# Patient Record
Sex: FEMALE | Race: WHITE | NOT HISPANIC OR LATINO | Employment: FULL TIME | URBAN - METROPOLITAN AREA
[De-identification: names, ages, dates, MRNs, and addresses within clinical notes are randomized per-mention and may not be internally consistent; named-entity substitution may affect disease eponyms.]

---

## 2017-01-01 ENCOUNTER — TRANSFERRED RECORDS (OUTPATIENT)
Dept: HEALTH INFORMATION MANAGEMENT | Facility: CLINIC | Age: 42
End: 2017-01-01

## 2017-01-01 LAB — PAP SMEAR - HIM PATIENT REPORTED: NEGATIVE

## 2019-02-06 ENCOUNTER — OFFICE VISIT (OUTPATIENT)
Dept: FAMILY MEDICINE | Facility: CLINIC | Age: 44
End: 2019-02-06
Payer: MEDICAID

## 2019-02-06 VITALS
SYSTOLIC BLOOD PRESSURE: 96 MMHG | TEMPERATURE: 98.2 F | DIASTOLIC BLOOD PRESSURE: 60 MMHG | WEIGHT: 168 LBS | BODY MASS INDEX: 27.12 KG/M2 | HEART RATE: 84 BPM

## 2019-02-06 DIAGNOSIS — M79.631 PAIN OF RIGHT FOREARM: Primary | ICD-10-CM

## 2019-02-06 PROCEDURE — 99214 OFFICE O/P EST MOD 30 MIN: CPT | Performed by: FAMILY MEDICINE

## 2019-02-06 RX ORDER — DOXYCYCLINE 100 MG/1
100 CAPSULE ORAL 2 TIMES DAILY
Qty: 20 CAPSULE | Refills: 0 | Status: SHIPPED | OUTPATIENT
Start: 2019-02-06 | End: 2019-03-16

## 2019-02-06 NOTE — PROGRESS NOTES
SUBJECTIVE:   Jihan Gill is a 43 year old female who presents to clinic today for the following health issues:      Concern - redness right arm  Onset: 2-3 days     Therapies Tried and outcome: NONE  Woke up one day and noticed some redness in the right forearm area with some tenderness and some swelling.  It has been painful.    Problem list and histories reviewed & adjusted, as indicated.      Patient Active Problem List   Diagnosis     Degeneration of lumbar or lumbosacral intervertebral disc     Major depression, recurrent (H)     Opiate addiction (H)     Methadone overdose (H)     Suicide attempt (H)     Depression     Past Surgical History:   Procedure Laterality Date     C/SECTION, LOW TRANSVERSE      [id number]     ENT SURGERY       GYN SURGERY       TONSILLECTOMY         Social History     Tobacco Use     Smoking status: Never Smoker     Smokeless tobacco: Never Used   Substance Use Topics     Alcohol use: Yes     Comment: States no EtOH since 2008.     No family history on file.      Current Outpatient Medications   Medication Sig Dispense Refill     doxycycline hyclate (VIBRAMYCIN) 100 MG capsule Take 1 capsule (100 mg) by mouth 2 times daily for 10 days 20 capsule 0     FLUoxetine (PROZAC) 40 MG capsule Take 1 capsule (40 mg) by mouth daily 30 capsule 1     gabapentin (NEURONTIN) 300 MG capsule Take 1 capsule (300 mg) by mouth 3 times daily 90 capsule 0     MULTIPLE VITAMIN PO Take  by mouth.       FLUoxetine (PROZAC) 40 MG capsule Take 1 capsule (40 mg) by mouth daily 30 capsule 0     methadone (DOLPHINE-INTENSOL) 10 mg/mL CONC Take 140 mg by mouth daily         Reviewed and updated as needed this visit by clinical staff  Tobacco  Allergies  Meds  Soc Hx      Reviewed and updated as needed this visit by Provider         ROS:  CONSTITUTIONAL: NEGATIVE for fever, chills, change in weight  ENT/MOUTH: NEGATIVE for ear, mouth and throat problems  RESP: NEGATIVE for significant cough or SOB  CV:  NEGATIVE for chest pain, palpitations or peripheral edema  MUSCULOSKELETAL: POSITIVE  for arm pain.    OBJECTIVE:                                                    BP 96/60   Pulse 84   Temp 98.2  F (36.8  C) (Tympanic)   Wt 76.2 kg (168 lb)   LMP 01/23/2019 (Approximate)   BMI 27.12 kg/m    Body mass index is 27.12 kg/m .   GENERAL: healthy, alert, well nourished, well hydrated, no distress  NECK: no tenderness, no adenopathy, no asymmetry, no masses, no stiffness; thyroid- normal to palpation  RESP: lungs clear to auscultation - no rales, no rhonchi, no wheezes  CV: regular rates and rhythm, normal S1 S2, no S3 or S4 and no murmur, no click or rub -  Right forearm area has a small area with tenderness and some induration and possible some pain with swelling.     ASSESSMENT/PLAN:                                                        ICD-10-CM    1. Pain of right forearm M79.631 US Extremity Upper Venous  lt     doxycycline hyclate (VIBRAMYCIN) 100 MG capsule     Exact etiology of forearm swelling and area of redness is not clear it could be underlying small cellulitis versus some bite versus superficial clot.  I would suggest to get an ultrasound of upper extremity meanwhile I will start her on doxycycline for possible underlying cellulitis.  Once ultrasound is done if there is no clot I would suggest continue doxycycline.  Warm compresses and follow-up if is not improving.  Warning signs were discussed with the patient    Rickie Duval MD  [facility]

## 2019-02-06 NOTE — Clinical Note
Please abstract the following data from this visit with this patient into the appropriate field in Epic:Pap smear done on this date: 1/2017 (approximately), by this group: [place], results were normal.

## 2019-03-16 ENCOUNTER — APPOINTMENT (OUTPATIENT)
Dept: GENERAL RADIOLOGY | Facility: CLINIC | Age: 44
End: 2019-03-16
Attending: INTERNAL MEDICINE
Payer: MEDICAID

## 2019-03-16 ENCOUNTER — HOSPITAL ENCOUNTER (INPATIENT)
Facility: CLINIC | Age: 44
LOS: 4 days | Discharge: HOME | End: 2019-03-20
Attending: EMERGENCY MEDICINE | Admitting: INTERNAL MEDICINE
Payer: MEDICAID

## 2019-03-16 DIAGNOSIS — L29.9 ITCHING: ICD-10-CM

## 2019-03-16 DIAGNOSIS — T42.6X2A GABAPENTIN OVERDOSE, INTENTIONAL SELF-HARM, INITIAL ENCOUNTER (H): ICD-10-CM

## 2019-03-16 DIAGNOSIS — F33.9 EPISODE OF RECURRENT MAJOR DEPRESSIVE DISORDER, UNSPECIFIED DEPRESSION EPISODE SEVERITY (H): ICD-10-CM

## 2019-03-16 DIAGNOSIS — T14.91XA SUICIDE ATTEMPT (H): ICD-10-CM

## 2019-03-16 DIAGNOSIS — T56.892A: ICD-10-CM

## 2019-03-16 DIAGNOSIS — F41.9 ANXIETY: ICD-10-CM

## 2019-03-16 DIAGNOSIS — R10.31 GROIN PAIN, RIGHT: Primary | ICD-10-CM

## 2019-03-16 LAB
ALBUMIN SERPL-MCNC: 4.1 G/DL (ref 3.4–5)
ALP SERPL-CCNC: 99 U/L (ref 40–150)
ALT SERPL W P-5'-P-CCNC: 303 U/L (ref 0–50)
ANION GAP SERPL CALCULATED.3IONS-SCNC: 1 MMOL/L (ref 3–14)
APAP SERPL-MCNC: <2 MG/L (ref 10–20)
AST SERPL W P-5'-P-CCNC: 207 U/L (ref 0–45)
BASOPHILS # BLD AUTO: 0 10E9/L (ref 0–0.2)
BASOPHILS NFR BLD AUTO: 0.3 %
BILIRUB SERPL-MCNC: 0.6 MG/DL (ref 0.2–1.3)
BUN SERPL-MCNC: 6 MG/DL (ref 7–30)
CALCIUM SERPL-MCNC: 9.6 MG/DL (ref 8.5–10.1)
CHLORIDE SERPL-SCNC: 108 MMOL/L (ref 94–109)
CO2 SERPL-SCNC: 32 MMOL/L (ref 20–32)
CREAT SERPL-MCNC: 0.62 MG/DL (ref 0.52–1.04)
DIFFERENTIAL METHOD BLD: ABNORMAL
EOSINOPHIL # BLD AUTO: 0.2 10E9/L (ref 0–0.7)
EOSINOPHIL NFR BLD AUTO: 2.8 %
ERYTHROCYTE [DISTWIDTH] IN BLOOD BY AUTOMATED COUNT: 15.3 % (ref 10–15)
ETHANOL SERPL-MCNC: <0.01 G/DL
GFR SERPL CREATININE-BSD FRML MDRD: >90 ML/MIN/{1.73_M2}
GLUCOSE BLDC GLUCOMTR-MCNC: 179 MG/DL (ref 70–99)
GLUCOSE BLDC GLUCOMTR-MCNC: 41 MG/DL (ref 70–99)
GLUCOSE SERPL-MCNC: 81 MG/DL (ref 70–99)
HCT VFR BLD AUTO: 35.6 % (ref 35–47)
HGB BLD-MCNC: 12 G/DL (ref 11.7–15.7)
IMM GRANULOCYTES # BLD: 0 10E9/L (ref 0–0.4)
IMM GRANULOCYTES NFR BLD: 0.1 %
LITHIUM SERPL-SCNC: 1.95 MMOL/L (ref 0.6–1.2)
LYMPHOCYTES # BLD AUTO: 2.7 10E9/L (ref 0.8–5.3)
LYMPHOCYTES NFR BLD AUTO: 37.8 %
MCH RBC QN AUTO: 30.8 PG (ref 26.5–33)
MCHC RBC AUTO-ENTMCNC: 33.7 G/DL (ref 31.5–36.5)
MCV RBC AUTO: 91 FL (ref 78–100)
MONOCYTES # BLD AUTO: 0.4 10E9/L (ref 0–1.3)
MONOCYTES NFR BLD AUTO: 6.1 %
NEUTROPHILS # BLD AUTO: 3.8 10E9/L (ref 1.6–8.3)
NEUTROPHILS NFR BLD AUTO: 52.9 %
NRBC # BLD AUTO: 0 10*3/UL
NRBC BLD AUTO-RTO: 0 /100
PLATELET # BLD AUTO: 300 10E9/L (ref 150–450)
POTASSIUM SERPL-SCNC: 3.8 MMOL/L (ref 3.4–5.3)
PROT SERPL-MCNC: 7.9 G/DL (ref 6.8–8.8)
RBC # BLD AUTO: 3.9 10E12/L (ref 3.8–5.2)
SALICYLATES SERPL-MCNC: <2 MG/DL
SODIUM SERPL-SCNC: 141 MMOL/L (ref 133–144)
WBC # BLD AUTO: 7.2 10E9/L (ref 4–11)

## 2019-03-16 PROCEDURE — 85025 COMPLETE CBC W/AUTO DIFF WBC: CPT | Performed by: EMERGENCY MEDICINE

## 2019-03-16 PROCEDURE — 96375 TX/PRO/DX INJ NEW DRUG ADDON: CPT

## 2019-03-16 PROCEDURE — 96376 TX/PRO/DX INJ SAME DRUG ADON: CPT

## 2019-03-16 PROCEDURE — 25000128 H RX IP 250 OP 636

## 2019-03-16 PROCEDURE — 20000003 ZZH R&B ICU

## 2019-03-16 PROCEDURE — 99291 CRITICAL CARE FIRST HOUR: CPT | Performed by: INTERNAL MEDICINE

## 2019-03-16 PROCEDURE — 40000986 XR ABDOMEN PORT 1 VW

## 2019-03-16 PROCEDURE — 96374 THER/PROPH/DIAG INJ IV PUSH: CPT

## 2019-03-16 PROCEDURE — 99285 EMERGENCY DEPT VISIT HI MDM: CPT

## 2019-03-16 PROCEDURE — 93005 ELECTROCARDIOGRAM TRACING: CPT

## 2019-03-16 PROCEDURE — 80329 ANALGESICS NON-OPIOID 1 OR 2: CPT | Performed by: EMERGENCY MEDICINE

## 2019-03-16 PROCEDURE — 00000146 ZZHCL STATISTIC GLUCOSE BY METER IP

## 2019-03-16 PROCEDURE — 25800025 ZZH RX 258

## 2019-03-16 PROCEDURE — 80053 COMPREHEN METABOLIC PANEL: CPT | Performed by: EMERGENCY MEDICINE

## 2019-03-16 PROCEDURE — 80178 ASSAY OF LITHIUM: CPT | Performed by: EMERGENCY MEDICINE

## 2019-03-16 PROCEDURE — 25800030 ZZH RX IP 258 OP 636: Performed by: INTERNAL MEDICINE

## 2019-03-16 PROCEDURE — 80320 DRUG SCREEN QUANTALCOHOLS: CPT | Performed by: EMERGENCY MEDICINE

## 2019-03-16 PROCEDURE — 25000132 ZZH RX MED GY IP 250 OP 250 PS 637: Performed by: EMERGENCY MEDICINE

## 2019-03-16 PROCEDURE — 25000128 H RX IP 250 OP 636: Performed by: EMERGENCY MEDICINE

## 2019-03-16 RX ORDER — NALOXONE HYDROCHLORIDE 0.4 MG/ML
.1-.4 INJECTION, SOLUTION INTRAMUSCULAR; INTRAVENOUS; SUBCUTANEOUS
Status: DISCONTINUED | OUTPATIENT
Start: 2019-03-16 | End: 2019-03-20 | Stop reason: HOSPADM

## 2019-03-16 RX ORDER — LORAZEPAM 2 MG/ML
.5-1 INJECTION INTRAMUSCULAR EVERY 4 HOURS PRN
Status: DISCONTINUED | OUTPATIENT
Start: 2019-03-16 | End: 2019-03-17

## 2019-03-16 RX ORDER — GABAPENTIN 600 MG/1
600 TABLET ORAL 3 TIMES DAILY
Status: ON HOLD | COMMUNITY
End: 2019-03-20

## 2019-03-16 RX ORDER — ONDANSETRON 2 MG/ML
4 INJECTION INTRAMUSCULAR; INTRAVENOUS ONCE
Status: COMPLETED | OUTPATIENT
Start: 2019-03-16 | End: 2019-03-16

## 2019-03-16 RX ORDER — LORAZEPAM 2 MG/ML
INJECTION INTRAMUSCULAR
Status: COMPLETED
Start: 2019-03-16 | End: 2019-03-16

## 2019-03-16 RX ORDER — DEXTROSE MONOHYDRATE 25 G/50ML
INJECTION, SOLUTION INTRAVENOUS
Status: COMPLETED
Start: 2019-03-16 | End: 2019-03-16

## 2019-03-16 RX ORDER — ALPRAZOLAM 1 MG
1 TABLET ORAL 3 TIMES DAILY PRN
Status: ON HOLD | COMMUNITY
End: 2019-03-20

## 2019-03-16 RX ORDER — LORAZEPAM 2 MG/ML
1 INJECTION INTRAMUSCULAR ONCE
Status: COMPLETED | OUTPATIENT
Start: 2019-03-16 | End: 2019-03-16

## 2019-03-16 RX ORDER — LITHIUM CARBONATE 300 MG
300 TABLET ORAL 2 TIMES DAILY
Status: ON HOLD | COMMUNITY
End: 2019-03-20

## 2019-03-16 RX ADMIN — LORAZEPAM 1 MG: 2 INJECTION INTRAMUSCULAR at 21:22

## 2019-03-16 RX ADMIN — LORAZEPAM 1 MG: 2 INJECTION INTRAMUSCULAR; INTRAVENOUS at 20:35

## 2019-03-16 RX ADMIN — LORAZEPAM 1 MG: 2 INJECTION INTRAMUSCULAR; INTRAVENOUS at 21:22

## 2019-03-16 RX ADMIN — DEXTROSE MONOHYDRATE 50 ML: 25 INJECTION, SOLUTION INTRAVENOUS at 22:15

## 2019-03-16 RX ADMIN — POLYETHYLENE GLYCOL 3350, SODIUM SULFATE ANHYDROUS, SODIUM BICARBONATE, SODIUM CHLORIDE, POTASSIUM CHLORIDE 4000 ML: 236; 22.74; 6.74; 5.86; 2.97 POWDER, FOR SOLUTION ORAL at 21:05

## 2019-03-16 RX ADMIN — DEXTROSE AND SODIUM CHLORIDE: 5; 900 INJECTION, SOLUTION INTRAVENOUS at 23:13

## 2019-03-16 RX ADMIN — ONDANSETRON 4 MG: 2 INJECTION INTRAMUSCULAR; INTRAVENOUS at 20:35

## 2019-03-16 ASSESSMENT — ENCOUNTER SYMPTOMS
PALPITATIONS: 0
ABDOMINAL PAIN: 0
AGITATION: 0
SHORTNESS OF BREATH: 0
NAUSEA: 1
HEADACHES: 0
VOMITING: 0

## 2019-03-16 ASSESSMENT — MIFFLIN-ST. JEOR
SCORE: 1353.08
SCORE: 1385

## 2019-03-17 PROBLEM — F31.81 BIPOLAR II DISORDER (H): Status: ACTIVE | Noted: 2019-02-23

## 2019-03-17 PROBLEM — F60.3 BORDERLINE PERSONALITY DISORDER (H): Status: ACTIVE | Noted: 2019-02-24

## 2019-03-17 PROBLEM — B17.10 ACUTE HEPATITIS C VIRUS INFECTION: Status: ACTIVE | Noted: 2019-03-17

## 2019-03-17 LAB
ANION GAP SERPL CALCULATED.3IONS-SCNC: 1 MMOL/L (ref 3–14)
ANION GAP SERPL CALCULATED.3IONS-SCNC: 2 MMOL/L (ref 3–14)
ANION GAP SERPL CALCULATED.3IONS-SCNC: 3 MMOL/L (ref 3–14)
BUN SERPL-MCNC: 2 MG/DL (ref 7–30)
BUN SERPL-MCNC: 4 MG/DL (ref 7–30)
BUN SERPL-MCNC: 5 MG/DL (ref 7–30)
BUN SERPL-MCNC: 5 MG/DL (ref 7–30)
BUN SERPL-MCNC: <1 MG/DL (ref 7–30)
CALCIUM SERPL-MCNC: 8.8 MG/DL (ref 8.5–10.1)
CALCIUM SERPL-MCNC: 8.8 MG/DL (ref 8.5–10.1)
CALCIUM SERPL-MCNC: 8.9 MG/DL (ref 8.5–10.1)
CALCIUM SERPL-MCNC: 9.2 MG/DL (ref 8.5–10.1)
CALCIUM SERPL-MCNC: 9.7 MG/DL (ref 8.5–10.1)
CHLORIDE SERPL-SCNC: 105 MMOL/L (ref 94–109)
CHLORIDE SERPL-SCNC: 106 MMOL/L (ref 94–109)
CHLORIDE SERPL-SCNC: 107 MMOL/L (ref 94–109)
CHLORIDE SERPL-SCNC: 107 MMOL/L (ref 94–109)
CHLORIDE SERPL-SCNC: 108 MMOL/L (ref 94–109)
CO2 SERPL-SCNC: 29 MMOL/L (ref 20–32)
CO2 SERPL-SCNC: 30 MMOL/L (ref 20–32)
CREAT SERPL-MCNC: 0.35 MG/DL (ref 0.52–1.04)
CREAT SERPL-MCNC: 0.65 MG/DL (ref 0.52–1.04)
CREAT SERPL-MCNC: 0.66 MG/DL (ref 0.52–1.04)
CREAT SERPL-MCNC: 0.67 MG/DL (ref 0.52–1.04)
CREAT SERPL-MCNC: 0.68 MG/DL (ref 0.52–1.04)
ERYTHROCYTE [DISTWIDTH] IN BLOOD BY AUTOMATED COUNT: 15 % (ref 10–15)
GFR SERPL CREATININE-BSD FRML MDRD: >90 ML/MIN/{1.73_M2}
GLUCOSE BLDC GLUCOMTR-MCNC: 116 MG/DL (ref 70–99)
GLUCOSE BLDC GLUCOMTR-MCNC: 122 MG/DL (ref 70–99)
GLUCOSE BLDC GLUCOMTR-MCNC: 125 MG/DL (ref 70–99)
GLUCOSE BLDC GLUCOMTR-MCNC: 197 MG/DL (ref 70–99)
GLUCOSE BLDC GLUCOMTR-MCNC: 68 MG/DL (ref 70–99)
GLUCOSE BLDC GLUCOMTR-MCNC: 68 MG/DL (ref 70–99)
GLUCOSE BLDC GLUCOMTR-MCNC: 80 MG/DL (ref 70–99)
GLUCOSE BLDC GLUCOMTR-MCNC: 83 MG/DL (ref 70–99)
GLUCOSE BLDC GLUCOMTR-MCNC: 84 MG/DL (ref 70–99)
GLUCOSE SERPL-MCNC: 106 MG/DL (ref 70–99)
GLUCOSE SERPL-MCNC: 149 MG/DL (ref 70–99)
GLUCOSE SERPL-MCNC: 78 MG/DL (ref 70–99)
GLUCOSE SERPL-MCNC: 88 MG/DL (ref 70–99)
GLUCOSE SERPL-MCNC: 97 MG/DL (ref 70–99)
HCT VFR BLD AUTO: 35.2 % (ref 35–47)
HGB BLD-MCNC: 11.6 G/DL (ref 11.7–15.7)
INTERPRETATION ECG - MUSE: NORMAL
LITHIUM SERPL-SCNC: 0.61 MMOL/L (ref 0.6–1.2)
LITHIUM SERPL-SCNC: 0.67 MMOL/L (ref 0.6–1.2)
LITHIUM SERPL-SCNC: 0.87 MMOL/L (ref 0.6–1.2)
LITHIUM SERPL-SCNC: 0.99 MMOL/L (ref 0.6–1.2)
LITHIUM SERPL-SCNC: 1.01 MMOL/L (ref 0.6–1.2)
LITHIUM SERPL-SCNC: 1.17 MMOL/L (ref 0.6–1.2)
LITHIUM SERPL-SCNC: 1.21 MMOL/L (ref 0.6–1.2)
LITHIUM SERPL-SCNC: 1.28 MMOL/L (ref 0.6–1.2)
LITHIUM SERPL-SCNC: 1.71 MMOL/L (ref 0.6–1.2)
LITHIUM SERPL-SCNC: 2.74 MMOL/L (ref 0.6–1.2)
LITHIUM SERPL-SCNC: >6 MMOL/L (ref 0.6–1.2)
MAGNESIUM SERPL-MCNC: 2 MG/DL (ref 1.6–2.3)
MCH RBC QN AUTO: 30.2 PG (ref 26.5–33)
MCHC RBC AUTO-ENTMCNC: 33 G/DL (ref 31.5–36.5)
MCV RBC AUTO: 92 FL (ref 78–100)
PLATELET # BLD AUTO: 240 10E9/L (ref 150–450)
POTASSIUM SERPL-SCNC: 2.9 MMOL/L (ref 3.4–5.3)
POTASSIUM SERPL-SCNC: 3.3 MMOL/L (ref 3.4–5.3)
POTASSIUM SERPL-SCNC: 3.8 MMOL/L (ref 3.4–5.3)
POTASSIUM SERPL-SCNC: 3.9 MMOL/L (ref 3.4–5.3)
POTASSIUM SERPL-SCNC: 4 MMOL/L (ref 3.4–5.3)
RBC # BLD AUTO: 3.84 10E12/L (ref 3.8–5.2)
SODIUM SERPL-SCNC: 137 MMOL/L (ref 133–144)
SODIUM SERPL-SCNC: 138 MMOL/L (ref 133–144)
SODIUM SERPL-SCNC: 138 MMOL/L (ref 133–144)
SODIUM SERPL-SCNC: 140 MMOL/L (ref 133–144)
SODIUM SERPL-SCNC: 141 MMOL/L (ref 133–144)
WBC # BLD AUTO: 6.8 10E9/L (ref 4–11)

## 2019-03-17 PROCEDURE — 80178 ASSAY OF LITHIUM: CPT | Performed by: INTERNAL MEDICINE

## 2019-03-17 PROCEDURE — 36415 COLL VENOUS BLD VENIPUNCTURE: CPT | Performed by: INTERNAL MEDICINE

## 2019-03-17 PROCEDURE — 25000132 ZZH RX MED GY IP 250 OP 250 PS 637: Performed by: INTERNAL MEDICINE

## 2019-03-17 PROCEDURE — 87641 MR-STAPH DNA AMP PROBE: CPT | Performed by: INTERNAL MEDICINE

## 2019-03-17 PROCEDURE — 06HF33Z INSERTION OF INFUSION DEVICE INTO RIGHT EXTERNAL ILIAC VEIN, PERCUTANEOUS APPROACH: ICD-10-PCS | Performed by: INTERNAL MEDICINE

## 2019-03-17 PROCEDURE — G0499 HEPB SCREEN HIGH RISK INDIV: HCPCS | Performed by: INTERNAL MEDICINE

## 2019-03-17 PROCEDURE — 99233 SBSQ HOSP IP/OBS HIGH 50: CPT | Performed by: INTERNAL MEDICINE

## 2019-03-17 PROCEDURE — 99222 1ST HOSP IP/OBS MODERATE 55: CPT | Performed by: PSYCHIATRY & NEUROLOGY

## 2019-03-17 PROCEDURE — 90937 HEMODIALYSIS REPEATED EVAL: CPT

## 2019-03-17 PROCEDURE — 83735 ASSAY OF MAGNESIUM: CPT | Performed by: INTERNAL MEDICINE

## 2019-03-17 PROCEDURE — 25000132 ZZH RX MED GY IP 250 OP 250 PS 637: Performed by: PSYCHIATRY & NEUROLOGY

## 2019-03-17 PROCEDURE — 80048 BASIC METABOLIC PNL TOTAL CA: CPT | Performed by: INTERNAL MEDICINE

## 2019-03-17 PROCEDURE — 85027 COMPLETE CBC AUTOMATED: CPT | Performed by: INTERNAL MEDICINE

## 2019-03-17 PROCEDURE — 20000003 ZZH R&B ICU

## 2019-03-17 PROCEDURE — 25000128 H RX IP 250 OP 636: Performed by: INTERNAL MEDICINE

## 2019-03-17 PROCEDURE — 25800025 ZZH RX 258

## 2019-03-17 PROCEDURE — 00000146 ZZHCL STATISTIC GLUCOSE BY METER IP

## 2019-03-17 PROCEDURE — 5A1D70Z PERFORMANCE OF URINARY FILTRATION, INTERMITTENT, LESS THAN 6 HOURS PER DAY: ICD-10-PCS | Performed by: INTERNAL MEDICINE

## 2019-03-17 PROCEDURE — 87640 STAPH A DNA AMP PROBE: CPT | Performed by: INTERNAL MEDICINE

## 2019-03-17 PROCEDURE — 25800025 ZZH RX 258: Performed by: INTERNAL MEDICINE

## 2019-03-17 RX ORDER — POTASSIUM CHLORIDE 7.45 MG/ML
10 INJECTION INTRAVENOUS
Status: DISCONTINUED | OUTPATIENT
Start: 2019-03-17 | End: 2019-03-20 | Stop reason: HOSPADM

## 2019-03-17 RX ORDER — DEXTROSE MONOHYDRATE 100 MG/ML
INJECTION, SOLUTION INTRAVENOUS CONTINUOUS
Status: DISCONTINUED | OUTPATIENT
Start: 2019-03-17 | End: 2019-03-18

## 2019-03-17 RX ORDER — POTASSIUM CHLORIDE 29.8 MG/ML
20 INJECTION INTRAVENOUS
Status: DISCONTINUED | OUTPATIENT
Start: 2019-03-17 | End: 2019-03-20 | Stop reason: HOSPADM

## 2019-03-17 RX ORDER — DEXTROSE MONOHYDRATE 25 G/50ML
50 INJECTION, SOLUTION INTRAVENOUS ONCE
Status: COMPLETED | OUTPATIENT
Start: 2019-03-17 | End: 2019-03-17

## 2019-03-17 RX ORDER — ONDANSETRON 8 MG/1
8 TABLET, FILM COATED ORAL EVERY 6 HOURS PRN
Status: DISCONTINUED | OUTPATIENT
Start: 2019-03-17 | End: 2019-03-20 | Stop reason: HOSPADM

## 2019-03-17 RX ORDER — POTASSIUM CHLORIDE 1500 MG/1
20-40 TABLET, EXTENDED RELEASE ORAL
Status: DISCONTINUED | OUTPATIENT
Start: 2019-03-17 | End: 2019-03-20 | Stop reason: HOSPADM

## 2019-03-17 RX ORDER — POTASSIUM CL/LIDO/0.9 % NACL 10MEQ/0.1L
10 INTRAVENOUS SOLUTION, PIGGYBACK (ML) INTRAVENOUS
Status: DISCONTINUED | OUTPATIENT
Start: 2019-03-17 | End: 2019-03-20 | Stop reason: HOSPADM

## 2019-03-17 RX ORDER — POTASSIUM CHLORIDE 1.5 G/1.58G
20-40 POWDER, FOR SOLUTION ORAL
Status: DISCONTINUED | OUTPATIENT
Start: 2019-03-17 | End: 2019-03-20 | Stop reason: HOSPADM

## 2019-03-17 RX ORDER — MAGNESIUM SULFATE HEPTAHYDRATE 40 MG/ML
4 INJECTION, SOLUTION INTRAVENOUS EVERY 4 HOURS PRN
Status: DISCONTINUED | OUTPATIENT
Start: 2019-03-17 | End: 2019-03-20 | Stop reason: HOSPADM

## 2019-03-17 RX ORDER — DEXTROSE MONOHYDRATE 25 G/50ML
INJECTION, SOLUTION INTRAVENOUS
Status: COMPLETED
Start: 2019-03-17 | End: 2019-03-17

## 2019-03-17 RX ADMIN — SODIUM CHLORIDE 300 ML: 9 INJECTION, SOLUTION INTRAVENOUS at 06:45

## 2019-03-17 RX ADMIN — SODIUM CHLORIDE 250 ML: 9 INJECTION, SOLUTION INTRAVENOUS at 13:10

## 2019-03-17 RX ADMIN — DEXTROSE MONOHYDRATE 50 ML: 500 INJECTION PARENTERAL at 00:29

## 2019-03-17 RX ADMIN — DEXTROSE MONOHYDRATE 50 ML: 25 INJECTION, SOLUTION INTRAVENOUS at 00:29

## 2019-03-17 RX ADMIN — LORAZEPAM 1 MG: 2 INJECTION INTRAMUSCULAR; INTRAVENOUS at 08:41

## 2019-03-17 RX ADMIN — DEXTROSE MONOHYDRATE: 100 INJECTION, SOLUTION INTRAVENOUS at 05:42

## 2019-03-17 RX ADMIN — HEPARIN SODIUM 3000 UNITS: 1000 INJECTION INTRAVENOUS; SUBCUTANEOUS at 13:10

## 2019-03-17 RX ADMIN — LORAZEPAM 1 MG: 2 INJECTION INTRAMUSCULAR; INTRAVENOUS at 02:44

## 2019-03-17 RX ADMIN — VORTIOXETINE 5 MG: 5 TABLET, FILM COATED ORAL at 13:53

## 2019-03-17 RX ADMIN — POLYETHYLENE GLYCOL 3350, SODIUM SULFATE ANHYDROUS, SODIUM BICARBONATE, SODIUM CHLORIDE, POTASSIUM CHLORIDE 4000 ML: 236; 22.74; 6.74; 5.86; 2.97 POWDER, FOR SOLUTION ORAL at 00:41

## 2019-03-17 RX ADMIN — LORAZEPAM 1 MG: 2 INJECTION INTRAMUSCULAR; INTRAVENOUS at 13:57

## 2019-03-17 ASSESSMENT — ACTIVITIES OF DAILY LIVING (ADL)
ADLS_ACUITY_SCORE: 13
ADLS_ACUITY_SCORE: 11
ADLS_ACUITY_SCORE: 13
ADLS_ACUITY_SCORE: 11
ADLS_ACUITY_SCORE: 11
ADLS_ACUITY_SCORE: 13

## 2019-03-17 NOTE — PROVIDER NOTIFICATION
Brief update    Paged as pt w/ a shaking episode    Looked cold, denied shivering/feeling cold on nursing assessment. Alert and conversant.    Note episodes of hypoglycemia. Have increased rate of D5    Recheck blood glucose now.    Seizure precautions.     Episode resolved. Seems less likely to be seizure as brief and pt alert during, but would monitor closely.    Lithium toxicity can cause tremulousness, and gabapentin overdose can cause ZULEYKA depression w/ jerking motions.    Robi Hinson MD  1:26 AM

## 2019-03-17 NOTE — H&P
[facility]  History and Physical  Hospitalist       Date of Admission:  3/16/2019    Assessment & Plan   Jihan Gill is a 43 year old female with extensive psychiatric history (bipolar disorder, major depression, anxiety, repeated suicide attempts, PTSD, benzodiazepine and opiate dependence, alcohol use disorder), hepatitis C who was admitted on 3/16/2019 with suicide attempt by intentional overdose with lithium and gabapentin.     Suicide attempt by intentional overdose with lithium and gabapentin  Repeated suicide attempts by overdose  Depression  PTSD  Borderline Personality Disorder  Repeated suicide attempts by intentional overdose over many years with an array of medications (acetaminophen, methadone, lithium, etc). Reports taking lithium since ~ age 28 years. Most recently hospitalized at [facility] then transferred to [facility] for psychiatric care, ECT and dialysis, discharged 3/13. [place] level 1.9 in ED. Creatinine 0.62. Anion gap 1. Acetaminophen, ethanol, salicylate negative. Significant chance she will need dialysis if lithium level continues to increase. Appears she was given xanax previously but did not admit to overdosing with this med. No hallucinations. Telemetry with NSR.   -Admit to ICU  -NGT with golytely administration for whole bowel irrigation overnight (per poison control recommendations)  -recheck serum lithium levels and BMP q 4 hours overnight.   -nephrology consult for consideration of dialysis if lithium level >2.5  -telemetry to monitor for prolonged QT and symptomatic bradycardia  -avoid any QT prolonging medications  -q 2 hour neuro checks  -psych consultation tomorrow  -cautious use of prn IV ativan    Methamphetamine Abuse  Opiate Addiction  May contribute to other withdrawal symptoms or delirium above.   -utox  -supportive care    Hypoglycemia  Glucose 68 and D50 given. No h/o diabetes or any insulin given.   -additional D50 if  "needed    Hepatitis C, genotype 1a  Noted in [facility] documentation.   HCV RNA Detect/Quant, S [id number] (A)   Persistently elevated ALT and AST noted.   -Outpatient follow-up already scheduled at [facility] hepatology clinic on 3/27/2019    DVT prophylaxis: Pneumatic Compression Devices  Code Status: Prior Full    Disposition: Expected discharge in 3-5 days to inpatient psychiatry. For now needs ICU level of care and likely will need dialysis.     Daniela Gonzalez MD  Text Page    ~~~~~~~~~~~~~~~~~~~~~~~~~~~~~~~~~~~~~~~~~~~~~~~~~~~~~  Primary Care Physician   Physician No Ref-Primary    Chief Complaint   Suicide attempt by overdose    History is obtained from the patient and medical records.    History of Present Illness   Jihan Gill is a 43 year old female who presented to the ED after suicide attempt by overdose. She was recently hospitalized at [facility] for intentional overdose with lithium, acute renal failure and need for dialysis, then transferred to [facility] for further care and inpatient psychiatric hospitalization. Reports she was there about 2 weeks then thought she'd do ok at home. Discharged 3/13 and now reports she'd like to go back there and is thinking of moving to the [facility] and trying a day treatment program. She expresses remorse that she keeps waking up and \"here I am...still alive\" after taking 60 ER lithium tablets and 20 600 mg gabapentin tablets. She wonders what she will get for anxiety now since I told her she can't get gabapentin due to the ingestion. Endorses nausea. Does not know if she's making any urine since she is having profuse bowel movements due to gloytely. Denies hallucinations, confusion, headache, vision changes, palpitations, shortness of breath.     Discussed with Dr. Graham from the ED. He discussed case with nephrology for possible dialysis needed. Poison control recommended whole bowel irrigation with golytely. Had an NG placed in ED which she " dislodged in the ICU, prompting placement of a new one.     Past Medical History    I have reviewed this patient's medical history and updated it with pertinent information if needed.   Past Medical History:   Diagnosis Date     Arthritis      Depressive disorder     postpartum     Depressive disorder      Major depression, recurrent (H)      Opiate addiction (H)      Seizures (H)     narcotic withdrawal     Urinary calculus, unspecified     Renal stones       Past Surgical History   I have reviewed this patient's surgical history and updated it with pertinent information if needed.  Past Surgical History:   Procedure Laterality Date     C/SECTION, LOW TRANSVERSE      [id number]     ENT SURGERY       GYN SURGERY       TONSILLECTOMY         Prior to Admission Medications   Prior to Admission Medications   Prescriptions Last Dose Informant Patient Reported? Taking?   ALPRAZolam (XANAX) 1 MG tablet  Self Yes Yes   Sig: Take 1 mg by mouth 3 times daily as needed for anxiety   gabapentin (NEURONTIN) 600 MG tablet  Self Yes Yes   Sig: Take 600 mg by mouth 3 times daily    lithium (ESKALITH) 300 MG tablet  Self Yes Yes   Sig: Take 300 mg by mouth 2 times daily      Facility-Administered Medications: None     Allergies   Allergies   Allergen Reactions     Abilify [Aripiprazole] Other (See Comments)     Tardive dyskinesia     Compazine Other (See Comments)     Dystonia     Dramamine      Reglan Other (See Comments)     dystonia     Seroquel [Quetiapine] Other (See Comments)     Tardive dyskinesia.        Social History   I have reviewed this patient's social history and updated it with pertinent information if needed. Jihan CARRERO Jairo  reports that  has never smoked. she has never used smokeless tobacco. She reports that she drinks alcohol. She reports that she uses drugs.    Family History   I have reviewed this patient's family history and updated it with pertinent information if needed. Aunt  by suicide.     Review  of Systems   The 10 point Review of Systems is negative other than noted in the HPI or here.     Physical Exam   Temp: 98.7  F (37.1  C) Temp src: Oral BP: (!) 105/94 Pulse: 72 Heart Rate: 84 Resp: 10 SpO2: 99 % O2 Device: None (Room air)    Vital Signs with Ranges  Temp:  [98.7  F (37.1  C)] 98.7  F (37.1  C)  Pulse:  [72-87] 72  Heart Rate:  [84-87] 84  Resp:  [10-20] 10  BP: (105-119)/(55-94) 105/94  SpO2:  [99 %-100 %] 99 %  155 lbs 0 oz    Constitutional: Alert, NAD, pleasant and interactive, jittery  Eyes: PERRL, sclerae anicteric  HEENT: mmm, atraumatic  Respiratory: Lungs CTAB, no wheezes or crackles  Cardiovascular: RRR, no murmurs  no LE edema  GI: soft, non-tender, non-distended, normal bowel sounds  Skin/Integument: warm, dry, no acute rashes  Lymph/Hematologic: no supra or infraclavicular lymphadenopathy, no petechiae   Genitourinary: not examined  Musc: LITTLEJOHN, normal limb strength x 4  Neuro: AOx3, no focal deficits, no tremors or tongue fasciculations  Psych: + suicidal ideation, +anxious, fidgeting, not confused, no hallucinations    Data   Data reviewed today:  I personally reviewed the EKG tracing showing NSR with no QT prolongation.  Recent Labs   Lab 03/16/19 2020   WBC 7.2   HGB 12.0   MCV 91         POTASSIUM 3.8   CHLORIDE 108   CO2 32   BUN 6*   CR 0.62   ANIONGAP 1*   ALEXEY 9.6   GLC 81   ALBUMIN 4.1   PROTTOTAL 7.9   BILITOTAL 0.6   ALKPHOS 99   *   *       Imaging:  No results found for this or any previous visit (from the past 24 hour(s)).  Critical Care Time: 30 minutes. Patient in stabe room in ED then examined again in ICU.
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
## 2019-03-17 NOTE — PHARMACY-ADMISSION MEDICATION HISTORY
Admission medication history interview status for the 3/16/2019  admission is complete. See EPIC admission navigator for prior to admission medications     Medication history source reliability:Moderate    Actions taken by pharmacist (provider contacted, etc):None     Additional medication history information not noted on PTA med list :None    Medication reconciliation/reorder completed by provider prior to medication history? No    Time spent in this activity: 10 minutes    Prior to Admission medications    Medication Sig Last Dose Taking? Auth Provider   ALPRAZolam (XANAX) 1 MG tablet Take 1 mg by mouth 3 times daily as needed for anxiety  Yes Unknown, Entered By History   gabapentin (NEURONTIN) 600 MG tablet Take 600 mg by mouth 3 times daily   Yes Unknown, Entered By History   lithium (ESKALITH) 300 MG tablet Take 300 mg by mouth 2 times daily  Yes Unknown, Entered By History

## 2019-03-17 NOTE — PROGRESS NOTES
Lithium level now < 1 x three checks.  Latest 0.67.  Therefore done with dialysis for now.  She could rebound so I will order Q 4 H lithium levels and follow.  If she rises up > 2.5 again, she may need another run.    William Howell MD

## 2019-03-17 NOTE — PROGRESS NOTES
Procedure: Dialysis catheter placement - R femoral.      Consent: Written from pt    Anesthesia: 1 % lidocaine    Full Barrier  Precautions: Used    Narrative:  R femoral area prepped and draped in usual fashion.  1% lidocaine infiltrated locally.  Vein found with seeker needle.  Introducer needle advanced into R fem vein.  A narrow window of blood return.  Wire advanced eventually without resistance.  24 cm HD  Catheter then placed by modified Seldinger technique.  Good blood return.  Flushed.  Capped.  Sutured in place.  Dressing applied.        Initial attempt was R internal jugular.  Vein seen on US.  It was a little collapsed.  She had an internal jugular catheter for HD last month for lithium ingestion.  I was not able to cannulate the vein.      Complications:  None      William Howell MD

## 2019-03-17 NOTE — PROVIDER NOTIFICATION
Brief update    Lithium level returned >6 (last 1.9 range)    Nephrology to be contacted, anticipate dialysis. (Confirmed. Discussed with Dr Howell)    Robi Hinson MD  3:58 AM    Recurrent hypoglycemia despite increased D5 at 150/hr    Have switched to D10 at 150/hr. Note pt to initiate dialysis at this time.    Robi Hinson MD
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
## 2019-03-17 NOTE — PROGRESS NOTES
Intenisvist:    43F with pmh of depression who presented tonight after suicide attempt by ingestion apparently on lithium and gabapentin.  Hemodynamically stable, satting well on room air, sleeping but rousable.  Creat ok 0.62. Alt/ast 303/207.  Actaminophen, salicylate and etoh levels all negative.  Lithium level 1.95.  Nephrology service following.  Trending lithium level.  Undergoing whole bowel lavage.    Intensivist service is available for formal consultation or emergent intervention as needed.

## 2019-03-17 NOTE — PLAN OF CARE
VSS.  Patient had 6 hour HD run, tolerated well.  Lithium level down, recheck pending.  If lithium level > 2.5, plan for dialysis.  Attendant at the bedside.  Patient cooperative, anxious at times, received ativan 1 mg x 2.  Patient denies any suicidal ideation, but did state that she feels depressed.  Patient advanced to regular diet, tolerating well.  Voiding adequately.  Patient on bedrest as she has femoral access, but assists with turning.  Patient updated on POC, questions answered.

## 2019-03-17 NOTE — PROVIDER NOTIFICATION
Sravan Ruiz, hospitalist notfied of shivering activity, pt. Temp WNLa nd neuro cehcks WNL, will place pt. On SZ precautions and follow glucose closely (glucose also not low at time of shivering but having some low gluose requring D50).

## 2019-03-17 NOTE — PROGRESS NOTES
Current condition (current mood & behavior): agreeable, cooperative yet withdrawn  Sitter present: yes YES (RN 1:1)  Every 15 minute documentation by NA/RN completed for Shift: yes YES  Room safety Suicide Checklist completed in Epic: yes YES  Patient's color of severity (suicide scale): RED   Order for psych consult placed (if appropriate): yes YES  Suicide care plan added: no  NO    Dillon Mercer

## 2019-03-17 NOTE — CONSULTS
[facility]    Nephrology Consultation     Date of Admission:  3/16/2019    Assessment & Plan      Jihan Gill is a 43 year old female who was admitted on 3/16/2019.     1) BAD II - treated with lithium.    2) Lithium Ingestion:   Level  Is > 6.  She clearly needs dialysis.  This is [place] [facility] so it may be a prolonged run.  Need to continue HD until level is < 1.      3) Gabapentin Ingestion:  Treatment is supportive.      4) Chronic Hepatitis C    Plan:    Hemodialysis for lithium toxicity  Continue until level < 1.  Need to monitor for rebound.  She could need a second hemodialysis run.      William Howell MD  [facility] - Nephrology  [phone]    Reason for Consult      I was asked to see the patient for lithium ingestion.       Primary Care Physician      Physician No Ref-Primary    Chief Complaint      Lithium ingestion.     History is obtained from the patient and chart review.      History of Present Illness      Jihan Gill is a 43 year old female who presents with lithium ingestion.      She has been under treatment for BAD II with use of lithium.  She  Was just released from [facility] 2 days ago after receiving treatment including ECT.    She was given prescriptions including lithium which she filled last evening.    In effort to end her life she took sixty 300 mg lithium carbonate ER.    She was taken to ED by her boyfriend.     She underwent bowel irrigation with 4 liters of Golytely.   Her initial Lithium level was 1.9.  Her latest is > 6.    She is sleepy after some lorazepam but does awaken and is coherent and cooperative.      She has had some nausea and loose stool, the latter from Golytely.       She had a similar lithium ingestion last month and had dialysis for same at [facility].      Past Medical History   I have reviewed this patient's medical history and updated it with pertinent information if needed.   Past Medical History:   Diagnosis Date      Arthritis      Depressive disorder     postpartum     Depressive disorder      Major depression, recurrent (H)      Opiate addiction (H)      Seizures (H)     narcotic withdrawal     Urinary calculus, unspecified 2001    Renal stones       Past Surgical History   I have reviewed this patient's surgical history and updated it with pertinent information if needed.  Past Surgical History:   Procedure Laterality Date     C/SECTION, LOW TRANSVERSE      [id number]     ENT SURGERY       GYN SURGERY       TONSILLECTOMY         Prior to Admission Medications   Prior to Admission Medications   Prescriptions Last Dose Informant Patient Reported? Taking?   ALPRAZolam (XANAX) 1 MG tablet  Self Yes Yes   Sig: Take 1 mg by mouth 3 times daily as needed for anxiety   gabapentin (NEURONTIN) 600 MG tablet  Self Yes Yes   Sig: Take 600 mg by mouth 3 times daily    lithium (ESKALITH) 300 MG tablet  Self Yes Yes   Sig: Take 300 mg by mouth 2 times daily      Facility-Administered Medications: None     Allergies   Allergies   Allergen Reactions     Abilify [Aripiprazole] Other (See Comments)     Tardive dyskinesia     Compazine Other (See Comments)     Dystonia     Dramamine      Reglan Other (See Comments)     dystonia     Seroquel [Quetiapine] Other (See Comments)     Tardive dyskinesia.        Social History   I have reviewed this patient's social history and updated it with pertinent information if needed. Jihan Gill  reports that  has never smoked. she has never used smokeless tobacco. She reports that she drinks alcohol. She reports that she uses drugs.    Family History   I have reviewed this patient's family history and updated it with pertinent information if needed.   No family history on file.    Review of Systems   The 10 point Review of Systems is negative other than noted in the HPI.      Physical Exam   Temp: 98  F (36.7  C) Temp src: Oral BP: 99/60 Pulse: 95 Heart Rate: 96 Resp: 17 SpO2: 95 % O2 Device: None (Room  air)    Vital Signs with Ranges  Temp:  [97.8  F (36.6  C)-98.7  F (37.1  C)] 98  F (36.7  C)  Pulse:  [] 95  Heart Rate:  [] 96  Resp:  [9-54] 17  BP: ()/(55-94) 99/60  SpO2:  [92 %-100 %] 95 %  162 lbs .61 oz    GENERAL: sleepy,   HEENT:  Normocephalic. No gross abnormalities.  Pupils equal.  MMM.  Dentition is ok.  CV: RRR, no murmurs, no clicks, gallops, or rubs, no edema, no carotid bruits  RESP: Clear bilaterally with good efforts  GI: Abdomen soft/nt/nd, BS normal. No masses, organomegaly  MUSCULOSKELETAL: extremities nl - no gross deformities noted  SKIN: no suspicious lesions or rashes, dry to touch  NEURO:  Strength normal and symmetric.   PSYCH: mood good, affect appropriate  LYMPH: No palpable ant/post cervical and supraclavicular adenopathy    Data   BMP  Recent Labs   Lab 03/17/19  0220 03/16/19 2020    141   POTASSIUM 3.3* 3.8   CHLORIDE 105 108   ALEXEY 9.7 9.6   CO2 30 32   BUN 5* 6*   CR 0.66 0.62   GLC 78 81     [email or web address](phos:4)  CBC)  Recent Labs   Lab 03/16/19 2020   WBC 7.2   HGB 12.0   HCT 35.6   MCV 91        Recent Labs   Lab 03/16/19 2020   *   *   ALKPHOS 99   BILITOTAL 0.6     No lab results found in last 7 days.  No results found for: D2VIT, D3VIT, DTOT  Recent Labs   Lab 03/16/19 2020   HGB 12.0   HCT 35.6   MCV 91     No results for input(s): PTHI in the last 168 hours.

## 2019-03-17 NOTE — PLAN OF CARE
Pt. Admittied from Ed after Litthium and gabapentin ingestion with intent to harm self/end life, was in room air with one deep sleep snorind episode with sats to 8, pt. Was awoken and sats rebounded to WNL, good respiratory, and cardiac exam with the exception of cool extremities, laxatives given after replaceing NG that was dislodged, pt. With frequent watery stool that cleared over shift, no pills in stool noted (urine output hard to determine with wateriness off stool), poison control updated through shift, low glucoses treated with D50 X2, IV D5 rate increased an then change to D10 fluid, pt. Neuro exam intact with exception of one shivering episode, hospitalist MD notified and pt. Glucose checked and re-checked and was WNL or high around time of shivering episode, seizure precautions placed, hospitalist also notified of high Lithium level that took two attempts to process in LAB to ensure accuracy (delaying result), hospitalist MD notified who the paged nephrology with plan to dialyze pt, nephrology MD unable to place jugular catheter, and ultimately femoral catheter placed after pt. Did sign consent, pt. Was agreeable and cooperative yet withdrawn, she did ambulatte under own power to bedside commode, dialysis starting at change of shift.

## 2019-03-17 NOTE — ED NOTES
Bed: [facility]  Expected date: 3/16/19  Expected time: 7:40 PM  Means of arrival: Ambulance  Comments:  HEMS 423 43F Int. Lithium overdose

## 2019-03-17 NOTE — PROGRESS NOTES
[facility]    Medicine Progress Note - Hospitalist Service       Date of Admission:  3/16/2019  Assessment & Plan   Jihan Gill is a 43 year-old female with extensive psychiatric history (bipolar disorder, major depression, anxiety, repeated suicide attempts, PTSD, benzodiazepine and opiate dependence, alcohol use disorder), hepatitis C who was admitted on 3/16/2019 with suicide attempt by intentional overdose with lithium and gabapentin.      Suicide attempt by intentional overdose with lithium and gabapentin  Repeated suicide attempts by overdose  Depression  PTSD  Borderline Personality Disorder  Repeated suicide attempts by intentional overdose over many years with an array of medications (acetaminophen, methadone, lithium, etc). Reports taking lithium since ~ age 28 years. Most recently hospitalized at [facility] then transferred to [facility] for psychiatric care, ECT and dialysis, discharged 3/13. [place] level 1.9 in ED. Creatinine 0.62. Anion gap 1. Acetaminophen, ethanol, salicylate negative. NGT with golytely administration for whole bowel irrigation started on admission (per poison control recommendations).  - Lithium level peak of >6.00, initiated on HD. Dialyzing per femoral line.   - Nephrology consulted, appreciate assistance  - Continue ICU cares  - Serial lithium level per nephrology  - Psychiatry consulted  - Suicide precautions  - Telemetry   - Patient requesting transfer to [place] for psychiatric care. Can reassess when medically stable.   - Per RN, poison control recommending only 1 round of Go-Lytely, can remove NG  - Clear liquid diet, advance as tolerated once able to sit upright after HD    Hypoglycemia  Suspected due to lithium overdose  - Continue D10 infusion until diet reliably advanced and glucose stable   - Monitor BMP to ensure not precipitating hyponatremia, currently has serial BMP for lithium toxicity.    Hypokalemia  - Check magnesium  - Monitor  and replace per protocol     Methamphetamine Abuse  Opiate Addiction  She reports her last use of street drugs was 2-3 weeks ago.   - Urine toxicology pending     Hepatitis C, genotype 1a  Noted in [facility] documentation with persistently elevated ALT and AST  - Outpatient follow-up already scheduled at [facility] hepatology clinic on 3/27/2019    Diet: Advance Diet as Tolerated: Clear Liquid Diet; Regular Diet Adult    DVT Prophylaxis: Pneumatic Compression Devices  Lezama Catheter: not present  Code Status: Full Code      Disposition Plan   Expected discharge: 2+ days, likely to psychiatric hospital once medically stable. Continuing cares in ICU until stable off of HD.  Entered: Salvador Zavala MD 03/17/2019, 9:47 AM       The patient's care was discussed with the Bedside Nurse and Patient.    Salvador Zavala MD  Hospitalist Service  [facility]    ______________________________________________________________________    Interval History   Overnight was noted to be persistently hypoglycemic and was also initiated on HD for elevated lithium level. This morning she denies any complaints, specifically denies chest pain, shortness of breath, abdominal pain, n/v. Stooling adequately with Golytely. She reports she only took lithium, denies any other ingestions. Reports last street drug use was 2-3 weeks ago.     Data reviewed today: I reviewed all medications, new labs and imaging results over the last 24 hours. I personally reviewed no images or EKG's today.    Physical Exam   Vital Signs: Temp: 98.1  F (36.7  C) Temp src: Oral BP: 118/70 Pulse: 112 Heart Rate: 111 Resp: 14 SpO2: 99 % O2 Device: None (Room air)    Weight: 162 lbs .61 oz    Constitutional: NAD  Respiratory: Clear to auscultation bilaterally, good air movement bilaterally  Cardiovascular: RRR, no m/r/g. No peripheral edema.  GI: Soft, non-tender, non-distended.   Skin/Integumen: Warm, dry  Other: Alert. Oriented to person, place. Off  on month/date. Following commands.      Data   Recent Labs   Lab 03/17/19  0710 03/17/19  0220 03/16/19 2020   WBC 6.8  --  7.2   HGB 11.6*  --  12.0   MCV 92  --  91     --  300    138 141   POTASSIUM 2.9* 3.3* 3.8   CHLORIDE 107 105 108   CO2 30 30 32   BUN 4* 5* 6*   CR 0.68 0.66 0.62   ANIONGAP 3 3 1*   ALEXEY 8.8 9.7 9.6   GLC 97 78 81   ALBUMIN  --   --  4.1   PROTTOTAL  --   --  7.9   BILITOTAL  --   --  0.6   ALKPHOS  --   --  99   ALT  --   --  303*   AST  --   --  207*       Recent Results (from the past 24 hour(s))   XR Abdomen Port 1 View    Narrative    XR ABDOMEN PORTABLE 1 VIEW   3/17/2019 12:01 AM     HISTORY: New nasogastric tube placement.    COMPARISON: None.       Impression    IMPRESSION: Nasogastric tube tip in the mid stomach.    PEE PEDROZA MD     Medications     dextrose 150 mL/hr at 03/17/19 0542       sodium chloride 0.9%  250 mL Intravenous Once in dialysis     sodium chloride 0.9%  300 mL Hemodialysis Machine Once     heparin  3 mL Intracatheter During Hemodialysis (from stock)     heparin  3 mL Intracatheter During Hemodialysis (from stock)     - MEDICATION INSTRUCTIONS -   Does not apply Once     vortioxetine  5 mg Oral Daily

## 2019-03-17 NOTE — PROGRESS NOTES
Current condition (current mood & behavior): calm and cooperative  Sitter present: yes  Every 15 minute documentation by NA/RN completed for Shift: yes  Room safety Suicide Checklist completed in Epic: yes  Patient's color of severity (suicide scale): RED  Order for psych consult placed (if appropriate): yes  Suicide care plan added: yes      Johanna Betancur

## 2019-03-17 NOTE — ED NOTES
NG tube placed, pt tolerated well. Golytely administered via NG tube, pt up to BSC. Per MD/poison control, 1.5 L of medication to be given per hour.

## 2019-03-17 NOTE — PROGRESS NOTES
Lab Results   Component Value Date    POTASSIUM 2.9 03/17/2019     Lab Results   Component Value Date    HGB 11.6 03/17/2019          Hemodialysis Note:      All machine safety checks completed and passed.  Total chlorine checks less than 0.1ppm   All connections secured, saline line double clamped.  Venous and arterial parameters set  Report rec'd from Dillon Mercer RN    Dialyzed pt at bedside in ICU. Consent obtained for dialysis Rx this hospitalization, co-signed by ICU RN.  Hepatitis B labs verified on machine log from previous patient.  Time out taken.    Dialysis started from RFQ placed at bedside per Dr Howell. Pt ran 6 hours on a K 4 bath, with 0L removed. Blood flow rate of 400 ml/min was obtained.   PRE-WEIGHT:73.5kgs,    POST-WT 73.5 kgs.      Complications: None.   Education regarding need for frequent lab draws for lithium levels  Protocol explained to staff RN regarding possibility of incapacitated dialysis RN.  Vascular Access: Aseptic prep done for both on/off  Catheter Access: Aseptic prep done for both on/off       Dressing intact to IJ catheter.  Total Heparin given: 0    Meds given: Ativan.      Dr. Howell visited pt during run.   Pods checked Q 15 minutes, remain clear throughout Rx.  Machine water alarms in place and functioning.  Total blood volume processed:114.6L   Patient dialyzes at TBD    POST ASSESSMENTS: Skin warm and dry, alert, denies discomfort, Lungs clear, Resp rate regular, apical rate regular. No edema.  Dr Howell notes that this pt will have another dialysis Rx if Lithium levels > 2.5  See flowsheet in EPIC for further details.  Report given to Johanna Betancur RN.   Leslie Machado RN  [facility]

## 2019-03-18 LAB
ANION GAP SERPL CALCULATED.3IONS-SCNC: 1 MMOL/L (ref 3–14)
ANION GAP SERPL CALCULATED.3IONS-SCNC: 3 MMOL/L (ref 3–14)
BUN SERPL-MCNC: 5 MG/DL (ref 7–30)
BUN SERPL-MCNC: 5 MG/DL (ref 7–30)
CALCIUM SERPL-MCNC: 8.9 MG/DL (ref 8.5–10.1)
CALCIUM SERPL-MCNC: 9 MG/DL (ref 8.5–10.1)
CHLORIDE SERPL-SCNC: 107 MMOL/L (ref 94–109)
CHLORIDE SERPL-SCNC: 108 MMOL/L (ref 94–109)
CO2 SERPL-SCNC: 26 MMOL/L (ref 20–32)
CO2 SERPL-SCNC: 30 MMOL/L (ref 20–32)
CREAT SERPL-MCNC: 0.64 MG/DL (ref 0.52–1.04)
CREAT SERPL-MCNC: 0.68 MG/DL (ref 0.52–1.04)
GFR SERPL CREATININE-BSD FRML MDRD: >90 ML/MIN/{1.73_M2}
GFR SERPL CREATININE-BSD FRML MDRD: >90 ML/MIN/{1.73_M2}
GLUCOSE BLDC GLUCOMTR-MCNC: 108 MG/DL (ref 70–99)
GLUCOSE BLDC GLUCOMTR-MCNC: 85 MG/DL (ref 70–99)
GLUCOSE BLDC GLUCOMTR-MCNC: 97 MG/DL (ref 70–99)
GLUCOSE SERPL-MCNC: 107 MG/DL (ref 70–99)
GLUCOSE SERPL-MCNC: 89 MG/DL (ref 70–99)
HBV SURFACE AB SERPL IA-ACNC: >1000 M[IU]/ML
HBV SURFACE AG SERPL QL IA: NONREACTIVE
LITHIUM SERPL-SCNC: 0.72 MMOL/L (ref 0.6–1.2)
LITHIUM SERPL-SCNC: 1.03 MMOL/L (ref 0.6–1.2)
LITHIUM SERPL-SCNC: 1.2 MMOL/L (ref 0.6–1.2)
LITHIUM SERPL-SCNC: 1.22 MMOL/L (ref 0.6–1.2)
MRSA DNA SPEC QL NAA+PROBE: NEGATIVE
POTASSIUM SERPL-SCNC: 3.6 MMOL/L (ref 3.4–5.3)
POTASSIUM SERPL-SCNC: 3.7 MMOL/L (ref 3.4–5.3)
SODIUM SERPL-SCNC: 137 MMOL/L (ref 133–144)
SODIUM SERPL-SCNC: 138 MMOL/L (ref 133–144)
SPECIMEN SOURCE: NORMAL

## 2019-03-18 PROCEDURE — 12000000 ZZH R&B MED SURG/OB

## 2019-03-18 PROCEDURE — 80178 ASSAY OF LITHIUM: CPT | Performed by: INTERNAL MEDICINE

## 2019-03-18 PROCEDURE — 25000131 ZZH RX MED GY IP 250 OP 636 PS 637: Performed by: PSYCHIATRY & NEUROLOGY

## 2019-03-18 PROCEDURE — 36415 COLL VENOUS BLD VENIPUNCTURE: CPT | Performed by: INTERNAL MEDICINE

## 2019-03-18 PROCEDURE — 25000132 ZZH RX MED GY IP 250 OP 250 PS 637: Performed by: PSYCHIATRY & NEUROLOGY

## 2019-03-18 PROCEDURE — 40000915 ZZH STATISTIC SITTER, EVENING HOURS

## 2019-03-18 PROCEDURE — 80048 BASIC METABOLIC PNL TOTAL CA: CPT | Performed by: INTERNAL MEDICINE

## 2019-03-18 PROCEDURE — 00000146 ZZHCL STATISTIC GLUCOSE BY METER IP

## 2019-03-18 PROCEDURE — 40000141 ZZH STATISTIC PERIPHERAL IV START W/O US GUIDANCE

## 2019-03-18 PROCEDURE — 99232 SBSQ HOSP IP/OBS MODERATE 35: CPT | Performed by: PSYCHIATRY & NEUROLOGY

## 2019-03-18 PROCEDURE — 25800030 ZZH RX IP 258 OP 636: Performed by: INTERNAL MEDICINE

## 2019-03-18 PROCEDURE — 40000556 ZZH STATISTIC PERIPHERAL IV START W US GUIDANCE

## 2019-03-18 PROCEDURE — 99232 SBSQ HOSP IP/OBS MODERATE 35: CPT | Performed by: INTERNAL MEDICINE

## 2019-03-18 PROCEDURE — 40000914 ZZH STATISTIC SITTER, DAY HOURS

## 2019-03-18 PROCEDURE — 40000257 ZZH STATISTIC CONSULT NO CHARGE VASC ACCESS

## 2019-03-18 RX ORDER — GABAPENTIN 300 MG/1
300 CAPSULE ORAL 2 TIMES DAILY
Status: DISCONTINUED | OUTPATIENT
Start: 2019-03-18 | End: 2019-03-20 | Stop reason: HOSPADM

## 2019-03-18 RX ORDER — SODIUM CHLORIDE 9 MG/ML
INJECTION, SOLUTION INTRAVENOUS CONTINUOUS
Status: DISCONTINUED | OUTPATIENT
Start: 2019-03-18 | End: 2019-03-19

## 2019-03-18 RX ORDER — LORAZEPAM 0.5 MG/1
0.5 TABLET ORAL EVERY 6 HOURS PRN
Status: DISPENSED | OUTPATIENT
Start: 2019-03-18 | End: 2019-03-19

## 2019-03-18 RX ADMIN — SODIUM CHLORIDE: 9 INJECTION, SOLUTION INTRAVENOUS at 19:46

## 2019-03-18 RX ADMIN — LORAZEPAM 0.5 MG: 0.5 TABLET ORAL at 15:57

## 2019-03-18 RX ADMIN — GABAPENTIN 300 MG: 300 CAPSULE ORAL at 09:34

## 2019-03-18 RX ADMIN — VORTIOXETINE 5 MG: 5 TABLET, FILM COATED ORAL at 08:28

## 2019-03-18 RX ADMIN — ONDANSETRON HYDROCHLORIDE 8 MG: 8 TABLET, FILM COATED ORAL at 10:00

## 2019-03-18 RX ADMIN — LORAZEPAM 0.5 MG: 0.5 TABLET ORAL at 09:34

## 2019-03-18 RX ADMIN — LORAZEPAM 0.5 MG: 0.5 TABLET ORAL at 22:09

## 2019-03-18 RX ADMIN — ONDANSETRON HYDROCHLORIDE 8 MG: 8 TABLET, FILM COATED ORAL at 18:11

## 2019-03-18 RX ADMIN — GABAPENTIN 300 MG: 300 CAPSULE ORAL at 20:26

## 2019-03-18 RX ADMIN — SODIUM CHLORIDE 100 ML/HR: 9 INJECTION, SOLUTION INTRAVENOUS at 10:34

## 2019-03-18 ASSESSMENT — ACTIVITIES OF DAILY LIVING (ADL)
ADLS_ACUITY_SCORE: 11
ADLS_ACUITY_SCORE: 10
ADLS_ACUITY_SCORE: 11
ADLS_ACUITY_SCORE: 11

## 2019-03-18 ASSESSMENT — MIFFLIN-ST. JEOR: SCORE: 1390

## 2019-03-18 NOTE — PLAN OF CARE
Current condition (current mood & behavior): agreeable, cooperative yet withdrawn  Sitter present: yes YES (RN 1:1)  Every 15 minute documentation by NA/RN completed for Shift: yes YES  Room safety Suicide Checklist completed in Epic: yes YES  Patient's color of severity (suicide scale): RED   Order for psych consult placed (if appropriate): yes YES  Suicide care plan added: Y  Pt  weapy at time and trying to find a healty way to cope emotions,   Teddy cath removed per MD ordres.  Site bleed x2 femstop used  site not stable off bedrest at 345      Report called to handy 66 RN will

## 2019-03-18 NOTE — PROGRESS NOTES
Current condition (current mood & behavior): Calm, cooperative and quiet.  Sitter present: yes  Every 15 minute documentation by NA/RN completed for Shift: yes  Room safety Suicide Checklist completed in Epic: yes  Patient's color of severity (suicide scale): RED  Order for psych consult placed (if appropriate): yes  Suicide care plan added: yes      Doreen Lofton

## 2019-03-18 NOTE — PROGRESS NOTES
Current condition (current mood & behavior): calm,cooperative.  Sitter present: yes  Every 15 minute documentation by NA/RN completed for Shift: yes  Room safety Suicide Checklist completed in Epic: yes  Patient's color of severity (suicide scale): RED  Order for psych consult placed (if appropriate): yes  Suicide care plan added: yes      Dillon Mercer

## 2019-03-18 NOTE — PROGRESS NOTES
ICU Multi-Disciplinary Note  Patient condition reviewed and discussed while on multidisciplinary rounds today. Jihan Gill is a 43 year old female with extensive psychiatry history admitted with a suicide attempt by intentional overdose of lithium and gabapentin. She received two runs of HD, her line is being removed this morning. She is currently hemodynamically stable without the support of vasoactives and oxygenating adequately on room air. No interventions were implemented by the ICU team.   The Critical Care service will continue to follow peripherally while the patient is within the ICU. We are readily available should issues arise. Please feel free to contact us for critical care issues with which we may be of assistance. For all other concerns, please contact primary service first.   Marcia Hayes, BLAZE

## 2019-03-18 NOTE — PROGRESS NOTES
Assessment and Plan:   Li intoxication/overdose: HD yesterday. Li level stable at 1.2 , no longer rising. Renal fnx nl. Will cont saline diuresis, change to q 12 h Li. Repeat labs in am.             Interval History:   Jihan Gill is a 43 year old female who was admitted on 3/16/2019.      1) BAD II - treated with lithium.     2) Lithium Ingestion:   Level  Is > 6.  She clearly needs dialysis.  This is [facility] so it may be a prolonged run.  Need to continue HD until level is < 1.       3) Gabapentin Ingestion:  Treatment is supportive.       4) Chronic Hepatitis C                       Review of Systems:   Taking po well. No SOB or chest pain. No N or V. At bed rest with FDC in place.           Medications:       gabapentin  300 mg Oral BID     vortioxetine  5 mg Oral Daily         Current active medications and PTA medications reviewed, see medication list for details.            Physical Exam:   Vitals were reviewed  Patient Vitals for the past 24 hrs:   BP Temp Temp src Pulse Heart Rate Resp SpO2 Weight   03/18/19 0900 95/53 -- -- 79 82 8 100 % --   03/18/19 0800 109/67 97.6  F (36.4  C) Axillary 55 56 21 98 % --   03/18/19 0700 114/63 -- -- 59 58 15 96 % 74 kg (163 lb 2.3 oz)   03/18/19 0630 -- -- -- -- 58 8 -- --   03/18/19 0615 105/69 -- -- 66 72 19 95 % --   03/18/19 0600 -- 97.5  F (36.4  C) Oral 50 51 13 98 % --   03/18/19 0545 -- -- -- -- 66 24 98 % --   03/18/19 0530 -- -- -- -- 56 13 96 % --   03/18/19 0515 -- -- -- -- 56 11 95 % --   03/18/19 0505 91/63 -- -- 79 -- -- -- --   03/18/19 0500 -- -- -- 59 64 17 98 % --   03/18/19 0445 -- -- -- -- -- -- 97 % --   03/18/19 0430 -- -- -- -- 54 8 99 % --   03/18/19 0415 -- -- -- -- -- -- 96 % --   03/18/19 0405 90/48 -- -- 58 57 15 97 % --   03/18/19 0400 -- -- -- 56 -- -- 96 % --   03/18/19 0330 -- -- -- -- 62 8 97 % --   03/18/19 0300 92/55 -- -- 66 66 10 97 % --   03/18/19 0230 -- -- -- -- 68 18 97 % --   03/18/19 0221 90/54 -- -- 56 --  -- -- --   03/18/19 0200 -- 98.1  F (36.7  C) Oral 67 66 19 96 % --   03/18/19 0100 90/55 -- -- 72 72 9 95 % --   03/18/19 0030 -- -- -- -- 83 18 95 % --   03/18/19 0000 92/57 -- -- 84 81 9 96 % --   03/17/19 2330 -- -- -- -- 84 11 95 % --   03/17/19 2300 100/56 -- -- 83 84 (!) 6 98 % --   03/17/19 2230 -- -- -- -- 89 9 99 % --   03/17/19 2220 92/50 -- -- 90 87 12 97 % --   03/17/19 2200 -- 98.6  F (37  C) Oral 88 87 10 96 % --   03/17/19 2100 95/44 -- -- 90 78 (!) 0 97 % --   03/17/19 2030 -- -- -- -- 81 9 96 % --   03/17/19 2000 91/51 98.3  F (36.8  C) Oral 81 81 12 97 % --   03/17/19 1930 -- -- -- -- 78 (!) 6 96 % --   03/17/19 1900 90/59 -- -- 90 90 21 99 % --   03/17/19 1800 97/56 -- -- 93 90 13 95 % --   03/17/19 1700 97/61 -- -- 97 96 14 97 % --   03/17/19 1600 99/58 99.5  F (37.5  C) Oral 105 110 17 98 % --   03/17/19 1500 96/71 -- -- 105 101 10 99 % --   03/17/19 1400 97/49 -- -- 96 98 13 99 % --   03/17/19 1345 101/60 -- -- 87 86 15 97 % --   03/17/19 1330 111/65 -- -- 93 93 18 -- --   03/17/19 1320 109/66 99  F (37.2  C) Axillary 96 95 26 -- --   03/17/19 1315 103/59 -- -- 96 95 27 -- --   03/17/19 1310 111/59 -- -- 94 95 19 -- --   03/17/19 1300 110/64 -- -- 97 97 19 -- --   03/17/19 1245 101/62 -- -- 96 96 17 -- --   03/17/19 1230 114/71 -- -- 94 101 18 -- --   03/17/19 1215 109/71 -- -- 104 105 19 -- --   03/17/19 1200 107/67 -- -- 105 106 20 -- --   03/17/19 1145 107/69 -- -- 105 110 20 -- --   03/17/19 1130 117/70 -- -- 112 112 21 -- --   03/17/19 1115 112/68 -- -- 105 110 17 -- --   03/17/19 1100 100/61 -- -- 112 112 22 -- --   03/17/19 1045 107/62 -- -- 120 120 17 96 % --   03/17/19 1030 109/72 -- -- 108 109 10 97 % --   03/17/19 1015 106/67 -- -- 112 111 27 93 % --       Temp:  [97.5  F (36.4  C)-99.5  F (37.5  C)] 97.6  F (36.4  C)  Pulse:  [] 79  Heart Rate:  [] 82  Resp:  [0-27] 8  BP: ()/(44-72) 95/53  SpO2:  [93 %-100 %] 100 %    Temperatures:  Current - Temp: 97.6  F (36.4   C); Max - Temp  Av.4  F (36.9  C)  Min: 97.5  F (36.4  C)  Max: 99.5  F (37.5  C)  Respiration range: Resp  Av.9  Min: 0  Max: 27  Pulse range: Pulse  Av.1  Min: 50  Max: 120  Blood pressure range: Systolic (24hrs), Av , Min:90 , Max:117   ; Diastolic (24hrs), Av, Min:44, Max:72    Pulse oximetry range: SpO2  Av.8 %  Min: 93 %  Max: 100 %    I/O last 3 completed shifts:  In: 3274.16 [P.O.:2050; I.V.:1224.16]  Out: 600 [Urine:600]      Intake/Output Summary (Last 24 hours) at 3/18/2019 1006  Last data filed at 3/18/2019 0928  Gross per 24 hour   Intake 2524.16 ml   Output 1050 ml   Net 1474.16 ml     Alert and responsive  Lungs with clear ant BS  Cor RRR nl S1 S2 no M  LE no edema       Wt Readings from Last 4 Encounters:   19 74 kg (163 lb 2.3 oz)   19 76.2 kg (168 lb)   14 82 kg (180 lb 12.8 oz)   13 61.2 kg (135 lb)          Data:          Lab Results   Component Value Date     2019     2019     2019    Lab Results   Component Value Date    CHLORIDE 108 2019    CHLORIDE 107 2019    CHLORIDE 107 2019    Lab Results   Component Value Date    BUN 5 2019    BUN 5 2019    BUN 5 2019      Lab Results   Component Value Date    POTASSIUM 3.7 2019    POTASSIUM 3.6 2019    POTASSIUM 3.9 2019    Lab Results   Component Value Date    CO2 26 2019    CO2 30 2019    CO2 29 2019    Lab Results   Component Value Date    CR 0.68 2019    CR 0.64 2019    CR 0.67 2019        Recent Labs   Lab Test 19  0710 19  0743   WBC 6.8 7.2 6.8   HGB 11.6* 12.0 12.6   HCT 35.2 35.6 38.5   MCV 92 91 88    300 239     Recent Labs   Lab Test 19  1529 13   * 26 37   * 30 39   ALKPHOS 99 79 66   BILITOTAL 0.6 0.5 0.5       Recent Labs   Lab Test 19  0710 13  0623 13   MAG  2.0 2.2 2.2     Recent Labs   Lab Test 08/08/13  2125   PHOS 5.8*     Recent Labs   Lab Test 03/18/19  0608 03/18/19  0144 03/17/19  2148   ALEXEY 9.0 8.9 8.9       Lab Results   Component Value Date    ALEXEY 9.0 03/18/2019     Lab Results   Component Value Date    WBC 6.8 03/17/2019    HGB 11.6 (L) 03/17/2019    HCT 35.2 03/17/2019    MCV 92 03/17/2019     03/17/2019     Lab Results   Component Value Date     03/18/2019    POTASSIUM 3.7 03/18/2019    CHLORIDE 108 03/18/2019    CO2 26 03/18/2019    GLC 89 03/18/2019     Lab Results   Component Value Date    BUN 5 (L) 03/18/2019    CR 0.68 03/18/2019     Lab Results   Component Value Date    MAG 2.0 03/17/2019     Lab Results   Component Value Date    PHOS 5.8 (H) 08/08/2013       Creatinine   Date Value Ref Range Status   03/18/2019 0.68 0.52 - 1.04 mg/dL Final   03/18/2019 0.64 0.52 - 1.04 mg/dL Final   03/17/2019 0.67 0.52 - 1.04 mg/dL Final   03/17/2019 0.65 0.52 - 1.04 mg/dL Final   03/17/2019 0.35 (L) 0.52 - 1.04 mg/dL Final   03/17/2019 0.68 0.52 - 1.04 mg/dL Final       Attestation:  I have reviewed today's vital signs, notes, medications, labs and imaging.     Florentino Martinez MD

## 2019-03-18 NOTE — PLAN OF CARE
Pt. Remained in RA this 12H shift, no desats. noted, vitals stable, good perfusion with exception borderline blood pressure, HR noted in 50s at times while in a deep sleep(pt. With low baseline BP), voiding into bedpan while on bedrest, pt. Was pleasant and cooperative, alert and showing no neuro deficits,pt. Did report some anxiety and sleeplessness, PIV access was lost despite 9 attempts for new PIV, ativan prn D/C'd per hospitalist and recommendation and melatonin was prescribed for sleeplessness with pt.not wanting melatonin at this time..

## 2019-03-18 NOTE — PROGRESS NOTES
[facility]    Medicine Progress Note - Hospitalist Service       Date of Admission:  3/16/2019  Assessment & Plan   Jihan Gill is a 43 year-old female with extensive psychiatric history (bipolar disorder, major depression, anxiety, repeated suicide attempts, PTSD, benzodiazepine and opiate dependence, alcohol use disorder), hepatitis C who was admitted on 3/16/2019 with suicide attempt by intentional overdose with lithium and gabapentin.      Suicide attempt by intentional overdose with lithium and gabapentin  Repeated suicide attempts by overdose  Depression  PTSD  Borderline Personality Disorder  Repeated suicide attempts by intentional overdose over many years with an array of medications (acetaminophen, methadone, lithium, etc). Reports taking lithium since ~ age 28 years. Most recently hospitalized at [facility] then transferred to [facility] for psychiatric care, ECT and dialysis, discharged 3/13. Colon level 1.9 in ED. Creatinine 0.62. Anion gap 1. Acetaminophen, ethanol, salicylate negative. NGT with golytely administration for whole bowel irrigation started on admission (per poison control recommendations).  - Lithium level peak of >6.00, initiated on HD. Dialyzing per femoral line.   - Nephrology consulted, appreciate assistance  - Off HD, but continuing to monitor closely for rebound with serial lithium levels  - Can transfer from ICU once determined HD no longer needed and femoral line out  - Serial lithium level per nephrology  - Psychiatry re-consulted, anticipate eventual need to transfer to psychiatry once medically stable.  Patient requesting transfer to [place] for psychiatric care. Can reassess when medically stable.   - Suicide precautions  - Telemetry   - Vortioxetine started per psychiatry   - Defer additional medications for insomnia/PTSD per psychiatry. Note desire to avoid benzodiazapine therapy given her chemical dependency history.      Hypoglycemia  Suspected due to lithium overdose.  - Stable off of D10 infusion  - Regular diet as tolerated    Hypokalemia  - Magnesium normal   - Monitor and replace per protocol     Methamphetamine Abuse  Opiate Addiction  She reports her last use of street drugs was 2-3 weeks ago.   - Urine toxicology ordered on admission, has not been collected, will discontinue as unlikely to .      Hepatitis C, genotype 1a  Noted in [facility] documentation with persistently elevated ALT and AST  - Outpatient follow-up already scheduled at [facility] hepatology clinic on 3/27/2019    Diet: Combination Diet Regular Diet Adult; Safe Tray - with utensils    DVT Prophylaxis: Pneumatic Compression Devices  Lezama Catheter: not present  Code Status: Full Code      Disposition Plan   Expected discharge: 1-2 days, likely to psychiatric hospital once medically stable. Continuing cares in ICU until stable off of HD.  Entered: Salvador Zavala MD 03/18/2019, 8:05 AM       The patient's care was discussed with the Bedside Nurse and Patient.    Salvador Zaavla MD  Hospitalist Service  [facility]    ______________________________________________________________________    Interval History   Overnight had poor sleep, increase in nightmares. Otherwise denies new symptoms. Denies chest pain, shortness of breath, abdominal pain, n/v.     Data reviewed today: I reviewed all medications, new labs and imaging results over the last 24 hours. I personally reviewed no images or EKG's today.    Physical Exam   Vital Signs: Temp: 97.5  F (36.4  C) Temp src: Oral BP: 105/69 Pulse: 66 Heart Rate: 58 Resp: 8 SpO2: 95 % O2 Device: None (Room air)    Weight: 163 lbs 2.25 oz    Constitutional: NAD  Respiratory: Clear to auscultation bilaterally, good air movement bilaterally  Cardiovascular: RRR, no m/r/g. No peripheral edema.  GI: Soft, non-tender, non-distended.   Skin/Integumen: Warm, dry  Other: Alert. Cooperative.  Following commands.     Data   Recent Labs   Lab 03/18/19  0608 03/18/19  0144 03/17/19  2148  03/17/19  0710  03/16/19 2020   WBC  --   --   --   --  6.8  --  7.2   HGB  --   --   --   --  11.6*  --  12.0   MCV  --   --   --   --  92  --  91   PLT  --   --   --   --  240  --  300    138 138   < > 140   < > 141   POTASSIUM 3.7 3.6 3.9   < > 2.9*   < > 3.8   CHLORIDE 108 107 107   < > 107   < > 108   CO2 26 30 29   < > 30   < > 32   BUN 5* 5* 5*   < > 4*   < > 6*   CR 0.68 0.64 0.67   < > 0.68   < > 0.62   ANIONGAP 3 1* 2*   < > 3   < > 1*   ALEXEY 9.0 8.9 8.9   < > 8.8   < > 9.6   GLC 89 107* 88   < > 97   < > 81   ALBUMIN  --   --   --   --   --   --  4.1   PROTTOTAL  --   --   --   --   --   --  7.9   BILITOTAL  --   --   --   --   --   --  0.6   ALKPHOS  --   --   --   --   --   --  99   ALT  --   --   --   --   --   --  303*   AST  --   --   --   --   --   --  207*    < > = values in this interval not displayed.       No results found for this or any previous visit (from the past 24 hour(s)).  Medications     dextrose Stopped (03/17/19 8039)       vortioxetine  5 mg Oral Daily

## 2019-03-18 NOTE — PROVIDER NOTIFICATION
Brief update:    Paged re: loss of IV access    Discontinued IV ativan. Pt requesting benzodiazepines for anxiety and sleeping difficulty, nightmares.    Would avoid benzodiazepines for sleep and nightmares (may worsen vivid dreams/ptsd)    Melatonin for sleep.     Would avoid benzodiazepines going forward as well as psychiatry recommending chemical dependency treatment.    Robi Hinson MD  11:33 PM
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
## 2019-03-18 NOTE — PROVIDER NOTIFICATION
Dr. Hinson [facility] called with critical high Lithium level, he will page/call nephrology team MD and start to process of beginning dialysis.

## 2019-03-19 ENCOUNTER — APPOINTMENT (OUTPATIENT)
Dept: CT IMAGING | Facility: CLINIC | Age: 44
End: 2019-03-19
Attending: NURSE PRACTITIONER
Payer: MEDICAID

## 2019-03-19 LAB
ANION GAP SERPL CALCULATED.3IONS-SCNC: 7 MMOL/L (ref 3–14)
BUN SERPL-MCNC: 5 MG/DL (ref 7–30)
CALCIUM SERPL-MCNC: 8.7 MG/DL (ref 8.5–10.1)
CHLORIDE SERPL-SCNC: 114 MMOL/L (ref 94–109)
CO2 SERPL-SCNC: 22 MMOL/L (ref 20–32)
CREAT SERPL-MCNC: 0.69 MG/DL (ref 0.52–1.04)
GFR SERPL CREATININE-BSD FRML MDRD: >90 ML/MIN/{1.73_M2}
GLUCOSE SERPL-MCNC: 98 MG/DL (ref 70–99)
HGB BLD-MCNC: 9.6 G/DL (ref 11.7–15.7)
INR PPP: 1.03 (ref 0.86–1.14)
LACTATE BLD-SCNC: 0.5 MMOL/L (ref 0.7–2)
MAGNESIUM SERPL-MCNC: 1.9 MG/DL (ref 1.6–2.3)
PHOSPHATE SERPL-MCNC: 3.1 MG/DL (ref 2.5–4.5)
POTASSIUM SERPL-SCNC: 3.9 MMOL/L (ref 3.4–5.3)
SODIUM SERPL-SCNC: 143 MMOL/L (ref 133–144)

## 2019-03-19 PROCEDURE — 83605 ASSAY OF LACTIC ACID: CPT | Performed by: NURSE PRACTITIONER

## 2019-03-19 PROCEDURE — 12000000 ZZH R&B MED SURG/OB

## 2019-03-19 PROCEDURE — 25000128 H RX IP 250 OP 636: Performed by: NURSE PRACTITIONER

## 2019-03-19 PROCEDURE — 25000131 ZZH RX MED GY IP 250 OP 636 PS 637: Performed by: PSYCHIATRY & NEUROLOGY

## 2019-03-19 PROCEDURE — 25000132 ZZH RX MED GY IP 250 OP 250 PS 637: Performed by: PSYCHIATRY & NEUROLOGY

## 2019-03-19 PROCEDURE — 74177 CT ABD & PELVIS W/CONTRAST: CPT

## 2019-03-19 PROCEDURE — 25800030 ZZH RX IP 258 OP 636: Performed by: INTERNAL MEDICINE

## 2019-03-19 PROCEDURE — 36415 COLL VENOUS BLD VENIPUNCTURE: CPT | Performed by: NURSE PRACTITIONER

## 2019-03-19 PROCEDURE — 99207 ZZC APP CREDIT; MD BILLING SHARED VISIT: CPT | Performed by: NURSE PRACTITIONER

## 2019-03-19 PROCEDURE — 84100 ASSAY OF PHOSPHORUS: CPT | Performed by: INTERNAL MEDICINE

## 2019-03-19 PROCEDURE — 83735 ASSAY OF MAGNESIUM: CPT | Performed by: INTERNAL MEDICINE

## 2019-03-19 PROCEDURE — 36415 COLL VENOUS BLD VENIPUNCTURE: CPT | Performed by: INTERNAL MEDICINE

## 2019-03-19 PROCEDURE — 25000125 ZZHC RX 250: Performed by: NURSE PRACTITIONER

## 2019-03-19 PROCEDURE — 85018 HEMOGLOBIN: CPT | Performed by: NURSE PRACTITIONER

## 2019-03-19 PROCEDURE — 99232 SBSQ HOSP IP/OBS MODERATE 35: CPT | Performed by: INTERNAL MEDICINE

## 2019-03-19 PROCEDURE — 85610 PROTHROMBIN TIME: CPT | Performed by: NURSE PRACTITIONER

## 2019-03-19 PROCEDURE — 99232 SBSQ HOSP IP/OBS MODERATE 35: CPT | Performed by: PSYCHIATRY & NEUROLOGY

## 2019-03-19 PROCEDURE — 80048 BASIC METABOLIC PNL TOTAL CA: CPT | Performed by: INTERNAL MEDICINE

## 2019-03-19 RX ORDER — IOPAMIDOL 755 MG/ML
82 INJECTION, SOLUTION INTRAVASCULAR ONCE
Status: COMPLETED | OUTPATIENT
Start: 2019-03-19 | End: 2019-03-19

## 2019-03-19 RX ORDER — LORAZEPAM 0.5 MG/1
0.5 TABLET ORAL EVERY 6 HOURS PRN
Status: DISCONTINUED | OUTPATIENT
Start: 2019-03-19 | End: 2019-03-20 | Stop reason: HOSPADM

## 2019-03-19 RX ORDER — DIPHENHYDRAMINE HCL 25 MG
25 CAPSULE ORAL EVERY 6 HOURS PRN
Status: DISCONTINUED | OUTPATIENT
Start: 2019-03-19 | End: 2019-03-20 | Stop reason: HOSPADM

## 2019-03-19 RX ADMIN — LORAZEPAM 0.5 MG: 0.5 TABLET ORAL at 18:03

## 2019-03-19 RX ADMIN — IOPAMIDOL 82 ML: 755 INJECTION, SOLUTION INTRAVENOUS at 21:12

## 2019-03-19 RX ADMIN — LORAZEPAM 0.5 MG: 0.5 TABLET ORAL at 08:56

## 2019-03-19 RX ADMIN — SODIUM CHLORIDE 65 ML: 9 INJECTION, SOLUTION INTRAVENOUS at 21:12

## 2019-03-19 RX ADMIN — LORAZEPAM 0.5 MG: 0.5 TABLET ORAL at 12:04

## 2019-03-19 RX ADMIN — GABAPENTIN 300 MG: 300 CAPSULE ORAL at 20:14

## 2019-03-19 RX ADMIN — GABAPENTIN 300 MG: 300 CAPSULE ORAL at 08:56

## 2019-03-19 RX ADMIN — SODIUM CHLORIDE: 9 INJECTION, SOLUTION INTRAVENOUS at 06:06

## 2019-03-19 RX ADMIN — VORTIOXETINE 5 MG: 5 TABLET, FILM COATED ORAL at 08:56

## 2019-03-19 RX ADMIN — ONDANSETRON HYDROCHLORIDE 8 MG: 8 TABLET, FILM COATED ORAL at 09:05

## 2019-03-19 ASSESSMENT — ACTIVITIES OF DAILY LIVING (ADL)
ADLS_ACUITY_SCORE: 10

## 2019-03-19 ASSESSMENT — MIFFLIN-ST. JEOR: SCORE: 1390

## 2019-03-19 NOTE — CODE/RAPID RESPONSE
"[facility]    RRT Note  3/19/2019   Time Called: 1845    I was called to evaluate Ms. Gill for tenderness and hardening at the site of a recently removed HD line 3/18. The patient was admitted for lithium overdose with levels>6 requiring 2 runs of HD. The line was removed ~[id number] on 3/18, there are nursing notes documenting some site bleeding post removal, requiring femostop for site stabilization. After 4 hours of BR, patient was off bedrest and has been ambulating since. Transferred from ICU on 3/18 to station 66. Of note, the patient had an internal jugular dialysis line last month after a lithium overdose requiring HD.    Assessment & Plan     Line Removal Concern: Upon entering the room, the patient is in semi fowlers position, talking on the phone. She appears in no distress. She endorses severe tenderness at the line insertion site this afternoon after she showered. Nursing reports \"firmness\" of the tissue immediately below the insertion site, no firmness or tenderness in surrounding tissue.     On exam:  Right femoral site with CDI dressing noted. Dressing removed, a saturated fibrin patch dressing is in place, insertion site has dried drainage noted, site is tender to palpation but there is no firmness noted around the site. No signs of infection. DP/PT pulses are 1+ without doppler. Surrounding skin is soft, no erythema or ecchymosis noted, all of skin is warm and dry. The patient does note right low back \"aching\" since yesterday, worsening fatigue and weakness with mild nausea since yesterday. She is not dizzy or lightheaded upon standing.     INTERVENTIONS:  -STAT hgb, lactic acid --> if there are any acute changes, hypotension or tachycardia will scan abdomen/pelvis for bleeding    Immediate and life threatening concerns are for intraabdominal bleeding, or RP bleeding. Exam of the site is reassuring, her BP is slightly softer than 36 hours ago, but has been 90/50s for about 30 " hours. Since then, the patient has been up ambulating without issue, no mentation changes, making urine, overall assessment indicates adequate perfusion.     Discussed with and defer further cares to Dr. Betancur / Internal Medicine Hospitalist    Addendum 2200    2 gram drop in hgb from 2 days prior + soft BP--> CT abd/pelvis to r/o RP bleed pending    Addendum  CT abd/pelvis - probable hematoma present which would explain her tenderness/pain, but no extension into the RP or pelvis       Code Status: Full Code      BLANCHE Mojica CNP  Hospitalist - House Officer  Pager: [phone]       Allergies   Allergies   Allergen Reactions     Abilify [Aripiprazole] Other (See Comments)     Tardive dyskinesia     Compazine Other (See Comments)     Dystonia     Dramamine      Reglan Other (See Comments)     dystonia     Seroquel [Quetiapine] Other (See Comments)     Tardive dyskinesia.        Physical Exam   Vital Signs with Ranges:  Temp:  [97.8  F (36.6  C)-98.9  F (37.2  C)] 98.8  F (37.1  C)  Pulse:  [79-84] 84  Heart Rate:  [72-97] 84  Resp:  [16-18] 16  BP: (90-97)/(52-61) 90/52  SpO2:  [99 %-100 %] 100 %  I/O last 3 completed shifts:  In: 2038 [P.O.:700; I.V.:1338]  Out: 3075 [Urine:3075]        Constitutional: Awake, alert, cooperative, no apparent distress.  Eyes: Conjunctiva and pupils examined and normal.  HEENT: Moist mucous membranes, normal dentition.  Respiratory: Clear to auscultation bilaterally, no crackles or wheezing.  Cardiovascular: Regular rate and rhythm, normal S1 and S2, and no murmur noted.  GI: Soft, non-distended, non-tender, normal bowel sounds.  Skin: right femoral site has dried drainage, tenderness w palpation at the site insertion, no firmness noted, surrounding tissue is soft, non tender, no abnormal or irregular contour between groins  Neurologic: no gross neuro deficits, moves all four extremities.  Psychiatric: Alert, oriented to person, place and time    Data     IMAGING:  (X-ray/CT/MRI)   No results found for this or any previous visit (from the past 24 hour(s)).    CBC with Diff:  Recent Labs   Lab Test 03/17/19  0710   WBC 6.8   HGB 11.6*   MCV 92             Comprehensive Metabolic Panel:  Recent Labs   Lab 03/19/19  0838  03/16/19 2020      < > 141   POTASSIUM 3.9   < > 3.8   CHLORIDE 114*   < > 108   CO2 22   < > 32   ANIONGAP 7   < > 1*   GLC 98   < > 81   BUN 5*   < > 6*   CR 0.69   < > 0.62   GFRESTIMATED >90   < > >90   GFRESTBLACK >90   < > >90   ALEXEY 8.7   < > 9.6   MAG 1.9   < >  --    PHOS 3.1  --   --    PROTTOTAL  --   --  7.9   ALBUMIN  --   --  4.1   BILITOTAL  --   --  0.6   ALKPHOS  --   --  99   AST  --   --  207*   ALT  --   --  303*    < > = values in this interval not displayed.       INR:    No lab results found.      Time Spent on this Encounter   I spent 45 minutes on the unit/floor managing the care of Jihan ALISE Gill.Over 50% of my time was spent counseling the patient and/or coordinating care regarding services listed in this note.
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
## 2019-03-19 NOTE — PLAN OF CARE
Care Plan Summary Note:  ORIENTATION/BEHAVIOR: A&O X 4  ABNL VS/O2: Soft BP 92/55  MOBILITY/FALL RISK: Can be independent but due to IV Pole, patient is SBA.  PAIN MANAGMENT: Denies pain  DIET: Reg  BOWEL/BLADDER: Had 1 bowel movement this shift, voiding well.  ABNL LAB/BG:  DRAIN/DEVICES: PIV Infusing  SKIN: Rash to lower abdomen, lotion applied.  TESTS/PROCEDURES: None  AGGRESSION TOOL COLOR: Was red on admission, currently Green. Denies suicidal though and ideations.  D/C DAY/GOALS/PLACE: Psychiatry transfer when medically stable  OTHER: Tele NSR, prn ativan given per pt request, suicide precaution in place, sitter at bedside.

## 2019-03-19 NOTE — PROGRESS NOTES
[facility]    Medicine Progress Note - Hospitalist Service       Date of Admission:  3/16/2019  Assessment & Plan   Jihan Gill is a 43 year-old female with extensive psychiatric history (bipolar disorder, major depression, anxiety, repeated suicide attempts, PTSD, benzodiazepine and opiate dependence, alcohol use disorder), hepatitis C who was admitted on 3/16/2019 with suicide attempt by intentional overdose with lithium and gabapentin.      Suicide attempt by intentional overdose with lithium and gabapentin  Repeated suicide attempts by overdose  Depression  PTSD  Borderline Personality Disorder  Repeated suicide attempts by intentional overdose over many years with an array of medications (acetaminophen, methadone, lithium, etc). Reports taking lithium since ~ age 28 years. Most recently hospitalized at [facility] then transferred to [facility] for psychiatric care, ECT and dialysis, discharged 3/13. [place] level 1.9 in ED. Creatinine 0.62. Anion gap 1. Acetaminophen, ethanol, salicylate negative. NGT with golytely administration for whole bowel irrigation started on admission (per poison control recommendations).  - Nephrology consulted, appreciate assistance  - Lithium level peak of >6.00, initiated on HD per femoral line. Femoral line out 3/18/19   - Lithium levels stabilizing and now decreasing, no further HD anticipated  - Discontinue IVF  - Psychiatry re-consulted, anticipate transfer to psychiatry. Patient requesting transfer to [place] for psychiatric care, discussed with CM and also encouraged patient to speak to psychiatry to see if they support this  - Suicide precautions  - Telemetry discontinued, has been sinus rhythm   - Vortioxetine started per psychiatry. Patient c/o itching, see below.  - Defer additional medications for insomnia/PTSD per psychiatry. Note desire to avoid benzodiazapine therapy given her chemical dependency history.     Pruritis   - Patient c/o  itching, potentially due to vortioxetine as only other medication is gabapentin which she has been on previously and tolerated. Encouraged her to speak to psychiatry about potential medication change     Hypoglycemia  Suspected due to lithium overdose.  - Regular diet as tolerated    Hypokalemia  Magnesium normal. Resolved with replacement.     Methamphetamine Abuse  Opiate Addiction  She reports her last use of street drugs was 2-3 weeks ago.      Hepatitis C, genotype 1a  Noted in [facility] documentation with persistently elevated ALT and AST  - Outpatient follow-up already scheduled at [facility] hepatology clinic on 3/27/19. Will potentially need to reschedule depending on the length of her psychiatric hospitalizatoin    Diet: Combination Diet Regular Diet Adult; Safe Tray - with utensils    DVT Prophylaxis: Pneumatic Compression Devices  Lezama Catheter: not present  Code Status: Full Code      Disposition Plan   Expected discharge: IVF stopping, patient ambulating. Medically stable for transfer to psychiatry.   Entered: Salvador Zavala MD 03/19/2019, 9:35 AM       The patient's care was discussed with the Care Coordinator/ and Patient.    Salvador Zavala MD  Hospitalist Service  [facility]    ______________________________________________________________________    Interval History   No acute events overnight. Reports itching, primarily on trunk. Denies any other new symptoms. Eating/drinking well. Ambulating independently.     Data reviewed today: I reviewed all medications, new labs and imaging results over the last 24 hours. I personally reviewed no images or EKG's today.    Physical Exam   Vital Signs: Temp: 98.5  F (36.9  C) Temp src: Oral BP: 97/61 Pulse: 83 Heart Rate: 91 Resp: 16 SpO2: 99 % O2 Device: None (Room air)    Weight: 163 lbs 2.25 oz    Constitutional: NAD  Respiratory: Clear to auscultation bilaterally, good air movement bilaterally  Cardiovascular: RRR, no  m/r/g. No peripheral edema.  GI: Soft, non-tender, non-distended.   Skin/Integumen: Warm, dry. Excoriations over lower abdomen from itching  Other: Alert. Cooperative. Following commands.     Data   Recent Labs   Lab 03/19/19  0838 03/18/19  0608 03/18/19  0144  03/17/19  0710  03/16/19 2020   WBC  --   --   --   --  6.8  --  7.2   HGB  --   --   --   --  11.6*  --  12.0   MCV  --   --   --   --  92  --  91   PLT  --   --   --   --  240  --  300    137 138   < > 140   < > 141   POTASSIUM 3.9 3.7 3.6   < > 2.9*   < > 3.8   CHLORIDE 114* 108 107   < > 107   < > 108   CO2 22 26 30   < > 30   < > 32   BUN 5* 5* 5*   < > 4*   < > 6*   CR 0.69 0.68 0.64   < > 0.68   < > 0.62   ANIONGAP 7 3 1*   < > 3   < > 1*   ALEXEY 8.7 9.0 8.9   < > 8.8   < > 9.6   GLC 98 89 107*   < > 97   < > 81   ALBUMIN  --   --   --   --   --   --  4.1   PROTTOTAL  --   --   --   --   --   --  7.9   BILITOTAL  --   --   --   --   --   --  0.6   ALKPHOS  --   --   --   --   --   --  99   ALT  --   --   --   --   --   --  303*   AST  --   --   --   --   --   --  207*    < > = values in this interval not displayed.       No results found for this or any previous visit (from the past 24 hour(s)).  Medications     sodium chloride 100 mL/hr at 03/19/19 0606       gabapentin  300 mg Oral BID     vortioxetine  5 mg Oral Daily

## 2019-03-19 NOTE — PLAN OF CARE
Pt has occasional episodes of anxiety and requests ativan which alleviates her symptoms. Pt states emotionally she is dealing with PTSD symptoms still. Has been calm and cooperative with cares today. Right femoral site dressing clean and dry. VSS. Pt says she doesn't have much appetite for food, taking liquids well. Lithium level 0.72 today. Rash on abd improved today, less itchy as well as less redness.

## 2019-03-20 ENCOUNTER — HOSPITAL ENCOUNTER (INPATIENT)
Facility: CLINIC | Age: 44
LOS: 12 days | Discharge: HOME | End: 2019-04-01
Attending: PSYCHIATRY & NEUROLOGY | Admitting: PSYCHIATRY & NEUROLOGY
Payer: MEDICAID

## 2019-03-20 VITALS
RESPIRATION RATE: 16 BRPM | OXYGEN SATURATION: 100 % | HEIGHT: 64 IN | BODY MASS INDEX: 27.85 KG/M2 | HEART RATE: 102 BPM | WEIGHT: 163.14 LBS | SYSTOLIC BLOOD PRESSURE: 132 MMHG | DIASTOLIC BLOOD PRESSURE: 62 MMHG | TEMPERATURE: 98.8 F

## 2019-03-20 DIAGNOSIS — F33.2 SEVERE EPISODE OF RECURRENT MAJOR DEPRESSIVE DISORDER, WITHOUT PSYCHOTIC FEATURES (H): Primary | ICD-10-CM

## 2019-03-20 PROBLEM — F31.9 BIPOLAR DISORDER (H): Status: ACTIVE | Noted: 2019-03-20

## 2019-03-20 LAB — HCG UR QL: NEGATIVE

## 2019-03-20 PROCEDURE — 25000132 ZZH RX MED GY IP 250 OP 250 PS 637: Performed by: PSYCHIATRY & NEUROLOGY

## 2019-03-20 PROCEDURE — 81025 URINE PREGNANCY TEST: CPT | Performed by: INTERNAL MEDICINE

## 2019-03-20 PROCEDURE — 12400000 ZZH R&B MH

## 2019-03-20 PROCEDURE — 99239 HOSP IP/OBS DSCHRG MGMT >30: CPT | Performed by: INTERNAL MEDICINE

## 2019-03-20 PROCEDURE — 25000131 ZZH RX MED GY IP 250 OP 636 PS 637: Performed by: PSYCHIATRY & NEUROLOGY

## 2019-03-20 PROCEDURE — 99232 SBSQ HOSP IP/OBS MODERATE 35: CPT | Performed by: PSYCHIATRY & NEUROLOGY

## 2019-03-20 RX ORDER — HYDROXYZINE HYDROCHLORIDE 25 MG/1
25 TABLET, FILM COATED ORAL 3 TIMES DAILY PRN
Status: DISCONTINUED | OUTPATIENT
Start: 2019-03-20 | End: 2019-04-01 | Stop reason: HOSPADM

## 2019-03-20 RX ORDER — HYDROXYZINE HYDROCHLORIDE 25 MG/1
25 TABLET, FILM COATED ORAL 3 TIMES DAILY PRN
Status: DISCONTINUED | OUTPATIENT
Start: 2019-03-20 | End: 2019-03-20 | Stop reason: HOSPADM

## 2019-03-20 RX ORDER — ACETAMINOPHEN 325 MG/1
650 TABLET ORAL EVERY 4 HOURS PRN
Status: DISCONTINUED | OUTPATIENT
Start: 2019-03-20 | End: 2019-04-01 | Stop reason: HOSPADM

## 2019-03-20 RX ORDER — ACETAMINOPHEN 325 MG/1
650 TABLET ORAL EVERY 4 HOURS PRN
Status: ON HOLD | DISCHARGE
Start: 2019-03-20 | End: 2019-04-29

## 2019-03-20 RX ORDER — GABAPENTIN 300 MG/1
300 CAPSULE ORAL 2 TIMES DAILY
Status: ON HOLD | DISCHARGE
Start: 2019-03-20 | End: 2019-04-01

## 2019-03-20 RX ORDER — ACETAMINOPHEN 325 MG/1
650 TABLET ORAL EVERY 4 HOURS PRN
Status: DISCONTINUED | OUTPATIENT
Start: 2019-03-20 | End: 2019-03-20 | Stop reason: HOSPADM

## 2019-03-20 RX ORDER — HYDROXYZINE HYDROCHLORIDE 25 MG/1
25 TABLET, FILM COATED ORAL 3 TIMES DAILY PRN
Status: ON HOLD | DISCHARGE
Start: 2019-03-20 | End: 2019-04-29

## 2019-03-20 RX ORDER — DIPHENHYDRAMINE HCL 25 MG
25 CAPSULE ORAL EVERY 6 HOURS PRN
Status: ON HOLD | DISCHARGE
Start: 2019-03-20 | End: 2019-04-01

## 2019-03-20 RX ORDER — GABAPENTIN 300 MG/1
300 CAPSULE ORAL 2 TIMES DAILY
Status: DISCONTINUED | OUTPATIENT
Start: 2019-03-20 | End: 2019-03-21

## 2019-03-20 RX ADMIN — HYDROXYZINE HYDROCHLORIDE 25 MG: 25 TABLET ORAL at 14:15

## 2019-03-20 RX ADMIN — VORTIOXETINE 10 MG: 5 TABLET, FILM COATED ORAL at 08:28

## 2019-03-20 RX ADMIN — LORAZEPAM 0.5 MG: 0.5 TABLET ORAL at 00:01

## 2019-03-20 RX ADMIN — LORAZEPAM 0.5 MG: 0.5 TABLET ORAL at 19:02

## 2019-03-20 RX ADMIN — LORAZEPAM 0.5 MG: 0.5 TABLET ORAL at 07:11

## 2019-03-20 RX ADMIN — GABAPENTIN 300 MG: 300 CAPSULE ORAL at 08:29

## 2019-03-20 RX ADMIN — ONDANSETRON HYDROCHLORIDE 8 MG: 8 TABLET, FILM COATED ORAL at 08:53

## 2019-03-20 RX ADMIN — LORAZEPAM 0.5 MG: 0.5 TABLET ORAL at 13:12

## 2019-03-20 RX ADMIN — GABAPENTIN 300 MG: 300 CAPSULE ORAL at 21:32

## 2019-03-20 ASSESSMENT — ACTIVITIES OF DAILY LIVING (ADL)
ADLS_ACUITY_SCORE: 10
ADLS_ACUITY_SCORE: 10
FALL_HISTORY_WITHIN_LAST_SIX_MONTHS: NO
AMBULATION: 0-->INDEPENDENT
RETIRED_EATING: 0-->INDEPENDENT
DRESS: 0-->INDEPENDENT
ADLS_ACUITY_SCORE: 10
SWALLOWING: 0-->SWALLOWS FOODS/LIQUIDS WITHOUT DIFFICULTY
TOILETING: 0-->INDEPENDENT
ADLS_ACUITY_SCORE: 10
TRANSFERRING: 0-->INDEPENDENT
FALL_HISTORY_WITHIN_LAST_SIX_MONTHS: NO
AMBULATION: 0-->INDEPENDENT
RETIRED_COMMUNICATION: 0-->UNDERSTANDS/COMMUNICATES WITHOUT DIFFICULTY
COGNITION: 0 - NO COGNITION ISSUES REPORTED
SWALLOWING: 0-->SWALLOWS FOODS/LIQUIDS WITHOUT DIFFICULTY
TRANSFERRING: 0-->INDEPENDENT
BATHING: 0-->INDEPENDENT
COGNITION: 0 - NO COGNITION ISSUES REPORTED
ADLS_ACUITY_SCORE: 10
TOILETING: 0-->INDEPENDENT
RETIRED_COMMUNICATION: 0-->UNDERSTANDS/COMMUNICATES WITHOUT DIFFICULTY
DRESS: 0-->INDEPENDENT
RETIRED_EATING: 0-->INDEPENDENT
ADLS_ACUITY_SCORE: 10
BATHING: 0-->INDEPENDENT

## 2019-03-20 ASSESSMENT — MIFFLIN-ST. JEOR: SCORE: 1378.93

## 2019-03-20 NOTE — PLAN OF CARE
Current condition (current mood & behavior): Calm, cooperative and quiet.  Sitter present: yes  Every 15 minute documentation by NA/RN completed for Shift: yes  Room safety Suicide Checklist completed in Epic: yes  Patient's color of severity (suicide scale): Was RED on admission but now denies suicidal ideation.  Order for psych consult placed (if appropriate): yes  Suicide care plan added: yes

## 2019-03-20 NOTE — PLAN OF CARE
ORIENTATION/BEHAVIOR:, A&O x 4, anxious at times , given PRN ativan x 2 and Atarax x1. suicidal and seizure precautions,siter at bedside, VPM in place.  ABNL VS/O2:BP; 95/ 61a symptomatic, that is baseline per patient  MOBILITY/FALL RISK: SBA- Independent  PAIN MANAGMENT:on scheduled gabapentin  DIET: Regular, appetite fair  BOWEL/BLADDER:continent  ABNL LAB/BG:  DRAIN/DEVICES:Peripheral IV saline locked  SKIN:Rash to abdomen , previous HD femoral site dressing c/d/i, dried blood on dressing  TESTS/PROCEDURES:  AGGRESSION TOOL COLOR:Green  D/C DAY/GOALS/PLACE: no suicidal ideations this shift,transfer to [facility] when bed available

## 2019-03-20 NOTE — PLAN OF CARE
Current condition (current mood & behavior): Denies suicidal ideation, anxious but otherwise no behavior issues  Sitter present: Yes  Every 15 minute documentation by NA/RN completed for Shift: Yes  Room safety Suicide Checklist completed in Epic: Yes  Patient's color of severity (suicide scale): Currently green  Order for psych consult placed (if appropriate): Yes  Suicide care plan added: Yes      Adarsh Payan

## 2019-03-20 NOTE — PROVIDER NOTIFICATION
Pt took a shower and after returning from shower she C/o of right groin pain and stated it felt swollen around old right femoral line site. VS baseline, area around femoral site was tender to touch and felt firm at the border of the dressing. Dr. Betancur notified and was instructed to call an RRT to have pt evaluated.

## 2019-03-20 NOTE — PROGRESS NOTES
Current condition (current mood & behavior):calm cooperative  Sitter present: YES  Every 15 minute documentation by NA/RN completed for Shift: YES  Room safety Suicide Checklist completed in Epic: YES  Patient's color of severity (suicide scale):green   Order for psych consult placed (if appropriate): YES  Suicide care plan added: YES    Kin Allen

## 2019-03-20 NOTE — PLAN OF CARE
Care Plan Summary Note:  ORIENTATION/BEHAVIOR: No behavior issues overnight: denied suicidal ideations.She remains on 1:1 monitoring, sitter at bedside.  ABNL VS/O2: SBP in 90s, no change, pt asymptomatic  MOBILITY/FALL RISK:No  PAIN MANAGMENT: Scheduled Neurontin, declined non-pharmaceutical interventions.  DIET: Regular  BOWEL/BLADDER: Continent  ABNL LAB/BG:  DRAIN/DEVICES: PIV, SL  SKIN: Rash to abdomen/trunk, previous HD femoral line site  TESTS/PROCEDURES: NA  AGGRESSION TOOL COLOR: Currently green, was red at admission  D/C DAY/GOALS/PLACE: Pending  OTHER: She reported feeling anxious, PRN Ativan effective.

## 2019-03-20 NOTE — DISCHARGE SUMMARY
[facility]  Hospitalist Discharge Summary       Date of Admission:  3/16/2019  Date of Discharge:  3/20/2019  Discharging Provider: Salvador Zavala MD      Discharge Diagnoses   Suicide attempt by intentional overdose with lithium and gabapentin  Repeated suicide attempts by overdose  Bipolar disorder  PTSD  Anxiety  Borderline Personality Disorder  Pruritis   Groin edema secondary to femoral line   Normocytic anemia  Hypoglycemia  Hypokalemia  Methamphetamine Abuse  Opiate Addiction  Hepatitis C, genotype 1a    Follow-ups Needed After Discharge   Follow-up Appointments     Follow-up and recommended labs and tests       Follow-up with psychiatry on arrival to psychiatry unit. See primary care   provider 1 week after discharge from mental health unit for follow-up of   hospitalization and medical issues.               Hospital Course   Jihan Gill is a 43 year-old female with extensive psychiatric history (bipolar disorder, major depression, anxiety, repeated suicide attempts, PTSD, benzodiazepine and opiate dependence, alcohol use disorder), hepatitis C who was admitted on 3/16/2019 with suicide attempt by intentional overdose with lithium and gabapentin.      Suicide attempt by intentional overdose with lithium and gabapentin  Repeated suicide attempts by overdose  Bipolar disorder  PTSD  Anxiety  Borderline Personality Disorder  Repeated suicide attempts by intentional overdose over many years with an array of medications (acetaminophen, methadone, lithium, etc). Reports taking lithium since ~ age 28 years. Most recently hospitalized at [facility] then transferred to [facility] for psychiatric care, ECT and dialysis, discharged 3/13. [place] level 1.9 in ED. Creatinine 0.62. Anion gap 1. Acetaminophen, ethanol, salicylate negative. NGT placed with golytely administration for whole bowel irrigation started on admission (per poison control recommendations). Nephrology consulted,  underwent HD per femoral line for lithium level peak of >6.00. Lithium levels stabilizing and now decreasing, no further HD anticipated.   - Psychiatry re-consulted, awaiting bed availability for transfer to mental health unit.  - Vortioxetine started per psychiatry. Patient c/o itching, see below.  - Defer additional medications for insomnia/PTSD per psychiatry. Note desire to avoid benzodiazapine therapy given her chemical dependency history.   - Continue gabapentin and hydroxyzine ordered by psychiatry   - Suicide precautions    Pruritis   Patient c/o itching, potentially due to vortioxetine as only other medication is gabapentin which she has been on previously and tolerated.  - Vortioxetine increased to 10 mg 3/19 per psychiatry, does not sound as though she spoke to them about itching. Itching actually slightly better, discussed with psychiatry who will follow in mental health unit and adjust as needed  - Benadryl PRN    Groin edema secondary to femoral line   Noted to have increased firmness in right groin, hemoglobin checked and decreased so underwent CT which demonstrated edema but no evidence of large hematoma or extension into the pelvis or retroperitoneum.  - Acetaminophen PRN (will limit to maximum 2 grams per 24 hours given hepatitis C and elevated LFTs), ice for pain     Normocytic anemia  Recent Labs   Lab 03/19/19 2000 03/17/19  0710 03/16/19 2020   HGB 9.6* 11.6* 12.0      No signs of bleeding. Has received IVF since admission, likely some dilutional component. As well as iatrogenic blood loss from frequent labs (for example had 16 lithium levels checked 3/16-3/18)  - Does not need to be monitored routinely on psychiatry unless new bleeding concerns    Hypoglycemia  Suspected due to lithium overdose.  - Regular diet as tolerated    Hypokalemia  Magnesium normal. Resolved with replacement.     Methamphetamine Abuse  Opiate Addiction  She reports her last use of street drugs was 2-3 weeks ago.       Hepatitis C, genotype 1a  Noted in [facility] documentation with persistently elevated ALT and AST  - Outpatient follow-up already scheduled at [facility] hepatology clinic on 3/27/19. Will potentially need to reschedule depending on the length of her psychiatric hospitalizatoin    Diet: Combination Diet Regular Diet Adult; Safe Tray - with utensils    DVT Prophylaxis: Pneumatic Compression Devices  Lezama Catheter: not present  Code Status: Full Code        Consultations This Hospital Stay   NEPHROLOGY IP CONSULT  PSYCHIATRY IP CONSULT  PSYCHIATRY IP CONSULT  PSYCHIATRY IP CONSULT  PSYCHIATRY IP CONSULT    Code Status   Full Code    Time Spent on this Encounter   I, Salvador Zavala, personally saw the patient today and spent greater than 30 minutes discharging this patient.       Salvador Zavala MD  [facility]  ______________________________________________________________________    Physical Exam   Vital Signs: Temp: 98.8  F (37.1  C) Temp src: Oral BP: 132/62 Pulse: 102 Heart Rate: 68 Resp: 16 SpO2: 100 % O2 Device: None (Room air)    Weight: 163 lbs 2.25 oz    Constitutional: NAD  Respiratory:     Clear to auscultation bilaterally, good air movement bilaterally  Cardiovascular: RRR, no m/r/g. No peripheral edema. Right groin dressed, no significant hematoma.   GI: Soft, non-tender, non-distended.   Skin/Integumen: Warm, dry. Excoriations over lower abdomen from itching, no significant rashes  Other: Alert. Cooperative. Following commands.          Primary Care Physician   Physician No Ref-Primary    Discharge Disposition   Transferred to psychiatric hospital   Condition at discharge: Stable      Discharge Orders      Follow-up and recommended labs and tests     Follow-up with psychiatry on arrival to psychiatry unit. See primary care provider 1 week after discharge from mental health unit for follow-up of hospitalization and medical issues.     Activity    Your activity upon discharge:  activity as tolerated     Full Code    Default due to suicide attempt.     Diet    Follow this diet upon discharge:  Regular Diet Adult; Safe Tray - with utensils     Discharge Medications   Current Discharge Medication List      START taking these medications    Details   acetaminophen (TYLENOL) 325 MG tablet Take 2 tablets (650 mg) by mouth every 4 hours as needed for mild pain . Maximum of 2 grams per 24 hour period.    Associated Diagnoses: Groin pain, right      diphenhydrAMINE (BENADRYL) 25 MG capsule Take 1 capsule (25 mg) by mouth every 6 hours as needed for itching    Associated Diagnoses: Itching      gabapentin (NEURONTIN) 300 MG capsule Take 1 capsule (300 mg) by mouth 2 times daily    Associated Diagnoses: Anxiety      hydrOXYzine (ATARAX) 25 MG tablet Take 1 tablet (25 mg) by mouth 3 times daily as needed (anxiety)    Associated Diagnoses: Anxiety      vortioxetine (TRINTELLIX/BRINTELLIX) 10 MG tablet Take 1 tablet (10 mg) by mouth daily    Associated Diagnoses: Episode of recurrent major depressive disorder, unspecified depression episode severity (H)         STOP taking these medications       ALPRAZolam (XANAX) 1 MG tablet Comments:   Reason for Stopping:         gabapentin (NEURONTIN) 600 MG tablet Comments:   Reason for Stopping:         lithium (ESKALITH) 300 MG tablet Comments:   Reason for Stopping:               Significant Results and Procedures   Most Recent 3 CBC's:  Recent Labs   Lab Test 03/19/19 2000 03/17/19  0710 03/16/19 2020 02/09/14  0743   WBC  --  6.8 7.2 6.8   HGB 9.6* 11.6* 12.0 12.6   MCV  --  92 91 88   PLT  --  240 300 239     Most Recent 3 BMP's:  Recent Labs   Lab Test 03/19/19  0838 03/18/19  0608 03/18/19  0144    137 138   POTASSIUM 3.9 3.7 3.6   CHLORIDE 114* 108 107   CO2 22 26 30   BUN 5* 5* 5*   CR 0.69 0.68 0.64   ANIONGAP 7 3 1*   ALEXEY 8.7 9.0 8.9   GLC 98 89 107*     Most Recent 2 LFT's:  Recent Labs   Lab Test 03/16/19 2020 09/16/13  1529   * 26    * 30   ALKPHOS 99 79   BILITOTAL 0.6 0.5     Most Recent 3 INR's:  Recent Labs   Lab Test 03/19/19  2000   INR 1.03     Most Recent 3 Troponin's:No lab results found.  Most Recent 3 BNP's:No lab results found.  Most Recent Cholesterol Panel:No lab results found.  Most Recent 6 Bacteria Isolates From Any Culture (See EPIC Reports for Culture Details):  Recent Labs   Lab Test 08/13/13  1215   CULT No growth     Most Recent TSH and T4:  Recent Labs   Lab Test 02/09/14  0743 08/13/13  0732   TSH 0.85 0.54   T4  --  0.97     Most Recent Hemoglobin A1c:No lab results found.  Most Recent Urinalysis:  Recent Labs   Lab Test 02/08/14  1447  04/04/12  1512   COLOR Yellow   < > Yellow   APPEARANCE Slightly Cloudy   < >  --    URINEGLC Negative   < > NEGATIVE   URINEBILI Negative   < >  --    URINEKETONE Negative   < >  --    SG 1.021   < > 1.010   UBLD Moderate*   < >  --    URINEPH 6.5   < > 5.5   PROTEIN Negative   < >  --    UROBILINOGEN  --   --  0.2 E.U./dL   NITRITE Negative   < > NEGATIVE   LEUKEST Trace*   < >  --    RBCU 13*   < >  --    WBCU 4*   < > 21-50    < > = values in this interval not displayed.     Most Recent ABG:No lab results found.  Most Recent ESR & CRP:No lab results found.,   Results for orders placed or performed during the hospital encounter of 03/16/19   XR Abdomen Port 1 View    Narrative    XR ABDOMEN PORTABLE 1 VIEW   3/17/2019 12:01 AM     HISTORY: New nasogastric tube placement.    COMPARISON: None.       Impression    IMPRESSION: Nasogastric tube tip in the mid stomach.    PEE PEDROZA MD   CT Abdomen Pelvis w Contrast    Narrative    CT ABDOMEN AND PELVIS WITH CONTRAST   3/19/2019 9:16 PM     HISTORY: Right groin pain with recent HD catheter removed 3/18, rule  out retroperitoneal bleed.    TECHNIQUE: 82 mL Isovue-370. Radiation dose for this scan was reduced  using automated exposure control, adjustment of the mA and/or kV  according to patient size, or iterative reconstruction  technique.    COMPARISON: None.    FINDINGS: Included lung bases are unremarkable.    The liver is normal. Gallbladder is contracted. Spleen and pancreas  are normal.    No adrenal lesions. No kidney stones or hydronephrosis. No suspicious  renal lesions.  No retroperitoneal adenopathy or evidence of aortic  aneurysm.    Scans through the pelvis demonstrate a normal appearing bladder. No  pelvic adenopathy.    Scattered colonic diverticuli. No evidence of diverticulitis or  appendicitis. No thickened bowel wall or dilated bowel. No free air or  free fluid.    There is subcutaneous edema in the right groin. This does not extend  into the retroperitoneum or abdomen.      Impression    IMPRESSION:   1. There is mild edema in the right groin, some of which could be a  small amount of blood. No evidence of large hematoma. No extension  into the pelvis or retroperitoneum.  2. Otherwise unremarkable.    STACEY MAST MD       Allergies   Allergies   Allergen Reactions     Abilify [Aripiprazole] Other (See Comments)     Tardive dyskinesia     Compazine Other (See Comments)     Dystonia     Dramamine      Reglan Other (See Comments)     dystonia     Seroquel [Quetiapine] Other (See Comments)     Tardive dyskinesia.

## 2019-03-20 NOTE — PROGRESS NOTES
[facility]    Medicine Progress Note - Hospitalist Service       Date of Admission:  3/16/2019  Assessment & Plan   Jihan Gill is a 43 year-old female with extensive psychiatric history (bipolar disorder, major depression, anxiety, repeated suicide attempts, PTSD, benzodiazepine and opiate dependence, alcohol use disorder), hepatitis C who was admitted on 3/16/2019 with suicide attempt by intentional overdose with lithium and gabapentin.      Suicide attempt by intentional overdose with lithium and gabapentin  Repeated suicide attempts by overdose  Depression  PTSD  Borderline Personality Disorder  Repeated suicide attempts by intentional overdose over many years with an array of medications (acetaminophen, methadone, lithium, etc). Reports taking lithium since ~ age 28 years. Most recently hospitalized at [facility] then transferred to [facility] for psychiatric care, ECT and dialysis, discharged 3/13. [place] level 1.9 in ED. Creatinine 0.62. Anion gap 1. Acetaminophen, ethanol, salicylate negative. NGT placed with golytely administration for whole bowel irrigation started on admission (per poison control recommendations). Nephrology consulted, underwent HD per femoral line for lithium level peak of >6.00. Lithium levels stabilizing and now decreasing, no further HD anticipated.   - Psychiatry re-consulted, awaiting bed availability for transfer to mental health unit.  - Vortioxetine started per psychiatry. Patient c/o itching, see below.  - Defer additional medications for insomnia/PTSD per psychiatry. Note desire to avoid benzodiazapine therapy given her chemical dependency history.   - Suicide precautions    Pruritis   Patient c/o itching, potentially due to vortioxetine as only other medication is gabapentin which she has been on previously and tolerated.  - Vortioxetine increased to 10 mg 3/19 per psychiatry, does not sound as though she spoke to them about itching. Itching  actually slightly better, but will ask psychiatry to review for potential side effect      Groin edema secondary to femoral line   Noted to have increased firmness in right groin, hemoglobin checked and decreased so underwent CT which demonstrated edema but no evidence of large hematoma or extension into the pelvis or retroperitoneum.  - Acetaminophen PRN (will limit to maximum 2 grams per 24 hours given hepatitis C and elevated LFTs), ice for pain     Normocytic anemia  Recent Labs   Lab 03/19/19 2000 03/17/19  0710 03/16/19 2020   HGB 9.6* 11.6* 12.0      No signs of bleeding. Has received IVF since admission, likely some dilutional component. As well as iatrogenic blood loss from frequent labs (for example had 16 lithium levels checked 3/16-3/18)  - Monitor periodically    Hypoglycemia  Suspected due to lithium overdose.  - Regular diet as tolerated    Hypokalemia  Magnesium normal. Resolved with replacement.     Methamphetamine Abuse  Opiate Addiction  She reports her last use of street drugs was 2-3 weeks ago.      Hepatitis C, genotype 1a  Noted in [facility] documentation with persistently elevated ALT and AST  - Outpatient follow-up already scheduled at [facility] hepatology clinic on 3/27/19. Will potentially need to reschedule depending on the length of her psychiatric hospitalizatoin    Diet: Combination Diet Regular Diet Adult; Safe Tray - with utensils    DVT Prophylaxis: Pneumatic Compression Devices  Lezama Catheter: not present  Code Status: Full Code      Disposition Plan   Expected discharge: Transfer to psychiatry when bed available   Entered: Salvador Zavala MD 03/20/2019, 8:01 AM       The patient's care was discussed with the Patient and Charge Nurse.    Salvador Zavala MD  Hospitalist Service  [facility]    ______________________________________________________________________    Interval History   Overnight had RRT called to assess groin due to increased firmness.  Hemoglobin drop noted, so underwent CT which did not reveal large hematoma or retroperitoneal bleed. Continues to have some discomfort in the area. Reports itching is actually slightly improved despite increase in vortioxetine dose, though still present. Ambulating independently.     Data reviewed today: I reviewed all medications, new labs and imaging results over the last 24 hours. I personally reviewed no images or EKG's today.    Physical Exam   Vital Signs: Temp: 98.3  F (36.8  C) Temp src: Oral BP: 95/61 Pulse: 84 Heart Rate: 68 Resp: 16 SpO2: 99 % O2 Device: None (Room air)    Weight: 163 lbs 2.25 oz    Constitutional: NAD  Respiratory: Clear to auscultation bilaterally, good air movement bilaterally  Cardiovascular: RRR, no m/r/g. No peripheral edema. Right groin dressed, no significant hematoma.   GI: Soft, non-tender, non-distended.   Skin/Integumen: Warm, dry. Excoriations over lower abdomen from itching, no significant rashes  Other: Alert. Cooperative. Following commands.     Data   Recent Labs   Lab 03/19/19 2000 03/19/19  0838 03/18/19  0608 03/18/19  0144  03/17/19  0710  03/16/19 2020   WBC  --   --   --   --   --  6.8  --  7.2   HGB 9.6*  --   --   --   --  11.6*  --  12.0   MCV  --   --   --   --   --  92  --  91   PLT  --   --   --   --   --  240  --  300   INR 1.03  --   --   --   --   --   --   --    NA  --  143 137 138   < > 140   < > 141   POTASSIUM  --  3.9 3.7 3.6   < > 2.9*   < > 3.8   CHLORIDE  --  114* 108 107   < > 107   < > 108   CO2  --  22 26 30   < > 30   < > 32   BUN  --  5* 5* 5*   < > 4*   < > 6*   CR  --  0.69 0.68 0.64   < > 0.68   < > 0.62   ANIONGAP  --  7 3 1*   < > 3   < > 1*   ALEXEY  --  8.7 9.0 8.9   < > 8.8   < > 9.6   GLC  --  98 89 107*   < > 97   < > 81   ALBUMIN  --   --   --   --   --   --   --  4.1   PROTTOTAL  --   --   --   --   --   --   --  7.9   BILITOTAL  --   --   --   --   --   --   --  0.6   ALKPHOS  --   --   --   --   --   --   --  99   ALT  --   --   --    --   --   --   --  303*   AST  --   --   --   --   --   --   --  207*    < > = values in this interval not displayed.       Recent Results (from the past 24 hour(s))   CT Abdomen Pelvis w Contrast    Narrative    CT ABDOMEN AND PELVIS WITH CONTRAST   3/19/2019 9:16 PM     HISTORY: Right groin pain with recent HD catheter removed 3/18, rule  out retroperitoneal bleed.    TECHNIQUE: 82 mL Isovue-370. Radiation dose for this scan was reduced  using automated exposure control, adjustment of the mA and/or kV  according to patient size, or iterative reconstruction technique.    COMPARISON: None.    FINDINGS: Included lung bases are unremarkable.    The liver is normal. Gallbladder is contracted. Spleen and pancreas  are normal.    No adrenal lesions. No kidney stones or hydronephrosis. No suspicious  renal lesions.  No retroperitoneal adenopathy or evidence of aortic  aneurysm.    Scans through the pelvis demonstrate a normal appearing bladder. No  pelvic adenopathy.    Scattered colonic diverticuli. No evidence of diverticulitis or  appendicitis. No thickened bowel wall or dilated bowel. No free air or  free fluid.    There is subcutaneous edema in the right groin. This does not extend  into the retroperitoneum or abdomen.      Impression    IMPRESSION:   1. There is mild edema in the right groin, some of which could be a  small amount of blood. No evidence of large hematoma. No extension  into the pelvis or retroperitoneum.  2. Otherwise unremarkable.    STACEY MAST MD     Medications       gabapentin  300 mg Oral BID     vortioxetine  10 mg Oral Daily

## 2019-03-20 NOTE — CONSULTS
[facility] Psychiatric Consult Progress Note    Interval History:   Pt seen, chart reviewed, case discussed with nursing staff and treating clinicians.  I met with Jihan arzate 68.  She was pleasant and cooperative.  She denies active thoughts of self-harm, we are waiting for a bed on psychiatry.  She has had some itching in the last day or 2, less today.  She does not have a severe rash, but we will need to monitor this because she just started a new antidepressant.  She has a history of hypomania, mood instability.  We briefly talked about whether lamotrigine might be a suitable option for her long-term.  We talked about psychiatry transfer, eventual stepdown needs, she has been in DBT before.     Review of systems:   10 point Review of Systems completed by Dr. Espinoza, and is  is negative other than noted in the HPI     Medications:       gabapentin  300 mg Oral BID     vortioxetine  10 mg Oral Daily     acetaminophen, diphenhydrAMINE, LORazepam, magnesium sulfate, melatonin, naloxone, ondansetron, potassium chloride, potassium chloride with lidocaine, potassium chloride, potassium chloride, potassium chloride    Mental Status Examination:     Appearance:  awake, alert, dressed in hospital scrubs, appeared as age stated and cooperative  Eye Contact:  good  Speech:  clear, coherent  Language:Normal  Psychomotor Behavior:  no evidence of tardive dyskinesia, dystonia, or tics  Mood:  depressed  Affect:  mood congruent and intensity is flat  Thought Process:  logical, linear and goal oriented no loose associations  Thought Content:  no evidence of suicidal ideation or homicidal ideation and passive suicidal ideation present  Oriented to:  time, person, and place  Attention Span and Concentration:  intact  Recent and Remote Memory:  intact  Fund of Knowledge: appropriate  Muscle Strength and Tone: normal  Gait and Station: Normal  Insight:  limited  Judgment:  poor        Labs/Vitals:      Recent Results (from the past 24 hour(s))   Basic metabolic panel    Collection Time: 03/19/19  8:38 AM   Result Value Ref Range    Sodium 143 133 - 144 mmol/L    Potassium 3.9 3.4 - 5.3 mmol/L    Chloride 114 (H) 94 - 109 mmol/L    Carbon Dioxide 22 20 - 32 mmol/L    Anion Gap 7 3 - 14 mmol/L    Glucose 98 70 - 99 mg/dL    Urea Nitrogen 5 (L) 7 - 30 mg/dL    Creatinine 0.69 0.52 - 1.04 mg/dL    GFR Estimate >90 >60 mL/min/[1.73_m2]    GFR Estimate If Black >90 >60 mL/min/[1.73_m2]    Calcium 8.7 8.5 - 10.1 mg/dL   Phosphorus    Collection Time: 03/19/19  8:38 AM   Result Value Ref Range    Phosphorus 3.1 2.5 - 4.5 mg/dL   Magnesium    Collection Time: 03/19/19  8:38 AM   Result Value Ref Range    Magnesium 1.9 1.6 - 2.3 mg/dL   Hemoglobin    Collection Time: 03/19/19  8:00 PM   Result Value Ref Range    Hemoglobin 9.6 (L) 11.7 - 15.7 g/dL   Lactic acid whole blood    Collection Time: 03/19/19  8:00 PM   Result Value Ref Range    Lactic Acid 0.5 (L) 0.7 - 2.0 mmol/L   INR    Collection Time: 03/19/19  8:00 PM   Result Value Ref Range    INR 1.03 0.86 - 1.14     B/P: 109/67, T: 97.6, P: 55, R: 21    Impression:   Jihan is a pleasant 43-year-old  female status post very significant lithium ingestion with resolving intoxication.  She is ready for psychiatry transfer, the unit should notify intake so they can get her on the list.  We will continue her antidepressant medication, monitor itching.  She might benefit from a mood stabilizer at some point such as Lamictal, certainly needs stepdown services after discharge.  She has some interest in Suboxone given her past history of abusing opiates which is something she can discuss with the psychiatry service. vivitrol or naltrexone are also considerations.      DIagnoses:   1.  Bipolar disorder type II, most recent episode depressed  2.  PTSD  3.  Borderline personality disorder suspected  4.  Acute lithium intoxication, status post overdose  5. H/O  polysubstance abuse         Plan:   1. Written information given on medications. Side effects, risks, benefits reviewed.  2.  Okay to continue Tintellex, resume gabapentin 300 mg twice daily  3.  Psychiatry transfer when bd opens up  4. Likely needs stepdown services, DBT after discharge  5. Issue of suboxone, vivitrol can be addressed on psych    Attestation:  Patient has been seen and evaluated by me,  Seun Espinoza MD

## 2019-03-21 PROCEDURE — 90853 GROUP PSYCHOTHERAPY: CPT

## 2019-03-21 PROCEDURE — 12400000 ZZH R&B MH

## 2019-03-21 PROCEDURE — 25000131 ZZH RX MED GY IP 250 OP 636 PS 637: Performed by: PSYCHIATRY & NEUROLOGY

## 2019-03-21 PROCEDURE — 25000132 ZZH RX MED GY IP 250 OP 250 PS 637: Performed by: PSYCHIATRY & NEUROLOGY

## 2019-03-21 RX ORDER — ONDANSETRON 4 MG/1
4 TABLET, ORALLY DISINTEGRATING ORAL EVERY 6 HOURS PRN
Status: DISCONTINUED | OUTPATIENT
Start: 2019-03-21 | End: 2019-04-01 | Stop reason: HOSPADM

## 2019-03-21 RX ORDER — GABAPENTIN 300 MG/1
600 CAPSULE ORAL 2 TIMES DAILY
Status: DISCONTINUED | OUTPATIENT
Start: 2019-03-21 | End: 2019-04-01 | Stop reason: HOSPADM

## 2019-03-21 RX ORDER — BUPRENORPHINE AND NALOXONE 4; 1 MG/1; MG/1
1 FILM, SOLUBLE BUCCAL; SUBLINGUAL DAILY
Status: DISCONTINUED | OUTPATIENT
Start: 2019-03-21 | End: 2019-03-26

## 2019-03-21 RX ADMIN — ONDANSETRON 4 MG: 4 TABLET, ORALLY DISINTEGRATING ORAL at 19:08

## 2019-03-21 RX ADMIN — HYDROXYZINE HYDROCHLORIDE 25 MG: 25 TABLET ORAL at 16:20

## 2019-03-21 RX ADMIN — GABAPENTIN 600 MG: 300 CAPSULE ORAL at 20:01

## 2019-03-21 RX ADMIN — ONDANSETRON 4 MG: 4 TABLET, ORALLY DISINTEGRATING ORAL at 13:51

## 2019-03-21 RX ADMIN — VORTIOXETINE 10 MG: 10 TABLET, FILM COATED ORAL at 08:00

## 2019-03-21 RX ADMIN — BUPRENORPHINE HYDROCHLORIDE, NALOXONE HYDROCHLORIDE 1 FILM: 4; 1 FILM, SOLUBLE BUCCAL; SUBLINGUAL at 21:02

## 2019-03-21 RX ADMIN — HYDROXYZINE HYDROCHLORIDE 25 MG: 25 TABLET ORAL at 07:58

## 2019-03-21 RX ADMIN — GABAPENTIN 300 MG: 300 CAPSULE ORAL at 08:00

## 2019-03-21 ASSESSMENT — ACTIVITIES OF DAILY LIVING (ADL)
ORAL_HYGIENE: INDEPENDENT
HYGIENE/GROOMING: INDEPENDENT
DRESS: INDEPENDENT
LAUNDRY: WITH SUPERVISION

## 2019-03-21 NOTE — PROGRESS NOTES
03/20/19 2041   Patient Belongings   Did you bring any home meds/supplements to the hospital?  No   Patient Belongings locker   Patient Belongings Put in Hospital Secure Location (Security or Locker, etc.) other (see comments)   Belongings Search Yes   Clothing Search Yes   Second Staff Cassi         Boots  Zip up sweater  Lip balm  Shirt  Tank top  Leggings  Empty pill bottle        A               Admission:  I am responsible for any personal items that are not sent to the safe or pharmacy.  [facility] is not responsible for loss, theft or damage of any property in my possession.    Signature:  _________________________________ Date: _______  Time: _____                                              Staff Signature:  ____________________________ Date: ________  Time: _____      2nd Staff person, if patient is unable/unwilling to sign:    Signature: ________________________________ Date: ________  Time: _____     Discharge:  [facility] has returned all of my personal belongings:    Signature: _________________________________ Date: ________  Time: _____                                          Staff Signature:  ____________________________ Date: ________  Time: _____

## 2019-03-21 NOTE — PLAN OF CARE
Discharge    Patient discharged to [facility] via W/C with Bedside sitter and Security  Care plan note: Patient belongings sent with patient are clothes and shoes. Discharge paperwork given to pt. Report given to 77 RN staff.    Listed belongings gathered and returned to patient. Yes  Care Plan and Patient education resolved: Yes  Prescriptions if needed, hard copies sent with patient: CATHERINE  Home and hospital acquired medications returned to patient: CATHREINE  Medication Bin checked and emptied on discharge Yes  Follow up appointment made for patient: CATHERINE
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
## 2019-03-21 NOTE — PLAN OF CARE
"Flat affect, tearful at times this morning when describing her current reason for admission. Also reports \"a lot of anxiety\", prn hydroxyzine given. Also discussed with her non-pharm methods to deal with anxiety and also suggested she practice coping strategies that she learned while doing DBT as this might help her deal with her frustration/stress. Visible all shift, spending most of her time over on the SDU. Social with peers. Med compliant  "

## 2019-03-21 NOTE — PLAN OF CARE
Calm , pleasant and cooperative. Mood stable . Denies SI. Med compliant. . Social with peers and spent time on SDU.

## 2019-03-21 NOTE — PROGRESS NOTES
03/21/19 1200   General Information   Date Initially Attended OT 03/21/19   Clinical Impression   Affect Appropriate to situation   Orientation Oriented to person, place and time   Appearance and ADLs General cleanliness observed in most areas   Attention to Internal Stimuli No observed signs   Interaction Skills Interacts appropriately with staff;Interacts appropriately with peers   Ability to Communicate Needs Independent   Verbal Content Appropriate to topic   Ability to Maintain Boundaries Maintains appropriate physical boundaries;Maintains appropriate verbal boundaries   Participation Participates with minimal encouragement   Concentration Concentrates 5-10 minutes   Ability to Concentrate With structure   Follows and Comprehends Directions Independently follows multi-step directions   Memory Delayed and immediate recall intact   Organization Independently organizes medium tasks   Decision Making Needs further assessment   Planning and Problem Solving Needs further assessment   Ability to Apply and Learn Concepts Applies within group structure   Frustrations / Stress Tolerance Needs further assessment   Level of Insight Identifies needs with structure/support   Self Esteem Can identify positives   Social Supports Has knowledge of support systems   General Observation/Plan   General Observations/Plan See Comments     INITIAL O.T. ASSESSMENT:      With initial task set up and MIN encouragement, pt participated in therapeutic group activity and discussion addressing wellness. Pt ID'd positive supports in her life and hopes she has for the future (e.g. establish a better relationship with her son, maintain sobriety). Educated pt on benefits of reflecting on accomplishments, positive qualities, and things she's grateful for with pt verbalizing understanding. Additionally, pt ID'd current stressors (e.g. Needing to find a new job, recent loss of a relationship). With MIN A, problem-solved ways to address same. Pt  will continue to benefit from OT intervention to address implementation of positive functional coping skills, role performance, and community reintegration.         Pt was given and completed a written self assessment. Cited suicide attempt/overdose as the reason for current hospitalization. Goals ID'd include to manage time better, to be more independent, and to improve decision making. OT staff explained the purpose of pt being involved in current treatment plan.      Plan: Encourage ongoing attendance as able to meet self-stated goals, implement positive coping skills, engage in therapeutic activities, and provide assessment ongoing.

## 2019-03-21 NOTE — PROGRESS NOTES
Patient reports she has scheduled an intake at [facility] in [place] for [place] 3/26/19 @ 6:10 am. 3/26/19 @ 6:10 am.

## 2019-03-21 NOTE — PROGRESS NOTES
Admitted from [facility], where she was medically cleared for an overdosed of 60 pills ( 300 mg Lithium) and 20 pill ( 600 mg of Gabapentin, on a suicide attempt.   Patient reported feeling depressed, due to some legal issues that are coming, but does not want to talk about it.   She said  Two weeks ago, she got out of [facility], where she got ECT for depression but it did not seem to help.    Patient is alert, oriented, her Lithium level is normal at 1.2. Lithium will not be re started.    She denies any suicidal ideation, feels safe here,  Admission completed.
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
## 2019-03-21 NOTE — PROGRESS NOTES
Chart check:  Patient discharged hospitalist service to mental health unit on 3/20/2019.  Discussed with patient's bedside RN, no new issues from a medical standpoint at the moment.  I will sign off for now.  Please call us with any questions.    Ferdinand Cazares MD  March 21, 2019

## 2019-03-21 NOTE — PROGRESS NOTES
[facility] Psychiatric Progress Note       Interim History     The patient's care was discussed with the treatment team and chart notes were reviewed. Pt seen on 03/21/19.  The patient has a history of bipolar disorder, major depression, anxiety, repeated suicide attempts, PTSD, benzodiazepine and opiate dependence, alcohol use disorder. She was initially admitted at [facility] for a suicide attempt by intentional overdose with Lithium and Gabapentin. She was followed by Dr. Addison who started her on Trintellix. She is currently at 10mg daily. Additionally, she is on Gabapentin 300mg bid. On interview with Dr. Addison, the patient is more calm, focused, and alert. The patient exhibits as less depressed and less anxious. Her energy and motivation has increased appropriately. She is less flat, withdrawn, and agitated. She is retaining an appropriate sleeping pattern and currently denies SI or a plan of action.     Dr. Addison recommend the patient seek a chemical dependency inpatient treatment that will prescribe Suboxone as it will help manage the patient's opiate dependency. Additionally, recommend scheduling a Rule 25.      Hospital Course     This is a 43 year old female who initially presented at [facility] in the context of a suicide attempt through overdose of Gabapentin and Lithium. Psychitry was consulted and Dr. Addison started the patient on Trintellix.     Medications     Current Facility-Administered Medications Ordered in Epic   Medication Dose Route Frequency Last Rate Last Dose     acetaminophen (TYLENOL) tablet 650 mg  650 mg Oral Q4H PRN         gabapentin (NEURONTIN) capsule 300 mg  300 mg Oral BID   300 mg at 03/21/19 0800     hydrOXYzine (ATARAX) tablet 25 mg  25 mg Oral TID PRN   25 mg at 03/21/19 0758     vortioxetine (TRINTELLIX/BRINTELLIX) tablet 10 mg  10 mg Oral Daily   10 mg at 03/21/19 0800     No current [facility]-ordered outpatient medications on file.       "   Allergies      Allergies   Allergen Reactions     Abilify [Aripiprazole] Other (See Comments)     Tardive dyskinesia     Compazine Other (See Comments)     Dystonia     Dramamine      Reglan Other (See Comments)     dystonia     Seroquel [Quetiapine] Other (See Comments)     Tardive dyskinesia.         Medical Review of Systems     /58   Pulse 86   Temp 99.3  F (37.4  C) (Oral)   Resp 16   Ht 1.651 m (5' 5\")   Wt 71.3 kg (157 lb 3.2 oz)   BMI 26.16 kg/m    Body mass index is 26.16 kg/m .  A 10-point review of systems was performed by Edgardo Addison MD and is negative, no new findings.      Psychiatric Examination     Appearance Sitting in chair, dressed in scrubs. Appears stated age.   Attitude Cooperative   Orientation Oriented to person, place, time   Eye Contact Poor   Speech Regular rate, rhythm, volume and tone   Language Normal   Psychomotor Behavior Normal   Mood Less depressed, less anxious   Affect More bright    Thought Process Goal-Oriented, Intact   Associations Intact   Thought Content Patient is currently negative for suicidal ideation, negative for plan or intent, able to contract no self harm and identify barriers to suicide.  Negative for obsessions, compulsions or psychosis.     Fund of Knowledge Intact   Insight Improving   Judgement Improving   Attention Span & Concentration Intact   Recent & Remote Memory Intact   Gait Normal   Muscle Tone Intact        Labs     Labs reviewed.  Recent Results (from the past 24 hour(s))   HCG qualitative urine    Collection Time: 03/20/19 10:35 AM   Result Value Ref Range    HCG Qual Urine Negative NEG^Negative        Impression          This is a 43 year old female with a past psychiatric history significant for bipolar disorder, major depression, general anxiety, and polysubstance abuse who initially presented at [facility] in the context of a suicide attempt through overdose on Lithium and Gabapentin. The patient was started on " Trintellix, currently 10mg, and exhibits as much less depressed and anxious. Energy, motivation, concentration, and focus have improved. She is not overly agitated or irritable and retains a more normal sleeping pattern. Although previous suicidal, the patient currently denies suicidal ideation or a plan of action. Due to her increasing mental stability, will increase Gabapentin to 600mg bid. Continue all other medication as prescribed with no change. Additionally, recommend the contact a local treatment center to set up a Rule 25 and an intake appointment. Recommend the patient seek a chemical dependency treatment center with a Suboxone program to appropriately manage opiate withdrawals.         Diagnoses     1. Bipolar disorder  2. Major depression, recurrent, severe, without psychotic features.  3. General anxiety disorder.   4.         Benzodiazepine and opiate dependence  5.         Alcohol use disorder       Plan     1. Explained side effects, benefits, and complications of medications to the patient, Pt gave verbal consent.  2. Medication changes: Will increase Gabapentin to 600mg bid. Continue all other medication as prescribed with no change.   3. Discussed treatment plan with patient and team.  4. Projected length of stay: +7 days      Attestation:   Patient has been seen and evaluated by me, Edgardo Addison MD.    Patient ID:  Name: Jihan Gill    MRN: [id number]  Admission: 3/20/2019   YOB: 1975

## 2019-03-22 PROCEDURE — 25000132 ZZH RX MED GY IP 250 OP 250 PS 637: Performed by: PSYCHIATRY & NEUROLOGY

## 2019-03-22 PROCEDURE — 12400000 ZZH R&B MH

## 2019-03-22 PROCEDURE — 90853 GROUP PSYCHOTHERAPY: CPT

## 2019-03-22 RX ADMIN — GABAPENTIN 600 MG: 300 CAPSULE ORAL at 20:36

## 2019-03-22 RX ADMIN — GABAPENTIN 600 MG: 300 CAPSULE ORAL at 07:53

## 2019-03-22 RX ADMIN — VORTIOXETINE 10 MG: 10 TABLET, FILM COATED ORAL at 07:53

## 2019-03-22 RX ADMIN — BUPRENORPHINE HYDROCHLORIDE, NALOXONE HYDROCHLORIDE 1 FILM: 4; 1 FILM, SOLUBLE BUCCAL; SUBLINGUAL at 11:26

## 2019-03-22 ASSESSMENT — ACTIVITIES OF DAILY LIVING (ADL)
LAUNDRY: WITH SUPERVISION
HYGIENE/GROOMING: INDEPENDENT
DRESS: INDEPENDENT
ORAL_HYGIENE: INDEPENDENT

## 2019-03-22 NOTE — PLAN OF CARE
Adult Behavioral Health Plan of Care  Team Discussion  Description  Team Plan:  3/21/2019 2323 - No Change by Lala Blancas  Note  BEHAVIORAL TEAM DISCUSSION    Participants: Dr. Addison, , nurses, PA's  Progress: Pt's mood is improved. Less agitated, and depressed.   Continued Stay Criteria/Rationale: Needs further stabilization.   Medical/Physical: n/a  Precautions: n/a  Behavioral Orders   Procedures    Code 1 - Restrict to Unit    Routine Programming     As clinically indicated    Status 15     Every 15 minutes.     Plan: Start Trintellix. Continue hospitalization until stabilized with aftercare in place.   Rationale for change in precautions or plan: n/a

## 2019-03-22 NOTE — PLAN OF CARE
"Pt transitioned I'lly to OT group and engaged in therapeutic activity addressing functional cognition and interpersonal skills with task set up. With task set up, pt participated in therapeutic group activity and discussion addressing wellness and recovery. Pt ID'd what a \"life in recovery\" looks like (e.g. Not using, enjoying her job, living somewhere pleasant, being active and social, taking medications as prescribed, and going to appointments). Additionally, pt reports a relationship she would like to improve as that with her son and method to do so as to stay sober and be more present and responsible. Pt will continue to benefit from OT intervention to address implementation of positive functional coping skills, role performance, and community reintegration.     "

## 2019-03-22 NOTE — PROGRESS NOTES
[facility] Psychiatric Progress Note       Interim History     The patient's care was discussed with the treatment team and chart notes were reviewed. It should be noted that the patient reported feelings of paranoia last night, declaring that she no longer trusts the world. She has reported seeing things at times that are not there. Patient additionally admitted to endorsing chronic and passive SI. Pt seen on 03/22/19. On interview today, the patient declares she is doing fine this morning. Denies any problems or concerns in regards of the start of Suboxone yesterday evening. She does note that her appetite seemed increased last night, she would wake up feeling hungry. Again, Dr. Addison recommend the patient seek a chemical dependency inpatient treatment that will prescribe Suboxone as it will help manage the patient's opiate dependency. Additionally, recommend scheduling a Rule 25. According to our , the patient scheduled an intake at [facility] in [place] for Tuesday, 3/26/19.      Hospital Course     This is a 43 year old female who initially presented at [facility] in the context of a suicide attempt through overdose of Gabapentin and Lithium. Psychitry was consulted and Dr. Addison started the patient on Trintellix. Energy, motivation, concentration, and focus have improved. She is not overly agitated or irritable and retains a more normal sleeping pattern. Although previous suicidal, the patient currently denies suicidal ideation or a plan of action. Due to her increasing mental stability, will increase Gabapentin to 600mg bid. Continue all other medication as prescribed with no change. Additionally, recommend the contact a local treatment center to set up a Rule 25 and an intake appointment. Recommend the patient seek a chemical dependency treatment center with a Suboxone program to appropriately manage opiate withdrawals.      Medications  "    Current Facility-Administered Medications Ordered in Epic   Medication Dose Route Frequency Last Rate Last Dose     acetaminophen (TYLENOL) tablet 650 mg  650 mg Oral Q4H PRN         buprenorphine HCl-naloxone HCl (SUBOXONE) 4-1 MG per film 1 Film  1 Film Sublingual Daily   1 Film at 03/21/19 2102     gabapentin (NEURONTIN) capsule 600 mg  600 mg Oral BID   600 mg at 03/21/19 2001     hydrOXYzine (ATARAX) tablet 25 mg  25 mg Oral TID PRN   25 mg at 03/21/19 1620     ondansetron (ZOFRAN-ODT) ODT tab 4 mg  4 mg Oral Q6H PRN   4 mg at 03/21/19 1908     vortioxetine (TRINTELLIX/BRINTELLIX) tablet 10 mg  10 mg Oral Daily   10 mg at 03/21/19 0800     No current [facility]-ordered outpatient medications on file.         Allergies      Allergies   Allergen Reactions     Abilify [Aripiprazole] Other (See Comments)     Tardive dyskinesia     Compazine Other (See Comments)     Dystonia     Dramamine      Reglan Other (See Comments)     dystonia     Seroquel [Quetiapine] Other (See Comments)     Tardive dyskinesia.         Medical Review of Systems     /58   Pulse 86   Temp 99.3  F (37.4  C) (Oral)   Resp 16   Ht 1.651 m (5' 5\")   Wt 71.3 kg (157 lb 3.2 oz)   BMI 26.16 kg/m    Body mass index is 26.16 kg/m .  A 10-point review of systems was performed by Edgardo Addison MD and is negative, no new findings.      Psychiatric Examination     Appearance Sitting in chair, dressed in scrubs. Appears stated age.   Attitude Cooperative   Orientation Oriented to person, place, time   Eye Contact Good    Speech Regular rate, rhythm, volume and tone   Language Normal   Psychomotor Behavior Normal   Mood Less depressed, less anxious   Affect More bright    Thought Process Goal-Oriented, Intact   Associations Intact   Thought Content Patient is currently negative for suicidal ideation, negative for plan or intent, able to contract no self harm and identify barriers to suicide.  Negative for obsessions, compulsions or " psychosis.     Fund of Knowledge Intact   Insight Improving   Judgement Improving   Attention Span & Concentration Intact   Recent & Remote Memory Intact   Gait Normal   Muscle Tone Intact        Labs     Labs reviewed.  No results found for this or any previous visit (from the past 24 hour(s)).     Impression   This is a 43 year old female with a past psychiatric history significant for bipolar disorder, major depression, general anxiety, and polysubstance abuse. Today, patient denied any concerns, complications, or side effects from the start of Suboxone 4-1 mg. Dr. Addison will make no changes to this medication today. Patient proceeds to declare feeling an increase in energy and motivation and a decrease in irritability and agitation. In addition to this, the patient reported obtaining an intake appointment at [facility] in [place], [place]. This appointment is scheduled for Tuesday, 3/26/19 at 6:10am. Patient reported looking forward to this intake appointment.      Diagnoses     1. Bipolar disorder  2. Major depression, recurrent, severe, without psychotic features.  3. General anxiety disorder.   4.         Benzodiazepine and opiate dependence  5.         Alcohol use disorder       Plan     1. Explained side effects, benefits, and complications of medications to the patient, Pt gave verbal consent.  2. Medication changes: None today  3. Continue all other medication as prescribed with no change.   4. Discussed treatment plan with patient and team.  5. Projected length of stay: 7+ days  6. Patient attains an intake appointment on 3/26/19 with [facility] at 6:10am      Attestation:   Patient has been seen and evaluated by me, Edgardo Addison MD.    Patient ID:  Name: Jihan Gill    MRN: [id number]  Admission: 3/20/2019   YOB: 1975

## 2019-03-22 NOTE — PLAN OF CARE
Pleasant and cooperative.  States that she no longer trusts the world and feels paranoid at times.  Reports that she sees things at times that are not there, currently denies any hallucinations.  Endorses chronic passive SI, verbally contracts for safety.  Fears that she will be stuck like this.  Discussed the need for ongoing treatment and structure.  Blunted in affect and depressed in mood.  Hopes Suboxone helps with cravings.

## 2019-03-22 NOTE — PLAN OF CARE
Pt is med compliant, social with roommate and peers. Eating well and  attending groups.  Pt denied SI.

## 2019-03-23 PROCEDURE — 12400000 ZZH R&B MH

## 2019-03-23 PROCEDURE — 25000132 ZZH RX MED GY IP 250 OP 250 PS 637: Performed by: PSYCHIATRY & NEUROLOGY

## 2019-03-23 RX ADMIN — GABAPENTIN 600 MG: 300 CAPSULE ORAL at 20:47

## 2019-03-23 RX ADMIN — VORTIOXETINE 10 MG: 10 TABLET, FILM COATED ORAL at 07:57

## 2019-03-23 RX ADMIN — GABAPENTIN 600 MG: 300 CAPSULE ORAL at 07:57

## 2019-03-23 RX ADMIN — BUPRENORPHINE HYDROCHLORIDE, NALOXONE HYDROCHLORIDE 1 FILM: 4; 1 FILM, SOLUBLE BUCCAL; SUBLINGUAL at 07:57

## 2019-03-23 ASSESSMENT — ACTIVITIES OF DAILY LIVING (ADL)
DRESS: INDEPENDENT
DRESS: INDEPENDENT
HYGIENE/GROOMING: INDEPENDENT
LAUNDRY: WITH SUPERVISION
HYGIENE/GROOMING: INDEPENDENT
ORAL_HYGIENE: INDEPENDENT
ORAL_HYGIENE: INDEPENDENT
LAUNDRY: WITH SUPERVISION

## 2019-03-23 NOTE — PLAN OF CARE
Pt spent almost the entire shift bed resting and sleeping.  Reports feeling better after getting sleep.  Up for dinner and meds.  Attended wrap up group.  Stated her goal today was to stop ruminating and that it went well. Denied SI.

## 2019-03-23 NOTE — PLAN OF CARE
Patient denies suicidal ideation.  She states she is feeling a lot better but is still depressed. She states that at times she gets paranoid.  She thought people were talking about her this morning. She states that she has never been paranoid before. She is happy with being on suboxone and states that she has an intake on Tuesday.  She attended groups and participated appropriately.  Pleasant and cooperative. Social with her peers.

## 2019-03-24 PROCEDURE — 90853 GROUP PSYCHOTHERAPY: CPT

## 2019-03-24 PROCEDURE — 25000132 ZZH RX MED GY IP 250 OP 250 PS 637: Performed by: PSYCHIATRY & NEUROLOGY

## 2019-03-24 PROCEDURE — 12400000 ZZH R&B MH

## 2019-03-24 RX ADMIN — GABAPENTIN 600 MG: 300 CAPSULE ORAL at 08:00

## 2019-03-24 RX ADMIN — BUPRENORPHINE HYDROCHLORIDE, NALOXONE HYDROCHLORIDE 1 FILM: 4; 1 FILM, SOLUBLE BUCCAL; SUBLINGUAL at 08:00

## 2019-03-24 RX ADMIN — GABAPENTIN 600 MG: 300 CAPSULE ORAL at 20:41

## 2019-03-24 RX ADMIN — VORTIOXETINE 10 MG: 10 TABLET, FILM COATED ORAL at 08:00

## 2019-03-24 ASSESSMENT — ACTIVITIES OF DAILY LIVING (ADL)
LAUNDRY: WITH SUPERVISION
HYGIENE/GROOMING: INDEPENDENT
DRESS: INDEPENDENT
DRESS: STREET CLOTHES
ORAL_HYGIENE: INDEPENDENT
LAUNDRY: WITH SUPERVISION
HYGIENE/GROOMING: INDEPENDENT
ORAL_HYGIENE: INDEPENDENT

## 2019-03-24 NOTE — PLAN OF CARE
Pleasant and cooperative.  Pt complained of feeling more paranoid today.  Also complained of itchiness.  Reported that it started today, after she read about Trintellix side effects and was concerned.  Offered reassurance and education.  Declined PRN Atarax.  Pt was also concerned that she is late for menses, HCG negative.  Napped for 3 hours.  Attended wrap up group.  Appears blunted in affect and anxious in mood.

## 2019-03-24 NOTE — PLAN OF CARE
"A/O. Up independently. Head to toe assessment WDL ex large bruise on side groin. About a dime size hard mass under bruise in groin. States that it feels sore at times. Attends groups and participates. States that her paranoia is not well controlled. \"I feel at times that people are talking about me or that staff is conspiring against me.\" Denies hallucinations, although states that when talking with family , \"There are things that I remember that they tell me never happened.\" State she feels her depression is lifting although still has passive suicidal thoughts.   "

## 2019-03-24 NOTE — PROGRESS NOTES
Late entry:  3/23 at 1515 patient showed this writer a large bruise on her right groin area. This was from a catheter that was inserted for dialysis in ICU.This writer circled the border with a pen and will observe

## 2019-03-25 LAB — HCG UR QL: NEGATIVE

## 2019-03-25 PROCEDURE — 25000132 ZZH RX MED GY IP 250 OP 250 PS 637: Performed by: PSYCHIATRY & NEUROLOGY

## 2019-03-25 PROCEDURE — 12400000 ZZH R&B MH

## 2019-03-25 PROCEDURE — 81025 URINE PREGNANCY TEST: CPT | Performed by: PSYCHIATRY & NEUROLOGY

## 2019-03-25 PROCEDURE — 90853 GROUP PSYCHOTHERAPY: CPT

## 2019-03-25 RX ADMIN — BUPRENORPHINE HYDROCHLORIDE, NALOXONE HYDROCHLORIDE 1 FILM: 4; 1 FILM, SOLUBLE BUCCAL; SUBLINGUAL at 07:37

## 2019-03-25 RX ADMIN — GABAPENTIN 600 MG: 300 CAPSULE ORAL at 20:37

## 2019-03-25 RX ADMIN — GABAPENTIN 600 MG: 300 CAPSULE ORAL at 07:37

## 2019-03-25 RX ADMIN — VORTIOXETINE 10 MG: 10 TABLET, FILM COATED ORAL at 07:37

## 2019-03-25 ASSESSMENT — ACTIVITIES OF DAILY LIVING (ADL)
DRESS: STREET CLOTHES;INDEPENDENT
HYGIENE/GROOMING: INDEPENDENT
ORAL_HYGIENE: INDEPENDENT

## 2019-03-25 NOTE — PROGRESS NOTES
"[facility] Psychiatric Progress Note       Interim History     The patient's care was discussed with the treatment team and chart notes were reviewed. Over the weekend, the patient expressed mild improvment in regards of her mood, however reported of feeling depressed and attaining passive suicidal thoughts. She also complained of feeling somewhat paranoid throughout the weekend, as if people were talking about her or that staff members were conspiring against her. Pt seen on 3/25/19 by Dr. Addison. On interview, patient notes she is doing really well today. She notes that since the start of Suboxone and Trintellix she had noticed improvement in mood and feels \"a lot better.\" She states she is looking forward to her scheduled intake at [facility] in [place] tomorrow (3/26/19) at 6:10am.      Hospital Course     This is a 43 year old female who initially presented at [facility] in the context of a suicide attempt through overdose of Gabapentin and Lithium. Psychitry was consulted and Dr. Addison started the patient on Trintellix. Energy, motivation, concentration, and focus have improved. She is not overly agitated or irritable and retains a more normal sleeping pattern. Although previous suicidal, the patient currently denies suicidal ideation or a plan of action. Due to her increasing mental stability, will increase Gabapentin to 600mg bid. Continue all other medication as prescribed with no change. Additionally, recommend the contact a local treatment center to set up a Rule 25 and an intake appointment. Recommend the patient seek a chemical dependency treatment center with a Suboxone program to appropriately manage opiate withdrawals.      Medications     Current Facility-Administered Medications Ordered in Epic   Medication Dose Route Frequency Last Rate Last Dose     acetaminophen (TYLENOL) tablet 650 mg  650 mg Oral Q4H PRN         buprenorphine HCl-naloxone " "HCl (SUBOXONE) 4-1 MG per film 1 Film  1 Film Sublingual Daily   1 Film at 03/24/19 0800     gabapentin (NEURONTIN) capsule 600 mg  600 mg Oral BID   600 mg at 03/24/19 2041     hydrOXYzine (ATARAX) tablet 25 mg  25 mg Oral TID PRN   25 mg at 03/21/19 1620     ondansetron (ZOFRAN-ODT) ODT tab 4 mg  4 mg Oral Q6H PRN   4 mg at 03/21/19 1908     vortioxetine (TRINTELLIX/BRINTELLIX) tablet 10 mg  10 mg Oral Daily   10 mg at 03/24/19 0800     No current Epic-ordered outpatient medications on file.         Allergies      Allergies   Allergen Reactions     Abilify [Aripiprazole] Other (See Comments)     Tardive dyskinesia     Compazine Other (See Comments)     Dystonia     Dramamine      Reglan Other (See Comments)     dystonia     Seroquel [Quetiapine] Other (See Comments)     Tardive dyskinesia.         Medical Review of Systems     /66   Pulse 95   Temp 98.5  F (36.9  C) (Oral)   Resp 16   Ht 1.651 m (5' 5\")   Wt 71.3 kg (157 lb 3.2 oz)   SpO2 93%   BMI 26.16 kg/m    Body mass index is 26.16 kg/m .  A 10-point review of systems was performed by Edgardo Addison MD and is negative, no new findings.      Psychiatric Examination     Appearance Sitting in chair, dressed in scrubs. Appears stated age.   Attitude Cooperative   Orientation Oriented to person, place, time   Eye Contact Good    Speech Regular rate, rhythm, volume and tone   Language Normal   Psychomotor Behavior Normal   Mood Less depressed, less anxious   Affect More bright    Thought Process Goal-Oriented, Intact   Associations Intact   Thought Content Patient is currently negative for suicidal ideation, negative for plan or intent, able to contract no self harm and identify barriers to suicide.  Negative for obsessions, compulsions or psychosis.     Fund of Knowledge Intact   Insight Improving   Judgement Improving   Attention Span & Concentration Intact   Recent & Remote Memory Intact   Gait Normal   Muscle Tone Intact        Labs     Labs " reviewed.  No results found for this or any previous visit (from the past 24 hour(s)).     Impression   This is a 43 year old female with a past psychiatric history significant for bipolar disorder, major depression, general anxiety, and polysubstance abuse. Over the weekend (3/23-3/24), the patient expressed to staff members feelings of paranoia and continued depression. However in today's interview, the patient stressed how much better she feels after the start of Trintellix and Suboxone. She did report feelings of paranoia with Dr. Addison today, however Dr. Addison does not want to address this right away in hopes pf the medication Trintellix subsiding this claimed paranoia. Again, patient has a scheduled intake at [facility] in [place] (3/26/19) at 6:10am. Patient's mother will be driving the patient to and from this intake appointment.      Diagnoses   1. Bipolar disorder  2. Major depression, recurrent, severe, without psychotic features.  3. General anxiety disorder.   4.   Benzodiazepine and opiate dependence  5.   Alcohol use disorder     Plan     1. Explained side effects, benefits, and complications of medications to the patient, Pt gave verbal consent.  2. Medication changes: None today   3. Discussed treatment plan with patient and team.  4. Projected length of stay: 7+ days  5. Patient has scheduled intake at [facility] in [place] (3/26/19) at 6:10am.    Attestation:   Patient has been seen and evaluated by me, Edgardo Addison MD.    Patient ID:  Name: Jihan Gill    MRN: [id number]  Admission: 3/20/2019   YOB: 1975

## 2019-03-25 NOTE — PROGRESS NOTES
Pt came to the nursing station to alert staff that she is having paranoid thoughts. PRN for anxiety was offered. Pt refused.

## 2019-03-25 NOTE — PLAN OF CARE
"BEHAVIORAL TEAM DISCUSSION    Participants:  Dr. Addison, nurses, social workers, occupational therapist, psych assistants, scribe  Progress: Pt is improving. Pt stated she is feeling \"a lot better\" since taking Suboxone and Trintellix.   Continued Stay Criteria/Rationale: Pt has  meeting tomorrow at [facility] in [place] at 6:10am.  Medical/Physical: n/a  Precautions:   Behavioral Orders   Procedures     Code 1 - Restrict to Unit     Routine Programming     As clinically indicated     Status 15     Every 15 minutes.     Plan: Pt is awaiting placement; continue with current treatment plan.  Rationale for change in precautions or plan: Pt awaiting for placement, needs further observation here on the unit.      "

## 2019-03-25 NOTE — PLAN OF CARE
Pleasant and cooperative.  Social.  Napped. Attended wrap up group and participated appropriately.  Denied SI this shift.  States that she is experiencing paranoia intermittently.  Looking forward to her intake appointment on Tuesday.  Blunted in affect and anxious .at times

## 2019-03-25 NOTE — PLAN OF CARE
Pt engaged in therapeutic activity addressing functional cognition and interpersonal skills with task set up and MIN encouragement. With initial task set up, pt participated in therapeutic group activity and discussion addressing strategies to increase motivation for improved wellness. Educated pt on strategies to increase motivation and common myths regarding motivation. Pt ID'd strategy to help improve her motivation as taking things one step at a time. Pt reports that she could use this technique to help her come up with a long term treatment plan. Pt will continue to benefit from OT intervention to address implementation of positive functional coping skills, role performance, and community reintegration.

## 2019-03-26 PROCEDURE — 25000132 ZZH RX MED GY IP 250 OP 250 PS 637: Performed by: PSYCHIATRY & NEUROLOGY

## 2019-03-26 PROCEDURE — 90853 GROUP PSYCHOTHERAPY: CPT

## 2019-03-26 PROCEDURE — 12400000 ZZH R&B MH

## 2019-03-26 RX ORDER — BUPRENORPHINE AND NALOXONE 4; 1 MG/1; MG/1
1 FILM, SOLUBLE BUCCAL; SUBLINGUAL 2 TIMES DAILY
Status: DISCONTINUED | OUTPATIENT
Start: 2019-03-26 | End: 2019-04-01

## 2019-03-26 RX ADMIN — BUPRENORPHINE HYDROCHLORIDE, NALOXONE HYDROCHLORIDE 1 FILM: 4; 1 FILM, SOLUBLE BUCCAL; SUBLINGUAL at 16:34

## 2019-03-26 RX ADMIN — BUPRENORPHINE HYDROCHLORIDE, NALOXONE HYDROCHLORIDE 1 FILM: 4; 1 FILM, SOLUBLE BUCCAL; SUBLINGUAL at 04:58

## 2019-03-26 RX ADMIN — HYDROXYZINE HYDROCHLORIDE 25 MG: 25 TABLET ORAL at 17:23

## 2019-03-26 RX ADMIN — GABAPENTIN 600 MG: 300 CAPSULE ORAL at 04:57

## 2019-03-26 RX ADMIN — VORTIOXETINE 10 MG: 10 TABLET, FILM COATED ORAL at 04:58

## 2019-03-26 RX ADMIN — GABAPENTIN 600 MG: 300 CAPSULE ORAL at 19:51

## 2019-03-26 ASSESSMENT — MIFFLIN-ST. JEOR: SCORE: 1417.49

## 2019-03-26 NOTE — PLAN OF CARE
"Pt transitioned I'lly to OT group and engaged in therapeutic activity addressing functional cognition and interpersonal skills with task set up and initial instruction. With MIN encouragement, pt participated in therapeutic group activity and discussion addressing wellness. Pt ID'd things she is grateful for (e.g. That the medications are working, her boyfriend, and her career), goals she is working toward (e.g. Being less codependent, sobriety), and values that guide her life (e.g. Love, humor, curiosity). Additionally, pt ID'd what a \"life worth living\" looks like (having a good relationship with her son). Pt will continue to benefit from OT intervention to address implementation of positive functional coping skills, role performance, and community reintegration.     "

## 2019-03-26 NOTE — PLAN OF CARE
Pt presents a full range affect, pt has pressured speech throughout the shift. Pt was med compliant. Pt went to groups and participated appropriately. Pt went to go interview CD in [place], pt stated that it went well. Pt was seen spending most of the shift out of her room and doing a puzzle in the lounge.

## 2019-03-26 NOTE — PROGRESS NOTES
[facility] Psychiatric Progress Note       Interim History     The patient's care was discussed with the treatment team and chart notes were reviewed. Per attending hospital staff members, the patient has presented somewhat anxious in mood, but has been spending her time socializing with her peers in the SDU lounge. Patient seen on 3/26/19 by Dr. Addison. On interview, patient notes that her scheduled intake appointment at [facility] went well today and plans to seek treatment there. Dr. Addison in is agreement with this plan. Aside from this, Dr. Addison will increase patient's Suboxone dose to 4-1mg film to BID.      Hospital Course     This is a 43 year old female who initially presented at [facility] in the context of a suicide attempt through overdose of Gabapentin and Lithium. Psychitry was consulted and Dr. Addison started the patient on Trintellix. Energy, motivation, concentration, and focus have improved. She is not overly agitated or irritable and retains a more normal sleeping pattern. Although previous suicidal, the patient currently denies suicidal ideation or a plan of action. Due to her increasing mental stability, will increase Gabapentin to 600mg bid. Continue all other medication as prescribed with no change. Additionally, recommend the contact a local treatment center to set up a Rule 25 and an intake appointment. Recommend the patient seek a chemical dependency treatment center with a Suboxone program to appropriately manage opiate withdrawals.  Over the weekend (3/23-3/24), the patient expressed to staff members feelings of paranoia and continued depression. However in 3/26 interview, the patient stressed how much better she felt after the start of Trintellix and Suboxone. She did report feelings of paranoia with Dr. Addison, however Dr. Addison does not want to address this right away in hopes the medication Trintellix subsides this  "claimed paranoia. Patient attended an intake appointment at [facility] in [place] on 3/26/19.      Medications     Current Facility-Administered Medications Ordered in Epic   Medication Dose Route Frequency Last Rate Last Dose     acetaminophen (TYLENOL) tablet 650 mg  650 mg Oral Q4H PRN         buprenorphine HCl-naloxone HCl (SUBOXONE) 4-1 MG per film 1 Film  1 Film Sublingual Daily   1 Film at 03/26/19 0458     gabapentin (NEURONTIN) capsule 600 mg  600 mg Oral BID   600 mg at 03/26/19 0457     hydrOXYzine (ATARAX) tablet 25 mg  25 mg Oral TID PRN   25 mg at 03/21/19 1620     ondansetron (ZOFRAN-ODT) ODT tab 4 mg  4 mg Oral Q6H PRN   4 mg at 03/21/19 1908     vortioxetine (TRINTELLIX/BRINTELLIX) tablet 10 mg  10 mg Oral Daily   10 mg at 03/26/19 0458     No current [facility]-ordered outpatient medications on file.         Allergies      Allergies   Allergen Reactions     Abilify [Aripiprazole] Other (See Comments)     Tardive dyskinesia     Compazine Other (See Comments)     Dystonia     Dramamine      Reglan Other (See Comments)     dystonia     Seroquel [Quetiapine] Other (See Comments)     Tardive dyskinesia.         Medical Review of Systems     /52   Pulse 95   Temp 98.4  F (36.9  C) (Oral)   Resp 18   Ht 1.651 m (5' 5\")   Wt 75.2 kg (165 lb 11.2 oz)   SpO2 93%   BMI 27.57 kg/m    Body mass index is 27.57 kg/m .  A 10-point review of systems was performed by Edgardo Addison MD and is negative, no new findings.      Psychiatric Examination     Appearance Sitting in chair, dressed in scrubs. Appears stated age.   Attitude Cooperative   Orientation Oriented to person, place, time   Eye Contact Good    Speech Regular rate, rhythm, volume and tone   Language Normal   Psychomotor Behavior Normal   Mood Less depressed, less anxious   Affect More bright    Thought Process Goal-Oriented, Intact   Associations Intact   Thought Content Patient is currently negative for suicidal " ideation, negative for plan or intent, able to contract no self harm and identify barriers to suicide.  Negative for obsessions, compulsions or psychosis.     Fund of Knowledge Intact   Insight Improving   Judgement Improving   Attention Span & Concentration Intact   Recent & Remote Memory Intact   Gait Normal   Muscle Tone Intact        Labs     Labs reviewed.  Recent Results (from the past 24 hour(s))   HCG qualitative urine    Collection Time: 03/25/19  6:45 PM   Result Value Ref Range    HCG Qual Urine Negative NEG^Negative        Impression   This is a 43 year old female with a past psychiatric history significant for bipolar disorder, major depression, general anxiety, and polysubstance abuse. The patient proceeds to be cooperative in care, medication compliant, and has appeared somewhat anxious at times however has been social with her peers. Patient underwent her intake appointment at [facility] this morning, which went well. She would like to seek treatment at this clinic after hospitalization. Dr. Addison is in support of this plan.      Diagnoses   1. Bipolar disorder  2. Major depression, recurrent, severe, without psychotic features.  3. General anxiety disorder.   4.   Benzodiazepine and opiate dependence  5.   Alcohol use disorder     Plan     1. Explained side effects, benefits, and complications of medications to the patient, Pt gave verbal consent.  2. Medication changes: Increased Suboxone 4-1mg to BID   3. Discussed treatment plan with patient and team.  4. Projected length of stay: 7+ days    Attestation:   Patient has been seen and evaluated by me, Edgardo Addison MD.    Patient ID:  Name: Jihan Gill    MRN: [id number]  Admission: 3/20/2019   YOB: 1975

## 2019-03-26 NOTE — PLAN OF CARE
"  Depressive Symptoms  Depressive Symptoms  Description  Signs and symptoms of listed problems will be absent or manageable.  3/25/2019 2109 - No Change by Cassandra Lane     Patient presents with blunt affect, somewhat anxious mood. Spent most of shift watching TV and socializing with peers in lounge or resting in bed. Pleasant. Social with peers. \"Very tired\" today, isn't sure why. Poor boundaries with peers. Has intake interview tomorrow morning for [facility]. Patient's mom will pick her up at 0500. Attended activity and wrap-up group and participated appropriately. Med-compliant.  "

## 2019-03-27 PROCEDURE — 25000132 ZZH RX MED GY IP 250 OP 250 PS 637: Performed by: PSYCHIATRY & NEUROLOGY

## 2019-03-27 PROCEDURE — 12400000 ZZH R&B MH

## 2019-03-27 PROCEDURE — 90853 GROUP PSYCHOTHERAPY: CPT

## 2019-03-27 RX ADMIN — HYDROXYZINE HYDROCHLORIDE 25 MG: 25 TABLET ORAL at 08:30

## 2019-03-27 RX ADMIN — BUPRENORPHINE HYDROCHLORIDE, NALOXONE HYDROCHLORIDE 1 FILM: 4; 1 FILM, SOLUBLE BUCCAL; SUBLINGUAL at 16:00

## 2019-03-27 RX ADMIN — BUPRENORPHINE HYDROCHLORIDE, NALOXONE HYDROCHLORIDE 1 FILM: 4; 1 FILM, SOLUBLE BUCCAL; SUBLINGUAL at 07:43

## 2019-03-27 RX ADMIN — GABAPENTIN 600 MG: 300 CAPSULE ORAL at 07:43

## 2019-03-27 RX ADMIN — VORTIOXETINE 10 MG: 10 TABLET, FILM COATED ORAL at 07:43

## 2019-03-27 RX ADMIN — GABAPENTIN 600 MG: 300 CAPSULE ORAL at 20:34

## 2019-03-27 ASSESSMENT — ACTIVITIES OF DAILY LIVING (ADL)
DRESS: STREET CLOTHES
HYGIENE/GROOMING: INDEPENDENT
LAUNDRY: WITH SUPERVISION
DRESS: STREET CLOTHES
HYGIENE/GROOMING: INDEPENDENT
LAUNDRY: WITH SUPERVISION
ORAL_HYGIENE: INDEPENDENT
ORAL_HYGIENE: INDEPENDENT

## 2019-03-27 NOTE — PROGRESS NOTES
[facility] Psychiatric Progress Note       Interim History     The patient's care was discussed with the treatment team and chart notes were reviewed. Patient seen on 3/27/19 by Dr. Addison. On interview, patient reports she is doing good today. She notes that she slept really well last night in comparison to previous nights. The plan continues to be for the patient to seek treatment at [facility] in [place], [place]. In regards of further medication management, the patient will need to find a psychiatrist after hospitalization. Prior to admission, she was seeing a pscyhiatrist at [facility]. We will have patient sign a release of information for [facility] in order for our  to arrange appointments.      Hospital Course     This is a 43 year old female who initially presented at [facility] in the context of a suicide attempt through overdose of Gabapentin and Lithium. Psychitry was consulted and Dr. Addison started the patient on Trintellix. Energy, motivation, concentration, and focus have improved. She is not overly agitated or irritable and retains a more normal sleeping pattern. Although previous suicidal, the patient currently denies suicidal ideation or a plan of action. Due to her increasing mental stability, will increase Gabapentin to 600mg bid. Continue all other medication as prescribed with no change. Additionally, recommend the contact a local treatment center to set up a Rule 25 and an intake appointment. Recommend the patient seek a chemical dependency treatment center with a Suboxone program to appropriately manage opiate withdrawals.  Over the weekend (3/23-3/24), the patient expressed to staff members feelings of paranoia and continued depression. However in 3/26 interview, the patient stressed how much better she felt after the start of Trintellix and Suboxone. She did report feelings of paranoia with  "Dr. Addison, however Dr. Addison does not want to address this right away in hopes the medication Trintellix subsides this claimed paranoia. Patient attended an intake appointment at [facility] in [place] on 3/26/19.      Medications     Current Facility-Administered Medications Ordered in Epic   Medication Dose Route Frequency Last Rate Last Dose     acetaminophen (TYLENOL) tablet 650 mg  650 mg Oral Q4H PRN         buprenorphine HCl-naloxone HCl (SUBOXONE) 4-1 MG per film 1 Film  1 Film Sublingual BID   1 Film at 03/26/19 1634     gabapentin (NEURONTIN) capsule 600 mg  600 mg Oral BID   600 mg at 03/26/19 1951     hydrOXYzine (ATARAX) tablet 25 mg  25 mg Oral TID PRN   25 mg at 03/26/19 1723     ondansetron (ZOFRAN-ODT) ODT tab 4 mg  4 mg Oral Q6H PRN   4 mg at 03/21/19 1908     vortioxetine (TRINTELLIX/BRINTELLIX) tablet 10 mg  10 mg Oral Daily   10 mg at 03/26/19 0458     No current [facility]-ordered outpatient medications on file.         Allergies      Allergies   Allergen Reactions     Abilify [Aripiprazole] Other (See Comments)     Tardive dyskinesia     Compazine Other (See Comments)     Dystonia     Dramamine      Reglan Other (See Comments)     dystonia     Seroquel [Quetiapine] Other (See Comments)     Tardive dyskinesia.         Medical Review of Systems     /64   Pulse 90   Temp 98.2  F (36.8  C) (Oral)   Resp 16   Ht 1.651 m (5' 5\")   Wt 75.2 kg (165 lb 11.2 oz)   SpO2 93%   BMI 27.57 kg/m    Body mass index is 27.57 kg/m .  A 10-point review of systems was performed by Edgardo Addison MD and is negative, no new findings.      Psychiatric Examination     Appearance Sitting in chair, dressed in scrubs. Appears stated age.   Attitude Cooperative   Orientation Oriented to person, place, time   Eye Contact Good    Speech Regular rate, rhythm, volume and tone   Language Normal   Psychomotor Behavior Normal   Mood Less depressed, less anxious   Affect More bright  "   Thought Process Goal-Oriented, Intact   Associations Intact   Thought Content Patient is currently negative for suicidal ideation, negative for plan or intent, able to contract no self harm and identify barriers to suicide.  Negative for obsessions, compulsions or psychosis.     Fund of Knowledge Intact   Insight Improving   Judgement Improving   Attention Span & Concentration Intact   Recent & Remote Memory Intact   Gait Normal   Muscle Tone Intact        Labs     Labs reviewed.  No results found for this or any previous visit (from the past 24 hour(s)).     Impression   This is a 43 year old female with a past psychiatric history significant for bipolar disorder, major depression, general anxiety, and polysubstance abuse. Today the patient reported significant improvement in sleep last night in comparison to previous days. The plan proceeds to be for the patient to seek treatment at [facility] in [place] immediately after discharge. She has agreed to sign a release of information in order for our  to schedule an appointment with her prior to admission psychiatrist at [facility].     Diagnoses   1. Bipolar disorder  2. Major depression, recurrent, severe, without psychotic features.  3. General anxiety disorder.   4.         Benzodiazepine and opiate dependence  5.         Alcohol use disorder     Plan     1. Explained side effects, benefits, and complications of medications to the patient, Pt gave verbal consent.  2. Medication changes: None today  3. Discussed treatment plan with patient and team.  4. Projected length of stay: 7+ days  5. There could be an opening at [facility] by Monday, 4/01/19    Attestation:   Patient has been seen and evaluated by me, Edgardo Addison MD.    Patient ID:  Name: Jihan Gill    MRN: [id number]  Admission: 3/20/2019   YOB: 1975

## 2019-03-27 NOTE — PLAN OF CARE
Pt visible around the unit. Presents as paranoid and anxious during the morning; but stated she felt better as the day went on. Attended groups and participated appropriately. Bed rested after lunch. During staff check-in pt denied having any feelings of SI this shift.

## 2019-03-27 NOTE — PLAN OF CARE
Pt was napping at the start of the shift. States that she woke up early. Pt was cooperative and pleasant with peers and staff. Denies SI, attended groups and participated appropriately. Requested Atarax for itchy, states that she started itching upon admission and is unsure of the origin and will discuss her concerns with the doctor in the morning. Will continue to monitor for safety.

## 2019-03-27 NOTE — PLAN OF CARE
With initial task set up, pt participated in therapeutic group activity and discussion addressing wellness. Educated pt on choice theory of basic needs including survival, love and belonging, power, freedom, and fun. With MIN encouragement Pt identified constructive and destructive ways that each of these needs could be met and was able to ID how to meet needs in more constructive way (participate in more healthy versus unhealthy leisure activities such as spending time with positive supports, and being to herself in her romantic relationship). Pt will continue to benefit from OT intervention to address implementation of positive functional coping skills, role performance, and community reintegration.

## 2019-03-28 PROCEDURE — 25000132 ZZH RX MED GY IP 250 OP 250 PS 637: Performed by: PSYCHIATRY & NEUROLOGY

## 2019-03-28 PROCEDURE — 90853 GROUP PSYCHOTHERAPY: CPT

## 2019-03-28 PROCEDURE — 12400000 ZZH R&B MH

## 2019-03-28 RX ADMIN — VORTIOXETINE 10 MG: 10 TABLET, FILM COATED ORAL at 07:55

## 2019-03-28 RX ADMIN — CARIPRAZINE 1.5 MG: 1.5 CAPSULE, GELATIN COATED ORAL at 13:33

## 2019-03-28 RX ADMIN — BUPRENORPHINE HYDROCHLORIDE, NALOXONE HYDROCHLORIDE 1 FILM: 4; 1 FILM, SOLUBLE BUCCAL; SUBLINGUAL at 15:47

## 2019-03-28 RX ADMIN — GABAPENTIN 600 MG: 300 CAPSULE ORAL at 07:55

## 2019-03-28 RX ADMIN — BUPRENORPHINE HYDROCHLORIDE, NALOXONE HYDROCHLORIDE 1 FILM: 4; 1 FILM, SOLUBLE BUCCAL; SUBLINGUAL at 07:55

## 2019-03-28 RX ADMIN — GABAPENTIN 600 MG: 300 CAPSULE ORAL at 19:48

## 2019-03-28 ASSESSMENT — ACTIVITIES OF DAILY LIVING (ADL)
HYGIENE/GROOMING: INDEPENDENT
HYGIENE/GROOMING: INDEPENDENT
ORAL_HYGIENE: INDEPENDENT
ORAL_HYGIENE: INDEPENDENT
LAUNDRY: WITH SUPERVISION
ORAL_HYGIENE: INDEPENDENT
DRESS: INDEPENDENT
HYGIENE/GROOMING: INDEPENDENT
ORAL_HYGIENE: INDEPENDENT
DRESS: INDEPENDENT
HYGIENE/GROOMING: INDEPENDENT
LAUNDRY: WITH SUPERVISION
DRESS: INDEPENDENT
DRESS: STREET CLOTHES

## 2019-03-28 NOTE — PROGRESS NOTES
"[facility] Psychiatric Progress Note       Interim History     The patient's care was discussed with the treatment team and chart notes were reviewed. Patient seen on 3/28/19 by Dr. Addison. On interview, patient declares she is doing well today, however expresses some concern in regards of her claimed paranoia. She states, \"I have really bad paranoia, even before this [hospital stay], I wouldn't leave my house...when I was leaving the Suboxone clinic (this past Tuesday), I felt like I was having a panic attack...sometimes it [paranoia] gets so bad...\" Patient's previous antipsychotic medications are reviewed. These medications include Zyprexa, Abilify, and Seroquel. She notes that she disliked all of these medications due to them causing weight gain and making patient \"feel weird.\" From all of this, Dr. Addison informs the patient about the medication, Vraylar with the patient. After much review, Dr. Addison will order Vraylar 1.5mg daily in order to address paranoia. Patient is in agreement with this medication adjustment.      Hospital Course     This is a 43 year old female who initially presented at [facility] in the context of a suicide attempt through overdose of Gabapentin and Lithium. Psychitry was consulted and Dr. Addison started the patient on Trintellix. Energy, motivation, concentration, and focus have improved. She is not overly agitated or irritable and retains a more normal sleeping pattern. Although previous suicidal, the patient currently denies suicidal ideation or a plan of action. Due to her increasing mental stability, will increase Gabapentin to 600mg bid. Continue all other medication as prescribed with no change. Additionally, recommend the contact a local treatment center to set up a Rule 25 and an intake appointment. Recommend the patient seek a chemical dependency treatment center with a Suboxone program to appropriately manage opiate withdrawals.  Over " "the weekend (3/23-3/24), the patient expressed to staff members feelings of paranoia and continued depression. However in 3/26 interview, the patient stressed how much better she felt after the start of Trintellix and Suboxone. She did report feelings of paranoia with Dr. Addison, however Dr. Addison does not want to address this right away in hopes the medication Trintellix subsides this claimed paranoia. Patient will seek treatment at [facility] in [place] immediately after discharge. She had agreed to sign a release of information in order for our  to schedule an appointment with her prior to admission psychiatrist at [facility].     Medications     Current Facility-Administered Medications Ordered in Epic   Medication Dose Route Frequency Last Rate Last Dose     acetaminophen (TYLENOL) tablet 650 mg  650 mg Oral Q4H PRN         buprenorphine HCl-naloxone HCl (SUBOXONE) 4-1 MG per film 1 Film  1 Film Sublingual BID   1 Film at 03/28/19 0755     gabapentin (NEURONTIN) capsule 600 mg  600 mg Oral BID   600 mg at 03/28/19 0755     hydrOXYzine (ATARAX) tablet 25 mg  25 mg Oral TID PRN   25 mg at 03/27/19 0830     ondansetron (ZOFRAN-ODT) ODT tab 4 mg  4 mg Oral Q6H PRN   4 mg at 03/21/19 1908     vortioxetine (TRINTELLIX/BRINTELLIX) tablet 10 mg  10 mg Oral Daily   10 mg at 03/28/19 0755     No current [facility]-ordered outpatient medications on file.         Allergies      Allergies   Allergen Reactions     Abilify [Aripiprazole] Other (See Comments)     Tardive dyskinesia     Compazine Other (See Comments)     Dystonia     Dramamine      Reglan Other (See Comments)     dystonia     Seroquel [Quetiapine] Other (See Comments)     Tardive dyskinesia.         Medical Review of Systems     /67   Pulse 82   Temp 98.6  F (37  C) (Oral)   Resp 17   Ht 1.651 m (5' 5\")   Wt 75.2 kg (165 lb 11.2 oz)   SpO2 93%   BMI 27.57 kg/m    Body mass index is " 27.57 kg/m .  A 10-point review of systems was performed by Edgardo Addison MD and is negative, no new findings.      Psychiatric Examination     Appearance Sitting in chair, dressed in scrubs. Appears stated age.   Attitude Cooperative   Orientation Oriented to person, place, time   Eye Contact Good    Speech Regular rate, rhythm, volume and tone   Language Normal   Psychomotor Behavior Normal   Mood Less depressed, less anxious   Affect More bright    Thought Process Goal-Oriented, Intact   Associations Intact   Thought Content Patient is currently negative for suicidal ideation, negative for plan or intent, able to contract no self harm and identify barriers to suicide.  Negative for obsessions, compulsions or psychosis.     Fund of Knowledge Intact   Insight Improving   Judgement Improving   Attention Span & Concentration Intact   Recent & Remote Memory Intact   Gait Normal   Muscle Tone Intact        Labs     Labs reviewed.  No results found for this or any previous visit (from the past 24 hour(s)).     Impression   This is a 43 year old female with a past psychiatric history significant for bipolar disorder, major depression, general anxiety, and polysubstance abuse. Today, patient noted she is doing better, however expressed concern in regards of paranoia. Previous antipsychotic medications were reviewed in detail (Zyprexa, Abilify, and Seroquel) and Dr. Addison would like to start the patient on Vraylar 1.5mg daily. Patient is in agreement this.      Diagnoses   1. Bipolar disorder  2. Major depression, recurrent, severe, without psychotic features.  3. General anxiety disorder.   4.         Benzodiazepine and opiate dependence  5.         Alcohol use disorder     Plan     1. Explained side effects, benefits, and complications of medications to the patient, Pt gave verbal consent.  2. Medication changes: Started Vraylar 1.5mg daily   3. Discussed treatment plan with patient and team.  4. Projected  length of stay: 7+ days  5. There could be an opening at [facility] by Monday, 4/01/19    Attestation:   Patient has been seen and evaluated by me, Edgardo Addison MD.    Patient ID:  Name: Jihan Gill    MRN: [id number]  Admission: 3/20/2019   YOB: 1975

## 2019-03-28 NOTE — PLAN OF CARE
Reported feeling much better, depression is improving, however she expressed to the Doctor that sometimes she has bad paranoia and it is hard for her to leave her house, she started on Vryalar to address her paranoia.  Plan : Possible discharge tomorrow and will follow up with [facility].

## 2019-03-28 NOTE — PLAN OF CARE
Pt visible around the unit. Spent time socializing with another patient in the lounge. Pleasant and cooperative. Presents with a calm affect and brightens upon approach. Attended groups and participated appropriately; pt showed insight during focus group. During staff check-in pt reported no anxiety or paranoia this shift. Denies SI.

## 2019-03-28 NOTE — PLAN OF CARE
"Pt attended 2 of 2 OT groups today. With initial task set up, pt participated in therapeutic group activity and discussion addressing cognitive distortions and their impact on recovery. Educated pt on the various cognitive distortions (e.g. All or nothing thinking, filtering, labeling, jumping to conclusions, etc.) with pt ID'ing cognitive distortions she often uses as \"emotional reasoning and black and white thinking.\" Educated pt on strategies to fix cognitive distortions with pt verbalizing understanding and reporting thinking in shades of gray as helpful strategy to use in the future. Additionally, pt ID'd specific unhelpful thought she has as \"I'm anxious because I was traumatized.\" With MIN-MOD A, problem-solved individual strategy to fix specific distortion. Pt also ID'd a new hobby she would like to try (gardening), illness symptoms (e.g. Paranoia, muscle tension), and and a trigger to anger (people being in her space). Pt will continue to benefit from OT intervention to address implementation of positive functional coping skills, role performance, and community reintegration.    "

## 2019-03-28 NOTE — PLAN OF CARE
BEHAVIORAL TEAM DISCUSSION    Participants: Dr. Addison, nurses, social workers, occupational therapist, psych assistants, medical scribe.   Progress: Pt is improving. Pt reported feeling better, but still feels paranoid at times.   Continued Stay Criteria/Rationale: Until pt becomes adequately stabilized. There could be an opening at [facility] this upcoming Monday.  Medical/Physical: Start Vraylar 1.5mg.  Precautions:   Behavioral Orders   Procedures     Code 1 - Restrict to Unit     Routine Programming     As clinically indicated     Status 15     Every 15 minutes.     Plan: Continue with current treatment plan. Possible discharge on Monday if opening at [facility].   Rationale for change in precautions or plan: Pt needs further observation here on the unit.

## 2019-03-28 NOTE — PLAN OF CARE
Pt spent the first half of shift bed resting in her room; more visible once activity groups started. Presents with a calm affect. Pleasant and cooperative. Attended activity group and wrap-up group and participated appropriately. Pt stated she felt less paranoid this evening.

## 2019-03-29 LAB
PROLACTIN SERPL-MCNC: 24 UG/L (ref 3–27)
TSH SERPL DL<=0.005 MIU/L-ACNC: 0.58 MU/L (ref 0.4–4)

## 2019-03-29 PROCEDURE — 90853 GROUP PSYCHOTHERAPY: CPT

## 2019-03-29 PROCEDURE — 25000132 ZZH RX MED GY IP 250 OP 250 PS 637: Performed by: PSYCHIATRY & NEUROLOGY

## 2019-03-29 PROCEDURE — 36415 COLL VENOUS BLD VENIPUNCTURE: CPT | Performed by: PHYSICIAN ASSISTANT

## 2019-03-29 PROCEDURE — 84443 ASSAY THYROID STIM HORMONE: CPT | Performed by: PHYSICIAN ASSISTANT

## 2019-03-29 PROCEDURE — 12400000 ZZH R&B MH

## 2019-03-29 PROCEDURE — 99207 ZZC NON-BILLABLE SERV PER CHARTING: CPT | Performed by: PHYSICIAN ASSISTANT

## 2019-03-29 PROCEDURE — 84146 ASSAY OF PROLACTIN: CPT | Performed by: PHYSICIAN ASSISTANT

## 2019-03-29 RX ADMIN — BUPRENORPHINE HYDROCHLORIDE, NALOXONE HYDROCHLORIDE 1 FILM: 4; 1 FILM, SOLUBLE BUCCAL; SUBLINGUAL at 15:59

## 2019-03-29 RX ADMIN — GABAPENTIN 600 MG: 300 CAPSULE ORAL at 07:48

## 2019-03-29 RX ADMIN — GABAPENTIN 600 MG: 300 CAPSULE ORAL at 20:21

## 2019-03-29 RX ADMIN — CARIPRAZINE 1.5 MG: 1.5 CAPSULE, GELATIN COATED ORAL at 07:48

## 2019-03-29 RX ADMIN — BUPRENORPHINE HYDROCHLORIDE, NALOXONE HYDROCHLORIDE 1 FILM: 4; 1 FILM, SOLUBLE BUCCAL; SUBLINGUAL at 07:48

## 2019-03-29 RX ADMIN — VORTIOXETINE 10 MG: 10 TABLET, FILM COATED ORAL at 07:48

## 2019-03-29 NOTE — PROGRESS NOTES
Hospitalist service re-consulted for evaluation of a breast lump. Patient reports noticing a breast lump yesterday. Denies tenderness or erythema. Does not do regular breast exams and is due to see regular OBGYN. Also notes one week of small galactorrhea for one week or so, minimal per report. On exam area patient notes is just lateral to areola and without discrete borders. Non-tender. No dimpling or nipple retraction. No erythema or infectious findings. She is able to express a very small amount of milky fluid, non-bloody appearing. She reports her LMP was > 1 month ago and period is a week late, hcg negative x2. Youngest child born 7 years ago.     Medications reviewed: she is currently on serotonin specific reuptake inhibitor (Trintillix) as well as an atypical antipsychotic, Vraylar, which was just started 3/28. Recently on lithium and presented with toxicity this admission. She has not received any first generation anti-psychotics.     Plan:  --she reports she has an established OBGYN who she will see in clinic next week, will repeat breast exam at that time. Defer need for mammogram to OBGYN provider.   --will check prolactin level here, can follow this up with OBGYN in clinic next week. May need further workup pending results but this can be deferred to outpatient setting  --recommend she avoid trying to express breast further as this will stimulate further production.     Please call with questions or concerns.     Helen Rivas PA-C

## 2019-03-29 NOTE — PLAN OF CARE
With initial task set up, pt participated in therapeutic group activity and discussion addressing balanced scheduling. Educated pt on four categories of activity (self-care, leisure, productivity, and social) and the importance of balance between categories for wellness with pt verbalizing understanding. Pt created pie chart of current breakdown of daily schedule using the four categories and identified where she is out of balance. Pt reports poor balance in her daily schedule, reporting that most of her day is spend doing unhealthy leisure activities. Pt reports wanting to spend more time doing productive activities. With MIN A, problem-solved strategies to address same (e.g. Pursue cosmetology license in [place], spend time care-taking for son). Pt will continue to benefit from OT intervention to address implementation of positive functional coping skills, role performance, and community reintegration.

## 2019-03-29 NOTE — PROGRESS NOTES
[facility] Psychiatric Progress Note       Interim History     The patient's care was discussed with the treatment team and chart notes were reviewed. Patient seen on 3/29/19 by Dr. Addison. On interview, Pt started Vraylar 1.5mg no side effects. Reports a lump in her breast, hospitalist examined it. Hospitalist would like her to get a mammogram. No other concerns or complications as of right now. Still pending appointment at [facility] and [facility].     Hospital Course     This is a 43 year old female who initially presented at [facility] in the context of a suicide attempt through overdose of Gabapentin and Lithium. Psychitry was consulted and Dr. Addison started the patient on Trintellix. Energy, motivation, concentration, and focus have improved. She is not overly agitated or irritable and retains a more normal sleeping pattern. Although previous suicidal, the patient currently denies suicidal ideation or a plan of action. Due to her increasing mental stability, will increase Gabapentin to 600mg bid. Continue all other medication as prescribed with no change. Additionally, recommend the contact a local treatment center to set up a Rule 25 and an intake appointment. Recommend the patient seek a chemical dependency treatment center with a Suboxone program to appropriately manage opiate withdrawals.  Over the weekend (3/23-3/24), the patient expressed to staff members feelings of paranoia and continued depression. However in 3/26 interview, the patient stressed how much better she felt after the start of Trintellix and Suboxone. She did report feelings of paranoia with Dr. Addison, however Dr. Addison does not want to address this right away in hopes the medication Trintellix subsides this claimed paranoia. Patient will seek treatment at [facility] in [place] immediately after discharge. She had agreed to sign a release of information in order for our  to  "schedule an appointment with her prior to admission psychiatrist at [facility] and [facility]. On 3/28, patient noted she was doing much better, however expressed concern in regards of paranoia. Previous antipsychotic medications were reviewed in detail (Zyprexa, Abilify, and Seroquel) and Dr. Addison ended up starting the patient on Vraylar 1.5mg daily in hopes to subside paranoia.      Medications     Current Facility-Administered Medications Ordered in Epic   Medication Dose Route Frequency Last Rate Last Dose     acetaminophen (TYLENOL) tablet 650 mg  650 mg Oral Q4H PRN         buprenorphine HCl-naloxone HCl (SUBOXONE) 4-1 MG per film 1 Film  1 Film Sublingual BID   1 Film at 03/28/19 1547     cariprazine (VRAYLAR) capsule CAPS 1.5 mg  1.5 mg Oral Daily   1.5 mg at 03/28/19 1333     gabapentin (NEURONTIN) capsule 600 mg  600 mg Oral BID   600 mg at 03/28/19 1948     hydrOXYzine (ATARAX) tablet 25 mg  25 mg Oral TID PRN   25 mg at 03/27/19 0830     ondansetron (ZOFRAN-ODT) ODT tab 4 mg  4 mg Oral Q6H PRN   4 mg at 03/21/19 1908     vortioxetine (TRINTELLIX/BRINTELLIX) tablet 10 mg  10 mg Oral Daily   10 mg at 03/28/19 0755     No current [facility]-ordered outpatient medications on file.         Allergies      Allergies   Allergen Reactions     Abilify [Aripiprazole] Other (See Comments)     Tardive dyskinesia     Compazine Other (See Comments)     Dystonia     Dramamine      Reglan Other (See Comments)     dystonia     Seroquel [Quetiapine] Other (See Comments)     Tardive dyskinesia.         Medical Review of Systems     /68   Pulse 95   Temp 99  F (37.2  C) (Oral)   Resp 16   Ht 1.651 m (5' 5\")   Wt 75.2 kg (165 lb 11.2 oz)   SpO2 93%   BMI 27.57 kg/m    Body mass index is 27.57 kg/m .  A 10-point review of systems was performed by Edgardo Addison MD and is negative, no new findings.      Psychiatric Examination     Appearance Sitting in chair, dressed in scrubs. Appears " stated age.   Attitude Cooperative   Orientation Oriented to person, place, time   Eye Contact Good    Speech Regular rate, rhythm, volume and tone   Language Normal   Psychomotor Behavior Normal   Mood Less anxious   Affect Pleasant   Thought Process Goal-Oriented, Intact   Associations Intact   Thought Content Patient is currently negative for suicidal ideation, negative for plan or intent, able to contract no self harm and identify barriers to suicide.  Negative for obsessions, compulsions or psychosis.     Fund of Knowledge Intact   Insight Improving   Judgement Improving   Attention Span & Concentration Intact   Recent & Remote Memory Intact   Gait Normal   Muscle Tone Intact        Labs     Labs reviewed.  No results found for this or any previous visit (from the past 24 hour(s)).     Impression   This is a 43 year old female with a past psychiatric history significant for bipolar disorder, major depression, general anxiety, and polysubstance abuse. Patient proceeds to be medication compliant, cooperative in care, attending group sessions, and awaiting treatment at [facility]. Today, the patient denied any side effects from the start of Vraylar 1.5mg. She did express concerns regarding a lump on her breast. Hospital would like her to undergo a mammogram.      Diagnoses   1. Bipolar disorder  2. Major depression, recurrent, severe, without psychotic features.  3. General anxiety disorder.   4.         Benzodiazepine and opiate dependence  5.         Alcohol use disorder     Plan     1. Explained side effects, benefits, and complications of medications to the patient, Pt gave verbal consent.  2. Medication changes: none  3. Discussed treatment plan with patient and team.  4. Projected length of stay: 7+ days  5. Patient will attend treatment at [facility] on Monday, 4/01/19    Attestation:   Patient has been seen and evaluated by me, Edgardo Addison MD.    Patient ID:  Name: Jihan ALISE  Jairo    MRN: [id number]  Admission: 3/20/2019   YOB: 1975

## 2019-03-29 NOTE — PROGRESS NOTES
Pt presents calm and visible on unit spending time in lounge and socializing with peers during groups. Was seen by hospitalist for concerns of lump on breast and was advised to schedule an appointment with her ob/gyn for next week. Looking forward to discharging on Monday.

## 2019-03-29 NOTE — PLAN OF CARE
Presents with blunt affect, but brightens upon approach. Pt was visible for some of the shift; spent some time bed resting. Attended groups and participated appropriately. Denies any SI this shift.

## 2019-03-30 PROCEDURE — 25000132 ZZH RX MED GY IP 250 OP 250 PS 637: Performed by: PSYCHIATRY & NEUROLOGY

## 2019-03-30 PROCEDURE — 90853 GROUP PSYCHOTHERAPY: CPT

## 2019-03-30 PROCEDURE — 12400000 ZZH R&B MH

## 2019-03-30 RX ADMIN — GABAPENTIN 600 MG: 300 CAPSULE ORAL at 07:37

## 2019-03-30 RX ADMIN — GABAPENTIN 600 MG: 300 CAPSULE ORAL at 20:22

## 2019-03-30 RX ADMIN — VORTIOXETINE 10 MG: 10 TABLET, FILM COATED ORAL at 07:37

## 2019-03-30 RX ADMIN — BUPRENORPHINE HYDROCHLORIDE, NALOXONE HYDROCHLORIDE 1 FILM: 4; 1 FILM, SOLUBLE BUCCAL; SUBLINGUAL at 07:37

## 2019-03-30 RX ADMIN — ACETAMINOPHEN 650 MG: 325 TABLET, FILM COATED ORAL at 15:51

## 2019-03-30 RX ADMIN — CARIPRAZINE 1.5 MG: 1.5 CAPSULE, GELATIN COATED ORAL at 07:37

## 2019-03-30 RX ADMIN — BUPRENORPHINE HYDROCHLORIDE, NALOXONE HYDROCHLORIDE 1 FILM: 4; 1 FILM, SOLUBLE BUCCAL; SUBLINGUAL at 15:51

## 2019-03-30 NOTE — PROGRESS NOTES
Pt presents calm and visible on unit spending time in lounge and socializing with peers during groups. Was seen by hospitalist for concerns of lump on breast and was given a referral to schedule an appointment with her ob/gyn for next week but was not able to today since the office was closed. Looking forward to discharging on Monday.

## 2019-03-30 NOTE — PROGRESS NOTES
Hospitalist No Charge Note:    I came by today to discuss the results of her prolactin lab that was drawn on 3/29.  It was 24 and is not something we should work up anymore.  She denies any change since yesterday, understands plan should be to see her OB/Gyn as OP.  No new issues.  Will sign off today but please call if any concerns develop.    Willam Thibodeaux MD  [facility] Hospitalist  Pager [phone]

## 2019-03-30 NOTE — PLAN OF CARE
Pt presents with a full range affect, pt spent most of the shift outside of his room. Pt was med compliant. Pt told staff on a 1:1 that she is having a hard time sleeping at night. Pt will wake up in the middle of the night talking to someone in the corner of the room then realizing that there is no one there. Pt complains that she cannot stay asleep. Pt was med compliant. Pt ate all of her meals.

## 2019-03-31 PROCEDURE — 25000132 ZZH RX MED GY IP 250 OP 250 PS 637: Performed by: PSYCHIATRY & NEUROLOGY

## 2019-03-31 PROCEDURE — 12400000 ZZH R&B MH

## 2019-03-31 PROCEDURE — 90853 GROUP PSYCHOTHERAPY: CPT

## 2019-03-31 RX ADMIN — BUPRENORPHINE HYDROCHLORIDE, NALOXONE HYDROCHLORIDE 1 FILM: 4; 1 FILM, SOLUBLE BUCCAL; SUBLINGUAL at 15:49

## 2019-03-31 RX ADMIN — CARIPRAZINE 1.5 MG: 1.5 CAPSULE, GELATIN COATED ORAL at 07:43

## 2019-03-31 RX ADMIN — HYDROXYZINE HYDROCHLORIDE 25 MG: 25 TABLET ORAL at 08:56

## 2019-03-31 RX ADMIN — VORTIOXETINE 10 MG: 10 TABLET, FILM COATED ORAL at 07:43

## 2019-03-31 RX ADMIN — GABAPENTIN 600 MG: 300 CAPSULE ORAL at 19:40

## 2019-03-31 RX ADMIN — BUPRENORPHINE HYDROCHLORIDE, NALOXONE HYDROCHLORIDE 1 FILM: 4; 1 FILM, SOLUBLE BUCCAL; SUBLINGUAL at 07:43

## 2019-03-31 RX ADMIN — HYDROXYZINE HYDROCHLORIDE 25 MG: 25 TABLET ORAL at 19:40

## 2019-03-31 RX ADMIN — GABAPENTIN 600 MG: 300 CAPSULE ORAL at 07:43

## 2019-03-31 ASSESSMENT — ACTIVITIES OF DAILY LIVING (ADL)
LAUNDRY: WITH SUPERVISION
DRESS: STREET CLOTHES;INDEPENDENT
LAUNDRY: WITH SUPERVISION
DRESS: INDEPENDENT
ORAL_HYGIENE: INDEPENDENT
HYGIENE/GROOMING: INDEPENDENT
HYGIENE/GROOMING: INDEPENDENT
ORAL_HYGIENE: INDEPENDENT

## 2019-03-31 NOTE — DISCHARGE INSTRUCTIONS
Behavioral Discharge Planning and Instructions    Summary: Admitted for suicidal attempt by intentional overdose.     Main Diagnosis:  Bipolar Disorder; Major Depression, recurrent, severe, without psychotic features; General Anxiety Disorder; Benzodiazepine and Opiate Dependence; Alcohol Use Disorder    Major Treatments, Procedures and Findings:  Psychiatric assessment. Medication adjustment.     Symptoms to Report: Increased anxiety, Losing more sleep or sleeping too much, Mood getting worse or Thoughts of suicide    Lifestyle Adjustment:  Follow all treatment recommendations. Develop and follow safety plan. Your safety plan is your contract with yourself to keep you safe in a crisis. Be sure to use the resources and crisis numbers listed on your safety plan.  Abstain from the use of all mood-altering substances, including alcohol, as usage may increase symptoms of depression and interfere with the effectiveness of your psychiatric medications. Maintain sobriety by attending daily AA or NA meetings and obtaining a sponsor.     Psychiatry Follow-up:     Follow with Medication-Assisted Treatment through [facility] in [place]. Appointment on 4/2/19 @ 6 am.  [address] [address]  [phone] / Fax: [phone]    You have an appointment for medication management at [facility] in [place] on Wednesday 4/3/19 @ 9:40 am with Rajendra Daugherty and [facility] [address], [address]  [place] [address]  [phone] / [phone] fax    Per the Hospitalist Service, you should follow up with your OB/GYN doctor after discharge for evaluation of breast lump.     Resources:   Crisis Intervention: 279.935.3140 or 304-112-5876 (TTY: 376.754.9682).  Call anytime for help.  National [place] on Mental Illness (www.[place].zunilda.org): 797.901.8265 or 855-189-7367.  Alcoholics Anonymous  (www.alcoholics-anonymous.org): Check your phone book for your local chapter.  National Suicide Prevention Line (www.mentalhealthmn.org): 187-562-SIEH (4208)  COPE (Crisis Services for Adults) in [facility]: 375.877.1645 (Available 24/7)    General Medication Instructions:   See your medication sheet(s) for instructions.   Take all medicines as directed.  Make no changes unless your doctor suggests them.   Go to all your doctor visits.  Be sure to have all your required lab tests. This way, your medicines can be refilled on time.  Do not use any drugs not prescribed by your doctor.  Avoid alcohol.

## 2019-03-31 NOTE — PLAN OF CARE
Pt has been present and pleasant in the SDU throughout the day shift. Verbalized to writer that she is anxious and fearful about expressing that she wants to leave because she does not like this place, but that she also does not want to leave and not be on top of her meds. Presents a blunted affect, but brightens upon approach and communication. Respectful to peers and staff. No shower; untidy. Attended OT and participated accordingly.

## 2019-03-31 NOTE — PROGRESS NOTES
Pt got 4.75 hours of sleep this shift. Unable to stay asleep throughout the night and was seen sitting up in bed a few times during the night.

## 2019-03-31 NOTE — PROGRESS NOTES
Patient reported she is feeling paranoid and hallucinating. She is seeing people going across her room, she said last night she almost screamed because when she was sleeping people were in her room, she feels that people are talking about her

## 2019-03-31 NOTE — PLAN OF CARE
"Pt presents with calm mood and full range affect.  Pt has been visible for the majority of the shift.  During 1:1 pt expressed she is looking forward to discharge on Monday, however, is \"a little nervous\" to go. Pt has been working on figuring out a daily schedule for once she discharges.  Pt attended unit programming, participating appropriately.  Pt denies thoughts of suicide or self harm.  Pt med compliant.     "

## 2019-03-31 NOTE — PLAN OF CARE
Pt has been present and pleasant throughout the evening shift in the SDU. Presents an anxious affect but has remained calm. Respectful to peers and staff. Had two visitors; went well. Med compliant. No shower; neglected grooming, but attire is appropriate to age and situation.

## 2019-04-01 VITALS
HEIGHT: 65 IN | DIASTOLIC BLOOD PRESSURE: 83 MMHG | WEIGHT: 165.7 LBS | RESPIRATION RATE: 16 BRPM | OXYGEN SATURATION: 98 % | TEMPERATURE: 98.2 F | BODY MASS INDEX: 27.61 KG/M2 | HEART RATE: 110 BPM | SYSTOLIC BLOOD PRESSURE: 120 MMHG

## 2019-04-01 PROCEDURE — 25000132 ZZH RX MED GY IP 250 OP 250 PS 637: Performed by: PSYCHIATRY & NEUROLOGY

## 2019-04-01 PROCEDURE — 90853 GROUP PSYCHOTHERAPY: CPT

## 2019-04-01 RX ORDER — BUPRENORPHINE AND NALOXONE 8; 2 MG/1; MG/1
1 FILM, SOLUBLE BUCCAL; SUBLINGUAL DAILY
Qty: 2 FILM | Refills: 0 | Status: ON HOLD | OUTPATIENT
Start: 2019-04-01 | End: 2019-04-29

## 2019-04-01 RX ORDER — BUPRENORPHINE AND NALOXONE 8; 2 MG/1; MG/1
1 FILM, SOLUBLE BUCCAL; SUBLINGUAL DAILY
Status: DISCONTINUED | OUTPATIENT
Start: 2019-04-01 | End: 2019-04-01 | Stop reason: HOSPADM

## 2019-04-01 RX ORDER — GABAPENTIN 300 MG/1
600 CAPSULE ORAL 2 TIMES DAILY
Qty: 120 CAPSULE | Refills: 0 | Status: ON HOLD | OUTPATIENT
Start: 2019-04-01 | End: 2019-04-29

## 2019-04-01 RX ORDER — ONDANSETRON 4 MG/1
4 TABLET, ORALLY DISINTEGRATING ORAL EVERY 6 HOURS PRN
Qty: 30 TABLET | Refills: 0 | Status: ON HOLD | OUTPATIENT
Start: 2019-04-01 | End: 2019-04-29

## 2019-04-01 RX ADMIN — VORTIOXETINE 20 MG: 20 TABLET, FILM COATED ORAL at 09:15

## 2019-04-01 RX ADMIN — GABAPENTIN 600 MG: 300 CAPSULE ORAL at 09:16

## 2019-04-01 RX ADMIN — CARIPRAZINE 1.5 MG: 1.5 CAPSULE, GELATIN COATED ORAL at 09:15

## 2019-04-01 RX ADMIN — BUPRENORPHINE HYDROCHLORIDE, NALOXONE HYDROCHLORIDE 1 FILM: 8; 2 FILM, SOLUBLE BUCCAL; SUBLINGUAL at 09:15

## 2019-04-01 ASSESSMENT — ACTIVITIES OF DAILY LIVING (ADL)
ORAL_HYGIENE: INDEPENDENT
DRESS: STREET CLOTHES
LAUNDRY: WITH SUPERVISION

## 2019-04-01 NOTE — DISCHARGE SUMMARY
[facility] Psychiatric Discharge Summary      DATE OF ADMISSION: 3/20/2019     DATE OF DISCHARGE: 4/01/19    PRIMARY CARE PHYSICIAN: No Ref-Primary, Physician    IDENTIFICATION: For history, see dictation by Dr. Addison on 3/17/19. For physical summary, see dictation by Joey Graham MD on 3/16/19.     HOSPITAL COURSE:   This is a 43 year old female who initially presented at [facility] in the context of a suicide attempt through overdose of Gabapentin and Lithium. Psychitry was consulted and Dr. Addison started the patient on Trintellix. Energy, motivation, concentration, and focus have improved. She is not overly agitated or irritable and retains a more normal sleeping pattern. Although previous suicidal, the patient currently denies suicidal ideation or a plan of action. Due to her increasing mental stability, will increase Gabapentin to 600mg bid. Continue all other medication as prescribed with no change. Additionally, recommend the contact a local treatment center to set up a Rule 25 and an intake appointment. Recommend the patient seek a chemical dependency treatment center with a Suboxone program to appropriately manage opiate withdrawals.  Over the weekend (3/23-3/24), the patient expressed to staff members feelings of paranoia and continued depression. However in 3/26 interview, the patient stressed how much better she felt after the start of Trintellix and Suboxone. She did report feelings of paranoia with Dr. Addison, however Dr. Addison does not want to address this right away in hopes the medication Trintellix subsides this claimed paranoia. Patient will seek treatment at [facility] in [place] immediately after discharge. She had agreed to sign a release of information in order for our  to schedule an appointment with her prior to admission psychiatrist at [facility]. On 3/28, patient noted she was  "doing much better, however expressed concern in regards of paranoia. Previous antipsychotic medications were reviewed in detail (Zyprexa, Abilify, and Seroquel) and Dr. Addison ended up starting the patient on Vraylar 1.5mg daily in hopes to subside paranoia.       DISCHARGE MENTAL STATUS EXAMINATION:  Appearance Sitting in chair, dressed in scrubs. Appears stated age.   Attitude Cooperative   Orientation Oriented to person, place, time   Eye Contact Good    Speech Regular rate, rhythm, volume and tone   Language Normal   Psychomotor Behavior Normal   Mood Less anxious, less paranoid    Affect Pleasant   Thought Process Goal-Oriented, Intact   Associations Intact   Thought Content Patient is currently negative for suicidal ideation, negative for plan or intent, able to contract no self harm and identify barriers to suicide.  Negative for obsessions, compulsions or psychosis.     Fund of Knowledge Intact   Insight Improving   Judgement Improving   Attention Span & Concentration Intact   Recent & Remote Memory Intact   Gait Normal   Muscle Tone Intact       LABORATORY DATA:    Refer to hospitalist admission dictation.  No results found for this or any previous visit (from the past 24 hour(s)).     /84   Pulse 113   Temp 98.2  F (36.8  C) (Oral)   Resp 16   Ht 1.651 m (5' 5\")   Wt 75.2 kg (165 lb 11.2 oz)   SpO2 98%   BMI 27.57 kg/m       DISCHARGE MEDICATIONS:      Review of your medicines      UNREVIEWED medicines. Ask your doctor about these medicines      Dose / Directions   acetaminophen 325 MG tablet  Commonly known as:  TYLENOL  Used for:  Groin pain, right      Dose:  650 mg  Take 2 tablets (650 mg) by mouth every 4 hours as needed for mild pain . Maximum of 2 grams per 24 hour period.  Refills:  0     diphenhydrAMINE 25 MG capsule  Commonly known as:  BENADRYL  Used for:  Itching      Dose:  25 mg  Take 1 capsule (25 mg) by mouth every 6 hours as needed for itching  Refills:  0     gabapentin " 300 MG capsule  Commonly known as:  NEURONTIN  Used for:  Anxiety      Dose:  300 mg  Take 1 capsule (300 mg) by mouth 2 times daily  Refills:  0     hydrOXYzine 25 MG tablet  Commonly known as:  ATARAX  Used for:  Anxiety      Dose:  25 mg  Take 1 tablet (25 mg) by mouth 3 times daily as needed (anxiety)  Refills:  0     vortioxetine 10 MG tablet  Commonly known as:  TRINTELLIX/BRINTELLIX      Dose:  10 mg  Take 1 tablet (10 mg) by mouth daily  Refills:  0            DISCHARGE DIAGNOSES:  1. Bipolar disorder  2. Major depression, recurrent, severe, without psychotic features.  3. General anxiety disorder.   4.   Benzodiazepine and opiate dependence  5.    Alcohol use disorder    DISCHARGE PLAN:   1. Continue with current medication regiment  2. Discharge to [facility] in [place], [place]  3. Patient will follow-up at [facility]      DISCHARGE FOLLOW-UP:  See Reconciliation Note     Attestation:   Patient has been seen and evaluated by me, Edgardo Addison MD.    Patient ID:    Name: Jihan Gill MRN: [id number]  Admission: 3/20/2019  YOB: 1975

## 2019-04-01 NOTE — PROGRESS NOTES
Met with patient regarding discharge appontments. She reports she now has [facility] [place] and has an intake at [facility] tomorrow at 6 am. Checked with [facility] [employer] [facility] business office and able to verify [facility] insurance. Verified [facility] appointment and set up medication management appointment at [facility] for 4/3 @ 9:40 am. Patient said her OB Gyn is her primary care and she will follow up with them re': lump on breast.

## 2019-04-01 NOTE — PROGRESS NOTES
Patient denies suicidal ideation.  Reviewed discharge instructions with patient. Patient has a copy of discharge instructions and verbalizes understanding of them. Discharging to home this afternoon. Left at 1330 to take the bus home.

## 2019-04-01 NOTE — PROGRESS NOTES
[facility] Psychiatric Progress Note       Interim History     The patient's care was discussed with the treatment team and chart notes were reviewed. Per attending hospital staff members on [address], the patient has been medication compliant, cooperative in care, and pleasant with both her peers and staff members on SDU. Patient seen on 4/01/19 by Dr. Addison. On interview, patient notes she is doing well today. She reports she is looking forward to discharging to [facility] today. She plans to live with a friend and follow-up with her prior to admission psychiatrist at [facility] after hospital stay. In addition to this, patient denies experiencing any side effects or complications with her current medication regiment. Dr. Addison would like to increase Trintellix dose to 20mg in order to continue with patient's steady progress. Patient is in agreement with this medication adjustment.      Hospital Course     This is a 43 year old female who initially presented at [facility] in the context of a suicide attempt through overdose of Gabapentin and Lithium. Psychitry was consulted and Dr. Addison started the patient on Trintellix. Energy, motivation, concentration, and focus have improved. She is not overly agitated or irritable and retains a more normal sleeping pattern. Although previous suicidal, the patient currently denies suicidal ideation or a plan of action. Due to her increasing mental stability, will increase Gabapentin to 600mg bid. Continue all other medication as prescribed with no change. Additionally, recommend the contact a local treatment center to set up a Rule 25 and an intake appointment. Recommend the patient seek a chemical dependency treatment center with a Suboxone program to appropriately manage opiate withdrawals.  Over the weekend (3/23-3/24), the patient expressed to staff members feelings of paranoia and continued  depression. However in 3/26 interview, the patient stressed how much better she felt after the start of Trintellix and Suboxone. She did report feelings of paranoia with Dr. Addison, however Dr. Addison does not want to address this right away in hopes the medication Trintellix subsides this claimed paranoia. Patient will seek treatment at [facility] in [place] immediately after discharge. She had agreed to sign a release of information in order for our  to schedule an appointment with her prior to admission psychiatrist at [facility]. On 3/28, patient noted she was doing much better, however expressed concern in regards of paranoia. Previous antipsychotic medications were reviewed in detail (Zyprexa, Abilify, and Seroquel) and Dr. Addison ended up starting the patient on Vraylar 1.5mg daily in hopes to subside paranoia.      Medications     Current Facility-Administered Medications Ordered in Epic   Medication Dose Route Frequency Last Rate Last Dose     acetaminophen (TYLENOL) tablet 650 mg  650 mg Oral Q4H PRN   650 mg at 03/30/19 1551     buprenorphine HCl-naloxone HCl (SUBOXONE) 4-1 MG per film 1 Film  1 Film Sublingual BID   1 Film at 03/31/19 1549     cariprazine (VRAYLAR) capsule CAPS 1.5 mg  1.5 mg Oral Daily   1.5 mg at 03/31/19 0743     gabapentin (NEURONTIN) capsule 600 mg  600 mg Oral BID   600 mg at 03/31/19 1940     hydrOXYzine (ATARAX) tablet 25 mg  25 mg Oral TID PRN   25 mg at 03/31/19 1940     ondansetron (ZOFRAN-ODT) ODT tab 4 mg  4 mg Oral Q6H PRN   4 mg at 03/21/19 1908     vortioxetine (TRINTELLIX/BRINTELLIX) tablet 10 mg  10 mg Oral Daily   10 mg at 03/31/19 0743     No current [facility]-ordered outpatient medications on file.         Allergies      Allergies   Allergen Reactions     Abilify [Aripiprazole] Other (See Comments)     Tardive dyskinesia     Compazine Other (See Comments)     Dystonia     Dramamine      Reglan Other  "(See Comments)     dystonia     Seroquel [Quetiapine] Other (See Comments)     Tardive dyskinesia.         Medical Review of Systems     /84   Pulse 113   Temp 98.2  F (36.8  C) (Oral)   Resp 16   Ht 1.651 m (5' 5\")   Wt 75.2 kg (165 lb 11.2 oz)   SpO2 98%   BMI 27.57 kg/m    Body mass index is 27.57 kg/m .  A 10-point review of systems was performed by Edgardo Addison MD and is negative, no new findings.      Psychiatric Examination     Appearance Sitting in chair, dressed in scrubs. Appears stated age.   Attitude Cooperative   Orientation Oriented to person, place, time   Eye Contact Good    Speech Regular rate, rhythm, volume and tone   Language Normal   Psychomotor Behavior Normal   Mood Less anxious   Affect Pleasant   Thought Process Goal-Oriented, Intact   Associations Intact   Thought Content Patient is currently negative for suicidal ideation, negative for plan or intent, able to contract no self harm and identify barriers to suicide.  Negative for obsessions, compulsions or psychosis.     Fund of Knowledge Intact   Insight Improving   Judgement Improving   Attention Span & Concentration Intact   Recent & Remote Memory Intact   Gait Normal   Muscle Tone Intact        Labs     Labs reviewed.  No results found for this or any previous visit (from the past 24 hour(s)).     Impression   This is a 43 year old female with a past psychiatric history significant for bipolar disorder, major depression, general anxiety, and polysubstance abuse. Patient has been medication compliant, cooperative in care, pleasant to both her peers and staff members, and attending group sessions. She has denied any concerns or complications with her current medication regiment. Dr. Addison has increased patient's Trintellix dose form 10mg to 20mg in order to continue to aid in patient's symptoms of depression and anxiety. Aside from this, patient will be discharged today to [facility] later " this afternoon. She will also follow-up with her prior to admission psychiatrist at [facility].      Diagnoses   1. Bipolar disorder  2. Major depression, recurrent, severe, without psychotic features.  3. General anxiety disorder.   4.         Benzodiazepine and opiate dependence  5.         Alcohol use disorder     Plan     1. Explained side effects, benefits, and complications of medications to the patient, Pt gave verbal consent.  2. Medication changes: Increased Trintellix to 20mg daily   3. Discussed treatment plan with patient and team.  4. Projected length of stay: today  5. Patient will be discharged to treatment at [facility]     Attestation:   Patient has been seen and evaluated by me, Edgardo Addison MD.    Patient ID:  Name: Jihan Gill    MRN: [id number]  Admission: 3/20/2019   YOB: 1975

## 2019-04-01 NOTE — PROGRESS NOTES
With initial task set up, pt participated in therapeutic group activity and discussion addressing wellness. Pt ID'd positive supports in her life, things she is grateful for (e.g. Her son, her physical health, medication, laughter, and the outdoors), things she is proud of accomplishing in the past year (e.g. Moving twice, getting off benzodiazepines, getting out of an unhealthy relationship), and new things she would like to try (e.g. Canoeing, wear a wig, move to a new area, go to [place], and get a pet). Educated pt on benefits of reflecting on accomplishments, positive qualities, and things she's grateful for with pt verbalizing understanding. Pt will continue to benefit from OT intervention to address implementation of positive functional coping skills, role performance, and community reintegration.

## 2019-04-03 ENCOUNTER — HOSPITAL ENCOUNTER (INPATIENT)
Facility: CLINIC | Age: 44
LOS: 27 days | Discharge: PSYCHIATRIC OR SUBSTANCE USE FACILITY | End: 2019-04-30
Attending: EMERGENCY MEDICINE | Admitting: PSYCHIATRY & NEUROLOGY
Payer: COMMERCIAL

## 2019-04-03 DIAGNOSIS — F33.2 SEVERE EPISODE OF RECURRENT MAJOR DEPRESSIVE DISORDER, WITHOUT PSYCHOTIC FEATURES (H): ICD-10-CM

## 2019-04-03 DIAGNOSIS — R45.851 SUICIDAL IDEATION: ICD-10-CM

## 2019-04-03 DIAGNOSIS — F29 PSYCHOSIS, UNSPECIFIED PSYCHOSIS TYPE (H): ICD-10-CM

## 2019-04-03 LAB
ALBUMIN SERPL-MCNC: 3.9 G/DL (ref 3.4–5)
ALP SERPL-CCNC: 100 U/L (ref 40–150)
ALT SERPL W P-5'-P-CCNC: 297 U/L (ref 0–50)
AMPHETAMINES UR QL SCN: POSITIVE
ANION GAP SERPL CALCULATED.3IONS-SCNC: 7 MMOL/L (ref 3–14)
APAP SERPL-MCNC: <2 MG/L (ref 10–20)
AST SERPL W P-5'-P-CCNC: 232 U/L (ref 0–45)
BARBITURATES UR QL: NEGATIVE
BASOPHILS # BLD AUTO: 0 10E9/L (ref 0–0.2)
BASOPHILS NFR BLD AUTO: 0.5 %
BENZODIAZ UR QL: NEGATIVE
BILIRUB SERPL-MCNC: 0.9 MG/DL (ref 0.2–1.3)
BUN SERPL-MCNC: 8 MG/DL (ref 7–30)
CALCIUM SERPL-MCNC: 9.4 MG/DL (ref 8.5–10.1)
CANNABINOIDS UR QL SCN: NEGATIVE
CHLORIDE SERPL-SCNC: 108 MMOL/L (ref 94–109)
CO2 SERPL-SCNC: 26 MMOL/L (ref 20–32)
COCAINE UR QL: NEGATIVE
CREAT SERPL-MCNC: 0.66 MG/DL (ref 0.52–1.04)
DIFFERENTIAL METHOD BLD: ABNORMAL
EOSINOPHIL # BLD AUTO: 0.2 10E9/L (ref 0–0.7)
EOSINOPHIL NFR BLD AUTO: 3.6 %
ERYTHROCYTE [DISTWIDTH] IN BLOOD BY AUTOMATED COUNT: 14.1 % (ref 10–15)
ETHANOL SERPL-MCNC: <0.01 G/DL
GFR SERPL CREATININE-BSD FRML MDRD: >90 ML/MIN/{1.73_M2}
GLUCOSE SERPL-MCNC: 121 MG/DL (ref 70–99)
HCT VFR BLD AUTO: 35 % (ref 35–47)
HGB BLD-MCNC: 11.4 G/DL (ref 11.7–15.7)
IMM GRANULOCYTES # BLD: 0 10E9/L (ref 0–0.4)
IMM GRANULOCYTES NFR BLD: 0.2 %
LYMPHOCYTES # BLD AUTO: 2.1 10E9/L (ref 0.8–5.3)
LYMPHOCYTES NFR BLD AUTO: 32.4 %
MCH RBC QN AUTO: 29.6 PG (ref 26.5–33)
MCHC RBC AUTO-ENTMCNC: 32.6 G/DL (ref 31.5–36.5)
MCV RBC AUTO: 91 FL (ref 78–100)
MONOCYTES # BLD AUTO: 0.5 10E9/L (ref 0–1.3)
MONOCYTES NFR BLD AUTO: 7.4 %
NEUTROPHILS # BLD AUTO: 3.6 10E9/L (ref 1.6–8.3)
NEUTROPHILS NFR BLD AUTO: 55.9 %
NRBC # BLD AUTO: 0 10*3/UL
NRBC BLD AUTO-RTO: 0 /100
OPIATES UR QL SCN: NEGATIVE
PCP UR QL SCN: NEGATIVE
PLATELET # BLD AUTO: 293 10E9/L (ref 150–450)
POTASSIUM SERPL-SCNC: 3.2 MMOL/L (ref 3.4–5.3)
PROT SERPL-MCNC: 8 G/DL (ref 6.8–8.8)
RBC # BLD AUTO: 3.85 10E12/L (ref 3.8–5.2)
SALICYLATES SERPL-MCNC: <2 MG/DL
SODIUM SERPL-SCNC: 141 MMOL/L (ref 133–144)
WBC # BLD AUTO: 6.4 10E9/L (ref 4–11)

## 2019-04-03 PROCEDURE — 80053 COMPREHEN METABOLIC PANEL: CPT | Performed by: EMERGENCY MEDICINE

## 2019-04-03 PROCEDURE — 80329 ANALGESICS NON-OPIOID 1 OR 2: CPT | Performed by: EMERGENCY MEDICINE

## 2019-04-03 PROCEDURE — 25000132 ZZH RX MED GY IP 250 OP 250 PS 637: Performed by: EMERGENCY MEDICINE

## 2019-04-03 PROCEDURE — 25000132 ZZH RX MED GY IP 250 OP 250 PS 637: Performed by: PSYCHIATRY & NEUROLOGY

## 2019-04-03 PROCEDURE — 25000128 H RX IP 250 OP 636: Performed by: PSYCHIATRY & NEUROLOGY

## 2019-04-03 PROCEDURE — 36415 COLL VENOUS BLD VENIPUNCTURE: CPT | Performed by: EMERGENCY MEDICINE

## 2019-04-03 PROCEDURE — 12400000 ZZH R&B MH

## 2019-04-03 PROCEDURE — 80320 DRUG SCREEN QUANTALCOHOLS: CPT | Performed by: EMERGENCY MEDICINE

## 2019-04-03 PROCEDURE — 25000125 ZZHC RX 250

## 2019-04-03 PROCEDURE — 99285 EMERGENCY DEPT VISIT HI MDM: CPT

## 2019-04-03 PROCEDURE — 80307 DRUG TEST PRSMV CHEM ANLYZR: CPT | Performed by: EMERGENCY MEDICINE

## 2019-04-03 PROCEDURE — 85025 COMPLETE CBC W/AUTO DIFF WBC: CPT | Performed by: EMERGENCY MEDICINE

## 2019-04-03 RX ORDER — OLANZAPINE 10 MG/2ML
10 INJECTION, POWDER, FOR SOLUTION INTRAMUSCULAR EVERY 6 HOURS PRN
Status: DISCONTINUED | OUTPATIENT
Start: 2019-04-03 | End: 2019-04-30 | Stop reason: HOSPADM

## 2019-04-03 RX ORDER — OLANZAPINE 10 MG/2ML
10 INJECTION, POWDER, FOR SOLUTION INTRAMUSCULAR ONCE
Status: COMPLETED | OUTPATIENT
Start: 2019-04-03 | End: 2019-04-03

## 2019-04-03 RX ORDER — ACETAMINOPHEN 325 MG/1
650 TABLET ORAL EVERY 4 HOURS PRN
Status: DISCONTINUED | OUTPATIENT
Start: 2019-04-03 | End: 2019-04-30 | Stop reason: HOSPADM

## 2019-04-03 RX ORDER — HYDROXYZINE HYDROCHLORIDE 25 MG/1
25 TABLET, FILM COATED ORAL EVERY 4 HOURS PRN
Status: DISCONTINUED | OUTPATIENT
Start: 2019-04-03 | End: 2019-04-30 | Stop reason: HOSPADM

## 2019-04-03 RX ORDER — OLANZAPINE 10 MG/1
10 TABLET, ORALLY DISINTEGRATING ORAL ONCE
Status: COMPLETED | OUTPATIENT
Start: 2019-04-03 | End: 2019-04-03

## 2019-04-03 RX ORDER — HYDROXYZINE HYDROCHLORIDE 25 MG/1
25 TABLET, FILM COATED ORAL 3 TIMES DAILY PRN
Status: DISCONTINUED | OUTPATIENT
Start: 2019-04-03 | End: 2019-04-03

## 2019-04-03 RX ORDER — ONDANSETRON 4 MG/1
4 TABLET, ORALLY DISINTEGRATING ORAL EVERY 6 HOURS PRN
Status: DISCONTINUED | OUTPATIENT
Start: 2019-04-03 | End: 2019-04-26

## 2019-04-03 RX ORDER — HALOPERIDOL 5 MG/1
5 TABLET ORAL EVERY 6 HOURS PRN
Status: DISCONTINUED | OUTPATIENT
Start: 2019-04-03 | End: 2019-04-30 | Stop reason: HOSPADM

## 2019-04-03 RX ORDER — HALOPERIDOL 5 MG/ML
5 INJECTION INTRAMUSCULAR EVERY 6 HOURS PRN
Status: DISCONTINUED | OUTPATIENT
Start: 2019-04-03 | End: 2019-04-30 | Stop reason: HOSPADM

## 2019-04-03 RX ORDER — WATER 10 ML/10ML
INJECTION INTRAMUSCULAR; INTRAVENOUS; SUBCUTANEOUS
Status: COMPLETED
Start: 2019-04-03 | End: 2019-04-03

## 2019-04-03 RX ORDER — GABAPENTIN 300 MG/1
600 CAPSULE ORAL 2 TIMES DAILY
Status: DISCONTINUED | OUTPATIENT
Start: 2019-04-03 | End: 2019-04-11

## 2019-04-03 RX ORDER — LORAZEPAM 2 MG/ML
2 INJECTION INTRAMUSCULAR ONCE
Status: COMPLETED | OUTPATIENT
Start: 2019-04-03 | End: 2019-04-03

## 2019-04-03 RX ORDER — OLANZAPINE 10 MG/1
10 TABLET ORAL EVERY 6 HOURS PRN
Status: DISCONTINUED | OUTPATIENT
Start: 2019-04-03 | End: 2019-04-30 | Stop reason: HOSPADM

## 2019-04-03 RX ORDER — LORAZEPAM 2 MG/ML
INJECTION INTRAMUSCULAR
Status: DISCONTINUED
Start: 2019-04-03 | End: 2019-04-04 | Stop reason: HOSPADM

## 2019-04-03 RX ADMIN — HYDROXYZINE HYDROCHLORIDE 25 MG: 25 TABLET ORAL at 20:32

## 2019-04-03 RX ADMIN — CARIPRAZINE 1.5 MG: 1.5 CAPSULE, GELATIN COATED ORAL at 12:37

## 2019-04-03 RX ADMIN — HYDROXYZINE HYDROCHLORIDE 25 MG: 25 TABLET ORAL at 16:01

## 2019-04-03 RX ADMIN — GABAPENTIN 600 MG: 300 CAPSULE ORAL at 20:32

## 2019-04-03 RX ADMIN — OLANZAPINE 10 MG: 10 TABLET, ORALLY DISINTEGRATING ORAL at 16:01

## 2019-04-03 RX ADMIN — WATER 5 ML: 1 INJECTION INTRAMUSCULAR; INTRAVENOUS; SUBCUTANEOUS at 18:31

## 2019-04-03 RX ADMIN — VORTIOXETINE 20 MG: 20 TABLET, FILM COATED ORAL at 12:36

## 2019-04-03 RX ADMIN — OLANZAPINE 10 MG: 10 INJECTION, POWDER, FOR SOLUTION INTRAMUSCULAR at 18:31

## 2019-04-03 RX ADMIN — HALOPERIDOL 5 MG: 5 TABLET ORAL at 19:50

## 2019-04-03 RX ADMIN — OLANZAPINE 10 MG: 10 TABLET, ORALLY DISINTEGRATING ORAL at 11:56

## 2019-04-03 RX ADMIN — LORAZEPAM 2 MG: 2 INJECTION INTRAMUSCULAR; INTRAVENOUS at 18:32

## 2019-04-03 RX ADMIN — GABAPENTIN 600 MG: 300 CAPSULE ORAL at 11:53

## 2019-04-03 ASSESSMENT — MIFFLIN-ST. JEOR
SCORE: 1430.19
SCORE: 1420.66

## 2019-04-03 ASSESSMENT — ACTIVITIES OF DAILY LIVING (ADL)
SWALLOWING: 0-->SWALLOWS FOODS/LIQUIDS WITHOUT DIFFICULTY
TOILETING: 0-->INDEPENDENT
RETIRED_COMMUNICATION: 0-->UNDERSTANDS/COMMUNICATES WITHOUT DIFFICULTY
RETIRED_EATING: 0-->INDEPENDENT
BATHING: 0-->INDEPENDENT
AMBULATION: 0-->INDEPENDENT
COGNITION: 0 - NO COGNITION ISSUES REPORTED
TRANSFERRING: 0-->INDEPENDENT
FALL_HISTORY_WITHIN_LAST_SIX_MONTHS: NO
DRESS: 0-->INDEPENDENT

## 2019-04-03 ASSESSMENT — ENCOUNTER SYMPTOMS
HALLUCINATIONS: 1
WOUND: 1
FEVER: 0

## 2019-04-03 NOTE — PROGRESS NOTES
Patient brought in on a  Hold by Officer Kavin yang # [id number] file # [id number] of the [place] [employer] [phone]. If patient is placed on a 72 hold and is being discharged before it expires this department will need to be called when a discharge order is placed and before the discharge and then fully documented in Epic.

## 2019-04-03 NOTE — ED NOTES
"Late entry summary: Change of shift, 1515, pt became quite agitated by pacing around more and more and starting to shout and have a real argument with her dad who is not here physically at all.     MD has been texted to possibly come by with the hopes that his \"Doctoral presence\" would calm her down and she would take her prescribed atarax.     Gina Rn called from 77 because pt was recently there on [address] with her. Gina came down and tried to talk to patient but pt did not calm down at all.     Spoke with MD Joy and ordered zyprexa. Lissett attempting to give her it now.     1615: Pt took zyprexa and atarax from Gina . Plan is to transport to  shortly.   "

## 2019-04-03 NOTE — PLAN OF CARE
"Jihan is delusional and paranoid, she is hearing voices, she is impulsive. Reported doing methamphetamine, soon as she left from Hospital on Monday.                                                          On arrival to the unit, while I was talking to her, she ran to the hallway and saw kids and she was going to tell them \" Do not worry about a thing, I do not have Aids\"  She was redirected to her room.   She is hallucinating, she told me  \" Get my Dad Delores here, I just saw him around the corner.  Has superficial scratches on left forearm , scratched her arm with a griffin pin, over a tattoo.  Redness noted.   She was given Olanzapine in the ED.   Will continue to monitor status.  "

## 2019-04-03 NOTE — ED NOTES
"Jihan was discharged from the Mental Health Unit on April 1st.  Today she presented to the ED via police, for suicidal ideation.  After her discharge she stayed with her boyfriend in a hotel and smoked meth.  She had in an argument with her boyfriend and went for a walk, she was thinking about jumping off the bridge, however she change her mind and flagged down a . According to Jihan, she has been hearing voices and seeing people.  She has multiple scratches on her lower arm, over a tattoo that she got 3 weeks ago  \" I was trying to scratch my tat, because I do not believe in the saying anymore\"    She is anxious and tearful during the Admission Interview.  Here in the ED she was looking for something to hang her self, and that is the reason she was moved to [facility].  At this time she denies SI.  Contracts for safety.  "

## 2019-04-03 NOTE — PHARMACY-ADMISSION MEDICATION HISTORY
"Admission medication history interview status for the 4/3/2019  admission is complete. See EPIC admission navigator for prior to admission medications     Medication history source reliability:Moderate    Actions taken by pharmacist (provider contacted, etc):medication as per what she was taken when discharged this week.     Additional medication history information not noted on PTA med list :None    Medication reconciliation/reorder completed by provider prior to medication history? No    Time spent in this activity: 15\"    Prior to Admission medications    Medication Sig Last Dose Taking? Auth Provider   buprenorphine HCl-naloxone HCl (SUBOXONE) 8-2 MG per film Place 1 Film under the tongue daily for 2 days 4/3/2019 at am Yes Edgardo Addison MD   cariprazine (VRAYLAR) 1.5 MG CAPS capsule Take 1 capsule (1.5 mg) by mouth daily 4/3/2019 at 1237 Yes Edgardo Addison MD   gabapentin (NEURONTIN) 300 MG capsule Take 2 capsules (600 mg) by mouth 2 times daily 4/3/2019 at 1153 Yes Edgardo Addison MD   vortioxetine (TRINTELLIX/BRINTELLIX) 20 MG tablet Take 1 tablet (20 mg) by mouth daily 4/3/2019 at 1236 Yes Edgardo Addison MD   acetaminophen (TYLENOL) 325 MG tablet Take 2 tablets (650 mg) by mouth every 4 hours as needed for mild pain . Maximum of 2 grams per 24 hour period. prn  Salvador Zavala MD   hydrOXYzine (ATARAX) 25 MG tablet Take 1 tablet (25 mg) by mouth 3 times daily as needed (anxiety) prn  Salvador Zavala MD   ondansetron (ZOFRAN-ODT) 4 MG ODT tab Take 1 tablet (4 mg) by mouth every 6 hours as needed for nausea or vomiting prn  Edgardo Addison MD         "

## 2019-04-03 NOTE — PROGRESS NOTES
Welcome packet reviewed with patient. Information reviewed includes getting emergency help, preventing infections, understanding your care, using medication safely, reducing falls, preventing pressure ulcers, smoking cessation, powerful choices and Patients Bill of Rights. Pt. given tour of the unit and instruction on use of facility including emergency call light. Program schedule reviewed with patient. Questions regarding the unit addressed. Pt. Search completed and belongings inventoried. .Nursing assessment complete including patient and medication profiles. Risk assessments completed addressing suicide,fall,skin,nutrition and safety issues. Care plan initiated. Assessments reviewed with physician and admit orders received. Video monitoring in progress, Patient Informed.

## 2019-04-03 NOTE — ED NOTES
"Patient presents with PD after pt waving down a  because she wanted to jump in front of a car or off of a bridge. Pt recently in and out of hospital for months for suicidal actions, ideations and attempts. Pt reports she tried to overdose on lithium twice in the past few weeks requiring ICU admissions. Today, pt states she is hearing and seeing people. She states people are talking badly about her, about her recent warrant and looking through her blinds. Pt took a griffin pin to her left forearm today and cut herself with attempts of suicide. Pt states she then saw someone peering through her blinds and that caused her to run outside with hopes of jumping off a bridge or jumping in front of a car. When pt arrived to ED room she tried to stand on bed looking to harm herself so she was moved to a locked room with the garage door down. Pt states she used to feel safe in hospitals and now she does not because all she can hear voices. When RN is talking with patient she is saying, \"can't you hear that woman talking shit about me.\" RN attempting to reassure patient. Belongings secured and pt on [place] by [place] police  "

## 2019-04-04 PROCEDURE — 12400000 ZZH R&B MH

## 2019-04-04 PROCEDURE — 25000132 ZZH RX MED GY IP 250 OP 250 PS 637: Performed by: PSYCHIATRY & NEUROLOGY

## 2019-04-04 PROCEDURE — 25000132 ZZH RX MED GY IP 250 OP 250 PS 637: Performed by: EMERGENCY MEDICINE

## 2019-04-04 RX ADMIN — HYDROXYZINE HYDROCHLORIDE 25 MG: 25 TABLET ORAL at 15:56

## 2019-04-04 RX ADMIN — OLANZAPINE 10 MG: 10 TABLET, FILM COATED ORAL at 19:46

## 2019-04-04 RX ADMIN — OLANZAPINE 10 MG: 10 TABLET, FILM COATED ORAL at 12:36

## 2019-04-04 RX ADMIN — HALOPERIDOL 5 MG: 5 TABLET ORAL at 13:50

## 2019-04-04 RX ADMIN — ACETAMINOPHEN 650 MG: 325 TABLET, FILM COATED ORAL at 15:56

## 2019-04-04 RX ADMIN — VORTIOXETINE 15 MG: 10 TABLET, FILM COATED ORAL at 15:56

## 2019-04-04 RX ADMIN — GABAPENTIN 600 MG: 300 CAPSULE ORAL at 19:46

## 2019-04-04 RX ADMIN — GABAPENTIN 600 MG: 300 CAPSULE ORAL at 08:56

## 2019-04-04 RX ADMIN — CARIPRAZINE 1.5 MG: 1.5 CAPSULE, GELATIN COATED ORAL at 08:56

## 2019-04-04 ASSESSMENT — ACTIVITIES OF DAILY LIVING (ADL)
LAUNDRY: WITH SUPERVISION
ORAL_HYGIENE: INDEPENDENT
HYGIENE/GROOMING: INDEPENDENT
DRESS: SCRUBS (BEHAVIORAL HEALTH)
ORAL_HYGIENE: INDEPENDENT
LAUNDRY: WITH SUPERVISION
DRESS: SCRUBS (BEHAVIORAL HEALTH)
HYGIENE/GROOMING: INDEPENDENT
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
## 2019-04-04 NOTE — PLAN OF CARE
Confused , delusional, restless , anxious . Having auditory hallucinations, poor comprehension, making senseless remarks, having racing thoughts, loose associations, incoherent, difficult to redirect. Was given Zyprexa 10 mg IM and Ativan 2 mg IM shortly after intake and not slowing down. Gave Haldol 5 mg PO and constant attention and she eventually settled down. Accepted  meds .

## 2019-04-04 NOTE — PLAN OF CARE
"Alert. Up independently. VSS ex low grade temperature and tachycardia. Upon 1:1 discussion, patient's speech was rambling a difficult to follow. At one point, patient stated \"oh my gosh I just talked about a dream like it it reality.\" Expressed feelings that people are talking about her. Patient was reassured that this was not the case. Patient was also surprised with the information that she was at [facility] in the mental health unit. Spent the majority of her time in her room napping. Head to toe assessment WDL ex left arm is scratched and erythremic. It is open to air.   "

## 2019-04-04 NOTE — PROGRESS NOTES
04/03/19 2034   Patient Belongings   Did you bring any home meds/supplements to the hospital?  No   Patient Belongings other (see comments)   Belongings Search Yes   Clothing Search Yes   Second Staff Noé   Comment All belongings searched and placed in locker on unit     Black watch  Black boots  Pink t-shirt  Black pants with strings  Pink bra  Gray fleece jacket     Admission:  I am responsible for any personal items that are not sent to the safe or pharmacy. [facility] is not responsible for loss, theft or damage of any property in my possession.        Patient Signature: ___________________________________________      Date/Time:__________________________     Staff Signature: __________________________________      Date/Time:__________________________     2nd Staff person, if patient is unable/unwilling to sign:      __________________________________________________________      Date/Time: __________________________        Discharge:  [facility] has returned all of my personal belongings:     Patient Signature: ________________________________________     Date/Time: ____________________________________     Staff Signature: ______________________________________     Date/Time:_____________________________________

## 2019-04-04 NOTE — PLAN OF CARE
"Goal: Team Discussion  Outcome: No change   BEHAVIORAL TEAM DISCUSSION     Participants: Dr. Addison, nurses, social workers, psych asst   Progress: Pt was admitted yesterday evening, pt was given a shot during the shift. Pt displayed paranoia about \"the  coming to get her.\" Pt was delusional and hallucinating last evening. Pt had poor boundaries and attempted multiple times to go into other pts room. Pt was disorganized.   Continued Stay Criteria/Rationale: Pt will not go back on suboxone. Pt will remain on the unit to receive further treatment. Pt meds are being adjusted.   Medical/Physical: Raylar 3MG; Trintellix 15 MG  Precautions:        Behavioral Orders   Procedures    Code 1 - Restrict to Unit    Routine Programming       As clinically indicated    Seizure precautions    Status 15       Every 15 minutes.      Plan: Pt will remain in the ITC, pt is not tracking well. Pt meds are being adjusted. Pt needs further treatment.   Rationale for change in precautions or plan: Pt needs further observation and treatment.     "

## 2019-04-05 PROCEDURE — 25000132 ZZH RX MED GY IP 250 OP 250 PS 637: Performed by: PSYCHIATRY & NEUROLOGY

## 2019-04-05 PROCEDURE — 12400000 ZZH R&B MH

## 2019-04-05 PROCEDURE — 25000132 ZZH RX MED GY IP 250 OP 250 PS 637: Performed by: EMERGENCY MEDICINE

## 2019-04-05 RX ADMIN — GABAPENTIN 600 MG: 300 CAPSULE ORAL at 22:03

## 2019-04-05 RX ADMIN — GABAPENTIN 600 MG: 300 CAPSULE ORAL at 08:19

## 2019-04-05 RX ADMIN — VORTIOXETINE 15 MG: 10 TABLET, FILM COATED ORAL at 08:19

## 2019-04-05 RX ADMIN — CARIPRAZINE 3 MG: 1.5 CAPSULE, GELATIN COATED ORAL at 08:19

## 2019-04-05 ASSESSMENT — ACTIVITIES OF DAILY LIVING (ADL)
ORAL_HYGIENE: INDEPENDENT
DRESS: SCRUBS (BEHAVIORAL HEALTH)
HYGIENE/GROOMING: INDEPENDENT
DRESS: STREET CLOTHES;SCRUBS (BEHAVIORAL HEALTH)
HYGIENE/GROOMING: INDEPENDENT
LAUNDRY: WITH SUPERVISION
ORAL_HYGIENE: INDEPENDENT

## 2019-04-05 NOTE — PROGRESS NOTES
[facility] Psychiatric Progress Note       Interim History     The patient's care was discussed with the treatment team and chart notes were reviewed. According to attending staff members, since the patient has been on [address] she has been observed pacing the ITC lounge and talking to herself. She had appeared confused and knocking on the nurses station window multiple times, however has been easily redirectable. Pt seen on 4/05/19 by Dr. Addison. On interview, the patient noted she is feeling fine this morning. She denies any complications with her current medication regiment, particularly the increase in Trintellix dose made yesterday. Dr. Addison will not make any medication adjustments today.      Hospital Course   This is a 43 year old female who attains a history of depression, substance abuse, and bipolar disorder. She was discharged from [facility] on 4/01/19 by Dr. Addison with the plans of discharging to [facility] in [place], [place] and following up at [facility]. The patient however never did make it to treatment, instead she got on the bus after discharge and went to [facility] where she used meth and Klonopin with her boyfriend. While discussing with Dr. Addison today, the patient declared she believes she needs residential treatment rather than outpatient. Dr. Addison is in agreement with this and will discuss more with our  in order to arrange for aftercare plans. Aside from this, Dr. Addison has increased patient's Trintellix dose from 10mg to 15mg in order to aid in patient's consistent symptoms of depression and paranoia.      Medications     Current Facility-Administered Medications Ordered in Epic   Medication Dose Route Frequency Last Rate Last Dose     acetaminophen (TYLENOL) tablet 650 mg  650 mg Oral Q4H PRN   650 mg at 04/04/19 9916     cariprazine (VRAYLAR) capsule CAPS 3 mg  3 mg Oral Daily      "    gabapentin (NEURONTIN) capsule 600 mg  600 mg Oral BID   600 mg at 04/04/19 1946     haloperidol (HALDOL) tablet 5 mg  5 mg Oral Q6H PRN   5 mg at 04/04/19 1350     haloperidol lactate (HALDOL) injection 5 mg  5 mg Intramuscular Q6H PRN         hydrOXYzine (ATARAX) tablet 25 mg  25 mg Oral Q4H PRN   25 mg at 04/04/19 1556     OLANZapine (zyPREXA) tablet 10 mg  10 mg Oral Q6H PRN   10 mg at 04/04/19 1946    Or     OLANZapine (zyPREXA) injection 10 mg  10 mg Intramuscular Q6H PRN         ondansetron (ZOFRAN-ODT) ODT tab 4 mg  4 mg Oral Q6H PRN         vortioxetine (TRINTELLIX/BRINTELLIX) tablet 15 mg  15 mg Oral Daily   15 mg at 04/04/19 1556     No current Epic-ordered outpatient medications on file.         Allergies      Allergies   Allergen Reactions     Abilify [Aripiprazole] Other (See Comments)     Tardive dyskinesia     Compazine Other (See Comments)     Dystonia     Dramamine      Reglan Other (See Comments)     dystonia     Seroquel [Quetiapine] Other (See Comments)     Tardive dyskinesia.         Medical Review of Systems     /71   Pulse 97   Temp 98.9  F (37.2  C)   Resp 16   Ht 1.676 m (5' 6\")   Wt 73.9 kg (162 lb 14.4 oz)   SpO2 99%   BMI 26.29 kg/m    Body mass index is 26.29 kg/m .  A 10-point review of systems was performed by Edgardo Addison MD and is negative, no new findings.      Psychiatric Examination     Appearance Sitting in chair, dressed in hospital scrubs. Appears stated age.   Attitude Cooperative   Orientation Oriented to person, place, time   Eye Contact Good   Speech Regular rate, rhythm, volume and tone   Language Normal   Psychomotor Behavior Normal   Mood Anxious    Affect Pleasant    Thought Process Goal-Oriented, Intact   Associations Intact   Thought Content Patient is currently negative for suicidal ideation, negative for plan or intent, able to contract no self harm and identify barriers to suicide.  Negative for obsessions, compulsions or psychosis.   "   Fund of Knowledge Intact   Insight Poor   Judgement Poor   Attention Span & Concentration Fair    Recent & Remote Memory Intact   Gait Normal   Muscle Tone Intact      Labs     Labs reviewed.  No results found for this or any previous visit (from the past 24 hour(s)).     Impression   This is a 43 year old female who attains a history of depression, substance abuse, and bipolar disorder. Since being on Psychiatry, the patient has been found multiple times pacing in the lounge, talking to herself, and knocking on the nurses window. She has been easily redirectable. While discussing with Dr. Addison this morning, patient did not demonstrate any paranoid or unusual behavior. She had denied any side effects, concerns, or complications with her current medication regiment, particularly the increase in Trintellix made yesterday afternoon. Dr. Addison will not make any mediation adjustments today. Will continue to arrange treatment options for patient. [facility] should be considered.      Diagnoses   1. Bipolar disorder  2. Major depression, recurrent, severe, without psychotic features.  3. General anxiety disorder.   4.         Benzodiazepine and opiate dependence  5.         Alcohol use disorder     Plan     1. Explained side effects, benefits, and complications of medications to the patient, Pt gave verbal consent.  2. Medication changes: none   3. Discussed treatment plan with patient and team.  4. Projected length of stay: 7+ days  5. Arrange treatment options for patient, consider [facility]       Attestation:   Patient has been seen and evaluated by me, Edgardo Addison MD.    Patient ID:  Name: Jihan Gill    MRN: [id number]  Admission: 4/3/2019   YOB: 1975

## 2019-04-05 NOTE — PLAN OF CARE
Pt was observed pacing in the lounge and talking to self. Appeared confused; walking in and out of the bathroom, and knocking on the window making inaudible sounds. Pt was easily directable, but continued with pacing back and forth in and out of her room. Pt is internal preoccupied at times; called her mother several times while engaging in rambling tangential conversations. Pt was given Atarax and Tylenol; low grade temp noted. Pt ate dinner, napped and was withdrawn to her room.

## 2019-04-05 NOTE — PLAN OF CARE
"Pt slept through morning until 11am until woken by staff. Pt woke and immediately said, \"I'm hungry.\" Pt had told writer 2 hrs prior to get rid of her breakfast because she didn't want it. Staff brought her food from kitchen and pt ate it in the lounge. Pt has flat affect. Denies SI. Med compliant. Plan for pt to move to SDU today once room is cleaned by facilities.    Adult Inpatient Plan of Care  Absence of Hospital-Acquired Illness or Injury  4/5/2019 1108 - Improving by Mary Dunn, RN     Adult Inpatient Plan of Care  Readiness for Transition of Care  4/5/2019 1108 - Improving by Mary Dunn RN     Suicide Risk  Absence of Self-Harm  4/5/2019 1108 - Improving by Mary Dunn RN     Suicidal Behavior  Suicidal Behavior is Absent or Managed  4/5/2019 1108 - Improving by Mary Dunn, RN    "

## 2019-04-06 PROCEDURE — 25000132 ZZH RX MED GY IP 250 OP 250 PS 637: Performed by: PSYCHIATRY & NEUROLOGY

## 2019-04-06 PROCEDURE — 12400000 ZZH R&B MH

## 2019-04-06 PROCEDURE — 25000132 ZZH RX MED GY IP 250 OP 250 PS 637: Performed by: EMERGENCY MEDICINE

## 2019-04-06 RX ADMIN — CARIPRAZINE 3 MG: 1.5 CAPSULE, GELATIN COATED ORAL at 09:02

## 2019-04-06 RX ADMIN — GABAPENTIN 600 MG: 300 CAPSULE ORAL at 19:50

## 2019-04-06 RX ADMIN — GABAPENTIN 600 MG: 300 CAPSULE ORAL at 09:02

## 2019-04-06 RX ADMIN — VORTIOXETINE 15 MG: 10 TABLET, FILM COATED ORAL at 09:02

## 2019-04-06 ASSESSMENT — ACTIVITIES OF DAILY LIVING (ADL)
LAUNDRY: WITH SUPERVISION
ORAL_HYGIENE: INDEPENDENT
DRESS: STREET CLOTHES;INDEPENDENT
HYGIENE/GROOMING: INDEPENDENT

## 2019-04-06 NOTE — PLAN OF CARE
"3p-7p: after arriving to SDU, pt spent majority of time in bed resting. When asked why pt stated \"I'm just really tired.\"    Psychotic Symptoms  Social and Therapeutic (Psychotic Symptoms)  Description  Signs and symptoms of listed problems will be absent or manageable.  4/5/2019 1902 - No Change by Mary Dunn RN     Suicidal Behavior  Suicidal Behavior is Absent or Managed  4/5/2019 1902 - Improving by Mary Dunn RN     Psychotic Symptoms  Psychotic Symptoms  Description  Signs and symptoms of listed problems will be absent or manageable.  4/5/2019 1902 - Improving by Mary Dunn RN     "

## 2019-04-06 NOTE — PLAN OF CARE
Pt spent most of the shift sleeping, was only up for breakfast and lunch and attend community meeting. Presents disheveled in appearance, has a flat affect and is withdrawn.

## 2019-04-07 LAB — POTASSIUM SERPL-SCNC: 4 MMOL/L (ref 3.4–5.3)

## 2019-04-07 PROCEDURE — 25000132 ZZH RX MED GY IP 250 OP 250 PS 637: Performed by: EMERGENCY MEDICINE

## 2019-04-07 PROCEDURE — 84132 ASSAY OF SERUM POTASSIUM: CPT | Performed by: PSYCHIATRY & NEUROLOGY

## 2019-04-07 PROCEDURE — 25000132 ZZH RX MED GY IP 250 OP 250 PS 637: Performed by: PSYCHIATRY & NEUROLOGY

## 2019-04-07 PROCEDURE — 12400000 ZZH R&B MH

## 2019-04-07 PROCEDURE — 36415 COLL VENOUS BLD VENIPUNCTURE: CPT | Performed by: PSYCHIATRY & NEUROLOGY

## 2019-04-07 RX ADMIN — CARIPRAZINE 3 MG: 1.5 CAPSULE, GELATIN COATED ORAL at 07:34

## 2019-04-07 RX ADMIN — GABAPENTIN 600 MG: 300 CAPSULE ORAL at 19:52

## 2019-04-07 RX ADMIN — ACETAMINOPHEN 650 MG: 325 TABLET, FILM COATED ORAL at 07:59

## 2019-04-07 RX ADMIN — VORTIOXETINE 15 MG: 10 TABLET, FILM COATED ORAL at 07:34

## 2019-04-07 RX ADMIN — GABAPENTIN 600 MG: 300 CAPSULE ORAL at 07:34

## 2019-04-07 ASSESSMENT — ACTIVITIES OF DAILY LIVING (ADL)
ORAL_HYGIENE: INDEPENDENT
DRESS: STREET CLOTHES;INDEPENDENT
HYGIENE/GROOMING: INDEPENDENT

## 2019-04-07 NOTE — PLAN OF CARE
Pt was more active on the unit, spent time out in the lounge watching tv, was out for meals and showered this shift. Presents with a flat affect and is withdrawn. Pt hopes to discharge tomorrow.

## 2019-04-07 NOTE — PLAN OF CARE
Patient has remained isolative to bed most of the shift, dozing at intervals.  She needed to be awakened for her meal & meds.  Patient denies any suicidal thoughts.  Patient declined to attend any groups.  She is hoping to be discharged on Monday.

## 2019-04-08 PROCEDURE — 25000132 ZZH RX MED GY IP 250 OP 250 PS 637: Performed by: PSYCHIATRY & NEUROLOGY

## 2019-04-08 PROCEDURE — 25000132 ZZH RX MED GY IP 250 OP 250 PS 637: Performed by: EMERGENCY MEDICINE

## 2019-04-08 PROCEDURE — 12400000 ZZH R&B MH

## 2019-04-08 PROCEDURE — 90853 GROUP PSYCHOTHERAPY: CPT

## 2019-04-08 RX ADMIN — VORTIOXETINE 15 MG: 10 TABLET, FILM COATED ORAL at 08:12

## 2019-04-08 RX ADMIN — CARIPRAZINE 3 MG: 1.5 CAPSULE, GELATIN COATED ORAL at 08:12

## 2019-04-08 RX ADMIN — GABAPENTIN 600 MG: 300 CAPSULE ORAL at 08:13

## 2019-04-08 RX ADMIN — GABAPENTIN 600 MG: 300 CAPSULE ORAL at 19:37

## 2019-04-08 ASSESSMENT — ACTIVITIES OF DAILY LIVING (ADL)
HYGIENE/GROOMING: INDEPENDENT
DRESS: STREET CLOTHES
DRESS: STREET CLOTHES
ORAL_HYGIENE: INDEPENDENT
DRESS: STREET CLOTHES
LAUNDRY: WITH SUPERVISION
HYGIENE/GROOMING: INDEPENDENT
LAUNDRY: WITH SUPERVISION
ORAL_HYGIENE: INDEPENDENT
LAUNDRY: WITH SUPERVISION
ORAL_HYGIENE: INDEPENDENT
HYGIENE/GROOMING: INDEPENDENT

## 2019-04-08 NOTE — PLAN OF CARE
BEHAVIORAL TEAM DISCUSSION    Participants: Dr. Addison, nurses, social workers, occupational therapist, psych assistants, medical scribe.  Progress: No change. Pt stated she has auditory hallucinations and feels paranoid.   Continued Stay Criteria/Rationale: Until pt becomes adequately stabilized.  Medical/Physical: n/a  Precautions:   Behavioral Orders   Procedures     Code 1 - Restrict to Unit     Routine Programming     As clinically indicated     Status 15     Every 15 minutes.     Plan: Continue with current treatment plan. CD assessment ordered.  Rationale for change in precautions or plan: Pt needs further observation here on the unit.

## 2019-04-08 NOTE — PROGRESS NOTES
"[facility] Psychiatric Progress Note       Interim History     The patient's care was discussed with the treatment team and chart notes were reviewed. Per attending hospital staff note, the patient has been quite isolative and withdrawn to her room. Has been medication compliant, cooperative, however has declined multiple group sessions. Patient seen on 4/08/19 by Dr. Addison. When asked how the patient is doing this morning, she states, \"my life has been a shit show...I hallucinate now...I don't know what has been happening.\" She notes that since being discharged just last week from [facility], she has experienced auditory hallucinations and paranoia. Dr. Addison highly encourages the patient to utilize her previous DBT skills to aid in her current situation. After discussing the benefits of this, the patient acknowledges.      Hospital Course   This is a 43 year old female who attains a history of depression, substance abuse, and bipolar disorder. She was discharged from [facility] on 4/01/19 by Dr. Addison with the plans of discharging to [facility] in [place], [place] and following up at [facility]. The patient however never did make it to treatment, instead she got on the bus after discharge and went to [facility] where she used meth and Klonopin with her boyfriend. While discussing with Dr. Addison today, the patient declared she believes she needs residential treatment rather than outpatient. Dr. Addison is in agreement with this and will discuss more with our  in order to arrange for aftercare plans. Aside from this, Dr. Addison has increased patient's Trintellix dose from 10mg to 15mg in order to aid in patient's consistent symptoms of depression and paranoia.      Medications     Current Facility-Administered Medications Ordered in Epic   Medication Dose Route Frequency Last Rate Last Dose     acetaminophen (TYLENOL) tablet " "650 mg  650 mg Oral Q4H PRN   650 mg at 04/07/19 0759     cariprazine (VRAYLAR) capsule CAPS 3 mg  3 mg Oral Daily   3 mg at 04/07/19 0734     gabapentin (NEURONTIN) capsule 600 mg  600 mg Oral BID   600 mg at 04/07/19 1952     haloperidol (HALDOL) tablet 5 mg  5 mg Oral Q6H PRN   5 mg at 04/04/19 1350     haloperidol lactate (HALDOL) injection 5 mg  5 mg Intramuscular Q6H PRN         hydrOXYzine (ATARAX) tablet 25 mg  25 mg Oral Q4H PRN   25 mg at 04/04/19 1556     OLANZapine (zyPREXA) tablet 10 mg  10 mg Oral Q6H PRN   10 mg at 04/04/19 1946    Or     OLANZapine (zyPREXA) injection 10 mg  10 mg Intramuscular Q6H PRN         ondansetron (ZOFRAN-ODT) ODT tab 4 mg  4 mg Oral Q6H PRN         vortioxetine (TRINTELLIX/BRINTELLIX) tablet 15 mg  15 mg Oral Daily   15 mg at 04/07/19 0734     No current Epic-ordered outpatient medications on file.         Allergies      Allergies   Allergen Reactions     Abilify [Aripiprazole] Other (See Comments)     Tardive dyskinesia     Compazine Other (See Comments)     Dystonia     Dramamine      Reglan Other (See Comments)     dystonia     Seroquel [Quetiapine] Other (See Comments)     Tardive dyskinesia.         Medical Review of Systems     BP (!) 130/92   Pulse 95   Temp 98  F (36.7  C) (Oral)   Resp 16   Ht 1.676 m (5' 6\")   Wt 73.9 kg (162 lb 14.4 oz)   SpO2 97%   BMI 26.29 kg/m    Body mass index is 26.29 kg/m .  A 10-point review of systems was performed by Edgardo Addison MD and is negative, no new findings.      Psychiatric Examination     Appearance Sitting in chair, dressed in hospital scrubs. Appears stated age.   Attitude Cooperative   Orientation Oriented to person, place, time   Eye Contact Good   Speech Regular rate, rhythm, volume and tone   Language Normal   Psychomotor Behavior Normal   Mood Anxious, less paranoid    Affect Pleasant, but tearful at times     Thought Process Goal-Oriented, Intact   Associations Intact   Thought Content Patient is " currently negative for suicidal ideation, negative for plan or intent, able to contract no self harm and identify barriers to suicide.  Negative for obsessions, compulsions or psychosis.     Fund of Knowledge Intact   Insight Poor   Judgement Poor   Attention Span & Concentration Fair    Recent & Remote Memory Intact   Gait Normal   Muscle Tone Intact      Labs     Labs reviewed.  Recent Results (from the past 24 hour(s))   Potassium    Collection Time: 04/07/19  7:23 PM   Result Value Ref Range    Potassium 4.0 3.4 - 5.3 mmol/L        Impression   This is a 43 year old female who attains a history of depression, substance abuse, and bipolar disorder. According to attending hospital staff members, the patient has been somewhat isolative and withdrawn since being on Psychiatry. She has been cooperative in care, medication compliant, however has declined multiple group sessions since admission. Today, when discussing with patient, she stressed that she is quite fearful and hopeless in regards of her future. She reported experiencing increased paranoia and even auditory hallucinations since she was discharged from previous hospitalization (just last week). Dr. Addison had highly encouraged the patient to utilize her DBT skills in order to deal with her fearfulness. Will continue to be in contact with  in regards of treatment options.      Diagnoses   1. Bipolar disorder  2. Major depression, recurrent, severe, without psychotic features.  3. General anxiety disorder.   4.         Benzodiazepine and opiate dependence  5.         Alcohol use disorder     Plan     1. Explained side effects, benefits, and complications of medications to the patient, Pt gave verbal consent.  2. Medication changes: none   3. Discussed treatment plan with patient and team.  4. Projected length of stay: 7+ days  5. Arrange treatment options for patient, consider [facility]  6. Ordered CD assessment       Attestation:   Patient  has been seen and evaluated by me, Edgardo Addison MD.    Patient ID:  Name: Jihan Gill    MRN: [id number]  Admission: 4/3/2019   YOB: 1975

## 2019-04-08 NOTE — PLAN OF CARE
Problem: Depression  Goal: Improved Mood  4/7/2019 2149 by Cassandra Lane  Outcome: No Change     Patient presents with blunt affect, somewhat depressed mood. Spent majority of shift sleeping in room. Got up briefly to watch TV and eat dinner. Attended no groups. Med-compliant.

## 2019-04-08 NOTE — PROGRESS NOTES
04/08/19 1400   General Information   Date Initially Attended OT 04/08/19   Clinical Impression   Affect Appropriate to situation   Orientation Oriented to person, place and time   Appearance and ADLs General cleanliness observed in most areas   Attention to Internal Stimuli No observed signs   Interaction Skills Interacts appropriately with staff;Interacts appropriately with peers   Ability to Communicate Needs Independent   Verbal Content Appropriate to topic   Ability to Maintain Boundaries Maintains appropriate physical boundaries;Maintains appropriate verbal boundaries   Participation Participates with minimal encouragement   Concentration Concentrates 5-10 minutes   Ability to Concentrate With structure   Follows and Comprehends Directions Independently follows multi-step directions   Memory Delayed and immediate recall intact   Organization Independently organizes medium tasks   Decision Making Needs further assessment   Planning and Problem Solving Needs further assessment   Ability to Apply and Learn Concepts Applies within group structure   Frustrations / Stress Tolerance Needs further assessment   Level of Insight Identifies needs with structure/support   Self Esteem Can identify positives   Social Supports Has knowledge of support systems   General Observation/Plan   General Observations/Plan See Comments     INITIAL O.T. ASSESSMENT:      Pt attended 1 of 2 OT groups today. With initial task set up, pt participated in therapeutic group activity and discussion addressing self-concept. Educated pt on definition of self concept and benefits of positive/consequences of negative self-confidence. With MIN encouragement, pt ID'd things she is good at (e.g. Cutting hair, making others laugh), positive attributes of self (e.g. Kind, smart), achievements, and things that make her happy. With education, pt ID'd something she can do to improve self-concept as to focus on her achievements rather than her failures.  Pt will continue to benefit from OT intervention to address implementation of positive functional coping skills, role performance, and community reintegration.        Pt was given and completed a written self assessment. Cited self-injurious behavior and SI as the reason for current hospitalization. Goals ID'd include to improve self-confidence, to increase motivation, to manage time better, and to be more assertive. OT staff explained the purpose of pt being involved in current treatment plan.      Plan: Encourage ongoing attendance as able to meet self-stated goals, implement positive coping skills, engage in graded opportunities for success, and provide assessment ongoing.

## 2019-04-08 NOTE — PLAN OF CARE
Pt spent the majority of the shift bed resting. Presents with a blunt affect, but is pleasant with staff and other patients. Pt attended wrap-up group; needed prompts to participate.

## 2019-04-09 PROCEDURE — 25000132 ZZH RX MED GY IP 250 OP 250 PS 637: Performed by: EMERGENCY MEDICINE

## 2019-04-09 PROCEDURE — 90853 GROUP PSYCHOTHERAPY: CPT

## 2019-04-09 PROCEDURE — 25000132 ZZH RX MED GY IP 250 OP 250 PS 637: Performed by: PSYCHIATRY & NEUROLOGY

## 2019-04-09 PROCEDURE — 12400000 ZZH R&B MH

## 2019-04-09 RX ADMIN — GABAPENTIN 600 MG: 300 CAPSULE ORAL at 08:08

## 2019-04-09 RX ADMIN — GABAPENTIN 600 MG: 300 CAPSULE ORAL at 20:55

## 2019-04-09 RX ADMIN — HYDROXYZINE HYDROCHLORIDE 25 MG: 25 TABLET ORAL at 16:16

## 2019-04-09 RX ADMIN — VORTIOXETINE 15 MG: 10 TABLET, FILM COATED ORAL at 08:08

## 2019-04-09 RX ADMIN — CARIPRAZINE 3 MG: 1.5 CAPSULE, GELATIN COATED ORAL at 20:55

## 2019-04-09 ASSESSMENT — MIFFLIN-ST. JEOR: SCORE: 1451.05

## 2019-04-09 ASSESSMENT — ACTIVITIES OF DAILY LIVING (ADL)
LAUNDRY: WITH SUPERVISION
DRESS: INDEPENDENT
ORAL_HYGIENE: INDEPENDENT
HYGIENE/GROOMING: INDEPENDENT

## 2019-04-09 NOTE — PROGRESS NOTES
[facility] Psychiatric Progress Note       Interim History     The patient's care was discussed with the treatment team and chart notes were reviewed.  Patient seen on 4/09/19 by Dr. Addison. On interview, the patient notes that she is doing fine today. When asked about her current medication regiment, the patient denies any issues, however asks if she could have her Vraylar medication be scheduled for at bedtime due to it making her tired after taking it in the morning. Dr. Addison is ok with this change, thus will order Vraylar at bedtime. We continue to be in contact with our  in regards of treatment options after hospitalization. Patient will also be meeting with a CD  during this hospitalization as well.      Hospital Course   This is a 43 year old female who attains a history of depression, substance abuse, and bipolar disorder. She was discharged from [facility] on 4/01/19 by Dr. Addison with the plans of discharging to [facility] in [place], [place] and following up at [facility]. The patient however never did make it to treatment, instead she got on the bus after discharge and went to [facility] where she used meth and Klonopin with her boyfriend. While discussing with Dr. Addison today, the patient declared she believes she needs residential treatment rather than outpatient. Dr. Addison is in agreement with this and will discuss more with our  in order to arrange for aftercare plans. Aside from this, Dr. Addison has increased patient's Trintellix dose from 10mg to 15mg in order to aid in patient's consistent symptoms of depression and paranoia. According to attending hospital staff members, the patient has been somewhat isolative and withdrawn since being on Psychiatry. She has been cooperative in care, medication compliant, however has declined multiple group sessions since admission. Today, when  "discussing with patient, she stressed that she is quite fearful and hopeless in regards of her future. She reported experiencing increased paranoia and even auditory hallucinations since she was discharged from previous hospitalization (just last week). Dr. Addison had highly encouraged the patient to utilize her DBT skills in order to deal with her fearfulness.     Medications     Current Facility-Administered Medications Ordered in Epic   Medication Dose Route Frequency Last Rate Last Dose     acetaminophen (TYLENOL) tablet 650 mg  650 mg Oral Q4H PRN   650 mg at 04/07/19 0759     cariprazine (VRAYLAR) capsule CAPS 3 mg  3 mg Oral At Bedtime         gabapentin (NEURONTIN) capsule 600 mg  600 mg Oral BID   600 mg at 04/09/19 0808     haloperidol (HALDOL) tablet 5 mg  5 mg Oral Q6H PRN   5 mg at 04/04/19 1350     haloperidol lactate (HALDOL) injection 5 mg  5 mg Intramuscular Q6H PRN         hydrOXYzine (ATARAX) tablet 25 mg  25 mg Oral Q4H PRN   25 mg at 04/04/19 1556     OLANZapine (zyPREXA) tablet 10 mg  10 mg Oral Q6H PRN   10 mg at 04/04/19 1946    Or     OLANZapine (zyPREXA) injection 10 mg  10 mg Intramuscular Q6H PRN         ondansetron (ZOFRAN-ODT) ODT tab 4 mg  4 mg Oral Q6H PRN         vortioxetine (TRINTELLIX/BRINTELLIX) tablet 15 mg  15 mg Oral Daily   15 mg at 04/09/19 0808     No current [facility]-ordered outpatient medications on file.         Allergies      Allergies   Allergen Reactions     Abilify [Aripiprazole] Other (See Comments)     Tardive dyskinesia     Compazine Other (See Comments)     Dystonia     Dramamine      Reglan Other (See Comments)     dystonia     Seroquel [Quetiapine] Other (See Comments)     Tardive dyskinesia.         Medical Review of Systems     /61   Pulse 70   Temp 99.4  F (37.4  C) (Oral)   Resp 14   Ht 1.676 m (5' 6\")   Wt 76.9 kg (169 lb 9.6 oz)   SpO2 97%   BMI 27.37 kg/m    Body mass index is 27.37 kg/m .  A 10-point review of systems was performed by " Edgardo Addison MD and is negative, no new findings.      Psychiatric Examination     Appearance Sitting in chair, dressed in hospital scrubs. Appears stated age.   Attitude Cooperative   Orientation Oriented to person, place, time   Eye Contact Good   Speech Regular rate, rhythm, volume and tone   Language Normal   Psychomotor Behavior Normal   Mood Anxious    Affect Pleasant     Thought Process Goal-Oriented, Intact   Associations Intact   Thought Content Patient is currently negative for suicidal ideation, negative for plan or intent, able to contract no self harm and identify barriers to suicide.  Negative for obsessions, compulsions or psychosis.     Fund of Knowledge Intact   Insight Poor   Judgement Poor   Attention Span & Concentration Fair    Recent & Remote Memory Intact   Gait Normal   Muscle Tone Intact      Labs     Labs reviewed.  No results found for this or any previous visit (from the past 24 hour(s)).     Impression   This is a 43 year old female who attains a history of depression, substance abuse, and bipolar disorder. Today, the patient denied any complications or concerns with care, however reported being tired after taking her Vraylar medication in the mornings. Dr. Addison has changed this medication from in the morning to at bedtime in order to avoid patient becoming tired throughout the daytime. Will continue to be in contact with  in regards of treatment options, patient proceeds to await CD assessment.      Diagnoses   1. Bipolar disorder  2. Major depression, recurrent, severe, without psychotic features.  3. General anxiety disorder.   4.         Benzodiazepine and opiate dependence  5.         Alcohol use disorder     Plan     1. Explained side effects, benefits, and complications of medications to the patient, Pt gave verbal consent.  2. Medication changes: changed Vraylar medication to at bedtime   3. Discussed treatment plan with patient and team.  4. Projected  length of stay: 7+ days  5. Arrange treatment options for patient, consider [facility]  6. Awaiting CD assessment       Attestation:   Patient has been seen and evaluated by me, Edgardo Addison MD.    Patient ID:  Name: Jihan Gill    MRN: [id number]  Admission: 4/3/2019   YOB: 1975

## 2019-04-09 NOTE — PLAN OF CARE
Pt spent most of the shift in her room bed resting; came out to eat dinner but then returned back to her room. Presents with a blunt affect. Pt did not attend any groups this shift.

## 2019-04-09 NOTE — PLAN OF CARE
With initial task set up, pt participated in therapeutic group activity and discussion addressing role performance. Pt engaged in therapeutic activity addressing functional cognition and interpersonal skills with task set up. Pt ID'd roles she currently has and responsibilities of each role (e.g. Mother, daughter, employee). Additionally, Pt ID'd what she does well in each role and what she could improve (e.g. Be consistent with communicating with son, be more present for son). Problem-solved ways to implement changes with Pt appreciative of information given. Pt became tearful when discussing how she could improve in her role as a mother. Pt will continue to benefit from OT intervention to address implementation of positive functional coping skills, role performance, and community reintegration.

## 2019-04-10 PROCEDURE — 25000132 ZZH RX MED GY IP 250 OP 250 PS 637: Performed by: PSYCHIATRY & NEUROLOGY

## 2019-04-10 PROCEDURE — 90853 GROUP PSYCHOTHERAPY: CPT

## 2019-04-10 PROCEDURE — 12400000 ZZH R&B MH

## 2019-04-10 PROCEDURE — 25000132 ZZH RX MED GY IP 250 OP 250 PS 637: Performed by: EMERGENCY MEDICINE

## 2019-04-10 RX ADMIN — GABAPENTIN 600 MG: 300 CAPSULE ORAL at 20:06

## 2019-04-10 RX ADMIN — VORTIOXETINE 15 MG: 10 TABLET, FILM COATED ORAL at 08:42

## 2019-04-10 RX ADMIN — HYDROXYZINE HYDROCHLORIDE 25 MG: 25 TABLET ORAL at 17:29

## 2019-04-10 RX ADMIN — GABAPENTIN 600 MG: 300 CAPSULE ORAL at 08:42

## 2019-04-10 RX ADMIN — CARIPRAZINE 3 MG: 1.5 CAPSULE, GELATIN COATED ORAL at 20:06

## 2019-04-10 ASSESSMENT — ACTIVITIES OF DAILY LIVING (ADL)
HYGIENE/GROOMING: INDEPENDENT
DRESS: STREET CLOTHES
ORAL_HYGIENE: WITH SUPERVISION;INDEPENDENT
LAUNDRY: WITH SUPERVISION

## 2019-04-10 NOTE — PROGRESS NOTES
[facility] Psychiatric Progress Note       Interim History     The patient's care was discussed with the treatment team and chart notes were reviewed. Patient proceeds to be medication compliant, cooperative in care, calm in mood, and has been spending majority of her time in the SDU lounge. Patient seen on 4/10/19 by Dr. Addison. On interview, patient notes she is doing fine today, however has been experiencing horrible cravings and even becomes tearful when discussing it. She had been trying to distract herself by doing cross word puzzles, thinking of her son, or spending time in the lounge, however these have been unsuccessful in relieving her cravings. Dr. Addison will try to get the medication Vivitrol to patient while in the hospital. He also encourages the patient to make a list of things she has done right in her life.     Hospital Course   This is a 43 year old female who attains a history of depression, substance abuse, and bipolar disorder. She was discharged from [facility] on 4/01/19 by Dr. Addison with the plans of discharging to [facility] in [place], [place] and following up at [facility]. The patient however never did make it to treatment, instead she got on the bus after discharge and went to [facility] where she used meth and Klonopin with her boyfriend. While discussing with Dr. Addison today, the patient declared she believes she needs residential treatment rather than outpatient. Dr. Addison is in agreement with this and will discuss more with our  in order to arrange for aftercare plans. Aside from this, Dr. Addison has increased patient's Trintellix dose from 10mg to 15mg in order to aid in patient's consistent symptoms of depression and paranoia. According to attending hospital staff members, the patient has been somewhat isolative and withdrawn since being on Psychiatry. She has been cooperative in  "care, medication compliant, however has declined multiple group sessions since admission. Today, when discussing with patient, she stressed that she is quite fearful and hopeless in regards of her future. She reported experiencing increased paranoia and even auditory hallucinations since she was discharged from previous hospitalization (just last week). Dr. Addison had highly encouraged the patient to utilize her DBT skills in order to deal with her fearfulness.     Medications     Current Facility-Administered Medications Ordered in Epic   Medication Dose Route Frequency Last Rate Last Dose     acetaminophen (TYLENOL) tablet 650 mg  650 mg Oral Q4H PRN   650 mg at 04/07/19 0759     cariprazine (VRAYLAR) capsule CAPS 3 mg  3 mg Oral At Bedtime   3 mg at 04/09/19 2055     gabapentin (NEURONTIN) capsule 600 mg  600 mg Oral BID   600 mg at 04/09/19 2055     haloperidol (HALDOL) tablet 5 mg  5 mg Oral Q6H PRN   5 mg at 04/04/19 1350     haloperidol lactate (HALDOL) injection 5 mg  5 mg Intramuscular Q6H PRN         hydrOXYzine (ATARAX) tablet 25 mg  25 mg Oral Q4H PRN   25 mg at 04/09/19 1616     OLANZapine (zyPREXA) tablet 10 mg  10 mg Oral Q6H PRN   10 mg at 04/04/19 1946    Or     OLANZapine (zyPREXA) injection 10 mg  10 mg Intramuscular Q6H PRN         ondansetron (ZOFRAN-ODT) ODT tab 4 mg  4 mg Oral Q6H PRN         vortioxetine (TRINTELLIX/BRINTELLIX) tablet 15 mg  15 mg Oral Daily   15 mg at 04/09/19 0808     No current [facility]-ordered outpatient medications on file.         Allergies      Allergies   Allergen Reactions     Abilify [Aripiprazole] Other (See Comments)     Tardive dyskinesia     Compazine Other (See Comments)     Dystonia     Dramamine      Reglan Other (See Comments)     dystonia     Seroquel [Quetiapine] Other (See Comments)     Tardive dyskinesia.         Medical Review of Systems     /64   Pulse 69   Temp 97.8  F (36.6  C) (Oral)   Resp 16   Ht 1.676 m (5' 6\")   Wt 76.9 kg (169 lb 9.6 " oz)   SpO2 97%   BMI 27.37 kg/m    Body mass index is 27.37 kg/m .  A 10-point review of systems was performed by Edgardo Addison MD and is negative, no new findings.      Psychiatric Examination     Appearance Sitting in chair, dressed in hospital scrubs. Appears stated age.   Attitude Cooperative   Orientation Oriented to person, place, time   Eye Contact Good   Speech Regular rate, rhythm, volume and tone   Language Normal   Psychomotor Behavior Normal   Mood Anxious, experiencing cravings    Affect Pleasant     Thought Process Goal-Oriented, Intact   Associations Intact   Thought Content Patient is currently negative for suicidal ideation, negative for plan or intent, able to contract no self harm and identify barriers to suicide.  Negative for obsessions, compulsions or psychosis.     Fund of Knowledge Intact   Insight Poor   Judgement Poor   Attention Span & Concentration Fair    Recent & Remote Memory Intact   Gait Normal   Muscle Tone Intact      Labs     Labs reviewed.  No results found for this or any previous visit (from the past 24 hour(s)).     Impression   This is a 43 year old female who attains a history of depression, substance abuse, and bipolar disorder. Patient proceeds to be medication compliant, cooperative in care, pleasant with her peers, and calm in mood according to attending staff members. Today, the patient denied any mediation or care concerns, however did declare she has been having horrible cravings. She has tried to distract herself by engaging in different things/activities, however has yet to be successful in finding relief. Dr. Addison will work on getting the medication Vivitrol to patient while in the hospital.        Diagnoses   1. Bipolar disorder  2. Major depression, recurrent, severe, without psychotic features.  3. General anxiety disorder.   4.         Benzodiazepine and opiate dependence  5.         Alcohol use disorder     Plan     1. Explained side effects,  benefits, and complications of medications to the patient, Pt gave verbal consent.  2. Medication changes: None  3. Discussed treatment plan with patient and team.  4. Projected length of stay: 7+ days  5. Arrange treatment options for patient, consider [facility]  6. Awaiting CD assessment       Attestation:   Patient has been seen and evaluated by me, Edgardo Addison MD.    Patient ID:  Name: Jihan Gill    MRN: [id number]  Admission: 4/3/2019   YOB: 1975

## 2019-04-10 NOTE — PLAN OF CARE
Problem: Depression  Goal: Improved Mood  Outcome: No Change  Note:   Pt is alert and oriented within the SDU. Pt presents with a blunt affect and calm mood. Pt spent most of the shift in the lounge, with portions of resting in her room. Pt was minimally social with peers and staff but was actively apart of the milieu. Pt stated that she is just really bored of being here. Pt requested to borrow a book and spent a good portion of time reading in her room. Pt declined unit programming. Pt ate all of her food and was med compliant.

## 2019-04-10 NOTE — PLAN OF CARE
"Pt attended 2 of 2 OT groups today. Pt transitioned well to OT group  andengaged in therapeutic activity addressing functional cognition and interpersonal skills with task set up. With MIN encouragement, pt participated in therapeutic group activity and discussion addressing improved motivation and self concept by use of affirmations. With task set up, pt created visual representation of positive quote \"be gentle with yourself, you're doing the best you can\" and ID'd when it would be useful to refer to same. In PM group, pt actively participated in a mindfulness group focusing on reduction of anxiety, improvement of mood, and increase in concentration. The mindfulness practice was offered via supportive approaches including chair yoga, visualization, and journaling to decrease worry, rumination, and other negative feelings. Pt reported feeling so depressed she was nauseous prior to group but stated she felt none of that after group. Pt was able to remain focused for the full duration. Pt will continue to benefit from OT intervention to address implementation of positive functional coping skills, role performance, and community reintegration.     "

## 2019-04-10 NOTE — PLAN OF CARE
Pt has been up and visible on the unit. Attended one group and left early, withdrawn, not social with peers or staff.  Med compliant when asked to come for morning medications. Affect blunted mood calm.

## 2019-04-10 NOTE — PLAN OF CARE
"4/10 3-7p: pt shared interest in going to an all-female treatment center. Atarax given x1 for anxiety. Stated \"I have the feeling like I want to use but I know I can't. its hard to get rid of that feeling.\" Stated \"I want to take suboxone but Dr. gage won't prescribe it to me.\" up eating dinner with peers. Spoke of her son, \"I want to make sure he still wants me to be his mother once he turns 20.\"    Problem: Adult Inpatient Plan of Care  Goal: Absence of Hospital-Acquired Illness or Injury  4/10/2019 1745 by Mary Dunn RN  Outcome: Improving     Problem: Adult Inpatient Plan of Care  Goal: Optimal Comfort and Wellbeing  4/10/2019 1745 by Mary Dunn RN  Outcome: Improving     Problem: Adult Inpatient Plan of Care  Goal: Readiness for Transition of Care  4/10/2019 1745 by Mary Dunn RN  Outcome: Improving     Problem: Suicide Risk  Goal: Absence of Self-Harm  4/10/2019 1745 by Mary Dunn RN  Outcome: Improving     Problem: Suicidal Behavior  Goal: Suicidal Behavior is Absent or Managed  4/10/2019 1745 by Mary Dunn RN  Outcome: Improving     Problem: Depression  Goal: Improved Mood  4/10/2019 1745 by Mary Dunn, RN  Outcome: Improving     "

## 2019-04-11 PROCEDURE — 25000132 ZZH RX MED GY IP 250 OP 250 PS 637: Performed by: EMERGENCY MEDICINE

## 2019-04-11 PROCEDURE — 90853 GROUP PSYCHOTHERAPY: CPT

## 2019-04-11 PROCEDURE — 12400000 ZZH R&B MH

## 2019-04-11 PROCEDURE — 25000132 ZZH RX MED GY IP 250 OP 250 PS 637: Performed by: PSYCHIATRY & NEUROLOGY

## 2019-04-11 RX ORDER — GABAPENTIN 300 MG/1
600 CAPSULE ORAL 3 TIMES DAILY
Status: DISCONTINUED | OUTPATIENT
Start: 2019-04-11 | End: 2019-04-30 | Stop reason: HOSPADM

## 2019-04-11 RX ADMIN — OLANZAPINE 10 MG: 10 TABLET, FILM COATED ORAL at 14:33

## 2019-04-11 RX ADMIN — GABAPENTIN 600 MG: 300 CAPSULE ORAL at 07:55

## 2019-04-11 RX ADMIN — ACETAMINOPHEN 650 MG: 325 TABLET, FILM COATED ORAL at 07:56

## 2019-04-11 RX ADMIN — CARIPRAZINE 3 MG: 1.5 CAPSULE, GELATIN COATED ORAL at 20:52

## 2019-04-11 RX ADMIN — VORTIOXETINE 15 MG: 10 TABLET, FILM COATED ORAL at 07:56

## 2019-04-11 RX ADMIN — GABAPENTIN 600 MG: 300 CAPSULE ORAL at 20:52

## 2019-04-11 RX ADMIN — GABAPENTIN 600 MG: 300 CAPSULE ORAL at 15:32

## 2019-04-11 NOTE — PLAN OF CARE
Pt is med compliant, c/o feeling depressed, affect blunted. Attending groups and is appropriate. Ate meals in the lounge with peers.

## 2019-04-11 NOTE — PROGRESS NOTES
Patient has an order for chemical health assessment. [facility] has informed us assessments may take 3-4 days to be completed. Ordered 4/8/19.

## 2019-04-11 NOTE — PLAN OF CARE
Goal: Team Discussion  Outcome: No change   BEHAVIORAL TEAM DISCUSSION     Participants: Dr. Addison, social workers, nurses, psych asst   Progress: Pt still is wanting opioids medication, Dr. Addison has declined pts request. Pt remains preoccupied. Pt received a CD assessment this afternoon.   Continued Stay Criteria/Rationale: Pt will remain on the unit, pt needs further treatment and has no where to go as of now. Vivitrol shot being disgusted with pt.   Medical/Physical:N/A   Precautions:        Behavioral Orders   Procedures    Code 1 - Restrict to Unit    Routine Programming       As clinically indicated    Seizure precautions    Status 15       Every 15 minutes.      Plan: Pt will remain on the unit, pt remains the same.   Rationale for change in precautions or plan: Pt needs further treatment.

## 2019-04-11 NOTE — PLAN OF CARE
Pt is A&O. Pt presents with a blunt affect and calm mood. Pt spent all of the shift resting in her room spent reading. Pt was appropriate with staff.Denies signs and symptoms of psychosis.  Pt declined unit programming. Pt was med compliant. No noted behaviors this shift. Discharge plan pending pt progress. Will continue to observe.

## 2019-04-11 NOTE — PLAN OF CARE
Pt transitioned to OT group with MIN VCs and engaged in therapeutic activity addressing functional cognition and interpersonal skills with task set up. With direct VCs, pt participated in therapeutic group activity and discussion addressing wellness. Pt ID'd positive things she can do to fill her time (spend time with friends, do yoga, play tennis, go to AA/NA) and words that describe her when she's feeling well (calm, peaceful, centered). Pt will continue to benefit from OT intervention to address implementation of positive functional coping skills, role performance, and community reintegration.

## 2019-04-11 NOTE — PROGRESS NOTES
"[facility] Psychiatric Progress Note       Interim History     The patient's care was discussed with the treatment team and chart notes were reviewed. The patient remains medication compliant, cooperative in care, however has been somewhat isolative, and declining group sessions. Patient seen on 4/11/19 by Dr. Addison. On interview, the patient is wondering about the Vivitrol injection that was discussed in yesterdays interview and if this medication is available yet. Dr. Addison will be in contact with pharmacy in regards of this medication, and will get back to patient. When asking patient about her mood this afternoon, she states, \"I don't know what's going on, but I've been feeling so depressed...I even feel sick to my stomach at times.\" Dr. Addison would like to increase patient's Trintellix from 15mg to 20mg and increase Gabapentin 600mg BID to 600mg TID to subside symptoms of depression and anxiety. Patient is in agreement with this medication adjustment.      Hospital Course   This is a 43 year old female who attains a history of depression, substance abuse, and bipolar disorder. She was discharged from [facility] on 4/01/19 by Dr. Addison with the plans of discharging to [facility] in [place], [place] and following up at [facility]. The patient however never did make it to treatment, instead she got on the bus after discharge and went to hotel where she used meth and Klonopin with her boyfriend. While discussing with Dr. Addison today, the patient declared she believes she needs residential treatment rather than outpatient. Dr. Addison is in agreement with this and will discuss more with our  in order to arrange for aftercare plans. Aside from this, Dr. Addison has increased patient's Trintellix dose from 10mg to 15mg in order to aid in patient's consistent symptoms of depression and paranoia. According to " attending hospital staff members, the patient has been somewhat isolative and withdrawn since being on Psychiatry. She has been cooperative in care, medication compliant, however has declined multiple group sessions since admission. Today, when discussing with patient, she stressed that she is quite fearful and hopeless in regards of her future. She reported experiencing increased paranoia and even auditory hallucinations since she was discharged from previous hospitalization (just last week). Dr. Addison had highly encouraged the patient to utilize her DBT skills in order to deal with her fearfulness.     Medications     Current Facility-Administered Medications Ordered in Epic   Medication Dose Route Frequency Last Rate Last Dose     acetaminophen (TYLENOL) tablet 650 mg  650 mg Oral Q4H PRN   650 mg at 04/11/19 0756     cariprazine (VRAYLAR) capsule CAPS 3 mg  3 mg Oral At Bedtime   3 mg at 04/10/19 2006     gabapentin (NEURONTIN) capsule 600 mg  600 mg Oral BID   600 mg at 04/11/19 0755     haloperidol (HALDOL) tablet 5 mg  5 mg Oral Q6H PRN   5 mg at 04/04/19 1350     haloperidol lactate (HALDOL) injection 5 mg  5 mg Intramuscular Q6H PRN         hydrOXYzine (ATARAX) tablet 25 mg  25 mg Oral Q4H PRN   25 mg at 04/10/19 1729     OLANZapine (zyPREXA) tablet 10 mg  10 mg Oral Q6H PRN   10 mg at 04/04/19 1946    Or     OLANZapine (zyPREXA) injection 10 mg  10 mg Intramuscular Q6H PRN         ondansetron (ZOFRAN-ODT) ODT tab 4 mg  4 mg Oral Q6H PRN         vortioxetine (TRINTELLIX/BRINTELLIX) tablet 15 mg  15 mg Oral Daily   15 mg at 04/11/19 0756     No current [facility]-ordered outpatient medications on file.         Allergies      Allergies   Allergen Reactions     Abilify [Aripiprazole] Other (See Comments)     Tardive dyskinesia     Compazine Other (See Comments)     Dystonia     Dramamine      Reglan Other (See Comments)     dystonia     Seroquel [Quetiapine] Other (See Comments)     Tardive dyskinesia.          "Medical Review of Systems     /67   Pulse 93   Temp 98.8  F (37.1  C) (Oral)   Resp 16   Ht 1.676 m (5' 6\")   Wt 76.9 kg (169 lb 9.6 oz)   SpO2 97%   BMI 27.37 kg/m    Body mass index is 27.37 kg/m .  A 10-point review of systems was performed by Edgardo Addison MD and is negative, no new findings.      Psychiatric Examination     Appearance Sitting in chair, dressed in hospital scrubs. Appears stated age.   Attitude Cooperative   Orientation Oriented to person, place, time   Eye Contact Good   Speech Regular rate, rhythm, volume and tone   Language Normal   Psychomotor Behavior Normal   Mood Depressed    Affect Pleasant, but appears depressed    Thought Process Goal-Oriented, Intact   Associations Intact   Thought Content Patient is currently negative for suicidal ideation, negative for plan or intent, able to contract no self harm and identify barriers to suicide.  Negative for obsessions, compulsions or psychosis.     Fund of Knowledge Intact   Insight Poor   Judgement Poor   Attention Span & Concentration Fair    Recent & Remote Memory Intact   Gait Normal   Muscle Tone Intact      Labs     Labs reviewed.  No results found for this or any previous visit (from the past 24 hour(s)).     Impression   This is a 43 year old female who attains a history of depression, substance abuse, and bipolar disorder. Patient remains medication compliant, cooperative in care, however proceeds to be isolative and denying group sessions. When discussing with Dr. Addison this afternoon, the patient expressed her curiosity about the Vivitrol injection discussed in yesterdays interview. Dr. Addison will be in contact with pharmacy about this medication. In addition to this, the patient reported an increase in depression and anxiety throughout the daytime. Dr. Addison has increased Trintellix dose from 15mg to 20mg and increased Gabapentin dose from 600mg BID to 600mg TID in order to aid in patient's " acclaimed symptoms of depression.      Diagnoses   1. Bipolar disorder  2. Major depression, recurrent, severe, without psychotic features.  3. General anxiety disorder.   4.         Benzodiazepine and opiate dependence  5.         Alcohol use disorder     Plan     1. Explained side effects, benefits, and complications of medications to the patient, Pt gave verbal consent.  2. Medication changes: Increased Trintellix to 20mg daily and increased Gabapentin to 600mg TID  3. Discussed treatment plan with patient and team.  4. Projected length of stay: 7+ days  5. Arrange treatment options for patient, consider [facility]  6. Awaiting CD assessment       Attestation:   Patient has been seen and evaluated by me, Edgardo Addison MD.    Patient ID:  Name: Jihan Gill    MRN: [id number]  Admission: 4/3/2019   YOB: 1975

## 2019-04-12 PROCEDURE — 25000132 ZZH RX MED GY IP 250 OP 250 PS 637: Performed by: EMERGENCY MEDICINE

## 2019-04-12 PROCEDURE — 25000132 ZZH RX MED GY IP 250 OP 250 PS 637: Performed by: PSYCHIATRY & NEUROLOGY

## 2019-04-12 PROCEDURE — 12400000 ZZH R&B MH

## 2019-04-12 PROCEDURE — 90853 GROUP PSYCHOTHERAPY: CPT

## 2019-04-12 RX ADMIN — VORTIOXETINE 20 MG: 20 TABLET, FILM COATED ORAL at 08:11

## 2019-04-12 RX ADMIN — GABAPENTIN 600 MG: 300 CAPSULE ORAL at 08:11

## 2019-04-12 RX ADMIN — GABAPENTIN 600 MG: 300 CAPSULE ORAL at 20:19

## 2019-04-12 RX ADMIN — GABAPENTIN 600 MG: 300 CAPSULE ORAL at 15:08

## 2019-04-12 RX ADMIN — ACETAMINOPHEN 650 MG: 325 TABLET, FILM COATED ORAL at 08:11

## 2019-04-12 RX ADMIN — CARIPRAZINE 3 MG: 1.5 CAPSULE, GELATIN COATED ORAL at 20:19

## 2019-04-12 ASSESSMENT — ACTIVITIES OF DAILY LIVING (ADL)
DRESS: STREET CLOTHES
LAUNDRY: WITH SUPERVISION
ORAL_HYGIENE: INDEPENDENT
HYGIENE/GROOMING: INDEPENDENT
HYGIENE/GROOMING: INDEPENDENT
DRESS: INDEPENDENT
ORAL_HYGIENE: INDEPENDENT

## 2019-04-12 NOTE — PROGRESS NOTES
[facility] Psychiatric Progress Note       Interim History     The patient's care was discussed with the treatment team and chart notes were reviewed. Patient seen on 4/12/19 by Dr. Addison. On interview, patient notes she is doing well today. She states she received a dose of Zyprexa yesterday which allowed her to attain really good sleep last night. Patient asks more about the Vivitrol injection, and if this medication can be distributed while here in the hospital. The pharmacist has yet to return Dr. Addison's call in regards of this medication. Aside from this, CD  has yet to meet with patient.      Hospital Course   This is a 43 year old female who attains a history of depression, substance abuse, and bipolar disorder. She was discharged from [facility] on 4/01/19 by Dr. Addison with the plans of discharging to [facility] in [place], [place] and following up at [facility]. The patient however never did make it to treatment, instead she got on the bus after discharge and went to [facility] [place] where she used meth and Klonopin with her boyfriend. While discussing with Dr. Addison today, the patient declared she believes she needs residential treatment rather than outpatient. Dr. Addison is in agreement with this and will discuss more with our  in order to arrange for aftercare plans. Aside from this, Dr. Addison has increased patient's Trintellix dose from 10mg to 15mg in order to aid in patient's consistent symptoms of depression and paranoia. According to attending hospital staff members, the patient has been somewhat isolative and withdrawn since being on Psychiatry. She has been cooperative in care, medication compliant, however has declined multiple group sessions since admission. Today, when discussing with patient, she stressed that she is quite fearful and hopeless in regards of her future. She reported  "experiencing increased paranoia and even auditory hallucinations since she was discharged from previous hospitalization (just last week). Dr. Addison had highly encouraged the patient to utilize her DBT skills in order to deal with her fearfulness. Dr. Addison increased Trintellix dose from 15mg to 20mg and increased Gabapentin dose from 600mg BID to 600mg TID in order to aid in patient's acclaimed symptoms of depression.      Medications     Current Facility-Administered Medications Ordered in Epic   Medication Dose Route Frequency Last Rate Last Dose     acetaminophen (TYLENOL) tablet 650 mg  650 mg Oral Q4H PRN   650 mg at 04/11/19 0756     cariprazine (VRAYLAR) capsule CAPS 3 mg  3 mg Oral At Bedtime   3 mg at 04/11/19 2052     gabapentin (NEURONTIN) capsule 600 mg  600 mg Oral TID   600 mg at 04/11/19 2052     haloperidol (HALDOL) tablet 5 mg  5 mg Oral Q6H PRN   5 mg at 04/04/19 1350     haloperidol lactate (HALDOL) injection 5 mg  5 mg Intramuscular Q6H PRN         hydrOXYzine (ATARAX) tablet 25 mg  25 mg Oral Q4H PRN   25 mg at 04/10/19 1729     OLANZapine (zyPREXA) tablet 10 mg  10 mg Oral Q6H PRN   10 mg at 04/11/19 1433    Or     OLANZapine (zyPREXA) injection 10 mg  10 mg Intramuscular Q6H PRN         ondansetron (ZOFRAN-ODT) ODT tab 4 mg  4 mg Oral Q6H PRN         vortioxetine (TRINTELLIX/BRINTELLIX) tablet 20 mg  20 mg Oral Daily         No current [facility]-ordered outpatient medications on file.         Allergies      Allergies   Allergen Reactions     Abilify [Aripiprazole] Other (See Comments)     Tardive dyskinesia     Compazine Other (See Comments)     Dystonia     Dramamine      Reglan Other (See Comments)     dystonia     Seroquel [Quetiapine] Other (See Comments)     Tardive dyskinesia.         Medical Review of Systems     /58   Pulse 77   Temp 98.3  F (36.8  C) (Oral)   Resp 16   Ht 1.676 m (5' 6\")   Wt 76.9 kg (169 lb 9.6 oz)   SpO2 97%   BMI 27.37 kg/m    Body mass index is " 27.37 kg/m .  A 10-point review of systems was performed by Edgardo Addison MD and is negative, no new findings.      Psychiatric Examination     Appearance Sitting in chair, dressed in hospital scrubs. Appears stated age.   Attitude Cooperative   Orientation Oriented to person, place, time   Eye Contact Good   Speech Regular rate, rhythm, volume and tone   Language Normal   Psychomotor Behavior Normal   Mood Depressed    Affect Pleasant, but appears depressed    Thought Process Goal-Oriented, Intact   Associations Intact   Thought Content Patient is currently negative for suicidal ideation, negative for plan or intent, able to contract no self harm and identify barriers to suicide.  Negative for obsessions, compulsions or psychosis.     Fund of Knowledge Intact   Insight Poor   Judgement Poor   Attention Span & Concentration Fair    Recent & Remote Memory Intact   Gait Normal   Muscle Tone Intact      Labs     Labs reviewed.  No results found for this or any previous visit (from the past 24 hour(s)).     Impression   This is a 43 year old female who attains a history of depression, substance abuse, and bipolar disorder. While discussing with Dr. Addison this morning, the patient denied any complications or concerns in regards of care or medications. She did ask more about the Vivitrol injection that could be distributed while here in the hospital. The pharmacist however has yet to return Dr. Addison's call. Aside from this, CD  has yet to meet with patient, however patient understands the plan for treatment. Will have CD  refer patient to [place].      Diagnoses   1. Bipolar disorder  2. Major depression, recurrent, severe, without psychotic features.  3. General anxiety disorder.   4.         Benzodiazepine and opiate dependence  5.         Alcohol use disorder     Plan     1. Explained side effects, benefits, and complications of medications to the patient, Pt gave verbal  consent.  2. Medication changes: none   3. Discussed treatment plan with patient and team.  4. Projected length of stay: 7+ days  5. Consider treatment center such as [facility]  6. Awaiting CD assessment        Attestation:   Patient has been seen and evaluated by me, Edgardo Addison MD.    Patient ID:  Name: Jihan Gill    MRN: [id number]  Admission: 4/3/2019   YOB: 1975

## 2019-04-12 NOTE — PLAN OF CARE
Pt presents with bright affect, visible in the SDU and observed socializing with peers. Pt acknowledge having had good sound sleep from the Zyprexa given to her yesterday evening. Denies ani SI or hallucination. Pt is med compliant, calm mood and cooperative. Still waiting to hear from Pharmacy  If they carry the medication Vivitrol IM shots discuss yesterday here in the hospital.

## 2019-04-12 NOTE — PLAN OF CARE
4/11 1900-2330: med compliant, c/o feeling depressed - flat/blunt affect, withdrawn. Napped in room all evening.

## 2019-04-12 NOTE — PLAN OF CARE
Pt attended 2 of 2 OT groups today. Pt transitioned I'lly to OT group and engaged in therapeutic activity addressing functional cognition and interpersonal skills with task set up. Pt participated in therapeutic group activity and discussion addressing healthy coping. With MIN VCs pt participated in IDing if various coping strategies are either healthy, unhealthy, or neutral and why. After completing, pt ID'd new coping strategies she would like to implement in the future including investing in a new hobby (beading). With MIN VCs, problem-solved how to implement same after discharge. In PM, pt actively participated in a mindfulness group focusing on reduction of anxiety, improvement of mood, and increase in concentration. The mindfulness practice was offered via supportive approaches including yoga and body scan meditation to decrease worry, rumination, and other negative feelings. Pt was able to remain focused for the full duration. Pt will continue to benefit from OT intervention to address implementation of positive functional coping skills, role performance, and community reintegration.

## 2019-04-13 PROCEDURE — H0001 ALCOHOL AND/OR DRUG ASSESS: HCPCS

## 2019-04-13 PROCEDURE — 25000132 ZZH RX MED GY IP 250 OP 250 PS 637: Performed by: PSYCHIATRY & NEUROLOGY

## 2019-04-13 PROCEDURE — 90853 GROUP PSYCHOTHERAPY: CPT

## 2019-04-13 PROCEDURE — 12400000 ZZH R&B MH

## 2019-04-13 RX ADMIN — VORTIOXETINE 20 MG: 20 TABLET, FILM COATED ORAL at 07:38

## 2019-04-13 RX ADMIN — GABAPENTIN 600 MG: 300 CAPSULE ORAL at 13:51

## 2019-04-13 RX ADMIN — HYDROXYZINE HYDROCHLORIDE 25 MG: 25 TABLET ORAL at 15:51

## 2019-04-13 RX ADMIN — GABAPENTIN 600 MG: 300 CAPSULE ORAL at 20:09

## 2019-04-13 RX ADMIN — CARIPRAZINE 3 MG: 1.5 CAPSULE, GELATIN COATED ORAL at 20:09

## 2019-04-13 RX ADMIN — GABAPENTIN 600 MG: 300 CAPSULE ORAL at 07:38

## 2019-04-13 NOTE — PLAN OF CARE
"Pt was pleased to have CD  here today, accepts the idea of in-patient CD treatment. Pt stated \"I don't want to have happen what happened before\". Pt admits to fleeting suicidal thoughts, but also expressed the hope that she will get to see her son on his 18th birthday, and that CD treatment won't be an obstacle to visiting him. Pt appears sad, but acknowledges that CD treatment is the wiser path in her life now. Pt spending more time in lounge and with peers compared to 3 days ago. Med compliant, eating well, participating in groups.  "

## 2019-04-13 NOTE — PLAN OF CARE
Flat but reactive affect. Mood calm. Visible out in lounge watching TV, social with peers. Attended morning group and OT where she worked on art project. CD assessment done today. Med compliant

## 2019-04-13 NOTE — PLAN OF CARE
Patient more visible today, pleasant and cooperative. Patient attended both groups and participated well. During staff check-in patient said her goal was to stay out of her room today until bed time. She was glad to have accomplished that goal. Denies suicidal ideation, contracts for safety.
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
## 2019-04-13 NOTE — CONSULTS
4/13/2019    Writer met with pt. Completed CD assessment. Signed JOSE for [place]. Reporting she wants inpatient treatment and also Suboxone maintenance.    Carolyn Clark, [facility]  [phone]

## 2019-04-13 NOTE — PROGRESS NOTES
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 does the person have that decrease relapse potential?)      The patient appears to lack sober coping skills and she lacks long-term sober maintenance skills.  The patient would benefit from continuing to develop her sober coping skills and long-term sober maintenance skills.  The patient has dual issues of mental health issues and substance abuse.  The patient reported her current unemployment is a potential trigger for her to abuse mood altering chemicals.  The patient lacks awareness regarding the disease model of addiction.  The patient lacks a sober peer support network.         DIMENSION VI - Recovery Environment   1. Are you employed/attending school? Tell me about that.     Not working currently.     If not employed, last time worked: 2019, .     2A. Describe a typical day; evening for you. Work, school, social, leisure, volunteer, spiritual practices. Include time spent obtaining, using, recovering from drugs or alcohol. (DSM)     \"Get up, say to self \"not going to use over and over\" the anxiety kicks in and plans change.\"    Please describe what leisure activities have been " "associated with your substance abuse:     The patient denied having any leisure activities which had been associated with her substance abuse.    2B. How often do you spend more time than you planned using or use more than you planned? (DSM)     \"Never really planning on using, so every time\"    3. How important is using to your social connections? Do many of your family or friends use?     No friends or family use.     4A. Are you currently in a significant relationship?     No    4C. Sexual Orientation:     Heterosexual    5A. Who do you live with?      My mom    5B. Tell me about their alcohol/drug use and mental health issues.     Denies concerns.     5C. Are you concerned for your safety there? No    5D. Are you concerned about the safety of anyone else who lives with you? No    6A. Do you have children who live with you?     Yes.  (Ask follow-up questions to determine the person's relationship and responsibility, both legal and care giving; and what arrangements are made for supervision for the children when the person is not available.) Son who is almost 18.    6B. Do you have children who do not live with you?     Yes.  (Ask follow-up questions to determine the person's relationship and responsibility, both legal and care giving; and what arrangements are made for supervision for the children when the person is not available.) Had a son in 2010 and gave up for adoption.     7A. Who supports you in making changes in your alcohol or drug use? What are they willing to do to support you? Who is upset or angry about you making changes in your alcohol or drug use? How big a problem is this for you?      Mom, dad and step-mom, whole family.     7B. This table is provided to record information about the person s relationships and available support It is not necessary to ask each item; only to get a comprehensive picture of their support system.  How often can you count on the following people when you need someone? "   Partner / Spouse The patient does not have a current partner or spouse.   Parent(s)/Aunt(s)/Uncle(s)/Grandparents Always supportive   Sibling(s)/Cousin(s) Always supportive   Child(shawna) Always supportive   Other relative(s) The patient doesn't have any current contact with other relatives.   Friend(s)/neighbor(s) Always supportive   Child(shawna) s father(s)/mother(s) Usually supportive   Support group member(s) The patient denied having any current involvement with 12-step or other support group meetings.   Community of radha members The patient denied having any current involvement with community radha members.   /counselor/therapist/healer The patient denied having any current involvement with a , counselor, therapist or healer.   Other (specify) No     8A. What is your current living situation?     Lives with her mom and son.     8B. What is your long term plan for where you will be living?     Stay at mom's and she needs help.     8C. Tell me about your living environment/neighborhood? Ask enough follow up questions to determine safety, criminal activity, availability of alcohol and drugs, supportive or antagonistic to the person making changes.      Safe    9. Criminal justice history: Gather current/recent history and any significant history related to substance use--Arrests? Convictions? Circumstances? Alcohol or drug involvement? Sentences? Still on probation or parole? Expectations of the court? Current court order? Any sex offenses - lifetime? What level? (DSM)    20's got two DWIs     Commitment: No  Registered Sex Offender: No    10. What obstacles exist to participating in treatment? (Time off work, childcare, funding, transportation, pending half-way time, living situation)     The patient denied having any obstacles for participating in substance abuse treatment.    Dimension VI Ratings   Recovery environment - The placing authority must use the criteria in Dimension VI to  determine a client s recovery environment.   RISK DESCRIPTIONS - Severity rating: 3 Client is not engaged in structured, meaningful activity and the client's peers, family, significant other, and living environment are unsupportive, or there is significant criminal justice system involvement.    REASONS SEVERITY WAS ASSIGNED - (What support does the person have for making changes? What structure/stability does the person have in his or her daily life that will increase the likelihood that changes can be sustained? What problems exist in the person s environment that will jeopardize getting/staying clean and sober?)     Patient lives with her mom and her 17 year old son. Patient is not employed at this time. Patient last worked in 11/2018 as a . Patient reports she is , and currently not in a romantic relationship. Patient reports her family is supportive. Patient denies current legals. Patient reports 2 DWIs in her 20's. Patient reports she has a valid 's license.        Client Choice/Exceptions   Would you like services specific to language, age, gender, culture, Protestant preference, race, ethnicity, sexual orientation or disability?  No    What particular treatment choices and options would you like to have? None    Do you have a preference for a particular treatment program? [facility]    Criteria for Diagnosis     Criteria for Diagnosis  DSM-5 Criteria for Substance Use Disorder  Instructions: Determine whether the client currently meets the criteria for Substance Use Disorder using the diagnostic criteria in the DSM-V pp.[phone]. Current means during the most recent 12 months outside a facility that controls access to substances    Category of Substance Severity (ICD-10 Code / DSM 5 Code)     Alcohol Use Disorder Severe  (10.20) (303.90)   Cannabis Use Disorder The patient does not meet the criteria for a Cannabis use disorder.   Hallucinogen Use Disorder The patient does not meet  the criteria for a Hallucinogen use disorder.   Inhalant Use Disorder The patient does not meet the criteria for an Inhalant use disorder.   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   Other (or unknown) Substance Use Disorder The patient does not meet the criteria for a Other (or unknown) Substance use disorder.       Collateral Contact Summary   Number of contacts made: 1    Contact with referring person:  No    If court related records were reviewed, summarize here: No court records had been reviewed at the time of this documentation.    Information from collateral contacts supported/largely agreed with information from the client and associated risk ratings.      Rule 25 Assessment Summary and Plan   's Recommendation    Patient is recommended to enter and complete MI/CD treatment at [facility] or similar programs.   Patient is recommended to follow all discharge recommendations from [facility] unit.       Collateral Contacts     Name:    EMR   Relationship:    Medical records   Phone Number:    None Releases:    Yes     Patient's medial record was reviewed and discussed care with staff.   collateral Contacts      A problematic pattern of alcohol/drug use leading to clinically significant impairment or distress, as manifested by at least two of the following, occurring within a 12-month period:    1.) Alcohol/drug is often taken in larger amounts or over a longer period than was intended.  2.) There is a persistent desire or unsuccessful efforts to cut down or control alcohol/drug use  3.) A great deal of time is spent in activities necessary to obtain alcohol, use alcohol, or recover from its effects.  4.) Craving, or a strong desire or  urge to use alcohol/drug  5.) Recurrent alcohol/drug use resulting in a failure to fulfill major role obligations at work, school or home.  6.) Continued alcohol use despite having persistent or recurrent social or interpersonal problems caused or exacerbated by the effects of alcohol/drug.  7.) Important social, occupational, or recreational activities are given up or reduced because of alcohol/drug use.  8.) Recurrent alcohol/drug use in situations in which it is physically hazardous.  9.) Alcohol/drug use is continued despite knowledge of having a persistent or recurrent physical or psychological problem that is likely to have been caused or exacerbated by alcohol.  10.) Tolerance, as defined by either of the following: A need for markedly increased amounts of alcohol/drug to achieve intoxication or desired effect.  11.) Withdrawal, as manifested by either of the following: The characteristic withdrawal syndrome for alcohol/drug (refer to Criteria A and B of the criteria set for alcohol/drug withdrawal). and Alcohol/drug (or a closely related substance, such as a benzodiazepine) is taken to relieve or avoid withdrawal symptoms.      Specify if: In early remission:  After full criteria for alcohol/drug use disorder were previously met, none of the criteria for alcohol/drug use disorder have been met for at least 3 months but for less than 12 months (with the exception that Criterion A4,  Craving or a strong desire or urge to use alcohol/drug  may be met).     In sustained remission:   After full criteria for alcohol use disorder were previously met, none of the criteria for alcohol/drug use disorder have been met at any time during a period of 12 months or longer (with the exception that Criterion A4,  Craving or strong desire or urge to use alcohol/drug  may be met).   Specify if:   This additional specifier is used if the individual is in an environment where access to alcohol is restricted.    Mild: Presence of  2-3 symptoms  Moderate: Presence of 4-5 symptoms  Severe: Presence of 6 or more symptoms

## 2019-04-14 PROCEDURE — 25000132 ZZH RX MED GY IP 250 OP 250 PS 637: Performed by: PSYCHIATRY & NEUROLOGY

## 2019-04-14 PROCEDURE — 90853 GROUP PSYCHOTHERAPY: CPT

## 2019-04-14 PROCEDURE — 12400000 ZZH R&B MH

## 2019-04-14 RX ADMIN — GABAPENTIN 600 MG: 300 CAPSULE ORAL at 14:46

## 2019-04-14 RX ADMIN — GABAPENTIN 600 MG: 300 CAPSULE ORAL at 20:16

## 2019-04-14 RX ADMIN — HYDROXYZINE HYDROCHLORIDE 25 MG: 25 TABLET ORAL at 17:09

## 2019-04-14 RX ADMIN — VORTIOXETINE 20 MG: 20 TABLET, FILM COATED ORAL at 07:46

## 2019-04-14 RX ADMIN — CARIPRAZINE 3 MG: 1.5 CAPSULE, GELATIN COATED ORAL at 20:16

## 2019-04-14 RX ADMIN — HYDROXYZINE HYDROCHLORIDE 25 MG: 25 TABLET ORAL at 09:19

## 2019-04-14 RX ADMIN — GABAPENTIN 600 MG: 300 CAPSULE ORAL at 07:46

## 2019-04-14 ASSESSMENT — ACTIVITIES OF DAILY LIVING (ADL)
DRESS: STREET CLOTHES
LAUNDRY: WITH SUPERVISION
ORAL_HYGIENE: INDEPENDENT
HYGIENE/GROOMING: INDEPENDENT

## 2019-04-14 NOTE — PROGRESS NOTES
ASSESSMENT SUMMARY    PATIENT NAME: Jihan Gill  MEDICAL RECORD NUMBER: [id number]  PATIENT ADDRESS: [address] [place] [address] [address] [place] [address]  HOME TELEPHONE NUMBER: [phone] (home)   MOBILE TELEPHONE NUMBER:   No relevant phone numbers on file.     STATISTICS: YOB: 1975     Age: 43 year old     Gender: female    RELATIONSHIP STATUS:  Single, in no serious relationship    DATE OF ASSESSMENT: 04/13/2019  EVALUATION COUNSELOR: NIMISHA Ng    REFERRAL SOURCE: Dr Addison    REASON FOR EVALUATION:     Per H&P:  HISTORY OF PRESENT ILLNESS  This is a 43 year old female who attains a history of depression, substance abuse, and bipolar disorder. She was just recently discharged from [facility] on 4/01/19 by Dr. Addison with the plans of immediately attending [facility] in [place], [place] and following up at [facility]. The patient however never did make it to treatment, instead she discharged and received a bus token where she got a hotel room in [place] with her boyfriend. There, they were actively using methamphetamine. Patient came to the hospital via police after leaving the hotel to go jump off a bridge. She however changed her mind and ended up flagging down a  while she was on the bridge. She reported to ED that she had been hearing voices and seeing people that are not there. Patient had missed two days worth of medication. Patient was suggested inpatient level care due to suicidal thoughts. On interview with Dr. Addison, the patient reports that she had relapsed not on her Suboxone, but rather meth and possibly Klonopin. She confirms that she did not make it to [facility], rather she got a hotel room instead. From this, Dr. Addison believes it's time for the patient to attend residential treatment rather than any form of outpatient treatment. Patient appears to be in agreement with this  "idea. Dr. Addison will need to be in contact with our  in order to arrange possible residential treatment centers for patient. In addition to this, Dr. Addison asks more about patients current medications. She denies any side effects or complications with her medications, thus Dr. Addison would like to increase Trintellix dose to 15mg and increase Vraylar to 3mg in order to address patients symptoms of depression and paranoia. Patient is in agreement with these changes     Per patient:  \"Anxiety kicks in, think I can use something to help with it and it doesn't help. And every time I have used meth I have ended up in a psych unit.\"    HEALTH HISTORY AND MEDICATIONS:     See EMR for complete medical record and medications administered.     HISTORY OF PREVIOUS TREATMENT AND COUNSELING:     Patient reports she has attended 10-15 treatments in the past. Patient denies any previous individual counseling.     HISTORY OF ALCOHOL AND DRUG USE:                   X = Primary Drug Used    Age of First Use Most Recent Pattern of Use and Duration   Need enough information to show pattern (both frequency and amounts) and to show tolerance for each chemical that has a diagnosis    Date of last use and time, if needed    Withdrawal Potential? Requiring special care Method of use  (oral, smoked, snort, IV, etc)   x    Alcohol       17 Recently:  Drinking to point of intoxication   3 or 4 times since 02/2019     Past use:  Alcohol has been a problem throughout her life. Always drinking to black out, slowed down once started using heroin.     End of 03/2019 No Oral        Marijuana/  Hashish    17 Smoked in teenage years, no pattern of use.   May have uses sporadically through adult years, doesn't enjoy it.  02/2019 No Smoke        Cocaine/Crack       No use           x    Meth/  Amphetamines    21 Recent use:  Binge using and then hallucinates and ends up in hospitals  Unsure of amount   Binging pattern being " going on since 02/2019     Used [age] and then just quit    04/3/2019 No Smoke/IV   x    Heroin       33 About 6 months after opiates, then heroin use  1 gram per day.   Daily use with periods of sobriety throughout     04/3/2019 Yes IV   x    Other Opiates/  Synthetics    32 Pain Medications  Percocet, Morphine  Everyday use started shortly after, in about summer of 2008      Has been on methadone maintenance program in 2009/2010     Started Suboxone for about a week in 03/2019 03/2019  Used Vicodin, unsure of exact date Yes Oral         Inhalants       No use                Benzodiazepines       19 Started with prescription for Klonopin and no issue  Then starting at age [age] will use to black out.   Doesn't get them prescribed much anymore due to abuse issues.     04/02/3019 Yes Oral        Hallucinogens       No use                Barbiturates/  Sedatives/  Hypnotics No use                Over-the-Counter Drugs    No use                Other       No use                Nicotine       No use               SUMMARY OF SUBSTANCE USE DISORDER SYMPTOMS ACKNOWLEDGED BY THE PATIENT: The patient identified positively with 11 of the 11 DSM-5 criteria for a primary diagnostic impression of substance use disorder severe.     SUMMARY OF COLLATERAL DATA:    Patient's medical record was reviewed and discussed care with [facility] staff.     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    Patient reports using alcohol, meth, opiates and benzo's if she has access to them. Last date of use reported as 04/03/2019. Patient admits to abusing  multiple substances. Patient reported withdrawal symptoms in past 12 months and also in past 30 days. Patient reports lifetime detox admissions.  Patient reports she wants to be on Suboxone, as she felt she was most successful with that in the past.     DIMENSION 2: Biomedical Conditions:  The patient scored a 1.    Patient denies any ongoing biomedical concerns. Patient reports she does not have a PCP, nor does she have a primary clinic. Patient reported learned last year she has Hep C, but has not done anything to receive services for it. Patient denies being prescribed medications for biomedical health concerns.     DIMENSION 3: Emotional and Behavioral:  The patient scored a 2.    Patient has a long history of mental health hospitalizations. Patient reports they typically happen when she is using/withdrawing. Patient reports she experiences high anxiety. Patient reports she is prescribed medications for her mental health symptoms. Patient reports she is good at taking her mediations as prescribed, except when using she doesn't. Patient reports suicidal thoughts, but only acts on them when under the influence. Patient reports past suicide attempts, last one 03/2019 by overdose. Patient has past trauma, and has never received counseling for the trauma. Patient would benefit from addressing her past trauma and learning coping skills to deal with her anxiety concerns.    DIMENSION 4: Readiness to Change:  The patient scored a 2.    Patient admits to having a problem with substances. Patient reports her family has expressed concerns about abusing substances. Patient appears in the contemplation stage of change based on their verbal reports and behaviors. Patient is willing to enter inpatient for treatment of her substance abuse and mental health concerns.     DIMENSION 5: Relapse Potential:  The patient scored a 4.    The patient appears to lack sober coping skills and she lacks long-term sober maintenance skills.   The patient would benefit from continuing to develop her sober coping skills and long-term sober maintenance skills.  The patient has dual issues of mental health issues and substance abuse.  The patient reported her current unemployment is a potential trigger for her to abuse mood altering chemicals.  The patient lacks awareness regarding the disease model of addiction.  The patient lacks a sober peer support network    DIMENSION 6: Recovery Environment:  The patient scored a 3.    Patient lives with her mom and her 17 year old son. Patient is not employed at this time. Patient last worked in 11/2018 as a . Patient reports she is , and currently not in a romantic relationship. Patient reports her family is supportive. Patient denies current legals. Patient reports 2 DWIs in her 20's. Patient reports she has a valid 's license.    RECOMMENDATIONS:    Patient is recommended to enter and complete MI/CD treatment at [facility] or similar programs.   Patient is recommended to follow all discharge recommendations from [facility] unit.     INITIAL PROBLEM LIST:    The patient lacks relapse prevention skills  The patient has poor coping skills  The patient lacks a sober peer support network  The patient has dual issues of MI and CD  The patient lacks the ability to effectively manage his/her mental health issues  The patient has a significant history of trauma and/or abuse issues    This information has been disclosed to you from records protected by Federal confidentiality rules (42 CFR part 2). The Federal rules prohibit you from making any further disclosure of this information unless further disclosure is expressly permitted by the written consent of the person to whom it pertains or as otherwise permitted by 42 CFR part 2. A general authorization for the release of medical or other information is NOT sufficient for this purpose. The Federal rules restrict any use of the  information to criminally investigate or prosecute any alcohol or drug abuse patient.

## 2019-04-14 NOTE — PLAN OF CARE
"Still appears sad but is also hopeful about  having the opportunity to in patient CD treatment. Her only reservation was that she doesn't want to miss her son's birthday which is in June. During 1:1 pt was able to reflect on how her past drug use has \"screwed things up\". She said that she felt really embarrassed about how \"messed up\" she was when she first got here. \"Some of the things I remember doing, like crawling around on the floor, should make want to sober up.\". Pt continues to be visible on the unit and is participating in group activities. Med compliant  "

## 2019-04-15 PROCEDURE — 25000132 ZZH RX MED GY IP 250 OP 250 PS 637: Performed by: PSYCHIATRY & NEUROLOGY

## 2019-04-15 PROCEDURE — 12400000 ZZH R&B MH

## 2019-04-15 PROCEDURE — 90853 GROUP PSYCHOTHERAPY: CPT

## 2019-04-15 RX ORDER — BENZTROPINE MESYLATE 1 MG/1
1 TABLET ORAL 2 TIMES DAILY
Status: DISCONTINUED | OUTPATIENT
Start: 2019-04-15 | End: 2019-04-30 | Stop reason: HOSPADM

## 2019-04-15 RX ORDER — BUPRENORPHINE AND NALOXONE 4; 1 MG/1; MG/1
1 FILM, SOLUBLE BUCCAL; SUBLINGUAL DAILY
Status: DISCONTINUED | OUTPATIENT
Start: 2019-04-15 | End: 2019-04-16

## 2019-04-15 RX ADMIN — GABAPENTIN 600 MG: 300 CAPSULE ORAL at 20:12

## 2019-04-15 RX ADMIN — GABAPENTIN 600 MG: 300 CAPSULE ORAL at 07:48

## 2019-04-15 RX ADMIN — VORTIOXETINE 20 MG: 20 TABLET, FILM COATED ORAL at 07:48

## 2019-04-15 RX ADMIN — GABAPENTIN 600 MG: 300 CAPSULE ORAL at 15:23

## 2019-04-15 RX ADMIN — CARIPRAZINE 3 MG: 1.5 CAPSULE, GELATIN COATED ORAL at 20:12

## 2019-04-15 RX ADMIN — BENZTROPINE MESYLATE 1 MG: 1 TABLET ORAL at 08:23

## 2019-04-15 RX ADMIN — BUPRENORPHINE HYDROCHLORIDE, NALOXONE HYDROCHLORIDE 1 FILM: 4; 1 FILM, SOLUBLE BUCCAL; SUBLINGUAL at 11:06

## 2019-04-15 RX ADMIN — HYDROXYZINE HYDROCHLORIDE 25 MG: 25 TABLET ORAL at 07:02

## 2019-04-15 RX ADMIN — HYDROXYZINE HYDROCHLORIDE 25 MG: 25 TABLET ORAL at 16:20

## 2019-04-15 NOTE — PLAN OF CARE
Problem: General Plan of Care (Inpatient Behavioral)  Goal: Team Discussion  Description  Team Plan:  Outcome: Improving  Note:   BEHAVIORAL TEAM DISCUSSION    Participants: Dr. Addison, , Nursing staff and PA's  Progress: Pt's affect remains unchanged, but appears to be more social and willing to follow plan of care  Continued Stay Criteria/Rationale: Until stabilized on medication and aftercare in place  Medical/Physical: Restart Suboxone and start Cogentin  Precautions:   Behavioral Orders   Procedures    Code 1 - Restrict to Unit    Routine Programming     As clinically indicated    Status 15     Every 15 minutes.     Plan: Pt was given medication information and gave verbal consent. Plan of care was discussed with patient and team. Restart Subxone 4-1mg films and start Cogentin 1mg Bid. Pt's Rule 25 is completed and will be referred to [facility]. Continue hospitalization at this time.  Rationale for change in precautions or plan: Medication changes to help with opioid addiction and decrease restlessness.

## 2019-04-15 NOTE — PLAN OF CARE
Patient spent much of the shift in the lounge watching TV. Minimally social with peers.  During 1:1 patient spoke about feeling guilty about past reckless sexual behaviors. Patient able to recognize this behavior as part of her illness and was encouraged to continue talking about this issue with her care team. Patient states she is feeling hopeful about discharging to treatment facility. Denies SI and SIB.  Mood is calm. Affect is full range

## 2019-04-15 NOTE — PLAN OF CARE
"Pt attended 2 of 2 OT groups today. Pt transitioned I'lly to OT group and engaged in therapeutic activity addressing functional cognition and interpersonal skills with task set up. With task set up, pt participated in therapeutic group activity and discussion addressing self care and illness management. Educated pt various types of self care and the importance of practicing good self-care with pt verbalizing understanding. Pt reports she would like to improve on exercising more, participating in hobbies, and spending time with people who she likes. With MIN A, problem-solved strategies to work towards same (e.g. Go to spin class again, discover new healthy hobbies, and meet new sober friends). Additionally, with task set up, pt created wellness plan, ID'in what she can do daily to stay healthy (yoga, take meds as prescribed), what she needs to avoid to stay well, goals for the future (get a job, have a place to live for her and her son), positive self talk (\"you're still learning\"), and what others can do to help her. Pt will continue to benefit from OT intervention to address implementation of positive functional coping skills, role performance, and community reintegration.     "

## 2019-04-15 NOTE — PLAN OF CARE
Pt presents a full range affect this shift, pt spent most of the shift attending groups. Pt was seen socializing with other pt in the lounge playing card games. Pt mentions her kids to staff and how she wants to fix what happened in the past. Pt appears much less anxious this shift, pt has been med compliant. Pt ate all of her meals. Pt did not shower this shift.

## 2019-04-15 NOTE — PROGRESS NOTES
[facility] Psychiatric Progress Note       Interim History     The patient's care was discussed with the treatment team and chart notes were reviewed. Patient seen on 4/15/19 by Dr. Addison. Upon interview, the patient states she is doing well this morning. Patient reports she met with a CD  this past Friday where it was recommended that patient either attend [facility] or [facility]. As of right now, [facility] is a 2 week wait, whereas [facility] is a 1 week wait. Both Dr. Addison and patient believe [facility] would be a better option for patient, thus will wait for placement there. In addition to this, it was reported that [facility] takes individuals on Suboxone. Patient would like to be re-started on this medication. Dr. Addison will allow this, thus will order re-started Suboxone 4-1mg per film today.      Hospital Course   This is a 43 year old female who attains a history of depression, substance abuse, and bipolar disorder. She was discharged from [facility] on 4/01/19 by Dr. Addison with the plans of discharging to [facility] in [place], [place] and following up at [facility]. The patient however never did make it to treatment, instead she got on the bus after discharge and went to [facility] [place] where she used meth and Klonopin with her boyfriend. While discussing with Dr. Addison today, the patient declared she believes she needs residential treatment rather than outpatient. Dr. Addison is in agreement with this and will discuss more with our  in order to arrange for aftercare plans. Aside from this, Dr. Addison has increased patient's Trintellix dose from 10mg to 15mg in order to aid in patient's consistent symptoms of depression and paranoia. According to attending hospital staff members, the patient has been somewhat isolative and withdrawn since being on Psychiatry. She has been cooperative in care,  medication compliant, however has declined multiple group sessions since admission. Today, when discussing with patient, she stressed that she is quite fearful and hopeless in regards of her future. She reported experiencing increased paranoia and even auditory hallucinations since she was discharged from previous hospitalization (just last week). Dr. Addison had highly encouraged the patient to utilize her DBT skills in order to deal with her fearfulness. Dr. Addison increased Trintellix dose from 15mg to 20mg and increased Gabapentin dose from 600mg BID to 600mg TID in order to aid in patient's acclaimed symptoms of depression.      Medications     Current Facility-Administered Medications Ordered in Epic   Medication Dose Route Frequency Last Rate Last Dose     acetaminophen (TYLENOL) tablet 650 mg  650 mg Oral Q4H PRN   650 mg at 04/12/19 0811     cariprazine (VRAYLAR) capsule CAPS 3 mg  3 mg Oral At Bedtime   3 mg at 04/14/19 2016     gabapentin (NEURONTIN) capsule 600 mg  600 mg Oral TID   600 mg at 04/15/19 0748     haloperidol (HALDOL) tablet 5 mg  5 mg Oral Q6H PRN   5 mg at 04/04/19 1350     haloperidol lactate (HALDOL) injection 5 mg  5 mg Intramuscular Q6H PRN         hydrOXYzine (ATARAX) tablet 25 mg  25 mg Oral Q4H PRN   25 mg at 04/15/19 0702     OLANZapine (zyPREXA) tablet 10 mg  10 mg Oral Q6H PRN   10 mg at 04/11/19 1433    Or     OLANZapine (zyPREXA) injection 10 mg  10 mg Intramuscular Q6H PRN         ondansetron (ZOFRAN-ODT) ODT tab 4 mg  4 mg Oral Q6H PRN         vortioxetine (TRINTELLIX/BRINTELLIX) tablet 20 mg  20 mg Oral Daily   20 mg at 04/15/19 0748     No current [facility]-ordered outpatient medications on file.         Allergies      Allergies   Allergen Reactions     Abilify [Aripiprazole] Other (See Comments)     Tardive dyskinesia     Compazine Other (See Comments)     Dystonia     Dramamine      Reglan Other (See Comments)     dystonia     Seroquel [Quetiapine] Other (See Comments)  "    Tardive dyskinesia.         Medical Review of Systems     BP 99/59   Pulse 77   Temp 98.6  F (37  C) (Oral)   Resp 16   Ht 1.676 m (5' 6\")   Wt 76.9 kg (169 lb 9.6 oz)   SpO2 97%   BMI 27.37 kg/m    Body mass index is 27.37 kg/m .  A 10-point review of systems was performed by Edgardo Addison MD and is negative, no new findings.      Psychiatric Examination     Appearance Sitting in chair, dressed in hospital scrubs. Appears stated age.   Attitude Cooperative   Orientation Oriented to person, place, time   Eye Contact Good   Speech Regular rate, rhythm, volume and tone   Language Normal   Psychomotor Behavior Normal   Mood Less depressed    Affect Pleasant    Thought Process Goal-Oriented, Intact   Associations Intact   Thought Content Patient is currently negative for suicidal ideation, negative for plan or intent, able to contract no self harm and identify barriers to suicide.  Negative for obsessions, compulsions or psychosis.     Fund of Knowledge Intact   Insight Improving    Judgement Improving   Attention Span & Concentration Fair    Recent & Remote Memory Intact   Gait Normal   Muscle Tone Intact      Labs     Labs reviewed.  No results found for this or any previous visit (from the past 24 hour(s)).     Impression   This is a 43 year old female who attains a history of depression, substance abuse, and bipolar disorder. While discussing with Dr. Addison this morning, the patient denied any complications, concerns, or issues with her current medication regiment or overall care while here on [facility]. She was seen by a chemical dependency  on 4/12/19 where it was recommended that patient attend [facility] after this hospitalization. This placement could be up to 2 week wait, however both Dr. Addison and patient believe [facility] would be the patient's best option. In addition to this, it was noted that [facility] takes individuals on Suboxone. Patient expressed her desire to be " re-started on this medication while discussing with Dr. Addison today, thus Dr. Addison re-started patient on Suboxone 4-1mg per film. Patient is very much in agreement with this medication adjustment.      Diagnoses   1. Bipolar disorder  2. Major depression, recurrent, severe, without psychotic features.  3. General anxiety disorder.   4.         Benzodiazepine and opiate dependence  5.         Alcohol use disorder     Plan     1. Explained side effects, benefits, and complications of medications to the patient, Pt gave verbal consent.  2. Medication changes: Re-started Suboxone 4-1mg per film and Cogentin 1mg BID (to aid in restlessness)   3. Discussed treatment plan with patient and team.  4. Projected length of stay: 7+ days  5. Patient will await placement at [facility]      Attestation:   Patient has been seen and evaluated by me, Edgardo Addison MD.    Patient ID:  Name: Jihan Gill    MRN: [id number]  Admission: 4/3/2019   YOB: 1975

## 2019-04-16 PROCEDURE — 25000132 ZZH RX MED GY IP 250 OP 250 PS 637: Performed by: PSYCHIATRY & NEUROLOGY

## 2019-04-16 PROCEDURE — 90853 GROUP PSYCHOTHERAPY: CPT

## 2019-04-16 PROCEDURE — 12400000 ZZH R&B MH

## 2019-04-16 RX ORDER — BUPRENORPHINE AND NALOXONE 4; 1 MG/1; MG/1
1 FILM, SOLUBLE BUCCAL; SUBLINGUAL 2 TIMES DAILY
Status: DISCONTINUED | OUTPATIENT
Start: 2019-04-16 | End: 2019-04-30 | Stop reason: HOSPADM

## 2019-04-16 RX ORDER — BUPRENORPHINE AND NALOXONE 4; 1 MG/1; MG/1
1 FILM, SOLUBLE BUCCAL; SUBLINGUAL ONCE
Status: COMPLETED | OUTPATIENT
Start: 2019-04-16 | End: 2019-04-16

## 2019-04-16 RX ADMIN — BUPRENORPHINE HYDROCHLORIDE, NALOXONE HYDROCHLORIDE 1 FILM: 4; 1 FILM, SOLUBLE BUCCAL; SUBLINGUAL at 07:50

## 2019-04-16 RX ADMIN — GABAPENTIN 600 MG: 300 CAPSULE ORAL at 07:38

## 2019-04-16 RX ADMIN — GABAPENTIN 600 MG: 300 CAPSULE ORAL at 14:28

## 2019-04-16 RX ADMIN — VORTIOXETINE 20 MG: 20 TABLET, FILM COATED ORAL at 07:38

## 2019-04-16 RX ADMIN — BUPRENORPHINE HYDROCHLORIDE, NALOXONE HYDROCHLORIDE 1 FILM: 4; 1 FILM, SOLUBLE BUCCAL; SUBLINGUAL at 19:05

## 2019-04-16 RX ADMIN — GABAPENTIN 600 MG: 300 CAPSULE ORAL at 20:25

## 2019-04-16 RX ADMIN — CARIPRAZINE 3 MG: 1.5 CAPSULE, GELATIN COATED ORAL at 20:25

## 2019-04-16 RX ADMIN — BENZTROPINE MESYLATE 1 MG: 1 TABLET ORAL at 20:25

## 2019-04-16 ASSESSMENT — MIFFLIN-ST. JEOR: SCORE: 1453.77

## 2019-04-16 NOTE — PLAN OF CARE
Pt transitioned well to OT group which addressed illness management through therapeutic group activity and discussion on the stages of change. Educated pt on the 5 stages of change and various steps throughout the recovery process. Pt reports that she is currently between the determination and action stages of change. Pt reports that steps she is working on in her recovery process include learning from mistakes and hospitalization/re-hospitalization. With MIN VCs, problem-solved ways to improve these in her life. Pt reports she is looking forward to going to residential treatment and feels confident in her ability to complete the program. Pt will continue to benefit from OT intervention to address implementation of positive functional coping skills, role performance, and community reintegration.

## 2019-04-16 NOTE — PLAN OF CARE
Patient presents with a full range affect and a calm/sad mood. She has been watching TV, walking the halls, and attending groups. She is waiting for placement at [facility], and states she is bored.

## 2019-04-16 NOTE — PLAN OF CARE
Pt. presents with full range affect and calm mood. Has been in the lounge most of shift and attending groups. Suboxone started yesterday. Pt. reports feeling much better and stated the Trintellix is improving depression. No SI. Med compliant.Waiting for Rx at [facility]  aprox 2 week wait

## 2019-04-16 NOTE — PROGRESS NOTES
"[facility] Psychiatric Progress Note       Interim History     The patient's care was discussed with the treatment team and chart notes were reviewed. Patient seen on 4/16/19 by Dr. Addison. Upon interview, patient reports she is doing fine this morning. No complications or side effects from the start of Suboxone yesterday. Dr. Addison will make this medication 4-1mg per film in the mornings and 4-1mg per film in evenings. Patient also declares, \"since you raised the Trintellix (to 15mg), I've noticed a big difference.\" Dr. Addison will not make any changes to this medication today, will consider in the future. We continue to wait for placement at [facility].      Hospital Course   This is a 43 year old female who attains a history of depression, substance abuse, and bipolar disorder. She was discharged from [facility] on 4/01/19 by Dr. Addison with the plans of discharging to [facility] in [place], [place] and following up at [facility]. The patient however never did make it to treatment, instead she got on the bus after discharge and went to [facility] where she used meth and Klonopin with her boyfriend. While discussing with Dr. Addison today, the patient declared she believes she needs residential treatment rather than outpatient. Dr. Addison is in agreement with this and will discuss more with our  in order to arrange for aftercare plans. Aside from this, Dr. Addison has increased patient's Trintellix dose from 10mg to 15mg in order to aid in patient's consistent symptoms of depression and paranoia. According to attending hospital staff members, the patient has been somewhat isolative and withdrawn since being on Psychiatry. She has been cooperative in care, medication compliant, however has declined multiple group sessions since admission. Today, when discussing with patient, she stressed that she is quite fearful " and hopeless in regards of her future. She reported experiencing increased paranoia and even auditory hallucinations since she was discharged from previous hospitalization (just last week). Dr. Addison had highly encouraged the patient to utilize her DBT skills in order to deal with her fearfulness. Dr. Addison increased Trintellix dose from 15mg to 20mg and increased Gabapentin dose from 600mg BID to 600mg TID in order to aid in patient's acclaimed symptoms of depression.      Medications     Current Facility-Administered Medications Ordered in Epic   Medication Dose Route Frequency Last Rate Last Dose     acetaminophen (TYLENOL) tablet 650 mg  650 mg Oral Q4H PRN   650 mg at 04/12/19 0811     benztropine (COGENTIN) tablet 1 mg  1 mg Oral BID   1 mg at 04/15/19 0823     buprenorphine HCl-naloxone HCl (SUBOXONE) 4-1 MG per film 1 Film  1 Film Sublingual Daily   1 Film at 04/15/19 1106     cariprazine (VRAYLAR) capsule CAPS 3 mg  3 mg Oral At Bedtime   3 mg at 04/15/19 2012     gabapentin (NEURONTIN) capsule 600 mg  600 mg Oral TID   600 mg at 04/15/19 2012     haloperidol (HALDOL) tablet 5 mg  5 mg Oral Q6H PRN   5 mg at 04/04/19 1350     haloperidol lactate (HALDOL) injection 5 mg  5 mg Intramuscular Q6H PRN         hydrOXYzine (ATARAX) tablet 25 mg  25 mg Oral Q4H PRN   25 mg at 04/15/19 1620     OLANZapine (zyPREXA) tablet 10 mg  10 mg Oral Q6H PRN   10 mg at 04/11/19 1433    Or     OLANZapine (zyPREXA) injection 10 mg  10 mg Intramuscular Q6H PRN         ondansetron (ZOFRAN-ODT) ODT tab 4 mg  4 mg Oral Q6H PRN         vortioxetine (TRINTELLIX/BRINTELLIX) tablet 20 mg  20 mg Oral Daily   20 mg at 04/15/19 0748     No current [facility]-ordered outpatient medications on file.         Allergies      Allergies   Allergen Reactions     Abilify [Aripiprazole] Other (See Comments)     Tardive dyskinesia     Compazine Other (See Comments)     Dystonia     Dramamine      Reglan Other (See Comments)     dystonia      "Seroquel [Quetiapine] Other (See Comments)     Tardive dyskinesia.         Medical Review of Systems     /46   Pulse 80   Temp 97.7  F (36.5  C) (Oral)   Resp 16   Ht 1.676 m (5' 6\")   Wt 76.9 kg (169 lb 9.6 oz)   SpO2 97%   BMI 27.37 kg/m    Body mass index is 27.37 kg/m .  A 10-point review of systems was performed by Edgardo Addison MD and is negative, no new findings.      Psychiatric Examination     Appearance Sitting in chair, dressed in hospital scrubs. Appears stated age.   Attitude Cooperative   Orientation Oriented to person, place, time   Eye Contact Good   Speech Regular rate, rhythm, volume and tone   Language Normal   Psychomotor Behavior Normal   Mood Good   Affect Pleasant    Thought Process Goal-Oriented, Intact   Associations Intact   Thought Content Patient is currently negative for suicidal ideation, negative for plan or intent, able to contract no self harm and identify barriers to suicide.  Negative for obsessions, compulsions or psychosis.     Fund of Knowledge Intact   Insight Improving    Judgement Improving   Attention Span & Concentration Fair    Recent & Remote Memory Intact   Gait Normal   Muscle Tone Intact      Labs     Labs reviewed.  No results found for this or any previous visit (from the past 24 hour(s)).     Impression   This is a 43 year old female who attains a history of depression, substance abuse, and bipolar disorder. In today's interview with patient, she noted improvement in mood with the start of Suboxone 4-1mg per film and the increase in Trintellix (made approximately 1 week ago). Dr. Addison has increased Suboxone dose to 4-1mg per film every morning and 4-1mg per film every evening. Patient is in agreement with this medication adjustment. We continue to wait for placement at [facility].      Diagnoses   1. Bipolar disorder  2. Major depression, recurrent, severe, without psychotic features.  3. General anxiety disorder.   4.         " Benzodiazepine and opiate dependence  5.         Alcohol use disorder     Plan     1. Explained side effects, benefits, and complications of medications to the patient, Pt gave verbal consent.  2. Medication changes: Increased Suboxone to 4-1mg per film every morning and 4-1mg per film every evening  3. Discussed treatment plan with patient and team.  4. Projected length of stay: 7+ days  5. Patient will await placement at [facility]      Attestation:   Patient has been seen and evaluated by me, Edgardo Addison MD.    Patient ID:  Name: Jihan Gill    MRN: [id number]  Admission: 4/3/2019   YOB: 1975

## 2019-04-16 NOTE — PROGRESS NOTES
Call placed to [facility] to check on referral. Message left for admissions requesting confirmation that referral information had been received. Return call requested.

## 2019-04-17 PROCEDURE — 25000132 ZZH RX MED GY IP 250 OP 250 PS 637: Performed by: PSYCHIATRY & NEUROLOGY

## 2019-04-17 PROCEDURE — 12400000 ZZH R&B MH

## 2019-04-17 PROCEDURE — 90853 GROUP PSYCHOTHERAPY: CPT

## 2019-04-17 RX ADMIN — GABAPENTIN 600 MG: 300 CAPSULE ORAL at 14:36

## 2019-04-17 RX ADMIN — VORTIOXETINE 20 MG: 20 TABLET, FILM COATED ORAL at 07:45

## 2019-04-17 RX ADMIN — GABAPENTIN 600 MG: 300 CAPSULE ORAL at 07:45

## 2019-04-17 RX ADMIN — GABAPENTIN 600 MG: 300 CAPSULE ORAL at 19:44

## 2019-04-17 RX ADMIN — BUPRENORPHINE HYDROCHLORIDE, NALOXONE HYDROCHLORIDE 1 FILM: 4; 1 FILM, SOLUBLE BUCCAL; SUBLINGUAL at 06:41

## 2019-04-17 RX ADMIN — CARIPRAZINE 3 MG: 1.5 CAPSULE, GELATIN COATED ORAL at 19:44

## 2019-04-17 RX ADMIN — BUPRENORPHINE HYDROCHLORIDE, NALOXONE HYDROCHLORIDE 1 FILM: 4; 1 FILM, SOLUBLE BUCCAL; SUBLINGUAL at 16:14

## 2019-04-17 ASSESSMENT — ACTIVITIES OF DAILY LIVING (ADL)
ORAL_HYGIENE: INDEPENDENT
DRESS: STREET CLOTHES;INDEPENDENT
HYGIENE/GROOMING: INDEPENDENT

## 2019-04-17 NOTE — PLAN OF CARE
"Pt. Reports feeling much less anxious and \"jittery\" since re-starting Suboxone today. Reports that she feels much improved in mood and thought process since being on Trintellix. Reports being \"in a good place\" motivated and ready to transition to treatment. \" I really don't want to ever get involved with drugs again\". Active in milieu and medication compliant.   "

## 2019-04-17 NOTE — PLAN OF CARE
Problem: Suicidal Behavior  Goal: Suicidal Behavior is Absent or Managed  Outcome: Improving     Patient presents with full-range affect, calm mood. Spent most of shift in groups, chatting with roommate or resting in bed. Pleasant and cooperative. Social with peers and staff. Motivated to attend treatment. Waiting for bed opening. Called parents today to make plans, get clothes for treatment. Attended community meeting, focus group and OT and participated appropriately. Med-compliant.

## 2019-04-17 NOTE — PROGRESS NOTES
[facility] Psychiatric Progress Note       Interim History     The patient's care was discussed with the treatment team and chart notes were reviewed. Per attending hospital staff notes, the patient has reported feeling much less anxious since re-starting the Suboxone medication. Her mood has improved alnong with her thought process. Additionally, according to our , a call was placed to [facility] to check on referral. Patient seen on 4/17/19 by Dr. Addison. Upon interview, patient denies any complications or concerns with her current medications or overall care. There will no medication adjustments made today. Continue to wait for placement.      Hospital Course   This is a 43 year old female who attains a history of depression, substance abuse, and bipolar disorder. She was discharged from [facility] on 4/01/19 by Dr. Addison with the plans of discharging to [facility] in [place], [place] and following up at [facility]. The patient however never did make it to treatment, instead she got on the bus after discharge and went to [facility] where she used meth and Klonopin with her boyfriend. While discussing with Dr. Addison today, the patient declared she believes she needs residential treatment rather than outpatient. Dr. Addison is in agreement with this and will discuss more with our  in order to arrange for aftercare plans. Aside from this, Dr. Addison has increased patient's Trintellix dose from 10mg to 15mg in order to aid in patient's consistent symptoms of depression and paranoia. According to attending hospital staff members, the patient has been somewhat isolative and withdrawn since being on Psychiatry. She has been cooperative in care, medication compliant, however has declined multiple group sessions since admission. Today, when discussing with patient, she stressed that she is quite fearful and hopeless  in regards of her future. She reported experiencing increased paranoia and even auditory hallucinations since she was discharged from previous hospitalization (just last week). Dr. Addison had highly encouraged the patient to utilize her DBT skills in order to deal with her fearfulness. Dr. Addison increased Trintellix dose from 15mg to 20mg and increased Gabapentin dose from 600mg BID to 600mg TID in order to aid in patient's acclaimed symptoms of depression. Patient noted improvement in mood and thought process with the start of Suboxone 4-1mg per film and the increase in Trintellix (made approximately 1 week ago). Dr. Addison increased Suboxone dose to 4-1mg per film every morning and 4-1mg per film every evening.      Medications     Current Facility-Administered Medications Ordered in Epic   Medication Dose Route Frequency Last Rate Last Dose     acetaminophen (TYLENOL) tablet 650 mg  650 mg Oral Q4H PRN   650 mg at 04/12/19 0811     benztropine (COGENTIN) tablet 1 mg  1 mg Oral BID   1 mg at 04/16/19 2025     buprenorphine HCl-naloxone HCl (SUBOXONE) 4-1 MG per film 1 Film  1 Film Sublingual BID   1 Film at 04/17/19 0641     cariprazine (VRAYLAR) capsule CAPS 3 mg  3 mg Oral At Bedtime   3 mg at 04/16/19 2025     gabapentin (NEURONTIN) capsule 600 mg  600 mg Oral TID   600 mg at 04/16/19 2025     haloperidol (HALDOL) tablet 5 mg  5 mg Oral Q6H PRN   5 mg at 04/04/19 1350     haloperidol lactate (HALDOL) injection 5 mg  5 mg Intramuscular Q6H PRN         hydrOXYzine (ATARAX) tablet 25 mg  25 mg Oral Q4H PRN   25 mg at 04/15/19 1620     OLANZapine (zyPREXA) tablet 10 mg  10 mg Oral Q6H PRN   10 mg at 04/11/19 1433    Or     OLANZapine (zyPREXA) injection 10 mg  10 mg Intramuscular Q6H PRN         ondansetron (ZOFRAN-ODT) ODT tab 4 mg  4 mg Oral Q6H PRN         vortioxetine (TRINTELLIX/BRINTELLIX) tablet 20 mg  20 mg Oral Daily   20 mg at 04/16/19 0738     No current [facility]-ordered outpatient medications  "on file.         Allergies      Allergies   Allergen Reactions     Abilify [Aripiprazole] Other (See Comments)     Tardive dyskinesia     Compazine Other (See Comments)     Dystonia     Dramamine      Reglan Other (See Comments)     dystonia     Seroquel [Quetiapine] Other (See Comments)     Tardive dyskinesia.         Medical Review of Systems     /71   Pulse 80   Temp 98.1  F (36.7  C) (Oral)   Resp 16   Ht 1.676 m (5' 6\")   Wt 77.2 kg (170 lb 3.2 oz)   SpO2 97%   BMI 27.47 kg/m    Body mass index is 27.47 kg/m .  A 10-point review of systems was performed by Edgardo Addison MD and is negative, no new findings.      Psychiatric Examination     Appearance Sitting in chair, dressed in hospital scrubs. Appears stated age.   Attitude Cooperative   Orientation Oriented to person, place, time   Eye Contact Good   Speech Regular rate, rhythm, volume and tone   Language Normal   Psychomotor Behavior Normal   Mood Good   Affect Pleasant    Thought Process Goal-Oriented, Intact   Associations Intact   Thought Content Patient is currently negative for suicidal ideation, negative for plan or intent, able to contract no self harm and identify barriers to suicide.  Negative for obsessions, compulsions or psychosis.     Fund of Knowledge Intact   Insight Improving    Judgement Improving   Attention Span & Concentration Fair    Recent & Remote Memory Intact   Gait Normal   Muscle Tone Intact      Labs     Labs reviewed.  No results found for this or any previous visit (from the past 24 hour(s)).     Impression   This is a 43 year old female who attains a history of depression, substance abuse, and bipolar disorder. Per nursing and PA hospital staff members, the patient has expressed improvement in her mood and overall thought process. While interviewing with Dr. Addison, the patient denied any complications, concerns, or issues in regards of her current medication regiment or overall care.  Patient does " indeed appear to be making much progress. There will be no changes made today, will continue to wait for [facility] placement.      Diagnoses   1. Bipolar disorder  2. Major depression, recurrent, severe, without psychotic features.  3. General anxiety disorder.   4.         Benzodiazepine and opiate dependence  5.         Alcohol use disorder     Plan     1. Explained side effects, benefits, and complications of medications to the patient, Pt gave verbal consent.  2. Medication changes: None   3. Discussed treatment plan with patient and team.  4. Projected length of stay: 7+ days  5. Patient will await placement at [facility]      Attestation:   Patient has been seen and evaluated by me, Edgardo Addison MD.    Patient ID:  Name: Jihan Gill    MRN: [id number]  Admission: 4/3/2019   YOB: 1975

## 2019-04-18 PROCEDURE — 90853 GROUP PSYCHOTHERAPY: CPT

## 2019-04-18 PROCEDURE — 12400000 ZZH R&B MH

## 2019-04-18 PROCEDURE — 25000131 ZZH RX MED GY IP 250 OP 636 PS 637: Performed by: EMERGENCY MEDICINE

## 2019-04-18 PROCEDURE — 25000132 ZZH RX MED GY IP 250 OP 250 PS 637: Performed by: PSYCHIATRY & NEUROLOGY

## 2019-04-18 RX ORDER — AMOXICILLIN 250 MG
1 CAPSULE ORAL DAILY PRN
Status: DISCONTINUED | OUTPATIENT
Start: 2019-04-18 | End: 2019-04-30 | Stop reason: HOSPADM

## 2019-04-18 RX ADMIN — ONDANSETRON 4 MG: 4 TABLET, ORALLY DISINTEGRATING ORAL at 12:15

## 2019-04-18 RX ADMIN — GABAPENTIN 600 MG: 300 CAPSULE ORAL at 07:40

## 2019-04-18 RX ADMIN — VORTIOXETINE 20 MG: 20 TABLET, FILM COATED ORAL at 07:40

## 2019-04-18 RX ADMIN — BUPRENORPHINE HYDROCHLORIDE, NALOXONE HYDROCHLORIDE 1 FILM: 4; 1 FILM, SOLUBLE BUCCAL; SUBLINGUAL at 15:44

## 2019-04-18 RX ADMIN — GABAPENTIN 600 MG: 300 CAPSULE ORAL at 13:37

## 2019-04-18 RX ADMIN — GABAPENTIN 600 MG: 300 CAPSULE ORAL at 20:12

## 2019-04-18 RX ADMIN — SENNOSIDES AND DOCUSATE SODIUM 1 TABLET: 8.6; 5 TABLET ORAL at 15:44

## 2019-04-18 RX ADMIN — BUPRENORPHINE HYDROCHLORIDE, NALOXONE HYDROCHLORIDE 1 FILM: 4; 1 FILM, SOLUBLE BUCCAL; SUBLINGUAL at 07:40

## 2019-04-18 RX ADMIN — CARIPRAZINE 3 MG: 1.5 CAPSULE, GELATIN COATED ORAL at 20:12

## 2019-04-18 NOTE — PLAN OF CARE
Pt is med compliant. Requested a PRN Zofran as she felt nauseated after taking morning medications.  Pt believes the Trintillex is to blame. Pt is attending groups.Social with peers and staff. Pt gets along well with her room mate.

## 2019-04-18 NOTE — PROGRESS NOTES
"[facility] Psychiatric Progress Note       Interim History     The patient's care was discussed with the treatment team and chart notes were reviewed.  has been in contact with [facility]. The facility has received the patients referral and is currently reviewing. Patient seen on 4/18/19 by Dr. Addison. On interview, the patient notes she is doing fine this afternoon. She denies any complications with her current medication regiment, however asks about her Suboxone dosing. She reports she starts to feel \"icky\" around 2:00pm after the first dose of Suboxone. Aside from this, the patient states the Trintellix medication has been aiding significantly in her depression, stating, \"it's made a world of difference.\" There will be no medication adjustments made today.      Hospital Course   This is a 43 year old female who attains a history of depression, substance abuse, and bipolar disorder. She was discharged from [facility] on 4/01/19 by Dr. Addison with the plans of discharging to [facility] in [place], [place] and following up at [facility]. The patient however never did make it to treatment, instead she got on the bus after discharge and went to [facility] where she used meth and Klonopin with her boyfriend. While discussing with Dr. Addison today, the patient declared she believes she needs residential treatment rather than outpatient. Dr. Addison is in agreement with this and will discuss more with our  in order to arrange for aftercare plans. Aside from this, Dr. Addison has increased patient's Trintellix dose from 10mg to 15mg in order to aid in patient's consistent symptoms of depression and paranoia. According to attending hospital staff members, the patient has been somewhat isolative and withdrawn since being on Psychiatry. She has been cooperative in care, medication compliant, however has declined multiple " group sessions since admission. Today, when discussing with patient, she stressed that she is quite fearful and hopeless in regards of her future. She reported experiencing increased paranoia and even auditory hallucinations since she was discharged from previous hospitalization (just last week). Dr. Addison had highly encouraged the patient to utilize her DBT skills in order to deal with her fearfulness. Dr. Addison increased Trintellix dose from 15mg to 20mg and increased Gabapentin dose from 600mg BID to 600mg TID in order to aid in patient's acclaimed symptoms of depression. Patient noted improvement in mood and thought process with the start of Suboxone 4-1mg per film and the increase in Trintellix (made approximately 1 week ago). Dr. Addison increased Suboxone dose to 4-1mg per film every morning and 4-1mg per film every evening. Since the start of Suboxone, the patient reported an improvement in overall mood.       Medications     Current Facility-Administered Medications Ordered in Epic   Medication Dose Route Frequency Last Rate Last Dose     acetaminophen (TYLENOL) tablet 650 mg  650 mg Oral Q4H PRN   650 mg at 04/12/19 0811     benztropine (COGENTIN) tablet 1 mg  1 mg Oral BID   1 mg at 04/16/19 2025     buprenorphine HCl-naloxone HCl (SUBOXONE) 4-1 MG per film 1 Film  1 Film Sublingual BID   1 Film at 04/18/19 0740     cariprazine (VRAYLAR) capsule CAPS 3 mg  3 mg Oral At Bedtime   3 mg at 04/17/19 1944     gabapentin (NEURONTIN) capsule 600 mg  600 mg Oral TID   600 mg at 04/18/19 0740     haloperidol (HALDOL) tablet 5 mg  5 mg Oral Q6H PRN   5 mg at 04/04/19 1350     haloperidol lactate (HALDOL) injection 5 mg  5 mg Intramuscular Q6H PRN         hydrOXYzine (ATARAX) tablet 25 mg  25 mg Oral Q4H PRN   25 mg at 04/15/19 1620     OLANZapine (zyPREXA) tablet 10 mg  10 mg Oral Q6H PRN   10 mg at 04/11/19 1433    Or     OLANZapine (zyPREXA) injection 10 mg  10 mg Intramuscular Q6H PRN          "ondansetron (ZOFRAN-ODT) ODT tab 4 mg  4 mg Oral Q6H PRN         vortioxetine (TRINTELLIX/BRINTELLIX) tablet 20 mg  20 mg Oral Daily   20 mg at 04/18/19 0740     No current Epic-ordered outpatient medications on file.         Allergies      Allergies   Allergen Reactions     Abilify [Aripiprazole] Other (See Comments)     Tardive dyskinesia     Compazine Other (See Comments)     Dystonia     Dramamine      Reglan Other (See Comments)     dystonia     Seroquel [Quetiapine] Other (See Comments)     Tardive dyskinesia.         Medical Review of Systems     /69   Pulse 82   Temp 98.4  F (36.9  C) (Oral)   Resp 16   Ht 1.676 m (5' 6\")   Wt 77.2 kg (170 lb 3.2 oz)   SpO2 97%   BMI 27.47 kg/m    Body mass index is 27.47 kg/m .  A 10-point review of systems was performed by Edgardo Addison MD and is negative, no new findings.      Psychiatric Examination     Appearance Sitting in chair, dressed in hospital scrubs. Appears stated age.   Attitude Cooperative   Orientation Oriented to person, place, time   Eye Contact Good   Speech Regular rate, rhythm, volume and tone   Language Normal   Psychomotor Behavior Normal   Mood Good   Affect Pleasant    Thought Process Goal-Oriented, Intact   Associations Intact   Thought Content Patient is currently negative for suicidal ideation, negative for plan or intent, able to contract no self harm and identify barriers to suicide.  Negative for obsessions, compulsions or psychosis.     Fund of Knowledge Intact   Insight Improving    Judgement Improving   Attention Span & Concentration Fair    Recent & Remote Memory Intact   Gait Normal   Muscle Tone Intact      Labs     Labs reviewed.  No results found for this or any previous visit (from the past 24 hour(s)).     Impression   This is a 43 year old female who attains a history of depression, substance abuse, and bipolar disorder.  has been in contact with [facility] in regards of patient's placement. They " have received referral and are currently reviewing it in detail. When discussing with Dr. Addison this afternoon, the patient denied any complications with her current medication regiment. She was able to acknowledge her improvement in mood and appears to be much more stable.      Diagnoses   1. Bipolar disorder  2. Major depression, recurrent, severe, without psychotic features.  3. General anxiety disorder.   4.         Benzodiazepine and opiate dependence  5.         Alcohol use disorder     Plan     1. Explained side effects, benefits, and complications of medications to the patient, Pt gave verbal consent.  2. Medication changes: None   3. Discussed treatment plan with patient and team.  4. Projected length of stay: 7+ days  5. Patient will await placement at [facility]      Attestation:   Patient has been seen and evaluated by me, Edgardo Addison MD.    Patient ID:  Name: Jihan Gill    MRN: [id number]  Admission: 4/3/2019   YOB: 1975

## 2019-04-18 NOTE — PROGRESS NOTES
Received phone message from Jackie at [facility] admissions (). Referral information has been received. Review is pending.

## 2019-04-18 NOTE — PLAN OF CARE
Problem: General Plan of Care (Inpatient Behavioral)  Goal: Team Discussion  Description  Team Plan:  Outcome: Improving  Note:   BEHAVIORAL TEAM DISCUSSION    Participants: Dr. Addison, Social Workers, Nursing Staff and PA's  Progress: Improving  Continued Stay Criteria/Rationale: Until aftercare in place, waiting for bed  Medical/Physical: N/A  Precautions:   Behavioral Orders   Procedures    Code 1 - Restrict to Unit    Routine Programming     As clinically indicated    Status 15     Every 15 minutes.     Plan: Plan of care discussed with patient and treatment team. Continue hospitalization until aftercare in place. Waiting for placement at [facility]. Will discharge directly to treatment when bed available.  Rationale for change in precautions or plan: N/A

## 2019-04-18 NOTE — PLAN OF CARE
"Pt transitioned well to OT group and engaged in therapeutic activity addressing functional cognition and interpersonal skills with task set up. Pt actively participated in therapeutic group activity and discussion addressing managing mental health. With education, pt reports journaling as a new coping skill she would like to implement, stsing she believes it would help her \"get out of [her] head.\" Additionally, pt I identified strategies to build social supports after discontinue (health club, [email or web address], NA, AA, work). Pt will continue to benefit from OT intervention to address implementation of positive functional coping skills, role performance, and community reintegration.     "

## 2019-04-19 PROCEDURE — 25000132 ZZH RX MED GY IP 250 OP 250 PS 637: Performed by: PSYCHIATRY & NEUROLOGY

## 2019-04-19 PROCEDURE — 12400000 ZZH R&B MH

## 2019-04-19 PROCEDURE — 25000131 ZZH RX MED GY IP 250 OP 636 PS 637: Performed by: EMERGENCY MEDICINE

## 2019-04-19 PROCEDURE — 90853 GROUP PSYCHOTHERAPY: CPT

## 2019-04-19 RX ORDER — BISACODYL 10 MG
10 SUPPOSITORY, RECTAL RECTAL DAILY PRN
Status: DISCONTINUED | OUTPATIENT
Start: 2019-04-19 | End: 2019-04-30 | Stop reason: HOSPADM

## 2019-04-19 RX ADMIN — Medication 10 MG: at 19:14

## 2019-04-19 RX ADMIN — MAGNESIUM HYDROXIDE 30 ML: 400 SUSPENSION ORAL at 14:29

## 2019-04-19 RX ADMIN — SENNOSIDES AND DOCUSATE SODIUM 1 TABLET: 8.6; 5 TABLET ORAL at 08:05

## 2019-04-19 RX ADMIN — GABAPENTIN 600 MG: 300 CAPSULE ORAL at 13:58

## 2019-04-19 RX ADMIN — BUPRENORPHINE HYDROCHLORIDE, NALOXONE HYDROCHLORIDE 1 FILM: 4; 1 FILM, SOLUBLE BUCCAL; SUBLINGUAL at 15:50

## 2019-04-19 RX ADMIN — CARIPRAZINE 3 MG: 1.5 CAPSULE, GELATIN COATED ORAL at 21:04

## 2019-04-19 RX ADMIN — BUPRENORPHINE HYDROCHLORIDE, NALOXONE HYDROCHLORIDE 1 FILM: 4; 1 FILM, SOLUBLE BUCCAL; SUBLINGUAL at 07:31

## 2019-04-19 RX ADMIN — GABAPENTIN 600 MG: 300 CAPSULE ORAL at 21:04

## 2019-04-19 RX ADMIN — GABAPENTIN 600 MG: 300 CAPSULE ORAL at 07:31

## 2019-04-19 RX ADMIN — VORTIOXETINE 20 MG: 20 TABLET, FILM COATED ORAL at 07:31

## 2019-04-19 RX ADMIN — ONDANSETRON 4 MG: 4 TABLET, ORALLY DISINTEGRATING ORAL at 19:39

## 2019-04-19 NOTE — PROGRESS NOTES
Carolyn Clark called to let us know that Jihan has been approved for [facility]. CM called Jackie at [facility] ([phone]) and left a message asking when a bed would be available.

## 2019-04-19 NOTE — PLAN OF CARE
"Pt presents with a full-range affect and calm in mood. Pleasant and cooperative. Stated she is doing \"good\" today. Was visible on the unit and social with peers. Med compliant. Awaiting placement at [facility].   "

## 2019-04-20 PROCEDURE — 25000131 ZZH RX MED GY IP 250 OP 636 PS 637: Performed by: EMERGENCY MEDICINE

## 2019-04-20 PROCEDURE — 25000132 ZZH RX MED GY IP 250 OP 250 PS 637: Performed by: PSYCHIATRY & NEUROLOGY

## 2019-04-20 PROCEDURE — 12400000 ZZH R&B MH

## 2019-04-20 RX ORDER — MAGNESIUM CARB/ALUMINUM HYDROX 105-160MG
296 TABLET,CHEWABLE ORAL ONCE
Status: COMPLETED | OUTPATIENT
Start: 2019-04-20 | End: 2019-04-20

## 2019-04-20 RX ORDER — POLYETHYLENE GLYCOL 3350 17 G/17G
17 POWDER, FOR SOLUTION ORAL DAILY
Status: DISCONTINUED | OUTPATIENT
Start: 2019-04-21 | End: 2019-04-30 | Stop reason: HOSPADM

## 2019-04-20 RX ADMIN — SENNOSIDES AND DOCUSATE SODIUM 1 TABLET: 8.6; 5 TABLET ORAL at 07:47

## 2019-04-20 RX ADMIN — VORTIOXETINE 20 MG: 20 TABLET, FILM COATED ORAL at 07:44

## 2019-04-20 RX ADMIN — GABAPENTIN 600 MG: 300 CAPSULE ORAL at 13:50

## 2019-04-20 RX ADMIN — BENZTROPINE MESYLATE 1 MG: 1 TABLET ORAL at 07:44

## 2019-04-20 RX ADMIN — GABAPENTIN 600 MG: 300 CAPSULE ORAL at 21:07

## 2019-04-20 RX ADMIN — ONDANSETRON 4 MG: 4 TABLET, ORALLY DISINTEGRATING ORAL at 10:59

## 2019-04-20 RX ADMIN — BUPRENORPHINE HYDROCHLORIDE, NALOXONE HYDROCHLORIDE 1 FILM: 4; 1 FILM, SOLUBLE BUCCAL; SUBLINGUAL at 07:44

## 2019-04-20 RX ADMIN — GABAPENTIN 600 MG: 300 CAPSULE ORAL at 07:44

## 2019-04-20 RX ADMIN — CARIPRAZINE 3 MG: 1.5 CAPSULE, GELATIN COATED ORAL at 21:07

## 2019-04-20 RX ADMIN — BUPRENORPHINE HYDROCHLORIDE, NALOXONE HYDROCHLORIDE 1 FILM: 4; 1 FILM, SOLUBLE BUCCAL; SUBLINGUAL at 15:55

## 2019-04-20 RX ADMIN — MAGNESIUM CITRATE 296 ML: 1.75 LIQUID ORAL at 12:16

## 2019-04-20 ASSESSMENT — ACTIVITIES OF DAILY LIVING (ADL)
DRESS: STREET CLOTHES
ORAL_HYGIENE: INDEPENDENT
HYGIENE/GROOMING: INDEPENDENT
LAUNDRY: WITH SUPERVISION

## 2019-04-20 NOTE — PLAN OF CARE
Pt visible around the unit; spent time in the lounge watching tv and socializing with other patients. Presents with a calm affect; brightens upon approach. During staff check-in pt reported feeling tired and just wanted to rest during the afternoon. Denies any SI this shift.

## 2019-04-20 NOTE — PROGRESS NOTES
[facility] Psychiatric Progress Note       Interim History     The patient's care was discussed with the treatment team and chart notes were reviewed.  has been in contact with [facility]. The facility has received the patients referral and is currently reviewing. Patient seen on 4/19/19 by Dr. Addison. Patient reports she has been sleeping better, less anxious, and is happier overall. She is still having issues with constipation, even after taking Sennna twice. Dicussed with RN and will try to arrange something else to help subside patient's constipation.      Hospital Course   This is a 43 year old female who attains a history of depression, substance abuse, and bipolar disorder. She was discharged from [facility] on 4/01/19 by Dr. Addison with the plans of discharging to [facility] in [place], [place] and following up at [facility]. The patient however never did make it to treatment, instead she got on the bus after discharge and went to [facility] where she used meth and Klonopin with her boyfriend. While discussing with Dr. Addison today, the patient declared she believes she needs residential treatment rather than outpatient. Dr. Addison is in agreement with this and will discuss more with our  in order to arrange for aftercare plans. Aside from this, Dr. Addison has increased patient's Trintellix dose from 10mg to 15mg in order to aid in patient's consistent symptoms of depression and paranoia. According to attending hospital staff members, the patient has been somewhat isolative and withdrawn since being on Psychiatry. She has been cooperative in care, medication compliant, however has declined multiple group sessions since admission. Today, when discussing with patient, she stressed that she is quite fearful and hopeless in regards of her future. She reported experiencing increased paranoia and even auditory  hallucinations since she was discharged from previous hospitalization (just last week). Dr. Adidson had highly encouraged the patient to utilize her DBT skills in order to deal with her fearfulness. Dr. Addison increased Trintellix dose from 15mg to 20mg and increased Gabapentin dose from 600mg BID to 600mg TID in order to aid in patient's acclaimed symptoms of depression. Patient noted improvement in mood and thought process with the start of Suboxone 4-1mg per film and the increase in Trintellix (made approximately 1 week ago). Dr. Addison increased Suboxone dose to 4-1mg per film every morning and 4-1mg per film every evening. Since the start of Suboxone, the patient reported an improvement in overall mood.       Medications     Current Facility-Administered Medications Ordered in Epic   Medication Dose Route Frequency Last Rate Last Dose     acetaminophen (TYLENOL) tablet 650 mg  650 mg Oral Q4H PRN   650 mg at 04/12/19 0811     benztropine (COGENTIN) tablet 1 mg  1 mg Oral BID   1 mg at 04/16/19 2025     bisacodyl (DULCOLAX) Suppository 10 mg  10 mg Rectal Daily PRN   10 mg at 04/19/19 1914     buprenorphine HCl-naloxone HCl (SUBOXONE) 4-1 MG per film 1 Film  1 Film Sublingual BID   1 Film at 04/19/19 1550     cariprazine (VRAYLAR) capsule CAPS 3 mg  3 mg Oral At Bedtime   3 mg at 04/19/19 2104     gabapentin (NEURONTIN) capsule 600 mg  600 mg Oral TID   600 mg at 04/19/19 2104     haloperidol (HALDOL) tablet 5 mg  5 mg Oral Q6H PRN   5 mg at 04/04/19 1350     haloperidol lactate (HALDOL) injection 5 mg  5 mg Intramuscular Q6H PRN         hydrOXYzine (ATARAX) tablet 25 mg  25 mg Oral Q4H PRN   25 mg at 04/15/19 1620     magnesium hydroxide (MILK OF MAGNESIA) suspension 30 mL  30 mL Oral Daily PRN   30 mL at 04/19/19 1429     OLANZapine (zyPREXA) tablet 10 mg  10 mg Oral Q6H PRN   10 mg at 04/11/19 1433    Or     OLANZapine (zyPREXA) injection 10 mg  10 mg Intramuscular Q6H PRN         ondansetron  "(ZOFRAN-ODT) ODT tab 4 mg  4 mg Oral Q6H PRN   4 mg at 04/19/19 1939     senna-docusate (SENOKOT-S/PERICOLACE) 8.6-50 MG per tablet 1 tablet  1 tablet Oral Daily PRN   1 tablet at 04/19/19 0805     vortioxetine (TRINTELLIX/BRINTELLIX) tablet 20 mg  20 mg Oral Daily   20 mg at 04/19/19 0731     No current Epic-ordered outpatient medications on file.         Allergies      Allergies   Allergen Reactions     Abilify [Aripiprazole] Other (See Comments)     Tardive dyskinesia     Compazine Other (See Comments)     Dystonia     Dramamine      Reglan Other (See Comments)     dystonia     Seroquel [Quetiapine] Other (See Comments)     Tardive dyskinesia.         Medical Review of Systems     /69   Pulse 95   Temp 98.1  F (36.7  C) (Oral)   Resp 16   Ht 1.676 m (5' 6\")   Wt 77.2 kg (170 lb 3.2 oz)   SpO2 97%   BMI 27.47 kg/m    Body mass index is 27.47 kg/m .  A 10-point review of systems was performed by Edgardo Addison MD and is negative, no new findings.      Psychiatric Examination     Appearance Sitting in chair, dressed in hospital scrubs. Appears stated age.   Attitude Cooperative   Orientation Oriented to person, place, time   Eye Contact Good   Speech Regular rate, rhythm, volume and tone   Language Normal   Psychomotor Behavior Normal   Mood Good   Affect Pleasant    Thought Process Goal-Oriented, Intact   Associations Intact   Thought Content Patient is currently negative for suicidal ideation, negative for plan or intent, able to contract no self harm and identify barriers to suicide.  Negative for obsessions, compulsions or psychosis.     Fund of Knowledge Intact   Insight Improving    Judgement Improving   Attention Span & Concentration Fair    Recent & Remote Memory Intact   Gait Normal   Muscle Tone Intact      Labs     Labs reviewed.  No results found for this or any previous visit (from the past 24 hour(s)).     Impression   This is a 43 year old female who attains a history of " depression, substance abuse, and bipolar disorder. There were no changes made in patient's medication regiment due to patient denying any complications with them. Patient continues to appear improved in mood and her insight along with her judgment continue to improve as well.      Diagnoses   1. Bipolar disorder  2. Major depression, recurrent, severe, without psychotic features.  3. General anxiety disorder.   4.         Benzodiazepine and opiate dependence  5.         Alcohol use disorder     Plan     1. Explained side effects, benefits, and complications of medications to the patient, Pt gave verbal consent.  2. Medication changes: None   3. Discussed treatment plan with patient and team.  4. Projected length of stay: 7+ days  5. Patient will await placement at [facility]      Attestation:   Patient has been seen and evaluated by me, Edgardo Addison MD.    Patient ID:  Name: Jihan Gill    MRN: [id number]  Admission: 4/3/2019   YOB: 1975

## 2019-04-21 PROCEDURE — 12400000 ZZH R&B MH

## 2019-04-21 PROCEDURE — 25000132 ZZH RX MED GY IP 250 OP 250 PS 637: Performed by: PSYCHIATRY & NEUROLOGY

## 2019-04-21 RX ADMIN — GABAPENTIN 600 MG: 300 CAPSULE ORAL at 15:42

## 2019-04-21 RX ADMIN — BUPRENORPHINE HYDROCHLORIDE, NALOXONE HYDROCHLORIDE 1 FILM: 4; 1 FILM, SOLUBLE BUCCAL; SUBLINGUAL at 08:50

## 2019-04-21 RX ADMIN — BUPRENORPHINE HYDROCHLORIDE, NALOXONE HYDROCHLORIDE 1 FILM: 4; 1 FILM, SOLUBLE BUCCAL; SUBLINGUAL at 15:42

## 2019-04-21 RX ADMIN — GABAPENTIN 600 MG: 300 CAPSULE ORAL at 19:52

## 2019-04-21 RX ADMIN — GABAPENTIN 600 MG: 300 CAPSULE ORAL at 08:50

## 2019-04-21 RX ADMIN — CARIPRAZINE 3 MG: 1.5 CAPSULE, GELATIN COATED ORAL at 19:52

## 2019-04-21 RX ADMIN — VORTIOXETINE 20 MG: 20 TABLET, FILM COATED ORAL at 08:50

## 2019-04-21 ASSESSMENT — ACTIVITIES OF DAILY LIVING (ADL)
ORAL_HYGIENE: INDEPENDENT
LAUNDRY: WITH SUPERVISION
HYGIENE/GROOMING: INDEPENDENT
DRESS: STREET CLOTHES

## 2019-04-21 NOTE — PLAN OF CARE
Patient presents with a full range affect and calm mood. Showered this shift. She started planning for her son's birthday today which was her goal. Patient hopes she doesn't have to be in treatment for more than 30 days because she does not want to spend her birthday (or her son's) there.

## 2019-04-21 NOTE — PLAN OF CARE
Pt visible around the unit during the first half of shift; spent time resting in bed after lunch. Presents with a full range affect; brightens upon approach. Attended groups in the morning. During staff check-in pt reported feeling more tired than usual for the past two days. Denies any feelings of SI this shift.

## 2019-04-22 PROCEDURE — 25000132 ZZH RX MED GY IP 250 OP 250 PS 637: Performed by: PSYCHIATRY & NEUROLOGY

## 2019-04-22 PROCEDURE — 25000131 ZZH RX MED GY IP 250 OP 636 PS 637: Performed by: EMERGENCY MEDICINE

## 2019-04-22 PROCEDURE — 25000132 ZZH RX MED GY IP 250 OP 250 PS 637: Performed by: EMERGENCY MEDICINE

## 2019-04-22 PROCEDURE — 90853 GROUP PSYCHOTHERAPY: CPT

## 2019-04-22 PROCEDURE — 12400000 ZZH R&B MH

## 2019-04-22 RX ADMIN — ACETAMINOPHEN 650 MG: 325 TABLET, FILM COATED ORAL at 13:19

## 2019-04-22 RX ADMIN — POLYETHYLENE GLYCOL 3350 17 G: 17 POWDER, FOR SOLUTION ORAL at 08:17

## 2019-04-22 RX ADMIN — ONDANSETRON 4 MG: 4 TABLET, ORALLY DISINTEGRATING ORAL at 08:16

## 2019-04-22 RX ADMIN — BUPRENORPHINE HYDROCHLORIDE, NALOXONE HYDROCHLORIDE 1 FILM: 4; 1 FILM, SOLUBLE BUCCAL; SUBLINGUAL at 08:17

## 2019-04-22 RX ADMIN — GABAPENTIN 600 MG: 300 CAPSULE ORAL at 13:52

## 2019-04-22 RX ADMIN — VORTIOXETINE 20 MG: 20 TABLET, FILM COATED ORAL at 08:17

## 2019-04-22 RX ADMIN — ACETAMINOPHEN 650 MG: 325 TABLET, FILM COATED ORAL at 09:16

## 2019-04-22 RX ADMIN — GABAPENTIN 600 MG: 300 CAPSULE ORAL at 08:17

## 2019-04-22 RX ADMIN — BUPRENORPHINE HYDROCHLORIDE, NALOXONE HYDROCHLORIDE 1 FILM: 4; 1 FILM, SOLUBLE BUCCAL; SUBLINGUAL at 15:48

## 2019-04-22 RX ADMIN — GABAPENTIN 600 MG: 300 CAPSULE ORAL at 20:02

## 2019-04-22 RX ADMIN — CARIPRAZINE 3 MG: 1.5 CAPSULE, GELATIN COATED ORAL at 20:02

## 2019-04-22 ASSESSMENT — ACTIVITIES OF DAILY LIVING (ADL)
HYGIENE/GROOMING: INDEPENDENT
DRESS: SCRUBS (BEHAVIORAL HEALTH);INDEPENDENT
ORAL_HYGIENE: INDEPENDENT

## 2019-04-22 NOTE — PLAN OF CARE
Problem: General Plan of Care (Inpatient Behavioral)  Goal: Team Discussion  Description  Team Plan:  4/22/2019 1514 by Oswaldo Morgan  Outcome: No Change  Note:   BEHAVIORAL TEAM DISCUSSION    Participants: Dr. Addison, , Nursing staff and PA's  Progress: Pt is adequately stabilized  Continued Stay Criteria/Rationale: Until aftercare in place  Medical/Physical: N/A  Precautions:   Behavioral Orders   Procedures    Code 1 - Restrict to Unit    Routine Programming     As clinically indicated    Status 15     Every 15 minutes.     Plan: Pt was given medication information and gave verbal consent. Plan of care was discussed with patient and team. Continue hospitalization and current medication awhile awaiting a bed at [facility].  Rationale for change in precautions or plan: N/A

## 2019-04-22 NOTE — PROGRESS NOTES
[facility] Psychiatric Progress Note       Interim History     The patient's care was discussed with the treatment team and chart notes were reviewed. Patient seen on 4/22/19 by Dr. Addison. On interview, the patient reports she is feeling pretty good today, relieved that she no longer feels nauseous or vomiting like she was this past weekend. She has no complications or concerns in regards of her current medication regiment, thus no medications will be altered today. Patient has been accepted for treatment through [facility], thus we are currently awaiting a bed to become available.      Hospital Course   This is a 43 year old female who attains a history of depression, substance abuse, and bipolar disorder. She was discharged from [facility] on 4/01/19 by Dr. Addison with the plans of discharging to [facility] in [place], [place] and following up at [facility]. The patient however never did make it to treatment, instead she got on the bus after discharge and went to [facility] [place] where she used meth and Klonopin with her boyfriend. While discussing with Dr. Addison today, the patient declared she believes she needs residential treatment rather than outpatient. Dr. Addison is in agreement with this and will discuss more with our  in order to arrange for aftercare plans. Aside from this, Dr. Addison has increased patient's Trintellix dose from 10mg to 15mg in order to aid in patient's consistent symptoms of depression and paranoia. According to attending hospital staff members, the patient has been somewhat isolative and withdrawn since being on Psychiatry. She has been cooperative in care, medication compliant, however has declined multiple group sessions since admission. Today, when discussing with patient, she stressed that she is quite fearful and hopeless in regards of her future. She reported experiencing increased paranoia  and even auditory hallucinations since she was discharged from previous hospitalization (just last week). Dr. Addison had highly encouraged the patient to utilize her DBT skills in order to deal with her fearfulness. Dr. Addison increased Trintellix dose from 15mg to 20mg and increased Gabapentin dose from 600mg BID to 600mg TID in order to aid in patient's acclaimed symptoms of depression. Patient noted improvement in mood and thought process with the start of Suboxone 4-1mg per film and the increase in Trintellix (made approximately 1 week ago). Dr. Addison increased Suboxone dose to 4-1mg per film every morning and 4-1mg per film every evening. Since the start of Suboxone, the patient reported an improvement in overall mood.       Medications     Current Facility-Administered Medications Ordered in Epic   Medication Dose Route Frequency Last Rate Last Dose     acetaminophen (TYLENOL) tablet 650 mg  650 mg Oral Q4H PRN   650 mg at 04/22/19 0916     benztropine (COGENTIN) tablet 1 mg  1 mg Oral BID   1 mg at 04/20/19 0744     bisacodyl (DULCOLAX) Suppository 10 mg  10 mg Rectal Daily PRN   10 mg at 04/19/19 1914     buprenorphine HCl-naloxone HCl (SUBOXONE) 4-1 MG per film 1 Film  1 Film Sublingual BID   1 Film at 04/22/19 0817     cariprazine (VRAYLAR) capsule CAPS 3 mg  3 mg Oral At Bedtime   3 mg at 04/21/19 1952     gabapentin (NEURONTIN) capsule 600 mg  600 mg Oral TID   600 mg at 04/22/19 0817     haloperidol (HALDOL) tablet 5 mg  5 mg Oral Q6H PRN   5 mg at 04/04/19 1350     haloperidol lactate (HALDOL) injection 5 mg  5 mg Intramuscular Q6H PRN         hydrOXYzine (ATARAX) tablet 25 mg  25 mg Oral Q4H PRN   25 mg at 04/15/19 1620     magnesium hydroxide (MILK OF MAGNESIA) suspension 30 mL  30 mL Oral Daily PRN   30 mL at 04/19/19 1429     OLANZapine (zyPREXA) tablet 10 mg  10 mg Oral Q6H PRN   10 mg at 04/11/19 1433    Or     OLANZapine (zyPREXA) injection 10 mg  10 mg Intramuscular Q6H PRN          "ondansetron (ZOFRAN-ODT) ODT tab 4 mg  4 mg Oral Q6H PRN   4 mg at 04/22/19 0816     polyethylene glycol (MIRALAX/GLYCOLAX) Packet 17 g  17 g Oral Daily   17 g at 04/22/19 0817     senna-docusate (SENOKOT-S/PERICOLACE) 8.6-50 MG per tablet 1 tablet  1 tablet Oral Daily PRN   1 tablet at 04/20/19 0747     vortioxetine (TRINTELLIX/BRINTELLIX) tablet 20 mg  20 mg Oral Daily   20 mg at 04/22/19 0817     No current Epic-ordered outpatient medications on file.         Allergies      Allergies   Allergen Reactions     Abilify [Aripiprazole] Other (See Comments)     Tardive dyskinesia     Compazine Other (See Comments)     Dystonia     Dramamine      Reglan Other (See Comments)     dystonia     Seroquel [Quetiapine] Other (See Comments)     Tardive dyskinesia.         Medical Review of Systems     /73   Pulse 100   Temp 98.9  F (37.2  C) (Oral)   Resp 16   Ht 1.676 m (5' 6\")   Wt 77.2 kg (170 lb 3.2 oz)   SpO2 97%   BMI 27.47 kg/m    Body mass index is 27.47 kg/m .  A 10-point review of systems was performed by Edgardo Addison MD and is negative, no new findings.      Psychiatric Examination     Appearance Sitting in chair, dressed in hospital scrubs. Appears stated age.   Attitude Cooperative   Orientation Oriented to person, place, time   Eye Contact Good   Speech Regular rate, rhythm, volume and tone   Language Normal   Psychomotor Behavior Normal   Mood Good, not anxious   Affect Pleasant    Thought Process Goal-Oriented, Intact   Associations Intact   Thought Content Patient is currently negative for suicidal ideation, negative for plan or intent, able to contract no self harm and identify barriers to suicide.  Negative for obsessions, compulsions or psychosis.     Fund of Knowledge Intact   Insight Improving    Judgement Improving   Attention Span & Concentration Fair    Recent & Remote Memory Intact   Gait Normal   Muscle Tone Intact      Labs     Labs reviewed.  No results found for this or any " previous visit (from the past 24 hour(s)).     Impression   This is a 43 year old female who attains a history of depression, substance abuse, and bipolar disorder. Per attending nursing staff, patient had been pleasant, cooperative in care, medication compliant, and respectful to both staff members and her peers.    Today while meeting with patient, she appeared much less anxious and her overall mood appeared good. She noted that she did experience some pretty harsh nausea and upset stomach this weekend, however denied any physical issues today. It should be noted that patient has been approved for [facility] and we are currently waiting for bed availability.      Diagnoses   1. Bipolar disorder  2. Major depression, recurrent, severe, without psychotic features.  3. General anxiety disorder.   4.         Benzodiazepine and opiate dependence  5.         Alcohol use disorder     Plan     1. Explained side effects, benefits, and complications of medications to the patient, Pt gave verbal consent.  2. Medication changes: None   3. Discussed treatment plan with patient and team.  4. Projected length of stay: 7+ days  5. Patient has been approved for [facility], continue to wait for bed availability       Attestation:   Patient has been seen and evaluated by me, Edgardo Addison MD.    Patient ID:  Name: Jihan Gill    MRN: [id number]  Admission: 4/3/2019   YOB: 1975

## 2019-04-22 NOTE — PLAN OF CARE
Patient has been bed resting and napping for about half of the shift. She enjoyed hunting for Easter eggs.  She is relieved that she is no longer constipated. Presents with a full range affect and calm mood. Attending groups.

## 2019-04-22 NOTE — PLAN OF CARE
Pt presents with a with a calm affect. Pleasant and cooperative with staff and other patients. Pt spent the majority of the morning in her room bed resting; more visible in the lounge after lunch. Did not attend any groups this shift. Pt has been accepted to [facility] and is now awaiting for a bed to be a available.

## 2019-04-23 PROCEDURE — 25000131 ZZH RX MED GY IP 250 OP 636 PS 637: Performed by: EMERGENCY MEDICINE

## 2019-04-23 PROCEDURE — 25000132 ZZH RX MED GY IP 250 OP 250 PS 637: Performed by: EMERGENCY MEDICINE

## 2019-04-23 PROCEDURE — 25000132 ZZH RX MED GY IP 250 OP 250 PS 637: Performed by: PSYCHIATRY & NEUROLOGY

## 2019-04-23 PROCEDURE — 12400000 ZZH R&B MH

## 2019-04-23 PROCEDURE — 90853 GROUP PSYCHOTHERAPY: CPT

## 2019-04-23 RX ADMIN — BUPRENORPHINE HYDROCHLORIDE, NALOXONE HYDROCHLORIDE 1 FILM: 4; 1 FILM, SOLUBLE BUCCAL; SUBLINGUAL at 15:53

## 2019-04-23 RX ADMIN — CARIPRAZINE 3 MG: 1.5 CAPSULE, GELATIN COATED ORAL at 20:24

## 2019-04-23 RX ADMIN — VORTIOXETINE 20 MG: 20 TABLET, FILM COATED ORAL at 08:23

## 2019-04-23 RX ADMIN — GABAPENTIN 600 MG: 300 CAPSULE ORAL at 20:24

## 2019-04-23 RX ADMIN — GABAPENTIN 600 MG: 300 CAPSULE ORAL at 08:23

## 2019-04-23 RX ADMIN — ACETAMINOPHEN 650 MG: 325 TABLET, FILM COATED ORAL at 08:23

## 2019-04-23 RX ADMIN — ONDANSETRON 4 MG: 4 TABLET, ORALLY DISINTEGRATING ORAL at 21:38

## 2019-04-23 RX ADMIN — BUPRENORPHINE HYDROCHLORIDE, NALOXONE HYDROCHLORIDE 1 FILM: 4; 1 FILM, SOLUBLE BUCCAL; SUBLINGUAL at 08:23

## 2019-04-23 RX ADMIN — ONDANSETRON 4 MG: 4 TABLET, ORALLY DISINTEGRATING ORAL at 08:51

## 2019-04-23 RX ADMIN — ACETAMINOPHEN 650 MG: 325 TABLET, FILM COATED ORAL at 12:33

## 2019-04-23 ASSESSMENT — ACTIVITIES OF DAILY LIVING (ADL)
HYGIENE/GROOMING: INDEPENDENT
LAUNDRY: WITH SUPERVISION
ORAL_HYGIENE: INDEPENDENT
DRESS: SCRUBS (BEHAVIORAL HEALTH);STREET CLOTHES

## 2019-04-23 ASSESSMENT — MIFFLIN-ST. JEOR: SCORE: 1461.94

## 2019-04-23 NOTE — PLAN OF CARE
Pt transitioned well to group session, which addressed illness management through therapeutic activity.  Given visual guide, pt ID'd her main barriers to recovery (e.g. Substance abuse, perfectionism). Pt ID'd her biggest barrier to recovery as unrealistic expectations which prevent her from being content. With MIN A and feedback from group, pt ID'd potential solutions to addressing barrier (e.g.  Gratitude journal, positive self-talk). Pt will continue to benefit from OT intervention to address implementation of positive functional coping skills, role performance, and community reintegration.

## 2019-04-23 NOTE — PLAN OF CARE
Patient feels better, physically, than earlier today.  Her temp.=97.9(down from 100.) after prn tylenol was given.  Patient denies any thoughts of self harm & participated in all milieu activities.  She has been accepted at [facility] & is waiting for a bed. Patient had several visitors & was social with peers.

## 2019-04-23 NOTE — PLAN OF CARE
"Pt has spent the majority of the shift in bed.  Pt reported not feeling well and wanted to rest.  Pt reported nausea and feeling \"flu\" like.  Pt was assessed by nursing.  Pt ate some lunch and did not feel well after.  Pt attended OT. Pt utilized Zofran and tylenol to help with discomfort. Pt completed laundry this shift. Pt denied SI and was med compliant.     "

## 2019-04-23 NOTE — PROGRESS NOTES
[facility] Psychiatric Progress Note       Interim History     The patient's care was discussed with the treatment team and chart notes were reviewed. According to nursing staff on [address], the patient expressed feeling better physically in comparison to this past weekend. She denies any complications with her medications, thus no changes will be made today. We continue to wait for patient to be placed at [facility].      Hospital Course   This is a 43 year old female who attains a history of depression, substance abuse, and bipolar disorder. She was discharged from [facility] on 4/01/19 by Dr. Addison with the plans of discharging to [facility] in [place], [place] and following up at [facility]. The patient however never did make it to treatment, instead she got on the bus after discharge and went to [facility] where she used meth and Klonopin with her boyfriend. While discussing with Dr. Addison today, the patient declared she believes she needs residential treatment rather than outpatient. Dr. Addison is in agreement with this and will discuss more with our  in order to arrange for aftercare plans. Aside from this, Dr. Addison has increased patient's Trintellix dose from 10mg to 15mg in order to aid in patient's consistent symptoms of depression and paranoia. According to attending hospital staff members, the patient has been somewhat isolative and withdrawn since being on Psychiatry. She has been cooperative in care, medication compliant, however has declined multiple group sessions since admission. Today, when discussing with patient, she stressed that she is quite fearful and hopeless in regards of her future. She reported experiencing increased paranoia and even auditory hallucinations since she was discharged from previous hospitalization (just last week). Dr. Addison had highly encouraged the patient  to utilize her DBT skills in order to deal with her fearfulness. Dr. Addison increased Trintellix dose from 15mg to 20mg and increased Gabapentin dose from 600mg BID to 600mg TID in order to aid in patient's acclaimed symptoms of depression. Patient noted improvement in mood and thought process with the start of Suboxone 4-1mg per film and the increase in Trintellix (made approximately 1 week ago). Dr. Addison increased Suboxone dose to 4-1mg per film every morning and 4-1mg per film every evening. Since the start of Suboxone, the patient reported an improvement in overall mood.       Medications     Current Facility-Administered Medications Ordered in Epic   Medication Dose Route Frequency Last Rate Last Dose     acetaminophen (TYLENOL) tablet 650 mg  650 mg Oral Q4H PRN   650 mg at 04/23/19 0823     benztropine (COGENTIN) tablet 1 mg  1 mg Oral BID   1 mg at 04/20/19 0744     bisacodyl (DULCOLAX) Suppository 10 mg  10 mg Rectal Daily PRN   10 mg at 04/19/19 1914     buprenorphine HCl-naloxone HCl (SUBOXONE) 4-1 MG per film 1 Film  1 Film Sublingual BID   1 Film at 04/23/19 0823     cariprazine (VRAYLAR) capsule CAPS 3 mg  3 mg Oral At Bedtime   3 mg at 04/22/19 2002     gabapentin (NEURONTIN) capsule 600 mg  600 mg Oral TID   600 mg at 04/23/19 0823     haloperidol (HALDOL) tablet 5 mg  5 mg Oral Q6H PRN   5 mg at 04/04/19 1350     haloperidol lactate (HALDOL) injection 5 mg  5 mg Intramuscular Q6H PRN         hydrOXYzine (ATARAX) tablet 25 mg  25 mg Oral Q4H PRN   25 mg at 04/15/19 1620     magnesium hydroxide (MILK OF MAGNESIA) suspension 30 mL  30 mL Oral Daily PRN   30 mL at 04/19/19 1429     OLANZapine (zyPREXA) tablet 10 mg  10 mg Oral Q6H PRN   10 mg at 04/11/19 1433    Or     OLANZapine (zyPREXA) injection 10 mg  10 mg Intramuscular Q6H PRN         ondansetron (ZOFRAN-ODT) ODT tab 4 mg  4 mg Oral Q6H PRN   4 mg at 04/23/19 0851     polyethylene glycol (MIRALAX/GLYCOLAX) Packet 17 g  17 g Oral Daily    "17 g at 04/22/19 0817     senna-docusate (SENOKOT-S/PERICOLACE) 8.6-50 MG per tablet 1 tablet  1 tablet Oral Daily PRN   1 tablet at 04/20/19 0747     vortioxetine (TRINTELLIX/BRINTELLIX) tablet 20 mg  20 mg Oral Daily   20 mg at 04/23/19 0823     No current Epic-ordered outpatient medications on file.         Allergies      Allergies   Allergen Reactions     Abilify [Aripiprazole] Other (See Comments)     Tardive dyskinesia     Compazine Other (See Comments)     Dystonia     Dramamine      Reglan Other (See Comments)     dystonia     Seroquel [Quetiapine] Other (See Comments)     Tardive dyskinesia.         Medical Review of Systems     /62   Pulse 90   Temp 98.2  F (36.8  C) (Oral)   Resp 14   Ht 1.676 m (5' 6\")   Wt 78 kg (172 lb)   SpO2 97%   BMI 27.76 kg/m    Body mass index is 27.76 kg/m .  A 10-point review of systems was performed by Edgardo Addison MD and is negative, no new findings.      Psychiatric Examination     Appearance Sitting in chair, dressed in hospital scrubs. Appears stated age.   Attitude Cooperative   Orientation Oriented to person, place, time   Eye Contact Good   Speech Regular rate, rhythm, volume and tone   Language Normal   Psychomotor Behavior Normal   Mood Good, not anxious   Affect Pleasant    Thought Process Goal-Oriented, Intact   Associations Intact   Thought Content Patient is currently negative for suicidal ideation, negative for plan or intent, able to contract no self harm and identify barriers to suicide.  Negative for obsessions, compulsions or psychosis.     Fund of Knowledge Intact   Insight Improving    Judgement Improving   Attention Span & Concentration Fair    Recent & Remote Memory Intact   Gait Normal   Muscle Tone Intact      Labs     Labs reviewed.  No results found for this or any previous visit (from the past 24 hour(s)).     Impression   This is a 43 year old female who attains a history of depression, substance abuse, and bipolar disorder. " Patient has reported feeling much better physcially in comparison to this past weekend and yesterday. Her temperature has also decreased. Patient denies any complications, concerns, or issues with her current medication regiment or overall care today. Thus, there will be no medication adjustments made. Again, it should be noted that patient has been approved for [facility] and we are currently waiting for bed availability.      Diagnoses   1. Bipolar disorder  2. Major depression, recurrent, severe, without psychotic features.  3. General anxiety disorder.   4.         Benzodiazepine and opiate dependence  5.         Alcohol use disorder     Plan     1. Explained side effects, benefits, and complications of medications to the patient, Pt gave verbal consent.  2. Medication changes: None   3. Discussed treatment plan with patient and team.  4. Projected length of stay: 7+ days  5. Patient has been approved for [facility], continue to wait for bed availability       Attestation:   Patient has been seen and evaluated by me, Edgardo Addison MD.    Patient ID:  Name: Jihan Gill    MRN: [id number]  Admission: 4/3/2019   YOB: 1975

## 2019-04-24 LAB
ALBUMIN SERPL-MCNC: 3 G/DL (ref 3.4–5)
ALP SERPL-CCNC: 109 U/L (ref 40–150)
ALT SERPL W P-5'-P-CCNC: 228 U/L (ref 0–50)
ANION GAP SERPL CALCULATED.3IONS-SCNC: 5 MMOL/L (ref 3–14)
AST SERPL W P-5'-P-CCNC: 188 U/L (ref 0–45)
BASOPHILS # BLD AUTO: 0 10E9/L (ref 0–0.2)
BASOPHILS NFR BLD AUTO: 0.1 %
BILIRUB DIRECT SERPL-MCNC: 0.2 MG/DL (ref 0–0.2)
BILIRUB SERPL-MCNC: 0.4 MG/DL (ref 0.2–1.3)
BUN SERPL-MCNC: 4 MG/DL (ref 7–30)
CALCIUM SERPL-MCNC: 8.8 MG/DL (ref 8.5–10.1)
CHLORIDE SERPL-SCNC: 105 MMOL/L (ref 94–109)
CO2 SERPL-SCNC: 28 MMOL/L (ref 20–32)
CREAT SERPL-MCNC: 0.55 MG/DL (ref 0.52–1.04)
DIFFERENTIAL METHOD BLD: ABNORMAL
EOSINOPHIL # BLD AUTO: 0.3 10E9/L (ref 0–0.7)
EOSINOPHIL NFR BLD AUTO: 4.8 %
ERYTHROCYTE [DISTWIDTH] IN BLOOD BY AUTOMATED COUNT: 14.6 % (ref 10–15)
GFR SERPL CREATININE-BSD FRML MDRD: >90 ML/MIN/{1.73_M2}
GLUCOSE SERPL-MCNC: 107 MG/DL (ref 70–99)
HCT VFR BLD AUTO: 30.9 % (ref 35–47)
HGB BLD-MCNC: 10.1 G/DL (ref 11.7–15.7)
IMM GRANULOCYTES # BLD: 0 10E9/L (ref 0–0.4)
IMM GRANULOCYTES NFR BLD: 0.1 %
LYMPHOCYTES # BLD AUTO: 1.9 10E9/L (ref 0.8–5.3)
LYMPHOCYTES NFR BLD AUTO: 27.6 %
MCH RBC QN AUTO: 28.8 PG (ref 26.5–33)
MCHC RBC AUTO-ENTMCNC: 32.7 G/DL (ref 31.5–36.5)
MCV RBC AUTO: 88 FL (ref 78–100)
MONOCYTES # BLD AUTO: 0.6 10E9/L (ref 0–1.3)
MONOCYTES NFR BLD AUTO: 8.8 %
NEUTROPHILS # BLD AUTO: 3.9 10E9/L (ref 1.6–8.3)
NEUTROPHILS NFR BLD AUTO: 58.6 %
NRBC # BLD AUTO: 0 10*3/UL
NRBC BLD AUTO-RTO: 0 /100
PLATELET # BLD AUTO: 270 10E9/L (ref 150–450)
POTASSIUM SERPL-SCNC: 3.5 MMOL/L (ref 3.4–5.3)
PROT SERPL-MCNC: 6.5 G/DL (ref 6.8–8.8)
RBC # BLD AUTO: 3.51 10E12/L (ref 3.8–5.2)
SODIUM SERPL-SCNC: 138 MMOL/L (ref 133–144)
WBC # BLD AUTO: 6.7 10E9/L (ref 4–11)

## 2019-04-24 PROCEDURE — 36415 COLL VENOUS BLD VENIPUNCTURE: CPT | Performed by: PSYCHIATRY & NEUROLOGY

## 2019-04-24 PROCEDURE — 80076 HEPATIC FUNCTION PANEL: CPT | Performed by: PSYCHIATRY & NEUROLOGY

## 2019-04-24 PROCEDURE — 25000132 ZZH RX MED GY IP 250 OP 250 PS 637: Performed by: EMERGENCY MEDICINE

## 2019-04-24 PROCEDURE — 25000131 ZZH RX MED GY IP 250 OP 636 PS 637: Performed by: EMERGENCY MEDICINE

## 2019-04-24 PROCEDURE — 25000132 ZZH RX MED GY IP 250 OP 250 PS 637: Performed by: PSYCHIATRY & NEUROLOGY

## 2019-04-24 PROCEDURE — 80048 BASIC METABOLIC PNL TOTAL CA: CPT | Performed by: PSYCHIATRY & NEUROLOGY

## 2019-04-24 PROCEDURE — 85025 COMPLETE CBC W/AUTO DIFF WBC: CPT | Performed by: PSYCHIATRY & NEUROLOGY

## 2019-04-24 PROCEDURE — 90853 GROUP PSYCHOTHERAPY: CPT

## 2019-04-24 PROCEDURE — 12400000 ZZH R&B MH

## 2019-04-24 RX ORDER — LOPERAMIDE HCL 2 MG
2 CAPSULE ORAL 4 TIMES DAILY PRN
Status: DISCONTINUED | OUTPATIENT
Start: 2019-04-24 | End: 2019-04-30 | Stop reason: HOSPADM

## 2019-04-24 RX ADMIN — VORTIOXETINE 20 MG: 20 TABLET, FILM COATED ORAL at 07:58

## 2019-04-24 RX ADMIN — CARIPRAZINE 3 MG: 1.5 CAPSULE, GELATIN COATED ORAL at 20:04

## 2019-04-24 RX ADMIN — ACETAMINOPHEN 650 MG: 325 TABLET, FILM COATED ORAL at 07:59

## 2019-04-24 RX ADMIN — ONDANSETRON 4 MG: 4 TABLET, ORALLY DISINTEGRATING ORAL at 08:42

## 2019-04-24 RX ADMIN — LOPERAMIDE HYDROCHLORIDE 2 MG: 2 CAPSULE ORAL at 11:10

## 2019-04-24 RX ADMIN — GABAPENTIN 600 MG: 300 CAPSULE ORAL at 07:58

## 2019-04-24 RX ADMIN — LOPERAMIDE HYDROCHLORIDE 2 MG: 2 CAPSULE ORAL at 15:55

## 2019-04-24 RX ADMIN — GABAPENTIN 600 MG: 300 CAPSULE ORAL at 14:14

## 2019-04-24 RX ADMIN — GABAPENTIN 600 MG: 300 CAPSULE ORAL at 20:04

## 2019-04-24 RX ADMIN — BUPRENORPHINE HYDROCHLORIDE, NALOXONE HYDROCHLORIDE 1 FILM: 4; 1 FILM, SOLUBLE BUCCAL; SUBLINGUAL at 15:55

## 2019-04-24 RX ADMIN — BUPRENORPHINE HYDROCHLORIDE, NALOXONE HYDROCHLORIDE 1 FILM: 4; 1 FILM, SOLUBLE BUCCAL; SUBLINGUAL at 07:58

## 2019-04-24 ASSESSMENT — ACTIVITIES OF DAILY LIVING (ADL)
LAUNDRY: WITH SUPERVISION
HYGIENE/GROOMING: INDEPENDENT
ORAL_HYGIENE: INDEPENDENT
DRESS: SCRUBS (BEHAVIORAL HEALTH)

## 2019-04-24 NOTE — PLAN OF CARE
Pt presents as withdrawn and depressed. Pt continues to feel tired. Pt had obtained Hep C within the last year from her ex-boyfriend and was wondering if the symptoms she is feeling now are from Hep C. Doctor ordered a screen today. Pt attended focus group and participated appropriately. Pt is currently waiting for a bed to be available at [facility].

## 2019-04-24 NOTE — PROGRESS NOTES
[facility] Psychiatric Progress Note       Interim History     The patient's care was discussed with the treatment team and chart notes were reviewed. Per attending staff members on [address], the patient has been found bed resting majority of her time. She declared that she was not feeling well, flu like symptoms. Aside from this, the patient proceeds to be medication compliant, cooperative in care, and pleasant with both staff members and her peers. Patient seen on 4/24/19 by Dr. Addison. On interview, the reports she is feeling tired and achy this morning. Patient informs Dr. Addison that she obtained Hep C approximately 6-12 months ago from her ex-boyfriend. She is wondering if these flu-like symptoms are from Hep C. Will order a screen today. Aside from this, patient denies any complications or concerns in regards of her current medication regiment. There will be no adjustments made today.      Hospital Course   This is a 43 year old female who attains a history of depression, substance abuse, and bipolar disorder. She was discharged from [facility] on 4/01/19 by Dr. Addison with the plans of discharging to [facility] in [place], [place] and following up at [facility]. The patient however never did make it to treatment, instead she got on the bus after discharge and went to [facility] where she used meth and Klonopin with her boyfriend. While discussing with Dr. Addison today, the patient declared she believes she needs residential treatment rather than outpatient. Dr. Addison is in agreement with this and will discuss more with our  in order to arrange for aftercare plans. Aside from this, Dr. Addison has increased patient's Trintellix dose from 10mg to 15mg in order to aid in patient's consistent symptoms of depression and paranoia. According to attending hospital staff members, the patient has been somewhat isolative  and withdrawn since being on Psychiatry. She has been cooperative in care, medication compliant, however has declined multiple group sessions since admission. Today, when discussing with patient, she stressed that she is quite fearful and hopeless in regards of her future. She reported experiencing increased paranoia and even auditory hallucinations since she was discharged from previous hospitalization (just last week). Dr. Addison had highly encouraged the patient to utilize her DBT skills in order to deal with her fearfulness. Dr. Addison increased Trintellix dose from 15mg to 20mg and increased Gabapentin dose from 600mg BID to 600mg TID in order to aid in patient's acclaimed symptoms of depression. Patient noted improvement in mood and thought process with the start of Suboxone 4-1mg per film and the increase in Trintellix (made approximately 1 week ago). Dr. Addison increased Suboxone dose to 4-1mg per film every morning and 4-1mg per film every evening. Since the start of Suboxone, the patient reported an improvement in overall mood.       Medications     Current Facility-Administered Medications Ordered in Epic   Medication Dose Route Frequency Last Rate Last Dose     acetaminophen (TYLENOL) tablet 650 mg  650 mg Oral Q4H PRN   650 mg at 04/23/19 1233     benztropine (COGENTIN) tablet 1 mg  1 mg Oral BID   1 mg at 04/20/19 0744     bisacodyl (DULCOLAX) Suppository 10 mg  10 mg Rectal Daily PRN   10 mg at 04/19/19 1914     buprenorphine HCl-naloxone HCl (SUBOXONE) 4-1 MG per film 1 Film  1 Film Sublingual BID   1 Film at 04/23/19 1553     cariprazine (VRAYLAR) capsule CAPS 3 mg  3 mg Oral At Bedtime   3 mg at 04/23/19 2024     gabapentin (NEURONTIN) capsule 600 mg  600 mg Oral TID   600 mg at 04/23/19 2024     haloperidol (HALDOL) tablet 5 mg  5 mg Oral Q6H PRN   5 mg at 04/04/19 1350     haloperidol lactate (HALDOL) injection 5 mg  5 mg Intramuscular Q6H PRN         hydrOXYzine (ATARAX) tablet 25 mg  " 25 mg Oral Q4H PRN   25 mg at 04/15/19 1620     magnesium hydroxide (MILK OF MAGNESIA) suspension 30 mL  30 mL Oral Daily PRN   30 mL at 04/19/19 1429     OLANZapine (zyPREXA) tablet 10 mg  10 mg Oral Q6H PRN   10 mg at 04/11/19 1433    Or     OLANZapine (zyPREXA) injection 10 mg  10 mg Intramuscular Q6H PRN         ondansetron (ZOFRAN-ODT) ODT tab 4 mg  4 mg Oral Q6H PRN   4 mg at 04/23/19 2138     polyethylene glycol (MIRALAX/GLYCOLAX) Packet 17 g  17 g Oral Daily   17 g at 04/22/19 0817     senna-docusate (SENOKOT-S/PERICOLACE) 8.6-50 MG per tablet 1 tablet  1 tablet Oral Daily PRN   1 tablet at 04/20/19 0747     vortioxetine (TRINTELLIX/BRINTELLIX) tablet 20 mg  20 mg Oral Daily   20 mg at 04/23/19 0823     No current Epic-ordered outpatient medications on file.         Allergies      Allergies   Allergen Reactions     Abilify [Aripiprazole] Other (See Comments)     Tardive dyskinesia     Compazine Other (See Comments)     Dystonia     Dramamine      Reglan Other (See Comments)     dystonia     Seroquel [Quetiapine] Other (See Comments)     Tardive dyskinesia.         Medical Review of Systems     /64   Pulse 87   Temp 97.8  F (36.6  C) (Oral)   Resp 16   Ht 1.676 m (5' 6\")   Wt 78 kg (172 lb)   SpO2 97%   BMI 27.76 kg/m    Body mass index is 27.76 kg/m .  A 10-point review of systems was performed by Edgardo Addison MD and is negative, no new findings.      Psychiatric Examination     Appearance Sitting in chair, dressed in hospital scrubs. Appears stated age.   Attitude Cooperative   Orientation Oriented to person, place, time   Eye Contact Good   Speech Regular rate, rhythm, volume and tone   Language Normal   Psychomotor Behavior Normal   Mood Good    Affect Pleasant    Thought Process Goal-Oriented, Intact   Associations Intact   Thought Content Patient is currently negative for suicidal ideation, negative for plan or intent, able to contract no self harm and identify barriers to " suicide.  Negative for obsessions, compulsions or psychosis.     Fund of Knowledge Intact   Insight Improving    Judgement Improving   Attention Span & Concentration Fair    Recent & Remote Memory Intact   Gait Normal   Muscle Tone Intact      Labs     Labs reviewed.  No results found for this or any previous visit (from the past 24 hour(s)).     Impression   This is a 43 year old female who attains a history of depression, substance abuse, and bipolar disorder. According to nursing staff notes, the patient has reported feeling unwell with flu-like symptoms. She has been bed resting for majority of her time, however proceeds to be medication compliant, cooperative in care, respectful, and pleasant with her peers. When interviewing with Dr. Addison this morning, patient confirms her flu-like symptoms, reporting feeling achy and tired. She expressed curiosity in regards of the Hep C that she contracted approximately 6-12 months ago from her ex-boyfriend. Dr. Addison will order a screen. Aside from this, patient does not have any concerns or complications in regards of her current medication regiment. There will be no alterations today.       Diagnoses   1. Bipolar disorder  2. Major depression, recurrent, severe, without psychotic features.  3. General anxiety disorder.   4.         Benzodiazepine and opiate dependence  5.         Alcohol use disorder     Plan     1. Explained side effects, benefits, and complications of medications to the patient, Pt gave verbal consent.  2. Medication changes: None   3. Discussed treatment plan with patient and team.  4. Projected length of stay: 7+ days  5. Patient has been approved for [facility], continue to wait for bed availability   6. Ordered Hep C screen       Attestation:   Patient has been seen and evaluated by me, Edgardo Addison MD.    Patient ID:  Name: Jihan Gill    MRN: [id number]  Admission: 4/3/2019   YOB: 1975

## 2019-04-24 NOTE — PLAN OF CARE
Pt is visible in the unit with full range affect and calm mood. Pt however continue to express not feeling well and reported nausea and prn Zofran given as requested. Pt refused her cogentin that it causes dry mouth and eyes. Pt took the rest of her meds. Denies SI or hallucination. Pt continue to await open bed at [facility] for placement. Pt went to focus group this shift and participated appropriately.

## 2019-04-24 NOTE — PLAN OF CARE
Pt actively participated in a mindfulness group focusing on reduction of anxiety, improvement of mood, and increase in concentration. The mindfulness practice was offered via supportive approaches including mindful movement/stretching, journaling, and progressive muscle relaxation to decrease worry, rumination, and other negative feelings. Pt was able to remain focused for the full duration.

## 2019-04-25 LAB — HBV SURFACE AG SERPL QL IA: NONREACTIVE

## 2019-04-25 PROCEDURE — 25000132 ZZH RX MED GY IP 250 OP 250 PS 637: Performed by: PSYCHIATRY & NEUROLOGY

## 2019-04-25 PROCEDURE — 87902 NFCT AGT GNTYP ALYS HEP C: CPT | Performed by: PSYCHIATRY & NEUROLOGY

## 2019-04-25 PROCEDURE — 86803 HEPATITIS C AB TEST: CPT | Performed by: PSYCHIATRY & NEUROLOGY

## 2019-04-25 PROCEDURE — 87340 HEPATITIS B SURFACE AG IA: CPT | Performed by: PSYCHIATRY & NEUROLOGY

## 2019-04-25 PROCEDURE — 90853 GROUP PSYCHOTHERAPY: CPT

## 2019-04-25 PROCEDURE — 12400000 ZZH R&B MH

## 2019-04-25 PROCEDURE — 36415 COLL VENOUS BLD VENIPUNCTURE: CPT | Performed by: PSYCHIATRY & NEUROLOGY

## 2019-04-25 PROCEDURE — 87522 HEPATITIS C REVRS TRNSCRPJ: CPT | Performed by: PSYCHIATRY & NEUROLOGY

## 2019-04-25 RX ADMIN — VORTIOXETINE 20 MG: 20 TABLET, FILM COATED ORAL at 07:58

## 2019-04-25 RX ADMIN — BUPRENORPHINE HYDROCHLORIDE, NALOXONE HYDROCHLORIDE 1 FILM: 4; 1 FILM, SOLUBLE BUCCAL; SUBLINGUAL at 15:42

## 2019-04-25 RX ADMIN — BUPRENORPHINE HYDROCHLORIDE, NALOXONE HYDROCHLORIDE 1 FILM: 4; 1 FILM, SOLUBLE BUCCAL; SUBLINGUAL at 07:58

## 2019-04-25 RX ADMIN — GABAPENTIN 600 MG: 300 CAPSULE ORAL at 14:38

## 2019-04-25 RX ADMIN — GABAPENTIN 600 MG: 300 CAPSULE ORAL at 07:58

## 2019-04-25 RX ADMIN — CARIPRAZINE 3 MG: 1.5 CAPSULE, GELATIN COATED ORAL at 19:50

## 2019-04-25 RX ADMIN — GABAPENTIN 600 MG: 300 CAPSULE ORAL at 19:50

## 2019-04-25 RX ADMIN — BENZTROPINE MESYLATE 1 MG: 1 TABLET ORAL at 19:50

## 2019-04-25 ASSESSMENT — ACTIVITIES OF DAILY LIVING (ADL)
ORAL_HYGIENE: INDEPENDENT
HYGIENE/GROOMING: INDEPENDENT
LAUNDRY: WITH SUPERVISION
DRESS: SCRUBS (BEHAVIORAL HEALTH)
DRESS: SCRUBS (BEHAVIORAL HEALTH)
HYGIENE/GROOMING: INDEPENDENT
LAUNDRY: WITH SUPERVISION
ORAL_HYGIENE: INDEPENDENT

## 2019-04-25 NOTE — PLAN OF CARE
Pt presents as flat in affect, depressed in mood. She was present on the unit and social with peers. She attended wrap up group with proper participation. Pt reports she feels better today. Waiting for placement.

## 2019-04-25 NOTE — PROGRESS NOTES
Spoke with Jackie at [facility]. She expects bed available Monday 4/30/19. Jackie will call Monday about ride arrangements.

## 2019-04-25 NOTE — PLAN OF CARE
Problem: General Plan of Care (Inpatient Behavioral)  Goal: Team Discussion  Description  Team Plan:  4/25/2019 1752 by Oswaldo Morgan  Outcome: No Change  Note:   BEHAVIORAL TEAM DISCUSSION    Participants: Dr. Addison, , Nursing staff and PA's  Progress: Pt has returned to baseline  Continued Stay Criteria/Rationale: Until aftercare in place  Medical/Physical: N/A  Precautions:   Behavioral Orders   Procedures    Code 1 - Restrict to Unit    Routine Programming     As clinically indicated    Status 15     Every 15 minutes.     Plan: Pt was given medication and gave verbal consent. Plan of care was discussed with patient and team. Continue current medication and hospitalization awhile awaiting outpatient placement.  Rationale for change in precautions or plan: N/A

## 2019-04-25 NOTE — PLAN OF CARE
Pt transitioned well to OT group and engaged in therapeutic activity addressing functional cognition and interpersonal skills with task set up. With task set up, pt participated in therapeutic group activity and discussion addressing identification and management of feelings. Pt reports recognizing that she needs to stand up for herself more and recognizes that not doing so has had negative consequences on her. Additionally, pt reports looking forward to spending time outdoors when she discharges and reports a goal to travel by herself in the future. Pt will continue to benefit from OT intervention to address implementation of positive functional coping skills, role performance, and community reintegration.

## 2019-04-25 NOTE — PLAN OF CARE
Pt presents with a calm affect. Pt stated she felt better today than the past couple of days. More visible in the morning, but bed rested after lunch. Attended some of the morning groups. During staff check-in pt reported no feelings of SI. Pt is currently awaiting for a bed to open up at [facility].

## 2019-04-25 NOTE — PROGRESS NOTES
Hep C antibody lab result is positive reactive; Dr Addison contacted and hospitalist consult ordered.

## 2019-04-25 NOTE — PROGRESS NOTES
[facility] Psychiatric Progress Note       Interim History     The patient's care was discussed with the treatment team and chart notes were reviewed. No notable difference in patient per attending staff members. Patient seen on 4/25/19 by Dr. Addison. Upon interview, patient states she is doing well this afternoon, feeling physically better in comparison to yesterday. She reports she does not have any symptoms of depression. We continue to wait for patients Hep C screening to come back.        Hospital Course   This is a 43 year old female who attains a history of depression, substance abuse, and bipolar disorder. She was discharged from [facility] on 4/01/19 by Dr. Addison with the plans of discharging to [facility] in [place], [place] and following up at [facility]. The patient however never did make it to treatment, instead she got on the bus after discharge and went to hotel where she used meth and Klonopin with her boyfriend. While discussing with Dr. Addison today, the patient declared she believes she needs residential treatment rather than outpatient. Dr. Addison is in agreement with this and will discuss more with our  in order to arrange for aftercare plans. Aside from this, Dr. Addison has increased patient's Trintellix dose from 10mg to 15mg in order to aid in patient's consistent symptoms of depression and paranoia. According to attending hospital staff members, the patient has been somewhat isolative and withdrawn since being on Psychiatry. She has been cooperative in care, medication compliant, however has declined multiple group sessions since admission. Today, when discussing with patient, she stressed that she is quite fearful and hopeless in regards of her future. She reported experiencing increased paranoia and even auditory hallucinations since she was discharged from previous hospitalization (just last  week). Dr. Addison had highly encouraged the patient to utilize her DBT skills in order to deal with her fearfulness. Dr. Addison increased Trintellix dose from 15mg to 20mg and increased Gabapentin dose from 600mg BID to 600mg TID in order to aid in patient's acclaimed symptoms of depression. Patient noted improvement in mood and thought process with the start of Suboxone 4-1mg per film and the increase in Trintellix (made approximately 1 week ago). Dr. Addison increased Suboxone dose to 4-1mg per film every morning and 4-1mg per film every evening. Since the start of Suboxone, the patient reported an improvement in overall mood.       Medications     Current Facility-Administered Medications Ordered in Epic   Medication Dose Route Frequency Last Rate Last Dose     acetaminophen (TYLENOL) tablet 650 mg  650 mg Oral Q4H PRN   650 mg at 04/24/19 0759     benztropine (COGENTIN) tablet 1 mg  1 mg Oral BID   1 mg at 04/20/19 0744     bisacodyl (DULCOLAX) Suppository 10 mg  10 mg Rectal Daily PRN   10 mg at 04/19/19 1914     buprenorphine HCl-naloxone HCl (SUBOXONE) 4-1 MG per film 1 Film  1 Film Sublingual BID   1 Film at 04/25/19 0758     cariprazine (VRAYLAR) capsule CAPS 3 mg  3 mg Oral At Bedtime   3 mg at 04/24/19 2004     gabapentin (NEURONTIN) capsule 600 mg  600 mg Oral TID   600 mg at 04/25/19 0758     haloperidol (HALDOL) tablet 5 mg  5 mg Oral Q6H PRN   5 mg at 04/04/19 1350     haloperidol lactate (HALDOL) injection 5 mg  5 mg Intramuscular Q6H PRN         hydrOXYzine (ATARAX) tablet 25 mg  25 mg Oral Q4H PRN   25 mg at 04/15/19 1620     loperamide (IMODIUM) capsule 2 mg  2 mg Oral 4x Daily PRN   2 mg at 04/24/19 1555     magnesium hydroxide (MILK OF MAGNESIA) suspension 30 mL  30 mL Oral Daily PRN   30 mL at 04/19/19 1429     OLANZapine (zyPREXA) tablet 10 mg  10 mg Oral Q6H PRN   10 mg at 04/11/19 1433    Or     OLANZapine (zyPREXA) injection 10 mg  10 mg Intramuscular Q6H PRN         ondansetron  "(ZOFRAN-ODT) ODT tab 4 mg  4 mg Oral Q6H PRN   4 mg at 04/24/19 0842     polyethylene glycol (MIRALAX/GLYCOLAX) Packet 17 g  17 g Oral Daily   17 g at 04/22/19 0817     senna-docusate (SENOKOT-S/PERICOLACE) 8.6-50 MG per tablet 1 tablet  1 tablet Oral Daily PRN   1 tablet at 04/20/19 0747     vortioxetine (TRINTELLIX/BRINTELLIX) tablet 20 mg  20 mg Oral Daily   20 mg at 04/25/19 0758     No current Epic-ordered outpatient medications on file.         Allergies      Allergies   Allergen Reactions     Abilify [Aripiprazole] Other (See Comments)     Tardive dyskinesia     Compazine Other (See Comments)     Dystonia     Dramamine      Reglan Other (See Comments)     dystonia     Seroquel [Quetiapine] Other (See Comments)     Tardive dyskinesia.         Medical Review of Systems     /67   Pulse 81   Temp 100.8  F (38.2  C) (Oral)   Resp 16   Ht 1.676 m (5' 6\")   Wt 78 kg (172 lb)   SpO2 97%   BMI 27.76 kg/m    Body mass index is 27.76 kg/m .  A 10-point review of systems was performed by Edgardo Addison MD and is negative, no new findings.      Psychiatric Examination     Appearance Sitting in chair, dressed in hospital scrubs. Appears stated age.   Attitude Cooperative   Orientation Oriented to person, place, time   Eye Contact Good   Speech Regular rate, rhythm, volume and tone   Language Normal   Psychomotor Behavior Normal   Mood Good    Affect Pleasant    Thought Process Goal-Oriented, Intact   Associations Intact   Thought Content Patient is currently negative for suicidal ideation, negative for plan or intent, able to contract no self harm and identify barriers to suicide.  Negative for obsessions, compulsions or psychosis.     Fund of Knowledge Intact   Insight Improving    Judgement Improving   Attention Span & Concentration Fair    Recent & Remote Memory Intact   Gait Normal   Muscle Tone Intact      Labs     Labs reviewed.  No results found for this or any previous visit (from the past 24 " hour(s)).     Impression   This is a 43 year old female who attains a history of depression, substance abuse, and bipolar disorder. Today while meeting with Dr. Addison, the patient reported feeling physically better in comparison to yesterday, not feeling as tired or nauseous. She denies any concerns or complications with her current medication regiment, thus no changes will be made today. We continue to wait to hear from [facility] in regards of placement.      Diagnoses   1. Bipolar disorder  2. Major depression, recurrent, severe, without psychotic features.  3. General anxiety disorder.   4.         Benzodiazepine and opiate dependence  5.         Alcohol use disorder     Plan     1. Explained side effects, benefits, and complications of medications to the patient, Pt gave verbal consent.  2. Medication changes: None   3. Discussed treatment plan with patient and team.  4. Projected length of stay: 7+ days  5. Patient has been approved for [facility], continue to wait for bed availability   6. Awaiting Hep C screen       Attestation:   Patient has been seen and evaluated by me, Edgardo Addison MD.    Patient ID:  Name: Jihan Gill    MRN: [id number]  Admission: 4/3/2019   YOB: 1975

## 2019-04-25 NOTE — PROGRESS NOTES
ASAP consult placed regarding reactive Hep C Antibody. Hepatitis RNA Quantitative pending. Has elevated LFTs but bili and electrolytes stable. Nothing urgent to address today. Discussed with RN no contact precautions neccessary. Hospitalist will f/u tomorrow. Patient will need outpatient follow up with GI.      Leticia Kemp PA-C  Hospitalist Service  [phone]

## 2019-04-26 PROCEDURE — 99221 1ST HOSP IP/OBS SF/LOW 40: CPT | Performed by: PHYSICIAN ASSISTANT

## 2019-04-26 PROCEDURE — 99207 ZZC CONSULT E&M CHANGED TO INITIAL LEVEL: CPT | Performed by: PHYSICIAN ASSISTANT

## 2019-04-26 PROCEDURE — 25000132 ZZH RX MED GY IP 250 OP 250 PS 637: Performed by: PSYCHIATRY & NEUROLOGY

## 2019-04-26 PROCEDURE — 25000132 ZZH RX MED GY IP 250 OP 250 PS 637: Performed by: PHYSICIAN ASSISTANT

## 2019-04-26 PROCEDURE — 90853 GROUP PSYCHOTHERAPY: CPT

## 2019-04-26 PROCEDURE — 25000131 ZZH RX MED GY IP 250 OP 636 PS 637: Performed by: PHYSICIAN ASSISTANT

## 2019-04-26 PROCEDURE — 12400000 ZZH R&B MH

## 2019-04-26 PROCEDURE — 25000131 ZZH RX MED GY IP 250 OP 636 PS 637: Performed by: EMERGENCY MEDICINE

## 2019-04-26 RX ORDER — SIMETHICONE 80 MG
80 TABLET,CHEWABLE ORAL 4 TIMES DAILY
Status: DISCONTINUED | OUTPATIENT
Start: 2019-04-26 | End: 2019-04-30 | Stop reason: HOSPADM

## 2019-04-26 RX ORDER — ONDANSETRON 4 MG/1
8 TABLET, ORALLY DISINTEGRATING ORAL EVERY 6 HOURS PRN
Status: DISCONTINUED | OUTPATIENT
Start: 2019-04-26 | End: 2019-04-30 | Stop reason: HOSPADM

## 2019-04-26 RX ADMIN — CARIPRAZINE 3 MG: 1.5 CAPSULE, GELATIN COATED ORAL at 20:51

## 2019-04-26 RX ADMIN — BUPRENORPHINE HYDROCHLORIDE, NALOXONE HYDROCHLORIDE 1 FILM: 4; 1 FILM, SOLUBLE BUCCAL; SUBLINGUAL at 15:37

## 2019-04-26 RX ADMIN — SIMETHICONE CHEW TAB 80 MG 80 MG: 80 TABLET ORAL at 15:37

## 2019-04-26 RX ADMIN — GABAPENTIN 600 MG: 300 CAPSULE ORAL at 13:23

## 2019-04-26 RX ADMIN — VORTIOXETINE 20 MG: 20 TABLET, FILM COATED ORAL at 07:46

## 2019-04-26 RX ADMIN — SIMETHICONE CHEW TAB 80 MG 80 MG: 80 TABLET ORAL at 20:50

## 2019-04-26 RX ADMIN — BUPRENORPHINE HYDROCHLORIDE, NALOXONE HYDROCHLORIDE 1 FILM: 4; 1 FILM, SOLUBLE BUCCAL; SUBLINGUAL at 07:46

## 2019-04-26 RX ADMIN — ONDANSETRON 8 MG: 4 TABLET, ORALLY DISINTEGRATING ORAL at 20:50

## 2019-04-26 RX ADMIN — SIMETHICONE CHEW TAB 80 MG 80 MG: 80 TABLET ORAL at 14:02

## 2019-04-26 RX ADMIN — ONDANSETRON 4 MG: 4 TABLET, ORALLY DISINTEGRATING ORAL at 07:46

## 2019-04-26 RX ADMIN — BENZTROPINE MESYLATE 1 MG: 1 TABLET ORAL at 20:50

## 2019-04-26 RX ADMIN — GABAPENTIN 600 MG: 300 CAPSULE ORAL at 20:50

## 2019-04-26 RX ADMIN — GABAPENTIN 600 MG: 300 CAPSULE ORAL at 07:46

## 2019-04-26 RX ADMIN — POLYETHYLENE GLYCOL 3350 17 G: 17 POWDER, FOR SOLUTION ORAL at 07:46

## 2019-04-26 ASSESSMENT — ACTIVITIES OF DAILY LIVING (ADL)
ORAL_HYGIENE: INDEPENDENT
HYGIENE/GROOMING: INDEPENDENT
DRESS: STREET CLOTHES
LAUNDRY: WITH SUPERVISION

## 2019-04-26 NOTE — PLAN OF CARE
Pt is visible in SDU and presents with blunt affect, calm mood. Pt reported increase flatulence and a gas pill ordered. On 1:1 pt stated that she is feeling much better and ready to go. Pt is positive for Hep C and will be discharge on Tuesday to [facility]. Pt denies any SI , no voices and remain med compliant.

## 2019-04-26 NOTE — CONSULTS
[facility]  Consult Note - Hospitalist Service     Date of Admission:  4/3/2019  Consult Requested by: Dr. Addison  Reason for Consult: Hepatitis C antibody positive/reactive    Assessment & Plan   Jihan Gill is a 43 year old female admitted on 4/3/2019.     Past medical history significant for extensive psychiatric history (bipolar D/O, MDD, Anxiety, repeat suicide attempts, PTSD, benzodiazepine and opiate dependence, alcohol use disorder), hepatitis C who was re-admitted 4/3/2019 (had been discharged 4/1/2019) due to request for residential treatment after use of meth and Klonopin immediately after discharge.      Per EMR patient again was discharged from [facility] on 4/1/2019 with plans to proceed to [facility] in [place], [place] and follow up with [facility].  Unfortunately, the patient never made it to treatment and instead took a bus and met up with her boyfriend at a hotel where she used meth and Klonopin.       Decompensated mental health with active meth and klonopin use/abuse in the setting of known Bipolar D/O, MDD, Anxiety, PTSD, Repeat Suicide attempts, benzodiazepine and opiate dependence and alcohol use disorder:  --Psych is managing at this point.      Known Hepatitis C, genotype 1a infection:  Noted labs positive in Feb 2019 from [facility] [place] [facility] and with plans for outpatient follow up within their system.  Repeat labs performed during this admission and with reactive Hepatitis C Antibody.  Had a follow-up appointment scheduled for 3/27/19 at [facility] that I believe was missed due to prolonged hospitalization.    -LFTS persistently elevated.    -Patient stated she was diagnosed in Nov 2018 when living in [place] and has not started treatment.    -She complains of nausea, vomiting and diarrhea with bloating abdominal discomfort.   -Patient is requesting a follow up appointment with provider closer to [place].    -Patient was given printout  regarding Hep C.    --Care Coordinator consulted to assist with follow-up recommend with [place] versus [facility].      Nausea/Vomiting/Diarrhea with abdominal bloating/discomfort:  --Scheduled simethicone 80 mg QID.    --Increase Zofran to 8 mg PRN.      Normocytic Anemia:  Appears stable.      Methamphetamine Abuse and Opiate Addiction:  --Per Psych.      The patient's care was discussed with the Patient.    The patient has been discussed with Dr. Lopez, who agrees with the assessment and plan at this time. Dr. Lopez will evaluate the patient independently.     Hospitalist service will sign off.      Stewart Escobar PA-C  [facility]    ______________________________________________________________________    Chief Complaint   Consult placed regarding reactive Hepatitis C antibody result.      Patient admitted due to relapse of Meth and Klonopin abuse.      History is obtained from the patient and EMR.      History of Present Illness   Jihan Gill is a 43 year old female with a past medical history significant for extensive psychiatric history (bipolar D/O, MDD, Anxiety, repeat suicide attempts, PTSD, benzodiazepine and opiate dependence, alcohol use disorder), hepatitis C who was re-admitted 4/3/2019 (had been discharged 4/1/2019) due to request for residential treatment after use of meth and Klonopin immediately after discharge.      Patient has remained hospitalized since 4/3/2019.  Psych has been working on medication adjustment and residential treatment placement.      Hepatitis screening labs were performed and Hep C antibody was found to be reactive; which was known from Care Everywhere search ([facility] 2/28/2019 with RNA 18,000,000).      I evaluated the patient on the psych unit.  I gave her a handout regarding Hepatitis C infection.  We discussed her Hepatitis C and she stated that she was diagnosed in Nov 2018 while residing in [place].  She had repeat lab  studies done in [place] system when hospitalized for mental health reasons which came back positive.  She had informed staff on the psych unit about her positive results prior to re-testing that has occurred.  She also noted that whenever she is tested for Hep B it is positive because she has been immunized against it.      Patient was requested if she could have an outpatient follow- up here in the [facility] as she does not live close to the [facility] [place] she had previously been scheduled to follow up with.      Patient also notes she has been having nausea, vomiting and diarrhea for about one week and prior to this was constipated.  She has had increased fatigue and body aches all over.  She complained of abdominal pain and bloating.  She states her bloating/discomfort resolves with belching.      Review of Systems   The 10 point Review of Systems is negative other than noted in the HPI.    Past Medical History    I have reviewed this patient's medical history and updated it with pertinent information if needed.   Past Medical History:   Diagnosis Date     Arthritis      Depressive disorder     postpartum     Depressive disorder      Major depression, recurrent (H)      Opiate addiction (H)      Seizures (H)     narcotic withdrawal     Urinary calculus, unspecified 2001    Renal stones   Extensive psychiatric history (bipolar D/O, MDD, Anxiety, repeat suicide attempts, PTSD, benzodiazepine and opiate dependence, alcohol use disorder).  Hepatitis C.    Past Surgical History   I have reviewed this patient's surgical history and updated it with pertinent information if needed.  Past Surgical History:   Procedure Laterality Date     C/SECTION, LOW TRANSVERSE      [id number]     ENT SURGERY       GYN SURGERY       TONSILLECTOMY         Social History   I have reviewed this patient's social history and updated it with pertinent information if needed.  Social History     Tobacco Use     Smoking status: Never Smoker     Smokeless  tobacco: Never Used   Substance Use Topics     Alcohol use: Yes     Comment: States no EtOH since .     Drug use: Yes     Comment: Methadone 60mg/day street buy       Family History   I have reviewed this patient's family history and updated it with pertinent information if needed.   No family history on file.   Aunt committed suicide.      Medications   Medications Prior to Admission   Medication Sig Dispense Refill Last Dose     [] buprenorphine HCl-naloxone HCl (SUBOXONE) 8-2 MG per film Place 1 Film under the tongue daily for 2 days 2 Film 0 4/3/2019 at am     cariprazine (VRAYLAR) 1.5 MG CAPS capsule Take 1 capsule (1.5 mg) by mouth daily 30 capsule 0 4/3/2019 at 1237     gabapentin (NEURONTIN) 300 MG capsule Take 2 capsules (600 mg) by mouth 2 times daily 120 capsule 0 4/3/2019 at 1153     vortioxetine (TRINTELLIX/BRINTELLIX) 20 MG tablet Take 1 tablet (20 mg) by mouth daily 30 tablet 0 4/3/2019 at 1236     acetaminophen (TYLENOL) 325 MG tablet Take 2 tablets (650 mg) by mouth every 4 hours as needed for mild pain . Maximum of 2 grams per 24 hour period.   prn     hydrOXYzine (ATARAX) 25 MG tablet Take 1 tablet (25 mg) by mouth 3 times daily as needed (anxiety)   prn     ondansetron (ZOFRAN-ODT) 4 MG ODT tab Take 1 tablet (4 mg) by mouth every 6 hours as needed for nausea or vomiting 30 tablet 0 prn       Allergies   Allergies   Allergen Reactions     Abilify [Aripiprazole] Other (See Comments)     Tardive dyskinesia     Compazine Other (See Comments)     Dystonia     Dramamine      Reglan Other (See Comments)     dystonia     Seroquel [Quetiapine] Other (See Comments)     Tardive dyskinesia.        Physical Exam   Vital Signs: Temp: 98.1  F (36.7  C) Temp src: Oral BP: 96/55 Pulse: 83 Heart Rate: 90 Resp: 16        Weight: 172 lbs 0 oz      Constitutional: Awake, alert, cooperative, no apparent distress.    ENT: Normocephalic, without obvious abnormality, atraumatic, oral pharynx with moist mucus  membranes, tonsils without erythema or exudates.  Eyes pupils are equal, round and reactive to light; extra occular movements intact.  Normal sclera.    Neck: Supple, symmetrical, trachea midline, no adenopathy.  Pulmonary: No increased work of breathing, good air exchange, clear to auscultation bilaterally, no crackles or wheezing.  Cardiovascular: Regular rate and rhythm, normal S1 and S2, no S3 or S4, and no murmur noted.  GI: Normal bowel sounds, soft, non-distended, non-tender.    Skin/Integumen: Clear.  Neuro: CN II-XII grossly intact.  Psych:  Alert and oriented x 3. Normal affect.  Extremities: No lower extremity edema noted, and non-TTP bilaterally.       Data   No results found for this or any previous visit (from the past 24 hour(s)).

## 2019-04-26 NOTE — PLAN OF CARE
Pt visible around the unit, but tends to stay to herself. Presents with a blunt and flat affect. Attended activity group for a couple of minutes this evening. Possible bed opening on Monday at [place].

## 2019-04-26 NOTE — PROGRESS NOTES
[facility] Psychiatric Progress Note       Interim History     The patient's care was discussed with the treatment team and chart notes were reviewed. Patient Hep C antibody lab results came back positive yesterday afternoon, thus a Hospitalist consult was ordered ASA. Hospitalist noted that patient does have elevated LFTs, but bili and electrolytes are stable, thus it was not deemed urgent address and no contact precautions were necessary. Hospitalist will follow-up with patient today. Patient seen on 4/26/19 by Dr. Addison. Upon interview, patient states she had a really rough night last night, vomiting a few times. Dr. Addison reviews patients Hep C results and further informs her about precautions she is needing to take while here in the hospital. Patient acknowledges.      Hospital Course   This is a 43 year old female who attains a history of depression, substance abuse, and bipolar disorder. She was discharged from [facility] on 4/01/19 by Dr. Addison with the plans of discharging to [facility] in [place], [place] and following up at [facility]. The patient however never did make it to treatment, instead she got on the bus after discharge and went to [place] she used meth and Klonopin with her boyfriend. While discussing with Dr. Addison today, the patient declared she believes she needs residential treatment rather than outpatient. Dr. Addison is in agreement with this and will discuss more with our  in order to arrange for aftercare plans. Aside from this, Dr. Addison has increased patient's Trintellix dose from 10mg to 15mg in order to aid in patient's consistent symptoms of depression and paranoia. According to attending hospital staff members, the patient has been somewhat isolative and withdrawn since being on Psychiatry. She has been cooperative in care, medication compliant, however has declined  multiple group sessions since admission. Today, when discussing with patient, she stressed that she is quite fearful and hopeless in regards of her future. She reported experiencing increased paranoia and even auditory hallucinations since she was discharged from previous hospitalization (just last week). Dr. Addison had highly encouraged the patient to utilize her DBT skills in order to deal with her fearfulness. Dr. Addison increased Trintellix dose from 15mg to 20mg and increased Gabapentin dose from 600mg BID to 600mg TID in order to aid in patient's acclaimed symptoms of depression. Patient noted improvement in mood and thought process with the start of Suboxone 4-1mg per film and the increase in Trintellix (made approximately 1 week ago). Dr. Addison increased Suboxone dose to 4-1mg per film every morning and 4-1mg per film every evening. Since the start of Suboxone, the patient reported an improvement in overall mood.       Medications     Current Facility-Administered Medications Ordered in Epic   Medication Dose Route Frequency Last Rate Last Dose     acetaminophen (TYLENOL) tablet 650 mg  650 mg Oral Q4H PRN   650 mg at 04/24/19 0759     benztropine (COGENTIN) tablet 1 mg  1 mg Oral BID   1 mg at 04/25/19 1950     bisacodyl (DULCOLAX) Suppository 10 mg  10 mg Rectal Daily PRN   10 mg at 04/19/19 1914     buprenorphine HCl-naloxone HCl (SUBOXONE) 4-1 MG per film 1 Film  1 Film Sublingual BID   1 Film at 04/25/19 1542     cariprazine (VRAYLAR) capsule CAPS 3 mg  3 mg Oral At Bedtime   3 mg at 04/25/19 1950     gabapentin (NEURONTIN) capsule 600 mg  600 mg Oral TID   600 mg at 04/25/19 1950     haloperidol (HALDOL) tablet 5 mg  5 mg Oral Q6H PRN   5 mg at 04/04/19 1350     haloperidol lactate (HALDOL) injection 5 mg  5 mg Intramuscular Q6H PRN         hydrOXYzine (ATARAX) tablet 25 mg  25 mg Oral Q4H PRN   25 mg at 04/15/19 1620     loperamide (IMODIUM) capsule 2 mg  2 mg Oral 4x Daily PRN   2 mg at  "04/24/19 1555     magnesium hydroxide (MILK OF MAGNESIA) suspension 30 mL  30 mL Oral Daily PRN   30 mL at 04/19/19 1429     OLANZapine (zyPREXA) tablet 10 mg  10 mg Oral Q6H PRN   10 mg at 04/11/19 1433    Or     OLANZapine (zyPREXA) injection 10 mg  10 mg Intramuscular Q6H PRN         ondansetron (ZOFRAN-ODT) ODT tab 4 mg  4 mg Oral Q6H PRN   4 mg at 04/24/19 0842     polyethylene glycol (MIRALAX/GLYCOLAX) Packet 17 g  17 g Oral Daily   17 g at 04/22/19 0817     senna-docusate (SENOKOT-S/PERICOLACE) 8.6-50 MG per tablet 1 tablet  1 tablet Oral Daily PRN   1 tablet at 04/20/19 0747     vortioxetine (TRINTELLIX/BRINTELLIX) tablet 20 mg  20 mg Oral Daily   20 mg at 04/25/19 0758     No current Epic-ordered outpatient medications on file.         Allergies      Allergies   Allergen Reactions     Abilify [Aripiprazole] Other (See Comments)     Tardive dyskinesia     Compazine Other (See Comments)     Dystonia     Dramamine      Reglan Other (See Comments)     dystonia     Seroquel [Quetiapine] Other (See Comments)     Tardive dyskinesia.         Medical Review of Systems     BP 96/55   Pulse 83   Temp 98.1  F (36.7  C) (Oral)   Resp 16   Ht 1.676 m (5' 6\")   Wt 78 kg (172 lb)   SpO2 97%   BMI 27.76 kg/m    Body mass index is 27.76 kg/m .  A 10-point review of systems was performed by Edgardo Addison MD and is negative, no new findings.      Psychiatric Examination     Appearance Sitting in chair, dressed in hospital scrubs. Appears stated age.   Attitude Cooperative   Orientation Oriented to person, place, time   Eye Contact Good   Speech Regular rate, rhythm, volume and tone   Language Normal   Psychomotor Behavior Normal   Mood Good    Affect Pleasant    Thought Process Goal-Oriented, Intact   Associations Intact   Thought Content Patient is currently negative for suicidal ideation, negative for plan or intent, able to contract no self harm and identify barriers to suicide.  Negative for obsessions, " compulsions or psychosis.     Fund of Knowledge Intact   Insight Improving    Judgement Improving   Attention Span & Concentration Fair    Recent & Remote Memory Intact   Gait Normal   Muscle Tone Intact      Labs     Labs reviewed.  Recent Results (from the past 24 hour(s))   Hepatitis C Screen Reflex to HCV RNA Quant and Genotype    Collection Time: 04/25/19  7:18 AM   Result Value Ref Range    Hepatitis C Antibody Reactive (AA) NR^Nonreactive   Hepatitis B surface antigen    Collection Time: 04/25/19  7:18 AM   Result Value Ref Range    Hep B Surface Agn Nonreactive NR^Nonreactive        Impression   This is a 43 year old female who attains a history of depression, substance abuse, and bipolar disorder. Due to patients Hep C antibody lab testing positive, Hospitalist was consulted yesterday afternoon. From this consultation, the Hospital deemed patients situation non-urgent being that patients bili and electrolytes were deemed stable. Patient will however need to follow-up with outpatient GI for this issue. Other than this, patient denied any complications with her overall care and denied any concerns in regards of her current medication regiment. Dr. Addison will not make any changes to medications today. Additionally, the patient noted before she left the interview that Melania Bagley had been in contact with her and informed her that there may be a bed available by Tuesday (4/30/19).     Diagnoses   1. Bipolar disorder  2. Major depression, recurrent, severe, without psychotic features.  3. General anxiety disorder.   4.         Benzodiazepine and opiate dependence  5.         Alcohol use disorder     Plan     1. Explained side effects, benefits, and complications of medications to the patient, Pt gave verbal consent.  2. Medication changes: None   3. Discussed treatment plan with patient and team.  4. Projected length of stay: 7+ days  5. Patient has been approved for [facility], continue to wait for bed  availability. Bed could be available by Tuesday (4/30/19)  6. Patient will need outpatient follow-up with GI per Hospitalist in regards of patients Hep C      Attestation:   Patient has been seen and evaluated by me, Edgardo Addison MD.    Patient ID:  Name: Jihan Gill    MRN: [id number]  Admission: 4/3/2019   YOB: 1975

## 2019-04-27 PROCEDURE — 90853 GROUP PSYCHOTHERAPY: CPT

## 2019-04-27 PROCEDURE — 25000132 ZZH RX MED GY IP 250 OP 250 PS 637: Performed by: PSYCHIATRY & NEUROLOGY

## 2019-04-27 PROCEDURE — 25000132 ZZH RX MED GY IP 250 OP 250 PS 637: Performed by: PHYSICIAN ASSISTANT

## 2019-04-27 PROCEDURE — 12400000 ZZH R&B MH

## 2019-04-27 PROCEDURE — 25000131 ZZH RX MED GY IP 250 OP 636 PS 637: Performed by: PHYSICIAN ASSISTANT

## 2019-04-27 RX ADMIN — BUPRENORPHINE HYDROCHLORIDE, NALOXONE HYDROCHLORIDE 1 FILM: 4; 1 FILM, SOLUBLE BUCCAL; SUBLINGUAL at 16:01

## 2019-04-27 RX ADMIN — SIMETHICONE CHEW TAB 80 MG 80 MG: 80 TABLET ORAL at 20:56

## 2019-04-27 RX ADMIN — GABAPENTIN 600 MG: 300 CAPSULE ORAL at 07:35

## 2019-04-27 RX ADMIN — ONDANSETRON 8 MG: 4 TABLET, ORALLY DISINTEGRATING ORAL at 13:40

## 2019-04-27 RX ADMIN — SIMETHICONE CHEW TAB 80 MG 80 MG: 80 TABLET ORAL at 13:39

## 2019-04-27 RX ADMIN — SIMETHICONE CHEW TAB 80 MG 80 MG: 80 TABLET ORAL at 07:38

## 2019-04-27 RX ADMIN — ONDANSETRON 8 MG: 4 TABLET, ORALLY DISINTEGRATING ORAL at 04:22

## 2019-04-27 RX ADMIN — BENZTROPINE MESYLATE 1 MG: 1 TABLET ORAL at 20:56

## 2019-04-27 RX ADMIN — SIMETHICONE CHEW TAB 80 MG 80 MG: 80 TABLET ORAL at 16:01

## 2019-04-27 RX ADMIN — BENZTROPINE MESYLATE 1 MG: 1 TABLET ORAL at 07:35

## 2019-04-27 RX ADMIN — GABAPENTIN 600 MG: 300 CAPSULE ORAL at 20:56

## 2019-04-27 RX ADMIN — VORTIOXETINE 20 MG: 20 TABLET, FILM COATED ORAL at 07:35

## 2019-04-27 RX ADMIN — GABAPENTIN 600 MG: 300 CAPSULE ORAL at 13:39

## 2019-04-27 RX ADMIN — CARIPRAZINE 3 MG: 1.5 CAPSULE, GELATIN COATED ORAL at 20:56

## 2019-04-27 RX ADMIN — BUPRENORPHINE HYDROCHLORIDE, NALOXONE HYDROCHLORIDE 1 FILM: 4; 1 FILM, SOLUBLE BUCCAL; SUBLINGUAL at 07:35

## 2019-04-27 ASSESSMENT — ACTIVITIES OF DAILY LIVING (ADL)
HYGIENE/GROOMING: INDEPENDENT
DRESS: STREET CLOTHES
ORAL_HYGIENE: INDEPENDENT
LAUNDRY: WITH SUPERVISION
ORAL_HYGIENE: INDEPENDENT
DRESS: INDEPENDENT
HYGIENE/GROOMING: INDEPENDENT
LAUNDRY: WITH SUPERVISION

## 2019-04-27 NOTE — PLAN OF CARE
Pt presents with blunt affect and calm mood. Previous pt visited and brought toiletries and other clothing items. Pt plans to discharge on Tuesday to [facility]. Pt denies any SI and is positive for Hep C.

## 2019-04-27 NOTE — PLAN OF CARE
"Pt appears visible in the unit although spent more time in her room. Pt continue to report anxiety and was overheard talking to Dad and tearing upon the phone she stated \"I  feel sick with nausea and bloating\". Zofran and semiticone given. Pt denies SI and looks forward to Tuesday when she is expected to discharge. Pt is med compliant. A print of her med list printed and given to her as requested.    "

## 2019-04-28 PROCEDURE — 25000132 ZZH RX MED GY IP 250 OP 250 PS 637: Performed by: PHYSICIAN ASSISTANT

## 2019-04-28 PROCEDURE — 25000132 ZZH RX MED GY IP 250 OP 250 PS 637: Performed by: PSYCHIATRY & NEUROLOGY

## 2019-04-28 PROCEDURE — 12400000 ZZH R&B MH

## 2019-04-28 PROCEDURE — 25000132 ZZH RX MED GY IP 250 OP 250 PS 637: Performed by: EMERGENCY MEDICINE

## 2019-04-28 PROCEDURE — 90853 GROUP PSYCHOTHERAPY: CPT

## 2019-04-28 RX ADMIN — SIMETHICONE CHEW TAB 80 MG 80 MG: 80 TABLET ORAL at 15:37

## 2019-04-28 RX ADMIN — BUPRENORPHINE HYDROCHLORIDE, NALOXONE HYDROCHLORIDE 1 FILM: 4; 1 FILM, SOLUBLE BUCCAL; SUBLINGUAL at 15:37

## 2019-04-28 RX ADMIN — BUPRENORPHINE HYDROCHLORIDE, NALOXONE HYDROCHLORIDE 1 FILM: 4; 1 FILM, SOLUBLE BUCCAL; SUBLINGUAL at 07:37

## 2019-04-28 RX ADMIN — GABAPENTIN 600 MG: 300 CAPSULE ORAL at 14:03

## 2019-04-28 RX ADMIN — LOPERAMIDE HYDROCHLORIDE 2 MG: 2 CAPSULE ORAL at 07:37

## 2019-04-28 RX ADMIN — SIMETHICONE CHEW TAB 80 MG 80 MG: 80 TABLET ORAL at 20:27

## 2019-04-28 RX ADMIN — SIMETHICONE CHEW TAB 80 MG 80 MG: 80 TABLET ORAL at 07:37

## 2019-04-28 RX ADMIN — GABAPENTIN 600 MG: 300 CAPSULE ORAL at 07:37

## 2019-04-28 RX ADMIN — CARIPRAZINE 3 MG: 1.5 CAPSULE, GELATIN COATED ORAL at 20:27

## 2019-04-28 RX ADMIN — ACETAMINOPHEN 650 MG: 325 TABLET, FILM COATED ORAL at 06:37

## 2019-04-28 RX ADMIN — GABAPENTIN 600 MG: 300 CAPSULE ORAL at 20:27

## 2019-04-28 RX ADMIN — SIMETHICONE CHEW TAB 80 MG 80 MG: 80 TABLET ORAL at 12:34

## 2019-04-28 RX ADMIN — BENZTROPINE MESYLATE 1 MG: 1 TABLET ORAL at 20:27

## 2019-04-28 RX ADMIN — VORTIOXETINE 20 MG: 20 TABLET, FILM COATED ORAL at 07:37

## 2019-04-28 ASSESSMENT — ACTIVITIES OF DAILY LIVING (ADL)
DRESS: STREET CLOTHES
ORAL_HYGIENE: INDEPENDENT
LAUNDRY: WITH SUPERVISION
HYGIENE/GROOMING: INDEPENDENT
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
## 2019-04-28 NOTE — PLAN OF CARE
Pt presents with sad mood and reported feeling sick. @ mg imodium given due to diarrhea. Pt express concern that she might have IBS and would check on it when she discharges Tuesday. Med compliant and denies SI. Pt stated my mood is getting better by the day.

## 2019-04-29 LAB
HCV AB SERPL QL IA: REACTIVE
HCV RNA SERPL NAA+PROBE-ACNC: ABNORMAL [IU]/ML
HCV RNA SERPL NAA+PROBE-LOG IU: 7 LOG IU/ML

## 2019-04-29 PROCEDURE — 90853 GROUP PSYCHOTHERAPY: CPT

## 2019-04-29 PROCEDURE — 25000131 ZZH RX MED GY IP 250 OP 636 PS 637: Performed by: PHYSICIAN ASSISTANT

## 2019-04-29 PROCEDURE — 12400000 ZZH R&B MH

## 2019-04-29 PROCEDURE — 25000132 ZZH RX MED GY IP 250 OP 250 PS 637: Performed by: PHYSICIAN ASSISTANT

## 2019-04-29 PROCEDURE — 25000132 ZZH RX MED GY IP 250 OP 250 PS 637: Performed by: PSYCHIATRY & NEUROLOGY

## 2019-04-29 RX ORDER — LOPERAMIDE HCL 2 MG
2 CAPSULE ORAL 4 TIMES DAILY PRN
Qty: 10 CAPSULE | Refills: 0 | Status: ON HOLD | OUTPATIENT
Start: 2019-04-29 | End: 2019-09-27

## 2019-04-29 RX ORDER — SIMETHICONE 80 MG
80 TABLET,CHEWABLE ORAL 4 TIMES DAILY
Qty: 120 TABLET | Refills: 0 | Status: ON HOLD | OUTPATIENT
Start: 2019-04-29 | End: 2019-09-27

## 2019-04-29 RX ORDER — BUPRENORPHINE AND NALOXONE 4; 1 MG/1; MG/1
1 FILM, SOLUBLE BUCCAL; SUBLINGUAL 2 TIMES DAILY
Qty: 60 FILM | Refills: 0 | Status: ON HOLD | OUTPATIENT
Start: 2019-04-29 | End: 2019-09-27

## 2019-04-29 RX ORDER — ONDANSETRON 8 MG/1
8 TABLET, ORALLY DISINTEGRATING ORAL EVERY 6 HOURS PRN
Qty: 30 TABLET | Refills: 0 | Status: ON HOLD | OUTPATIENT
Start: 2019-04-29 | End: 2019-09-27

## 2019-04-29 RX ORDER — GABAPENTIN 300 MG/1
600 CAPSULE ORAL 3 TIMES DAILY
Qty: 180 CAPSULE | Refills: 0 | Status: ON HOLD | OUTPATIENT
Start: 2019-04-29 | End: 2019-09-27

## 2019-04-29 RX ORDER — BENZTROPINE MESYLATE 1 MG/1
1 TABLET ORAL 2 TIMES DAILY
Qty: 60 TABLET | Refills: 0 | Status: ON HOLD | OUTPATIENT
Start: 2019-04-29 | End: 2019-09-27

## 2019-04-29 RX ADMIN — SIMETHICONE CHEW TAB 80 MG 80 MG: 80 TABLET ORAL at 21:10

## 2019-04-29 RX ADMIN — CARIPRAZINE 3 MG: 1.5 CAPSULE, GELATIN COATED ORAL at 21:09

## 2019-04-29 RX ADMIN — GABAPENTIN 600 MG: 300 CAPSULE ORAL at 13:42

## 2019-04-29 RX ADMIN — SIMETHICONE CHEW TAB 80 MG 80 MG: 80 TABLET ORAL at 07:47

## 2019-04-29 RX ADMIN — SIMETHICONE CHEW TAB 80 MG 80 MG: 80 TABLET ORAL at 15:50

## 2019-04-29 RX ADMIN — BENZTROPINE MESYLATE 1 MG: 1 TABLET ORAL at 21:10

## 2019-04-29 RX ADMIN — GABAPENTIN 600 MG: 300 CAPSULE ORAL at 21:09

## 2019-04-29 RX ADMIN — VORTIOXETINE 20 MG: 20 TABLET, FILM COATED ORAL at 07:47

## 2019-04-29 RX ADMIN — BUPRENORPHINE HYDROCHLORIDE, NALOXONE HYDROCHLORIDE 1 FILM: 4; 1 FILM, SOLUBLE BUCCAL; SUBLINGUAL at 15:50

## 2019-04-29 RX ADMIN — BUPRENORPHINE HYDROCHLORIDE, NALOXONE HYDROCHLORIDE 1 FILM: 4; 1 FILM, SOLUBLE BUCCAL; SUBLINGUAL at 07:47

## 2019-04-29 RX ADMIN — SIMETHICONE CHEW TAB 80 MG 80 MG: 80 TABLET ORAL at 12:11

## 2019-04-29 RX ADMIN — ONDANSETRON 8 MG: 4 TABLET, ORALLY DISINTEGRATING ORAL at 07:12

## 2019-04-29 RX ADMIN — GABAPENTIN 600 MG: 300 CAPSULE ORAL at 07:47

## 2019-04-29 ASSESSMENT — ACTIVITIES OF DAILY LIVING (ADL)
DRESS: STREET CLOTHES;INDEPENDENT
LAUNDRY: WITH SUPERVISION
ORAL_HYGIENE: INDEPENDENT
HYGIENE/GROOMING: INDEPENDENT
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
## 2019-04-29 NOTE — DISCHARGE SUMMARY
[facility] Psychiatric Discharge Summary      DATE OF ADMISSION: 4/3/2019     DATE OF DISCHARGE: 4/30/19    PRIMARY CARE PHYSICIAN: No Ref-Primary, Physician    IDENTIFICATION: For history, see dictation by Dr. Addison on 4/04/19. For physical summary, see dictation by [place] MD on 4/03/19.     HOSPITAL COURSE:   This is a 43 year old female who attains a history of depression, substance abuse, and bipolar disorder. She was discharged from [facility] on 4/01/19 by Dr. Addison with the plans of discharging to [facility] in [place], [place] and following up at [facility]. The patient however never made it to treatment, instead she got on the bus after discharge and went to a hotel where she used both meth and Klonopin with her boyfriend. When patient first arrived, she was somewhat isolative, withdrawn, and unpleasant. She stressed that she was quite fearful and hopeless in regards of her future. She reported experiencing increased paranoia and even auditory hallucinations since she was discharged from previous hospitalization (just last week). Dr. Addison increased patient's Trintellix dose from 10mg to 15mg in order to aid in patient's consistent symptoms of depression and paranoia. He also highly encouraged patient to utilize her DBT skills in order to deal with her fearfulness. Dr. Addison again increased Trintellix dose after a few days on 15mg to 20mg and increased Gabapentin dose from 600mg BID to 600mg TID in order to aid in patient's symptoms of depression. Patient was again started on Suboxone after finding out that she may be allowed on this medication while at treatment. Patient noted improvement in mood and thought process with the start of Suboxone 4-1mg per film and the increase in Trintellix. Dr. Addison increased Suboxone dose to 4-1mg per film every morning and 4-1mg per film every evening. Since the start of  "Suboxone, the patient reported an improvement in overall mood. She appeared more calm, less anxious, more pleasant, and more hopeful overall. Patient will be discharged to [facility] in [place], [place]. Upon finishing this program, patient is recommended to follow-up with medication management through [facility].     DISCHARGE MENTAL STATUS EXAMINATION:  Appearance Sitting in chair, dressed in hospital scrubs. Appears stated age.   Attitude Cooperative   Orientation Oriented to person, place, time   Eye Contact Good   Speech Regular rate, rhythm, volume and tone   Language Normal   Psychomotor Behavior Normal   Mood Good, but somewhat anxious    Affect Pleasant   Thought Process Goal-Oriented, Intact   Associations Intact   Thought Content Patient is currently negative for suicidal ideation, negative for plan or intent, able to contract no self harm and identify barriers to suicide.  Negative for obsessions, compulsions or psychosis.     Fund of Knowledge Intact   Insight Improving    Judgement Improving   Attention Span & Concentration Fair    Recent & Remote Memory Intact   Gait Normal   Muscle Tone Intact       LABORATORY DATA:    Refer to hospitalist admission dictation.  No results found for this or any previous visit (from the past 24 hour(s)).     /51   Pulse 97   Temp 98.1  F (36.7  C) (Oral)   Resp 16   Ht 1.676 m (5' 6\")   Wt 78 kg (172 lb)   SpO2 97%   BMI 27.76 kg/m       DISCHARGE MEDICATIONS:      Review of your medicines      START taking      Dose / Directions   benztropine 1 MG tablet  Commonly known as:  COGENTIN  Used for:  Severe episode of recurrent major depressive disorder, without psychotic features (H)      Dose:  1 mg  Take 1 tablet (1 mg) by mouth 2 times daily  Quantity:  60 tablet  Refills:  0     buprenorphine HCl-naloxone HCl 4-1 MG per film  Commonly known as:  SUBOXONE  Used for:  Severe episode of recurrent major depressive disorder, " without psychotic features (H)  Replaces:  buprenorphine HCl-naloxone HCl 8-2 MG per film      Dose:  1 Film  Place 1 Film under the tongue 2 times daily  Quantity:  60 Film  Refills:  0     loperamide 2 MG capsule  Commonly known as:  IMODIUM  Used for:  Severe episode of recurrent major depressive disorder, without psychotic features (H)      Dose:  2 mg  Take 1 capsule (2 mg) by mouth 4 times daily as needed for diarrhea  Quantity:  10 capsule  Refills:  0     simethicone 80 MG chewable tablet  Commonly known as:  MYLICON  Used for:  Severe episode of recurrent major depressive disorder, without psychotic features (H)      Dose:  80 mg  Take 1 tablet (80 mg) by mouth 4 times daily  Quantity:  120 tablet  Refills:  0        CONTINUE these medicines which may have CHANGED, or have new prescriptions. If we are uncertain of the size of tablets/capsules you have at home, strength may be listed as something that might have changed.      Dose / Directions   cariprazine 3 MG Caps capsule  Commonly known as:  VRAYLAR  This may have changed:      medication strength    how much to take    when to take this  Used for:  Severe episode of recurrent major depressive disorder, without psychotic features (H)      Dose:  3 mg  Take 1 capsule (3 mg) by mouth At Bedtime  Quantity:  30 capsule  Refills:  0     gabapentin 300 MG capsule  Commonly known as:  NEURONTIN  This may have changed:  when to take this  Used for:  Severe episode of recurrent major depressive disorder, without psychotic features (H)      Dose:  600 mg  Take 2 capsules (600 mg) by mouth 3 times daily  Quantity:  180 capsule  Refills:  0     ondansetron 8 MG ODT tab  Commonly known as:  ZOFRAN-ODT  This may have changed:      medication strength    how much to take  Used for:  Severe episode of recurrent major depressive disorder, without psychotic features (H)      Dose:  8 mg  Take 1 tablet (8 mg) by mouth every 6 hours as needed for nausea or  vomiting  Quantity:  30 tablet  Refills:  0        CONTINUE these medicines which have NOT CHANGED      Dose / Directions   vortioxetine 20 MG tablet  Commonly known as:  TRINTELLIX/BRINTELLIX  Used for:  Severe episode of recurrent major depressive disorder, without psychotic features (H)      Dose:  20 mg  Take 1 tablet (20 mg) by mouth daily  Quantity:  30 tablet  Refills:  0        STOP taking    acetaminophen 325 MG tablet  Commonly known as:  TYLENOL        buprenorphine HCl-naloxone HCl 8-2 MG per film  Commonly known as:  SUBOXONE  Replaced by:  buprenorphine HCl-naloxone HCl 4-1 MG per film        hydrOXYzine 25 MG tablet  Commonly known as:  ATARAX              Where to get your medicines      These medications were sent to [facility] - [place] - [address] [address]  [address] [address] [address]    Phone:  [phone]     benztropine 1 MG tablet    cariprazine 3 MG Caps capsule    gabapentin 300 MG capsule    loperamide 2 MG capsule    ondansetron 8 MG ODT tab    simethicone 80 MG chewable tablet    vortioxetine 20 MG tablet     Some of these will need a paper prescription and others can be bought over the counter. Ask your nurse if you have questions.    Bring a paper prescription for each of these medications    buprenorphine HCl-naloxone HCl 4-1 MG per film         DISCHARGE DIAGNOSES:    1. Bipolar disorder  2. Major depression, recurrent, severe, without psychotic features.  3. General anxiety disorder.   4.         Benzodiazepine and opiate dependence  5.         Alcohol use disorder    DISCHARGE PLAN:   1. Patient will continue with current medications: Vraylar 3mg daily, Trintellix 20mg daily, Suboxone 4-1mg BID, Cogentin 1mg BID, Gabapentin 600mg TID, Mylicon 80mg 4 times daily, Zofran 8mg BID PRN   2. Patient will discharge to [facility] program in [place], [place]  3. Upon discharge from [facility], patient may want to follow-up for  medication management through [facility]     DISCHARGE FOLLOW-UP:  See Reconciliation Note     Attestation:   Patient has been seen and evaluated by me, Edgardo Addison MD.    Patient ID:    Name: Jihan Gill MRN: [id number]  Admission: 4/3/2019  YOB: 1975

## 2019-04-29 NOTE — PLAN OF CARE
Patient denies suicidal ideation.  Patient states her mood is stable today.  She is attending groups.  Plan is for her to go to [facility] tomorrow. Meds are ordered.

## 2019-04-29 NOTE — PLAN OF CARE
Pt presents with sad affect and calm mood.  Pt has been visible for the majority of the shift interacting with peers in the lounge and in groups.  Pt pleasant and cooperative.  Pt attended unit programming.  During 1:1, pt reported feeling excited and anxious to discharge on Tuesday.  Pt denied SI and was medication compliant.

## 2019-04-29 NOTE — PROGRESS NOTES
[facility] will  patient at 1300 tomorrow.  They will call when they get here and we will take the patient to door 6.
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
## 2019-04-30 VITALS
OXYGEN SATURATION: 97 % | RESPIRATION RATE: 16 BRPM | DIASTOLIC BLOOD PRESSURE: 47 MMHG | SYSTOLIC BLOOD PRESSURE: 99 MMHG | BODY MASS INDEX: 27.29 KG/M2 | HEIGHT: 66 IN | WEIGHT: 169.8 LBS | TEMPERATURE: 98.9 F | HEART RATE: 97 BPM

## 2019-04-30 PROCEDURE — 25000132 ZZH RX MED GY IP 250 OP 250 PS 637: Performed by: PHYSICIAN ASSISTANT

## 2019-04-30 PROCEDURE — 90853 GROUP PSYCHOTHERAPY: CPT

## 2019-04-30 PROCEDURE — 25000132 ZZH RX MED GY IP 250 OP 250 PS 637: Performed by: PSYCHIATRY & NEUROLOGY

## 2019-04-30 PROCEDURE — 25000131 ZZH RX MED GY IP 250 OP 636 PS 637: Performed by: PHYSICIAN ASSISTANT

## 2019-04-30 PROCEDURE — 25000132 ZZH RX MED GY IP 250 OP 250 PS 637: Performed by: EMERGENCY MEDICINE

## 2019-04-30 RX ADMIN — SIMETHICONE CHEW TAB 80 MG 80 MG: 80 TABLET ORAL at 07:57

## 2019-04-30 RX ADMIN — VORTIOXETINE 20 MG: 20 TABLET, FILM COATED ORAL at 07:57

## 2019-04-30 RX ADMIN — GABAPENTIN 600 MG: 300 CAPSULE ORAL at 13:00

## 2019-04-30 RX ADMIN — SIMETHICONE CHEW TAB 80 MG 80 MG: 80 TABLET ORAL at 11:19

## 2019-04-30 RX ADMIN — GABAPENTIN 600 MG: 300 CAPSULE ORAL at 07:57

## 2019-04-30 RX ADMIN — ACETAMINOPHEN 650 MG: 325 TABLET, FILM COATED ORAL at 07:56

## 2019-04-30 RX ADMIN — BUPRENORPHINE HYDROCHLORIDE, NALOXONE HYDROCHLORIDE 1 FILM: 4; 1 FILM, SOLUBLE BUCCAL; SUBLINGUAL at 07:56

## 2019-04-30 RX ADMIN — ONDANSETRON 8 MG: 4 TABLET, ORALLY DISINTEGRATING ORAL at 08:42

## 2019-04-30 ASSESSMENT — ACTIVITIES OF DAILY LIVING (ADL)
HYGIENE/GROOMING: INDEPENDENT
LAUNDRY: WITH SUPERVISION
ORAL_HYGIENE: INDEPENDENT
DRESS: STREET CLOTHES

## 2019-04-30 ASSESSMENT — MIFFLIN-ST. JEOR: SCORE: 1451.96

## 2019-04-30 NOTE — PROGRESS NOTES
Patient discharged denies suicidal ideation and has stable mood. Instructions gone over with patient regarding medications and follow up plan.  . Copies of discharge instructions ( After Visit Summary) given to patient. Thirty day supply of medications sent with patient and staff from [facility] here to  at 1300.

## 2019-04-30 NOTE — PLAN OF CARE
Patient is looking forward to discharge tomorrow to [facility] at 1300.  Meds will be delivered at 1100 due to having to order vraylar.  Patient will need to follow up as an out-pt. With [facility] Regarding her Hepatitis C.  She participated in all milieu activities & was social with peers.  Patient denies any suicidal ideation or thoughts of self harm.

## 2019-04-30 NOTE — DISCHARGE INSTRUCTIONS
Behavioral Discharge Planning and Instructions    Summary: Readmitted to [facility] unit due to suicidal ideation with a plan to jump off of a bridge.     Main Diagnosis:    1. Bipolar disorder  2. Major depression, recurrent, severe, without psychotic features.  3. General anxiety disorder.   4.   Benzodiazepine and opiate dependence  5.   Alcohol use disorder    Major Treatments, Procedures and Findings:Psychiatric assessment;Chemical dependency assessment     Symptoms to Report: mood getting worse or thoughts of suicide    Lifestyle Adjustment:Develop and follow safety plan. Attend and complete chemical dependency treatment and follow recommendations from your care providers. Abstain from any mood altering substances including alcohol and Methamphetamine.     Follow up for Hep C  [facility]   Address: [address], [place] [address]   Phone: [phone]   Website: [facility]      Psychiatry Follow-up:     [facility] - admission date 4/30/19 (MI/CD residential program).  [address] [place] [address]  [phone]    On discharge from [facility], you may want to follow up for medication management where last you were scheduled: Both medication management and therapy are available there.  [facility] Tiffanie Yell  [address] [place] [address] Dr. Tiffanie Valdez, [id number]  [phone] fax: [phone]      Prior referral for suboxone:  [address] [address]  [phone]     Resources:   [facility] Service- [phone]  National [place] on Mental Illness (www.[place].zunilda.org): 364.640.5091 or 450-371-7203.  Alcoholics Anonymous (www.alcoholics-anonymous.org): Check your phone book for your local chapter.  Suicide Awareness Voices of Education (SAVE) (www.save.org): 599-517-QIWB (9922)  National Suicide Prevention Line (www.mentalhealthmn.org): 820-238-UEYX (2431)  Mental Health  Consumer/Survivor Network of [place] (www.mhcsn.net): 707-166-6622 or 125-718-3721  Mental Health Association of [place] (www.mentalhealth.org): 212.239.8584 or 576-914-8617    General Medication Instructions:   See your medication sheet(s) for instructions.   Take all medicines as directed.  Make no changes unless your doctor suggests them.   Go to all your doctor visits.  Be sure to have all your required lab tests. This way, your medicines can be refilled on time.  Do not use any drugs not prescribed by your doctor.  Avoid alcohol.

## 2019-05-04 LAB
HCV GENTYP SERPL NAA+PROBE: NORMAL
HCV RNA SERPL NAA+PROBE-ACNC: ABNORMAL [IU]/ML
HCV RNA SERPL NAA+PROBE-LOG IU: 7 LOG IU/ML

## 2019-09-13 ENCOUNTER — HOSPITAL ENCOUNTER (EMERGENCY)
Facility: CLINIC | Age: 44
Discharge: PSYCHIATRIC OR SUBSTANCE USE FACILITY | End: 2019-09-15
Attending: EMERGENCY MEDICINE | Admitting: EMERGENCY MEDICINE
Payer: COMMERCIAL

## 2019-09-13 DIAGNOSIS — F29 PSYCHOSIS, UNSPECIFIED PSYCHOSIS TYPE (H): ICD-10-CM

## 2019-09-13 DIAGNOSIS — F15.10 METHAMPHETAMINE ABUSE (H): ICD-10-CM

## 2019-09-13 DIAGNOSIS — R44.1 VISUAL HALLUCINATIONS: ICD-10-CM

## 2019-09-13 PROCEDURE — 80053 COMPREHEN METABOLIC PANEL: CPT | Performed by: EMERGENCY MEDICINE

## 2019-09-13 PROCEDURE — 80329 ANALGESICS NON-OPIOID 1 OR 2: CPT | Performed by: EMERGENCY MEDICINE

## 2019-09-13 PROCEDURE — 80178 ASSAY OF LITHIUM: CPT | Performed by: EMERGENCY MEDICINE

## 2019-09-13 PROCEDURE — 99285 EMERGENCY DEPT VISIT HI MDM: CPT | Mod: 25

## 2019-09-13 PROCEDURE — 80329 ANALGESICS NON-OPIOID 1 OR 2: CPT | Mod: 59 | Performed by: EMERGENCY MEDICINE

## 2019-09-13 RX ORDER — OLANZAPINE 10 MG/2ML
10 INJECTION, POWDER, FOR SOLUTION INTRAMUSCULAR ONCE
Status: DISCONTINUED | OUTPATIENT
Start: 2019-09-13 | End: 2019-09-15 | Stop reason: HOSPADM

## 2019-09-13 ASSESSMENT — ENCOUNTER SYMPTOMS
CONFUSION: 1
HALLUCINATIONS: 1
AGITATION: 1

## 2019-09-13 NOTE — ED AVS SNAPSHOT
Emergency Department  [address] [place] [address]  Phone:  [phone]  Fax:  [phone]                                    Jihan Gill   MRN: [id number]    Department:   Emergency Department   Date of Visit:  9/13/2019           After Visit Summary Signature Page    I have received my discharge instructions, and my questions have been answered. I have discussed any challenges I see with this plan with the nurse or doctor.    ..........................................................................................................................................  Patient/Patient Representative Signature      ..........................................................................................................................................  Patient Representative Print Name and Relationship to Patient    ..................................................               ................................................  Date                                   Time    ..........................................................................................................................................  Reviewed by Signature/Title    ...................................................              ..............................................  Date                                               Time          22EPIC Rev 08/18

## 2019-09-14 LAB
ALBUMIN SERPL-MCNC: 4 G/DL (ref 3.4–5)
ALBUMIN UR-MCNC: NEGATIVE MG/DL
ALP SERPL-CCNC: 130 U/L (ref 40–150)
ALT SERPL W P-5'-P-CCNC: 126 U/L (ref 0–50)
AMPHETAMINES UR QL SCN: POSITIVE
ANION GAP SERPL CALCULATED.3IONS-SCNC: 4 MMOL/L (ref 3–14)
APAP SERPL-MCNC: <2 MG/L (ref 10–20)
APPEARANCE UR: CLEAR
AST SERPL W P-5'-P-CCNC: 75 U/L (ref 0–45)
BACTERIA #/AREA URNS HPF: ABNORMAL /HPF
BARBITURATES UR QL: NEGATIVE
BENZODIAZ UR QL: POSITIVE
BILIRUB SERPL-MCNC: 0.3 MG/DL (ref 0.2–1.3)
BILIRUB UR QL STRIP: NEGATIVE
BUN SERPL-MCNC: 12 MG/DL (ref 7–30)
CALCIUM SERPL-MCNC: 9.9 MG/DL (ref 8.5–10.1)
CANNABINOIDS UR QL SCN: NEGATIVE
CHLORIDE SERPL-SCNC: 102 MMOL/L (ref 94–109)
CO2 SERPL-SCNC: 31 MMOL/L (ref 20–32)
COCAINE UR QL: NEGATIVE
COLOR UR AUTO: YELLOW
CREAT SERPL-MCNC: 0.66 MG/DL (ref 0.52–1.04)
GFR SERPL CREATININE-BSD FRML MDRD: >90 ML/MIN/{1.73_M2}
GLUCOSE SERPL-MCNC: 86 MG/DL (ref 70–99)
GLUCOSE UR STRIP-MCNC: NEGATIVE MG/DL
HGB UR QL STRIP: NEGATIVE
KETONES UR STRIP-MCNC: NEGATIVE MG/DL
LEUKOCYTE ESTERASE UR QL STRIP: ABNORMAL
LITHIUM SERPL-SCNC: 0.4 MMOL/L (ref 0.6–1.2)
MUCOUS THREADS #/AREA URNS LPF: PRESENT /LPF
NITRATE UR QL: NEGATIVE
OPIATES UR QL SCN: NEGATIVE
PCP UR QL SCN: NEGATIVE
PH UR STRIP: 7.5 PH (ref 5–7)
POTASSIUM SERPL-SCNC: 3.7 MMOL/L (ref 3.4–5.3)
PROT SERPL-MCNC: 7.9 G/DL (ref 6.8–8.8)
RBC #/AREA URNS AUTO: 1 /HPF (ref 0–2)
SALICYLATES SERPL-MCNC: <2 MG/DL
SODIUM SERPL-SCNC: 137 MMOL/L (ref 133–144)
SOURCE: ABNORMAL
SP GR UR STRIP: 1.01 (ref 1–1.03)
SQUAMOUS #/AREA URNS AUTO: 3 /HPF (ref 0–1)
UROBILINOGEN UR STRIP-MCNC: NORMAL MG/DL (ref 0–2)
WBC #/AREA URNS AUTO: 5 /HPF (ref 0–5)

## 2019-09-14 PROCEDURE — 87086 URINE CULTURE/COLONY COUNT: CPT | Performed by: EMERGENCY MEDICINE

## 2019-09-14 PROCEDURE — 25000132 ZZH RX MED GY IP 250 OP 250 PS 637: Performed by: EMERGENCY MEDICINE

## 2019-09-14 PROCEDURE — 81001 URINALYSIS AUTO W/SCOPE: CPT | Mod: XU | Performed by: EMERGENCY MEDICINE

## 2019-09-14 PROCEDURE — 90791 PSYCH DIAGNOSTIC EVALUATION: CPT

## 2019-09-14 PROCEDURE — 80307 DRUG TEST PRSMV CHEM ANLYZR: CPT | Performed by: EMERGENCY MEDICINE

## 2019-09-14 RX ORDER — HYDROXYZINE HYDROCHLORIDE 25 MG/1
50 TABLET, FILM COATED ORAL
Status: COMPLETED | OUTPATIENT
Start: 2019-09-14 | End: 2019-09-14

## 2019-09-14 RX ORDER — BENZTROPINE MESYLATE 1 MG/1
1 TABLET ORAL ONCE
Status: COMPLETED | OUTPATIENT
Start: 2019-09-14 | End: 2019-09-14

## 2019-09-14 RX ORDER — BENZTROPINE MESYLATE 1 MG/ML
1 INJECTION, SOLUTION INTRAMUSCULAR; INTRAVENOUS ONCE
Status: DISCONTINUED | OUTPATIENT
Start: 2019-09-14 | End: 2019-09-14

## 2019-09-14 RX ORDER — GABAPENTIN 300 MG/1
300 CAPSULE ORAL ONCE
Status: COMPLETED | OUTPATIENT
Start: 2019-09-14 | End: 2019-09-14

## 2019-09-14 RX ORDER — POLYETHYLENE GLYCOL 3350 17 G
2-4 POWDER IN PACKET (EA) ORAL
Status: DISCONTINUED | OUTPATIENT
Start: 2019-09-14 | End: 2019-09-15 | Stop reason: HOSPADM

## 2019-09-14 RX ADMIN — CARIPRAZINE 3 MG: 1.5 CAPSULE, GELATIN COATED ORAL at 15:26

## 2019-09-14 RX ADMIN — VORTIOXETINE 20 MG: 20 TABLET, FILM COATED ORAL at 15:26

## 2019-09-14 RX ADMIN — GABAPENTIN 300 MG: 300 CAPSULE ORAL at 16:41

## 2019-09-14 RX ADMIN — BENZTROPINE MESYLATE 1 MG: 1 TABLET ORAL at 16:41

## 2019-09-14 RX ADMIN — HYDROXYZINE HYDROCHLORIDE 50 MG: 25 TABLET, FILM COATED ORAL at 01:53

## 2019-09-14 NOTE — ED NOTES
"When giving patient her belongings she asked about her cell phone and Ugg boots. Staff showed her the belongings list and told her she did not come with either and patient responded with \" I was wearing my Ugg boots while running through the snow in the woods after my son\". Staff assured her we didn't have her boots or cell phone or boots. Patient went back to her room. Patient kept coming out of her room with many disorganized thoughts. Patient thought there was a man yelling at her and a man watching her. Staff told her that the people she saw walking by the window were paramedics and they are just here to drop off patients at the hospital. Patient seemed ok with that and went back to her room. Patient came out again in tears and upset about not having her medication and thinking there is a man watching her and yelling at her. MD and RN notified.   "
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
## 2019-09-14 NOTE — ED NOTES
"Patient in room moving bed and \"cleaning it\". Bed removed from room. Patient now watching television on mattress on floor. Will continue to monitor   "

## 2019-09-14 NOTE — ED NOTES
Pt sleeping sitting upright on the mattress against the wall. Pt repositions to supine position on the mattress.

## 2019-09-14 NOTE — ED NOTES
"Pt up to window rambling incoherently about \"talking to her mom\" and other statements that cannot be understood.  "

## 2019-09-14 NOTE — ED PROVIDER NOTES
Jihan Gill is a 44 year old female seen by Dr. Lainez last evening and here on our shift. Patient comes in after being picked up by paramedics running around the neighborhood looking for her 18 year old child. Paramedics are familiar with her, she has been brought in before, and tends to use meth and develop psychosis. She was seen by Dr. Espinoza in April and is on several meds. It is unsure if she was taking these. She was watched during the night and received atarax. She states she had an allergy to other narcotics. She was reevaluated by LORI, our DEC  today, and it appeared she was back to her normal self. I had set her up to go home, but according to the nurses, she has become more confused, making statements that do not make sense. I will give her medication that she has not had, 1 mg cogentin, 20 mg Trintellix, and 300 mg of Neurontin. Patient will be reassessed by DEC, hopefully this will stabilize her enough to go home. If not, we may need to change our disposition plans.      Scribe Disclosure:  I, Ofelia Gonzalez, am serving as a scribe at 3:04 PM on 9/14/2019 to document services personally performed by Vitaliy Lopez MD based on my observations and the provider's statements to me.        Vitaliy Lopez MD  09/14/19 2882

## 2019-09-14 NOTE — ED NOTES
Patient continuously walking up to the nurses station. Not speaking in logical sentences. Patient is attempting to stick things under the garage door in her room and making faces on the glass of the nurses station.

## 2019-09-14 NOTE — ED NOTES
Bed: ED17  Expected date:   Expected time:   Means of arrival:   Comments:  [facility] [place] [id number] crisis eval, on hold, cooperative - ETA 11min

## 2019-09-14 NOTE — ED NOTES
Pt walked out to nurses station, stands at glass. Talked with pt and redirected her back to her room. Pt rambling and not making sense with her words. Pt reoriented to place and time. Pt given water and crackers

## 2019-09-14 NOTE — ED PROVIDER NOTES
History     Chief Complaint:  Psychiatric evaluation    HPI   Jihan Gill is a 44 year old female with a history of substance abuse,hepatitis C, and diagnosis of depression and bipolar disorder who presents to the emergency department for evaluation of altered mental status. The patient was most recently admitted on 09/01 for lithium toxicity and hallucinations. The patient was found by police running down the street. Upon talking to her they found she was very confused, thinking that it was 1999 and that her 18 year old son was in the cars around her. This is her third encounter with police.     In the emergency department, the patient states that she is not sure why she is here. She states that she has begun taking Ativan and that it made her feel non-anxious. She states that she was fine this morning and mentions feeling bad that she is not there for her son who is 18. She does admit to hallucinations and takes 170 mg of Methadone a day for opioid addiction. She denies drug use, injury, thought of self harm or harm to others.    Allergies:  Abilify  Compazine  Dramamine  Reglan  Seroquel  Aripiprazole  Buspirone  Prochlorperazine  Dimenhydrinate  Droperidol  Hydrocodone  Ketorolac  Metoclopramide  Quetiapine  Sulfa    Medications:    Neurontin  Prozac  Ativan  Xanax  Nicorette  Lithium carbonate    Past Medical History:    Arthritis  Depression  Opiate addiction  Seizures  Urinary calculus  Benzodiazepine dependence  Bipolar disorder  Lithium overdose  Hepatitis C  Suicidal ideation  PTSD    Past Surgical History:    C section  ENT surgery  GYN surgery  Tonsillectomy    Family History:    Cancer  Depression    Social History:  The patient presented alone.  Smoking Status: Never  Smokeless Tobacco: Never  Alcohol Use: Yes  Drug Use: Yes  Marital Status:        Review of Systems   Psychiatric/Behavioral: Positive for agitation, confusion and hallucinations. Negative for self-injury.   All other  systems reviewed and are negative.        Physical Exam   Vitals:  Patient Vitals for the past 24 hrs:   BP Temp Temp src Pulse Resp SpO2 Weight   09/13/19 2228 91/74 98.2  F (36.8  C) Oral 81 20 96 % 83.9 kg (185 lb)     Physical Exam   Constitutional: She is oriented to person, place, and time. She appears well-developed.   Tearful and anxious appearing   HENT:   Head: Normocephalic and atraumatic.   Eyes: Pupils are equal, round, and reactive to light.   Neck: Normal range of motion.   Cardiovascular: Normal rate and regular rhythm.   Pulmonary/Chest: Effort normal.   Neurological: She is alert and oriented to person, place, and time.   Skin: Skin is warm and dry.   Psychiatric:   Patient is tangential seems to talk about things randomly.  Patient is aware of the year and her location.  She is not delusional though she seems somewhat manic.  Patient does admit to possible drug use though is not clear about this.  Patient is on a new anti-anxiety medication but they cannot tell me what.   Nursing note and vitals reviewed.        Emergency Department Course     Laboratory:  CMP: AWNL (Creatinine normal)  Lithium: 0.4    Acetaminophen Level: pending  Salicylate Level: pending    UA with micro: pending o/w negative  Drug abuse screen 77 urine: pending    Interventions:  Emergency Department Course:  Nursing notes and vitals reviewed. 2235 I performed an exam of the patient as documented above.     Medicine administered as documented above. Blood drawn. This was sent to the lab for further testing, results above.    The patient provided a urine sample here in the emergency department. This was sent for laboratory testing, findings above.     2239 I spoke with DEC and asked them to evaluate the patient.    2249 I spoke with DEC regarding the patient.    2310 I rechecked and updated the patient.     The patient was signed out to by colleague Dr. Epstein for re-evaluation of drug use and mental health  in the  morning.    Impression & Plan      Medical Decision Making:  Patient presents with agitated behavior running in the street.  Clinical presentation can be consistent with von and psychosis.  The patient does have a history of methamphetamine abuse and I wonder about drug use.  I am unable to really clarify at this time whether this is drug related psychosis or not.  Deck consultation was made and we did have clinically decided to the.  Of observation to see if mental health clears is appropriate.  We will observe till the morning and reassess.  If patient continuous tearful and shows signs of von and or psychosis consider admission once methamphetamines if ingested clear from her system.  Patient is placed on a flight risk old until the morning.      Diagnosis:    ICD-10-CM    1. 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 Epstein at 1 am pending reassessment in am      Discharge Medications:  New Prescriptions    No medications on file     I, Jarad Mcnulty, am serving as a scribe on 9/13/2019 at 10:55 PM to personally document services performed by Jermaine Lainez MD based on my observations and the provider's statements to me.     Jarad Mcnulty  9/13/2019    EMERGENCY DEPARTMENT       Jermaine Lainez MD  09/14/19 0100

## 2019-09-14 NOTE — ED NOTES
Patient was escorted to the bathroom outside of  area due to bathroom in  being clogged. Security and Tech escorted patient out and back to her room with no issues. Patient was also given chap stick and tissues.

## 2019-09-14 NOTE — ED NOTES
Pt walking around in common area of behavioral suite, kissing the glass of fishbowl area by ERT.  Continuing to talk to herself and walk around unit aimlessly.

## 2019-09-14 NOTE — ED NOTES
"Pt trying to open the mattress \"my son is in there, he isnt breathing\" reoriented pt to time and place. Bedding and scrubs changed.  "

## 2019-09-14 NOTE — ED PROVIDER NOTES
Patient was signed out to me after she came in last night with visual hallucinations and psychosis.  It was believed to be secondary to her methamphetamine use.  She seemed to clear this morning and there were plans to get her discharged.  However she became psychotic again as the day went on and was hallucinating and paranoid.  She thought she saw a fetus near the camera in the room.  She also thought there were guns pointing down at her from the ceiling.  This was almost 24 hours after her last use of methamphetamine.  I had Logan ELIZABETH see the patient again and he agreed that she is psychotic and needs admission.  Her mom came in and she visited with her daughter and said this is not normal.  She does have bipolar disorder diagnosis but I think her drugs are certainly contributing to her mental status and psychosis as well.  She has been accepted at [facility] and will be transferred there by ambulance on a 72-hour hold.      ICD-10-CM    1. Psychosis, unspecified psychosis type (H) F29    2. Visual hallucinations R44.1    3. Methamphetamine abuse (H) F15.10           Janee Garcia MD  09/14/19 8903

## 2019-09-14 NOTE — ED NOTES
Patient is in her room pointing at the corners of the ceiling and looking into the TV screen that is off and is pointing at it.

## 2019-09-15 ENCOUNTER — HOSPITAL ENCOUNTER (INPATIENT)
Facility: CLINIC | Age: 44
LOS: 16 days | Discharge: PSYCHIATRIC OR SUBSTANCE USE FACILITY | End: 2019-10-01
Attending: PSYCHIATRY & NEUROLOGY | Admitting: PSYCHIATRY & NEUROLOGY
Payer: COMMERCIAL

## 2019-09-15 VITALS
WEIGHT: 185 LBS | DIASTOLIC BLOOD PRESSURE: 66 MMHG | HEART RATE: 108 BPM | OXYGEN SATURATION: 100 % | BODY MASS INDEX: 29.86 KG/M2 | SYSTOLIC BLOOD PRESSURE: 108 MMHG | TEMPERATURE: 99 F | RESPIRATION RATE: 20 BRPM

## 2019-09-15 DIAGNOSIS — F15.20 AMPHETAMINE USE DISORDER, SEVERE (H): Primary | ICD-10-CM

## 2019-09-15 DIAGNOSIS — F11.21 OPIOID DEPENDENCE IN REMISSION (H): ICD-10-CM

## 2019-09-15 DIAGNOSIS — F31.81 BIPOLAR II DISORDER (H): ICD-10-CM

## 2019-09-15 DIAGNOSIS — F33.2 SEVERE EPISODE OF RECURRENT MAJOR DEPRESSIVE DISORDER, WITHOUT PSYCHOTIC FEATURES (H): ICD-10-CM

## 2019-09-15 PROBLEM — F31.9 BIPOLAR 1 DISORDER (H): Status: ACTIVE | Noted: 2019-09-15

## 2019-09-15 LAB
BACTERIA SPEC CULT: NO GROWTH
Lab: NORMAL
SPECIMEN SOURCE: NORMAL

## 2019-09-15 PROCEDURE — 99207 ZZC CDG-HISTORY COMP: MEETS EXP. PROB FOCUSED- DOWN CODED LACK OF PFSH: CPT | Performed by: PSYCHIATRY & NEUROLOGY

## 2019-09-15 PROCEDURE — 25000132 ZZH RX MED GY IP 250 OP 250 PS 637: Performed by: PSYCHIATRY & NEUROLOGY

## 2019-09-15 PROCEDURE — 12400001 ZZH R&B MH UMMC

## 2019-09-15 PROCEDURE — HZ2ZZZZ DETOXIFICATION SERVICES FOR SUBSTANCE ABUSE TREATMENT: ICD-10-PCS | Performed by: PSYCHIATRY & NEUROLOGY

## 2019-09-15 PROCEDURE — 99221 1ST HOSP IP/OBS SF/LOW 40: CPT | Mod: AI | Performed by: PSYCHIATRY & NEUROLOGY

## 2019-09-15 RX ORDER — TRAZODONE HYDROCHLORIDE 50 MG/1
50 TABLET, FILM COATED ORAL
Status: DISCONTINUED | OUTPATIENT
Start: 2019-09-15 | End: 2019-10-01 | Stop reason: HOSPADM

## 2019-09-15 RX ORDER — HALOPERIDOL 5 MG/1
5-10 TABLET ORAL EVERY 4 HOURS PRN
Status: DISCONTINUED | OUTPATIENT
Start: 2019-09-15 | End: 2019-10-01 | Stop reason: HOSPADM

## 2019-09-15 RX ORDER — HYDROXYZINE HYDROCHLORIDE 25 MG/1
25-50 TABLET, FILM COATED ORAL EVERY 4 HOURS PRN
Status: DISCONTINUED | OUTPATIENT
Start: 2019-09-15 | End: 2019-10-01 | Stop reason: HOSPADM

## 2019-09-15 RX ORDER — GABAPENTIN 300 MG/1
600 CAPSULE ORAL 3 TIMES DAILY
Status: DISCONTINUED | OUTPATIENT
Start: 2019-09-15 | End: 2019-09-16

## 2019-09-15 RX ORDER — BENZTROPINE MESYLATE 0.5 MG/1
1 TABLET ORAL 2 TIMES DAILY
Status: DISCONTINUED | OUTPATIENT
Start: 2019-09-15 | End: 2019-09-16

## 2019-09-15 RX ORDER — LOPERAMIDE HCL 2 MG
2 CAPSULE ORAL 4 TIMES DAILY PRN
Status: DISCONTINUED | OUTPATIENT
Start: 2019-09-15 | End: 2019-10-01 | Stop reason: HOSPADM

## 2019-09-15 RX ORDER — POLYETHYLENE GLYCOL 3350 17 G
2-4 POWDER IN PACKET (EA) ORAL
Status: DISCONTINUED | OUTPATIENT
Start: 2019-09-15 | End: 2019-10-01 | Stop reason: HOSPADM

## 2019-09-15 RX ORDER — LORAZEPAM 1 MG/1
1-2 TABLET ORAL EVERY 4 HOURS PRN
Status: DISCONTINUED | OUTPATIENT
Start: 2019-09-15 | End: 2019-09-24

## 2019-09-15 RX ORDER — DIPHENHYDRAMINE HCL 25 MG
25-50 CAPSULE ORAL EVERY 4 HOURS PRN
Status: DISCONTINUED | OUTPATIENT
Start: 2019-09-15 | End: 2019-10-01 | Stop reason: HOSPADM

## 2019-09-15 RX ORDER — LORAZEPAM 2 MG/ML
1-2 INJECTION INTRAMUSCULAR EVERY 4 HOURS PRN
Status: DISCONTINUED | OUTPATIENT
Start: 2019-09-15 | End: 2019-09-24

## 2019-09-15 RX ORDER — HALOPERIDOL 5 MG/ML
5-10 INJECTION INTRAMUSCULAR EVERY 4 HOURS PRN
Status: DISCONTINUED | OUTPATIENT
Start: 2019-09-15 | End: 2019-10-01 | Stop reason: HOSPADM

## 2019-09-15 RX ORDER — DIPHENHYDRAMINE HYDROCHLORIDE 50 MG/ML
25-50 INJECTION INTRAMUSCULAR; INTRAVENOUS EVERY 4 HOURS PRN
Status: DISCONTINUED | OUTPATIENT
Start: 2019-09-15 | End: 2019-10-01 | Stop reason: HOSPADM

## 2019-09-15 RX ORDER — ONDANSETRON 4 MG/1
8 TABLET, ORALLY DISINTEGRATING ORAL EVERY 6 HOURS PRN
Status: DISCONTINUED | OUTPATIENT
Start: 2019-09-15 | End: 2019-09-16

## 2019-09-15 RX ORDER — SIMETHICONE 80 MG
80 TABLET,CHEWABLE ORAL 4 TIMES DAILY
Status: DISCONTINUED | OUTPATIENT
Start: 2019-09-15 | End: 2019-09-24

## 2019-09-15 RX ORDER — LORAZEPAM 1 MG/1
1-4 TABLET ORAL EVERY 30 MIN PRN
Status: DISCONTINUED | OUTPATIENT
Start: 2019-09-15 | End: 2019-09-16

## 2019-09-15 RX ADMIN — SIMETHICONE CHEW TAB 80 MG 80 MG: 80 TABLET ORAL at 08:59

## 2019-09-15 RX ADMIN — GABAPENTIN 600 MG: 300 CAPSULE ORAL at 10:49

## 2019-09-15 RX ADMIN — HALOPERIDOL 5 MG: 5 TABLET ORAL at 05:24

## 2019-09-15 RX ADMIN — DIPHENHYDRAMINE HYDROCHLORIDE 25 MG: 25 CAPSULE ORAL at 05:24

## 2019-09-15 RX ADMIN — LORAZEPAM 2 MG: 1 TABLET ORAL at 03:57

## 2019-09-15 RX ADMIN — CARIPRAZINE 3 MG: 1.5 CAPSULE, GELATIN COATED ORAL at 20:07

## 2019-09-15 RX ADMIN — BENZTROPINE MESYLATE 1 MG: 0.5 TABLET ORAL at 08:59

## 2019-09-15 RX ADMIN — LORAZEPAM 2 MG: 1 TABLET ORAL at 07:10

## 2019-09-15 RX ADMIN — GABAPENTIN 600 MG: 300 CAPSULE ORAL at 20:06

## 2019-09-15 RX ADMIN — GABAPENTIN 600 MG: 300 CAPSULE ORAL at 14:44

## 2019-09-15 RX ADMIN — BENZTROPINE MESYLATE 1 MG: 0.5 TABLET ORAL at 20:06

## 2019-09-15 RX ADMIN — LORAZEPAM 2 MG: 1 TABLET ORAL at 05:24

## 2019-09-15 RX ADMIN — SIMETHICONE CHEW TAB 80 MG 80 MG: 80 TABLET ORAL at 20:06

## 2019-09-15 RX ADMIN — VORTIOXETINE 20 MG: 10 TABLET, FILM COATED ORAL at 10:49

## 2019-09-15 ASSESSMENT — ACTIVITIES OF DAILY LIVING (ADL)
COGNITION: 2 - DIFFICULTY WITH ORGANIZING THOUGHTS
TRANSFERRING: 0-->INDEPENDENT
ORAL_HYGIENE: PROMPTS
HYGIENE/GROOMING: HANDWASHING;INDEPENDENT
BATHING: 0-->INDEPENDENT
AMBULATION: 0-->INDEPENDENT
WHICH_OF_THE_ABOVE_FUNCTIONAL_RISKS_HAD_A_RECENT_ONSET_OR_CHANGE?: COGNITION
TRANSFERRING: 0-->INDEPENDENT
BATHING: 0-->INDEPENDENT
FALL_HISTORY_WITHIN_LAST_SIX_MONTHS: NO
HYGIENE/GROOMING: PROMPTS
AMBULATION: 0-->INDEPENDENT
SWALLOWING: 0-->SWALLOWS FOODS/LIQUIDS WITHOUT DIFFICULTY
RETIRED_EATING: 0-->INDEPENDENT
DRESS: 0-->INDEPENDENT
RETIRED_COMMUNICATION: 2-->DIFFICULTY UNDERSTANDING (NOT RELATED TO LANGUAGE BARRIER)
SWALLOWING: 0-->SWALLOWS FOODS/LIQUIDS WITHOUT DIFFICULTY
TOILETING: 0-->INDEPENDENT
ORAL_HYGIENE: INDEPENDENT
FALL_HISTORY_WITHIN_LAST_SIX_MONTHS: NO
DRESS: 0-->INDEPENDENT
WHICH_OF_THE_ABOVE_FUNCTIONAL_RISKS_HAD_A_RECENT_ONSET_OR_CHANGE?: COGNITION
DRESS: SCRUBS (BEHAVIORAL HEALTH);INDEPENDENT
DRESS: SCRUBS (BEHAVIORAL HEALTH)
RETIRED_COMMUNICATION: 2-->DIFFICULTY UNDERSTANDING (NOT RELATED TO LANGUAGE BARRIER)
LAUNDRY: WITH SUPERVISION
TOILETING: 0-->INDEPENDENT
COGNITION: 2 - DIFFICULTY WITH ORGANIZING THOUGHTS

## 2019-09-15 NOTE — ED NOTES
Nurse went into patients room to get consent for her transfer (EVIE). Patient was crying on her mattress but agreed that she was willing to be transferred. On license of [facility] in route

## 2019-09-15 NOTE — PROGRESS NOTES
"Patient admitted to station [facility] via litter from Ambulance, from [facility] ER.  Patient awake, at 1st smiled happily at this RN, calling her \"Kika - when did you move over here?\".  Patient informed of place and date (she at 1st thought it was December, 2019, then September 12 th, 2019. Patient then denied \"seeing anything or hearing voices\".  Appears labile - at times almost exstatic, then becomes sad and tearful. See admission interview.  Patient cooperative with staff, search done and patient admitted to her room.  Patient did admit to taking her Methadone 170 mg po about \"3 or 4 days ago\" at the [facility]. Also stated that she takes Ativan during the day, prescribed by a physician at an  [facility], but has not taken for at least 3-4 days. Patient did admit to using Methamphetamine and exstacy on approx. 9/12/19.  Patient's MSSA = 11, and Ativan 2 mg po given.  "

## 2019-09-15 NOTE — PROGRESS NOTES
Patient's MSSA was 12 at 05:00AM, and patient was crawling on the floor, seeing people outside of her window, including her son, and vacillating between crying loudly and then huddling under her blankets.  Patient was laying on her mattress, on the floor, with her feet up against her 1:1 staff's chair.  Vital signs were stable, color pink, skin warm and dry. M.D. Contacted and patient now given Haldol 5 mg po along with 2 mg po Ativan (to cover the MSSA) and Benadryl 25 mg po.  Able to give patient reassurance, and tucked back into bed with 1:1 staff at her door.  Continue to monitor closely, will recheck MSSA at 06:30AM once the Haldol and Ativan have had time to work. Reassurance, reorientation and  Redirection as needed.

## 2019-09-15 NOTE — PLAN OF CARE
Jihan Gill is a 44-year-old female with a history of substance abuse, hepatitis C, and diagnosis of depression and bipolar disorder.  This patient was found on the night of 9/13/19 by police running down the street. Upon talking to her, they found she was very confused, thinking that it was 1999 and that her 18-year-old son was in the cars around her. This patient was recently admitted on 09/01 for lithium toxicity and hallucinations. While in the emergency room last night, she states that she was fine and mentions feeling bad that she is not there for her son, who is 18 years. She does admit to hallucinations and takes 170 mg of Methadone a day for opioid addiction. She was observed to be hallucinating as she is seeing dead fetuses in the walls of the ED and thought there were guns pointing at her. Pt admits to using methamphetamines and ecstasy two nights ago. She has hx of suicide attempts by overdose. Pt utox was positive for amphetamines and benzodiazepines.  She is admitted on 72 hrs hold. She is on MSSA for benzo withdrawal and scored 12 this shift.

## 2019-09-15 NOTE — PROGRESS NOTES
Initial Psychosocial Assessment    I have reviewed the chart, met with the patient, and developed Care Plan.  Information for assessment was obtained from:     Chart Review and Patient Interview    Presenting Problem:  Pt is admitted to [facility] Station 12 under the care of Alea Gonzalez MD.  She was brought to the ED on a PD hold because she was found wandering around in a confused disorganized state with obvious delusional thinking.  She may hve a drug induced psychosis.    Per ED Note:  Jihan Gill is a 44 year old female seen by Dr. Lainez last evening and here on our shift. Patient comes in after being picked up by paramedics running around the neighborhood looking for her 18 year old child. Paramedics are familiar with her, she has been brought in before, and tends to use meth and develop psychosis. She was seen by Dr. Espinoza in April and is on several meds. It is unsure if she was taking these. She was watched during the night and received atarax. She states she had an allergy to other narcotics. She was reevaluated by LORI, our DEC  today, and it appeared she was back to her normal self. I had set her up to go home, but according to the nurses, she has become more confused, making statements that do not make sense. I will give her medication that she has not had, 1 mg cogentin, 20 mg Trintellix, and 300 mg of Neurontin. Patient will be reassessed by DEC, hopefully this will stabilize her enough to go home. If not, we may need to change our disposition plans.    History of Mental Health and Chemical Dependency:  Pt has had numerous Psychiatric admissions.  Her most recent admission was at [facility] April 2019.  She has also had 3 ED visits within the past month.  Her most recent admission to the ED was for lithium toxicity.  Pt has a history of Bi-Polar disorder, methamphetamine use, and drug induced psychosis.    She was assessed for substance use during her most recent admission to  "[facility] and referred to [facility] for residential substance abuse treatment. It is unclear if she followed through with this recommendation.    Family Description (Constellation, Family Psychiatric History):  Pt is th 2nd of 4 siblings all girls.  Her parents were not  but she spent time with both of them.  Pt has one son 18 years old and one son who is 15 who has been adopted out in an open adoption.  She sees him occasionally.  Pt is single.    Significant Life Events (Illness, Abuse, Trauma, Death):  Raped when 19 years old.    Living Situation:  Lives with mother and a cousin Connor who is 22.    Educational Background:  Some college.    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    Legal Issues:  None reported    Ethnic/Cultural Issues:      Spiritual Orientation:  none     Service History:  No    Social Functioning (organization, interests):  Has been to AA in the far past.  Likes to go snowshoeing and boating with friends.    Current Treatment Providers are:  PCP- [facility] [place]  [facility]  [facility]  Social Service Assessment/Plan:  Pt is in need of psychiatric evaluation and stabilization.  Her medicine will be reviewed and adjusted if indicated.  Pt will participate in therapeutic milieu, attend group therapy, and join in other unit activities.  [facility] will provide case management services.  "

## 2019-09-15 NOTE — PROGRESS NOTES
09/15/19 0229   Patient Belongings   Did you bring any home meds/supplements to the hospital?  No   Belongings Search Yes   Clothing Search Yes   Second Staff Riddhi CHINCHILLA     Belongings:  Frederic colored morales bear   Paper document  Black espinosa top  Black pants     No security items     A               Admission:  I am responsible for any personal items that are not sent to the safe or pharmacy.  [facility] is not responsible for loss, theft or damage of any property in my possession.    Signature:  _________________________________ Date: _______  Time: _____                                              Staff Signature:  ____________________________ Date: ________  Time: _____      2nd Staff person, if patient is unable/unwilling to sign:    Signature: ________________________________ Date: ________  Time: _____     Discharge:  [facility] has returned all of my personal belongings:    Signature: _________________________________ Date: ________  Time: _____                                          Staff Signature:  ____________________________ Date: ________  Time: _____

## 2019-09-15 NOTE — PLAN OF CARE
"Patient was found wandering down a street. Brought in by police. Making comments that confirmed that she did not know where she is at currently.  Her response to questions is frequently unrelated to the questions.  Patient scored an 8 on the MSSA mid morning. Soon after that she got into bed and slept for much of the shift. Attempted to do her vitals this afternoon but once cuff was on her arm she said \"I need to sleep\" and took it off. She ate lunch and appeared to enjoy all of it.  Pt denied suicide ideation but has hx of attempts in the past.  "

## 2019-09-15 NOTE — ED NOTES
Pt remains on 72 hour hold. Security present. Pt laying on mattress on the floor resting with eyes closed. respirations even nonlabored. Pt repositions self for comfort

## 2019-09-15 NOTE — H&P
"[facility], [place]   Psychiatric History and Physical  Admission date: 9/15/2019        Chief Complaint:   \"I'm good\"        HPI:     The patient is a 43yo female with a history of bipolar disorder, PTSD and polysubstance abuse who was admitted for delusional thinking and erratic behavior.      Per ER: Jihan Gill is a 44 year old female with a history of substance abuse,hepatitis C, and diagnosis of depression and bipolar disorder who presents to the emergency department for evaluation of altered mental status. The patient was most recently admitted on 09/01 for lithium toxicity and hallucinations. The patient was found by police running down the street. Upon talking to her they found she was very confused, thinking that it was 1999 and that her 18 year old son was in the cars around her. This is her third encounter with police.      In the emergency department, the patient states that she is not sure why she is here. She states that she has begun taking Ativan and that it made her feel non-anxious. She states that she was fine this morning and mentions feeling bad that she is not there for her son who is 18. She does admit to hallucinations and takes 170 mg of Methadone a day for opioid addiction. She denies drug use, injury, thought of self harm or harm to others.    Today, the patient reports that she needs to leave for a job interview \"at a [email or web address] place.\" Is oriented X3. Denies any problems with her mood. Denies SI or HI. Does says that she \"can  little voices\" but doesn't understand what they saw. When asked if the Vraylar helps with this, does not realize that she is on this medication. Says that she takes \"160mg of Methadone per year.\" Was on Suboxone in the past \"but it wasn't working.\"         Past Psychiatric History:     History of multiple hospitalizations including a medical admission recently for Lithium toxicity. History of suicide attempt via " overdose in 2018.         Substance Use and History:     Polysubstance abuse - methamphetamine, ecstasy, benzodiazepines. CD treatment X2.         Past Medical History:   PAST MEDICAL HISTORY:   Past Medical History:   Diagnosis Date     Arthritis      Bipolar 1 disorder (H)      Depressive disorder     postpartum     Depressive disorder      Major depression, recurrent (H)      Opiate addiction (H)      Seizures (H)     narcotic withdrawal     Substance abuse (H)      Urinary calculus, unspecified     Renal stones       PAST SURGICAL HISTORY:   Past Surgical History:   Procedure Laterality Date     C/SECTION, LOW TRANSVERSE      [id number]     ENT SURGERY       GYN SURGERY       TONSILLECTOMY               Family History:   FAMILY HISTORY: No family history on file.        Social History:   Please see the full psychosocial profile from the clinical treatment coordinator.   SOCIAL HISTORY:   Social History     Tobacco Use     Smoking status: Never Smoker     Smokeless tobacco: Never Used   Substance Use Topics     Alcohol use: Yes     Comment: States no EtOH since .            Physical ROS:   The 10-point review of systems was negative except as noted in HPI.         PTA Medications:     Medications Prior to Admission   Medication Sig Dispense Refill Last Dose     loperamide (IMODIUM) 2 MG capsule Take 1 capsule (2 mg) by mouth 4 times daily as needed for diarrhea 10 capsule 0 9/15/2019 at Unknown time     ondansetron (ZOFRAN-ODT) 8 MG ODT tab Take 1 tablet (8 mg) by mouth every 6 hours as needed for nausea or vomiting 30 tablet 0 9/15/2019 at Unknown time     benztropine (COGENTIN) 1 MG tablet Take 1 tablet (1 mg) by mouth 2 times daily 60 tablet 0      [] buprenorphine HCl-naloxone HCl (SUBOXONE) 4-1 MG per film Place 1 Film under the tongue 2 times daily 60 Film 0      [] cariprazine (VRAYLAR) 3 MG CAPS capsule Take 1 capsule (3 mg) by mouth At Bedtime 30 capsule 0      gabapentin (NEURONTIN) 300  MG capsule Take 2 capsules (600 mg) by mouth 3 times daily 180 capsule 0      [] simethicone (MYLICON) 80 MG chewable tablet Take 1 tablet (80 mg) by mouth 4 times daily 120 tablet 0      [] vortioxetine (TRINTELLIX/BRINTELLIX) 20 MG tablet Take 1 tablet (20 mg) by mouth daily 30 tablet 0           Allergies:     Allergies   Allergen Reactions     Abilify [Aripiprazole] Other (See Comments)     Tardive dyskinesia     Allopurinol      Compazine Other (See Comments)     Dystonia     Dramamine      Reglan Other (See Comments)     dystonia     Seroquel [Quetiapine] Other (See Comments)     Tardive dyskinesia.           Labs:     Recent Results (from the past 48 hour(s))   Acetaminophen level    Collection Time: 19 11:59 PM   Result Value Ref Range    Acetaminophen Level <2 mg/L   Comprehensive metabolic panel    Collection Time: 19 11:59 PM   Result Value Ref Range    Sodium 137 133 - 144 mmol/L    Potassium 3.7 3.4 - 5.3 mmol/L    Chloride 102 94 - 109 mmol/L    Carbon Dioxide 31 20 - 32 mmol/L    Anion Gap 4 3 - 14 mmol/L    Glucose 86 70 - 99 mg/dL    Urea Nitrogen 12 7 - 30 mg/dL    Creatinine 0.66 0.52 - 1.04 mg/dL    GFR Estimate >90 >60 mL/min/[1.73_m2]    GFR Estimate If Black >90 >60 mL/min/[1.73_m2]    Calcium 9.9 8.5 - 10.1 mg/dL    Bilirubin Total 0.3 0.2 - 1.3 mg/dL    Albumin 4.0 3.4 - 5.0 g/dL    Protein Total 7.9 6.8 - 8.8 g/dL    Alkaline Phosphatase 130 40 - 150 U/L     (H) 0 - 50 U/L    AST 75 (H) 0 - 45 U/L   Lithium level    Collection Time: 19 11:59 PM   Result Value Ref Range    Lithium Level 0.40 (L) 0.60 - 1.20 mmol/L   Salicylate level    Collection Time: 19 11:59 PM   Result Value Ref Range    Salicylate Level <2 mg/dL   UA with Microscopic reflex to Culture    Collection Time: 19 12:29 AM   Result Value Ref Range    Color Urine Yellow     Appearance Urine Clear     Glucose Urine Negative NEG^Negative mg/dL    Bilirubin Urine Negative  NEG^Negative    Ketones Urine Negative NEG^Negative mg/dL    Specific Gravity Urine 1.015 1.003 - 1.035    Blood Urine Negative NEG^Negative    pH Urine 7.5 (H) 5.0 - 7.0 pH    Protein Albumin Urine Negative NEG^Negative mg/dL    Urobilinogen mg/dL Normal 0.0 - 2.0 mg/dL    Nitrite Urine Negative NEG^Negative    Leukocyte Esterase Urine Moderate (A) NEG^Negative    Source Midstream Urine     WBC Urine 5 0 - 5 /HPF    RBC Urine 1 0 - 2 /HPF    Bacteria Urine Few (A) NEG^Negative /HPF    Squamous Epithelial /HPF Urine 3 (H) 0 - 1 /HPF    Mucous Urine Present (A) NEG^Negative /LPF   Drug abuse screen urine    Collection Time: 09/14/19 12:29 AM   Result Value Ref Range    Amphetamine Qual Urine Positive (A) NEG^Negative    Barbiturates Qual Urine Negative NEG^Negative    Benzodiazepine Qual Urine Positive (A) NEG^Negative    Cannabinoids Qual Urine Negative NEG^Negative    Cocaine Qual Urine Negative NEG^Negative    Opiates Qualitative Urine Negative NEG^Negative    PCP Qual Urine Negative NEG^Negative   Urine Culture Aerobic Bacterial    Collection Time: 09/14/19 12:29 AM   Result Value Ref Range    Specimen Description Midstream Urine     Special Requests Specimen received in preservative     Culture Micro No growth           Physical and Psychiatric Examination:     /69   Pulse 99   Temp 98.7  F (37.1  C) (Oral)   Resp 16   SpO2 99%   Weight is 0 lbs 0 oz  There is no height or weight on file to calculate BMI.    Physical Exam:  I have reviewed the physical exam as documented by by the medical team and agree with findings and assessment and have no additional findings to add at this time.    Mental Status Exam:  Appearance: awake, alert and disheveled   Attitude:  somewhat cooperative  Eye Contact:  fair  Mood:  good  Affect:  intensity is blunted  Speech:  rambling  Language: fluent and intact in English  Psychomotor, Gait, Musculoskeletal:  fidgeting  Thought Process:  illogical  Associations:  loosening  of associations present  Thought Content:  no evidence of suicidal ideation or homicidal ideation, auditory hallucinations present and obsessions present  Insight:  limited  Judgement:  limited  Oriented to:  time, person, and place  Attention Span and Concentration:  fair  Recent and Remote Memory:  poor  Fund of Knowledge:  appropriate         Admission Diagnoses:   Substance-induced psychosis  Bipolar disorder type 1  PTSD  Polysubstance abuse         Assessment & Plan:     1) Continue 72-hour hold.   2) Patient reports possible allergic reaction to Zyprexa so B52 added.   3) Hold Suboxone from PTA list. Will verify patient's methadone versus Suboxone dose.  4) Continue SIO.      Disposition Plan   Reason for ongoing admission: is unable to care for self due to severe psychosis or von  Discharge location: TBD  Discharge Medications: not ordered  Follow-up Appointments: not scheduled  Legal Status: 72 hour hold  Entered by: Diego Pimentel on 9/15/2019 at 6:09 AM

## 2019-09-15 NOTE — ED NOTES
Patient came out of room and asked why she was here. Nurse told her that she was placed on a hold by police and was evaluated by our DEC  who thought that she should be admitted for 72 hours. Patient agreed to this and was given food per request. Will continue to monitor.

## 2019-09-15 NOTE — PLAN OF CARE
"Patient began c/o seeing a man outside of her window, who was calling her fat. Within a few minutes she was also seeing someone outside, \"a man fishing - see there\".  Patient tearful, stating \"I'm crazy, I must be\".  Reassurance given. Patient's MSSA was 11, and she was given Ativan 2 mg po.  Will wait for now to see if it helps with her symptoms from possible withdrawal from Ativan and/or Methadone.  "

## 2019-09-15 NOTE — CONSULTS
"Brief Medicine Note  September 15, 2019    Internal medicine consult note was placed via telephone readback by RN last night for \"hep C positive.\" Per chart review, pt was admitted to a medicine service in April of this year and referrals were made for her to see GI/Hepatology at [facility] and then again admitted to OSH last week with referrals made to GI/Hepatology at [facility]. Her LFTs are mildly elevated ALT:AST 2:1 ratio c/w known hx of hepatitis C. This is a chronic issue that is stable at this point, patient should follow up with GI/hepatology as previously referred to.     Cecy Wilks PA-C    "

## 2019-09-16 PROCEDURE — 93010 ELECTROCARDIOGRAM REPORT: CPT | Performed by: INTERNAL MEDICINE

## 2019-09-16 PROCEDURE — 25000132 ZZH RX MED GY IP 250 OP 250 PS 637: Performed by: PSYCHIATRY & NEUROLOGY

## 2019-09-16 PROCEDURE — 93005 ELECTROCARDIOGRAM TRACING: CPT

## 2019-09-16 PROCEDURE — 99232 SBSQ HOSP IP/OBS MODERATE 35: CPT | Mod: GC | Performed by: PSYCHIATRY & NEUROLOGY

## 2019-09-16 PROCEDURE — 12400001 ZZH R&B MH UMMC

## 2019-09-16 RX ORDER — DIAZEPAM 5 MG
5-20 TABLET ORAL EVERY 30 MIN PRN
Status: DISCONTINUED | OUTPATIENT
Start: 2019-09-16 | End: 2019-09-17

## 2019-09-16 RX ORDER — CLONIDINE HYDROCHLORIDE 0.1 MG/1
0.1 TABLET, EXTENDED RELEASE ORAL 3 TIMES DAILY PRN
Status: DISCONTINUED | OUTPATIENT
Start: 2019-09-16 | End: 2019-09-16

## 2019-09-16 RX ORDER — GABAPENTIN 300 MG/1
300 CAPSULE ORAL 3 TIMES DAILY PRN
Status: DISCONTINUED | OUTPATIENT
Start: 2019-09-16 | End: 2019-09-20

## 2019-09-16 RX ORDER — METHADONE HYDROCHLORIDE 40 MG/1
40 TABLET ORAL AT BEDTIME
Status: DISCONTINUED | OUTPATIENT
Start: 2019-09-16 | End: 2019-09-16

## 2019-09-16 RX ORDER — METHADONE HYDROCHLORIDE 5 MG/5ML
80 SOLUTION ORAL ONCE
Status: COMPLETED | OUTPATIENT
Start: 2019-09-16 | End: 2019-09-16

## 2019-09-16 RX ORDER — CLONIDINE HYDROCHLORIDE 0.1 MG/1
0.1 TABLET ORAL 3 TIMES DAILY PRN
Status: DISCONTINUED | OUTPATIENT
Start: 2019-09-16 | End: 2019-10-01 | Stop reason: HOSPADM

## 2019-09-16 RX ADMIN — SIMETHICONE CHEW TAB 80 MG 80 MG: 80 TABLET ORAL at 18:36

## 2019-09-16 RX ADMIN — METHADONE HYDROCHLORIDE 80 MG: 5 SOLUTION ORAL at 18:38

## 2019-09-16 RX ADMIN — FLUOXETINE 40 MG: 20 CAPSULE ORAL at 18:36

## 2019-09-16 ASSESSMENT — ACTIVITIES OF DAILY LIVING (ADL)
DRESS: SCRUBS (BEHAVIORAL HEALTH);INDEPENDENT
ORAL_HYGIENE: INDEPENDENT
HYGIENE/GROOMING: INDEPENDENT

## 2019-09-16 NOTE — PLAN OF CARE
"  Problem: Psychotic Symptoms  Goal: Psychotic Symptoms  Description  Signs and symptoms of listed problems will be absent or manageable.  Outcome: Improving  RN Assessment  Patient demonstrated improvement in psychotic symptoms including hallucinations, thought process and organization and overall alertness. She was alert and oriented x 4 on assessment and was able to verbalize reason for her admission as \" Being psychotic, running around,  after doing meth\" She was somewhat tearful and stated that she was looking for her 18 year old son. Denies suicidal thoughts, thoughts of self injury or passive thoughts of dying.  She denied and did not demonstrate evidence of response to internal stimuli, but acknowledged that she did heard AH earlier this shift.  MSSA 5 and 5 this shift. Denies withdrawal symptoms. I explained to patient that nursing staff were not able to confirm her methadone dose on days today as clinic was closed, and she was receptive to this explanation. Stated that she last dosed yesterday. Upon room search staff discovered that patient stuffed a garbage bag in her toilet, when I asked her about it she stated that she did not remembered doing that, but saw a bag earlier and thought that staff had done so. Due to marked improvement, patient's condition and symptoms were dicussed with attending provider and SIO was discontinued.    Refer to case manger notes for discharge planing and recommendations. Continue with current treatment plan and recommendations. Continue to monitor and reassess symptoms. Monitor response to medications. Monitor progress towards treatment goals. Encourage groups and participation.      "

## 2019-09-16 NOTE — PROGRESS NOTES
Pt's meds held throughout the day per MD and resident during AM team meeting. EKG ordered, abnormal finding of prolonged QTc noted. Reported to resident on call who acknowledged. MD is starting her back on methadone to manage withdrawal. Pt's VS stable at this time.

## 2019-09-16 NOTE — PROGRESS NOTES
Feli called from [facility] program.  They are scheduled to come and meet with Jihan on the unit tomorrow.  She will meet with Digna Call    9/17/2019 at 12:30PM

## 2019-09-16 NOTE — PROGRESS NOTES
Met with pt, she signed in Voluntarily.  Declined to sign JOSE for her Mother.    This writer received [facility] inquiring on if we were pursuing commitment.  Pt's Mother had called them wanting commitment on her daughter.  Since the pt would not sign a release I was only able to receive information from Mom, but was unable to provide her with any information.     Mom reported to this writer that the pt has been decompensating greatly the last 30 days.  She is doctor shopping. Using IV meth.  Mother reports that she is using excessive amount of Lorazepam, Gabapentin and Xanax. Has misused lithium in the past as well. Mom repots that last Wednesday she was sent home with 10 pills of 0.5 mgs and she took them all.  She has done things like barricading herself in the house, wandering around the house with a knife because of paranoia, ransacked her Mother's room looking for pills (that mom took from her).  She has called the police 4x recently out of paranoia.  Mom worries about her safety because she is doing private massages for money.  Mom reports that she needs to be sober to return to her home.  Mom thinks she needs residential, not outpatient treatment. Pt is on probation in [facility].     I explained that at this time the patient has signed into the hospital and is voluntarily seeking treatment.  Therefore, seeking a commitment would be difficult but I would relay all of the information above to her treating doctors.

## 2019-09-16 NOTE — PLAN OF CARE
BEHAVIORAL TEAM DISCUSSION    Participants: Dr. Alea Gonzalez, Tracy Boss MD (Resident), Celestine Islas RN, HEIDI- Chrissy Barrios LM, [facility]   Progress: continue to assess, pt just admitted yesterday  Anticipated length of stay: approximately one week  Continued Stay Criteria/Rationale: pt is stabilizing after substance induced psychosis   Medical/Physical: methadone and MSSA   Precautions:   Behavioral Orders   Procedures    Code 1 - Restrict to Unit    Elopement precautions    Routine Programming     As clinically indicated    Status 15     Every 15 minutes.     Plan: plan is to stabilize MH symptoms and she is getting a DA done tomorrow with hope to go to [facility] MI[facility]CD program   Rationale for change in precautions or plan: no change

## 2019-09-16 NOTE — PHARMACY
Pharmacy contacted by Dr. Boss regarding recommendations for restarting patient's methadone.    Patient's prior to admission methadone dose reported as 160 mg daily and patient's last dose per Dr. Boss was 9/13/19. On admission, patient's urine drug screen was positive for benzodiazepines and amphetamines. In addition, chart notes indicate that patient has reportedly been using excessive amounts of lorazepam, gabapentin, and Xanax prior to admission. Considering the patient's polysubstance use, relatively high dose of methadone, and missing doses for the last few days, would recommend consulting with outpatient methadone clinic regarding if (and at which dose) methadone should be restarted at this time. Contacting outpatient methadone clinic could also be beneficial as these clinics sometimes will not continue to provide methadone to patients who have missed clinic appointments or continue to use other substances.    Zev Gonzalez, Pharm.D.  [facility]: Ascom *[phone] or [phone]

## 2019-09-16 NOTE — PROGRESS NOTES
Thank you for referring Jihan Gill to [facility] ([facility]) .     The referral order included a request for DA completion on the inpatient unit, prior to discharge.     Upon initial screening including review of the EMR, consult with the CTC, and/or a brief meeting with the patient to discuss this referral, the patient meets criteria for the DA.     At this time, we have scheduled Jihan Gill in our next available DA appointment on: 9/17/2019 at 12:30PM with Digna Call.    We will coordinate with the CTC and/or patient should an earlier appointment become available.    Please contact me directly if you would like to discuss this referral further.    Feli Galloway, [facility]  9/16/2019  [phone]

## 2019-09-16 NOTE — PROGRESS NOTES
Continuing to hold medications until MD (resident) has been able to follow up with pharmacy. Patient currently compliant and fine with this. Waiting for information on methadone dosage as well as other PTA meds.
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
## 2019-09-17 ENCOUNTER — TELEPHONE (OUTPATIENT)
Dept: BEHAVIORAL HEALTH | Facility: CLINIC | Age: 44
End: 2019-09-17

## 2019-09-17 LAB — INTERPRETATION ECG - MUSE: NORMAL

## 2019-09-17 PROCEDURE — 99233 SBSQ HOSP IP/OBS HIGH 50: CPT | Mod: GC | Performed by: PSYCHIATRY & NEUROLOGY

## 2019-09-17 PROCEDURE — 12400001 ZZH R&B MH UMMC

## 2019-09-17 PROCEDURE — 25000132 ZZH RX MED GY IP 250 OP 250 PS 637: Performed by: PSYCHIATRY & NEUROLOGY

## 2019-09-17 RX ORDER — METHADONE HYDROCHLORIDE 5 MG/5ML
160 SOLUTION ORAL
Status: DISCONTINUED | OUTPATIENT
Start: 2019-09-17 | End: 2019-09-30

## 2019-09-17 RX ORDER — NALOXONE HYDROCHLORIDE 0.4 MG/ML
.1-.4 INJECTION, SOLUTION INTRAMUSCULAR; INTRAVENOUS; SUBCUTANEOUS
Status: DISCONTINUED | OUTPATIENT
Start: 2019-09-17 | End: 2019-10-01 | Stop reason: HOSPADM

## 2019-09-17 RX ADMIN — METHADONE HYDROCHLORIDE 160 MG: 5 SOLUTION ORAL at 16:07

## 2019-09-17 ASSESSMENT — ACTIVITIES OF DAILY LIVING (ADL)
LAUNDRY: UNABLE TO COMPLETE
DRESS: SCRUBS (BEHAVIORAL HEALTH)
DRESS: SCRUBS (BEHAVIORAL HEALTH)
HYGIENE/GROOMING: PROMPTS;INDEPENDENT
ORAL_HYGIENE: INDEPENDENT
HYGIENE/GROOMING: INDEPENDENT
LAUNDRY: UNABLE TO COMPLETE
ORAL_HYGIENE: INDEPENDENT

## 2019-09-17 NOTE — PROGRESS NOTES
This writer left vm for Fito in the OP MI/CD program.   We cancelled the DA they were going to do for the pt to enter their program.  New information came to light yesterday and with that and concern for pt's follow-through we are petitioning for MI/CD commitment and recommending Inpatient treatment.

## 2019-09-17 NOTE — TELEPHONE ENCOUNTER
Received voicemail from HEIDI Infante on station 12, to cancel Diagnostic Assessment for patient as they will pursue residential at this time.     Feli Galloway, [facility] , LADC  9/17/2019

## 2019-09-17 NOTE — PROGRESS NOTES
09/17/19 1345   Behavioral Health   Hallucinations denies / not responding to hallucinations   Thinking intact   Orientation person, disoriented;place, disoriented;date, disoriented;time, disoriented   Memory baseline memory   Insight poor   Judgement impaired   Eye Contact at floor   Affect sad   Mood depressed   Physical Appearance/Attire untidy;disheveled   Hygiene neglected grooming - unclean body, hair, teeth   1. Wish to be Dead (Past Month) No   Wish to be Dead Description (Recent) denies   2. Non-Specific Active Suicidal Thoughts (Past Month) No   Non-Specific Active Suicidal Thought Description (Recent) denies   3. Active Sucidal Ideation with any Methods (Not Plan) Without Intent to Act (Past Month) No   Active Suicidal Ideation with any Methods (Not Plan) Description (Past Month) denies   4. Active Suicidal Ideation with Some Intent to Act, Without Specific Plan (Past Month) No   Active Suicidal Ideation with Some Intent to Act, Without Specific Plan Description (Past Month) denies   5. Active Suicidal Ideation with Specific Plan and Intent (Past Month) No   Elopement   (No elopement behaviors reported or observed.)   Activity isolative;withdrawn   Speech pressured;rambling   Medication Sensitivity no stated side effects;no observed side effects   Psychomotor / Gait slow;balanced;steady   Coping/Psychosocial   Verbalized Emotional State sadness   Activities of Daily Living   Hygiene/Grooming independent   Oral Hygiene independent   Dress scrubs (behavioral health)   Laundry unable to complete   Room Organization independent     Pt slept on and off throughout the shift.  Isolative to self.

## 2019-09-17 NOTE — PROGRESS NOTES
This writer called petition for MI/CD commitment into [facility] Prepetition screening.  Also, faxed Examiner's Statement, exhibit A, Petition for commitment and 72 hr hold paperwork to them.

## 2019-09-17 NOTE — PLAN OF CARE
Patient presents as quiet and socially withdrawn with a sad, blunted affect.  She endorses depressive symptoms, but she denies any self harm ideation.  She has been isolative to her room the entire night, napping and resting in bed.  She has been pleasant and cooperative on approach.  She has not demonstrated any overt psychotic symptoms, disorganization, or agitation this shift; however, she becomes defensive and slightly guarded when more triggering topics come up, such as legal status and chemical dependency.  She remains opposed to CD treatment and would like to leave the hospital ASAP.  She minimizes the events leading up to her admission.  She has been compliant with all of her scheduled medications this evening, and no adverse side effects have been reported or observed.  VSS.  She continues on COWS and MSSA, but she has not had any elevated scores on either substance withdrawal scale this evening.  She denies any acute physical concerns. Will continue to monitor closely.
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
## 2019-09-17 NOTE — PROVIDER NOTIFICATION
09/16/19 1900   COWS (Clinical Opiate Withdrawal Scale)   Resting Pulse Rate 1-->pulse rate    Sweating 0-->no report of chills or flushing   Restlessness 0-->able to sit still   Pupil Size 0-->pupils pinned or normal size for room light   Bone or Joint Aches 0-->not present   Runny Nose or Tearing 0-->none present   GI Upset 2-->nausea or loose stool   Tremor 0-->no tremor   Yawning 0-->no yawning   Anxiety or Irritability 0-->none   Gooseflesh Skin 0-->skin is smooth   Score 3   Modified Selective Severity Assessment (MSSA)   Eating Disturbances 0   Tremor 0   Sleep Disturbance 0   Clouding of Sensorium 0   Hallucinations 0   Quality of Contact 0   Agitation 0   Paroxysmal Sweats 0   Temperature 1   Pulse 3   Total MSSA Score 4

## 2019-09-18 PROCEDURE — 25000132 ZZH RX MED GY IP 250 OP 250 PS 637: Performed by: PSYCHIATRY & NEUROLOGY

## 2019-09-18 PROCEDURE — 12400001 ZZH R&B MH UMMC

## 2019-09-18 RX ADMIN — METHADONE HYDROCHLORIDE 160 MG: 5 SOLUTION ORAL at 18:10

## 2019-09-18 ASSESSMENT — ACTIVITIES OF DAILY LIVING (ADL)
DRESS: SCRUBS (BEHAVIORAL HEALTH)
DRESS: SCRUBS (BEHAVIORAL HEALTH)
ORAL_HYGIENE: PROMPTS
HYGIENE/GROOMING: PROMPTS
HYGIENE/GROOMING: PROMPTS
ORAL_HYGIENE: PROMPTS

## 2019-09-18 NOTE — PROGRESS NOTES
This writer had a vm from prepetition screener from last evening.  She came to see the pt last evening and will screen the case with her team.  They have until tomorrow when the hold is up so we will likely hear today or tomorrow morning if they support the petition.   Phone: [phone]

## 2019-09-18 NOTE — PROGRESS NOTES
Pt has been isolative and withdrawn all evening. She was napping on and off throughout the shift, though she was responsive and engaged in conversation when approached by RN. COWS score 1 at 1600. /74 and HR 85. Received scheduled Methadone at 1600. Nursing is monitoring VS closely due to low BPs recently, and there is parameters in place for scheduled Methadone.  She was initially irritable when approached about pre-petition screener arriving to meet with pt, but she did agree to participate. She is declining to attend or participate in groups or programming. Hygiene poor, she is malodorous and disheveled. Writer encouraged her to shower and that staff would help her change her bedding, though she declined.   She became tearful when talking about her son. Reports feeling concerned because son is homeless, and she is unsure where he is. Encouraged pt to reach out to family and friends that may know where he is. She reports some anxiety about court process and the possibility of going to treatment again. Denies SI/SIB and HI.   Will continue to monitor closely.

## 2019-09-18 NOTE — PHARMACY
Methadone Clinic Information Note    Clinic Name:[facility]  Clinic Location ([facility]): [address] [place] [address]  Phone Number: [phone]    I called the methadone clinic and confirm pt's  methadone dose. Per RN Chelo : pt gets methadone 160 mg po daily, last dose was on 9/13 with no take out.      Karina Ochoa, Pharm.D.

## 2019-09-18 NOTE — PLAN OF CARE
Patient isolative to room for majority of the shift. Patient has been calm/flat this evening. Did have questions regarding her 72HH and hopes for discharge. She was calm and cooperative. Neglected grooming. No s/sx of psychosis. Scoring low on withdrawal scale. Continue with POC, notify MD of changes.

## 2019-09-18 NOTE — PROGRESS NOTES
"   09/18/19 5647   Significant Event   Significant Event Other (see comments)  (shift summary)   Pt was isolative to room for almost entire shift, sleeping. She ate meals in her room as well. In 1:1 pt stated that she was \"just super tired.\" When asked she denied any SI/SIB, and showed no overt signs of psychosis. She was calm, cooperative and pleasant upon approach. Of note, she is extremely malodorous  "

## 2019-09-19 PROCEDURE — 25000132 ZZH RX MED GY IP 250 OP 250 PS 637: Performed by: PSYCHIATRY & NEUROLOGY

## 2019-09-19 PROCEDURE — 12400001 ZZH R&B MH UMMC

## 2019-09-19 PROCEDURE — 99232 SBSQ HOSP IP/OBS MODERATE 35: CPT | Performed by: PSYCHIATRY & NEUROLOGY

## 2019-09-19 RX ADMIN — FLUOXETINE 40 MG: 20 CAPSULE ORAL at 10:22

## 2019-09-19 RX ADMIN — METHADONE HYDROCHLORIDE 160 MG: 5 SOLUTION ORAL at 16:31

## 2019-09-19 ASSESSMENT — ACTIVITIES OF DAILY LIVING (ADL)
ORAL_HYGIENE: PROMPTS
LAUNDRY: WITH SUPERVISION
HYGIENE/GROOMING: PROMPTS
DRESS: SCRUBS (BEHAVIORAL HEALTH);INDEPENDENT
DRESS: SCRUBS (BEHAVIORAL HEALTH)
ORAL_HYGIENE: INDEPENDENT
HYGIENE/GROOMING: INDEPENDENT

## 2019-09-19 NOTE — PROGRESS NOTES
"[facility], [place]   Psychiatric Progress Note  Hospital Day: 4        Interim History:   The patient's care was discussed with the treatment team during the daily team meeting and/or staff's chart notes were reviewed.  Staff report patient has been mostly isolative to her room. She is eating meals in her room. She said that she feels \"super tired.\" She consistently denies SI, HI and sx of psychosis. She has been calm and cooperative on unit. Not participating in groups. Neglecting grooming.    Upon interview, the patient states that she feels Prozac is helping her. When asked how, she said \"I don't feel as negative and I am feeling better about letting things just happen rather than feeling the need to control everything.\" Again discussed symptoms of psychosis and Bipolar Disorder. Patient again notes that she has had extended periods of sobriety during which time she does NOT experience symptoms of von or psychosis. She feels psychotic symptoms are solely related to substance use. She denies problems with concentration. She continues to express concerns about her son because he is reportedly homeless. She said that she would be willing to consider inpatient treatment, \"but I would need to go home first to make sure my son is okay. Otherwise I would just be distracted in treatment.\" Pt denies acute medical concerns, including lightheadedness/dizziness. She said that her blood pressure has always been low. The patient denies SI, SIB, and HI. Patient had no further requests or concerns.          Medications:       FLUoxetine  40 mg Oral Daily     methadone  160 mg Oral Daily at 4 pm     simethicone  80 mg Oral 4x Daily          Allergies:     Allergies   Allergen Reactions     Abilify [Aripiprazole] Other (See Comments)     Tardive dyskinesia     Allopurinol      Compazine Other (See Comments)     Dystonia     Dramamine      Olanzapine Other (See Comments)     Tardive dyskinesia from " Zyprexa - per patient     Reglan Other (See Comments)     dystonia     Seroquel [Quetiapine] Other (See Comments)     Tardive dyskinesia.           Labs:   No results found for this or any previous visit (from the past 24 hour(s)).       Psychiatric Examination:     /63   Pulse 86   Temp 97.6  F (36.4  C) (Tympanic)   Resp 16   SpO2 99%   Weight is 0 lbs 0 oz  There is no height or weight on file to calculate BMI.    Weight over time:  There were no vitals filed for this visit.    Orthostatic Vitals       Most Recent      Lying Orthostatic BP 98/64 09/18 0815    Lying Orthostatic Pulse (bpm) 64 09/18 0815    Sitting Orthostatic BP 89/55 09/19 0848    Sitting Orthostatic Pulse (bpm) 61 09/19 0848    Standing Orthostatic BP --  Comment: declined 09/19 0848            Cardiometabolic risk assessment. 09/19/19      Reviewed patient profile for cardiometabolic risk factors    Date taken /Value  REFERENCE RANGE   Abdominal Obesity  (Waist Circumference)   See nursing flowsheet Women ?35 in (88 cm)   Men ?40 in (102 cm)      Triglycerides  No results found for: TRIG    ?150 mg/dL (1.7 mmol/L) or current treatment for elevated triglycerides   HDL cholesterol  No results found for: HDL]   Women <50 mg/dL (1.3 mmol/L) in women or current treatment for low HDL cholesterol  Men <40 mg/dL (1 mmol/L) in men or current treatment for low HDL cholesterol     Fasting plasma glucose (FPG) Lab Results   Component Value Date    GLC 86 09/13/2019      FPG ?100 mg/dL (5.6 mmol/L) or treatment for elevated blood glucose   Blood pressure  BP Readings from Last 3 Encounters:   09/18/19 100/63   09/15/19 108/66   04/30/19 99/47    Blood pressure ?130/85 mmHg or treatment for elevated blood pressure   Family History  See family history     Appearance: lying in bed, initially sleeping though awakened to voice, and disheveled   Attitude:  more cooperative  Eye Contact:  good  Mood:  better  Affect:  mood congruent and constricted  mobility  Speech:  clear, coherent  Language: fluent and intact in English  Psychomotor, Gait, Musculoskeletal:  no evidence of tardive dyskinesia, dystonia, or tics  Throught Process:  linear and goal oriented  Associations:  no loose associations  Thought Content:  no evidence of suicidal ideation or homicidal ideation and no evidence of psychotic thought  Insight:  fair  Judgement:  limited  Oriented to:  time, person, and place  Attention Span and Concentration:  fair  Recent and Remote Memory:  fair  Fund of Knowledge:  appropriate    Clinical Global Impressions  First:  Considering your total clinical experience with this particular patient population, how severe are the patient's symptoms at this time?: 7 (09/15/19 0601)  Compared to the patient's condition at the START of treatment, this patient's condition is:: 4 (09/15/19 0601)  Most recent:  Considering your total clinical experience with this particular patient population, how severe are the patient's symptoms at this time?: 7 (09/15/19 0601)  Compared to the patient's condition at the START of treatment, this patient's condition is:: 4 (09/15/19 0601)           Precautions:     Behavioral Orders   Procedures     Code 1 - Restrict to Unit     Elopement precautions     Routine Programming     As clinically indicated     Seizure precautions     Status 15     Every 15 minutes.     Withdrawal precautions          Diagnoses:     MDD, recurrent, moderate  Substance induced psychosis  R/O Bipolar Disorder  Amphetamine Use Disorder, severe  Benzodiazepine Use Disorder, severe  Opiate Use Disorder, severe, on methadone maintenance         Assessment & Plan:     Assessment and hospital summary:  Jihan Gill was admitted to station 12 on 72 hold initally as she was acutely psychotic and she did not have the capacity to make her decision, on second of hospitalization as she is clinically got better and all signs of substance induced psychosis has resolved,  patient has signed in voluntary. PTA Lithium was discontinued as the patient psychiatrist was weaning her off and giving her last admission on 1/19 with lithium toxicity,  PTA Prozac was started at 40 mg po day for her depressed mood. On 9/17 and as the team has concerning collateral informations about the ongoing substance use disorder mainly the IV methamphetamine and benzodiazepine which is very concerning especially that she is on high dose of the methadone ( 160 mg /daily) so the team filed for petition for MICD on 9/17 and placed the patient on 72 hours holds as she was demanding immediate discharge and has no insight to her ongoing substance use and consequent psychosis.    Target psychiatric symptoms and interventions:  Continue current medication regimen without changes and while monitoring closely for emergence of manic symptoms    Medical Problems and Treatments:  Nursing staff instructed to HOLD methadone dose and contact provider if BPs remain soft. At baseline, it appears that patient's BP range is low 100s/60s-70s. Will continue to monitor for symptoms of hypotension and may consider reducing dose of methadone if BP remains lower than baseline.    Behavioral/Psychological/Social:  Encourage group participation    Legal: Petitioned for MI/CD. Pt currently on court hold.     Safety:  - Continue precautions as noted above  - Status 15 minute checks    Disposition: Pending clinical stabilization. Advocating for [id number] to inpatient CD programming. Pt initially quite resistant to CD treatment. Due to concerns about her safety and additional collateral information from patient's mother, petition for MI/CD commitment was pursued.     Rufina Gonzalez MD  [facility] Psychiatry

## 2019-09-19 NOTE — PROGRESS NOTES
This writer heard from Carolina from [facility].  They supported for the MI and CD portion of the petition.  They will send over court hold paperwork prior to ending of 72 hr hold along with upcoming court dates.

## 2019-09-19 NOTE — PLAN OF CARE
"Pt continues to be minimally involved in social and therapeutic milieu. Pt maintains blunted, flat affect and depressed mood. Pt reports \"feeling fine\" and denying depression or anxiety. Pt denies other mental health symptoms  Pt did not attend groups or attend to her ADLs this shift. Pt reports no physical health concerns or side effects from medications this shift. Pt was medication compliant except for Simethicone which she normally takes as needed at home. Pt's VS were WDL with slightly low BP consistent with previous readings. Opiate withdrawal scale discontinued per MD this shift.   "

## 2019-09-20 PROCEDURE — 25000132 ZZH RX MED GY IP 250 OP 250 PS 637: Performed by: PSYCHIATRY & NEUROLOGY

## 2019-09-20 PROCEDURE — 99232 SBSQ HOSP IP/OBS MODERATE 35: CPT | Performed by: PSYCHIATRY & NEUROLOGY

## 2019-09-20 PROCEDURE — 12400001 ZZH R&B MH UMMC

## 2019-09-20 RX ORDER — GABAPENTIN 300 MG/1
300 CAPSULE ORAL 3 TIMES DAILY
Status: DISCONTINUED | OUTPATIENT
Start: 2019-09-20 | End: 2019-09-24

## 2019-09-20 RX ADMIN — GABAPENTIN 300 MG: 300 CAPSULE ORAL at 20:02

## 2019-09-20 RX ADMIN — METHADONE HYDROCHLORIDE 160 MG: 5 SOLUTION ORAL at 16:37

## 2019-09-20 RX ADMIN — FLUOXETINE 40 MG: 20 CAPSULE ORAL at 08:48

## 2019-09-20 RX ADMIN — GABAPENTIN 300 MG: 300 CAPSULE ORAL at 16:40

## 2019-09-20 ASSESSMENT — ACTIVITIES OF DAILY LIVING (ADL)
HYGIENE/GROOMING: INDEPENDENT
LAUNDRY: UNABLE TO COMPLETE
ORAL_HYGIENE: INDEPENDENT
ORAL_HYGIENE: INDEPENDENT
DRESS: SCRUBS (BEHAVIORAL HEALTH);INDEPENDENT
DRESS: SCRUBS (BEHAVIORAL HEALTH)
HYGIENE/GROOMING: HANDWASHING;INDEPENDENT

## 2019-09-20 NOTE — PROGRESS NOTES
"Jihan had a good day she spent all her morning in bed, she is feels \"ok\" and  denied SI/SIB/HI.      09/20/19 1200   Behavioral Health   Hallucinations denies / not responding to hallucinations   Thinking intact   Orientation person: oriented;place: oriented;date: oriented;time: oriented   Memory baseline memory   Insight insight appropriate to situation;denial of illness   Judgement intact   Eye Contact at examiner   Affect full range affect   Mood mood is calm   Physical Appearance/Attire attire appropriate to age and situation   1. Wish to be Dead (Past Month) No   2. Non-Specific Active Suicidal Thoughts (Past Month) No   Psycho Education   Type of Intervention 1:1 intervention   Response participates, initiates socially appropriate   Hours 0.5   Activities of Daily Living   Hygiene/Grooming handwashing;independent   Oral Hygiene independent   Dress scrubs (behavioral health)   Room Organization independent     "

## 2019-09-20 NOTE — PROGRESS NOTES
"   09/19/19 2000   Behavioral [facility]   Hallucinations denies / not responding to hallucinations   Thinking intact   Orientation person: oriented;place: oriented   Memory baseline memory   Insight poor   Judgement impaired   Eye Contact at examiner   Affect blunted, flat   Mood mood is calm   Physical Appearance/Attire disheveled   Hygiene neglected grooming - unclean body, hair, teeth   Suicidality other (see comments)  (denies)   1. Wish to be Dead (Past Month) No   2. Non-Specific Active Suicidal Thoughts (Past Month) No   Self Injury other (see comment)  (denies)   Activity isolative;withdrawn   Speech clear;coherent   Activities of Daily Living   Hygiene/Grooming independent   Oral Hygiene independent   Dress scrubs (behavioral health);independent   Laundry with supervision   Room Organization independent       Pt denied SI and SIB.  Pt ate supper and spent most of the shift in bed.  Pt reported feeling \"fine just tired.\"  "

## 2019-09-20 NOTE — PROGRESS NOTES
This writer reached out to [facility] to find out if they can see this pt for a CD assessment with the type of insurance she has [employer]. Waiting to hear back.

## 2019-09-20 NOTE — PROGRESS NOTES
This writer received court documents from Matthew Anaya informing us of upcoming court dates.    Prelim Exam:   @ 2:15pm  Final Hearin/27/ @ TBD

## 2019-09-20 NOTE — PROGRESS NOTES
"[facility], [place]   Psychiatric Progress Note  Hospital Day: 5        Interim History:   The patient's care was discussed with the treatment team during the daily team meeting and/or staff's chart notes were reviewed.  Staff report patient has been mostly isolative to her room. She is eating meals in her room. She consistently denies SI, HI and sx of psychosis. She has been calm and cooperative on unit. Not participating in groups. Not attending to ADLs. Pt denies acute medical concerns.     Upon interview, the patient was quite irritable and somewhat uncooperative. She said that she is feeling depressed, and \"Right now I don't even care anymore. I just wish I was dead and want to go to sleep and not wake up.\" She expresses frustration about ongoing hospitalization and court involvement. She said \"I really have to go to inpatient treatment? I have been there so many times already. I rather be dead honestly!\" She said that she feels that previous CD treatments have only addressed CD concerns and have not helped to address mental health concerns. She appeared to be quite focused on the fact that Ativan was discontinued and feels that \"that has really been the only thing to help me!\" She said \"I am just expected to smile all the time when everything is going to shit in my life.\" She attributes decline to MH issues rather than CD issues. She said that she has struggled with \"depression, anxiety, and abandonment for many years, way before the substance use started.\" She denies any overt symptoms of psychosis or von. Discussed option of increasing Prozac further. She agrees, though understands risk of inducing von. She again states that she has never had manic episodes in the past. What she describes is more consistent with emotional dysregulation. She requests reinitiation of Ativan, though it was again explained why this is not an option. She said that she wishes she could take " "methadone in the morning \"so I can just sleep.\" She also said that she does not wish to be transferred because she likes to \"watch TV just to escape everything.\" She ultimately was able to contract for safety on the unit and in the hospital. She said that she would notify staff if feeling unsafe. She said that she has an active prescription for Gabapentin and is hoping this medication can be restarted to address acute anxiety. The patient denies SI, SIB, and HI. Patient had no further requests or concerns.  Denies lightheadedness/dizziness.          Medications:       FLUoxetine  40 mg Oral Daily     methadone  160 mg Oral Daily at 4 pm     simethicone  80 mg Oral 4x Daily          Allergies:     Allergies   Allergen Reactions     Abilify [Aripiprazole] Other (See Comments)     Tardive dyskinesia     Allopurinol      Compazine Other (See Comments)     Dystonia     Dramamine      Olanzapine Other (See Comments)     Tardive dyskinesia from Zyprexa - per patient     Reglan Other (See Comments)     dystonia     Seroquel [Quetiapine] Other (See Comments)     Tardive dyskinesia.           Labs:   No results found for this or any previous visit (from the past 24 hour(s)).       Psychiatric Examination:     /63   Pulse 76   Temp 97.6  F (36.4  C) (Oral)   Resp 16   SpO2 99%   Weight is 0 lbs 0 oz  There is no height or weight on file to calculate BMI.    Weight over time:  There were no vitals filed for this visit.    Orthostatic Vitals       Most Recent      Lying Orthostatic BP 98/64 09/18 0815    Lying Orthostatic Pulse (bpm) 64 09/18 0815    Sitting Orthostatic BP 89/55 09/19 0848    Sitting Orthostatic Pulse (bpm) 61 09/19 0848    Standing Orthostatic BP --  Comment: declined 09/19 0848            Cardiometabolic risk assessment. 09/19/19      Reviewed patient profile for cardiometabolic risk factors    Date taken /Value  REFERENCE RANGE   Abdominal Obesity  (Waist Circumference)   See nursing flowsheet Women " "?35 in (88 cm)   Men ?40 in (102 cm)      Triglycerides  No results found for: TRIG    ?150 mg/dL (1.7 mmol/L) or current treatment for elevated triglycerides   HDL cholesterol  No results found for: HDL]   Women <50 mg/dL (1.3 mmol/L) in women or current treatment for low HDL cholesterol  Men <40 mg/dL (1 mmol/L) in men or current treatment for low HDL cholesterol     Fasting plasma glucose (FPG) Lab Results   Component Value Date    GLC 86 09/13/2019      FPG ?100 mg/dL (5.6 mmol/L) or treatment for elevated blood glucose   Blood pressure  BP Readings from Last 3 Encounters:   09/19/19 102/63   09/15/19 108/66   04/30/19 99/47    Blood pressure ?130/85 mmHg or treatment for elevated blood pressure   Family History  See family history     Appearance: lying in bed, initially sleeping though awakened to voice, and disheveled   Attitude:  Somewhat cooperative, dismissive, irritable  Eye Contact:  good  Mood:  \"not good\"  Affect:  mood congruent and reactive, tearful  Speech:  clear, coherent  Language: fluent and intact in English  Psychomotor, Gait, Musculoskeletal:  no evidence of tardive dyskinesia, dystonia, or tics  Throught Process:  linear and goal oriented  Associations:  no loose associations  Thought Content:  no evidence of suicidal ideation or homicidal ideation and no evidence of psychotic thought  Insight:  fair  Judgement:  limited  Oriented to:  time, person, and place. She knew month and year but not day or date.   Attention Span and Concentration:  fair  Recent and Remote Memory:  fair  Fund of Knowledge:  appropriate    Clinical Global Impressions  First:  Considering your total clinical experience with this particular patient population, how severe are the patient's symptoms at this time?: 7 (09/15/19 0601)  Compared to the patient's condition at the START of treatment, this patient's condition is:: 4 (09/15/19 0601)  Most recent:  Considering your total clinical experience with this particular " patient population, how severe are the patient's symptoms at this time?: 7 (09/15/19 0601)  Compared to the patient's condition at the START of treatment, this patient's condition is:: 4 (09/15/19 0601)           Precautions:     Behavioral Orders   Procedures     Code 1 - Restrict to Unit     Elopement precautions     Routine Programming     As clinically indicated     Seizure precautions     Status 15     Every 15 minutes.     Withdrawal precautions          Diagnoses:     MDD, recurrent, moderate  Substance induced psychosis  Cluster B traits, R/O Borderline Personality Disorder  R/O Bipolar Disorder  Amphetamine Use Disorder, severe  Benzodiazepine Use Disorder, severe  Opiate Use Disorder, severe, on methadone maintenance         Assessment & Plan:     Assessment and hospital summary:  Jihan Gill was admitted to station 12 on 72 hold initally as she was acutely psychotic and she did not have the capacity to make her decision, on second of hospitalization as she is clinically got better and all signs of substance induced psychosis has resolved, patient has signed in voluntary. PTA Lithium was discontinued as the patient psychiatrist was weaning her off and giving her last admission on 1/19 with lithium toxicity,  PTA Prozac was started at 40 mg po day for her depressed mood. On 9/17 and as the team has concerning collateral informations about the ongoing substance use disorder mainly the IV methamphetamine and benzodiazepine which is very concerning especially that she is on high dose of the methadone ( 160 mg /daily) so the team filed for petition for MICD on 9/17 and placed the patient on 72 hours holds as she was demanding immediate discharge and has no insight to her ongoing substance use and consequent psychosis.    Target psychiatric symptoms and interventions:  Increase Prozac to 60 mg daily  Keep methadone dose scheduled in afternoon for now.  Pt does have active prescription for Gabapentin.  Will schedule 300 mg TID to target acute anxiety.      Medical Problems and Treatments:  Nursing staff instructed to HOLD methadone dose and contact provider if BPs remain soft. At baseline, it appears that patient's BP range is low 100s/60s-70s. Will continue to monitor for symptoms of hypotension and may consider reducing dose of methadone if BP remains lower than baseline. Pt encouraged to drink adequate fluids. She is asymptomatic.     Behavioral/Psychological/Social:  Encourage group participation    Legal: Petitioned for MI/CD, which were both supported. Pt currently on court hold.     Safety:  - Add suicide precautions due to patient's reported passive SI  - Continue precautions as noted above  - Status 15 minute checks    Disposition: Pending clinical stabilization. Advocating for [id number] to inpatient CD programming. Pt initially quite resistant to CD treatment. Due to concerns about her safety and additional collateral information from patient's mother, petition for MI/CD commitment was pursued.     Rufina Gonzalez MD  [facility] Psychiatry

## 2019-09-21 PROCEDURE — 25000132 ZZH RX MED GY IP 250 OP 250 PS 637: Performed by: PSYCHIATRY & NEUROLOGY

## 2019-09-21 PROCEDURE — 12400001 ZZH R&B MH UMMC

## 2019-09-21 RX ADMIN — GABAPENTIN 300 MG: 300 CAPSULE ORAL at 08:46

## 2019-09-21 RX ADMIN — GABAPENTIN 300 MG: 300 CAPSULE ORAL at 21:05

## 2019-09-21 RX ADMIN — METHADONE HYDROCHLORIDE 160 MG: 5 SOLUTION ORAL at 16:32

## 2019-09-21 RX ADMIN — FLUOXETINE 60 MG: 20 CAPSULE ORAL at 08:46

## 2019-09-21 RX ADMIN — GABAPENTIN 300 MG: 300 CAPSULE ORAL at 13:56

## 2019-09-21 ASSESSMENT — ACTIVITIES OF DAILY LIVING (ADL)
LAUNDRY: UNABLE TO COMPLETE
DRESS: INDEPENDENT
LAUNDRY: UNABLE TO COMPLETE
HYGIENE/GROOMING: INDEPENDENT
ORAL_HYGIENE: INDEPENDENT
HYGIENE/GROOMING: INDEPENDENT
DRESS: SCRUBS (BEHAVIORAL HEALTH);INDEPENDENT
ORAL_HYGIENE: INDEPENDENT

## 2019-09-21 NOTE — PROGRESS NOTES
"Pt spent the shift in her room. Pt ate breakfast and lunch in her room. Pt denied SI/SIB/HI. Pt reported that she is worried about her son being missing; \"he is out there somewhere so that's what I'm worried about.\" Pt appeared calm and cooperative.      09/21/19 1300   Behavioral Health   Hallucinations denies / not responding to hallucinations   Thinking intact   Orientation person: oriented;place: oriented;date: oriented;time: oriented   Memory baseline memory   Insight insight appropriate to situation   Judgement intact   Eye Contact at examiner   Affect full range affect   Mood mood is calm   Physical Appearance/Attire attire appropriate to age and situation   Hygiene well groomed   1. Wish to be Dead (Past Month) No   2. Non-Specific Active Suicidal Thoughts (Past Month) No   Elopement   (no observed behaviors)   Activity isolative   Speech clear;coherent   Medication Sensitivity no stated side effects;no observed side effects   Psychomotor / Gait balanced;steady   Activities of Daily Living   Hygiene/Grooming independent   Oral Hygiene independent   Dress scrubs (behavioral health);independent   Laundry unable to complete   Room Organization independent     "

## 2019-09-21 NOTE — PROGRESS NOTES
Pt was in the milieu only briefly during the shift.  She denies anx, dep, SI/SIB.  Pt talked about wanting to be d/yamilet but as of yet does not know of any tx plan.

## 2019-09-22 PROCEDURE — 25000132 ZZH RX MED GY IP 250 OP 250 PS 637: Performed by: PSYCHIATRY & NEUROLOGY

## 2019-09-22 PROCEDURE — 12400001 ZZH R&B MH UMMC

## 2019-09-22 RX ADMIN — FLUOXETINE 60 MG: 20 CAPSULE ORAL at 08:39

## 2019-09-22 RX ADMIN — METHADONE HYDROCHLORIDE 160 MG: 5 SOLUTION ORAL at 17:27

## 2019-09-22 RX ADMIN — GABAPENTIN 300 MG: 300 CAPSULE ORAL at 14:05

## 2019-09-22 RX ADMIN — GABAPENTIN 300 MG: 300 CAPSULE ORAL at 08:39

## 2019-09-22 RX ADMIN — GABAPENTIN 300 MG: 300 CAPSULE ORAL at 20:12

## 2019-09-22 ASSESSMENT — ACTIVITIES OF DAILY LIVING (ADL)
DRESS: SCRUBS (BEHAVIORAL HEALTH)
ORAL_HYGIENE: INDEPENDENT
HYGIENE/GROOMING: INDEPENDENT
LAUNDRY: UNABLE TO COMPLETE
ORAL_HYGIENE: INDEPENDENT
LAUNDRY: UNABLE TO COMPLETE
HYGIENE/GROOMING: INDEPENDENT
DRESS: SCRUBS (BEHAVIORAL HEALTH)

## 2019-09-22 NOTE — PROGRESS NOTES
Case Management Note    Writer met with patient to complete Rule 25 assessment.  Patient questioned what treatment team recommendations were and writer responded that the team is recommending completing a residential MI/CD treatment as part of the order for civil commitment.      Patient verbalized ambivalence for treatment (while she is willing to do tx, she will not be able to participate in Tx knowing that 18 year old son is homeless.      Also displayed lack of insight re: the severity of MI/CD problems (reports she is in the hospital is because her mother kicked out her 18 year old son and as he is now homeless).  Stated that she did not want to hear alternative points of view on the matter.      Patient declined to participate in the assessment at this time.

## 2019-09-22 NOTE — PROGRESS NOTES
"   09/22/19 1410   Behavioral Health   Hallucinations other (see comment)   Thinking other (see comment)   Orientation place: oriented;other (see comment)   Memory baseline memory;other (see comment)   Insight other (see comment)   Judgement impaired   Eye Contact at examiner   Affect other (see comments);blunted, flat   Mood mood is calm;other (see comments)   Hygiene other (see comment)   Activities of Daily Living   Hygiene/Grooming independent   Oral Hygiene independent   Dress scrubs (behavioral health)   Laundry unable to complete   Room Organization independent   Pt was isolative in her room for most of this shift. She did not say much to staff and most of her answers to staff are short \"yes or no\". She ate all meals in her room and did not interact with staff or peers. She did not shower during this shift. She was overall clam.  "

## 2019-09-22 NOTE — PLAN OF CARE
"Pt remained in her room most of this evening shift to separate self from unit acuity. Pt slept, rested in her bed, watched TV, and ate snacks. Pt denies all mental health symptoms besides depressive symptoms. She explains she has been sleeping more than she usually does and that her mood is \"sad.\" She explains she feels \"numb,\" but does not elaborate. Pt's blood pressure was slightly low when first checked. Pt was just waking up from a nap, so she walked around the unit from a while, drank a few cups of water, and her /69 with pulse 71. Methadone was administered as VS within parameters. Pt denies problems with pain, sleeping or eating. Pt does not appear to understand the court process fully. She was unsure if she was being committed. This writer explained the next steps to her, but it is unclear if she fully grasped this.   "

## 2019-09-23 PROCEDURE — 99232 SBSQ HOSP IP/OBS MODERATE 35: CPT | Performed by: PSYCHIATRY & NEUROLOGY

## 2019-09-23 PROCEDURE — 12400001 ZZH R&B MH UMMC

## 2019-09-23 PROCEDURE — 25000132 ZZH RX MED GY IP 250 OP 250 PS 637: Performed by: PSYCHIATRY & NEUROLOGY

## 2019-09-23 RX ADMIN — GABAPENTIN 300 MG: 300 CAPSULE ORAL at 14:21

## 2019-09-23 RX ADMIN — SIMETHICONE CHEW TAB 80 MG 80 MG: 80 TABLET ORAL at 09:39

## 2019-09-23 RX ADMIN — METHADONE HYDROCHLORIDE 160 MG: 5 SOLUTION ORAL at 16:38

## 2019-09-23 RX ADMIN — GABAPENTIN 300 MG: 300 CAPSULE ORAL at 09:39

## 2019-09-23 RX ADMIN — FLUOXETINE 60 MG: 20 CAPSULE ORAL at 09:39

## 2019-09-23 RX ADMIN — GABAPENTIN 300 MG: 300 CAPSULE ORAL at 20:08

## 2019-09-23 ASSESSMENT — ACTIVITIES OF DAILY LIVING (ADL)
HYGIENE/GROOMING: PROMPTS
DRESS: SCRUBS (BEHAVIORAL HEALTH)
ORAL_HYGIENE: PROMPTS

## 2019-09-23 NOTE — PROGRESS NOTES
"[facility], [place]   Psychiatric Progress Note  Hospital Day: 8        Interim History:   The patient's care was discussed with the treatment team during the daily team meeting and/or staff's chart notes were reviewed.  Staff report patient has been mostly isolative to her room. She is eating meals in her room. She consistently denies SI, HI and sx of psychosis. She has been calm and cooperative on unit. Not participating in groups. Pt denies acute medical concerns. Pt reported longstanding, transient and brief AH of radio noises and \"seeing movements\" our of the corner of her eyes. Pt had Rule 25 assessment scheduled, but declined to meet with them to complete it.     Upon interview, the patient was alert, sitting in her bed and watching TV. When asked how she is feeling, she said \"Better actually.\" She said that she is feeling more hopeful. However, she continues to worry about her 19 yo son who is actively using substances and is homeless. She said that she would like to find him a place to live, but akcnowledges that this will also be challenging for her if she does not get the help she needs to maintain sobriety. She said that she is now willing to engage in CD assessment and inpatient CD treatment. She said that she would like to go back to [facility], if possible. The patient denies SI, SIB, and HI. Patient had no further requests or concerns.  Denies lightheadedness/dizziness. She mentioned that if depressive symptoms persist after period of sobriety, she would like to explore option of TMS. Pt also reports history of transient psychotic symptoms since she was approximately 17 years old. She said that she will sometimes hear something that sounds like a radio and then shadows. She denies other symptoms of psychosis.          Medications:       FLUoxetine  60 mg Oral Daily     gabapentin  300 mg Oral TID     methadone  160 mg Oral Daily at 4 pm     simethicone  80 mg Oral " 4x Daily          Allergies:     Allergies   Allergen Reactions     Abilify [Aripiprazole] Other (See Comments)     Tardive dyskinesia     Allopurinol      Compazine Other (See Comments)     Dystonia     Dramamine      Olanzapine Other (See Comments)     Tardive dyskinesia from Zyprexa - per patient     Reglan Other (See Comments)     dystonia     Seroquel [Quetiapine] Other (See Comments)     Tardive dyskinesia.           Labs:   No results found for this or any previous visit (from the past 24 hour(s)).       Psychiatric Examination:     BP 95/68   Pulse 67   Temp 97.5  F (36.4  C) (Tympanic)   Resp 16   SpO2 98%   Weight is 0 lbs 0 oz  There is no height or weight on file to calculate BMI.    Weight over time:  Vitals:       Orthostatic Vitals       Most Recent      Lying Orthostatic BP 98/64 09/18 0815    Lying Orthostatic Pulse (bpm) 64 09/18 0815    Sitting Orthostatic BP 89/55 09/19 0848    Sitting Orthostatic Pulse (bpm) 61 09/19 0848    Standing Orthostatic BP --  Comment: declined 09/19 0848            Cardiometabolic risk assessment. 09/19/19      Reviewed patient profile for cardiometabolic risk factors    Date taken /Value  REFERENCE RANGE   Abdominal Obesity  (Waist Circumference)   See nursing flowsheet Women ?35 in (88 cm)   Men ?40 in (102 cm)      Triglycerides  No results found for: TRIG    ?150 mg/dL (1.7 mmol/L) or current treatment for elevated triglycerides   HDL cholesterol  No results found for: HDL]   Women <50 mg/dL (1.3 mmol/L) in women or current treatment for low HDL cholesterol  Men <40 mg/dL (1 mmol/L) in men or current treatment for low HDL cholesterol     Fasting plasma glucose (FPG) Lab Results   Component Value Date    GLC 86 09/13/2019      FPG ?100 mg/dL (5.6 mmol/L) or treatment for elevated blood glucose   Blood pressure  BP Readings from Last 3 Encounters:   09/22/19 95/68   09/15/19 108/66   04/30/19 99/47    Blood pressure ?130/85 mmHg or treatment for elevated blood  "pressure   Family History  See family history     Appearance: lying in bed, awake, alert  Attitude:  Cooperative  Eye Contact:  good  Mood:  \"better\"  Affect:  mood congruent, brighter  Speech:  clear, coherent  Language: fluent and intact in English  Psychomotor, Gait, Musculoskeletal:  no evidence of tardive dyskinesia, dystonia, or tics  Throught Process:  linear and goal oriented  Associations:  no loose associations  Thought Content:  no evidence of suicidal ideation or homicidal ideation and no evidence of psychotic thought  Insight:  fair  Judgement: fair  Oriented to:  time, person, and place.    Attention Span and Concentration:  fair  Recent and Remote Memory:  fair  Fund of Knowledge:  appropriate    Clinical Global Impressions  First:  Considering your total clinical experience with this particular patient population, how severe are the patient's symptoms at this time?: 7 (09/15/19 0601)  Compared to the patient's condition at the START of treatment, this patient's condition is:: 4 (09/15/19 0601)  Most recent:  Considering your total clinical experience with this particular patient population, how severe are the patient's symptoms at this time?: 7 (09/15/19 0601)  Compared to the patient's condition at the START of treatment, this patient's condition is:: 4 (09/15/19 0601)           Precautions:     Behavioral Orders   Procedures     Code 1 - Restrict to Unit     Elopement precautions     Routine Programming     As clinically indicated     Seizure precautions     Status 15     Every 15 minutes.     Suicide precautions     Patients on Suicide Precautions should have a Combination Diet ordered that includes a Diet selection(s) AND a Behavioral Tray selection for Safe Tray - with utensils, or Safe Tray - NO utensils       Withdrawal precautions          Diagnoses:     MDD, recurrent, moderate  Substance induced psychosis  Cluster B traits, R/O Borderline Personality Disorder  R/O Bipolar " Disorder  Amphetamine Use Disorder, severe  Benzodiazepine Use Disorder, severe  Opiate Use Disorder, severe, on methadone maintenance         Assessment & Plan:     Assessment and hospital summary:  Jihan Gill was admitted to station 12 on 72 hold initally as she was acutely psychotic and she did not have the capacity to make her decision, on second of hospitalization as she is clinically got better and all signs of substance induced psychosis has resolved, patient has signed in voluntary. PTA Lithium was discontinued as the patient psychiatrist was weaning her off and giving her last admission on 1/19 with lithium toxicity,  PTA Prozac was started at 40 mg po day for her depressed mood. On 9/17 and as the team has concerning collateral informations about the ongoing substance use disorder mainly the IV methamphetamine and benzodiazepine which is very concerning especially that she is on high dose of the methadone ( 160 mg /daily) so the team filed for petition for MICD on 9/17 and placed the patient on 72 hours holds as she was demanding immediate discharge and has no insight to her ongoing substance use and consequent psychosis.    Target psychiatric symptoms and interventions:  Resume Prozac 60 mg daily  Keep methadone dose scheduled in afternoon for now.  Resume Gabapentin 300 mg TID to target acute anxiety.      Medical Problems and Treatments:  Nursing staff instructed to HOLD methadone dose and contact provider if BPs remain soft. At baseline, it appears that patient's BP range is low 100s/60s-70s. Will continue to monitor for symptoms of hypotension and may consider reducing dose of methadone if BP remains lower than baseline. Pt encouraged to drink adequate fluids. She is asymptomatic.     Behavioral/Psychological/Social:  Encourage group participation    Legal: Petitioned for MI/CD, which were both supported. Pt currently on court hold. Preliminary hearing is tomorrow.     Safety:  - Continue  suicide precautions  - Continue precautions as noted above  - Status 15 minute checks    Disposition: Pending clinical stabilization. Advocating for [id number] to inpatient CD programming. Pt initially quite resistant to CD treatment. Due to concerns about her safety and additional collateral information from patient's mother, petition for MI/CD commitment was pursued. She is now willing to meet with CD  to complete Rule 25.    Rufina Gonzalez MD  [facility]

## 2019-09-23 NOTE — PROGRESS NOTES
"Nursing Assessment    Pt was pleasant and cooperative on approach. Pt presents with blunted, flat affect. Pt remained in room for the majority of the shift. Pt did not attend any groups. Pt denies SI/SIB/HI. Pt is experiencing AH/VH. Pt expressed the hallucinations are not specific. Pt explained the hallucinations are confusing such that she believes she is hearing \"a radio on in another room\" or \"seeing movement\" out of the corner of her eyes. Per pt, this is close to her baseline. Pt rated anxiety 0/10 and depression 8/10. Staff encouraged pt to come out of room. Pt showered this shift and spent approximately 30 minutes walking the halls. Pt expressed a lack of appetite. Staff provided snacks and encouraged fluids. Pt was medication compliant. No observed medication side effects. Pt did not complain of any physical pains. Will continue to monitor and support.     "

## 2019-09-23 NOTE — PROGRESS NOTES
This writer called Penelope Gonzalez from [facility]- she wanted update prior to pt having her upcoming exam tomorrow.  Provided update including her declining to do CD eval this weekend.

## 2019-09-23 NOTE — PROGRESS NOTES
9/23/2019    CD consult acknowledged. At this time, pt may not be seen until Wednesday or Thursday of this week due to high demand.     Carolyn Clark, [facility]  [phone]

## 2019-09-23 NOTE — PROGRESS NOTES
Provided Jihan with paperwork to complete Rule 25.  She is currently working on this. Also requested to have an  attempt to meet with her again (she declined this weekend.)     Update:  Assessors notified this writer they will likely be able to come Wed or Thurs to do assessment.    Also reached out to [facility] to see if they can see her with the insurance that she has.

## 2019-09-24 PROCEDURE — 25000132 ZZH RX MED GY IP 250 OP 250 PS 637: Performed by: PSYCHIATRY & NEUROLOGY

## 2019-09-24 PROCEDURE — 99232 SBSQ HOSP IP/OBS MODERATE 35: CPT | Performed by: PSYCHIATRY & NEUROLOGY

## 2019-09-24 PROCEDURE — 12400001 ZZH R&B MH UMMC

## 2019-09-24 RX ORDER — SIMETHICONE 80 MG
80 TABLET,CHEWABLE ORAL 4 TIMES DAILY PRN
Status: DISCONTINUED | OUTPATIENT
Start: 2019-09-24 | End: 2019-10-01 | Stop reason: HOSPADM

## 2019-09-24 RX ORDER — GABAPENTIN 300 MG/1
600 CAPSULE ORAL 2 TIMES DAILY
Status: DISCONTINUED | OUTPATIENT
Start: 2019-09-25 | End: 2019-10-01 | Stop reason: HOSPADM

## 2019-09-24 RX ORDER — GABAPENTIN 300 MG/1
900 CAPSULE ORAL AT BEDTIME
Status: DISCONTINUED | OUTPATIENT
Start: 2019-09-24 | End: 2019-10-01 | Stop reason: HOSPADM

## 2019-09-24 RX ADMIN — METHADONE HYDROCHLORIDE 160 MG: 5 SOLUTION ORAL at 16:22

## 2019-09-24 RX ADMIN — FLUOXETINE 60 MG: 20 CAPSULE ORAL at 08:55

## 2019-09-24 RX ADMIN — GABAPENTIN 900 MG: 300 CAPSULE ORAL at 19:07

## 2019-09-24 RX ADMIN — GABAPENTIN 300 MG: 300 CAPSULE ORAL at 13:06

## 2019-09-24 RX ADMIN — GABAPENTIN 300 MG: 300 CAPSULE ORAL at 08:55

## 2019-09-24 RX ADMIN — HYDROXYZINE HYDROCHLORIDE 50 MG: 25 TABLET, FILM COATED ORAL at 19:07

## 2019-09-24 ASSESSMENT — ACTIVITIES OF DAILY LIVING (ADL)
LAUNDRY: UNABLE TO COMPLETE
DRESS: SCRUBS (BEHAVIORAL HEALTH)
DRESS: SCRUBS (BEHAVIORAL HEALTH)
HYGIENE/GROOMING: PROMPTS
ORAL_HYGIENE: PROMPTS
ORAL_HYGIENE: INDEPENDENT
HYGIENE/GROOMING: INDEPENDENT

## 2019-09-24 NOTE — PLAN OF CARE
"Nursing Assessment     Pt states she is, \"doing okay,\" today. Pt was pleasant and cooperative on approach. Pt presents with blunted, flat affect. Pt remained in room for the entire shift. Pt did not attend any groups. Staff encouraged pt to attend groups and join the milieu, pt declined. Pt napped on and off throughout the shift (approximately 3 hours total). Pt denies SI/SIB/HIAH/VH. Pt was medication compliant. No observed medication side effects. Pt did not complain of any physical pains. Pt refused evening vitals. Will continue to monitor and support.      "

## 2019-09-24 NOTE — PROGRESS NOTES
"Pt presented as calm, pleasant, and cooperative.  Reported feeling tired due to not sleeping well.  Pt set goal to have a good day.  Pt is scheduled for court this afternoon.  Communicated acceptance of outcome as she stated \"I agree with what they're wanting to do.\"  Pt endorsed \"hearing things, voices\" but stated they are not command in nature, and denied they are bothersome. Denied V/H.  Pt denied SI/SIB/HI.  No behavioral concerns as of the time of this note.       09/24/19 1000   Behavioral Health   Hallucinations auditory   Thinking poor concentration   Orientation person: oriented;place: oriented;date: oriented;time: oriented   Memory baseline memory   Insight admits / accepts   Judgement impaired   Eye Contact at examiner   Affect blunted, flat   Mood mood is calm   Physical Appearance/Attire untidy;disheveled   Hygiene neglected grooming - unclean body, hair, teeth   Suicidality other (see comments)  (Denies)   1. Wish to be Dead (Past Month) No   Wish to be Dead Description (Recent) Denies   2. Non-Specific Active Suicidal Thoughts (Past Month) No   Non-Specific Active Suicidal Thought Description (Recent) Denies   3. Active Sucidal Ideation with any Methods (Not Plan) Without Intent to Act (Past Month) No   Active Suicidal Ideation with any Methods (Not Plan) Description (Past Month) Denies   4. Active Suicidal Ideation with Some Intent to Act, Without Specific Plan (Past Month) No   Active Suicidal Ideation with Some Intent to Act, Without Specific Plan Description (Past Month) Denies   5. Active Suicidal Ideation with Specific Plan and Intent (Past Month) No   Active Suicidal Ideation with Specific Plan and Intent Description (Past Month) Denies   Elopement   (No elopement behaviors reported or observed.)   Activity isolative;withdrawn   Speech clear;coherent   Medication Sensitivity no stated side effects;no observed side effects   Psychomotor / Gait balanced;steady   Coping/Psychosocial   Verbalized " Emotional State acceptance   Activities of Daily Living   Hygiene/Grooming prompts   Oral Hygiene prompts   Dress scrubs (behavioral health)   Laundry unable to complete   Room Organization independent

## 2019-09-24 NOTE — CONSULTS
9/24/2019    Writer received info from  that [facility] come see pt today. Writer will close CD consult at this time.     NIMISHA Ng

## 2019-09-24 NOTE — PROGRESS NOTES
This writer spoke with Matthew Alcala's Phone: [phone].  Explained that as long as pt is agreeable to completed Rule 25 and awaiting here for placement into MI/CD Residential treatment we are comfortable with a Stay. She explained that she may push it to final hearing so that we have a better idea of when and where she will go to treatment and to make sure that she cooperates with [facility] tomorrow when they come.

## 2019-09-24 NOTE — PROGRESS NOTES
"[facility], [place]   Psychiatric Progress Note  Hospital Day: 9        Interim History:   The patient's care was discussed with the treatment team during the daily team meeting and/or staff's chart notes were reviewed.  Staff report patient has been mostly isolative to her room, napping on and off throughout the day. She is eating meals in her room. She consistently denies SI, HI and sx of psychosis. She has been calm and cooperative on unit. Not participating in groups. Pt denies acute medical concerns. Pt continues to endorse transient AH/VH when asked, though notes that she is not hearing voices and is not bothered by these symptoms.     Upon interview, the patient was alert, sitting in her bed and watching TV. She said that she is feeling \"better.\" She notes that she continues to experience high anxiety, and questions whether Gabapentin can be raised to PTA dose of 600 mg TID. She also asked for a medication for initial insomnia. Discussed option of increasing at bedtime dose of Gabapentin to 900 mg at bedtime. She is in agreement with this plan. Again discussed reported symptoms of psychosis. She again states that she has always experienced \"seeing and hearing weird things,\" though again denies that this is bothersome for her. She said that even as a child she would see things in the corner of her eye for brief seconds, and she would believe that her house was haunted for that reason. She said \"I think I was seeing ghosts sometimes, I don't know, its weird.\" However, she denies severe episodes of psychosis unless in the context of methamphetamine use. She denies any significant concerns about cognitive functioning. She believes that her thinking is clearer. She is willing to engage in inpatient CD treatment. She said that one of the prompting factors for relapse was her fast transition back to home following inpatient treatment. Her preference is to discharge to a program that " "provides transitional outpatient programming and sober housing for these reasons. She said \"Yeah, I think that has always been the missing piece.\" The patient denies SI, SIB, and HI. Patient had no further requests or concerns.  Denies lightheadedness/dizziness.         Medications:       FLUoxetine  60 mg Oral Daily     [START ON 9/25/2019] gabapentin  600 mg Oral BID     gabapentin  900 mg Oral At Bedtime     methadone  160 mg Oral Daily at 4 pm          Allergies:     Allergies   Allergen Reactions     Abilify [Aripiprazole] Other (See Comments)     Tardive dyskinesia     Allopurinol      Compazine Other (See Comments)     Dystonia     Dramamine      Olanzapine Other (See Comments)     Tardive dyskinesia from Zyprexa - per patient     Reglan Other (See Comments)     dystonia     Seroquel [Quetiapine] Other (See Comments)     Tardive dyskinesia.           Labs:   No results found for this or any previous visit (from the past 24 hour(s)).       Psychiatric Examination:     /77   Pulse 72   Temp 99.6  F (37.6  C)   Resp 16   Wt 87.3 kg (192 lb 8 oz)   SpO2 97%   BMI 31.07 kg/m    Weight is 192 lbs 8 oz  Body mass index is 31.07 kg/m .    Weight over time:  Vitals:    09/24/19 0900   Weight: 87.3 kg (192 lb 8 oz)       Orthostatic Vitals       Most Recent      Lying Orthostatic BP 98/64 09/18 0815    Lying Orthostatic Pulse (bpm) 64 09/18 0815    Sitting Orthostatic BP 89/55 09/19 0848    Sitting Orthostatic Pulse (bpm) 61 09/19 0848    Standing Orthostatic BP --  Comment: declined 09/19 0848            Cardiometabolic risk assessment. 09/19/19      Reviewed patient profile for cardiometabolic risk factors    Date taken /Value  REFERENCE RANGE   Abdominal Obesity  (Waist Circumference)   See nursing flowsheet Women ?35 in (88 cm)   Men ?40 in (102 cm)      Triglycerides  No results found for: TRIG    ?150 mg/dL (1.7 mmol/L) or current treatment for elevated triglycerides   HDL cholesterol  No results " "found for: HDL]   Women <50 mg/dL (1.3 mmol/L) in women or current treatment for low HDL cholesterol  Men <40 mg/dL (1 mmol/L) in men or current treatment for low HDL cholesterol     Fasting plasma glucose (FPG) Lab Results   Component Value Date    GLC 86 09/13/2019      FPG ?100 mg/dL (5.6 mmol/L) or treatment for elevated blood glucose   Blood pressure  BP Readings from Last 3 Encounters:   09/24/19 110/77   09/15/19 108/66   04/30/19 99/47    Blood pressure ?130/85 mmHg or treatment for elevated blood pressure   Family History  See family history     Appearance: lying in bed, awake, alert  Attitude:  Cooperative  Eye Contact:  good  Mood:  \"better\"  Affect:  mood congruent, brighter  Speech:  clear, coherent  Language: fluent and intact in English  Psychomotor, Gait, Musculoskeletal:  no evidence of tardive dyskinesia, dystonia, or tics  Throught Process:  linear and goal oriented  Associations:  no loose associations  Thought Content:  no evidence of suicidal ideation or homicidal ideation and no evidence of psychotic thought  Insight:  fair  Judgement: fair  Oriented to:  time, person, and place.    Attention Span and Concentration:  fair  Recent and Remote Memory:  fair  Fund of Knowledge:  appropriate    Clinical Global Impressions  First:  Considering your total clinical experience with this particular patient population, how severe are the patient's symptoms at this time?: 7 (09/15/19 0601)  Compared to the patient's condition at the START of treatment, this patient's condition is:: 4 (09/15/19 0601)  Most recent:  Considering your total clinical experience with this particular patient population, how severe are the patient's symptoms at this time?: 7 (09/15/19 0601)  Compared to the patient's condition at the START of treatment, this patient's condition is:: 4 (09/15/19 0601)           Precautions:     Behavioral Orders   Procedures     Code 1 - Restrict to Unit     Elopement precautions     Routine " Programming     As clinically indicated     Seizure precautions     Status 15     Every 15 minutes.     Suicide precautions     Patients on Suicide Precautions should have a Combination Diet ordered that includes a Diet selection(s) AND a Behavioral Tray selection for Safe Tray - with utensils, or Safe Tray - NO utensils       Withdrawal precautions          Diagnoses:     MDD, recurrent, moderate  Substance induced psychosis  Cluster B traits, R/O Borderline Personality Disorder  R/O Bipolar Disorder  Amphetamine Use Disorder, severe  Benzodiazepine Use Disorder, severe  Opiate Use Disorder, severe, on methadone maintenance         Assessment & Plan:     Assessment and hospital summary:  Jihan Gill was admitted to station 12 on 72 hold initally as she was acutely psychotic and she did not have the capacity to make her decision, on second of hospitalization as she is clinically got better and all signs of substance induced psychosis has resolved, patient has signed in voluntary. PTA Lithium was discontinued as the patient psychiatrist was weaning her off and giving her last admission on 1/19 with lithium toxicity,  PTA Prozac was started at 40 mg po day for her depressed mood. On 9/17 and as the team has concerning collateral informations about the ongoing substance use disorder mainly the IV methamphetamine and benzodiazepine which is very concerning especially that she is on high dose of the methadone ( 160 mg /daily) so the team filed for petition for MICD on 9/17 and placed the patient on 72 hour hold as she was demanding immediate discharge and has no insight to her ongoing substance use and consequent psychosis. Petition for MI/CD commitment was pursued and petition was supported. Pt appears to be responding positively to current medication regimen with resolution of acute psychotic and manic symptoms. She continues to endorse moderate depressive and anxiety symptoms, though notes gradual  improvements. She appears to have improved insight with regards to importance of engaging in structured inpatient CD program. She reports transient and brief episodes of AH/VH. I suspect that these symptoms were exacerbated by substance use and secondary to depression vs Borderline Personality Disorder. I do not suspect that she has a primary psychotic illness.     Target psychiatric symptoms and interventions:  Resume Prozac 60 mg daily  Keep methadone dose scheduled in afternoon for now.  Increase Gabapentin to 600 mg daily, 600 mg at 2 pm, and 900 mg QHS to target acute anxiety and sleep.  Hydroxyzine prn for sleep.    Medical Problems and Treatments:  No acute medical concerns    Behavioral/Psychological/Social:  Encourage group participation    Legal: Petitioned for MI/CD, which were both supported. Pt currently on court hold. Preliminary hearing is today.    Safety:  - Continue suicide precautions  - Continue precautions as noted above  - Status 15 minute checks    Disposition: Pending clinical stabilization. Advocating for [id number] to inpatient CD programming. Pt initially quite resistant to CD treatment. Due to concerns about her safety and additional collateral information from patient's mother, petition for MI/CD commitment was pursued. She is now willing to meet with CD  to complete Rule 25, which is scheduled for 9/25/19.     Rufina Gonzalez MD  [facility] Psychiatry

## 2019-09-25 PROCEDURE — 12400001 ZZH R&B MH UMMC

## 2019-09-25 PROCEDURE — H2032 ACTIVITY THERAPY, PER 15 MIN: HCPCS

## 2019-09-25 PROCEDURE — 25000132 ZZH RX MED GY IP 250 OP 250 PS 637: Performed by: PSYCHIATRY & NEUROLOGY

## 2019-09-25 RX ADMIN — FLUOXETINE 60 MG: 20 CAPSULE ORAL at 07:59

## 2019-09-25 RX ADMIN — GABAPENTIN 900 MG: 300 CAPSULE ORAL at 19:52

## 2019-09-25 RX ADMIN — METHADONE HYDROCHLORIDE 160 MG: 5 SOLUTION ORAL at 15:42

## 2019-09-25 RX ADMIN — GABAPENTIN 600 MG: 300 CAPSULE ORAL at 07:59

## 2019-09-25 RX ADMIN — GABAPENTIN 600 MG: 300 CAPSULE ORAL at 15:41

## 2019-09-25 ASSESSMENT — ACTIVITIES OF DAILY LIVING (ADL)
HYGIENE/GROOMING: INDEPENDENT
LAUNDRY: UNABLE TO COMPLETE
DRESS: SCRUBS (BEHAVIORAL HEALTH)
ORAL_HYGIENE: INDEPENDENT
ORAL_HYGIENE: INDEPENDENT
HYGIENE/GROOMING: INDEPENDENT
DRESS: SCRUBS (BEHAVIORAL HEALTH)

## 2019-09-25 NOTE — PROGRESS NOTES
This writer completed [facility] Diagnostic Assessment and sent to Penelope in [facility] intake @ Fax: [phone].    She will assign a  this week for Jihan. She can be reached at [phone]

## 2019-09-25 NOTE — PLAN OF CARE
Pt continues to remain mostly isolated and withdrawn to her room.  After returning from court patient became tense and then tearful.  Pt reported that going to court was extremely difficult and triggering for her, resulting in paranoia and AH.  She was given PRN hydroxyzine for anxiety and was medication compliant.  She denies SI/SIB/HI.

## 2019-09-25 NOTE — PROGRESS NOTES
"   09/25/19 1404   Behavioral Health   Hallucinations denies / not responding to hallucinations   Thinking intact   Orientation person: oriented;place: oriented;date: oriented   Memory baseline memory   Insight admits / accepts   Judgement intact   Eye Contact cultural specific   Affect full range affect   Mood anxious   Physical Appearance/Attire attire appropriate to age and situation   Hygiene well groomed   1. Wish to be Dead (Past Month) No   2. Non-Specific Active Suicidal Thoughts (Past Month) No   Activity isolative   Speech clear;coherent   Medication Sensitivity no stated side effects   Psychomotor / Gait balanced;steady   Psycho Education   Type of Intervention 1:1 intervention   Response participates, initiates socially appropriate   Hours 0.5   Treatment Detail IMR   Activities of Daily Living   Hygiene/Grooming independent   Oral Hygiene independent   Dress scrubs (behavioral health)   Room Organization independent   Pt ate meals but was in room most of shift. Pt states that she is not feeling depressed but is having moderate anxiety. Pt states she has no thought or desire to harm self or others at this time. Pt states \"I think the medication is really helping.\"  "

## 2019-09-25 NOTE — PROGRESS NOTES
This writer spoke to [facility].  The pt waived her right to exam yesterday and agreed and signed off on a stay of commitment.  She agrees to getting her Rule 25 completed and going to an MI/CD Residential program.  IF she declines any of this moving forward we are to contact them and they can push it to a full commit.      Pt has a CD eval with [facility] today at 12:30pm.

## 2019-09-26 PROCEDURE — 25000132 ZZH RX MED GY IP 250 OP 250 PS 637: Performed by: PSYCHIATRY & NEUROLOGY

## 2019-09-26 PROCEDURE — G0177 OPPS/PHP; TRAIN & EDUC SERV: HCPCS

## 2019-09-26 PROCEDURE — 12400001 ZZH R&B MH UMMC

## 2019-09-26 PROCEDURE — 99232 SBSQ HOSP IP/OBS MODERATE 35: CPT | Performed by: PSYCHIATRY & NEUROLOGY

## 2019-09-26 RX ADMIN — METHADONE HYDROCHLORIDE 160 MG: 5 SOLUTION ORAL at 16:32

## 2019-09-26 RX ADMIN — FLUOXETINE 60 MG: 20 CAPSULE ORAL at 09:02

## 2019-09-26 RX ADMIN — GABAPENTIN 600 MG: 300 CAPSULE ORAL at 09:02

## 2019-09-26 RX ADMIN — GABAPENTIN 600 MG: 300 CAPSULE ORAL at 14:14

## 2019-09-26 RX ADMIN — HYDROXYZINE HYDROCHLORIDE 25 MG: 25 TABLET, FILM COATED ORAL at 09:02

## 2019-09-26 RX ADMIN — GABAPENTIN 900 MG: 300 CAPSULE ORAL at 19:01

## 2019-09-26 ASSESSMENT — ACTIVITIES OF DAILY LIVING (ADL)
ORAL_HYGIENE: INDEPENDENT
ORAL_HYGIENE: INDEPENDENT
HYGIENE/GROOMING: INDEPENDENT
DRESS: SCRUBS (BEHAVIORAL HEALTH)
LAUNDRY: UNABLE TO COMPLETE
HYGIENE/GROOMING: INDEPENDENT
DRESS: INDEPENDENT

## 2019-09-26 NOTE — PROGRESS NOTES
This writer spoke to Sun @ [facility] and she confirms that they will be able to work with and help figure out the pt's daily Methadone dose.  They will be able to either drive her or get her approved closer to tx.

## 2019-09-26 NOTE — PROGRESS NOTES
09/25/19 2006   Art Therapy   Type of Intervention structured groups   Response participates with encouragement    Hours .5   Treatment Detail    Collaborative pankaj/ adrienne      Problem-Substance-induced psychosis  Bipolar disorder type 1  PTSD  Polysubstance abuse      Goal- process, express, cope and regulate feelings and emotions through Art Therapy directives.     Outcome- pt was engaged for about half of the group. Affect was flat and blunted. She worked on a coloring project for a short time. She identified her strengths as beingg smart, kind and a good mother.

## 2019-09-26 NOTE — PROGRESS NOTES
Pt attended OT group for the first time since admission.  Pleasant and polite throughout the morning.  Pt identifies stressors as: son homeless, job issues, mental health, chemical dependency issues.  Pt states she is working on accepting her lack of control, and desires to be sober.  Pt was given positive feedback for finally coming to group, after being invited several times.  After discussion, pt asked to be shown how to make a hemp knotted bracelet, picked up 3 step knotting technique fairly quickly, and appeared relaxed while working.

## 2019-09-26 NOTE — PROGRESS NOTES
Pt is set up to attend [facility] and will discharge to this program on Tuesday, October 1st.  They will pick her up.  They will also assist her in setting up her Methadone appts upon her arrival.    Also set up psychiatry appointment at [facility] - appointments and additional info listed in pt's AVS.

## 2019-09-26 NOTE — PROGRESS NOTES
"[facility], [place]   Psychiatric Progress Note  Hospital Day: 11        Interim History:   The patient's care was discussed with the treatment team during the daily team meeting and/or staff's chart notes were reviewed.  Staff report patient has been mostly isolative to her room, napping on and off throughout the day. She is eating meals in her room. She consistently denies SI, HI and sx of psychosis. She has been calm and cooperative on unit. More visible in the milieu and participating in more groups. Pt denies acute medical concerns.    Upon interview, the patient was alert, sitting in her bed and watching TV. She said that she is feeling \"good.\" She said that she feels ready to engage in inpatient CD treatment. She recognizes how her use has ultimately affected her ability to be a good mother to her son. She was insightful. She denied side effects to medications with exception of \"the munchies\" from methadone. She said that she often becomes hungry in the middle of the night. She has gained 30 lbs since it was started. She was encouraged to discuss these side effects with her OP prescriber and to explore option of taper. Currently, she would like to remain at current dose. She repots ongoing improvement in her mood. She denies SI, SIB, HI. She said that while at court, she was \"paranoid\" that officers may \"just bring me to intermediate and lock me up.\" She said that she has experienced deception and trauma through the legal system in the past, and describes this as \"traumatic\" for her.  Asked appropriate questions about treatment facility and aftercare plan, which were addressed. Patient had no further requests or concerns.  Denies lightheadedness/dizziness.         Medications:       FLUoxetine  60 mg Oral Daily     gabapentin  600 mg Oral BID     gabapentin  900 mg Oral At Bedtime     methadone  160 mg Oral Daily at 4 pm          Allergies:     Allergies   Allergen Reactions     Abilify " [Aripiprazole] Other (See Comments)     Tardive dyskinesia     Allopurinol      Compazine Other (See Comments)     Dystonia     Dramamine      Olanzapine Other (See Comments)     Tardive dyskinesia from Zyprexa - per patient     Reglan Other (See Comments)     dystonia     Seroquel [Quetiapine] Other (See Comments)     Tardive dyskinesia.           Labs:   No results found for this or any previous visit (from the past 24 hour(s)).       Psychiatric Examination:     BP 98/62 (BP Location: Left arm)   Pulse 61   Temp 98.6  F (37  C) (Oral)   Resp 16   Wt 87.3 kg (192 lb 8 oz)   SpO2 97%   BMI 31.07 kg/m    Weight is 192 lbs 8 oz  Body mass index is 31.07 kg/m .    Weight over time:  Vitals:    09/24/19 0900   Weight: 87.3 kg (192 lb 8 oz)       Orthostatic Vitals       Most Recent      Lying Orthostatic BP 98/64 09/18 0815    Lying Orthostatic Pulse (bpm) 64 09/18 0815    Sitting Orthostatic BP 89/55 09/19 0848    Sitting Orthostatic Pulse (bpm) 61 09/19 0848    Standing Orthostatic BP --  Comment: declined 09/19 0848            Cardiometabolic risk assessment. 09/19/19      Reviewed patient profile for cardiometabolic risk factors    Date taken /Value  REFERENCE RANGE   Abdominal Obesity  (Waist Circumference)   See nursing flowsheet Women ?35 in (88 cm)   Men ?40 in (102 cm)      Triglycerides  No results found for: TRIG    ?150 mg/dL (1.7 mmol/L) or current treatment for elevated triglycerides   HDL cholesterol  No results found for: HDL]   Women <50 mg/dL (1.3 mmol/L) in women or current treatment for low HDL cholesterol  Men <40 mg/dL (1 mmol/L) in men or current treatment for low HDL cholesterol     Fasting plasma glucose (FPG) Lab Results   Component Value Date    GLC 86 09/13/2019      FPG ?100 mg/dL (5.6 mmol/L) or treatment for elevated blood glucose   Blood pressure  BP Readings from Last 3 Encounters:   09/26/19 98/62   09/15/19 108/66   04/30/19 99/47    Blood pressure ?130/85 mmHg or treatment for  "elevated blood pressure   Family History  See family history     Appearance: lying in bed, awake, alert  Attitude:  Cooperative  Eye Contact:  good  Mood:  \"better\"  Affect:  mood congruent, brighter  Speech:  clear, coherent  Language: fluent and intact in English  Psychomotor, Gait, Musculoskeletal:  no evidence of tardive dyskinesia, dystonia, or tics  Throught Process:  linear and goal oriented  Associations:  no loose associations  Thought Content:  no evidence of suicidal ideation or homicidal ideation and no evidence of psychotic thought  Insight:  fair  Judgement: fair  Oriented to:  time, person, and place.    Attention Span and Concentration:  fair  Recent and Remote Memory:  fair  Fund of Knowledge:  appropriate    Clinical Global Impressions  First:  Considering your total clinical experience with this particular patient population, how severe are the patient's symptoms at this time?: 7 (09/15/19 0601)  Compared to the patient's condition at the START of treatment, this patient's condition is:: 4 (09/15/19 0601)  Most recent:  Considering your total clinical experience with this particular patient population, how severe are the patient's symptoms at this time?: 7 (09/15/19 0601)  Compared to the patient's condition at the START of treatment, this patient's condition is:: 4 (09/15/19 0601)           Precautions:     Behavioral Orders   Procedures     Code 1 - Restrict to Unit     Elopement precautions     Routine Programming     As clinically indicated     Seizure precautions     Status 15     Every 15 minutes.     Suicide precautions     Patients on Suicide Precautions should have a Combination Diet ordered that includes a Diet selection(s) AND a Behavioral Tray selection for Safe Tray - with utensils, or Safe Tray - NO utensils       Withdrawal precautions          Diagnoses:     MDD, recurrent, moderate  Substance induced psychosis  Cluster B traits, R/O Borderline Personality Disorder  R/O Bipolar " Disorder  Amphetamine Use Disorder, severe  Benzodiazepine Use Disorder, severe  Opiate Use Disorder, severe, on methadone maintenance         Assessment & Plan:     Assessment and hospital summary:  Jihan Gill was admitted to station 12 on 72 hold initally as she was acutely psychotic and she did not have the capacity to make her decision, on second of hospitalization as she is clinically got better and all signs of substance induced psychosis has resolved, patient has signed in voluntary. PTA Lithium was discontinued as the patient psychiatrist was weaning her off and giving her last admission on 1/19 with lithium toxicity,  PTA Prozac was started at 40 mg po day for her depressed mood. On 9/17 and as the team has concerning collateral informations about the ongoing substance use disorder mainly the IV methamphetamine and benzodiazepine which is very concerning especially that she is on high dose of the methadone ( 160 mg /daily) so the team filed for petition for MICD on 9/17 and placed the patient on 72 hour hold as she was demanding immediate discharge and has no insight to her ongoing substance use and consequent psychosis. Petition for MI/CD commitment was pursued and petition was supported. Pt appears to be responding positively to current medication regimen with resolution of acute psychotic and manic symptoms. She continues to endorse moderate depressive and anxiety symptoms, though notes gradual improvements. She appears to have improved insight with regards to importance of engaging in structured inpatient CD program. She reports transient and brief episodes of AH/VH. I suspect that these symptoms were exacerbated by substance use and secondary to depression vs Borderline Personality Disorder. I do not suspect that she has a primary psychotic illness.     Target psychiatric symptoms and interventions:  Resume Prozac 60 mg daily  Keep methadone dose scheduled in afternoon for now.  Resume  Gabapentin 600 mg daily, 600 mg at 2 pm, and 900 mg QHS to target acute anxiety and sleep.  Hydroxyzine prn for sleep.     Medical Problems and Treatments:  No acute medical concerns    Behavioral/Psychological/Social:  Encourage group participation    Legal: Petitioned for MI/CD, which were both supported. Pt currently on court hold. Preliminary hearing is today.    Safety:  - Continue suicide precautions  - Continue precautions as noted above  - Status 15 minute checks    Disposition: Pending clinical stabilization. Advocating for [id number] to inpatient CD programming. Pt initially quite resistant to CD treatment. Due to concerns about her safety and additional collateral information from patient's mother, petition for MI/CD commitment was pursued. She is now willing to meet with CD  to complete Rule 25, which was completed on 9/25/19.     Rufina Gonzalez MD  [facility] Psychiatry

## 2019-09-26 NOTE — PLAN OF CARE
48 hour nursing assessment completed. Patient presents with a brighter affect. Calm and cooperative. Pleasant upon approach. Patient endorses anxiety and approprietly requests PRN. Patient is looking forward to upcoming discharge and verbalizes readiness to move forward in her life. Denies SI/SIB. Medication compliant. Continue to monitor and assess.

## 2019-09-27 PROCEDURE — 99231 SBSQ HOSP IP/OBS SF/LOW 25: CPT | Performed by: PSYCHIATRY & NEUROLOGY

## 2019-09-27 PROCEDURE — 25000132 ZZH RX MED GY IP 250 OP 250 PS 637: Performed by: PSYCHIATRY & NEUROLOGY

## 2019-09-27 PROCEDURE — 12400001 ZZH R&B MH UMMC

## 2019-09-27 RX ORDER — GABAPENTIN 300 MG/1
CAPSULE ORAL
Qty: 210 CAPSULE | Refills: 1 | Status: ON HOLD | OUTPATIENT
Start: 2019-09-27 | End: 2021-02-22

## 2019-09-27 RX ORDER — METHADONE HYDROCHLORIDE 5 MG/5ML
160 SOLUTION ORAL
Refills: 0 | Status: ON HOLD
Start: 2019-09-27 | End: 2021-02-22

## 2019-09-27 RX ORDER — SIMETHICONE 80 MG
80 TABLET,CHEWABLE ORAL 4 TIMES DAILY PRN
Qty: 120 TABLET | Refills: 1 | Status: ON HOLD | OUTPATIENT
Start: 2019-09-27 | End: 2021-02-22

## 2019-09-27 RX ADMIN — GABAPENTIN 600 MG: 300 CAPSULE ORAL at 08:01

## 2019-09-27 RX ADMIN — GABAPENTIN 600 MG: 300 CAPSULE ORAL at 13:09

## 2019-09-27 RX ADMIN — GABAPENTIN 900 MG: 300 CAPSULE ORAL at 20:08

## 2019-09-27 RX ADMIN — METHADONE HYDROCHLORIDE 160 MG: 5 SOLUTION ORAL at 16:07

## 2019-09-27 RX ADMIN — FLUOXETINE 60 MG: 20 CAPSULE ORAL at 08:01

## 2019-09-27 ASSESSMENT — ACTIVITIES OF DAILY LIVING (ADL)
ORAL_HYGIENE: INDEPENDENT
DRESS: SCRUBS (BEHAVIORAL HEALTH)
DRESS: SCRUBS (BEHAVIORAL HEALTH)
LAUNDRY: UNABLE TO COMPLETE
ORAL_HYGIENE: INDEPENDENT
HYGIENE/GROOMING: INDEPENDENT
HYGIENE/GROOMING: INDEPENDENT

## 2019-09-27 NOTE — PLAN OF CARE
BEHAVIORAL TEAM DISCUSSION    Participants: Dr. Alea Gonzalez, Jenni Islas RN, Julia Cisse RN, CTC- Chrissy FELDMAN, NIMISHA, Jeanie-Pharmacist  Progress: pt at baseline.  Ready for discharge 10/1 to MI/CD Treatment  Anticipated length of stay: discharge plan for 10/1  Continued Stay Criteria/Rationale: pt needs door to door for treatment   Medical/Physical: no medical concerns currently   Precautions:   Behavioral Orders   Procedures    Code 1 - Restrict to Unit    Elopement precautions    Routine Programming     As clinically indicated    Seizure precautions    Status 15     Every 15 minutes.    Suicide precautions     Patients on Suicide Precautions should have a Combination Diet ordered that includes a Diet selection(s) AND a Behavioral Tray selection for Safe Tray - with utensils, or Safe Tray - NO utensils      Withdrawal precautions     Plan: plan is for pt to attend [facility] MI/CD Residential Tx program   Rationale for change in precautions or plan: no change

## 2019-09-27 NOTE — PROGRESS NOTES
"[facility], [place]   Psychiatric Progress Note  Hospital Day: 12        Interim History:   The patient's care was discussed with the treatment team during the daily team meeting and/or staff's chart notes were reviewed.  Staff report patient has been mostly isolative to her room, napping on and off throughout the day. She is eating meals in her room. She consistently denies SI, HI and sx of psychosis. She has been calm and cooperative on unit. More visible in the milieu and participating in more groups. Pt denies acute medical concerns.    Upon interview, the patient was alert, sitting in her bed and watching TV. She said that she is feeling \"good.\" Asked appropriate questions about treatment facility and aftercare plan, which were addressed. Patient had no further requests or concerns.  Denies lightheadedness/dizziness and other side effects. She is looking forward to CD treatment. Was informed of plan to discharge on Tuesday of next week.          Medications:       FLUoxetine  60 mg Oral Daily     gabapentin  600 mg Oral BID     gabapentin  900 mg Oral At Bedtime     methadone  160 mg Oral Daily at 4 pm          Allergies:     Allergies   Allergen Reactions     Abilify [Aripiprazole] Other (See Comments)     Tardive dyskinesia     Allopurinol      Compazine Other (See Comments)     Dystonia     Dramamine      Olanzapine Other (See Comments)     Tardive dyskinesia from Zyprexa - per patient     Reglan Other (See Comments)     dystonia     Seroquel [Quetiapine] Other (See Comments)     Tardive dyskinesia.           Labs:   No results found for this or any previous visit (from the past 24 hour(s)).       Psychiatric Examination:     BP 95/61 (BP Location: Left arm)   Pulse 61   Temp 98.5  F (36.9  C) (Oral)   Resp 16   Wt 87.3 kg (192 lb 8 oz)   SpO2 98%   BMI 31.07 kg/m    Weight is 192 lbs 8 oz  Body mass index is 31.07 kg/m .    Weight over time:  Vitals:    09/24/19 0900 " "  Weight: 87.3 kg (192 lb 8 oz)       Orthostatic Vitals       Most Recent      Lying Orthostatic BP 98/64 09/18 0815    Lying Orthostatic Pulse (bpm) 64 09/18 0815    Sitting Orthostatic BP 89/55 09/19 0848    Sitting Orthostatic Pulse (bpm) 61 09/19 0848    Standing Orthostatic BP --  Comment: declined 09/19 0848            Cardiometabolic risk assessment. 09/19/19      Reviewed patient profile for cardiometabolic risk factors    Date taken /Value  REFERENCE RANGE   Abdominal Obesity  (Waist Circumference)   See nursing flowsheet Women ?35 in (88 cm)   Men ?40 in (102 cm)      Triglycerides  No results found for: TRIG    ?150 mg/dL (1.7 mmol/L) or current treatment for elevated triglycerides   HDL cholesterol  No results found for: HDL]   Women <50 mg/dL (1.3 mmol/L) in women or current treatment for low HDL cholesterol  Men <40 mg/dL (1 mmol/L) in men or current treatment for low HDL cholesterol     Fasting plasma glucose (FPG) Lab Results   Component Value Date    GLC 86 09/13/2019      FPG ?100 mg/dL (5.6 mmol/L) or treatment for elevated blood glucose   Blood pressure  BP Readings from Last 3 Encounters:   09/27/19 95/61   09/15/19 108/66   04/30/19 99/47    Blood pressure ?130/85 mmHg or treatment for elevated blood pressure   Family History  See family history     Appearance: lying in bed, awake, alert  Attitude:  Cooperative  Eye Contact:  good  Mood:  \"better\"  Affect:  mood congruent, brighter  Speech:  clear, coherent  Language: fluent and intact in English  Psychomotor, Gait, Musculoskeletal:  no evidence of tardive dyskinesia, dystonia, or tics  Throught Process:  linear and goal oriented  Associations:  no loose associations  Thought Content:  no evidence of suicidal ideation or homicidal ideation and no evidence of psychotic thought  Insight:  fair  Judgement: fair  Oriented to:  time, person, and place.    Attention Span and Concentration:  fair  Recent and Remote Memory:  fair  Fund of Knowledge:  " appropriate    Clinical Global Impressions  First:  Considering your total clinical experience with this particular patient population, how severe are the patient's symptoms at this time?: 7 (09/15/19 0601)  Compared to the patient's condition at the START of treatment, this patient's condition is:: 4 (09/15/19 0601)  Most recent:  Considering your total clinical experience with this particular patient population, how severe are the patient's symptoms at this time?: 7 (09/15/19 0601)  Compared to the patient's condition at the START of treatment, this patient's condition is:: 4 (09/15/19 0601)           Precautions:     Behavioral Orders   Procedures     Code 1 - Restrict to Unit     Elopement precautions     Routine Programming     As clinically indicated     Seizure precautions     Status 15     Every 15 minutes.     Suicide precautions     Patients on Suicide Precautions should have a Combination Diet ordered that includes a Diet selection(s) AND a Behavioral Tray selection for Safe Tray - with utensils, or Safe Tray - NO utensils       Withdrawal precautions          Diagnoses:     MDD, recurrent, moderate  Substance induced psychosis  Cluster B traits, R/O Borderline Personality Disorder  R/O Bipolar Disorder  Amphetamine Use Disorder, severe  Benzodiazepine Use Disorder, severe  Opiate Use Disorder, severe, on methadone maintenance         Assessment & Plan:     Assessment and hospital summary:  Jihan Gill was admitted to station 12 on 72 hold initally as she was acutely psychotic and she did not have the capacity to make her decision, on second of hospitalization as she is clinically got better and all signs of substance induced psychosis has resolved, patient has signed in voluntary. PTA Lithium was discontinued as the patient psychiatrist was weaning her off and giving her last admission on 1/19 with lithium toxicity,  PTA Prozac was started at 40 mg po day for her depressed mood. On 9/17 and as  the team has concerning collateral informations about the ongoing substance use disorder mainly the IV methamphetamine and benzodiazepine which is very concerning especially that she is on high dose of the methadone ( 160 mg /daily) so the team filed for petition for MICD on 9/17 and placed the patient on 72 hour hold as she was demanding immediate discharge and has no insight to her ongoing substance use and consequent psychosis. Petition for MI/CD commitment was pursued and petition was supported. Pt appears to be responding positively to current medication regimen with resolution of acute psychotic and manic symptoms. She continues to endorse moderate depressive and anxiety symptoms, though notes gradual improvements. She appears to have improved insight with regards to importance of engaging in structured inpatient CD program. She reports transient and brief episodes of AH/VH. I suspect that these symptoms were exacerbated by substance use and secondary to depression vs Borderline Personality Disorder. I do not suspect that she has a primary psychotic illness.     Target psychiatric symptoms and interventions:  No medication changes will be made today  Resume Prozac 60 mg daily  Keep methadone dose scheduled in afternoon for now.  Resume Gabapentin 600 mg daily, 600 mg at 2 pm, and 900 mg QHS to target acute anxiety and sleep.  Hydroxyzine prn for sleep.     Medical Problems and Treatments:  No acute medical concerns    Behavioral/Psychological/Social:  Encourage group participation    Legal: Currently voluntary. Petitioned for MI/CD, which were both supported. Pt on a stay of commitment.     Safety:  - Continue suicide precautions  - Continue precautions as noted above  - Status 15 minute checks    Disposition: Pending clinical stabilization. Advocating for [id number] to inpatient CD programming. Pt initially quite resistant to CD treatment. Due to concerns about her safety and additional collateral  information from patient's mother, petition for MI/CD commitment was pursued. She is now willing to meet with CD  to complete Rule 25, which was completed on 9/25/19. Pt will be discharged to [facility] on Tuesday.     Rufina Gonzalez MD  [facility]

## 2019-09-27 NOTE — PLAN OF CARE
"Pt continues to be isolative to her room, though she has attended some groups with encouragement. She reports that the milieu is too loud to spend more time there. Affect full range. Reports that her mood is \"good,\" and endorses some anxiety. She has been pleasant on approach and cooperative with unit guidelines. Reports that she is looking forward to discharge next week, and that treatment would be helpful for her. Compliant with scheduled medications and denies any side effects. Will continue to monitor and encourage participation in therapeutic programming.  "

## 2019-09-27 NOTE — PROGRESS NOTES
09/26/19 2019   Behavioral [facility]   Hallucinations denies / not responding to hallucinations   Thinking intact   Orientation person: oriented;place: oriented;date: oriented   Memory baseline memory   Insight admits / accepts   Judgement intact   Eye Contact cultural specific   Affect full range affect   Mood mood is calm   Physical Appearance/Attire attire appropriate to age and situation   Hygiene well groomed   1. Wish to be Dead (Past Month) No   2. Non-Specific Active Suicidal Thoughts (Past Month) No   Activity isolative   Speech clear;coherent   Medication Sensitivity no stated side effects   Psychomotor / Gait balanced;steady   Psycho Education   Type of Intervention 1:1 intervention   Response participates, initiates socially appropriate   Hours 0.5   Treatment Detail IMR   Activities of Daily Living   Hygiene/Grooming independent   Oral Hygiene independent   Dress scrubs (behavioral health)   Room Organization independent   Pt ate meals and was isolative to room. Pt states that she is not having depressed or anxious mood at this time. Pt states she is looking forward to discharge to start treatment. Pt state she has no thought or desire to harm self or others at this time.

## 2019-09-28 PROCEDURE — 25000132 ZZH RX MED GY IP 250 OP 250 PS 637: Performed by: PSYCHIATRY & NEUROLOGY

## 2019-09-28 PROCEDURE — 12400001 ZZH R&B MH UMMC

## 2019-09-28 RX ADMIN — GABAPENTIN 600 MG: 300 CAPSULE ORAL at 08:13

## 2019-09-28 RX ADMIN — METHADONE HYDROCHLORIDE 160 MG: 5 SOLUTION ORAL at 15:40

## 2019-09-28 RX ADMIN — GABAPENTIN 900 MG: 300 CAPSULE ORAL at 19:12

## 2019-09-28 RX ADMIN — HYDROXYZINE HYDROCHLORIDE 50 MG: 25 TABLET, FILM COATED ORAL at 19:12

## 2019-09-28 RX ADMIN — GABAPENTIN 600 MG: 300 CAPSULE ORAL at 15:39

## 2019-09-28 RX ADMIN — FLUOXETINE 60 MG: 20 CAPSULE ORAL at 08:13

## 2019-09-28 ASSESSMENT — ACTIVITIES OF DAILY LIVING (ADL)
ORAL_HYGIENE: INDEPENDENT
DRESS: INDEPENDENT
HYGIENE/GROOMING: INDEPENDENT

## 2019-09-28 NOTE — PROGRESS NOTES
Pt isolative for most of shift. Pt visible for meal times only. No behavioral concerns with this pt. No stated SI/SIB     09/28/19 1500   Behavioral Health   Hallucinations denies / not responding to hallucinations   Thinking intact   Orientation person: oriented;place: oriented   Memory baseline memory   Judgement intact   Eye Contact at examiner   Affect full range affect   Mood mood is calm   Physical Appearance/Attire attire appropriate to age and situation   Hygiene well groomed   Suicidality other (see comments)  (none stated)   Self Injury   (none)   Elopement   (none)   Activity isolative   Speech clear;coherent   Medication Sensitivity no observed side effects;no stated side effects   Psychomotor / Gait balanced;steady   Activities of Daily Living   Hygiene/Grooming independent   Oral Hygiene independent   Dress independent   Room Organization independent   Activity   Activity Assistance Provided independent

## 2019-09-28 NOTE — PLAN OF CARE
Patient has been isolative in her room. She is calm and cooperative when approached. She takes meds with no issues. Denies any mental alejo sympto  Problem: Adult Behavioral Health Plan of Care  Goal: Patient-Specific Goal (Individualization)  Outcome: No Change  Note:   Patient has been isolative in her room. She is calm and cooperative when approached. She takes meds with no issues. Denies any   Problem: Adult Behavioral Health Plan of Care  Goal: Patient-Specific Goal (Individualization)  Outcome: No Change  Note:   Patient has been isolative in her room. She is calm and cooperative when approached. She takes meds with no issues. Denies any mental alejo symptoms.      Patient rates depression at a 0 and anxiety at a 0.   Patient denies current or recent suicidal ideation or behaviors. and denies current or recent self injurious behavior or ideation. Patient also denies auditory and visual hallucinations.     Appearance/Behavior: No apparent distress  Speech: Normal  Mood/Affect: normal affect  Insight: Limited  Patient has  been attending to ADLs, eating well, sleeping well    Patient has required PRN medication this shift for:not applicable   Medication side effects seen this shift: none                   Patient rates depression at a 0 and anxiety at a 0.   Patient denies current or recent suicidal ideation or behaviors. and denies current or recent self injurious behavior or ideation. Patient also denies auditory and visual hallucinations.      Appearance/Behavior: No apparent distress  Speech: Normal  Mood/Affect: normal affect  Insight: Limited  Patient has  been attending to ADLs, eating well, sleeping well     Patient has required PRN medication this shift for:not applicable   Medication side effects seen this shift: none

## 2019-09-29 PROCEDURE — 12400001 ZZH R&B MH UMMC

## 2019-09-29 PROCEDURE — 25000132 ZZH RX MED GY IP 250 OP 250 PS 637: Performed by: PSYCHIATRY & NEUROLOGY

## 2019-09-29 RX ADMIN — GABAPENTIN 600 MG: 300 CAPSULE ORAL at 15:48

## 2019-09-29 RX ADMIN — FLUOXETINE 60 MG: 20 CAPSULE ORAL at 08:30

## 2019-09-29 RX ADMIN — GABAPENTIN 600 MG: 300 CAPSULE ORAL at 08:30

## 2019-09-29 RX ADMIN — METHADONE HYDROCHLORIDE 160 MG: 5 SOLUTION ORAL at 15:48

## 2019-09-29 RX ADMIN — GABAPENTIN 900 MG: 300 CAPSULE ORAL at 19:29

## 2019-09-29 ASSESSMENT — ACTIVITIES OF DAILY LIVING (ADL)
LAUNDRY: UNABLE TO COMPLETE
DRESS: SCRUBS (BEHAVIORAL HEALTH)
ORAL_HYGIENE: INDEPENDENT
LAUNDRY: UNABLE TO COMPLETE
DRESS: SCRUBS (BEHAVIORAL HEALTH)
HYGIENE/GROOMING: INDEPENDENT
ORAL_HYGIENE: INDEPENDENT
HYGIENE/GROOMING: INDEPENDENT

## 2019-09-29 NOTE — PLAN OF CARE
48 hour nursing assessment completed. Patient mostly sleeping in her room throughout the shift. Up for meals and am medications. Patient denies current mental health symptoms. Mostly verbalizes boredom and that she is looking forward to moving on and discharge to treatment. Denies SI/SIB. Medication compliant. Continue to monitor and assess.

## 2019-09-30 PROCEDURE — 99231 SBSQ HOSP IP/OBS SF/LOW 25: CPT | Performed by: PSYCHIATRY & NEUROLOGY

## 2019-09-30 PROCEDURE — 25000132 ZZH RX MED GY IP 250 OP 250 PS 637: Performed by: PSYCHIATRY & NEUROLOGY

## 2019-09-30 PROCEDURE — 12400001 ZZH R&B MH UMMC

## 2019-09-30 RX ORDER — METHADONE HYDROCHLORIDE 5 MG/5ML
160 SOLUTION ORAL
Status: COMPLETED | OUTPATIENT
Start: 2019-09-30 | End: 2019-09-30

## 2019-09-30 RX ORDER — METHADONE HYDROCHLORIDE 5 MG/5ML
160 SOLUTION ORAL ONCE
Status: COMPLETED | OUTPATIENT
Start: 2019-10-01 | End: 2019-10-01

## 2019-09-30 RX ADMIN — HYDROXYZINE HYDROCHLORIDE 25 MG: 25 TABLET, FILM COATED ORAL at 08:19

## 2019-09-30 RX ADMIN — METHADONE HYDROCHLORIDE 160 MG: 5 SOLUTION ORAL at 17:59

## 2019-09-30 RX ADMIN — FLUOXETINE 60 MG: 20 CAPSULE ORAL at 08:19

## 2019-09-30 RX ADMIN — HYDROXYZINE HYDROCHLORIDE 50 MG: 25 TABLET, FILM COATED ORAL at 20:07

## 2019-09-30 RX ADMIN — GABAPENTIN 900 MG: 300 CAPSULE ORAL at 20:07

## 2019-09-30 RX ADMIN — GABAPENTIN 600 MG: 300 CAPSULE ORAL at 17:58

## 2019-09-30 RX ADMIN — GABAPENTIN 600 MG: 300 CAPSULE ORAL at 08:19

## 2019-09-30 ASSESSMENT — ACTIVITIES OF DAILY LIVING (ADL)
LAUNDRY: WITH SUPERVISION
DRESS: SCRUBS (BEHAVIORAL HEALTH);INDEPENDENT
HYGIENE/GROOMING: SHOWER;INDEPENDENT
ORAL_HYGIENE: INDEPENDENT

## 2019-09-30 NOTE — PROGRESS NOTES
09/29/19 2143   Behavioral [facility]   Hallucinations denies / not responding to hallucinations   Thinking other (see comment)   Orientation place: oriented;person: oriented   Memory baseline memory   Insight insight appropriate to events;insight appropriate to situation   Judgement intact   Eye Contact at examiner   Affect other (see comments)   Mood mood is calm   Hygiene well groomed   1. Wish to be Dead (Past Month) No   2. Non-Specific Active Suicidal Thoughts (Past Month) No   Activities of Daily Living   Hygiene/Grooming independent   Oral Hygiene independent   Dress scrubs (behavioral health)   Laundry unable to complete   Room Organization independent   Pt spent most of this shift in her room resting. She told staff that she is doing fine and that she like spending time in her room watching television. She said she is not hearing voices or having thoughts of harming herself. She ate dinner without any issues, and she was overall calm. She did not take a shower during this shift.

## 2019-09-30 NOTE — PROGRESS NOTES
"[facility], [place]   Psychiatric Progress Note  Hospital Day: 15        Interim History:   The patient's care was discussed with the treatment team during the daily team meeting and/or staff's chart notes were reviewed.  Staff report patient has been mostly isolative to her room, napping on and off throughout the day. She is eating meals in her room. She consistently denies SI, HI and sx of psychosis. She has been calm and cooperative on unit. More visible in the milieu and participating in more groups. Pt denies acute medical concerns. Looking forward to CD treatment.     Upon interview, the patient was alert, sitting in her bed and watching TV. She said that she is feeling \"good.\" She said that she is \"a little anxious\" about starting CD treatment tomorrow. Feeling stressed about her son's situation, noting that \"he is going to be mad at me for being in here and not helping him find a place to stay.\" She was encouraged to set appropriate boundaries and to continue to process this with counselors at CD treatment to avoid co-dependent relationships. Asked appropriate questions about treatment facility and aftercare plan, which were addressed. Patient had no further requests or concerns.  Denies SI, HI, symptoms of psychosis or von. Notes that her mood is stable.          Medications:       FLUoxetine  60 mg Oral Daily     gabapentin  600 mg Oral BID     gabapentin  900 mg Oral At Bedtime     methadone  160 mg Oral Daily at 4 pm     [START ON 10/1/2019] methadone  160 mg Oral Once          Allergies:     Allergies   Allergen Reactions     Abilify [Aripiprazole] Other (See Comments)     Tardive dyskinesia     Allopurinol      Compazine Other (See Comments)     Dystonia     Dramamine      Olanzapine Other (See Comments)     Tardive dyskinesia from Zyprexa - per patient     Reglan Other (See Comments)     dystonia     Seroquel [Quetiapine] Other (See Comments)     Tardive dyskinesia.  " "         Labs:   No results found for this or any previous visit (from the past 24 hour(s)).       Psychiatric Examination:     /73 (BP Location: Left arm)   Pulse 72   Temp 98  F (36.7  C) (Oral)   Resp 16   Wt 87.3 kg (192 lb 8 oz)   SpO2 98%   BMI 31.07 kg/m    Weight is 192 lbs 8 oz  Body mass index is 31.07 kg/m .    Weight over time:  Vitals:    09/24/19 0900   Weight: 87.3 kg (192 lb 8 oz)       Orthostatic Vitals       Most Recent      Lying Orthostatic BP 98/64 09/18 0815    Lying Orthostatic Pulse (bpm) 64 09/18 0815    Sitting Orthostatic BP 89/55 09/19 0848    Sitting Orthostatic Pulse (bpm) 61 09/19 0848    Standing Orthostatic BP --  Comment: declined 09/19 0848            Cardiometabolic risk assessment. 09/19/19      Reviewed patient profile for cardiometabolic risk factors    Date taken /Value  REFERENCE RANGE   Abdominal Obesity  (Waist Circumference)   See nursing flowsheet Women ?35 in (88 cm)   Men ?40 in (102 cm)      Triglycerides  No results found for: TRIG    ?150 mg/dL (1.7 mmol/L) or current treatment for elevated triglycerides   HDL cholesterol  No results found for: HDL]   Women <50 mg/dL (1.3 mmol/L) in women or current treatment for low HDL cholesterol  Men <40 mg/dL (1 mmol/L) in men or current treatment for low HDL cholesterol     Fasting plasma glucose (FPG) Lab Results   Component Value Date    GLC 86 09/13/2019      FPG ?100 mg/dL (5.6 mmol/L) or treatment for elevated blood glucose   Blood pressure  BP Readings from Last 3 Encounters:   09/29/19 114/73   09/15/19 108/66   04/30/19 99/47    Blood pressure ?130/85 mmHg or treatment for elevated blood pressure   Family History  See family history     Appearance: lying in bed, awake, alert  Attitude:  Cooperative  Eye Contact:  good  Mood:  \"better\"  Affect:  mood congruent, brighter  Speech:  clear, coherent  Language: fluent and intact in English  Psychomotor, Gait, Musculoskeletal:  no evidence of tardive dyskinesia, " dystonia, or tics  Throught Process:  linear and goal oriented  Associations:  no loose associations  Thought Content:  no evidence of suicidal ideation or homicidal ideation and no evidence of psychotic thought  Insight:  fair  Judgement: fair  Oriented to:  time, person, and place.    Attention Span and Concentration:  fair  Recent and Remote Memory:  fair  Fund of Knowledge:  appropriate    Clinical Global Impressions  First:  Considering your total clinical experience with this particular patient population, how severe are the patient's symptoms at this time?: 7 (09/15/19 0601)  Compared to the patient's condition at the START of treatment, this patient's condition is:: 4 (09/15/19 0601)  Most recent:  Considering your total clinical experience with this particular patient population, how severe are the patient's symptoms at this time?: 7 (09/15/19 0601)  Compared to the patient's condition at the START of treatment, this patient's condition is:: 4 (09/15/19 0601)           Precautions:     Behavioral Orders   Procedures     Code 1 - Restrict to Unit     Elopement precautions     Routine Programming     As clinically indicated     Seizure precautions     Status 15     Every 15 minutes.     Suicide precautions     Patients on Suicide Precautions should have a Combination Diet ordered that includes a Diet selection(s) AND a Behavioral Tray selection for Safe Tray - with utensils, or Safe Tray - NO utensils       Withdrawal precautions          Diagnoses:     MDD, recurrent, moderate  Substance induced psychosis  Cluster B traits, R/O Borderline Personality Disorder  R/O Bipolar Disorder  Amphetamine Use Disorder, severe  Benzodiazepine Use Disorder, severe  Opiate Use Disorder, severe, on methadone maintenance         Assessment & Plan:     Assessment and hospital summary:  Jihan Gill was admitted to station 12 on 72 hold initally as she was acutely psychotic and she did not have the capacity to make her  decision, on second of hospitalization as she is clinically got better and all signs of substance induced psychosis has resolved, patient has signed in voluntary. PTA Lithium was discontinued as the patient psychiatrist was weaning her off and giving her last admission on 1/19 with lithium toxicity,  PTA Prozac was started at 40 mg po day for her depressed mood. On 9/17 and as the team has concerning collateral informations about the ongoing substance use disorder mainly the IV methamphetamine and benzodiazepine which is very concerning especially that she is on high dose of the methadone ( 160 mg /daily) so the team filed for petition for MICD on 9/17 and placed the patient on 72 hour hold as she was demanding immediate discharge and has no insight to her ongoing substance use and consequent psychosis. Petition for MI/CD commitment was pursued and petition was supported. Pt appears to be responding positively to current medication regimen with resolution of acute psychotic and manic symptoms. She continues to endorse moderate depressive and anxiety symptoms, though notes gradual improvements. She appears to have improved insight with regards to importance of engaging in structured inpatient CD program. She reports transient and brief episodes of AH/VH. I suspect that these symptoms were exacerbated by substance use and secondary to depression vs Borderline Personality Disorder. I do not suspect that she has a primary psychotic illness.     Target psychiatric symptoms and interventions:  No medication changes will be made today  Resume Prozac 60 mg daily  Keep methadone dose scheduled in afternoon for now.  Resume Gabapentin 600 mg daily, 600 mg at 2 pm, and 900 mg QHS to target acute anxiety and sleep.  Hydroxyzine prn for sleep.     Medical Problems and Treatments:  No acute medical concerns    Behavioral/Psychological/Social:  Encourage group participation    Legal: Currently voluntary. Petitioned for MI/CD,  which were both supported. Pt on a stay of commitment.     Safety:  - Continue suicide precautions  - Continue precautions as noted above  - Status 15 minute checks    Disposition: Pending clinical stabilization. Advocating for [id number] to inpatient CD programming. Pt initially quite resistant to CD treatment. Due to concerns about her safety and additional collateral information from patient's mother, petition for MI/CD commitment was pursued. She is now willing to meet with CD  to complete Rule 25, which was completed on 9/25/19. Pt will be discharged to [facility] on Tuesday.     Rufina Gonzalez MD  [facility]iat[facility]

## 2019-09-30 NOTE — DISCHARGE SUMMARY
"Psychiatric Discharge Summary    Jihan Gill MRN# [id number]   Age: 44 year old YOB: 1975     Date of Admission:  9/15/2019  Date of Discharge:  9/30/2019  Admitting Physician:  Alea Gonzalez MD  Discharge Physician:  Alea Gonzalez MD (Contact: [phone])         Event Leading to Hospitalization:   Per H&P:    The patient is a 45yo female with a history of bipolar disorder, PTSD and polysubstance abuse who was admitted for delusional thinking and erratic behavior.       Per ER: Jihan Gill is a 44 year old female with a history of substance abuse,hepatitis C, and diagnosis of depression and bipolar disorder who presents to the emergency department for evaluation of altered mental status. The patient was most recently admitted on 09/01 for lithium toxicity and hallucinations. The patient was found by police running down the street. Upon talking to her they found she was very confused, thinking that it was 1999 and that her 18 year old son was in the cars around her. This is her third encounter with police.      In the emergency department, the patient states that she is not sure why she is here. She states that she has begun taking Ativan and that it made her feel non-anxious. She states that she was fine this morning and mentions feeling bad that she is not there for her son who is 18. She does admit to hallucinations and takes 170 mg of Methadone a day for opioid addiction. She denies drug use, injury, thought of self harm or harm to others.     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 Denies any problems with her mood. Denies SI or HI. Does says that she \"can  little voices\" but doesn't understand what they saw. When asked if the Vraylar helps with this, does not realize that she is on this medication. Says that she takes \"160mg of Methadone per year.\" Was on Suboxone in the past \"but it " "wasn't working.\"        See Admission note by Alea Gonzalez MD on 9/15/19 for additional details.          Diagnoses:     MDD, recurrent, moderate  Substance induced psychosis, resolved  Cluster B traits, R/O Borderline Personality Disorder  R/O Bipolar Disorder  Amphetamine Use Disorder, severe  Benzodiazepine Use Disorder, severe  Opiate Use Disorder, severe, on methadone maintenance         Labs:     Recent Results (from the past 672 hour(s))   Acetaminophen level    Collection Time: 09/13/19 11:59 PM   Result Value Ref Range    Acetaminophen Level <2 mg/L   Comprehensive metabolic panel    Collection Time: 09/13/19 11:59 PM   Result Value Ref Range    Sodium 137 133 - 144 mmol/L    Potassium 3.7 3.4 - 5.3 mmol/L    Chloride 102 94 - 109 mmol/L    Carbon Dioxide 31 20 - 32 mmol/L    Anion Gap 4 3 - 14 mmol/L    Glucose 86 70 - 99 mg/dL    Urea Nitrogen 12 7 - 30 mg/dL    Creatinine 0.66 0.52 - 1.04 mg/dL    GFR Estimate >90 >60 mL/min/[1.73_m2]    GFR Estimate If Black >90 >60 mL/min/[1.73_m2]    Calcium 9.9 8.5 - 10.1 mg/dL    Bilirubin Total 0.3 0.2 - 1.3 mg/dL    Albumin 4.0 3.4 - 5.0 g/dL    Protein Total 7.9 6.8 - 8.8 g/dL    Alkaline Phosphatase 130 40 - 150 U/L     (H) 0 - 50 U/L    AST 75 (H) 0 - 45 U/L   Lithium level    Collection Time: 09/13/19 11:59 PM   Result Value Ref Range    Lithium Level 0.40 (L) 0.60 - 1.20 mmol/L   Salicylate level    Collection Time: 09/13/19 11:59 PM   Result Value Ref Range    Salicylate Level <2 mg/dL   UA with Microscopic reflex to Culture    Collection Time: 09/14/19 12:29 AM   Result Value Ref Range    Color Urine Yellow     Appearance Urine Clear     Glucose Urine Negative NEG^Negative mg/dL    Bilirubin Urine Negative NEG^Negative    Ketones Urine Negative NEG^Negative mg/dL    Specific Gravity Urine 1.015 1.003 - 1.035    Blood Urine Negative NEG^Negative    pH Urine 7.5 (H) 5.0 - 7.0 pH    Protein Albumin Urine Negative NEG^Negative mg/dL    " "Urobilinogen mg/dL Normal 0.0 - 2.0 mg/dL    Nitrite Urine Negative NEG^Negative    Leukocyte Esterase Urine Moderate (A) NEG^Negative    Source Midstream Urine     WBC Urine 5 0 - 5 /HPF    RBC Urine 1 0 - 2 /HPF    Bacteria Urine Few (A) NEG^Negative /HPF    Squamous Epithelial /HPF Urine 3 (H) 0 - 1 /HPF    Mucous Urine Present (A) NEG^Negative /LPF   Drug abuse screen urine    Collection Time: 09/14/19 12:29 AM   Result Value Ref Range    Amphetamine Qual Urine Positive (A) NEG^Negative    Barbiturates Qual Urine Negative NEG^Negative    Benzodiazepine Qual Urine Positive (A) NEG^Negative    Cannabinoids Qual Urine Negative NEG^Negative    Cocaine Qual Urine Negative NEG^Negative    Opiates Qualitative Urine Negative NEG^Negative    PCP Qual Urine Negative NEG^Negative   Urine Culture Aerobic Bacterial    Collection Time: 09/14/19 12:29 AM   Result Value Ref Range    Specimen Description Midstream Urine     Special Requests Specimen received in preservative     Culture Micro No growth    EKG 12-lead, complete    Collection Time: 09/16/19  3:36 PM   Result Value Ref Range    Interpretation ECG Click View Image link to view waveform and result           Consults:   Consultation during this admission received from internal medicine on 9/15/19:    Brief Medicine Note  September 15, 2019     Internal medicine consult note was placed via telephone readback by RN last night for \"hep C positive.\" Per chart review, pt was admitted to a medicine service in April of this year and referrals were made for her to see GI/Hepatology at [facility] and then again admitted to OSH last week with referrals made to GI/Hepatology at [facility]. Her LFTs are mildly elevated ALT:AST 2:1 ratio c/w known hx of hepatitis C. This is a chronic issue that is stable at this point, patient should follow up with GI/hepatology as previously referred to.      Cecy Wilks PA-C         Hospital Course:   Jihan Gill was " "admitted to [facility] with attending Alea Gonzalez MD on a 72 hour mental health hold. Pt then was willing to sign in voluntarily. However, she requested discharge soon after and declined referral for CD treatment. Collateral information gathered from patient's mother on 9/15/19 was quite concerning. Mother apparently reached out to prepetitioning screening to advocate for a commitment. Mother reported that pt had been decompensating greatly over the last 30 days PTA.  She reportedly was doctor shopping. Using IV meth.  Mother reported that she was using excessive amounts of Lorazepam, Gabapentin and Xanax. Has misused lithium in the past as well. Mom reported that one week prior to admission, she was given 10 pills of 0.5 mgs and she took them all at once.  She has done things like barricading herself in the house, wandering around the house with a knife because of paranoia, ransacked her Mother's room looking for pills (that mom took from her). Mother reported that patient called the police 4x recently out of paranoia.  Mom expressed concern about her safety because she was \"doing private massages for money.\"  Mom strongly advocated for residential treatment. For these reasons, MI/CD commitment was pursued. Pt and county settled on a stay of commitment as patient later agreed to CD assessment and inpatient MICD treatment.    On admission, the patient was placed under status 15 (15 minute checks) to ensure patient safety.   MSSA protocol was initiated due to the patient's history of alcohol abuse and concern for withdrawal symptoms.     Jihan Gill was admitted to station 12 as she was acutely psychotic, agitated, and she did not have the capacity to make decisions. Symptoms were most consistent with substance induced psychosis, which have resolved.    PTA Lithium was discontinued as the patient's psychiatrist was weaning her off given she had presented at her last admission on 1/19 with lithium " "toxicity, and mother reported concerns about Lithium abuse (taking more than what is prescribed for sedative effect).  PTA Prozac was started at 40 mg po day for her depressed mood, which was titrated to current dose of Prozac 60 mg daily. Methadone 160 mg daily was confirmed with methadone clinic and resumed. Gabapentin was resumed and titrated to 600 mg BID and 900 mg at bedtime to target acute anxiety. Pt appears to be responding positively to current medication regimen with resolution of acute psychotic and manic symptoms. She reported significant improvements in her mood, though experienced intermittent anxiety related to discharge planning. She consistently denied SI/HI. No significant behavioral concerns, and no need for seclusion or restraints throughout hospitalization. She remained future oriented. She was cognitively intact. She demonstrated improved insight with regards to importance of engaging in structured inpatient CD program. She reported transient and brief episodes of AH/VH, though this also resolved after approximately 2 weeks in context of sobriety. Thus I suspect that these symptoms were primarily substance induced. I do not suspect that she has a primary psychotic illness.     With regards to side effects, patient experienced persistently low normal blood pressures, though remained asymptomatic. She reported that blood pressures have historically been on low side of normal. Pt also reported intermittent sleep disturbances and \"munchies\" in the middle of the night. She also reported 30 lb wt gain since methadone was initiated. She inquired about a plan for taper in the future, and was encouraged to discuss this further with her methadone prescriber.    Jihan Gill did participate in groups and was visible in the milieu.     The patient's symptoms of depression, anxiety and psychosis improved.     Jihan Gill was released to inpatient MICD program under a stay of commitment. At " "the time of discharge Jihan Gill was determined to not be a danger to herself or others.     Of note, patient did report ongoing anxiety related to concerns about her 19 yo son's living situation. She repeatedly stated that she is \"worried\" about him as he has \"nowhere to go.\" She said that he is using substances (\"only marijuana\") and \"does get into trouble.\" She said \"he will be mad at me if I cant find him a place to go.\" She was encouraged to continue to discuss these concerns with therapist at CD program so that it will minimize risk of it negatively impacting her recovery/sobriety. She will benefit from boundary setting and education on codependency.    SUICIDE RISK ASSESSMENT:  Today Jihan Gill denies SI, SIB, and HI.  She has notable risk factors for self-harm including previous suicide attempt, recent psych inpt stay, substance use / pending treatment, financial/legal stress, relationship conflict and on opiates.  However, risk is mitigated by no plan or intent, no access to lethal means, describes a safety plan, h/o seeking help when needed, symptom improvement, future oriented, feeling hopeful, commitment to family, good social support   and participation in CD treatment and stay of commitment..  Based on all available evidence she does not appear to be at imminent risk for self-harm therefore does not meet criteria for a 72-hr hold/ involuntary hospitalization.  However, based on degree of symptoms substance use treatment, therapy and close psych FU was recommended which the pt did agree to. Patient also agreed to call 911/present to ED if any imminent safety concerns arise, including re-emergence of SI. Patient was provided with crisis resources at the time of discharge. Patient agreed to further reduce risk of self-harm by completely abstaining from illicit substances and alcohol, and agreed to remain medication adherent. Expressed understanding of the risks associated with alcohol " use, illicit drug use, and medication non-adherence.             Discharge Medications:     Current Discharge Medication List      START taking these medications    Details   FLUoxetine (PROZAC) 20 MG capsule Take 3 capsules (60 mg) by mouth daily  Qty: 90 capsule, Refills: 1    Associated Diagnoses: Severe episode of recurrent major depressive disorder, without psychotic features (H)      methadone (DOLPHINE) 5 MG/5ML solution Take 160 mLs (160 mg) by mouth daily at 4pm  Refills: 0    Associated Diagnoses: Opioid dependence in remission (H)         CONTINUE these medications which have CHANGED    Details   gabapentin (NEURONTIN) 300 MG capsule Take 2 tabs (600 mg) in the morning, 2 tabs (600 mg) in the afternoon, and 3 tabs (900mg) at bedtime  Qty: 210 capsule, Refills: 1    Associated Diagnoses: Severe episode of recurrent major depressive disorder, without psychotic features (H)      simethicone (MYLICON) 80 MG chewable tablet Take 1 tablet (80 mg) by mouth 4 times daily as needed for flatulence or cramping  Qty: 120 tablet, Refills: 1    Associated Diagnoses: Severe episode of recurrent major depressive disorder, without psychotic features (H)         STOP taking these medications       benztropine (COGENTIN) 1 MG tablet Comments:   Reason for Stopping:         buprenorphine HCl-naloxone HCl (SUBOXONE) 4-1 MG per film Comments:   Reason for Stopping:         cariprazine (VRAYLAR) 3 MG CAPS capsule Comments:   Reason for Stopping:         loperamide (IMODIUM) 2 MG capsule Comments:   Reason for Stopping:         ondansetron (ZOFRAN-ODT) 8 MG ODT tab Comments:   Reason for Stopping:         vortioxetine (TRINTELLIX/BRINTELLIX) 20 MG tablet Comments:   Reason for Stopping:                    Psychiatric Examination:   Appearance:  awake, alert and adequately groomed  Attitude:  cooperative, slightly anxious about discharge  Eye Contact:  good  Mood:  good  Affect:  appropriate and in normal range  Speech:  clear,  coherent  Psychomotor Behavior:  no evidence of tardive dyskinesia, dystonia, or tics  Thought Process:  logical, linear and goal oriented  Associations:  no loose associations  Thought Content:  no evidence of suicidal ideation or homicidal ideation and no evidence of psychotic thought  Insight:  good  Judgment:  intact  Oriented to:  time, person, and place  Attention Span and Concentration:  intact  Recent and Remote Memory:  intact  Language: Able to name objects, Able to repeat phrases and Able to read and write  Fund of Knowledge: appropriate  Muscle Strength and Tone: normal  Gait and Station: Normal         Discharge Plan:   Health Care Follow-up Appointments:   - You will be going to a Residential MI/CD Treatment Program - [facility]  [address] [place] [address]  Phone [phone]  Fax [phone]     - Psychiatry Appointment  [facility]   [address] [facility] [place] [place] [address]  Phone: [phone]     - [facility]  This is where you receive your Methadone.   The treatment center will assist you in getting your rides and appointments set up upon your arrival.   [address] [place] [address]  Phone: [phone]  Fax: [phone]     - You have been assigned a  through [facility]:  Phone: [phone]        Attend all scheduled appointments with your outpatient providers. Call at least 24 hours in advance if you need to reschedule an appointment to ensure continued access to your outpatient providers.   Major Treatments, Procedures and Findings:  You were provided with: a psychiatric assessment, assessed for medical stability, medication evaluation and/or management, group therapy, CD evaluation/assessment and milieu management     Symptoms to Report: feeling more aggressive, increased confusion, losing more sleep, mood getting worse or thoughts of suicide     Early warning signs can  "include: increased depression or anxiety sleep disturbances increased thoughts or behaviors of suicide or self-harm  increased unusual thinking, such as paranoia or hearing voices     Safety and Wellness:  Take all medicines as directed.  Make no changes unless your doctor suggests them.      Follow treatment recommendations.  Refrain from alcohol and non-prescribed drugs.  If there is a concern for safety, call 911.     Resources:   Crisis Intervention: 564.802.7799 or 855-440-1014 (TTY: 453.709.2019).  Call anytime for help.  National [place] on Mental Illness (www.[place].zunilda.org): 833.353.5415 or 053-833-5254.  [place] Association for Children's Mental Health (www.mac.org): 765.674.7551.  Alcoholics Anonymous (www.alcoholics-anonymous.org): Check your phone book for your local chapter.  Suicide Awareness Voices of Education (SAVE) (www.save.org): 545-646-IBNN (6830)  National Suicide Prevention Line (www.mentalhealthmn.org): 480-612-ESMD (6217)  Mental Health Consumer/Survivor Network of [place] (www.mhcsn.net): 982.310.3817 or 609-148-8767  Mental Health Association of [place] (www.mentalhealth.org): 505.445.4616 or 267-402-5897  Self- Management and Recovery Training., SMART-- Toll free: 457.777.4548  www.[employer].org  [facility] Crisis (COPE) Response - Adult [phone]  [facility] Crisis Response - Adult 825 970-8799  Text 4 Life: txt \"LIFE\" to 09243 for immediate support and crisis intervention  Crisis text line: Text \"[place]\" to 102536. Free, confidential, 24/7.  Crisis Intervention: 775.414.3754 or 774-759-4368. Call anytime for help.   [facility] Crisis Team - Child: [phone]  [facility] Mental Health Crisis Response Team - Child: [phone]        The treatment team has appreciated the opportunity to work with you. If you have any questions or concerns our unit number is [phone].    Attestation:  The patient has been seen and evaluated by Alea ruth " NARESH Gonzalez MD

## 2019-09-30 NOTE — PROGRESS NOTES
Pt will be d/c'ing tomorrow to [facility].  She will be picked up by treatment center between 10:30am-12pm.      - You will be going to a Residential MI/CD Treatment Program - [facility]  [address] [place] [address]  Phone [phone]  Fax [phone]     - Psychiatry Appointment  [facility]   [address] [facility] [place] [place] [address]  Phone: [phone]     - [facility]  This is where you receive your Methadone.   The treatment center will assist you in getting your rides and appointments set up upon your arrival.   [address] [place] [address]  Phone: [phone]  Fax: [phone]     - You have been assigned a  through [facility]:  Phone: [phone]

## 2019-10-01 VITALS
DIASTOLIC BLOOD PRESSURE: 68 MMHG | RESPIRATION RATE: 16 BRPM | HEART RATE: 67 BPM | WEIGHT: 200 LBS | SYSTOLIC BLOOD PRESSURE: 100 MMHG | TEMPERATURE: 98.9 F | OXYGEN SATURATION: 97 % | BODY MASS INDEX: 32.28 KG/M2

## 2019-10-01 PROCEDURE — 25000132 ZZH RX MED GY IP 250 OP 250 PS 637: Performed by: PSYCHIATRY & NEUROLOGY

## 2019-10-01 PROCEDURE — 99238 HOSP IP/OBS DSCHRG MGMT 30/<: CPT | Performed by: PSYCHIATRY & NEUROLOGY

## 2019-10-01 RX ADMIN — METHADONE HYDROCHLORIDE 160 MG: 5 SOLUTION ORAL at 09:28

## 2019-10-01 RX ADMIN — FLUOXETINE 60 MG: 20 CAPSULE ORAL at 07:56

## 2019-10-01 RX ADMIN — HYDROXYZINE HYDROCHLORIDE 50 MG: 25 TABLET, FILM COATED ORAL at 08:08

## 2019-10-01 RX ADMIN — GABAPENTIN 600 MG: 300 CAPSULE ORAL at 07:57

## 2019-10-01 ASSESSMENT — ACTIVITIES OF DAILY LIVING (ADL)
DRESS: STREET CLOTHES
ORAL_HYGIENE: INDEPENDENT
HYGIENE/GROOMING: INDEPENDENT

## 2019-10-01 NOTE — PLAN OF CARE
Patient is being discharged to [facility] for treatment. The facility is picking her up. Her discharge meds are being sent with her.  Patient belongings were returned to her.  Patient denies suicidal ideation. She reports feeling somewhat anxious about the move to [facility].  Patient states that she does nt want to be back in the hospital so is optimistic that she will do okay at treatment.

## 2019-10-01 NOTE — DISCHARGE INSTRUCTIONS
Behavioral Discharge Planning and Instructions      Summary:  You were admitted on 9/15/2019  due to Disorganized Thinking/Behaviors, Delusional Thoughts and Psychotic Symptomology.  You were treated by Dr. Alea Gonzalez MD and discharged on 10/1/2019 from [facility] to [facility]     You are on a Stay of Commitment and have agreed to attend and complete this treatment program as part of your commitment order.     Principal Diagnosis:   Substance-induced psychosis  Bipolar disorder type 1  Post-Traumatic Stress Disorder   Polysubstance abuse    Health Care Follow-up Appointments:   - You will be going to a Residential MI/CD Treatment Program - [facility]  [address] [place] [address]  Phone [phone]  Fax [phone]    - Psychiatry Appointment  [facility] - They will set up appointment only with you.  Please call to schedule.   [address] [place] [address]  Phone: [phone]    - [facility]  This is where you receive your Methadone.   The treatment center will assist you in getting your rides and appointments set up upon your arrival.   [address] [place] [address]  Phone: [phone]  Fax: [phone]    - You have been assigned a  through [facility]:  Phone: [phone]      Attend all scheduled appointments with your outpatient providers. Call at least 24 hours in advance if you need to reschedule an appointment to ensure continued access to your outpatient providers.   Major Treatments, Procedures and Findings:  You were provided with: a psychiatric assessment, assessed for medical stability, medication evaluation and/or management, group therapy, CD evaluation/assessment and milieu management    Symptoms to Report: feeling more aggressive, increased confusion, losing more sleep, mood getting worse or thoughts of suicide    Early  "warning signs can include: increased depression or anxiety sleep disturbances increased thoughts or behaviors of suicide or self-harm  increased unusual thinking, such as paranoia or hearing voices    Safety and Wellness:  Take all medicines as directed.  Make no changes unless your doctor suggests them.      Follow treatment recommendations.  Refrain from alcohol and non-prescribed drugs.  If there is a concern for safety, call 911.    Resources:   Crisis Intervention: 561.373.4987 or 652-475-9393 (TTY: 681.482.5309).  Call anytime for help.  National Union on Mental Illness (www.mn.zunilda.org): 675.382.3607 or 852-883-8365.  [place] Association for Children's Mental Health (www.mac.org): 958.837.5452.  Alcoholics Anonymous (www.alcoholics-anonymous.org): Check your phone book for your local chapter.  Suicide Awareness Voices of Education (SAVE) (www.save.org): 884-705-OYJX (7020)  National Suicide Prevention Line (www.mentalhealthmn.org): 981-944-RAXP (9466)  Mental Health Consumer/Survivor Network of [place] (www.mhcsn.net): 750.211.2476 or 229-143-9105  Mental Health Association of [place] (www.mentalhealth.org): 769.853.3681 or 825-494-7498  Self- Management and Recovery Training., SMART-- Toll free: 661.777.4835  www.[employer].org  [facility] Crisis (COPE) Response - Adult [phone]  [facility] Crisis Response - Adult [phone]  Text 4 Life: txt \"LIFE\" to 55317 for immediate support and crisis intervention  Crisis text line: Text \"[place]\" to 347921. Free, confidential, 24/7.  Crisis Intervention: 635.690.1656 or 294-307-3152. Call anytime for help.   [facility] Crisis Team - Child: [phone]  [facility] Crisis Response Team - Child: [phone]      The treatment team has appreciated the opportunity to work with you. If you have any questions or concerns our unit number is [phone].    "

## 2019-10-01 NOTE — PROGRESS NOTES
09/30/19 2011   Behavioral Health   Hallucinations denies / not responding to hallucinations   Thinking intact   Orientation person: oriented;place: oriented;date: oriented   Memory baseline memory   Insight insight appropriate to situation   Judgement intact   Eye Contact at examiner   Affect blunted, flat   Mood mood is calm   Physical Appearance/Attire attire appropriate to age and situation   Hygiene well groomed   1. Wish to be Dead (Past Month) No   2. Non-Specific Active Suicidal Thoughts (Past Month) No   Activity isolative;withdrawn   Speech clear;coherent   Medication Sensitivity no stated side effects;no observed side effects   Psychomotor / Gait balanced;steady   Activities of Daily Living   Hygiene/Grooming shower;independent   Oral Hygiene independent   Dress scrubs (behavioral health);independent   Laundry with supervision   Room Organization independent   Activity   Activity Assistance Provided independent     Pt was isolative and withdrawn to their room, did not attend group this evening. Pt denies SI/SIB, hallucinations, and medication effects. Pt states she is going to treatment tomorrow. Pt showered this evening, calm, cooperative.

## 2019-10-07 NOTE — PROVIDER NOTIFICATION
09/16/19 1600   COWS (Clinical Opiate Withdrawal Scale)   Resting Pulse Rate 0-->pulse rate 80 or below   Sweating 0-->no report of chills or flushing   Restlessness 0-->able to sit still   Pupil Size 0-->pupils pinned or normal size for room light   Bone or Joint Aches 0-->not present   Runny Nose or Tearing 0-->none present   GI Upset 2-->nausea or loose stool   Tremor 0-->no tremor   Yawning 1-->yawning once or twice during assessment   Anxiety or Irritability 0-->none   Gooseflesh Skin 0-->skin is smooth   Score 3      100

## 2020-07-29 ENCOUNTER — TRANSFERRED RECORDS (OUTPATIENT)
Dept: HEALTH INFORMATION MANAGEMENT | Facility: CLINIC | Age: 45
End: 2020-07-29

## 2020-09-01 ENCOUNTER — HOSPITAL ENCOUNTER (EMERGENCY)
Facility: CLINIC | Age: 45
Discharge: HOME | End: 2020-09-02
Payer: COMMERCIAL

## 2020-11-23 ENCOUNTER — APPOINTMENT (OUTPATIENT)
Dept: GENERAL RADIOLOGY | Facility: CLINIC | Age: 45
End: 2020-11-23
Attending: EMERGENCY MEDICINE
Payer: COMMERCIAL

## 2020-11-23 ENCOUNTER — HOSPITAL ENCOUNTER (EMERGENCY)
Facility: CLINIC | Age: 45
Discharge: HOME | End: 2020-11-23
Attending: EMERGENCY MEDICINE | Admitting: EMERGENCY MEDICINE
Payer: COMMERCIAL

## 2020-11-23 VITALS
TEMPERATURE: 98.5 F | RESPIRATION RATE: 18 BRPM | OXYGEN SATURATION: 98 % | BODY MASS INDEX: 32.28 KG/M2 | WEIGHT: 200 LBS | SYSTOLIC BLOOD PRESSURE: 127 MMHG | HEART RATE: 88 BPM | DIASTOLIC BLOOD PRESSURE: 63 MMHG

## 2020-11-23 DIAGNOSIS — J45.901 MODERATE ASTHMA WITH EXACERBATION, UNSPECIFIED WHETHER PERSISTENT: ICD-10-CM

## 2020-11-23 PROCEDURE — 71045 X-RAY EXAM CHEST 1 VIEW: CPT

## 2020-11-23 PROCEDURE — 99284 EMERGENCY DEPT VISIT MOD MDM: CPT | Performed by: EMERGENCY MEDICINE

## 2020-11-23 PROCEDURE — 99283 EMERGENCY DEPT VISIT LOW MDM: CPT | Mod: 25 | Performed by: EMERGENCY MEDICINE

## 2020-11-23 RX ORDER — PREDNISONE 20 MG/1
40 TABLET ORAL DAILY
Qty: 14 TABLET | Refills: 0 | Status: SHIPPED | OUTPATIENT
Start: 2020-11-23 | End: 2020-11-30

## 2020-11-23 NOTE — ED PROVIDER NOTES
History     Chief Complaint   Patient presents with     Shortness of Breath     Pt states that she has had soa for a week now but has asthma and thinks that it may be the asthma. pt states that the inhaler is only working for short periods.     Asthma     HPI  Jihan Gill is a 45 year old female who presents from [facility], she has been there for benzodiazepine dependency since the end of September.  Over the past week she has had shortness of air, history of asthma.  She uses albuterol inhaler typically on a as needed basis, over the past week she has been using it every 2-4 hours.  Prior to developing symptoms, she was living upstairs at the facility, she moved  down to the basement and subsequently developed shortness of air.  Denies cough, sore throat, fever, nausea or vomiting, myalgias headache or any other systemic complaint.  Is every day smoker.  Last lithium level subtherapeutic 0.5 on 9/24/2020.  No reported change in dose at that time.    Allergies:  Allergies   Allergen Reactions     Abilify [Aripiprazole] Other (See Comments)     Tardive dyskinesia     Allopurinol      Compazine Other (See Comments)     Dystonia     Dramamine      Olanzapine Other (See Comments)     Tardive dyskinesia from Zyprexa - per patient     Reglan Other (See Comments)     dystonia     Seroquel [Quetiapine] Other (See Comments)     Tardive dyskinesia.        Problem List:    Patient Active Problem List    Diagnosis Date Noted     Bipolar 1 disorder (H) 09/15/2019     Priority: Medium     Suicidal ideation 04/03/2019     Priority: Medium     Bipolar disorder (H) 03/20/2019     Priority: Medium     Acute hepatitis C virus infection 03/17/2019     Priority: Medium     Lithium overdose, intentional self-harm, initial encounter (H) 03/16/2019     Priority: Medium     Borderline personality disorder (H) 02/24/2019     Priority: Medium     Bipolar II disorder (H) 02/23/2019     Priority: Medium     Depression 02/08/2014      Priority: Medium     Suicide attempt (H) 08/11/2013     Priority: Medium     Methadone overdose (H) 08/09/2013     Priority: Medium     Major depression, recurrent (H)      Priority: Medium     Opiate addiction (H)      Priority: Medium     Benzodiazepine dependence (H) 01/15/2012     Priority: Medium     Overview:   Multiple providers, multiple prescriptions and frequent emergency department visits within different systems.       Degeneration of lumbar or lumbosacral intervertebral disc 04/25/2008     Priority: Medium        Past Medical History:    Past Medical History:   Diagnosis Date     Arthritis      Bipolar 1 disorder (H)      Depressive disorder      Depressive disorder      Major depression, recurrent (H)      Opiate addiction (H)      Seizures (H)      Substance abuse (H)      Urinary calculus, unspecified 2001       Past Surgical History:    Past Surgical History:   Procedure Laterality Date     C/SECTION, LOW TRANSVERSE      [id number]     ENT SURGERY       GYN SURGERY       TONSILLECTOMY         Family History:    History reviewed. No pertinent family history.    Social History:  Marital Status:   [4]  Social History     Tobacco Use     Smoking status: Never Smoker     Smokeless tobacco: Never Used   Substance Use Topics     Alcohol use: Yes     Comment: States no EtOH since 2008.     Drug use: Yes     Comment: Methadone 60mg/day street buy        Medications:         predniSONE (DELTASONE) 20 MG tablet       FLUoxetine (PROZAC) 20 MG capsule       gabapentin (NEURONTIN) 300 MG capsule       methadone (DOLPHINE) 5 MG/5ML solution       simethicone (MYLICON) 80 MG chewable tablet          Review of Systems  All other systems reviewed and are negative.    Physical Exam   BP: 127/63  Pulse: 90  Temp: 98.5  F (36.9  C)  Resp: 18  Weight: 90.7 kg (200 lb)  SpO2: 96 %      Physical Exam  Nontoxic appearing no respiratory distress alert and oriented ×3  Head atraumatic normocephalic   Neck supple full  active painless range of motion  Lungs diminished no rales rhonchi wheezes  Heart regular no murmur  Strength and sensation grossly intact throughout the extremities, gait and station normal  Speech is fluent, good eye contact, thought processes are rational      ED Course        Procedures               Critical Care time:  none               Results for orders placed or performed during the hospital encounter of 11/23/20 (from the past 24 hour(s))   XR Chest Port 1 View    Narrative    XR CHEST PORT 1 VW 11/23/2020 12:25 PM    HISTORY: Short of breath.    COMPARISON: 4/21/2008.      Impression    IMPRESSION: A single view the chest shows no acute cardiopulmonary  disease. Mild elevation of the right hemidiaphragm is present.     ANNIE MCKAY MD       Medications - No data to display    Assessments & Plan (with Medical Decision Making)  45-year-old female in [facility] secondary to benzodiazepine dependence presents with shortness of air, history of asthma, using inhaler with transient short-lived relief.  Diminished breath sounds, chest x-ray negative for acute finding.  Recently moved into the basement which coincides with asthma exacerbation.  Prednisone, albuterol inhaler as needed, recommend moving back upstairs at the treatment facility.  Return criteria reviewed.     I have reviewed the nursing notes.    I have reviewed the findings, diagnosis, plan and need for follow up with the patient.          Discharge Medication List as of 11/23/2020 12:45 PM      START taking these medications    Details   predniSONE (DELTASONE) 20 MG tablet Take 2 tablets (40 mg) by mouth daily for 7 days, Disp-14 tablet, R-0, Local Print             Final diagnoses:   Moderate asthma with exacerbation, unspecified whether persistent       11/23/2020   [facility] EMERGENCY DEPT     Kev Montemayor MD  11/23/20 8830

## 2020-11-23 NOTE — ED AVS SNAPSHOT
[facility] Emergency Dept  [address] [address]  Phone: [phone]  Fax: [phone]                                    Jihan Gill   MRN: [id number]    Department: [facility] Emergency Dept   Date of Visit: 11/23/2020           After Visit Summary Signature Page    I have received my discharge instructions, and my questions have been answered. I have discussed any challenges I see with this plan with the nurse or doctor.    ..........................................................................................................................................  Patient/Patient Representative Signature      ..........................................................................................................................................  Patient Representative Print Name and Relationship to Patient    ..................................................               ................................................  Date                                   Time    ..........................................................................................................................................  Reviewed by Signature/Title    ...................................................              ..............................................  Date                                               Time          22EPIC Rev 08/18

## 2020-11-23 NOTE — DISCHARGE INSTRUCTIONS
Prednisone as prescribed, continue albuterol inhaler every 3-4 hours    Return here for progressive shortness of air, passing out or any other concern

## 2020-11-23 NOTE — ED NOTES
"Pt states she has hx of asthma and says the last week she has been having trouble with \"getting all the air out.\"  Using inhaler every 2-3 hours with some relief.  No flu, cold, or covid symptoms.  No fevers.  "

## 2021-02-22 ENCOUNTER — TELEPHONE (OUTPATIENT)
Dept: BEHAVIORAL HEALTH | Facility: CLINIC | Age: 46
End: 2021-02-22

## 2021-02-22 ENCOUNTER — HOSPITAL ENCOUNTER (INPATIENT)
Facility: CLINIC | Age: 46
LOS: 8 days | Discharge: HOME | End: 2021-03-02
Attending: EMERGENCY MEDICINE | Admitting: PSYCHIATRY & NEUROLOGY
Payer: COMMERCIAL

## 2021-02-22 DIAGNOSIS — G89.4 CHRONIC PAIN SYNDROME: ICD-10-CM

## 2021-02-22 DIAGNOSIS — Z20.822 COVID-19 RULED OUT BY LABORATORY TESTING: ICD-10-CM

## 2021-02-22 DIAGNOSIS — F13.20 BENZODIAZEPINE DEPENDENCE (H): ICD-10-CM

## 2021-02-22 LAB
ALBUMIN SERPL-MCNC: 3.6 G/DL (ref 3.4–5)
ALP SERPL-CCNC: 163 U/L (ref 40–150)
ALT SERPL W P-5'-P-CCNC: 310 U/L (ref 0–50)
AMPHETAMINES UR QL SCN: NEGATIVE
ANION GAP SERPL CALCULATED.3IONS-SCNC: 8 MMOL/L (ref 3–14)
AST SERPL W P-5'-P-CCNC: 237 U/L (ref 0–45)
BARBITURATES UR QL: NEGATIVE
BASOPHILS # BLD AUTO: 0 10E9/L (ref 0–0.2)
BASOPHILS NFR BLD AUTO: 0.4 %
BENZODIAZ UR QL: POSITIVE
BILIRUB SERPL-MCNC: 0.4 MG/DL (ref 0.2–1.3)
BUN SERPL-MCNC: 14 MG/DL (ref 7–30)
CALCIUM SERPL-MCNC: 9.8 MG/DL (ref 8.5–10.1)
CANNABINOIDS UR QL SCN: NEGATIVE
CHLORIDE SERPL-SCNC: 102 MMOL/L (ref 94–109)
CO2 SERPL-SCNC: 28 MMOL/L (ref 20–32)
COCAINE UR QL: NEGATIVE
CREAT SERPL-MCNC: 0.6 MG/DL (ref 0.52–1.04)
DIFFERENTIAL METHOD BLD: NORMAL
EOSINOPHIL # BLD AUTO: 0.2 10E9/L (ref 0–0.7)
EOSINOPHIL NFR BLD AUTO: 2.4 %
ERYTHROCYTE [DISTWIDTH] IN BLOOD BY AUTOMATED COUNT: 13 % (ref 10–15)
ETHANOL UR QL SCN: NEGATIVE
GFR SERPL CREATININE-BSD FRML MDRD: >90 ML/MIN/{1.73_M2}
GLUCOSE SERPL-MCNC: 116 MG/DL (ref 70–99)
HBA1C MFR BLD: 6.4 % (ref 0–5.6)
HCG UR QL: NEGATIVE
HCT VFR BLD AUTO: 40.6 % (ref 35–47)
HGB BLD-MCNC: 13.3 G/DL (ref 11.7–15.7)
IMM GRANULOCYTES # BLD: 0 10E9/L (ref 0–0.4)
IMM GRANULOCYTES NFR BLD: 0.2 %
LABORATORY COMMENT REPORT: NORMAL
LYMPHOCYTES # BLD AUTO: 4.3 10E9/L (ref 0.8–5.3)
LYMPHOCYTES NFR BLD AUTO: 52.3 %
MCH RBC QN AUTO: 31 PG (ref 26.5–33)
MCHC RBC AUTO-ENTMCNC: 32.8 G/DL (ref 31.5–36.5)
MCV RBC AUTO: 95 FL (ref 78–100)
MONOCYTES # BLD AUTO: 0.6 10E9/L (ref 0–1.3)
MONOCYTES NFR BLD AUTO: 7.2 %
NEUTROPHILS # BLD AUTO: 3.1 10E9/L (ref 1.6–8.3)
NEUTROPHILS NFR BLD AUTO: 37.5 %
NRBC # BLD AUTO: 0 10*3/UL
NRBC BLD AUTO-RTO: 0 /100
OPIATES UR QL SCN: NEGATIVE
PLATELET # BLD AUTO: 194 10E9/L (ref 150–450)
POTASSIUM SERPL-SCNC: 4 MMOL/L (ref 3.4–5.3)
PROT SERPL-MCNC: 7.9 G/DL (ref 6.8–8.8)
RBC # BLD AUTO: 4.29 10E12/L (ref 3.8–5.2)
SARS-COV-2 RNA RESP QL NAA+PROBE: NEGATIVE
SODIUM SERPL-SCNC: 138 MMOL/L (ref 133–144)
SPECIMEN SOURCE: NORMAL
TSH SERPL DL<=0.005 MIU/L-ACNC: 2.22 MU/L (ref 0.4–4)
WBC # BLD AUTO: 8.2 10E9/L (ref 4–11)

## 2021-02-22 PROCEDURE — 85025 COMPLETE CBC W/AUTO DIFF WBC: CPT | Performed by: EMERGENCY MEDICINE

## 2021-02-22 PROCEDURE — 99232 SBSQ HOSP IP/OBS MODERATE 35: CPT | Performed by: PHYSICIAN ASSISTANT

## 2021-02-22 PROCEDURE — 84443 ASSAY THYROID STIM HORMONE: CPT | Performed by: EMERGENCY MEDICINE

## 2021-02-22 PROCEDURE — 80053 COMPREHEN METABOLIC PANEL: CPT | Performed by: EMERGENCY MEDICINE

## 2021-02-22 PROCEDURE — 36415 COLL VENOUS BLD VENIPUNCTURE: CPT | Performed by: EMERGENCY MEDICINE

## 2021-02-22 PROCEDURE — 80307 DRUG TEST PRSMV CHEM ANLYZR: CPT | Performed by: EMERGENCY MEDICINE

## 2021-02-22 PROCEDURE — 250N000013 HC RX MED GY IP 250 OP 250 PS 637: Performed by: PHYSICIAN ASSISTANT

## 2021-02-22 PROCEDURE — 87635 SARS-COV-2 COVID-19 AMP PRB: CPT | Performed by: EMERGENCY MEDICINE

## 2021-02-22 PROCEDURE — H2035 A/D TX PROGRAM, PER HOUR: HCPCS | Mod: HQ

## 2021-02-22 PROCEDURE — 87389 HIV-1 AG W/HIV-1&-2 AB AG IA: CPT | Performed by: EMERGENCY MEDICINE

## 2021-02-22 PROCEDURE — 83036 HEMOGLOBIN GLYCOSYLATED A1C: CPT | Performed by: EMERGENCY MEDICINE

## 2021-02-22 PROCEDURE — 250N000013 HC RX MED GY IP 250 OP 250 PS 637: Performed by: PSYCHIATRY & NEUROLOGY

## 2021-02-22 PROCEDURE — 99223 1ST HOSP IP/OBS HIGH 75: CPT | Mod: AI | Performed by: PSYCHIATRY & NEUROLOGY

## 2021-02-22 PROCEDURE — 99284 EMERGENCY DEPT VISIT MOD MDM: CPT | Performed by: EMERGENCY MEDICINE

## 2021-02-22 PROCEDURE — 99207 PR CONSULT E&M CHANGED TO SUBSEQUENT LEVEL: CPT | Performed by: PHYSICIAN ASSISTANT

## 2021-02-22 PROCEDURE — 80320 DRUG SCREEN QUANTALCOHOLS: CPT | Performed by: EMERGENCY MEDICINE

## 2021-02-22 PROCEDURE — 128N000004 HC R&B CD ADULT

## 2021-02-22 PROCEDURE — 99285 EMERGENCY DEPT VISIT HI MDM: CPT | Mod: 25 | Performed by: EMERGENCY MEDICINE

## 2021-02-22 PROCEDURE — C9803 HOPD COVID-19 SPEC COLLECT: HCPCS | Performed by: EMERGENCY MEDICINE

## 2021-02-22 PROCEDURE — 81025 URINE PREGNANCY TEST: CPT | Performed by: EMERGENCY MEDICINE

## 2021-02-22 RX ORDER — GABAPENTIN 400 MG/1
1600 CAPSULE ORAL AT BEDTIME
COMMUNITY
End: 2021-08-08

## 2021-02-22 RX ORDER — HYDROXYZINE HYDROCHLORIDE 25 MG/1
25 TABLET, FILM COATED ORAL EVERY 4 HOURS PRN
Status: DISCONTINUED | OUTPATIENT
Start: 2021-02-22 | End: 2021-02-22 | Stop reason: DRUGHIGH

## 2021-02-22 RX ORDER — HYDROXYZINE HYDROCHLORIDE 25 MG/1
25 TABLET, FILM COATED ORAL EVERY 6 HOURS PRN
Status: ON HOLD | COMMUNITY
End: 2021-02-26

## 2021-02-22 RX ORDER — NALOXONE HYDROCHLORIDE 0.4 MG/ML
0.2 INJECTION, SOLUTION INTRAMUSCULAR; INTRAVENOUS; SUBCUTANEOUS
Status: DISCONTINUED | OUTPATIENT
Start: 2021-02-22 | End: 2021-03-02 | Stop reason: HOSPADM

## 2021-02-22 RX ORDER — FLUCONAZOLE 150 MG/1
150 TABLET ORAL ONCE
Status: CANCELLED | OUTPATIENT
Start: 2021-02-22

## 2021-02-22 RX ORDER — PHENOBARBITAL 64.8 MG/1
64.8 TABLET ORAL 4 TIMES DAILY
Status: DISCONTINUED | OUTPATIENT
Start: 2021-02-22 | End: 2021-02-24

## 2021-02-22 RX ORDER — ALBUTEROL SULFATE 90 UG/1
1-2 AEROSOL, METERED RESPIRATORY (INHALATION) EVERY 4 HOURS PRN
Status: DISCONTINUED | OUTPATIENT
Start: 2021-02-22 | End: 2021-03-02 | Stop reason: HOSPADM

## 2021-02-22 RX ORDER — GABAPENTIN 400 MG/1
800 CAPSULE ORAL 2 TIMES DAILY
Status: DISCONTINUED | OUTPATIENT
Start: 2021-02-22 | End: 2021-03-02 | Stop reason: HOSPADM

## 2021-02-22 RX ORDER — POLYETHYLENE GLYCOL 3350 17 G/17G
1 POWDER, FOR SOLUTION ORAL DAILY PRN
COMMUNITY
End: 2021-08-08

## 2021-02-22 RX ORDER — NALOXONE HYDROCHLORIDE 0.4 MG/ML
0.4 INJECTION, SOLUTION INTRAMUSCULAR; INTRAVENOUS; SUBCUTANEOUS
Status: DISCONTINUED | OUTPATIENT
Start: 2021-02-22 | End: 2021-03-02 | Stop reason: HOSPADM

## 2021-02-22 RX ORDER — LANOLIN ALCOHOL/MO/W.PET/CERES
3 CREAM (GRAM) TOPICAL
Status: DISCONTINUED | OUTPATIENT
Start: 2021-02-22 | End: 2021-03-02 | Stop reason: HOSPADM

## 2021-02-22 RX ORDER — SACCHAROMYCES BOULARDII 250 MG
CAPSULE ORAL DAILY
Status: DISCONTINUED | OUTPATIENT
Start: 2021-02-22 | End: 2021-03-02 | Stop reason: HOSPADM

## 2021-02-22 RX ORDER — HYDROXYZINE HYDROCHLORIDE 25 MG/1
25 TABLET, FILM COATED ORAL EVERY 6 HOURS PRN
Status: DISCONTINUED | OUTPATIENT
Start: 2021-02-22 | End: 2021-02-25

## 2021-02-22 RX ORDER — POLYETHYLENE GLYCOL 3350 17 G/17G
17 POWDER, FOR SOLUTION ORAL DAILY PRN
Status: DISCONTINUED | OUTPATIENT
Start: 2021-02-22 | End: 2021-03-02 | Stop reason: HOSPADM

## 2021-02-22 RX ORDER — LITHIUM CARBONATE 300 MG/1
300 TABLET, FILM COATED, EXTENDED RELEASE ORAL 2 TIMES DAILY
Status: DISCONTINUED | OUTPATIENT
Start: 2021-02-22 | End: 2021-03-02 | Stop reason: HOSPADM

## 2021-02-22 RX ORDER — GABAPENTIN 800 MG/1
TABLET ORAL
Status: ON HOLD | COMMUNITY
End: 2022-01-12

## 2021-02-22 RX ORDER — GABAPENTIN 400 MG/1
800 CAPSULE ORAL 3 TIMES DAILY
Status: DISCONTINUED | OUTPATIENT
Start: 2021-02-22 | End: 2021-02-22

## 2021-02-22 RX ORDER — METHADONE HYDROCHLORIDE 5 MG/5ML
140 SOLUTION ORAL DAILY
Status: DISCONTINUED | OUTPATIENT
Start: 2021-02-23 | End: 2021-03-02 | Stop reason: HOSPADM

## 2021-02-22 RX ORDER — LITHIUM CARBONATE 300 MG/1
TABLET, FILM COATED, EXTENDED RELEASE ORAL
COMMUNITY
End: 2021-12-25

## 2021-02-22 RX ORDER — METHADONE HYDROCHLORIDE 5 MG/5ML
70 SOLUTION ORAL DAILY
Status: ON HOLD | COMMUNITY
End: 2021-08-12

## 2021-02-22 RX ORDER — ACETAMINOPHEN 325 MG/1
650 TABLET ORAL EVERY 4 HOURS PRN
Status: DISCONTINUED | OUTPATIENT
Start: 2021-02-22 | End: 2021-03-02 | Stop reason: HOSPADM

## 2021-02-22 RX ORDER — ALBUTEROL SULFATE 90 UG/1
1-2 AEROSOL, METERED RESPIRATORY (INHALATION) EVERY 6 HOURS PRN
Status: ON HOLD | COMMUNITY
End: 2022-01-12

## 2021-02-22 RX ORDER — TOPIRAMATE 25 MG/1
25 TABLET, FILM COATED ORAL 2 TIMES DAILY
Status: ON HOLD | COMMUNITY
End: 2021-02-22

## 2021-02-22 RX ORDER — GABAPENTIN 400 MG/1
1600 CAPSULE ORAL AT BEDTIME
Status: DISCONTINUED | OUTPATIENT
Start: 2021-02-22 | End: 2021-03-02 | Stop reason: HOSPADM

## 2021-02-22 RX ADMIN — PHENOBARBITAL 64.8 MG: 64.8 TABLET ORAL at 13:49

## 2021-02-22 RX ADMIN — OMEPRAZOLE 20 MG: 20 CAPSULE, DELAYED RELEASE ORAL at 13:50

## 2021-02-22 RX ADMIN — MICONAZOLE NITRATE 200 MG: 200 SUPPOSITORY VAGINAL at 22:37

## 2021-02-22 RX ADMIN — FLUOXETINE 40 MG: 20 CAPSULE ORAL at 13:50

## 2021-02-22 RX ADMIN — PHENOBARBITAL 64.8 MG: 64.8 TABLET ORAL at 17:58

## 2021-02-22 RX ADMIN — PHENOBARBITAL 64.8 MG: 64.8 TABLET ORAL at 22:37

## 2021-02-22 RX ADMIN — LITHIUM CARBONATE 300 MG: 300 TABLET, EXTENDED RELEASE ORAL at 22:38

## 2021-02-22 RX ADMIN — Medication 250 MG: at 16:18

## 2021-02-22 RX ADMIN — GABAPENTIN 800 MG: 400 CAPSULE ORAL at 13:50

## 2021-02-22 ASSESSMENT — ACTIVITIES OF DAILY LIVING (ADL)
HYGIENE/GROOMING: INDEPENDENT
ADL_ASSESSMENT: WDL
DOING_ERRANDS_INDEPENDENTLY_DIFFICULTY: NO
DRESS: INDEPENDENT
ORAL_HYGIENE: INDEPENDENT

## 2021-02-22 ASSESSMENT — MIFFLIN-ST. JEOR: SCORE: 1798.94

## 2021-02-22 NOTE — PROGRESS NOTES
02/22/21 1531   Patient Belongings   Patient Belongings other (see comments)   Belongings Search Yes   Storage - Jacket, scarf, boots, sweater, tank top, purse,     Med bin - cigarettes, lighter, assorted change, wallet,     Security envelope ([id number]) [place], ucare card, unlimited debit/visa, [employer] Visa/debit, $63.00 cash, Cell phone, diabetic kit,    A               Admission:  I am responsible for any personal items that are not sent to the safe or pharmacy.  [facility] is not responsible for loss, theft or damage of any property in my possession.    Signature:  _________________________________ Date: _______  Time: _____                                              Staff Signature:  ____________________________ Date: ________  Time: _____      2nd Staff person, if patient is unable/unwilling to sign:    Signature: ________________________________ Date: ________  Time: _____     Discharge:  [facility] has returned all of my personal belongings:    Signature: _________________________________ Date: ________  Time: _____                                          Staff Signature:  ____________________________ Date: ________  Time: _____

## 2021-02-22 NOTE — ED PROVIDER NOTES
History     Chief Complaint   Patient presents with     Addiction Problem     wants to get of benzo's      HPI  Jihan Gill is a 45 year old female who with a history of bipolar disorder but states that she has a problem with chronic pain for which she is on methadone 140 mg every day provided to her by the [facility] in [place].  Patient states that over the last year she has been supplementing her methadone with benzodiazepines but in the last month she has been taking as much as 15 mg every 2 days of her Klonopin.  Patient states that she would like to get off the Klonopin but every time she tries to get off of it that she starts to go into withdrawal and feels generalized malaise, etc. patient states she is here in the ER today to seek treatment for getting off the Klonopin.  Patient does not want to get off her methadone.  Patient denies any other recreational drug use.  Patient is not suicidal or homicidal.  Patient denies any fevers coughing vomiting diarrhea melena or bright blood per rectum.  Patient denies suicidal or homicidal ideation.  Patient states she took her Klonopin last, last night.    I have reviewed the Medications, Allergies, Past Medical and Surgical History, and Social History in the [employer] system.    Past Medical History:   Diagnosis Date     Arthritis      Bipolar 1 disorder (H)      Depressive disorder     postpartum     Depressive disorder      Major depression, recurrent (H)      Opiate addiction (H)      Seizures (H)     narcotic withdrawal     Substance abuse (H)      Urinary calculus, unspecified 2001    Renal stones       Past Surgical History:   Procedure Laterality Date     C/SECTION, LOW TRANSVERSE      p5015     ENT SURGERY       GYN SURGERY       TONSILLECTOMY         Dose / Directions   FLUoxetine 20 MG capsule  Commonly known as: PROzac  Used for: Severe episode of recurrent major depressive disorder, without psychotic features (H)      Dose: 60  mg  Take 3 capsules (60 mg) by mouth daily  Quantity: 90 capsule  Refills: 1     gabapentin 300 MG capsule  Commonly known as: NEURONTIN  Used for: Severe episode of recurrent major depressive disorder, without psychotic features (H)      Take 2 tabs (600 mg) in the morning, 2 tabs (600 mg) in the afternoon, and 3 tabs (900mg) at bedtime  Quantity: 210 capsule  Refills: 1     methadone 5 MG/5ML solution  Commonly known as: DOLPHINE  Used for: Opioid dependence in remission (H)      Dose: 160 mg  Take 160 mLs (160 mg) by mouth daily at 4pm  Refills: 0     simethicone 80 MG chewable tablet  Commonly known as: MYLICON  Used for: Severe episode of recurrent major depressive disorder, without psychotic features (H)      Dose: 80 mg  Take 1 tablet (80 mg) by mouth 4 times daily as needed for flatulence or cramping  Quantity: 120 tablet  Refills: 1            Past medical history, past surgical history, medications, and allergies were reviewed with the patient. Additional pertinent items: None    History reviewed. No pertinent family history.    Social History     Tobacco Use     Smoking status: Never Smoker     Smokeless tobacco: Never Used   Substance Use Topics     Alcohol use: Yes     Comment: States no EtOH since 2008.     Social history was reviewed with the patient. Additional pertinent items: None    Allergies   Allergen Reactions     Abilify [Aripiprazole] Other (See Comments)     Tardive dyskinesia     Allopurinol      Compazine Other (See Comments)     Dystonia     Dramamine      Olanzapine Other (See Comments)     Tardive dyskinesia from Zyprexa - per patient     Reglan Other (See Comments)     dystonia     Seroquel [Quetiapine] Other (See Comments)     Tardive dyskinesia.        Review of Systems  A complete review of systems was performed with pertinent positives and negatives noted in the HPI, and all other systems negative.    Physical Exam   BP: 114/67  Pulse: 80  Temp: 98  F (36.7  C)  Resp: 18  Weight:  120.4 kg (265 lb 6.4 oz)  SpO2: 98 %      Physical Exam  Vitals signs and nursing note reviewed.   Constitutional:       General: She is not in acute distress.     Appearance: She is obese. She is not diaphoretic.   HENT:      Head: Atraumatic.   Eyes:      Extraocular Movements: Extraocular movements intact.      Pupils: Pupils are equal, round, and reactive to light.   Neck:      Musculoskeletal: Neck supple.   Cardiovascular:      Rate and Rhythm: Regular rhythm.      Heart sounds: Normal heart sounds.   Pulmonary:      Breath sounds: Normal breath sounds.   Musculoskeletal:         General: No deformity.   Skin:     General: Skin is warm.   Neurological:      General: No focal deficit present.      Mental Status: She is alert and oriented to person, place, and time.      Comments: Personally intact and symmetric.  Ambulates without difficulty.   Psychiatric:         Mood and Affect: Mood normal.         ED Course        Procedures          Orders Placed This Encounter   Procedures     Drug abuse screen 6 urine (tox)     HCG qualitative urine     Asymptomatic SARS-CoV-2 COVID-19 Virus (Coronavirus) by PCR     CBC with platelets differential     Comprehensive metabolic panel         Assessments & Plan (with Medical Decision Making)     I have reviewed the nursing notes.    Case was discussed with intake and a bed was arranged for the patient in our detox unit.    I have reviewed the findings, diagnosis, and plan with the patient.      Final diagnoses:   Benzodiazepine dependence (H) - klonopin 8 mg/day   Chronic pain syndrome - on methadone     Nathan Anders MD, MD    2/22/2021   [facility] EMERGENCY DEPARTMENT     Nathan Anders MD  02/22/21 0851

## 2021-02-22 NOTE — H&P
Id:  Jihan Gill is a 45 year old female who with a history of bipolar disorder    History of present illness:    Patient is a 45-year-old  female she has history of major depressive disorder on methadone maintenance she came to the emergency room to get off of benzodiazepines.  She has been prescribed benzodiazepines for several years but has never abused them however since the pandemic started she began to take them she has been taking 5 mg until December.  January February she is been taking 5 to 15 mg.  She has tolerance withdrawal progressive use loss of control she is tried to quit unsuccessfully she has history of seizures she was in detox in October at [facility].      She reports when she tried to come off of it she became sweaty whole body pains she realized that she needs to get help and come out herself here he is still.      She reports she feels feeling down, depressed, denied hopeless, helpless, worthless, with low energy, poor concentration, poor motivation, not able to enjoy things, unhappy.       She is on methadone maintenance dose is confirmed  She is history of opiate addiction  She is sober from alcohol she does not use any other drugs  She smokes 3 cigarettes a day  She denies any gambling         Psychiatric Review of Systems:   Depression:   As above  [place]:     Denies: sleeplessness, increased goal-directed activities, abrupt increase in energy, pressured speech  Psychosis:     Denies: visual hallucinations, auditory hallucinations, paranoia  Anxiety:   Reports: excessive worries that are difficult to control for the past 6 months, panic attacks   worries that are difficult to control for the past 6 months, panic attacks  PTSD:   Reports: re-experiencing past trauma, nightmares, increased arousal, avoidance of traumatic stimuli, impaired function.    OCD:     Denies: obsessions, checking, symmetry, cleaning, skin picking.  ED:     Denies: restriction, binging,  purging.        PAST PSYCHIATRIC HISTORY:   The patient went to several chemical dependency   treatments:  [facility] in 1999, inpatient, and she stayed sober for 2   months; [facility] in 2003, inpatient, and she stayed sober for 14   months; [facility], where she stayed for 2 weeks;  and   [facility] in 2007.   she was in [facility], she was   committed to be in treatment.    New beginings  [facility]     She has several psychiatric admissions .  She has a significant history of suicide attempts.  One of them she   overdosed on pills and the other one [place] , last od was in 2017   No access to a gun     Pt has ect with benefits twice    Pt has 4 civil commitment last one 2019    She has been on Prozac, Paxil, Zoloft, Celexa, Lexapro, Effexor, Cymbalta, Wellbutrin, trazodone, Xanax, Ativan, Klonopin, Abilify, Geodon, Zyprexa, Seroquel, Risperdal.  She has been on Ambien.  She has taken Vistaril, BuSpar.  She has been on Neurontin.  Denies Lithium or Depakote.        The patient also has a history of self-injurious behavior.  She has swallowed many things including keys and coins which have had to be removed by endoscopy.         FAMILY HISTORY  Her mother's side has suffered from mental illness. States that her mother has suffered from depression and anxiety. Additionally, she reports that her members of her family have suffer with alcohol dependency.      SOCIAL HISTORY  Patient grew up in [place]. Was the second of four cihldren. Patients parents were  she was [age]. She lived with her mother. States that her mother was bipolar and that her childhood was rough and unpredictable. Graduated from high school and attended college but dropped out. 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 rate 16, height 1.651 m (5' 5\"), weight 114.3 kg (252 lb), last menstrual period 02/22/2019, SpO2 94 %, not currently breastfeeding.  10 point  ros done       A 10-point review of systems is reviewed and is negative except for psychiatric symptoms above.       Allergies reviewed  @vital    vitals  Appearance:  awake, alert, appeared as age stated, adequate groomed and slightly unkempt  Attitude:  cooperative  Eye Contact:  good  Mood:  scared  Affect:  congruent   Speech:  clear, coherent normal rate   Psychomotor Behavior:  no evidence of tardive dyskinesia, dystonia, or tics  Thought Process:  logical, linear and goal oriented  Associations:  no loose associations  Thought Content:  no evidence of psychotic thought and active suicidal ideation present  Denied any active suicidal /homicidation ideation plan intent   Insight:  fair  Judgment:  fair  Oriented to:  time, person, and place  Attention Span and Concentration:  intact  Recent and Remote Memory:  intact  Language:  english with appropriate syntax and vocabulary  Fund of Knowledge: appropriate  Muscle Strength and Tone: normal  Gait and Station: Normal  Assessment      Dx    Is a biological but disposition patient having depression and anxiety in her mother and family  She is abusing benzodiazepines while she is on methadone maintenance, she is also experiencing neurovegetative symptoms of depression    Patient has severe exacerbationof drug use patient has been unable to stop using drugs in the community due to both physical and psychological symptoms.  Continued use will put the patient at risk for medical and/or psychiatric complications.       Dx  Benzodiazepine use disorder " severe  Benzodiazepine withdrawal severe  Opiate use disorder on methadone maintenance  Major depressive disorder severe recurrent without psychosis on Prozac    Plan  Benzodiazepine use disorder severe patient will detox of benzodiazepines using phenobarbital 60 mg 4 times a day    Methadone maintenance rate continued we will monitor for sedation being on phenobarbital  For depression she will be continued on Prozac  Patient has elevated liver enzymes AST is 237 ALT is 310 alkaline phosphatase is 163  Transaminitis we will put internal medicine consult to help us find out the reason    I HAVE REVIEWED LABS WITH PT AND TALKED ABOUT RESULTS WITH PT  I HAVE REVIEWED AND SUMMARIZED OLD RECORDS including his medication reconcilation of his home medications  and PDMP   I HAVE SPOKEN WITH RN ABOUT MEDICATIONS AND withdrawl SCORES  I HAVE SPOKEN WITH CM ABOUT PTS TREATMETN OPTIONS

## 2021-02-22 NOTE — TELEPHONE ENCOUNTER
Pt presents in [facility] ed seeking detox from benzos  B: Pt has been abusing Klonopin off the street for many months. Pt is up to 15 mg daily.  States Unable to stop without having withdrawla symptoms. Pt last use yesterday.  Pt also getting Methadone 140 mg from [facility].   Denies MH hx , denies seizures.   A: Benzo detox. Calm, cooperative, vol.  R:  Veluvali/3a CD  Patient cleared and ready for behavioral bed placement: Yes

## 2021-02-22 NOTE — CONSULTS
Internal Medicine Initial Visit      Jihan Gill MRN# [id number]   YOB: 1975 Age: 45 year old   Date of Admission: 2/22/2021  PCP: Clinic, [facility]    Referring Provider: Behavioral Health - Bahman Sol MD  Reason for Visit: General Medical Evaluation          Assessment and Recommendations:   Jihan Gill is a 45 year old year old female with a history of major depressive disorder, bipolar I disorder, opiate use disorder, benzodiazapine use disorder, arthritis, and chronic hepatitis C who was admitted on 2/22/21 for benzodiazapine detoxification.    #Transaminitis  Jihan was diagnosed with hepatitis C in 04/25/2019. She admits to not following up with her diagnosis and has yet to receive treatment. She is interested in being treated with anti viral therapy, following a GI provider in outpatient, starting treatment soon.  Liver enzymes today are , , Alk Phos 163, which are consistent with previous LFTs since her 2019 diagnosis. Denies EtOH use which is supported by negative serum EtOH. Has immunity to hepatitis B and previously immunized for Hep A. Currently denies any abdominal pain. No jaundice, icterus, abdominal tenderness, hepatosplenomegaly, abnormal bruises, on exam.  - Encourage follow-up with GI and initiation of anti viral therapy and repeat LFTs in 2 weeks  - HIV antigen antibody combo pending    #Vaginal discharge  Jihan reports thick white discharge and itching after recent course of abx. + history of vaginal candidiasis.   - Miconazole 200mg vaginal suppository qhs x 3 days  - Follow-up with PCP one week after discharge if no improvement    #Amenorrhea   Reports last menstrual period was one year ago. Urine HCG negative today.  - Recommend following up with PCP    #Diabetes mellitus type 2, diet controlled   Jihan reports she was previously told she might have diabetes. Hgb A1C from 12/10/20 at [facility]  reported to be 6.5%. She has not followed up on this lab. Serum glucose 116 this morning.   - Repeat Hgb A1C pending. If still elevated, consider starting metformin here or with PCP   - POCT glucose BID  - Recommend follow-up with PCP    #Benzodiazepine use disorder, withdrawal  - Management per psychiatry     #Methadone maintenance  - Management per psychiatry    #Major depressive disorder  - Management per psychiatry    Medicine will follow up on HIV, Hemoglobin A1c, and TSH.  No further recommendations at this time. Please page the on-call ELOINA for any intercurrent medical issues which arise.     Aylin Strong PA-C  Hospitalist Service  Contact information available via [facility] Paging/Directory         Reason for Admission:   Benzodiazapine detoxification         History of Present Illness:   History is obtained from the patient and medical record.     Jihan Gill is a 45 year old year female admitted to the hospital for benzodiazepine detoxification. She reports a multi year history of benzodiazepine use that began with a prescription and eventually excalated to her buying them on the street. The pandemic has worsened the issue and has found herself taking more and more over the past two months where she has been taking doses of Clonazepam up to 20mg and/or Alprazolam up to 5mg. She reports a history of withdrawal associated seizures with her last one being in October 2020. She denies a history of DTs. Denies any recent IVDU, though she did inject lithium earlier this month as a suicide attempt. Denies any recent IV needle sharing. No medical concerns. In process of getting HCV treated. Hx of DM2, but not on any medications.           Review of Systems:   Constitutional: negative for fever/chills. Endorses 120lb weight gain.   Ears/Nose/Throat: negative for cough.  Cardiovascular: negative for chest pain   Respiratory: negative for cough or shortness of breath   Gastrointestinal: negative for abdominal  pain, N/V, diarrhea or constipation   Genitourinary: negative for dysuria, urinary urgency or frequency. Positive for white vaginal discharge.   Musculoskeletal: negative for joint pain   Neurologic: negative for headaches   Skin: negative for rashes or wounds             Past Medical History:   Reviewed and updated in Epic.  Past Medical History:   Diagnosis Date     Arthritis      Bipolar 1 disorder (H)      Chronic hepatitis C without hepatic coma (H)      Depressive disorder     postpartum     Depressive disorder      Major depression, recurrent (H)      Opiate addiction (H)      Seizures (H)     narcotic withdrawal     Substance abuse (H)      Urinary calculus, unspecified 2001    Renal stones             Past Surgical History:   Reviewed and updated in Epic.  Past Surgical History:   Procedure Laterality Date     C/SECTION, LOW TRANSVERSE      [id number]     ENT SURGERY       GYN SURGERY       TONSILLECTOMY               Social History:     Social History     Tobacco Use     Smoking status: Never Smoker     Smokeless tobacco: Never Used   Substance Use Topics     Alcohol use: Yes     Comment: States no EtOH since 2008.     Drug use: Yes     Comment: Methadone 60mg/day street buy             Family History:   Reviewed and updated in Epic.  History reviewed. No pertinent family history.          Allergies:     Allergies   Allergen Reactions     Abilify [Aripiprazole] Other (See Comments)     Tardive dyskinesia     Allopurinol      Compazine Other (See Comments)     Dystonia     Dramamine      Jaw lock.       Olanzapine Other (See Comments)     Tardive dyskinesia from Zyprexa - per patient     Reglan Other (See Comments)     dystonia     Seroquel [Quetiapine] Other (See Comments)     Tardive dyskinesia.              Medications:     Medications Prior to Admission   Medication Sig Dispense Refill Last Dose     albuterol (PROAIR HFA/PROVENTIL HFA/VENTOLIN HFA) 108 (90 Base) MCG/ACT inhaler Inhale 1-2 puffs into the  lungs every 4 hours as needed for shortness of breath / dyspnea or wheezing   Past Week     FLUoxetine (PROZAC) 40 MG capsule Take 40 mg by mouth daily   2/21/2021     gabapentin (NEURONTIN) 400 MG capsule Take 800 mg by mouth 2 times daily morning and afternoon   2/22/2021 at x1     gabapentin (NEURONTIN) 400 MG capsule Take 1,600 mg by mouth At Bedtime   2/21/2021     hydrOXYzine (ATARAX) 25 MG tablet Take 25 mg by mouth every 6 hours as needed for anxiety   Past Week     lithium ER (LITHOBID) 300 MG CR tablet Take 300 mg by mouth 2 times daily   2/21/2021     methadone (DOLPHINE) 5 MG/5ML solution Take 140 mg by mouth daily   2/22/2021     omeprazole (PRILOSEC) 20 MG DR capsule Take 20 mg by mouth daily   Past Month     polyethylene glycol (MIRALAX) 17 g packet Take 1 packet by mouth daily as needed for constipation    Past Week     Probiotic Product (PROBIOTIC PO) Take 1 capsule by mouth daily   Past Week        Current Facility-Administered Medications   Medication     acetaminophen (TYLENOL) tablet 650 mg     albuterol (PROAIR HFA/PROVENTIL HFA/VENTOLIN HFA) 108 (90 Base) MCG/ACT inhaler 1-2 puff     FLUoxetine (PROzac) capsule 40 mg     gabapentin (NEURONTIN) capsule 1,600 mg     gabapentin (NEURONTIN) capsule 800 mg     hydrOXYzine (ATARAX) tablet 25 mg     lithium ER (LITHOBID) CR tablet 300 mg     melatonin tablet 3 mg     [START ON 2/23/2021] methadone (DOLPHINE) solution 140 mg     miconazole (MICATIN) vaginal suppository 200 mg     naloxone (NARCAN) injection 0.2 mg    Or     naloxone (NARCAN) injection 0.4 mg    Or     naloxone (NARCAN) injection 0.2 mg    Or     naloxone (NARCAN) injection 0.4 mg     nicotine polacrilex (NICORETTE) gum 4 mg     omeprazole (priLOSEC) CR capsule 20 mg     PHENobarbital (LUMINAL) tablet 64.8 mg     polyethylene glycol (MIRALAX) Packet 17 g     saccharomyces boulardii (FLORASTOR) capsule            Physical Exam:   /72 (BP Location: Left arm)   Pulse 77   Temp  "97.6  F (36.4  C) (Temporal)   Resp 16   Ht 1.651 m (5' 5\")   Wt 114.3 kg (252 lb)   LMP 02/22/2019   SpO2 94%   Breastfeeding No   BMI 41.93 kg/m    Body mass index is 41.93 kg/m .  GENERAL: Not in acute distress. Alert and oriented x 3. Appears comfortable. Answering questions appropriately. Obese. Sitting up in bed.   HEENT: Atraumatic, normocephalic. Anicteric sclera. Mucous membranes moist.   CV: RRR. S1, S2. No murmurs, rubs, or gallops appreciated.   RESPIRATORY: Effort normal on room air. Lungs CTAB with no wheezing, rales, rhonchi.   GI: Abdomen soft and non distended, bowel sounds present. No tenderness, rebound, guarding.   MUSCULOSKELETAL: No joint swelling or tenderness. No CVA tenderness.   NEUROLOGICAL: No focal deficits. Moves all extremities symmetrically. CN II-XII grossly intact.   EXTREMITIES: No peripheral edema. No calf asymmetry, erythema, or tenderness.   SKIN: Warm, dry. No jaundice. No rashes on exposed skin.           Data:   CBC:  Recent Labs   Lab Test 02/22/21  0926   WBC 8.2   RBC 4.29   HGB 13.3   HCT 40.6   MCV 95   MCH 31.0   MCHC 32.8   RDW 13.0          CMP:  Recent Labs   Lab Test 02/22/21  0926      POTASSIUM 4.0   CHLORIDE 102   ALEXEY 9.8   CO2 28   BUN 14   CR 0.60   *   *   *   BILITOTAL 0.4   ALBUMIN 3.6   PROTTOTAL 7.9   ALKPHOS 163*       TSH:  TSH   Date Value Ref Range Status   02/22/2021 2.22 0.40 - 4.00 mU/L Final     Tox screen: Positive for benzodiazepines.     Unresulted Labs Ordered in the Past 30 Days of this Admission     Date and Time Order Name Status Description    2/22/2021 0926 HIV Antigen Antibody Combo In process            "

## 2021-02-22 NOTE — PHARMACY-ADMISSION MEDICATION HISTORY
Admission Medication History Completed by Pharmacy    See Epic Admission Navigator for allergy information, preferred outpatient pharmacy and prior to admission medications.     Medication History Sources:     Prescription fill history ([facility] in [place]) via Epic Surescripts data    Patient (via phone)     [facility] for methadone dosing (see other pharmacy progress note for details)     Additional Information:    All medications verified with [facility]. Patient states she thought her gabapentin dose was 1600mg AM and HS with 800mg in the afternoon, but writer called [facility] and verified dosing, as listed below.       Methadone: [facility] in [place] -- see other pharmacy progress note for details.    Prior to Admission medications    Medication Sig Last Dose     albuterol (PROAIR HFA/PROVENTIL HFA/VENTOLIN HFA) 108 (90 Base) MCG/ACT inhaler Inhale 1-2 puffs into the lungs every 4 hours as needed for shortness of breath / dyspnea or wheezing     Past Week     FLUoxetine (PROZAC) 40 MG capsule Take 40 mg by mouth daily     2/21/2021     gabapentin (NEURONTIN) 400 MG capsule Take 800 mg by mouth 2 times daily morning and afternoon     2/22/2021 at x1     gabapentin (NEURONTIN) 400 MG capsule Take 1,600 mg by mouth At Bedtime     2/21/2021     hydrOXYzine (ATARAX) 25 MG tablet Take 25 mg by mouth every 6 hours as needed for anxiety     Past Week     lithium ER (LITHOBID) 300 MG CR tablet Take 300 mg by mouth 2 times daily     2/21/2021     methadone (DOLPHINE) 5 MG/5ML solution Take 140 mg by mouth daily     2/22/2021     omeprazole (PRILOSEC) 20 MG DR capsule Take 20 mg by mouth daily     Past Month     polyethylene glycol (MIRALAX) 17 g packet Take 1 packet by mouth daily as needed for constipation      Past Week     Probiotic Product (PROBIOTIC PO) Take 1 capsule by mouth daily     Past Week       Date completed: 02/22/21    Medication history completed by:   Jeanie Mansfield, Pharm.D.,  BCPP  Behavioral Health Inpatient Pharmacist  [facility] ([facility]) Emergency Department  Phone: *[phone] (Ascom) or [phone]

## 2021-02-22 NOTE — PHARMACY
Outpatient Methadone Dosing Information    Clinic: [facility]  Location: [facility]  Phone: [phone], spoke with Jie    Dose: Methadone 140mg oral solution by mouth daily   Last dose: 2/22 at clinic    Does patient receive take-home doses?: No, just Sundays     Jeanie Mansfield, Pharm.D., [facility]  Behavioral Health Inpatient Pharmacist  [facility] ([facility]) Emergency Department  Phone: *[phone] (Ascom) or [phone]

## 2021-02-22 NOTE — PLAN OF CARE
"Admission note    S-(situation):   45 year old  female admitted for 2nd inpatient detox from benzodiazepines (BZs). Pt reported she must stop using benzodiazepines or else she will lose her methadone. Pt's goal for hospitalization: Detox from BZs    B-(background):   2nd inpatient detox from BZs  History of seizures from severe BZ withdrawal with warning signs of \"my eye twittering.\"  History of Tardive Dyskinesia -see Allergies list  Pt stated she has Hepatitis C    A-(assessment):   A&O x 4  Calm, pleasant, sleepy mood, pt wishes to rest.  No SI/SIB/HI/Hallucinations.  Endorses anxiety and depressive symptoms.  Does not identify other stressors/factors leading to hospitalization except that she is using BZs and that jeopardizes her methadone.  Lives alone in a house. Pt has a 19 year old child.  RN verified with Gunnar Benjamin RN at 11am that pt took 140mg methadone today.  U tox + for BZs  Pt reported last use of BZs was klonopin 4mg last evening.    MSSA = 5    VSS    R-(recommendations):   MSSA protocol with phenobarbital.  Continue with plan of care.    Precautions: Seizures, Withdrawal  "

## 2021-02-23 LAB
CHOLEST SERPL-MCNC: 159 MG/DL
GLUCOSE BLDC GLUCOMTR-MCNC: 129 MG/DL (ref 70–99)
GLUCOSE BLDC GLUCOMTR-MCNC: 132 MG/DL (ref 70–99)
HDLC SERPL-MCNC: 38 MG/DL
HIV 1+2 AB+HIV1 P24 AG SERPL QL IA: NONREACTIVE
LDLC SERPL CALC-MCNC: 94 MG/DL
NONHDLC SERPL-MCNC: 121 MG/DL
TRIGL SERPL-MCNC: 137 MG/DL

## 2021-02-23 PROCEDURE — 99232 SBSQ HOSP IP/OBS MODERATE 35: CPT | Performed by: PSYCHIATRY & NEUROLOGY

## 2021-02-23 PROCEDURE — 36416 COLLJ CAPILLARY BLOOD SPEC: CPT | Performed by: PSYCHIATRY & NEUROLOGY

## 2021-02-23 PROCEDURE — 80061 LIPID PANEL: CPT | Performed by: PSYCHIATRY & NEUROLOGY

## 2021-02-23 PROCEDURE — 128N000004 HC R&B CD ADULT

## 2021-02-23 PROCEDURE — H2032 ACTIVITY THERAPY, PER 15 MIN: HCPCS

## 2021-02-23 PROCEDURE — 250N000013 HC RX MED GY IP 250 OP 250 PS 637: Performed by: PSYCHIATRY & NEUROLOGY

## 2021-02-23 PROCEDURE — 999N001017 HC STATISTIC GLUCOSE BY METER IP

## 2021-02-23 RX ADMIN — LITHIUM CARBONATE 300 MG: 300 TABLET, EXTENDED RELEASE ORAL at 20:17

## 2021-02-23 RX ADMIN — GABAPENTIN 1600 MG: 400 CAPSULE ORAL at 22:00

## 2021-02-23 RX ADMIN — PHENOBARBITAL 64.8 MG: 64.8 TABLET ORAL at 20:17

## 2021-02-23 RX ADMIN — PHENOBARBITAL 64.8 MG: 64.8 TABLET ORAL at 12:53

## 2021-02-23 RX ADMIN — PHENOBARBITAL 64.8 MG: 64.8 TABLET ORAL at 08:03

## 2021-02-23 RX ADMIN — PHENOBARBITAL 64.8 MG: 64.8 TABLET ORAL at 16:01

## 2021-02-23 RX ADMIN — OMEPRAZOLE 20 MG: 20 CAPSULE, DELAYED RELEASE ORAL at 08:03

## 2021-02-23 RX ADMIN — NICOTINE POLACRILEX 4 MG: 4 GUM, CHEWING BUCCAL at 18:37

## 2021-02-23 RX ADMIN — FLUOXETINE 40 MG: 20 CAPSULE ORAL at 08:03

## 2021-02-23 RX ADMIN — Medication 250 MG: at 08:02

## 2021-02-23 RX ADMIN — LITHIUM CARBONATE 300 MG: 300 TABLET, EXTENDED RELEASE ORAL at 08:03

## 2021-02-23 RX ADMIN — GABAPENTIN 800 MG: 400 CAPSULE ORAL at 08:45

## 2021-02-23 RX ADMIN — GABAPENTIN 800 MG: 400 CAPSULE ORAL at 13:16

## 2021-02-23 RX ADMIN — METHADONE HYDROCHLORIDE 140 MG: 5 SOLUTION ORAL at 08:00

## 2021-02-23 RX ADMIN — ALBUTEROL SULFATE 2 PUFF: 90 AEROSOL, METERED RESPIRATORY (INHALATION) at 18:14

## 2021-02-23 ASSESSMENT — ACTIVITIES OF DAILY LIVING (ADL)
DRESS: INDEPENDENT
HYGIENE/GROOMING: INDEPENDENT
ORAL_HYGIENE: INDEPENDENT

## 2021-02-23 NOTE — PLAN OF CARE
Behavioral Team Discussion: (2/23/2021)    Continued Stay Criteria/Rationale: Patient admitted for Chemical Use Issues.  Plan: The following services will be provided to the patient; psychiatric assessment, medication management, therapeutic milieu, individual and group support, and skills groups.   Participants: 3A Provider: Dr. Bahman Sol MD; 3A RN: Claudy Arthur RN; 3A CM's: Shayy Soni  and Klaudia Holley.  Summary/Recommendation: Providers will assess today for treatment recommendations, discharge planning, and aftercare plans. CM will meet with pt for discharge planning.   Medical/Physical: Per ED note:    Arthritis       Bipolar 1 disorder (H)       Depressive disorder       postpartum     Depressive disorder       Major depression, recurrent (H)       Opiate addiction (H)       Seizures (H)       narcotic withdrawal     Substance abuse (H)       Urinary calculus, unspecified 2001     Renal stones     Precautions:   Behavioral Orders   Procedures     Code 1 - Restrict to Unit     Elopement precautions     Fall precautions     Routine Programming     As clinically indicated     Seizure precautions     Status 15     Every 15 minutes.     Suicide precautions     Patients on Suicide Precautions should have a Combination Diet ordered that includes a Diet selection(s) AND a Behavioral Tray selection for Safe Tray - with utensils, or Safe Tray - NO utensils       Withdrawal precautions     Rationale for change in precautions or plan: N/A  Progress: Initial.

## 2021-02-23 NOTE — PROGRESS NOTES
Jihan slept throughout the night. She appeared calm and comfortable. Breathing was unlabored. Will continue to monitor.

## 2021-02-23 NOTE — PROGRESS NOTES
[facility], [place]   Psychiatric Progress Note        Interim history   This is a 45 year old female with Benzodiazepine use disorder severe  Benzodiazepine withdrawal severe  Opiate use disorder on  maintenance  Major depressive disorder severe recurrent without psychosis on Prozac.Pt seen in rounds.   The patient's care was discussed with the treatment team during the daily team meeting and/or staff's chart notes were reviewed.  Staff report patient has been visible in the milieu,  no acute eventsovernight.     Patient's mood is better  Energy Level:MODERATE  Sleep:No concerns, sleeps well through night  Appetite:fair motivation interest improving  Denied suicidal/homicidal ideation/plan intent.  Denied psychosis  No prior suicde attempts  No access to gun  Pt is in alcohol withdrawal still being monitered every 4 hrs for it,   Pt mssa score are monitered  Tolerating meds and has no side effects.              Medications:     Current Facility-Administered Medications   Medication     acetaminophen (TYLENOL) tablet 650 mg     albuterol (PROAIR HFA/PROVENTIL HFA/VENTOLIN HFA) 108 (90 Base) MCG/ACT inhaler 1-2 puff     FLUoxetine (PROzac) capsule 40 mg     gabapentin (NEURONTIN) capsule 1,600 mg     gabapentin (NEURONTIN) capsule 800 mg     hydrOXYzine (ATARAX) tablet 25 mg     lithium ER (LITHOBID) CR tablet 300 mg     melatonin tablet 3 mg     methadone (DOLPHINE) solution 140 mg     miconazole (MICATIN) vaginal suppository 200 mg     naloxone (NARCAN) injection 0.2 mg    Or     naloxone (NARCAN) injection 0.4 mg    Or     naloxone (NARCAN) injection 0.2 mg    Or     naloxone (NARCAN) injection 0.4 mg     nicotine polacrilex (NICORETTE) gum 4 mg     omeprazole (priLOSEC) CR capsule 20 mg     PHENobarbital (LUMINAL) tablet 64.8 mg     polyethylene glycol (MIRALAX) Packet 17 g     saccharomyces boulardii (FLORASTOR) capsule             Allergies:     Allergies   Allergen Reactions      "Abilify [Aripiprazole] Other (See Comments)     Tardive dyskinesia     Allopurinol      Compazine Other (See Comments)     Dystonia     Dramamine      Jaw lock.       Olanzapine Other (See Comments)     Tardive dyskinesia from Zyprexa - per patient     Reglan Other (See Comments)     dystonia     Seroquel [Quetiapine] Other (See Comments)     Tardive dyskinesia.             Psychiatric Examination:   Blood pressure 97/62, pulse 78, temperature 97.5  F (36.4  C), temperature source Temporal, resp. rate 16, height 1.651 m (5' 5\"), weight 114.3 kg (252 lb), last menstrual period 02/22/2019, SpO2 95 %, not currently breastfeeding.  Weight is 252 lbs 0 oz  Body mass index is 41.93 kg/m .    Appearance:  awake, alert and adequately groomed  Attitude:  cooperative  Eye Contact:  good  Mood:  better  Affect:  appropriate and in normal range and mood congruent  Speech:  clear, coherent rate /rhythm are normal in rate rhythm volume  Psychomotor Behavior:  no evidence of tardive dyskinesia, dystonia, or tics and intact station, gait and muscle tone  Throught Process:  logical  Associations:  no loose associations  Thought Content:  no evidence of suicidal ideation or homicidal ideation, no evidence of psychotic thought, no auditory hallucinations present and no visual hallucinations present  Insight:  fair  Judgement:  intact  Oriented to:  time, person, and place  Attention Span and Concentration:  intact  Recent and Remote Memory:  intact  Language fund of knowledge are adequate         Labs:     Recent Results (from the past 24 hour(s))   Lipid panel    Collection Time: 02/23/21  6:55 AM   Result Value Ref Range    Cholesterol 159 <200 mg/dL    Triglycerides 137 <150 mg/dL    HDL Cholesterol 38 (L) >49 mg/dL    LDL Cholesterol Calculated 94 <100 mg/dL    Non HDL Cholesterol 121 <130 mg/dL   Glucose by meter    Collection Time: 02/23/21 11:44 AM   Result Value Ref Range    Glucose 129 (H) 70 - 99 mg/dL         DX " Benzodiazepine use disorder severe  Benzodiazepine withdrawal severe  Opiate use disorder on methadone maintenance  Major depressive disorder severe recurrent without psychosis on Prozac     PLAN  Patient will be detox of benzos using phenobarbital 64 times a day 1 dose was held yesterday because patient was very sedated  Today she does not seem to have nystagmus or ataxia  We will continue to monitor and hold if patient is feeling sedated hold parameters placed      MSSA    Eating Disturbances: ate and enjoyed all of it or not applicable  Tremor: 1 - not visibly apparent but can be felt by the examiner placing his fingertip slightly against the patient's fingertips  Sleep Disturbance: slept through the night or not applicable  Clouding of Sensorium: no evidence  Hallucinations: 0 - none  Quality of Contact: 0 - awareness of examiner and people around him/her  Agitation: 1 - somewhat more than normal activity  Paroxysmal Sweats: 1 - barely perceptible sweating  Temperature: 99.5 or below  Pulse: 1 - 70 to 79  Total MSSA Score: 5      Methadone maintenance rate continued we will monitor for sedation being on phenobarbital  For depression she will be continued on Prozac    Patient has night terrors but would not want [employer] for it  Laboratory/Imaging: reviewed with patient   Consults: internal medicine consult reviewed  Patient will be treated in therapeutic milieu with appropriate individual and group therapies as described.  PDMP CHECKED     Supportive psychotheraoy provided, nicko talked about recovery enviroment, relapse prevention, triggers to use.  Discussed with patient many issues of addiction,triggers, relapse, and establishing a solid recovery program.  Asked pt to be med complinat   Medical diagnoses to be addressed this admission:    Plan:       Assessment and Recommendations:   Jihan Gill is a 45 year old year old female with a history of major depressive disorder, bipolar I disorder, opiate use  disorder, benzodiazapine use disorder, arthritis, and chronic hepatitis C who was admitted on 2/22/21 for benzodiazapine detoxification.     #Transaminitis  Jihan was diagnosed with hepatitis C in 04/25/2019. She admits to not following up with her diagnosis and has yet to receive treatment. She is interested in being treated with anti viral therapy, following a GI provider in outpatient, starting treatment soon.  Liver enzymes today are , , Alk Phos 163, which are consistent with previous LFTs since her 2019 diagnosis. Denies EtOH use which is supported by negative serum EtOH. Has immunity to hepatitis B and previously immunized for Hep A. Currently denies any abdominal pain. No jaundice, icterus, abdominal tenderness, hepatosplenomegaly, abnormal bruises, on exam.  - Encourage follow-up with GI and initiation of anti viral therapy and repeat LFTs in 2 weeks  - HIV antigen antibody combo pending     #Vaginal discharge  Jihan reports thick white discharge and itching after recent course of abx. + history of vaginal candidiasis.   - Miconazole 200mg vaginal suppository qhs x 3 days  - Follow-up with PCP one week after discharge if no improvement     #Amenorrhea   Reports last menstrual period was one year ago. Urine HCG negative today.  - Recommend following up with PCP     #Diabetes mellitus type 2, diet controlled   Jihan reports she was previously told she might have diabetes. Hgb A1C from 12/10/20 at [facility] reported to be 6.5%. She has not followed up on this lab. Serum glucose 116 this morning.   - Repeat Hgb A1C pending. If still elevated, consider starting metformin here or with PCP   - POCT glucose BID  - Recommend follow-up with PCP     #Benzodiazepine use disorder, withdrawal  - Management per psychiatry      #Methadone maintenance  - Management per psychiatry     #Major depressive disorder  - Management per psychiatry     Medicine will follow up on HIV, Hemoglobin  A1c, and TSH.  No further recommendations at this time. Please page the on-call ELOINA for any intercurrent medical issues which arise.          Legal Status: voluntary    Safety Assessment:   Checks:  15 min  Precautions: withdrawal precautions  Pt has not required locked seclusion or restraints in the past 24 hours to maintain safety, please refer to RN documentation for further details.  Discussed with patient many issues of addiction,triggers, relapse, and establishing a solid recovery program.  Able to give informed consent:  YES   Discussed Risks/Benefits/Side Effects/Alternatives: YES    After discussion of the indications, risks, benefits, alternatives and consequences of no treatment, the patient elects to do treatment

## 2021-02-23 NOTE — PROGRESS NOTES
[place] Prescription Drug Control Monitoring Note:      Narcotic  060  Sedative  150  Stimulant  000  Explanation and GuidanceOVERDOSE RISK SCORE 170  (Range 000-999)  Explanation and Guidance  ADDITIONAL RISK INDICATORS ( 1 )  >= 5 opioid or sedative providers in any year in the last 2 years  Explanation and GuidanceThis [facility] report is based on search criteria supplied and the data entered by the dispensing pharmacy. For more information about any prescription, please contact the dispensing pharmacy or the prescriber. [facility] scores and reports are intended to aid, not replace, medical decision making. None of the information presented should be used as sole justification for providing or refusing to provide medications. The information on this report is not warranted as accurate or complete.  Graphs  RX GRAPH   Narcotic  Buprenorphine  Sedative  Stimulant  Other  All Prescribers  Prescribers  12 - Cathy Carpio  11 - Colleen Glass F  10 - IZZY Lopez  9 - Regi Richard  8 - Valentine Colbert  7 - Joey Lindsey  6 - Aubrey Montemayor  5 - Kika Eagle MD  4 - Eleuterio Roman  3 - Ciara Brandon  2 - Lori William  1 - Devon Mccann,  Timeline  02/23  2m  6m  1y  2y  Buprenorphine mg  16  4  0  28  Timeline  02/23  2m  6m  1y  2y  Morphine MgEq (MME)  200  80  0  320  Timeline  02/23  2m  6m  1y  2y  Lorazepam MgEq (LME)  10  2  0  18  Timeline  02/23  2m  6m  1y  2y  *Per CDC guidance, the MME conversion factors prescribed or provided as part of the medication-assisted treatment for opioid use disorder should not be used to benchmark against dosage thresholds meant for opioids prescribed for pain. Buprenorphine products have no agreed upon morphine equivalency, and as partial opioid agonists, are not expected to be associated with overdose risk in the same dose-dependent manner as doses for full agonist opioids. MME = morphine milligram equivalents. LME = Lorazepam milligram equivalents. mg  = dose in milligrams.  Summary  Summary  Total Prescriptions:  27  Total Prescribers:  12  Total Pharmacies:  4  Narcotics* (excluding Buprenorphine)  Current Qty:  0  Current MME/day:  0.00  30 Day Avg MME/day:  0.00  Sedatives*  Current Qty:  0  Current LME/day:  0.00  30 Day Avg LME/day:  0.00  Buprenorphine*  Current Qty:  0  Current mg/day:  0.00  30 Day Avg mg/day:  0.00  Rx Data  PRESCRIPTIONS  Total Prescriptions: 27  Total Private Pay: 0  Fill Date ID Written Sold Drug Qty Days Prescriber Rx # Pharmacy Refill Daily Dose * Pymt Type   01/28/2021 1 01/28/2021 01/28/2021 Gabapentin 400 Mg Capsule  300.00 37 Ki Fee [id number] [facility] 0/[place]  01/13/2021 1 11/19/2020 01/13/2021 Gabapentin 400 Mg Capsule  150.00 15 An Sto [id number] [facility] ([facility] 0/2  [place]  12/15/2020 4 12/14/2020 12/15/2020 Gabapentin 400 Mg Capsule  150.00 17 An Sto [id number] Set (9815) 0/1  [place]  11/25/2020 3 11/19/2020 11/25/2020 Gabapentin 400 Mg Capsule  150.00 17 An Sto [id number] Set (9815[place]  11/16/2020 3 10/29/2020 11/16/2020 Gabapentin 400 Mg Capsule  120.00 15 An Sto [id number] Set (9815[place]  10/29/2020 3 10/29/2020 10/29/2020 Gabapentin 400 Mg Capsule  120.00 15 An Sto [id number] Set (9815[place]  09/18/2020 1 09/17/2020 09/21/2020 Gabapentin 400 Mg Capsule  240.00 30 Ma Ge [id number] [facility]  09/09/2020 1 09/08/2020 09/10/2020 Gabapentin 800 Mg Tablet  90.00 30 Ju Hilary [id number] [facility]  08/19/2020 1 05/19/2020 08/19/2020 Gabapentin 400 Mg Capsule  240.00 30 Ma Ge [id number] [facility] 2[place]  08/14/2020 1 08/14/2020 08/14/2020 Clonazepam 0.5 Mg Tablet  60.00 30 Ra Agn [id number] [facility] (530) 0/0 2.[place]  07/27/2020 1 07/27/2020 07/27/2020 Lorazepam 1 Mg Tablet  30.00 30 Ja Chr [id number] [facility] (5308[place] 0/0 1.00 [place]  07/16/2020 1 05/19/2020 07/16/2020 Gabapentin 400 Mg Capsule  240.00 30 Ma Ge [id number] [facility] (5301[place] 1/3  [place]  07/07/2020 1 07/06/2020 07/07/2020 Lorazepam 1 Mg Tablet  30.00 15 Pa Fredy [id number] [facility] ([facility] 0/[place] 2.[place]  06/22/2020 1 05/19/2020 06/22/2020 Gabapentin 400 Mg Capsule  240.00 30 Ma Ge [id number] [facility]  06/17/2020 1 06/16/2020 06/17/2020 Lorazepam 1 Mg Tablet  15.00 15 Ma Fie [id number] [facility] ([facility]305[facility] 0/[place] 1.[place]  06/16/2020 1 04/28/2020 06/16/2020 Lorazepam 1 Mg Tablet  2.00 2 Ma Ge [id number] [facility] (5305[place] 1/0 1.[place]  05/27/2020 1 04/28/2020 05/27/2020 Gabapentin 400 Mg Capsule  240.00 30 Ma Ge [id number] [facility]  05/19/2020 1 05/19/2020 05/19/2020 Lorazepam 1 Mg Tablet  15.00 15 Ma Ge [id number] [facility] ([facility]305[place] 0/[place] 1.[place]  04/30/2020 1 04/28/2020 04/30/2020 Gabapentin 400 Mg Capsule  240.00 30 Ma Ge [id number] [facility] [place]305[place]  04/28/2020 1 04/28/2020 04/28/2020 Lorazepam 1 Mg Tablet  8.00 28 Ma Ge [id number] [facility] ([facility]305[place] 0/0 0.29 [place]  04/19/2020 1 04/16/2020 04/20/2020 Gabapentin 400 Mg Capsule  120.00 15 Ra Agn [id number] [facility] [place]  04/08/2020 1 04/06/2020 04/08/2020 Gabapentin 400 Mg Capsule  120.00 15 Da Sul [id number] [facility] [place]  04/07/2020 1 04/07/2020 04/07/2020 Gabapentin 400 Mg Capsule  8.00 1 Ma Nima [id number] [facility] (2041) 0/[place]  [place]  03/19/2020 2 06/25/2019  Gabapentin 600 Mg Tablet  90.00 30 Br Marco A [id number] [facility] (8975) 2/[place]  [place]  03/18/2020 1 01/21/2020 03/18/2020 Gabapentin 100 Mg Capsule  60.00 30 Br Marco A [id number] [facility] (5305[place] 2/2  [place]  03/09/2020 1 12/17/2019 03/10/2020 Gabapentin 300 Mg Capsule  270.00 30 Gr Leh [id number] [facility] (5305[place] 2/2  [place]  02/26/2020 2 06/25/2019  Gabapentin 600 Mg Tablet  90.00 30 Br Marco A [id number] [facility] (4545) 1/0  [place]  *Per CDC guidance, the MME conversion factors prescribed or provided as part of the medication-assisted treatment for opioid use disorder should not be used to benchmark against dosage thresholds meant for opioids  prescribed for pain. Buprenorphine products have no agreed upon morphine equivalency, and as partial opioid agonists, are not expected to be associated with overdose risk in the same dose-dependent manner as doses for full agonist opioids. MME = morphine milligram equivalents. LME = Lorazepam milligram equivalents. mg = dose in milligrams.  Providers  Total Providers: 12  Name Address City State Zipcode Phone  Lori William [address] [facility] [address] [phone]  Devon Mccann MD [address] [place] [address] [phone]  Eleuterio Hewitt MD [address] [facility] [address] [phone]  Kika Eagle MD [address] [facility] [address] [phone]  Aubrey Montemayor MD [address] [place] [address] [phone]  Joey Mendez MD [address] [place] [address] [phone]  Valentine Spaulding D.O. [address] [place] [address] [phone]  Regi Richard [address] [facility] [address] [phone]  Kika Briscoe Do [address] [place] [address] [phone]  Cathy Carpio [address] [place] [address] [phone]  Ciara Brandon [address] [facility] [id number] [phone]  Colleen SUÁREZ Msn [facility] [place], Msn [address] [place] [address] [phone]  Pharmacies  Total Pharmacies: 4  Name Address [facility] Zipcode Phone  [facility] (2041) [address] [facility] [address] [phone]  [facility] (5063) [address] [address] [phone]  [facility] (2249) [address] [facility] [address] [phone]  [facility] ([phone]) [address] [facility] [phone]  The report provided is based upon the search criteria entered and the corresponding data as it has been reported by dispenser(s). If erroneous information is  identified or additional information is needed, please contact the dispenser or the prescriber provided on the report. Date Sold signifies the date the prescription was sold (left the pharmacy). The absence of Date Sold does not necessarily indicate the prescription was not dispensed. Fill Date represents the date the medication was filled or prepared by the pharmacy. Note, federal regulation (CFR Title 42: Part 2) requires patient consent prior to releasing certain patient data from federally funded opioid treatment programs (OTPs). As such, controlled substances dispensed from OTPs for medication-assisted treatment may not appear in the [place]  report. Morphine milligram equivalent (MME) conversion factors published by the CDC are used in the MME calculation. Per the CDC, the MME conversion factor is intended only for analytic purposes where prescription data are used to retrospectively calculate daily MME to inform analyses of risks associated with opioid prescribing. This value does not constitute clinical guidance or recommendations for converting patients from one form of opioid analgesic to another. Per the CDC, the conversion factors for drugs prescribed or provided, as part of medication-assisted treatment for opioid use disorder should not be used to benchmark against MME dosage thresholds meant for opioids prescribed for pain. Buprenorphine products listed in the CDC s MME file do not have an associated conversion factor. Lastly, the CDC notes, in clinical practice, calculating MME for methadone often involves a sliding-scale approach, whereby the conversion factor increases with increasing dose. The conversion factor of 3 for methadone presented in this file could underestimate MME for a given patient. This report contains confidential information, including patient identifiers, and is not a public record. The information on this report must be treated as protected health information and is only to be  disclosed to others as authorized by applicable state and Federal regulations.

## 2021-02-23 NOTE — PROGRESS NOTES
Pt is on the restricted recipient program:    2.22.21 per mnits pt eff feb with [facility] restricted ma/pmap pmi [id number]  [facility]  Restricted Recipient Program Please call the [facility] unit for additional information. The telephone number is [phone] or [phone].   Provider number [id number], GAYE ROSSI DO is a provider for a Restricted Recipient for: Physician Services , Nurse Practitioner Services   Provider number [id number], PARK NICOLLET [facility] is a provider for a Restricted Recipient for: Physician Services , Diagnostic Lab , Nurse Practitioner Services   Provider number [id number], [facility] NIC[facility] is a provider for a Restricted Recipient for: Inpatient Hospital , Outpatient Hospital , Physician Services , Diagnostic Lab   Provider number [id number], [id number] is a provider for a Restricted Recipient for: Pharmacy   If you are providing services other than the restricted services listed above, you may bill DHS. If you have questions, please call [phone] or [phone].

## 2021-02-23 NOTE — PLAN OF CARE
Patient experienced a largely positive day on Hinson 3A.   She has been markedly less sedated than yesterday. Relatedly, she reports having experienced a very restful night's sleep, and feeling noticeably refreshed as a result.  She reports continuing absence of suicidal ideation since admission, and generally being in good spirits.  Treatment team updated accordingly as to above.  Will continue to monitor as prudent.

## 2021-02-23 NOTE — PROGRESS NOTES
02/22/21 2200   Groups   Details    (Psychotherapy)   Number of patients attending the group:  7  Group Length:  1 Hours     Group Therapy Type: Psychotherapy     Summary of Group / Topics Discussed:      The  Psychotherapy group goal is to promote insight to positive choice and change. Group processing is within a supportive and safe environment. Patients will process emotions using verbal group and expressive psychotherapy interventions including visual art/writing interventions.     Group interventions support patients by: identifying strengths and identifying how they can help reach our recovery goals     Modalities to reach these goals include: -Positive Psychology- reaching goals with our strengths      Subjective -mood -scary the progression of more addictive drugs from drinking     Objective/ Intervention- Goal of group and Therapeutic modality utilized-Positive Psychology/ Strengths Based       Group Response- very engaged     Patient Response- Pt was pleasant, cooperative and passively engaged. Pt was able to identify a few character strengths that will help her reach her  mental health and addiction recovery goals.  She talked about how important her son is to her. She was very groggy and her eyes were closing throughout group and she spilled a full cup of pop, didn't appear to be clear headed yet.      Jeremy Quevedo, YOANNAFT, ATR-BC

## 2021-02-23 NOTE — PLAN OF CARE
Pt was visible in the milieu at the beginning of shift. She attended group. During group she fell asleep and spilled coffee. Pt again fell asleep in the lounge two more times. When told by staff to go in her room, she became a little agitated but was understanding that it was for her safety. Pt stayed in her room asleep for the rest of the evening. Pt MSSA score of 5 and 6 given scheduled phenobarbital. Pt denies SI/HI/SIB. Will continue to monitor.

## 2021-02-23 NOTE — PROGRESS NOTES
Brief Medicine Note    Following up on lab results. Please see consult note on 2/22/2021 for details. Hemoglobin A1c 6.4. TSH 2.22 which is normal.  HIV non-reactive. Recommend outpatient follow up with PCP in 1 week after discharged and continued follow up with GI for tx of chronic HCV.     Medicine will sign off. No further recommendations at this time. Please page the on-call ELOINA for any intercurrent medical issues which arise.     Aylin Strong PA-C  Hospitalist Service  Contact information available via [facility] Paging/Directory

## 2021-02-24 LAB
GLUCOSE BLDC GLUCOMTR-MCNC: 66 MG/DL (ref 70–99)
GLUCOSE BLDC GLUCOMTR-MCNC: 70 MG/DL (ref 70–99)
GLUCOSE BLDC GLUCOMTR-MCNC: 98 MG/DL (ref 70–99)

## 2021-02-24 PROCEDURE — 99232 SBSQ HOSP IP/OBS MODERATE 35: CPT | Performed by: PSYCHIATRY & NEUROLOGY

## 2021-02-24 PROCEDURE — 250N000013 HC RX MED GY IP 250 OP 250 PS 637: Performed by: PSYCHIATRY & NEUROLOGY

## 2021-02-24 PROCEDURE — 999N001017 HC STATISTIC GLUCOSE BY METER IP

## 2021-02-24 PROCEDURE — 99207 PR CDG-MDM COMPONENT: MEETS MODERATE - UP CODED: CPT | Performed by: PSYCHIATRY & NEUROLOGY

## 2021-02-24 PROCEDURE — 128N000004 HC R&B CD ADULT

## 2021-02-24 RX ORDER — GABAPENTIN 400 MG/1
800 CAPSULE ORAL ONCE
Status: COMPLETED | OUTPATIENT
Start: 2021-02-24 | End: 2021-02-24

## 2021-02-24 RX ORDER — PHENOBARBITAL 64.8 MG/1
64.8 TABLET ORAL 3 TIMES DAILY
Status: COMPLETED | OUTPATIENT
Start: 2021-02-24 | End: 2021-02-27

## 2021-02-24 RX ADMIN — MICONAZOLE NITRATE 200 MG: 200 SUPPOSITORY VAGINAL at 21:10

## 2021-02-24 RX ADMIN — PHENOBARBITAL 64.8 MG: 64.8 TABLET ORAL at 08:11

## 2021-02-24 RX ADMIN — GABAPENTIN 800 MG: 400 CAPSULE ORAL at 21:05

## 2021-02-24 RX ADMIN — PHENOBARBITAL 64.8 MG: 64.8 TABLET ORAL at 13:07

## 2021-02-24 RX ADMIN — LITHIUM CARBONATE 300 MG: 300 TABLET, EXTENDED RELEASE ORAL at 21:05

## 2021-02-24 RX ADMIN — OMEPRAZOLE 20 MG: 20 CAPSULE, DELAYED RELEASE ORAL at 08:11

## 2021-02-24 RX ADMIN — FLUOXETINE 40 MG: 20 CAPSULE ORAL at 08:11

## 2021-02-24 RX ADMIN — Medication 250 MG: at 08:11

## 2021-02-24 RX ADMIN — LITHIUM CARBONATE 300 MG: 300 TABLET, EXTENDED RELEASE ORAL at 08:11

## 2021-02-24 RX ADMIN — METHADONE HYDROCHLORIDE 140 MG: 5 SOLUTION ORAL at 08:11

## 2021-02-24 RX ADMIN — GABAPENTIN 800 MG: 400 CAPSULE ORAL at 08:11

## 2021-02-24 ASSESSMENT — ACTIVITIES OF DAILY LIVING (ADL)
HYGIENE/GROOMING: INDEPENDENT
ORAL_HYGIENE: INDEPENDENT
ORAL_HYGIENE: INDEPENDENT
DRESS: STREET CLOTHES;INDEPENDENT
DRESS: INDEPENDENT
HYGIENE/GROOMING: HANDWASHING;INDEPENDENT

## 2021-02-24 NOTE — PROVIDER NOTIFICATION
Pt sitting in the lounge, eyes closed sitting up.  She is now acknowledging sedation.  Informed Dr. Sol, held gabapentin and changed phenobarbital to TID dosing with hold parameters.

## 2021-02-24 NOTE — PROGRESS NOTES
[facility], [place]   Psychiatric Progress Note        Interim history   This is a 45 year old female with Benzodiazepine use disorder severe  Benzodiazepine withdrawal severe  Opiate use disorder on  maintenance  Major depressive disorder severe recurrent without psychosis on Prozac.Pt seen in rounds.   The patient's care was discussed with the treatment team during the daily team meeting and/or staff's chart notes were reviewed.  Staff report patient has been visible in the milieu,  no acute eventsovernight.     Patient's mood is good  Energy Level:MODERATE  Sleep:No concerns, sleeps well through night  Appetite:fair motivation interest improving  Denied suicidal/homicidal ideation/plan intent.  Denied psychosis  No prior suicde attempts  No access to gun  Pt is in alcohol withdrawal still being monitered every 4 hrs for it,   Pt mssa score are monitered  Tolerating meds and has no side effects.              Medications:     Current Facility-Administered Medications   Medication     acetaminophen (TYLENOL) tablet 650 mg     albuterol (PROAIR HFA/PROVENTIL HFA/VENTOLIN HFA) 108 (90 Base) MCG/ACT inhaler 1-2 puff     FLUoxetine (PROzac) capsule 40 mg     gabapentin (NEURONTIN) capsule 1,600 mg     gabapentin (NEURONTIN) capsule 800 mg     hydrOXYzine (ATARAX) tablet 25 mg     lithium ER (LITHOBID) CR tablet 300 mg     melatonin tablet 3 mg     methadone (DOLPHINE) solution 140 mg     miconazole (MICATIN) vaginal suppository 200 mg     naloxone (NARCAN) injection 0.2 mg    Or     naloxone (NARCAN) injection 0.4 mg    Or     naloxone (NARCAN) injection 0.2 mg    Or     naloxone (NARCAN) injection 0.4 mg     nicotine polacrilex (NICORETTE) gum 4 mg     omeprazole (priLOSEC) CR capsule 20 mg     PHENobarbital (LUMINAL) tablet 64.8 mg     polyethylene glycol (MIRALAX) Packet 17 g     saccharomyces boulardii (FLORASTOR) capsule             Allergies:     Allergies   Allergen Reactions      "Abilify [Aripiprazole] Other (See Comments)     Tardive dyskinesia     Allopurinol      Compazine Other (See Comments)     Dystonia     Dramamine      Jaw lock.       Olanzapine Other (See Comments)     Tardive dyskinesia from Zyprexa - per patient     Reglan Other (See Comments)     dystonia     Seroquel [Quetiapine] Other (See Comments)     Tardive dyskinesia.             Psychiatric Examination:   Blood pressure 97/61, pulse 78, temperature 98  F (36.7  C), temperature source Temporal, resp. rate 16, height 1.651 m (5' 5\"), weight 114.3 kg (252 lb), last menstrual period 02/22/2019, SpO2 98 %, not currently breastfeeding.  Weight is 252 lbs 0 oz  Body mass index is 41.93 kg/m .    Appearance:  awake, alert and adequately groomed  Attitude:  cooperative  Eye Contact:  good  Mood:  Hopeful   Affect:  appropriate and in normal range and mood congruent  Speech:  clear, coherent rate /rhythm are normal in rate rhythm volume  Psychomotor Behavior:  no evidence of tardive dyskinesia, dystonia, or tics and intact station, gait and muscle tone  Throught Process:  logical  Associations:  no loose associations  Thought Content:  no evidence of suicidal ideation or homicidal ideation, no evidence of psychotic thought, no auditory hallucinations present and no visual hallucinations present  Insight:  fair  Judgement:  intact  Oriented to:  time, person, and place  Attention Span and Concentration:  intact  Recent and Remote Memory:  intact  Language fund of knowledge are adequate         Labs:     Recent Results (from the past 24 hour(s))   Glucose by meter    Collection Time: 02/23/21 11:44 AM   Result Value Ref Range    Glucose 129 (H) 70 - 99 mg/dL   Glucose by meter    Collection Time: 02/23/21  5:39 PM   Result Value Ref Range    Glucose 132 (H) 70 - 99 mg/dL   Glucose by meter    Collection Time: 02/24/21  6:20 AM   Result Value Ref Range    Glucose 66 (L) 70 - 99 mg/dL   Glucose by meter    Collection Time: 02/24/21  " 6:44 AM   Result Value Ref Range    Glucose 70 70 - 99 mg/dL         DX Benzodiazepine use disorder severe  Benzodiazepine withdrawal severe  Opiate use disorder on methadone maintenance  Major depressive disorder severe recurrent without psychosis on Prozac     PLAN  Patient will be detox of benzos using phenobarbital 64 times a day   Today she does not seem to have nystagmus or ataxia  We will continue to monitor and hold if patient is feeling sedated hold parameters placed      MSSA    Eating Disturbances: ate and enjoyed all of it or not applicable  Tremor: 0 - no tremor  Sleep Disturbance: slept through the night or not applicable  Clouding of Sensorium: no evidence  Hallucinations: 0 - none  Quality of Contact: 0 - awareness of examiner and people around him/her  Agitation: 0 - normal activity  Paroxysmal Sweats: 1 - barely perceptible sweating  Temperature: 99.5 or below  Pulse: 1 - 70 to 79  Total MSSA Score: 3      Methadone maintenance rate continued we will monitor for sedation being on phenobarbital  For depression she will be continued on Prozac    Patient has night terrors but would not want Minipress for it  Laboratory/Imaging: reviewed with patient   Consults: internal medicine consult reviewed  Patient will be treated in therapeutic milieu with appropriate individual and group therapies as described.  PDMP CHECKED     Supportive psychotheraoy provided, nicko talked about recovery enviroment, relapse prevention, triggers to use.  Discussed with patient many issues of addiction,triggers, relapse, and establishing a solid recovery program.  Asked pt to be med complinat   Medical diagnoses to be addressed this admission:    Plan:       Assessment and Recommendations:   Jihan Gill is a 45 year old year old female with a history of major depressive disorder, bipolar I disorder, opiate use disorder, benzodiazapine use disorder, arthritis, and chronic hepatitis C who was admitted on 2/22/21 for  benzodiazapine detoxification.     #Transaminitis  Jihan was diagnosed with hepatitis C in 04/25/2019. She admits to not following up with her diagnosis and has yet to receive treatment. She is interested in being treated with anti viral therapy, following a GI provider in outpatient, starting treatment soon.  Liver enzymes today are , , Alk Phos 163, which are consistent with previous LFTs since her 2019 diagnosis. Denies EtOH use which is supported by negative serum EtOH. Has immunity to hepatitis B and previously immunized for Hep A. Currently denies any abdominal pain. No jaundice, icterus, abdominal tenderness, hepatosplenomegaly, abnormal bruises, on exam.  - Encourage follow-up with GI and initiation of anti viral therapy and repeat LFTs in 2 weeks  - HIV antigen antibody combo pending     #Vaginal discharge  Jihan reports thick white discharge and itching after recent course of abx. + history of vaginal candidiasis.   - Miconazole 200mg vaginal suppository qhs x 3 days  - Follow-up with PCP one week after discharge if no improvement     #Amenorrhea   Reports last menstrual period was one year ago. Urine HCG negative today.  - Recommend following up with PCP     #Diabetes mellitus type 2, diet controlled   Jihan reports she was previously told she might have diabetes. [facility] to [facility] 6.5%. She has not followed up on this lab. Serum glucose 116 this morning.   - Repeat Hgb A1C pending. If still elevated, consider starting metformin here or with PCP   - POCT glucose BID  - Recommend follow-up with PCP     #Benzodiazepine use disorder, withdrawal  - Management per psychiatry      #Methadone maintenance  - Management per psychiatry     #Major depressive disorder  - Management per psychiatry     Medicine will follow up on HIV, Hemoglobin A1c, and TSH.  No further recommendations at this time. Please page the on-call ELOINA for any intercurrent  medical issues which arise.          Legal Status: voluntary    Safety Assessment:   Checks:  15 min  Precautions: withdrawal precautions  Pt has not required locked seclusion or restraints in the past 24 hours to maintain safety, please refer to RN documentation for further details.  Discussed with patient many issues of addiction,triggers, relapse, and establishing a solid recovery program.  Able to give informed consent:  YES   Discussed Risks/Benefits/Side Effects/Alternatives: YES    After discussion of the indications, risks, benefits, alternatives and consequences of no treatment, the patient elects to do treatment

## 2021-02-24 NOTE — PROGRESS NOTES
Pt more awake and alert early evening.  She reports that she can get up and do anything, but when she sits for a while she gets sleepy.  Pt noted during recreational therapy to be somnolent and also noted while watching TV to be dozing.  During earlier conversations with patient, the patient was alert, present and talkative.  More somnolent as evening has progressed.  Pt denies SI, SIB.  She is interactive with peers and staff.  Will continue to assess

## 2021-02-24 NOTE — PROGRESS NOTES
Pt.appeared to have a good night sleep as she slept through the night. No safety or behavioral concerns. Will continue to monitor.

## 2021-02-24 NOTE — PLAN OF CARE
Problem: Substance Withdrawal  Goal: Substance Withdrawal  Description: Signs and symptoms of listed problems will be absent or manageable.  Outcome: Improving    Problem: Suicidal Behavior  Goal: Suicidal Behavior is Absent or Managed  Outcome: Completed    S: Pt admitted for benzodiazepines withdrawal. MSSA scores 3 & 2   A: Pt reports sleep and appetite are adequate, denies depression or anxiety,  denies current thoughts plan or intent for self harm or harm towards others.  She is social with peers and denies any specific needs or concerns. She appears slightly sedated after am medications but denies this.  She is using a yoga mat to stretch on the unit.   R. She was encouraged to take a shower and work on discharge worksheets.  She was educated on social distancing and handwashing.  Will continue to monitor and assist with discharge planning.

## 2021-02-24 NOTE — PLAN OF CARE
Pt participated in Therapeutic Recreation group utilizing a group discussion with focus on leisure education and healthy coping strategies. Pt was cooperative in the group discussion and added to the benefits of healthy daily recreation. Pt participated in the discussion and the written portion of groups, but occasionally seemed to doze off at times. While talking, she would gradually slow down and almost fall asleep mid-sentence, then perked back up. Pt discussed many healthy interests enjoyed during free time during group discussion.  Pt shared with the group a positive coping strategy that was helpful with sobriety.

## 2021-02-25 LAB — GLUCOSE BLDC GLUCOMTR-MCNC: 154 MG/DL (ref 70–99)

## 2021-02-25 PROCEDURE — 999N001017 HC STATISTIC GLUCOSE BY METER IP

## 2021-02-25 PROCEDURE — H2032 ACTIVITY THERAPY, PER 15 MIN: HCPCS

## 2021-02-25 PROCEDURE — H2035 A/D TX PROGRAM, PER HOUR: HCPCS

## 2021-02-25 PROCEDURE — 99232 SBSQ HOSP IP/OBS MODERATE 35: CPT | Performed by: PSYCHIATRY & NEUROLOGY

## 2021-02-25 PROCEDURE — 128N000004 HC R&B CD ADULT

## 2021-02-25 PROCEDURE — 250N000013 HC RX MED GY IP 250 OP 250 PS 637: Performed by: PSYCHIATRY & NEUROLOGY

## 2021-02-25 RX ADMIN — METHADONE HYDROCHLORIDE 140 MG: 5 SOLUTION ORAL at 08:20

## 2021-02-25 RX ADMIN — PHENOBARBITAL 64.8 MG: 64.8 TABLET ORAL at 16:20

## 2021-02-25 RX ADMIN — GABAPENTIN 800 MG: 400 CAPSULE ORAL at 16:25

## 2021-02-25 RX ADMIN — LITHIUM CARBONATE 300 MG: 300 TABLET, EXTENDED RELEASE ORAL at 20:31

## 2021-02-25 RX ADMIN — PHENOBARBITAL 64.8 MG: 64.8 TABLET ORAL at 00:08

## 2021-02-25 RX ADMIN — LITHIUM CARBONATE 300 MG: 300 TABLET, EXTENDED RELEASE ORAL at 08:20

## 2021-02-25 RX ADMIN — PHENOBARBITAL 64.8 MG: 64.8 TABLET ORAL at 08:20

## 2021-02-25 RX ADMIN — OMEPRAZOLE 20 MG: 20 CAPSULE, DELAYED RELEASE ORAL at 08:20

## 2021-02-25 RX ADMIN — PHENOBARBITAL 64.8 MG: 64.8 TABLET ORAL at 20:31

## 2021-02-25 RX ADMIN — FLUOXETINE 40 MG: 20 CAPSULE ORAL at 08:20

## 2021-02-25 RX ADMIN — GABAPENTIN 800 MG: 400 CAPSULE ORAL at 08:20

## 2021-02-25 RX ADMIN — Medication 250 MG: at 08:20

## 2021-02-25 ASSESSMENT — ACTIVITIES OF DAILY LIVING (ADL)
DRESS: STREET CLOTHES;INDEPENDENT
HYGIENE/GROOMING: HANDWASHING;INDEPENDENT
DRESS: INDEPENDENT
ORAL_HYGIENE: INDEPENDENT
HYGIENE/GROOMING: INDEPENDENT
ORAL_HYGIENE: INDEPENDENT

## 2021-02-25 NOTE — PROGRESS NOTES
[facility], [place]   Psychiatric Progress Note        Interim history   This is a 45 year old female with Benzodiazepine use disorder severe  Benzodiazepine withdrawal severe  Opiate use disorder on  maintenance  Major depressive disorder severe recurrent without psychosis on Prozac.Pt seen in rounds.   The patient's care was discussed with the treatment team during the daily team meeting and/or staff's chart notes were reviewed.  Staff report patient has been visible in the milieu,  no acute eventsovernight.     Patient's mood is good  phenobarb held for pt was feeling sleeping  And low vitals   Energy Level:MODERATE  Sleep:No concerns, sleeps well through night  Appetite:fair motivation interest improving  Denied suicidal/homicidal ideation/plan intent.  Denied psychosis  No prior suicde attempts  No access to gun    Tolerating meds and has no side effects.              Medications:     Current Facility-Administered Medications   Medication     acetaminophen (TYLENOL) tablet 650 mg     albuterol (PROAIR HFA/PROVENTIL HFA/VENTOLIN HFA) 108 (90 Base) MCG/ACT inhaler 1-2 puff     FLUoxetine (PROzac) capsule 40 mg     gabapentin (NEURONTIN) capsule 1,600 mg     gabapentin (NEURONTIN) capsule 800 mg     lithium ER (LITHOBID) CR tablet 300 mg     melatonin tablet 3 mg     methadone (DOLPHINE) solution 140 mg     miconazole (MICATIN) vaginal suppository 200 mg     naloxone (NARCAN) injection 0.2 mg    Or     naloxone (NARCAN) injection 0.4 mg    Or     naloxone (NARCAN) injection 0.2 mg    Or     naloxone (NARCAN) injection 0.4 mg     nicotine polacrilex (NICORETTE) gum 4 mg     omeprazole (priLOSEC) CR capsule 20 mg     PHENobarbital (LUMINAL) tablet 64.8 mg     polyethylene glycol (MIRALAX) Packet 17 g     saccharomyces boulardii (FLORASTOR) capsule             Allergies:     Allergies   Allergen Reactions     Abilify [Aripiprazole] Other (See Comments)     Tardive dyskinesia      "Allopurinol      Compazine Other (See Comments)     Dystonia     Dramamine      Jaw lock.       Olanzapine Other (See Comments)     Tardive dyskinesia from Zyprexa - per patient     Reglan Other (See Comments)     dystonia     Seroquel [Quetiapine] Other (See Comments)     Tardive dyskinesia.             Psychiatric Examination:   Blood pressure 100/70, pulse 74, temperature 98  F (36.7  C), temperature source Temporal, resp. rate 16, height 1.651 m (5' 5\"), weight 114.3 kg (252 lb), last menstrual period 02/22/2019, SpO2 96 %, not currently breastfeeding.  Weight is 252 lbs 0 oz  Body mass index is 41.93 kg/m .    Appearance:  awake, alert and adequately groomed  Attitude:  cooperative  Eye Contact:  good  Mood:  positive  Affect:  appropriate and in normal range and mood congruent  Speech:  clear, coherent rate /rhythm are normal in rate rhythm volume  Psychomotor Behavior:  no evidence of tardive dyskinesia, dystonia, or tics and intact station, gait and muscle tone  Throught Process:  logical  Associations:  no loose associations  Thought Content:  no evidence of suicidal ideation or homicidal ideation, no evidence of psychotic thought, no auditory hallucinations present and no visual hallucinations present  Insight:  fair  Judgement:  intact  Oriented to:  time, person, and place  Attention Span and Concentration:  intact  Recent and Remote Memory:  intact  Language fund of knowledge are adequate         Labs:     Recent Results (from the past 24 hour(s))   Glucose by meter    Collection Time: 02/24/21 10:21 PM   Result Value Ref Range    Glucose 98 70 - 99 mg/dL         DX Benzodiazepine use disorder severe  Benzodiazepine withdrawal severe  Opiate use disorder on methadonemaintenance  Major depressive disorder severe recurrent without psychosis on Prozac     PLAN  Patient will be detox of benzos using phenobarbital 60 mg , will reduce it three times a day   Today she does not seem to have nystagmus or " ataxia  We will continue to monitor and hold if patient is feeling sedated hold parameters placed      MSSA    Eating Disturbances: ate and enjoyed all of it or not applicable  Tremor: 0 - no tremor  Sleep Disturbance: slept through the night or not applicable  Clouding of Sensorium: no evidence  Hallucinations: 0 - none  Quality of Contact: 0 - awareness of examiner and people around him/her  Agitation: 0 - normal activity  Paroxysmal Sweats: 0 - no observed sweating  Temperature: 99.5 or below  Pulse: 1 - 70 to 79  Total MSSA Score: 2      Methadone maintenance rate continued we will monitor for sedation being on phenobarbital  For depression she will be continued on Prozac    Patient has night terrors but would not want Minipress for it  Laboratory/Imaging: reviewed with patient   Consults: internal medicine consult reviewed  Patient will be treated in therapeutic milieu with appropriate individual and group therapies as described.  PDMP CHECKED     Supportive psychotheraoy provided, nicko talked about recovery enviroment, relapse prevention, triggers to use.  Discussed with patient many issues of addiction,triggers, relapse, and establishing a solid recovery program.  Asked pt to be med complinat   Medical diagnoses to be addressed this admission:    Plan:       Assessment and Recommendations:   Jihan Gill is a 45 year old year old female with a history of major depressive disorder, bipolar I disorder, opiate use disorder, benzodiazapine use disorder, arthritis, and chronic hepatitis C who was admitted on 2/22/21 for benzodiazapine detoxification.     #Transaminitis  Jihan was diagnosed with hepatitis C in 04/25/2019. She admits to not following up with her diagnosis and has yet to receive treatment. She is interested in being treated with anti viral therapy, following a GI provider in outpatient, starting treatment soon.  Liver enzymes today are , , Alk Phos 163, which are consistent  with previous LFTs since her 2019 diagnosis. Denies EtOH use which is supported by negative serum EtOH. Has immunity to hepatitis B and previously immunized for Hep A. Currently denies any abdominal pain. No jaundice, icterus, abdominal tenderness, hepatosplenomegaly, abnormal bruises, on exam.  - Encourage follow-up with GI and initiation of anti viral therapy and repeat LFTs in 2 weeks  - HIV antigen antibody combo pending     #Vaginal discharge  Jihan reports thick white discharge and itching after recent course of abx. + history of vaginal candidiasis.   - Miconazole 200mg vaginal suppository qhs x 3 days  - Follow-up with PCP one week after discharge if no improvement     #Amenorrhea   Reports last menstrual period was one year ago. Urine HCG negative today.  - Recommend following up with PCP     #Diabetes mellitus type 2, diet controlled   Jihan reports she was previously told she might have diabetes. Hgb A1C from 12/10/20 at [facility] reported to be 6.5%. She has not followed up on this lab. Serum glucose 116 this morning.   - Repeat Hgb A1C pending. If still elevated, consider starting metformin here or with PCP   - POCT glucose BID  - Recommend follow-up with PCP     #Benzodiazepine use disorder, withdrawal  - Management per psychiatry      #Methadone maintenance  - Management per psychiatry     #Major depressive disorder  - Management per psychiatry     Medicine will follow up on HIV, Hemoglobin A1c, and TSH.  No further recommendations at this time. Please page the on-call ELOINA for any intercurrent medical issues which arise.          Legal Status: voluntary    Safety Assessment:   Checks:  15 min  Precautions: withdrawal precautions  Pt has not required locked seclusion or restraints in the past 24 hours to maintain safety, please refer to RN documentation for further details.  Discussed with patient many issues of addiction,triggers, relapse, and establishing a solid recovery  program.  Able to give informed consent:  YES   Discussed Risks/Benefits/Side Effects/Alternatives: YES    After discussion of the indications, risks, benefits, alternatives and consequences of no treatment, the patient elects to do treatment

## 2021-02-25 NOTE — PLAN OF CARE
"Pt was visible and active in the milieu throughout the evening. Pt attended AA group this evening. Affect is full range. Pt denies SI/SIB     MSSA scores this evening: 3 & 1.     BP was 88/62 this evening. Phenobarbital was held per parameters. On-call provider was paged re: pts other scheduled medication. Provider stated ok to give scheduled lithium and ordered onetime dose of gabapentin 800mg in place of scheduled gabapentin 1,600mg.      Pt states that her BP typically runs low. Pt stated that a systolic reading in the 80's is \"normal\" for her. Pt denies symptoms of hypotension.     Continue to monitor MSSA per protocol.   Continue to monitor signs of sedation.           "

## 2021-02-25 NOTE — PROGRESS NOTES
Pt up reporting that she was unable to sleep, requesting held HS medications. Gave HS Phenobarbital as BP WNL

## 2021-02-26 LAB — GLUCOSE BLDC GLUCOMTR-MCNC: 72 MG/DL (ref 70–99)

## 2021-02-26 PROCEDURE — 99231 SBSQ HOSP IP/OBS SF/LOW 25: CPT | Performed by: PSYCHIATRY & NEUROLOGY

## 2021-02-26 PROCEDURE — 999N001017 HC STATISTIC GLUCOSE BY METER IP

## 2021-02-26 PROCEDURE — 250N000013 HC RX MED GY IP 250 OP 250 PS 637: Performed by: PSYCHIATRY & NEUROLOGY

## 2021-02-26 PROCEDURE — 128N000004 HC R&B CD ADULT

## 2021-02-26 RX ORDER — PHENOBARBITAL 32.4 MG/1
32.4 TABLET ORAL ONCE
Status: COMPLETED | OUTPATIENT
Start: 2021-03-01 | End: 2021-03-01

## 2021-02-26 RX ORDER — PHENOBARBITAL 64.8 MG/1
64.8 TABLET ORAL EVERY MORNING
Status: DISCONTINUED | OUTPATIENT
Start: 2021-02-28 | End: 2021-02-27

## 2021-02-26 RX ORDER — PHENOBARBITAL 32.4 MG/1
32.4 TABLET ORAL AT BEDTIME
Qty: 9 TABLET | Refills: 0 | Status: SHIPPED | OUTPATIENT
Start: 2021-02-26 | End: 2021-03-01

## 2021-02-26 RX ADMIN — Medication 250 MG: at 08:17

## 2021-02-26 RX ADMIN — LITHIUM CARBONATE 300 MG: 300 TABLET, EXTENDED RELEASE ORAL at 21:09

## 2021-02-26 RX ADMIN — METHADONE HYDROCHLORIDE 140 MG: 5 SOLUTION ORAL at 08:17

## 2021-02-26 RX ADMIN — GABAPENTIN 1600 MG: 400 CAPSULE ORAL at 21:09

## 2021-02-26 RX ADMIN — OMEPRAZOLE 20 MG: 20 CAPSULE, DELAYED RELEASE ORAL at 08:17

## 2021-02-26 RX ADMIN — PHENOBARBITAL 64.8 MG: 64.8 TABLET ORAL at 16:11

## 2021-02-26 RX ADMIN — PHENOBARBITAL 64.8 MG: 64.8 TABLET ORAL at 21:09

## 2021-02-26 RX ADMIN — PHENOBARBITAL 64.8 MG: 64.8 TABLET ORAL at 08:17

## 2021-02-26 RX ADMIN — LITHIUM CARBONATE 300 MG: 300 TABLET, EXTENDED RELEASE ORAL at 08:17

## 2021-02-26 RX ADMIN — GABAPENTIN 800 MG: 400 CAPSULE ORAL at 08:16

## 2021-02-26 RX ADMIN — GABAPENTIN 800 MG: 400 CAPSULE ORAL at 16:11

## 2021-02-26 RX ADMIN — FLUOXETINE 40 MG: 20 CAPSULE ORAL at 08:17

## 2021-02-26 ASSESSMENT — ACTIVITIES OF DAILY LIVING (ADL)
HYGIENE/GROOMING: HANDWASHING;INDEPENDENT
DRESS: INDEPENDENT
DRESS: INDEPENDENT
HYGIENE/GROOMING: INDEPENDENT
ORAL_HYGIENE: INDEPENDENT
ORAL_HYGIENE: INDEPENDENT

## 2021-02-26 NOTE — PROGRESS NOTES
02/26/21 0611   Sleep/Rest/Relaxation   Sleep/Rest/Relaxation appears asleep   Night Time # Hours 6.5 hours

## 2021-02-26 NOTE — PROGRESS NOTES
"Call placed to [facility] restricted recipient line #[phone], \"Whitney,\") She is not in until 3/1/2021.  I left a message on the \"immediate assistance line\" at #[phone] and have not received a call back.  Discharge pharmacy attempted to run RX  for discharge X 2 and patient is still restricted at this time.   "

## 2021-02-26 NOTE — PLAN OF CARE
Pt was visible and active in the milieu throughout the evening, spent down time reading in her room. Pt attended groups this evening. Pt denies SI/SIB.    MSSA scores this evenin & 3.    Pt states she feels more awake then the previous day. Writer did not observe any signs of sedation this evening.     Continue to monitor MSSA per protocol.
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
## 2021-02-26 NOTE — PLAN OF CARE
Problem: General Rehab Plan of Care  Goal: Therapeutic Recreation/Music Therapy Goal  Description: The patient and/or their representative will achieve their patient-specific goals related to the plan of care.  The patient-specific goals include: emotional expression  Outcome: No Change   Art Therapy directive is to create an image of calm mind vs anxious mind using a head profile template/handout and using lines, shapes and colors. Goals of directive: emotional expression, emotional regulation, mindfulness, trauma containment, media exploration. Pt was observed at times closing her eyes while drawing, appeared tired. Pts speech also was slurred at times and pt was distractible. Pt finished one drawing and briefly shared with group. Pt did not seem to grasp the directive of group and rather then expressing calm mind vs anxious mind pt chose to create a character from a children's book series. While sharing pt made some odd comments, saying that the head profile templates reminded her of Jessica Dunaway.

## 2021-02-26 NOTE — PROGRESS NOTES
[facility], [place]   Psychiatric Progress Note        Interim history   This is a 45 year old female with Benzodiazepine use disorder severe  Benzodiazepine withdrawal severe  Opiate use disorder on methadone maintenance  Major depressive disorder severe recurrent without psychosis on Prozac.Pt seen in rounds.   The patient's care was discussed with the treatment team during the daily team meeting and/or staff's chart notes were reviewed.  Staff report patient has been visible in the milieu,  no acute eventsovernight.     Patient's mood is good    Energy Level:MODERATE  Sleep:No concerns, sleeps well through night  Appetite:fair motivation interest improving  Denied suicidal/homicidal ideation/plan intent.  Denied psychosis  No prior suicde attempts  No access to gun    Tolerating meds and has no side effects.              Medications:     Current Facility-Administered Medications   Medication     acetaminophen (TYLENOL) tablet 650 mg     albuterol (PROAIR HFA/PROVENTIL HFA/VENTOLIN HFA) 108 (90 Base) MCG/ACT inhaler 1-2 puff     FLUoxetine (PROzac) capsule 40 mg     gabapentin (NEURONTIN) capsule 1,600 mg     gabapentin (NEURONTIN) capsule 800 mg     lithium ER (LITHOBID) CR tablet 300 mg     melatonin tablet 3 mg     methadone (DOLPHINE) solution 140 mg     naloxone (NARCAN) injection 0.2 mg    Or     naloxone (NARCAN) injection 0.4 mg    Or     naloxone (NARCAN) injection 0.2 mg    Or     naloxone (NARCAN) injection 0.4 mg     nicotine polacrilex (NICORETTE) gum 4 mg     omeprazole (priLOSEC) CR capsule 20 mg     PHENobarbital (LUMINAL) tablet 64.8 mg     polyethylene glycol (MIRALAX) Packet 17 g     saccharomyces boulardii (FLORASTOR) capsule             Allergies:     Allergies   Allergen Reactions     Abilify [Aripiprazole] Other (See Comments)     Tardive dyskinesia     Allopurinol      Compazine Other (See Comments)     Dystonia     Dramamine      Jaw lock.       Olanzapine  "Other (See Comments)     Tardive dyskinesia from Zyprexa - per patient     Reglan Other (See Comments)     dystonia     Seroquel [Quetiapine] Other (See Comments)     Tardive dyskinesia.             Psychiatric Examination:   Blood pressure 106/74, pulse 65, temperature 97.7  F (36.5  C), temperature source Temporal, resp. rate 16, height 1.651 m (5' 5\"), weight 114.3 kg (252 lb), last menstrual period 02/22/2019, SpO2 100 %, not currently breastfeeding.  Weight is 252 lbs 0 oz  Body mass index is 41.93 kg/m .    Appearance:  awake, alert and adequately groomed  Attitude:  cooperative  Eye Contact:  good  Mood:  positive  Affect:  appropriate and in normal range and mood congruent  Speech:  clear, coherent rate /rhythm are normal in rate rhythm volume  Psychomotor Behavior:  no evidence of tardive dyskinesia, dystonia, or tics and intact station, gait and muscle tone  Throught Process:  logical  Associations:  no loose associations  Thought Content:  no evidence of suicidal ideation or homicidal ideation, no evidence of psychotic thought, no auditory hallucinations present and no visual hallucinations present  Insight:  fair  Judgement:  intact  Oriented to:  time, person, and place  Attention Span and Concentration:  intact  Recent and Remote Memory:  intact  Language fund of knowledge are adequate         Labs:     Recent Results (from the past 24 hour(s))   Glucose by meter    Collection Time: 02/25/21 10:26 PM   Result Value Ref Range    Glucose 154 (H) 70 - 99 mg/dL         DX Benzodiazepine use disorder severe  Benzodiazepine withdrawal severe  Opiate use disorder on methadone maintenance  Major depressive disorder severe recurrent without psychosis on Prozac     PLAN  Patient will be detox of benzos using phenobarbital 60 mg , will reduce it three times a day   Today she does not seem to have nystagmus or ataxia  We will continue to monitor and hold if patient is feeling sedated hold parameters placed    Will " start taper on Sunday   MSSA    Eating Disturbances: ate and enjoyed all of it or not applicable  Tremor: 0 - no tremor  Sleep Disturbance: slept through the night or not applicable  Clouding of Sensorium: no evidence  Hallucinations: 0 - none  Quality of Contact: 0 - awareness of examiner and people around him/her  Agitation: 0 - normal activity  Paroxysmal Sweats: 0 - no observed sweating  Temperature: 99.5 or below  Pulse: 0 - 69 or below  Total MSSA Score: 1      Methadone maintenance rate continued we will monitor for sedation being on phenobarbital  For depression she will be continued on Prozac    Patient has night terrors but would not want Minipress for it  Laboratory/Imaging: reviewed with patient   Consults: internal medicine consult reviewed  Patient will be treated in therapeutic milieu with appropriate individual and group therapies as described.  PDMP CHECKED     Supportive psychotheraoy provided, nicko talked about recovery enviroment, relapse prevention, triggers to use.  Discussed with patient many issues of addiction,triggers, relapse, and establishing a solid recovery program.  Asked pt to be med complinat   Medical diagnoses to be addressed this admission:    Plan:       Assessment and Recommendations:   Jihan Gill is a 45 year old year old female with a history of major depressive disorder, bipolar I disorder, opiate use disorder, benzodiazapine use disorder, arthritis, and chronic hepatitis C who was admitted on 2/22/21 for benzodiazapine detoxification.     #Transaminitis  Jihan was diagnosed with hepatitis C in 04/25/2019. She admits to not following up with her diagnosis and has yet to receive treatment. She is interested in being treated with anti viral therapy, following a GI provider in outpatient, starting treatment soon.  Liver enzymes today are , , Alk Phos 163, which are consistent with previous LFTs since her 2019 diagnosis. Denies EtOH use which is supported  by negative serum EtOH. Has immunity to hepatitis B and previously immunized for Hep A. Currently denies any abdominal pain. No jaundice, icterus, abdominal tenderness, hepatosplenomegaly, abnormal bruises, on exam.  - Encourage follow-up with GI and initiation of anti viral therapy and repeat LFTs in 2 weeks  - HIV antigen antibody combo pending     #Vaginal discharge  Jihan reports thick white discharge and itching after recent course of abx. + history of vaginal candidiasis.   - Miconazole 200mg vaginal suppository qhs x 3 days  - Follow-up with PCP one week after discharge if no improvement     #Amenorrhea   Reports last menstrual period was one year ago. Urine HCG negative today.  - Recommend following up with PCP     #Diabetes mellitus type 2, diet controlled   Jihan reports she was previously told she might have diabetes. Hgb A1C from 12/10/20 at [facility] reported to be 6.5%. She has not followed up on this lab. Serum glucose 116 this morning.   - Repeat Hgb A1C pending. If still elevated, consider starting metformin here or with PCP   - POCT glucose BID  - Recommend follow-up with PCP     #Benzodiazepine use disorder, withdrawal  - Management per psychiatry      #Methadone maintenance  - Management per psychiatry     #Major depressive disorder  - Management per psychiatry     Medicine will follow up on HIV, Hemoglobin A1c, and TSH.  No further recommendations at this time. Please page the on-call ELOINA for any intercurrent medical issues which arise.          Legal Status: voluntary    Safety Assessment:   Checks:  15 min  Precautions: withdrawal precautions  Pt has not required locked seclusion or restraints in the past 24 hours to maintain safety, please refer to RN documentation for further details.  Discussed with patient many issues of addiction,triggers, relapse, and establishing a solid recovery program.  Able to give informed consent:  YES   Discussed Risks/Benefits/Side  Effects/Alternatives: YES    After discussion of the indications, risks, benefits, alternatives and consequences of no treatment, the patient elects to do treatment

## 2021-02-27 LAB
GLUCOSE BLDC GLUCOMTR-MCNC: 150 MG/DL (ref 70–99)
GLUCOSE BLDC GLUCOMTR-MCNC: 85 MG/DL (ref 70–99)

## 2021-02-27 PROCEDURE — 250N000013 HC RX MED GY IP 250 OP 250 PS 637: Performed by: PSYCHIATRY & NEUROLOGY

## 2021-02-27 PROCEDURE — 128N000004 HC R&B CD ADULT

## 2021-02-27 PROCEDURE — 999N001017 HC STATISTIC GLUCOSE BY METER IP

## 2021-02-27 RX ORDER — PHENOBARBITAL 64.8 MG/1
64.8 TABLET ORAL EVERY MORNING
Status: COMPLETED | OUTPATIENT
Start: 2021-02-28 | End: 2021-02-28

## 2021-02-27 RX ADMIN — FLUOXETINE 40 MG: 20 CAPSULE ORAL at 07:54

## 2021-02-27 RX ADMIN — Medication 250 MG: at 07:54

## 2021-02-27 RX ADMIN — PHENOBARBITAL 64.8 MG: 64.8 TABLET ORAL at 14:15

## 2021-02-27 RX ADMIN — PHENOBARBITAL 64.8 MG: 64.8 TABLET ORAL at 07:55

## 2021-02-27 RX ADMIN — GABAPENTIN 1600 MG: 400 CAPSULE ORAL at 19:06

## 2021-02-27 RX ADMIN — METHADONE HYDROCHLORIDE 140 MG: 5 SOLUTION ORAL at 07:55

## 2021-02-27 RX ADMIN — PHENOBARBITAL 64.8 MG: 64.8 TABLET ORAL at 19:06

## 2021-02-27 RX ADMIN — LITHIUM CARBONATE 300 MG: 300 TABLET, EXTENDED RELEASE ORAL at 07:54

## 2021-02-27 RX ADMIN — ALBUTEROL SULFATE 2 PUFF: 90 AEROSOL, METERED RESPIRATORY (INHALATION) at 06:36

## 2021-02-27 RX ADMIN — OMEPRAZOLE 20 MG: 20 CAPSULE, DELAYED RELEASE ORAL at 07:54

## 2021-02-27 RX ADMIN — GABAPENTIN 800 MG: 400 CAPSULE ORAL at 14:15

## 2021-02-27 RX ADMIN — LITHIUM CARBONATE 300 MG: 300 TABLET, EXTENDED RELEASE ORAL at 19:06

## 2021-02-27 RX ADMIN — GABAPENTIN 800 MG: 400 CAPSULE ORAL at 07:54

## 2021-02-27 ASSESSMENT — ACTIVITIES OF DAILY LIVING (ADL)
HYGIENE/GROOMING: HANDWASHING;INDEPENDENT
ORAL_HYGIENE: INDEPENDENT
ORAL_HYGIENE: INDEPENDENT
HYGIENE/GROOMING: INDEPENDENT
DRESS: STREET CLOTHES;INDEPENDENT
DRESS: INDEPENDENT

## 2021-02-27 NOTE — PLAN OF CARE
Problem: Substance Withdrawal  Goal: Substance Withdrawal  Description: Signs and symptoms of listed problems will be absent or manageable.  Outcome: Improving     Problem: Substance Withdrawal  Goal: Social and Therapeutic (Substance Withdrawal)  Description: Signs and symptoms of listed problems will be absent or manageable.  Outcome: Completed     S: Pt admitted for Benzodiazepine withdrawal, on phenobarbital and will start taper tomorrow.    A: She reports she slept well, appetite is good, denies depression or anxiety. She is social with peers and denies any specific needs or concerns. I left a second message for [facility] regarding need to left restriction for medications as they have no called back.  R.  She was encouraged to work on discharge worksheets. Will continue to monitor and assist with discharge planning.

## 2021-02-27 NOTE — PROGRESS NOTES
Pt reported feeling weird and requested BG check it.  It was 150 mg/dl, post lunch. She appears lethargic, VSS and was she was encouraged to lay down.

## 2021-02-27 NOTE — PROVIDER NOTIFICATION
02/27/21 0612   Sleep/Rest/Relaxation   Sleep/Rest/Relaxation appears asleep   Night Time # Hours 6.5 hours     No concerns voiced.    BG was 85.

## 2021-02-27 NOTE — PROGRESS NOTES
Pt participating in station activities, medication compliant and cooperative.  She is alert and oriented, no SI, no SIB.  She is eating well and drinking fluids.  She offers no complaints.  Will continue to monitor

## 2021-02-28 PROCEDURE — 128N000004 HC R&B CD ADULT

## 2021-02-28 PROCEDURE — H2032 ACTIVITY THERAPY, PER 15 MIN: HCPCS

## 2021-02-28 PROCEDURE — 250N000013 HC RX MED GY IP 250 OP 250 PS 637: Performed by: PSYCHIATRY & NEUROLOGY

## 2021-02-28 RX ADMIN — FLUOXETINE 40 MG: 20 CAPSULE ORAL at 07:53

## 2021-02-28 RX ADMIN — GABAPENTIN 800 MG: 400 CAPSULE ORAL at 14:05

## 2021-02-28 RX ADMIN — PHENOBARBITAL 97.2 MG: 64.8 TABLET ORAL at 20:23

## 2021-02-28 RX ADMIN — GABAPENTIN 1600 MG: 400 CAPSULE ORAL at 20:24

## 2021-02-28 RX ADMIN — OMEPRAZOLE 20 MG: 20 CAPSULE, DELAYED RELEASE ORAL at 07:53

## 2021-02-28 RX ADMIN — PHENOBARBITAL 64.8 MG: 64.8 TABLET ORAL at 07:53

## 2021-02-28 RX ADMIN — Medication 250 MG: at 07:53

## 2021-02-28 RX ADMIN — LITHIUM CARBONATE 300 MG: 300 TABLET, EXTENDED RELEASE ORAL at 07:53

## 2021-02-28 RX ADMIN — GABAPENTIN 800 MG: 400 CAPSULE ORAL at 07:53

## 2021-02-28 RX ADMIN — LITHIUM CARBONATE 300 MG: 300 TABLET, EXTENDED RELEASE ORAL at 20:25

## 2021-02-28 RX ADMIN — METHADONE HYDROCHLORIDE 140 MG: 5 SOLUTION ORAL at 07:53

## 2021-02-28 NOTE — PROGRESS NOTES
Pt awake and alert for most of evening.  Choosing to spend more time in her room reading.  She is eating well, drinking fluids, has no complaints.  Continues on phenobarbital for withdrawal.  Pt intends to return home upon discharge and attend DBT therapy as an outpatient.

## 2021-02-28 NOTE — PROGRESS NOTES
Pt started on phenobarbital taper today, she continue to appear groggy but denies this.  Vital signs have been stable, appetite is good, report she slept well and has no needs or concerns.

## 2021-03-01 LAB — LITHIUM SERPL-SCNC: 0.43 MMOL/L (ref 0.6–1.2)

## 2021-03-01 PROCEDURE — 99232 SBSQ HOSP IP/OBS MODERATE 35: CPT | Performed by: PSYCHIATRY & NEUROLOGY

## 2021-03-01 PROCEDURE — 80178 ASSAY OF LITHIUM: CPT | Performed by: PSYCHIATRY & NEUROLOGY

## 2021-03-01 PROCEDURE — 128N000004 HC R&B CD ADULT

## 2021-03-01 PROCEDURE — 250N000013 HC RX MED GY IP 250 OP 250 PS 637: Performed by: PSYCHIATRY & NEUROLOGY

## 2021-03-01 PROCEDURE — 90837 PSYTX W PT 60 MINUTES: CPT

## 2021-03-01 PROCEDURE — 36415 COLL VENOUS BLD VENIPUNCTURE: CPT | Performed by: PSYCHIATRY & NEUROLOGY

## 2021-03-01 RX ORDER — PHENOBARBITAL 32.4 MG/1
32.4 TABLET ORAL AT BEDTIME
Qty: 9 TABLET | Refills: 0 | Status: SHIPPED | OUTPATIENT
Start: 2021-03-01 | End: 2021-03-02

## 2021-03-01 RX ORDER — PHENOBARBITAL 32.4 MG/1
32.4 TABLET ORAL ONCE
Status: COMPLETED | OUTPATIENT
Start: 2021-03-02 | End: 2021-03-02

## 2021-03-01 RX ORDER — PHENOBARBITAL 32.4 MG/1
32.4 TABLET ORAL AT BEDTIME
Qty: 9 TABLET | Refills: 0 | Status: SHIPPED | OUTPATIENT
Start: 2021-03-01 | End: 2021-03-01

## 2021-03-01 RX ADMIN — LITHIUM CARBONATE 300 MG: 300 TABLET, EXTENDED RELEASE ORAL at 07:57

## 2021-03-01 RX ADMIN — Medication 250 MG: at 07:57

## 2021-03-01 RX ADMIN — OMEPRAZOLE 20 MG: 20 CAPSULE, DELAYED RELEASE ORAL at 07:57

## 2021-03-01 RX ADMIN — GABAPENTIN 800 MG: 400 CAPSULE ORAL at 07:57

## 2021-03-01 RX ADMIN — FLUOXETINE 40 MG: 20 CAPSULE ORAL at 07:57

## 2021-03-01 RX ADMIN — LITHIUM CARBONATE 300 MG: 300 TABLET, EXTENDED RELEASE ORAL at 19:52

## 2021-03-01 RX ADMIN — NICOTINE POLACRILEX 4 MG: 4 GUM, CHEWING BUCCAL at 19:57

## 2021-03-01 RX ADMIN — PHENOBARBITAL 32.4 MG: 32.4 TABLET ORAL at 07:57

## 2021-03-01 RX ADMIN — GABAPENTIN 800 MG: 400 CAPSULE ORAL at 14:53

## 2021-03-01 RX ADMIN — METHADONE HYDROCHLORIDE 140 MG: 5 SOLUTION ORAL at 07:57

## 2021-03-01 RX ADMIN — PHENOBARBITAL 97.2 MG: 64.8 TABLET ORAL at 19:51

## 2021-03-01 RX ADMIN — GABAPENTIN 1600 MG: 400 CAPSULE ORAL at 19:52

## 2021-03-01 ASSESSMENT — ACTIVITIES OF DAILY LIVING (ADL)
DRESS: STREET CLOTHES;INDEPENDENT
ORAL_HYGIENE: INDEPENDENT
HYGIENE/GROOMING: HANDWASHING;INDEPENDENT

## 2021-03-01 NOTE — PROVIDER NOTIFICATION
Informed Dr. Sol regarding phenobarbital refill issues and call from [facility].  Discharge is pending on obtaining discharge medications.

## 2021-03-01 NOTE — PROVIDER NOTIFICATION
"Received a call from [facility] \"Riddhi\" restricted recipient line.  She indicated that she can not lift the restriction to fill medications \"We never do that it is illegal.\" She stated that we needed to call another  person that can \"do a referral and ask the patient primary care doctor to order the medication.\" I explained this will delay her discharge as patient needs this script for discharge. She indicated that this is the process.  I called #[phone] and they will work on getting a referral from \"[facility] provider,\" to fill the script but sounded like it is unlikely it will happen today. Will inform Dr. Sol.   "

## 2021-03-01 NOTE — PROGRESS NOTES
03/01/21 0615   Sleep/Rest/Relaxation   Sleep/Rest/Relaxation appears asleep   Night Time # Hours 5.75 hours     Woke up early; witnessed reading in her room. No concerns.

## 2021-03-01 NOTE — PROGRESS NOTES
"Case Management Note  3/1/2021    Spoke with pt's counselor Baislio ([phone]) at [employer]. They are very concerned about patient. Discussed why commitment is not being considered at this time. Basilio explained she and pt had agreed pt would enter detox then go to residential. Explained pt had reported her plan was to go home on the first day of her admission 7 days ago. Basilio and Basilio' supervisor reported pt is going to be placed on a medically supervised withdrawal and discharged from their program due to repeated noncompliance and concern about pt's continued benzodiazepine abuse with methadone becoming a safety risk.     Writer informed pt of this. Pt initially reported she would just find another methadone clinic. Pt then later requested to speak with writer. Pt was tearful. Pt reported she was not clear-headed when she first came in here, and pt \"knows\" that going home is a bad idea. Pt wants to do residential. Explained to pt given how far she is into her admission there is no guarantee of finding a bed. Pt shown paperwork to complete for comp assessment.     Klaudia Holley, LPCC, LADC          "

## 2021-03-01 NOTE — PLAN OF CARE
Problem: Adult Inpatient Plan of Care  Goal: Plan of Care Review  Outcome: Adequate for Discharge     Behavioral  Pt oriented x 4; Pt social with staff and peers; pt compliant with medications and cares; Pt eating and drinking well; Pt pleasant and cooperative upon approach;  Pt denied SI, SIB, HI, and hallucinations; Pt became upset about not being able to discharge today; pt endorsed frustration with the delay.     Medical  Vital signs stable;  Pt on phenabarbitol taper; phenobarbital 97.2 mg given; will receive 32.4 tomorrow;  MSSA 1 and 1    Plan  Possible discharge Wednesday -earlier if script can be filled.

## 2021-03-01 NOTE — PLAN OF CARE
Pt more awake and alert this evening.  She is eating socially, watching TV and talking with peers and staff.  She is eating well and drinking fluids without difficulty.  Pt offers few complaints.  She is hoping to return home tomorrow and to follow up with DBT therapy as an outpatient with [facility] and continue methadone maintenance through [facility].  Will continue to gather information and establish firm plans for pt to follow at discharge.

## 2021-03-01 NOTE — DISCHARGE SUMMARY
Jihan Gill MRN# [id number]   Age: 45 year old YOB: 1975     Date of Admission:  2/22/2021  Date of Discharge:  3/1/2021  Admitting Physician:  Bahman Sol MD  Discharge Physician:  Bahman Sol MD      DISCHARGE  DX    Benzodiazepine use disorder severe    Opiate use disorder on methadone maintence  Major depressive disorder severe recurrent without psychosis          Event Leading to Hospitalization:     See Admission note by admitting provider for patient encounter. for additional details.          Hospital Course:   PATIENT was admitted to Station 3Awith attending  under DR sol, please review the detailed admit note on 2/22/21   The patient was placed under status 15 (15 minute checks) to ensure patient safety.   Patient was detoxed off benzodiazepines using phenobarbital.  She started taper she was tolerating the taper  She is continued on methadone maintenance    All outpatient medications were continued    PATIENTdid participate in groups and was visible in the milieu.     The patient's symptoms of  improved.     Patients energy motivation , sleep appetite improved.  Pt completed detox . It was un eventful.      Discussed with patient medications for craving.  Spoke with patient about triggers coping skills relapse prevention.    CONSULTS DONE DURING PATIENTS HOSPITALIZATION.  Patient was seen by medicine on date 2/22/21    This as per their medical consult            Assessment and Recommendations:   Jihan Gill is a 45 year old year old female with a history of major depressive disorder, bipolar I disorder, opiate use disorder, benzodiazapine use disorder, arthritis, and chronic hepatitis C who was admitted on 2/22/21 for benzodiazapine detoxification.     #Transaminitis  Jihan was diagnosed with hepatitis C in 04/25/2019. She admits to not following up with her diagnosis and has yet to receive treatment. She is interested in being treated with anti  viral therapy, following a GI provider in outpatient, starting treatment soon.  Liver enzymes today are , , Alk Phos 163, which are consistent with previous LFTs since her 2019 diagnosis. Denies EtOH use which is supported by negative serum EtOH. Has immunity to hepatitis B and previously immunized for Hep A. Currently denies any abdominal pain. No jaundice, icterus, abdominal tenderness, hepatosplenomegaly, abnormal bruises, on exam.  - Encourage follow-up with GI and initiation of anti viral therapy and repeat LFTs in 2 weeks  - HIV antigen antibody combo pending     #Vaginal discharge  Jihan reports thick white discharge and itching after recent course of abx. + history of vaginal candidiasis.   - Miconazole 200mg vaginal suppository qhs x 3 days  - Follow-up with PCP one week after discharge if no improvement     #Amenorrhea   Reports last menstrual period was one year ago. Urine HCG negative today.  - Recommend following up with PCP     #Diabetes mellitus type 2, diet controlled   Jihan reports she was previously told she might have diabetes. Hgb A1C from 12/10/20 at [facility] reported to be 6.5%. She has not followed up on this lab. Serum glucose 116 this morning.   - Repeat Hgb A1C pending. If still elevated, consider starting metformin here or with PCP   - POCT glucose BID  - Recommend follow-up with PCP     #Benzodiazepine use disorder, withdrawal  - Management per psychiatry      #Methadone maintenance  - Management per psychiatry     #Major depressive disorder  - Management per psychiatry     Medicine will follow up on HIV, Hemoglobin A1c, and TSH.  No further recommendations at this time. Please page the on-call ELOINA for any intercurrent medical issues which arise.                 Pt was seen by cm  As per recommendations from cm    Met with pt to discuss discharge planning. Pt reports an interest in some form of treatment. Pt reports multiple prior treatments both  residential and OP. Pt reports she lives with her mom in [place], and this is a safe place overall. Pt reports she tends to do better in OP treatments as it is a slower pace, she can process things more, and she is less distracted by social relationships. Pt prefers in-person. Talked with pt about dual disorder DBT. Pt reports she was in a DBT based program as a teenager. Pt reports some of her best success in sobriety is when she uses DBT skills, and would like to work on this more.      Will meet with pt later today to start referral to [facility] integrated DBT which has in-person DBT.      Addendum: Called [facility] to schedule intake DBT appointment. Scheduled for:   [facility]- Integrated Dual Disorder DBT Program  Appointment date/time: Wednesday, 3/3/21 @ 9 AM in-person  Provider/Clinic: Eleuterio @ [facility]  Address: [facility] [address], [address], [place] [address]  Phone: [phone]  Fax: [phone]      Pt has a  with [facility]: Fer Cisneros, [phone]. Fer reports concern about pt returning home, reporting pt has very poor follow-through and has been in the hospital repeatedly recently (this is in chart review.) Fer however reports pt has a long history of leaving residential programs AMA or absconding, so not sure if it would be beneficial to send pt to a residential if she is not voluntarily going. Fer reports Felipe had been considering civil commitment for pt after last hospitalization from 2/3-2/8. Based on records, Felipe had discharged pt to [facility]- [facility] for this detox are not in [facility]. Per [facility], pt has been committed 4 times over the course of her life, most recently 09/2019. Fer reports they did not have it in their records that pt has been officially diagnosed with borderline personality disorder, but based on chart review this diagnosis goes back to at least 2011, with several doctors listing borderline personality disorder as one of pt's diagnoses. Pt  historically struggles with follow-through and accountability. Talked with pt about DBT-based [facility]. Pt declined a referral to this higher level of care at this time. Open to it in the future if she returns and is unsuccessful with OP DBT. Pt is interested in pursuing sober housing possibly after detox, given sober housing list. AVS updated.             Labs:reviewed with patient       Recent Results (from the past 48 hour(s))   Glucose by meter    Collection Time: 02/27/21  1:02 PM   Result Value Ref Range    Glucose 150 (H) 70 - 99 mg/dL   Lithium level    Collection Time: 03/01/21  7:17 AM   Result Value Ref Range    Lithium Level 0.43 (L) 0.60 - 1.20 mmol/L         Recent Results (from the past 240 hour(s))   Drug abuse screen 6 urine (tox)    Collection Time: 02/22/21  8:32 AM   Result Value Ref Range    Amphetamine Qual Urine Negative NEG^Negative    Barbiturates Qual Urine Negative NEG^Negative    Benzodiazepine Qual Urine Positive (A) NEG^Negative    Cannabinoids Qual Urine Negative NEG^Negative    Cocaine Qual Urine Negative NEG^Negative    Ethanol Qual Urine Negative NEG^Negative    Opiates Qualitative Urine Negative NEG^Negative   HCG qualitative urine    Collection Time: 02/22/21  8:32 AM   Result Value Ref Range    HCG Qual Urine Negative NEG^Negative   Asymptomatic SARS-CoV-2 COVID-19 Virus (Coronavirus) by PCR    Collection Time: 02/22/21  8:58 AM    Specimen: Nasopharyngeal   Result Value Ref Range    SARS-CoV-2 Virus Specimen Source Nasopharyngeal     SARS-CoV-2 PCR Result NEGATIVE     SARS-CoV-2 PCR Comment (Note)    CBC with platelets differential    Collection Time: 02/22/21  9:26 AM   Result Value Ref Range    WBC 8.2 4.0 - 11.0 10e9/L    RBC Count 4.29 3.8 - 5.2 10e12/L    Hemoglobin 13.3 11.7 - 15.7 g/dL    Hematocrit 40.6 35.0 - 47.0 %    MCV 95 78 - 100 fl    MCH 31.0 26.5 - 33.0 pg    MCHC 32.8 31.5 - 36.5 g/dL    RDW 13.0 10.0 - 15.0 %    Platelet Count 194 150 - 450 10e9/L     Diff Method Automated Method     % Neutrophils 37.5 %    % Lymphocytes 52.3 %    % Monocytes 7.2 %    % Eosinophils 2.4 %    % Basophils 0.4 %    % Immature Granulocytes 0.2 %    Nucleated RBCs 0 0 /100    Absolute Neutrophil 3.1 1.6 - 8.3 10e9/L    Absolute Lymphocytes 4.3 0.8 - 5.3 10e9/L    Absolute Monocytes 0.6 0.0 - 1.3 10e9/L    Absolute Eosinophils 0.2 0.0 - 0.7 10e9/L    Absolute Basophils 0.0 0.0 - 0.2 10e9/L    Abs Immature Granulocytes 0.0 0 - 0.4 10e9/L    Absolute Nucleated RBC 0.0    Comprehensive metabolic panel    Collection Time: 02/22/21  9:26 AM   Result Value Ref Range    Sodium 138 133 - 144 mmol/L    Potassium 4.0 3.4 - 5.3 mmol/L    Chloride 102 94 - 109 mmol/L    Carbon Dioxide 28 20 - 32 mmol/L    Anion Gap 8 3 - 14 mmol/L    Glucose 116 (H) 70 - 99 mg/dL    Urea Nitrogen 14 7 - 30 mg/dL    Creatinine 0.60 0.52 - 1.04 mg/dL    GFR Estimate >90 >60 mL/min/[1.73_m2]    GFR Estimate If Black >90 >60 mL/min/[1.73_m2]    Calcium 9.8 8.5 - 10.1 mg/dL    Bilirubin Total 0.4 0.2 - 1.3 mg/dL    Albumin 3.6 3.4 - 5.0 g/dL    Protein Total 7.9 6.8 - 8.8 g/dL    Alkaline Phosphatase 163 (H) 40 - 150 U/L     (H) 0 - 50 U/L     (H) 0 - 45 U/L   Hemoglobin A1c    Collection Time: 02/22/21  9:26 AM   Result Value Ref Range    Hemoglobin A1C 6.4 (H) 0 - 5.6 %   HIV Antigen Antibody Combo    Collection Time: 02/22/21  9:26 AM   Result Value Ref Range    HIV Antigen Antibody Combo Nonreactive NR^Nonreactive       TSH with free T4 reflex    Collection Time: 02/22/21  9:26 AM   Result Value Ref Range    TSH 2.22 0.40 - 4.00 mU/L   Lipid panel    Collection Time: 02/23/21  6:55 AM   Result Value Ref Range    Cholesterol 159 <200 mg/dL    Triglycerides 137 <150 mg/dL    HDL Cholesterol 38 (L) >49 mg/dL    LDL Cholesterol Calculated 94 <100 mg/dL    Non HDL Cholesterol 121 <130 mg/dL   Glucose by meter    Collection Time: 02/23/21 11:44 AM   Result Value Ref Range    Glucose 129 (H) 70 - 99 mg/dL    Glucose by meter    Collection Time: 02/23/21  5:39 PM   Result Value Ref Range    Glucose 132 (H) 70 - 99 mg/dL   Glucose by meter    Collection Time: 02/24/21  6:20 AM   Result Value Ref Range    Glucose 66 (L) 70 - 99 mg/dL   Glucose by meter    Collection Time: 02/24/21  6:44 AM   Result Value Ref Range    Glucose 70 70 - 99 mg/dL   Glucose by meter    Collection Time: 02/24/21 10:21 PM   Result Value Ref Range    Glucose 98 70 - 99 mg/dL   Glucose by meter    Collection Time: 02/25/21 10:26 PM   Result Value Ref Range    Glucose 154 (H) 70 - 99 mg/dL   Glucose by meter    Collection Time: 02/26/21  5:45 PM   Result Value Ref Range    Glucose 72 70 - 99 mg/dL   Glucose by meter    Collection Time: 02/27/21  6:35 AM   Result Value Ref Range    Glucose 85 70 - 99 mg/dL   Glucose by meter    Collection Time: 02/27/21  1:02 PM   Result Value Ref Range    Glucose 150 (H) 70 - 99 mg/dL   Lithium level    Collection Time: 03/01/21  7:17 AM   Result Value Ref Range    Lithium Level 0.43 (L) 0.60 - 1.20 mmol/L            Because this patient meets criteria for an Alcohol Use Disorder, I performed the following brief intervention on the date of this note:              1) Expressed concern that the patient is drinking at unhealthy levels known to increase their risk of alcohol related problems              2) Gave feedback linking alcohol use and health, including personalized feedback explaining how alcohol use can interact with their medical and/or psychiatric problems, and with prescribed medications.              3) Advised patient to abstain.    PT counseled on nicotine cessation and nicotine replacement provided    Discussed with patient many issues of addiction,triggers, relapse, and establishing a solid recovery program.    DISCHARGE MENTAL STATUS EXAMINATION:  The patient is alert, oriented x3.  Good fund of knowledge.  Good use of language.  Recent and remote memory, language, fund of knowledge are all  adequate.  Euthymic mood congruent affect  Speech normal rate/rhythm linear tp no loose asso,The patient does not have any active suicidal or homicidal ideation.  Does not have any auditory or visual hallucination.  Fair insight/judgment At this time, the patient was stable to be discharged.        Pt was not determined to not be a danger to himself or others. At the current time of discharge, the patient does not meet criteria for involuntary hospitalization. On the day of discharge, the patient reports that they do not have suicidal or homicidal ideation and would never hurt themselves or others. Steps taken to minimize risk include: assessing patient s behavior and thought process daily during hospital stay, discharging patient with adequate plan for follow up for mental and physical health and discussing safety plan of returning to the hospital should the patient ever have thoughts of harming themselves or others. Therefore, based on all available evidence including the factors cited above, the patient does not appear to be at imminent risk for self-harm, and is appropriate for outpatient level of care.     Educated about side effects/risk vs benefits /alternative including non treatment.Pt consented to be on medication.     .Total time spent on discharge summary more than 35 min  More than  20 min  planning, coordination of care, medication reconciliation and performance of physical exam on day of discharge.Care was coordinated with unit RN and unit therapist       Jihan Gill   Home Medication Instructions GWEN:[id number]    Printed on:03/01/21 1033   Medication Information                      albuterol (PROAIR HFA/PROVENTIL HFA/VENTOLIN HFA) 108 (90 Base) MCG/ACT inhaler  Inhale 1-2 puffs into the lungs every 4 hours as needed for shortness of breath / dyspnea or wheezing             FLUoxetine (PROZAC) 40 MG capsule  Take 40 mg by mouth daily             gabapentin (NEURONTIN) 400 MG capsule  Take  "800 mg by mouth 2 times daily morning and afternoon             gabapentin (NEURONTIN) 400 MG capsule  Take 1,600 mg by mouth At Bedtime             lithium ER (LITHOBID) 300 MG CR tablet  Take 300 mg by mouth 2 times daily             methadone (DOLPHINE) 5 MG/5ML solution  Take 140 mg by mouth daily             omeprazole (PRILOSEC) 20 MG DR capsule  Take 20 mg by mouth daily             PHENobarbital (LUMINAL) 32.4 MG tablet  Take 1 tablet (32.4 mg) by mouth At Bedtime 0n 3/1 take 3 tabs at bedtime x2 day 0n 3/3 take 2 tabs at bedtime x1 day 0n 3/4 take 1 tabs at bedtime x1 day             polyethylene glycol (MIRALAX) 17 g packet  Take 1 packet by mouth daily as needed for constipation              Probiotic Product (PROBIOTIC PO)  Take 1 capsule by mouth daily                    .Disposition: Home      Treatment Follow-Up:   [facility]- Integrated Dual Disorder DBT Program  Appointment date/time: Wednesday, 3/3/21 @ 9 AM in-person  Provider/Clinic: Eleuterio @ [facility]  Address: [facility] [address], [facility] [address] [address]  Phone: [phone]  Fax: [phone]      Facts about COVID19 at www.cdc.gov/COVID19 and www.[place].gov/covid19     Keeping hands clean is one of the most important steps we can take to avoid getting sick and spreading germs to others.  Please wash your hands frequently and lather with soap for at least 20 seconds!     Follow-up Appointment:   Valentine Spaulding, DO Family Medicine [facility] [address]   [phone]  Appointment Date/Time: 4/4/2021 at 9:00am      Therapist:   Guanako Mayer (same location)                  \"Much or all of the text in this note was generated through the use of Dragon Dictate voice to text software. Errors in spelling or words which appear to be out of contact are unintentional, may be present due having escaped editing\"     "

## 2021-03-01 NOTE — PLAN OF CARE
Pt participated in Therapeutic Recreation group utilizing a group discussion with focus on leisure education and healthy coping strategies. Pt was engaged and cooperative in the group discussion and added to the benefits of healthy daily recreation. Pt participated through the entire duration of the group, though was a passive participant in the discussion.

## 2021-03-01 NOTE — PROVIDER NOTIFICATION
"I received a call from storm Maher, counselor \"Goldie,\" JOSE on file (#[phone]).  She reports that patient should not be discharged to home and that she has been to [facility] numerous times. Plan was for a commitment if she was hospitalized one more time. I transferred the call to .   "

## 2021-03-02 VITALS
RESPIRATION RATE: 16 BRPM | TEMPERATURE: 97.8 F | HEART RATE: 65 BPM | DIASTOLIC BLOOD PRESSURE: 58 MMHG | BODY MASS INDEX: 41.99 KG/M2 | SYSTOLIC BLOOD PRESSURE: 107 MMHG | HEIGHT: 65 IN | OXYGEN SATURATION: 95 % | WEIGHT: 252 LBS

## 2021-03-02 PROCEDURE — 250N000013 HC RX MED GY IP 250 OP 250 PS 637: Performed by: PSYCHIATRY & NEUROLOGY

## 2021-03-02 PROCEDURE — 99238 HOSP IP/OBS DSCHRG MGMT 30/<: CPT | Performed by: PSYCHIATRY & NEUROLOGY

## 2021-03-02 RX ORDER — PHENOBARBITAL 32.4 MG/1
32.4 TABLET ORAL AT BEDTIME
Qty: 5 TABLET | Refills: 0 | Status: SHIPPED | OUTPATIENT
Start: 2021-03-02 | End: 2021-08-08

## 2021-03-02 RX ORDER — PHENOBARBITAL 64.8 MG/1
64.8 TABLET ORAL AT BEDTIME
Status: DISCONTINUED | OUTPATIENT
Start: 2021-03-02 | End: 2021-03-02 | Stop reason: HOSPADM

## 2021-03-02 RX ADMIN — NICOTINE POLACRILEX 4 MG: 4 GUM, CHEWING BUCCAL at 13:24

## 2021-03-02 RX ADMIN — OMEPRAZOLE 20 MG: 20 CAPSULE, DELAYED RELEASE ORAL at 07:49

## 2021-03-02 RX ADMIN — METHADONE HYDROCHLORIDE 140 MG: 5 SOLUTION ORAL at 07:46

## 2021-03-02 RX ADMIN — Medication 250 MG: at 07:48

## 2021-03-02 RX ADMIN — PHENOBARBITAL 32.4 MG: 32.4 TABLET ORAL at 07:49

## 2021-03-02 RX ADMIN — FLUOXETINE 40 MG: 20 CAPSULE ORAL at 07:48

## 2021-03-02 RX ADMIN — GABAPENTIN 800 MG: 400 CAPSULE ORAL at 13:24

## 2021-03-02 RX ADMIN — NICOTINE POLACRILEX 4 MG: 4 GUM, CHEWING BUCCAL at 10:37

## 2021-03-02 RX ADMIN — GABAPENTIN 800 MG: 400 CAPSULE ORAL at 07:49

## 2021-03-02 RX ADMIN — LITHIUM CARBONATE 300 MG: 300 TABLET, EXTENDED RELEASE ORAL at 07:48

## 2021-03-02 NOTE — PROGRESS NOTES
[facility], [place]   Psychiatric Progress Note        Interim history   This is a 45 year old female with Benzodiazepine use disorder severe  Benzodiazepine withdrawal severe  Opiate use disorder on methadone maintenance  Major depressive disorder severe recurrent without psychosis on Prozac.Pt seen in rounds.   The patient's care was discussed with the treatment team during the daily team meeting and/or staff's chart notes were reviewed.  Staff report patient has been visible in the milieu,  no acute eventsovernight.     Patient's mood is good    Energy Level:MODERATE  Sleep:No concerns, sleeps well through night  Appetite:fair motivation interest improving  Denied suicidal/homicidal ideation/plan intent.  Denied psychosis  No prior suicde attempts  No access to gun    Tolerating meds and has no side effects.              Medications:     Current Facility-Administered Medications   Medication     acetaminophen (TYLENOL) tablet 650 mg     albuterol (PROAIR HFA/PROVENTIL HFA/VENTOLIN HFA) 108 (90 Base) MCG/ACT inhaler 1-2 puff     FLUoxetine (PROzac) capsule 40 mg     gabapentin (NEURONTIN) capsule 1,600 mg     gabapentin (NEURONTIN) capsule 800 mg     lithium ER (LITHOBID) CR tablet 300 mg     melatonin tablet 3 mg     methadone (DOLPHINE) solution 140 mg     naloxone (NARCAN) injection 0.2 mg    Or     naloxone (NARCAN) injection 0.4 mg    Or     naloxone (NARCAN) injection 0.2 mg    Or     naloxone (NARCAN) injection 0.4 mg     nicotine polacrilex (NICORETTE) gum 4 mg     omeprazole (priLOSEC) CR capsule 20 mg     PHENobarbital (LUMINAL) tablet 64.8 mg     polyethylene glycol (MIRALAX) Packet 17 g     saccharomyces boulardii (FLORASTOR) capsule             Allergies:     Allergies   Allergen Reactions     Abilify [Aripiprazole] Other (See Comments)     Tardive dyskinesia     Allopurinol      Compazine Other (See Comments)     Dystonia     Dramamine      Jaw lock.       Olanzapine  "Other (See Comments)     Tardive dyskinesia from Zyprexa - per patient     Reglan Other (See Comments)     dystonia     Seroquel [Quetiapine] Other (See Comments)     Tardive dyskinesia.             Psychiatric Examination:   Blood pressure 130/71, pulse 63, temperature 97.7  F (36.5  C), temperature source Temporal, resp. rate 16, height 1.651 m (5' 5\"), weight 114.3 kg (252 lb), last menstrual period 02/22/2019, SpO2 95 %, not currently breastfeeding.  Weight is 252 lbs 0 oz  Body mass index is 41.93 kg/m .    Appearance:  awake, alert and adequately groomed  Attitude:  cooperative  Eye Contact:  good  Mood:  positive  Affect:  appropriate and in normal range and mood congruent  Speech:  clear, coherent rate /rhythm are normal in rate rhythm volume  Psychomotor Behavior:  no evidence of tardive dyskinesia, dystonia, or tics and intact station, gait and muscle tone  Throught Process:  logical  Associations:  no loose associations  Thought Content:  no evidence of suicidal ideation or homicidal ideation, no evidence of psychotic thought, no auditory hallucinations present and no visual hallucinations present  Insight:  fair  Judgement:  intact  Oriented to:  time, person, and place  Attention Span and Concentration:  intact  Recent and Remote Memory:  intact  Language fund of knowledge are adequate         Labs:     No results found for this or any previous visit (from the past 24 hour(s)).      DX Benzodiazepine use disorder severe  Benzodiazepine withdrawal severe  Opiate use disorder on methadone maintenance  Major depressive disorder severe recurrent without psychosis on Prozac     PLAN  Patient continuing the taper  She has restricted provider and restriction is not lifted yet    She was supposed to be discharged today but her medications are not here working on that again today    Methadone maintenance rate continued we will monitor for sedation being on phenobarbital  For depression she will be continued on " Prozac    Patient has night terrors but would not want [employer] for it  Laboratory/Imaging: reviewed with patient   Consults: internal medicine consult reviewed  Patient will be treated in therapeutic milieu with appropriate individual and group therapies as described.  PDMP CHECKED     Supportive psychotheraoy provided, nicko talked about recovery enviroment, relapse prevention, triggers to use.  Discussed with patient many issues of addiction,triggers, relapse, and establishing a solid recovery program.  Asked pt to be med complinat   Medical diagnoses to be addressed this admission:    Plan:       Assessment and Recommendations:   Jihan Gill is a 45 year old year old female with a history of major depressive disorder, bipolar I disorder, opiate use disorder, benzodiazapine use disorder, arthritis, and chronic hepatitis C who was admitted on 2/22/21 for benzodiazapine detoxification.     #Transaminitis  Jihan was diagnosed with hepatitis C in 04/25/2019. She admits to not following up with her diagnosis and has yet to receive treatment. She is interested in being treated with anti viral therapy, following a GI provider in outpatient, starting treatment soon.  Liver enzymes today are , , Alk Phos 163, which are consistent with previous LFTs since her 2019 diagnosis. Denies EtOH use which is supported by negative serum EtOH. Has immunity to hepatitis B and previously immunized for Hep A. Currently denies any abdominal pain. No jaundice, icterus, abdominal tenderness, hepatosplenomegaly, abnormal bruises, on exam.  - Encourage follow-up with GI and initiation of anti viral therapy and repeat LFTs in 2 weeks  - HIV antigen antibody combo pending     #Vaginal discharge  Jihan reports thick white discharge and itching after recent course of abx. + history of vaginal candidiasis.   - Miconazole 200mg vaginal suppository qhs x 3 days  - Follow-up with PCP one week after discharge if no  improvement     #Amenorrhea   Reports last menstrual period was one year ago. Urine HCG negative today.  - Recommend following up with PCP     #Diabetes mellitus type 2, diet controlled   Jihan reports she was previously told she might have diabetes. Hgb A1C from 12/10/20 at [facility] reported to be 6.5%. She has not followed up on this lab. Serum glucose 116 this morning.   - Repeat Hgb A1C pending. If still elevated, consider starting metformin here or with PCP   - POCT glucose BID  - Recommend follow-up with PCP     #Benzodiazepine use disorder, withdrawal  - Management per psychiatry      #Methadone maintenance  - Management per psychiatry     #Major depressive disorder  - Management per psychiatry     Medicine will follow up on HIV, Hemoglobin A1c, and TSH.  No further recommendations at this time. Please page the on-call ELOINA for any intercurrent medical issues which arise.          Legal Status: voluntary    Safety Assessment:   Checks:  15 min  Precautions: withdrawal precautions  Pt has not required locked seclusion or restraints in the past 24 hours to maintain safety, please refer to RN documentation for further details.  Discussed with patient many issues of addiction,triggers, relapse, and establishing a solid recovery program.  Able to give informed consent:  YES   Discussed Risks/Benefits/Side Effects/Alternatives: YES    After discussion of the indications, risks, benefits, alternatives and consequences of no treatment, the patient elects to do treatment

## 2021-03-02 NOTE — PLAN OF CARE
This worker left a VM for patient's [employer] liaison, requesting follow up related to discharge medication Phenobarbital, and difficulty in obtaining it from patient's designated pharmacy.  Awaiting return call.  Patient currently calm, with continuing reported absence of any suicidal ideation.  Patient actively looking forward to discharge, the date of which remains undetermined, due to above medication-related uncertainties.  Treatment team updated accordingly as to above.  Will continue to monitor as prudent.

## 2021-03-02 NOTE — PROGRESS NOTES
[id number]   Groups   Details    (Psychotherapy)     1:1 Psychotherapy order     Group Therapy Type: Psychotherapy-      Summary of Group / Topics Discussed:      Patient Response-Pt was grateful for the therapy. She said she was having a difficult day due to not being able to discharge but accepting.  She was teary, seemingly anxious and having some fears that she will be here longer than she has been told. She is remembering hospital stays in her 20s when she was having very difficult years. She also discussed that the methadone clinic is a trigger and she would buy drugs there . She doesn't want to return there but is feeling sad about leaving a therapist there she connects with. She has been avoiding a conversation because she wants to talk to her in person. Writer recommended if the place is a trigger to call and see if zoom is an option for therapy with that therapist. She would like writer to check in with her tomorrow and see how her discharge plan is going.       Jereym Quevedo, YOANNAFT, ATR-BC

## 2021-03-02 NOTE — PROGRESS NOTES
03/02/21 0622   Sleep/Rest/Relaxation   Sleep/Rest/Relaxation appears asleep   Night Time # Hours 6.75 hours     No concerns.

## 2021-03-02 NOTE — PROGRESS NOTES
Case Management Note  3/2/2021    Checked in with pt this AM regarding intake paperwork, assessment. Pt now wants to go home, try to be sober with OP DBT and if she still is struggling plans to pursue residential. Pt had a sober house appointment today that she may have to move if unable to be discharged. Pt reports a plan to try sober housing.     Addendum: Pt changed her [facility] appointment:  [facility]- Integrated Dual Disorder DBT Program  Appointment date/time: Tuesday, 3/9/21 @ 9 AM in-person  Provider/Clinic: [facility]  Address: [facility] [address], [address], [place] [address]  Phone: [phone]  Fax: [phone]     Klaudia Holley, [facility], [facility]

## 2021-03-02 NOTE — DISCHARGE INSTRUCTIONS
Behavioral Discharge Planning and Instructions  THANK YOU FOR CHOOSING [address]  [phone]    Summary: You were admitted to [facility] on 2/22/2021 for detoxification from benzodiazepines.  A medical exam was performed that included lab work. You have met with a  and opted to attend DBT at [facility].  Please take care and make your recovery a daily priority, Jihan! It was a pleasure working with you and the entire treatment team here wishes you the very best in your recovery!     Recommendation:  Enter and complete DBT treatment at [facility] and follow all recommendations of your treatment providers.      Main Diagnoses:  Per Dr. Ebenezer MD:   Benzodiazepine use disorder severe  Benzodiazepine withdrawal severe  Opiate use disorder on Suboxone maintenance  Major depressive disorder severe recurrent without psychosis on Prozac    Major Treatments, Procedures and Findings:  You were treated for benzodiazepine detoxification using phenobarbital. As an outpatient you will be prescribed ***, please take this medication as prescribed until it is gone. You did not complete a chemical dependency assessment. You had labs drawn and those results were reviewed with you. Please take a copy of your lab work with you to your next primary care provider appointment.    Symptoms to Report:  If you experience more anxiety, confusion, sleeplessness, deep sadness or thoughts of suicide, notify your treatment team or notify your primary care provider. IF ANY OF THE SYMPTOMS YOU ARE EXPERIENCING ARE A MEDICAL EMERGENCY CALL 911 IMMEDIATELY.     Lifestyle Adjustment: Adjust your lifestyle to get enough sleep, relaxation, exercise and good nutrition. Continue to develop healthy coping skills to decrease stress and promote a sober living environment. Do not use mood altering substances including alcohol, illegal drugs or addictive medications other than what is currently prescribed.     Disposition: Home     Treatment  Follow-Up:   [facility]- Integrated Dual Disorder DBT Program  Appointment date/time: Tuesday, 3/9/21 @ 9 AM in-person  Provider/Clinic: S  Address: [address], [address], [place] [address]  Phone: [phone]  Fax: [phone]     Facts about COVID19 at www.cdc.gov/COVID19 and www.[place].gov/covid19    Keeping hands clean is one of the most important steps we can take to avoid getting sick and spreading germs to others.  Please wash your hands frequently and lather with soap for at least 20 seconds!    Follow-up Appointment:   Valentine Spaulding DO [facility] [address]   [phone]  Appointment Date/Time: 4/4/2021 at 9:00am     Therapist:   Guanako Mayer (same location)          Recovery apps for your phone to locate current in person and zoom recovery meetings  [place] - meeting niranjan  AA  - meeting niranjan  Meeting guide - meeting niranjan  Quick NA meeting - meeting niranjan  Junie- has various apps    Resources:  *due to covid-19 most AA/NA meetings are being held online*  AA meetings online search for them at: https://aa-intergroup.org (worldwide meeting listings)  AA meetings for [place] area can be found online at: https://a[place].org (click local online meetings listings)  NA meetings for [place] area can be found online at: https://www.nam[place].org  (click find a meeting)  AA and NA Sponsors are excellent resources for support and you can find one at any support group meeting.   Alcoholics Anonymous (https://aa.org/): for information 24 hours/day  AA Intergroup service office in [place] (http://www.aastpaul.org/) 807.102.3208  AA Intergroup service office in [facility]: 524.726.1366. (http://www.a[place].org/)  Narcotics Anonymous (www.nam[place].org) (987) 636-8118  https://aafairviewriverside.org/meetings  SMART Recovery - self management for addiction recovery:  www.smartrecovery.org  Pathways ~ A Health Crisis Resource  & Support Center:  744.530.7052.  https://prescribetoprevent.org/patient-education/videos/  http://www.harmreduction.org  [facility] 400-052-8250  Support Group:  AA/NA and Sponsor/support.  National [place] on Mental Illness (www.[place].zunilda.org): 969.826.9504 or 342-196-9760.  Alcoholics Anonymous (www.alcoholics-anonymous.org): Check your phone book for your local chapter.  Suicide Awareness Voices of Education (SAVE) (www.save.[employer]): 325-227-WOAU (3283)  National Suicide Prevention Line (www.mentalAdrealmn.org): 094-602-OEMK (1024)  Mental Health Consumer/Survivor Network of [place] (www.mhcsn.net): 775.177.4458 or 694-150-6710  Mental Health Association of [place] (www.mentalhealth.org): 679.960.3731 or 272-799-2719   Substance Abuse and Mental Health Services (www.samhsa.gov)  [place] Opioid Prevention Coalition: www.opioidcoalition.org    [place] Recovery Connection ([facility])  [facility] connects people seeking recovery to resources that help foster and sustain long-term recovery.  Whether you are seeking resources for treatment, transportation, housing, job training, education, health care or other pathways to recovery, [facility] is a great place to start.  619.989.7942.  www.minnesotarec[employer].org    Great Pod casts for nutrition and wellness  Listen on Apple Podcasts  Dishing Up Nutrition   [employer]   Nutrition       Understand the connection between what you eat and how you feel. Hosted by licensed nutritionists and dietitians from [employer] we share practical, real-life solutions for healthier living through nutrition.     General Medication Instructions:   See your medication sheet(s) for instructions.   Take all medications as prescribed.  Make no changes unless your primary care provider suggests them.   Go to all your primary care provider visits.  Be sure to have all your required lab tests. This way, your medicines can be refilled on time.  Do not use any forms of  alcohol.    Please Note:  If you have any questions at anytime after you are discharged please call [facility] detox unit 3[facility] at [phone].  [facility], Behavioral Intake [phone]  Medical Records call [phone]  Outpatient Behavioral Intake call [phone]  LP+ Wait List/Bed Availability call [phone]    Please remember to take all of your behavioral discharge planning and lab paperwork to any follow up appointments, it contains your lab results, diagnosis, medication list and discharge recommendations.      THANK YOU FOR CHOOSING [facility] .

## 2021-03-02 NOTE — DISCHARGE SUMMARY
Jihan Gill MRN# [id number]   Age: 45 year old YOB: 1975     Date of Admission:  2/22/2021  Admitting Physician:  Bahman Sol MD  Discharge Physician:  Bahman Sol MD      DISCHARGE  DX    Benzodiazepine use disorder severe    Opiate use disorder on methadone maintence  Major depressive disorder severe recurrent without psychosis     Patient was supposed to be discharged yesterday but she has restricted pharmacy and is very difficult to lift her medications today and medications were filled and she is being discharged today         Event Leading to Hospitalization:     See Admission note by admitting provider for patient encounter. for additional details.          Hospital Course:   PATIENT was admitted to [facility] attending  under DR sol, please review the detailed admit note on 2/22/21   The patient was placed under status 15 (15 minute checks) to ensure patient safety.   Patient was detoxed off benzodiazepines using phenobarbital.  She started taper she was tolerating the taper  She is continued on methadone maintenance    All outpatient medications were continued    PATIENTdid participate in groups and was visible in the milieu.     The patient's symptoms of  improved.     Patients energy motivation , sleep appetite improved.  Pt completed detox . It was un eventful.      Discussed with patient medications for craving.  Spoke with patient about triggers coping skills relapse prevention.    CONSULTS DONE DURING PATIENTS HOSPITALIZATION.  Patient was seen by medicine on date 2/22/21    This as per their medical consult            Assessment and Recommendations:   Jihan Gill is a 45 year old year old female with a history of major depressive disorder, bipolar I disorder, opiate use disorder, benzodiazapine use disorder, arthritis, and chronic hepatitis C who was admitted on 2/22/21 for benzodiazapine detoxification.     #Transaminitis  Jihan was  diagnosed with hepatitis C in 04/25/2019. She admits to not following up with her diagnosis and has yet to receive treatment. She is interested in being treated with anti viral therapy, following a GI provider in outpatient, starting treatment soon.  Liver enzymes today are , , Alk Phos 163, which are consistent with previous LFTs since her 2019 diagnosis. Denies EtOH use which is supported by negative serum EtOH. Has immunity to hepatitis B and previously immunized for Hep A. Currently denies any abdominal pain. No jaundice, icterus, abdominal tenderness, hepatosplenomegaly, abnormal bruises, on exam.  - Encourage follow-up with GI and initiation of anti viral therapy and repeat LFTs in 2 weeks  - HIV antigen antibody combo pending     #Vaginal discharge  Jihan reports thick white discharge and itching after recent course of abx. + history of vaginal candidiasis.   - Miconazole 200mg vaginal suppository qhs x 3 days  - Follow-up with PCP one week after discharge if no improvement     #Amenorrhea   Reports last menstrual period was one year ago. Urine HCG negative today.  - Recommend following up with PCP     #Diabetes mellitus type 2, diet controlled   Jihan reports she was previously told she might have diabetes. Hgb A1C from 12/10/20 at [facility] reported to be 6.5%. She has not followed up on this lab. Serum glucose 116 this morning.   - Repeat Hgb A1C pending. If still elevated, consider starting metformin here or with PCP   - POCT glucose BID  - Recommend follow-up with PCP     #Benzodiazepine use disorder, withdrawal  - Management per psychiatry      #Methadone maintenance  - Management per psychiatry     #Major depressive disorder  - Management per psychiatry     Medicine will follow up on HIV, Hemoglobin A1c, and TSH.  No further recommendations at this time. Please page the on-call ELOINA for any intercurrent medical issues which arise.                 Pt was seen by  cm  As per recommendations from cm    Met with pt to discuss discharge planning. Pt reports an interest in some form of treatment. Pt reports multiple prior treatments both residential and OP. Pt reports she lives with her mom in [place], and this is a safe place overall. Pt reports she tends to do better in OP treatments as it is a slower pace, she can process things more, and she is less distracted by social relationships. Pt prefers in-person. Talked with pt about dual disorder DBT. Pt reports she was in a DBT based program as a teenager. Pt reports some of her best success in sobriety is when she uses DBT skills, and would like to work on this more.      Will meet with pt later today to start referral to [facility] integrated DBT which has in-person DBT.      Addendum: Called [facility] to schedule intake DBT appointment. Scheduled for:   [facility]- Integrated Dual Disorder DBT Program  Appointment date/time: Wednesday, 3/3/21 @ 9 AM in-person  Provider/Clinic: Eleuterio @ [facility]  Address: [facility] [address], [place] [address]  Phone: [phone]  Fax: [phone]      Pt has a  with [facility]: Fer Cisneros, [phone]. Fer reports concern about pt returning home, reporting pt has very poor follow-through and has been in the hospital repeatedly recently (this is in chart review.) Fer however reports pt has a long history of leaving residential programs AMA or absconding, so not sure if it would be beneficial to send pt to a residential if she is not voluntarily going. Fer reports Felipe had been considering civil commitment for pt after last hospitalization from 2/3-2/8. Based on records, Felipe had discharged pt to [facility]- records for this detox are not in [facility]. Per [facility], pt has been committed 4 times over the course of her life, most recently 09/2019. Fer reports they did not have it in their records that pt has been officially diagnosed with borderline  personality disorder, but based on chart review this diagnosis goes back to at least 2011, with several doctors listing borderline personality disorder as one of pt's diagnoses. Pt historically struggles with follow-through and accountability. Talked with pt about DBT-based [facility]. Pt declined a referral to this higher level of care at this time. Open to it in the future if she returns and is unsuccessful with OP DBT. Pt is interested in pursuing sober housing possibly after detox, given sober housing list. AVS updated.             Labs:reviewed with patient       Recent Results (from the past 48 hour(s))   Lithium level    Collection Time: 03/01/21  7:17 AM   Result Value Ref Range    Lithium Level 0.43 (L) 0.60 - 1.20 mmol/L         Recent Results (from the past 240 hour(s))   Drug abuse screen 6 urine (tox)    Collection Time: 02/22/21  8:32 AM   Result Value Ref Range    Amphetamine Qual Urine Negative NEG^Negative    Barbiturates Qual Urine Negative NEG^Negative    Benzodiazepine Qual Urine Positive (A) NEG^Negative    Cannabinoids Qual Urine Negative NEG^Negative    Cocaine Qual Urine Negative NEG^Negative    Ethanol Qual Urine Negative NEG^Negative    Opiates Qualitative Urine Negative NEG^Negative   HCG qualitative urine    Collection Time: 02/22/21  8:32 AM   Result Value Ref Range    HCG Qual Urine Negative NEG^Negative   Asymptomatic SARS-CoV-2 COVID-19 Virus (Coronavirus) by PCR    Collection Time: 02/22/21  8:58 AM    Specimen: Nasopharyngeal   Result Value Ref Range    SARS-CoV-2 Virus Specimen Source Nasopharyngeal     SARS-CoV-2 PCR Result NEGATIVE     SARS-CoV-2 PCR Comment (Note)    CBC with platelets differential    Collection Time: 02/22/21  9:26 AM   Result Value Ref Range    WBC 8.2 4.0 - 11.0 10e9/L    RBC Count 4.29 3.8 - 5.2 10e12/L    Hemoglobin 13.3 11.7 - 15.7 g/dL    Hematocrit 40.6 35.0 - 47.0 %    MCV 95 78 - 100 fl    MCH 31.0 26.5 - 33.0 pg    MCHC 32.8 31.5 - 36.5 g/dL     RDW 13.0 10.0 - 15.0 %    Platelet Count 194 150 - 450 10e9/L    Diff Method Automated Method     % Neutrophils 37.5 %    % Lymphocytes 52.3 %    % Monocytes 7.2 %    % Eosinophils 2.4 %    % Basophils 0.4 %    % Immature Granulocytes 0.2 %    Nucleated RBCs 0 0 /100    Absolute Neutrophil 3.1 1.6 - 8.3 10e9/L    Absolute Lymphocytes 4.3 0.8 - 5.3 10e9/L    Absolute Monocytes 0.6 0.0 - 1.3 10e9/L    Absolute Eosinophils 0.2 0.0 - 0.7 10e9/L    Absolute Basophils 0.0 0.0 - 0.2 10e9/L    Abs Immature Granulocytes 0.0 0 - 0.4 10e9/L    Absolute Nucleated RBC 0.0    Comprehensive metabolic panel    Collection Time: 02/22/21  9:26 AM   Result Value Ref Range    Sodium 138 133 - 144 mmol/L    Potassium 4.0 3.4 - 5.3 mmol/L    Chloride 102 94 - 109 mmol/L    Carbon Dioxide 28 20 - 32 mmol/L    Anion Gap 8 3 - 14 mmol/L    Glucose 116 (H) 70 - 99 mg/dL    Urea Nitrogen 14 7 - 30 mg/dL    Creatinine 0.60 0.52 - 1.04 mg/dL    GFR Estimate >90 >60 mL/min/[1.73_m2]    GFR Estimate If Black >90 >60 mL/min/[1.73_m2]    Calcium 9.8 8.5 - 10.1 mg/dL    Bilirubin Total 0.4 0.2 - 1.3 mg/dL    Albumin 3.6 3.4 - 5.0 g/dL    Protein Total 7.9 6.8 - 8.8 g/dL    Alkaline Phosphatase 163 (H) 40 - 150 U/L     (H) 0 - 50 U/L     (H) 0 - 45 U/L   Hemoglobin A1c    Collection Time: 02/22/21  9:26 AM   Result Value Ref Range    Hemoglobin A1C 6.4 (H) 0 - 5.6 %   HIV Antigen Antibody Combo    Collection Time: 02/22/21  9:26 AM   Result Value Ref Range    HIV Antigen Antibody Combo Nonreactive NR^Nonreactive       TSH with free T4 reflex    Collection Time: 02/22/21  9:26 AM   Result Value Ref Range    TSH 2.22 0.40 - 4.00 mU/L   Lipid panel    Collection Time: 02/23/21  6:55 AM   Result Value Ref Range    Cholesterol 159 <200 mg/dL    Triglycerides 137 <150 mg/dL    HDL Cholesterol 38 (L) >49 mg/dL    LDL Cholesterol Calculated 94 <100 mg/dL    Non HDL Cholesterol 121 <130 mg/dL   Glucose by meter    Collection Time: 02/23/21 11:44  AM   Result Value Ref Range    Glucose 129 (H) 70 - 99 mg/dL   Glucose by meter    Collection Time: 02/23/21  5:39 PM   Result Value Ref Range    Glucose 132 (H) 70 - 99 mg/dL   Glucose by meter    Collection Time: 02/24/21  6:20 AM   Result Value Ref Range    Glucose 66 (L) 70 - 99 mg/dL   Glucose by meter    Collection Time: 02/24/21  6:44 AM   Result Value Ref Range    Glucose 70 70 - 99 mg/dL   Glucose by meter    Collection Time: 02/24/21 10:21 PM   Result Value Ref Range    Glucose 98 70 - 99 mg/dL   Glucose by meter    Collection Time: 02/25/21 10:26 PM   Result Value Ref Range    Glucose 154 (H) 70 - 99 mg/dL   Glucose by meter    Collection Time: 02/26/21  5:45 PM   Result Value Ref Range    Glucose 72 70 - 99 mg/dL   Glucose by meter    Collection Time: 02/27/21  6:35 AM   Result Value Ref Range    Glucose 85 70 - 99 mg/dL   Glucose by meter    Collection Time: 02/27/21  1:02 PM   Result Value Ref Range    Glucose 150 (H) 70 - 99 mg/dL   Lithium level    Collection Time: 03/01/21  7:17 AM   Result Value Ref Range    Lithium Level 0.43 (L) 0.60 - 1.20 mmol/L            Because this patient meets criteria for an Alcohol Use Disorder, I performed the following brief intervention on the date of this note:              1) Expressed concern that the patient is drinking at unhealthy levels known to increase their risk of alcohol related problems              2) Gave feedback linking alcohol use and health, including personalized feedback explaining how alcohol use can interact with their medical and/or psychiatric problems, and with prescribed medications.              3) Advised patient to abstain.    PT counseled on nicotine cessation and nicotine replacement provided    Discussed with patient many issues of addiction,triggers, relapse, and establishing a solid recovery program.    DISCHARGE MENTAL STATUS EXAMINATION:  The patient is alert, oriented x3.  Good fund of knowledge.  Good use of language.  Recent  and remote memory, language, fund of knowledge are all adequate.  Euthymic mood congruent affect  Speech normal rate/rhythm linear tp no loose asso,The patient does not have any active suicidal or homicidal ideation.  Does not have any auditory or visual hallucination.  Fair insight/judgment At this time, the patient was stable to be discharged.        Pt was not determined to not be a danger to himself or others. At the current time of discharge, the patient does not meet criteria for involuntary hospitalization. On the day of discharge, the patient reports that they do not have suicidal or homicidal ideation and would never hurt themselves or others. Steps taken to minimize risk include: assessing patient s behavior and thought process daily during hospital stay, discharging patient with adequate plan for follow up for mental and physical health and discussing safety plan of returning to the hospital should the patient ever have thoughts of harming themselves or others. Therefore, based on all available evidence including the factors cited above, the patient does not appear to be at imminent risk for self-harm, and is appropriate for outpatient level of care.     Educated about side effects/risk vs benefits /alternative including non treatment.Pt consented to be on medication.     .Total time spent on discharge summary more than 35 min  More than  20 min  planning, coordination of care, medication reconciliation and performance of physical exam on day of discharge.Care was coordinated with unit RN and unit therapist     Jihan Gill   Home Medication Instructions [id number]    Printed on:03/03/21 0413   Medication Information                      albuterol (PROAIR HFA/PROVENTIL HFA/VENTOLIN HFA) 108 (90 Base) MCG/ACT inhaler  Inhale 1-2 puffs into the lungs every 4 hours as needed for shortness of breath / dyspnea or wheezing             FLUoxetine (PROZAC) 40 MG capsule  Take 40 mg by mouth daily      "        gabapentin (NEURONTIN) 400 MG capsule  Take 800 mg by mouth 2 times daily morning and afternoon             gabapentin (NEURONTIN) 400 MG capsule  Take 1,600 mg by mouth At Bedtime             lithium ER (LITHOBID) 300 MG CR tablet  Take 300 mg by mouth 2 times daily             methadone (DOLPHINE) 5 MG/5ML solution  Take 140 mg by mouth daily             nicotine polacrilex (NICORETTE) 4 MG gum  Place 1 each (4 mg) inside cheek every hour as needed for other (nicotine withdrawal symptoms)             omeprazole (PRILOSEC) 20 MG DR capsule  Take 20 mg by mouth daily             PHENobarbital (LUMINAL) 32.4 MG tablet  Take 1 tablet (32.4 mg) by mouth At Bedtime 0n 3/2take  2 tabs at bedtime x2 day 0n 3/4 take 1 tabs at bedtime x1 day             polyethylene glycol (MIRALAX) 17 g packet  Take 1 packet by mouth daily as needed for constipation              Probiotic Product (PROBIOTIC PO)  Take 1 capsule by mouth daily             .Disposition: Home      Treatment Follow-Up:   [facility]- Integrated Dual Disorder DBT Program  Appointment date/time: Wednesday, 3/3/21 @ 9 AM in-person  Provider/Clinic: Eleuterio @ [facility]  Address: [facility] [address] [facility] [address], [place] [address]  Phone: [phone]  Fax: [phone]      Facts about COVID19 at www.cdc.gov/COVID19 and www.[place].gov/covid19     Keeping hands clean is one of the most important steps we can take to avoid getting sick and spreading germs to others.  Please wash your hands frequently and lather with soap for at least 20 seconds!     Follow-up Appointment:   Valentine Spaulding DO  Family Medicine  [facility] [address]   [phone]  Appointment Date/Time: 4/4/2021 at 9:00am      Therapist:   Guanako Mayer (same location)                  \"Much or all of the text in this note was generated through the use of Dragon Dictate voice to text software. Errors in spelling or words which appear to be out of contact are " "unintentional, may be present due having escaped editing\"     "

## 2021-03-22 ENCOUNTER — TRANSFERRED RECORDS (OUTPATIENT)
Dept: HEALTH INFORMATION MANAGEMENT | Facility: CLINIC | Age: 46
End: 2021-03-22

## 2021-04-20 NOTE — PROGRESS NOTES
09/23/19 1348   Significant Event   Significant Event Other (see comments)  (shift summary)   Similar to previous day shifts, pt slept the entire day, eating meals in her room and not coming out for groups. Hygiene is poor and mood is calm.   - - -

## 2021-07-24 ENCOUNTER — HEALTH MAINTENANCE LETTER (OUTPATIENT)
Age: 46
End: 2021-07-24

## 2021-08-08 ENCOUNTER — APPOINTMENT (OUTPATIENT)
Dept: GENERAL RADIOLOGY | Facility: CLINIC | Age: 46
End: 2021-08-08
Attending: INTERNAL MEDICINE
Payer: COMMERCIAL

## 2021-08-08 ENCOUNTER — TELEPHONE (OUTPATIENT)
Dept: BEHAVIORAL HEALTH | Facility: CLINIC | Age: 46
End: 2021-08-08

## 2021-08-08 ENCOUNTER — HOSPITAL ENCOUNTER (INPATIENT)
Facility: CLINIC | Age: 46
LOS: 4 days | Discharge: HOME | End: 2021-08-12
Attending: EMERGENCY MEDICINE | Admitting: INTERNAL MEDICINE
Payer: COMMERCIAL

## 2021-08-08 DIAGNOSIS — F13.130: ICD-10-CM

## 2021-08-08 DIAGNOSIS — U07.1 2019 NOVEL CORONAVIRUS DISEASE (COVID-19): ICD-10-CM

## 2021-08-08 DIAGNOSIS — R50.9 HYPERTHERMIA-INDUCED DEFECT: ICD-10-CM

## 2021-08-08 DIAGNOSIS — G89.4 CHRONIC PAIN SYNDROME: Primary | ICD-10-CM

## 2021-08-08 DIAGNOSIS — F13.930 BENZODIAZEPINE WITHDRAWAL WITHOUT COMPLICATION (H): ICD-10-CM

## 2021-08-08 DIAGNOSIS — U07.1 LAB TEST POSITIVE FOR DETECTION OF COVID-19 VIRUS: ICD-10-CM

## 2021-08-08 LAB
ALBUMIN SERPL-MCNC: 4.1 G/DL (ref 3.4–5)
ALBUMIN UR-MCNC: 10 MG/DL
ALP SERPL-CCNC: 127 U/L (ref 40–150)
ALT SERPL W P-5'-P-CCNC: 44 U/L (ref 0–50)
AMPHETAMINES UR QL SCN: ABNORMAL
ANION GAP SERPL CALCULATED.3IONS-SCNC: 6 MMOL/L (ref 3–14)
APPEARANCE UR: CLEAR
AST SERPL W P-5'-P-CCNC: 28 U/L (ref 0–45)
BARBITURATES UR QL: ABNORMAL
BASOPHILS # BLD AUTO: 0 10E3/UL (ref 0–0.2)
BASOPHILS NFR BLD AUTO: 1 %
BENZODIAZ UR QL: ABNORMAL
BILIRUB SERPL-MCNC: 0.3 MG/DL (ref 0.2–1.3)
BILIRUB UR QL STRIP: NEGATIVE
BUN SERPL-MCNC: 7 MG/DL (ref 7–30)
CALCIUM SERPL-MCNC: 9.7 MG/DL (ref 8.5–10.1)
CANNABINOIDS UR QL SCN: ABNORMAL
CHLORIDE BLD-SCNC: 103 MMOL/L (ref 94–109)
CK SERPL-CCNC: 115 U/L (ref 30–225)
CO2 SERPL-SCNC: 28 MMOL/L (ref 20–32)
COCAINE UR QL: ABNORMAL
COLOR UR AUTO: YELLOW
CREAT SERPL-MCNC: 0.74 MG/DL (ref 0.52–1.04)
CRP SERPL-MCNC: 11 MG/L (ref 0–8)
D DIMER PPP FEU-MCNC: 0.76 UG/ML FEU (ref 0–0.5)
EOSINOPHIL # BLD AUTO: 0.1 10E3/UL (ref 0–0.7)
EOSINOPHIL NFR BLD AUTO: 2 %
ERYTHROCYTE [DISTWIDTH] IN BLOOD BY AUTOMATED COUNT: 13.1 % (ref 10–15)
FERRITIN SERPL-MCNC: 65 NG/ML (ref 12–150)
GFR SERPL CREATININE-BSD FRML MDRD: >90 ML/MIN/1.73M2
GLUCOSE BLD-MCNC: 171 MG/DL (ref 70–99)
GLUCOSE UR STRIP-MCNC: NEGATIVE MG/DL
HCG UR QL: NEGATIVE
HCT VFR BLD AUTO: 46.1 % (ref 35–47)
HGB BLD-MCNC: 14.5 G/DL (ref 11.7–15.7)
HGB UR QL STRIP: NEGATIVE
HOLD SPECIMEN: NORMAL
HOLD SPECIMEN: NORMAL
HYALINE CASTS: 1 /LPF
IMM GRANULOCYTES # BLD: 0 10E3/UL
IMM GRANULOCYTES NFR BLD: 1 %
KETONES UR STRIP-MCNC: NEGATIVE MG/DL
LEUKOCYTE ESTERASE UR QL STRIP: ABNORMAL
LITHIUM SERPL-SCNC: 0.4 MMOL/L
LYMPHOCYTES # BLD AUTO: 1.6 10E3/UL (ref 0.8–5.3)
LYMPHOCYTES NFR BLD AUTO: 37 %
MCH RBC QN AUTO: 29.4 PG (ref 26.5–33)
MCHC RBC AUTO-ENTMCNC: 31.5 G/DL (ref 31.5–36.5)
MCV RBC AUTO: 93 FL (ref 78–100)
MONOCYTES # BLD AUTO: 0.8 10E3/UL (ref 0–1.3)
MONOCYTES NFR BLD AUTO: 18 %
MUCOUS THREADS #/AREA URNS LPF: PRESENT /LPF
NEUTROPHILS # BLD AUTO: 1.9 10E3/UL (ref 1.6–8.3)
NEUTROPHILS NFR BLD AUTO: 41 %
NITRATE UR QL: NEGATIVE
NRBC # BLD AUTO: 0 10E3/UL
NRBC BLD AUTO-RTO: 0 /100
OPIATES UR QL SCN: ABNORMAL
PH UR STRIP: 6.5 [PH] (ref 5–7)
PLATELET # BLD AUTO: 191 10E3/UL (ref 150–450)
POTASSIUM BLD-SCNC: 3.4 MMOL/L (ref 3.4–5.3)
PROT SERPL-MCNC: 8.6 G/DL (ref 6.8–8.8)
RBC # BLD AUTO: 4.94 10E6/UL (ref 3.8–5.2)
RBC URINE: 3 /HPF
SARS-COV-2 RNA RESP QL NAA+PROBE: POSITIVE
SODIUM SERPL-SCNC: 137 MMOL/L (ref 133–144)
SP GR UR STRIP: 1.02 (ref 1–1.03)
SQUAMOUS EPITHELIAL: 6 /HPF
UROBILINOGEN UR STRIP-MCNC: NORMAL MG/DL
WBC # BLD AUTO: 4.4 10E3/UL (ref 4–11)
WBC URINE: 8 /HPF

## 2021-08-08 PROCEDURE — 80307 DRUG TEST PRSMV CHEM ANLYZR: CPT | Performed by: EMERGENCY MEDICINE

## 2021-08-08 PROCEDURE — 80053 COMPREHEN METABOLIC PANEL: CPT | Performed by: EMERGENCY MEDICINE

## 2021-08-08 PROCEDURE — 250N000013 HC RX MED GY IP 250 OP 250 PS 637: Performed by: INTERNAL MEDICINE

## 2021-08-08 PROCEDURE — U0005 INFEC AGEN DETEC AMPLI PROBE: HCPCS | Performed by: EMERGENCY MEDICINE

## 2021-08-08 PROCEDURE — 120N000002 HC R&B MED SURG/OB UMMC

## 2021-08-08 PROCEDURE — 250N000013 HC RX MED GY IP 250 OP 250 PS 637: Performed by: EMERGENCY MEDICINE

## 2021-08-08 PROCEDURE — C9803 HOPD COVID-19 SPEC COLLECT: HCPCS | Performed by: EMERGENCY MEDICINE

## 2021-08-08 PROCEDURE — 36415 COLL VENOUS BLD VENIPUNCTURE: CPT | Performed by: EMERGENCY MEDICINE

## 2021-08-08 PROCEDURE — 81001 URINALYSIS AUTO W/SCOPE: CPT | Performed by: EMERGENCY MEDICINE

## 2021-08-08 PROCEDURE — 80178 ASSAY OF LITHIUM: CPT | Performed by: EMERGENCY MEDICINE

## 2021-08-08 PROCEDURE — 85379 FIBRIN DEGRADATION QUANT: CPT | Performed by: INTERNAL MEDICINE

## 2021-08-08 PROCEDURE — 99285 EMERGENCY DEPT VISIT HI MDM: CPT | Performed by: EMERGENCY MEDICINE

## 2021-08-08 PROCEDURE — 71045 X-RAY EXAM CHEST 1 VIEW: CPT

## 2021-08-08 PROCEDURE — 93010 ELECTROCARDIOGRAM REPORT: CPT | Performed by: INTERNAL MEDICINE

## 2021-08-08 PROCEDURE — 93005 ELECTROCARDIOGRAM TRACING: CPT

## 2021-08-08 PROCEDURE — 99222 1ST HOSP IP/OBS MODERATE 55: CPT | Mod: AI | Performed by: INTERNAL MEDICINE

## 2021-08-08 PROCEDURE — 86140 C-REACTIVE PROTEIN: CPT | Performed by: INTERNAL MEDICINE

## 2021-08-08 PROCEDURE — 82550 ASSAY OF CK (CPK): CPT | Performed by: INTERNAL MEDICINE

## 2021-08-08 PROCEDURE — 85025 COMPLETE CBC W/AUTO DIFF WBC: CPT | Performed by: EMERGENCY MEDICINE

## 2021-08-08 PROCEDURE — 82728 ASSAY OF FERRITIN: CPT | Performed by: INTERNAL MEDICINE

## 2021-08-08 PROCEDURE — 81025 URINE PREGNANCY TEST: CPT | Performed by: EMERGENCY MEDICINE

## 2021-08-08 RX ORDER — NALOXONE HYDROCHLORIDE 0.4 MG/ML
0.4 INJECTION, SOLUTION INTRAMUSCULAR; INTRAVENOUS; SUBCUTANEOUS
Status: DISCONTINUED | OUTPATIENT
Start: 2021-08-08 | End: 2021-08-12 | Stop reason: HOSPADM

## 2021-08-08 RX ORDER — HYDROXYZINE PAMOATE 25 MG/1
25 CAPSULE ORAL 4 TIMES DAILY PRN
COMMUNITY
End: 2021-12-25

## 2021-08-08 RX ORDER — POLYETHYLENE GLYCOL 3350 17 G/17G
17 POWDER, FOR SOLUTION ORAL DAILY PRN
Status: DISCONTINUED | OUTPATIENT
Start: 2021-08-08 | End: 2021-08-12 | Stop reason: HOSPADM

## 2021-08-08 RX ORDER — PHENOBARBITAL 32.4 MG/1
32.4 TABLET ORAL 3 TIMES DAILY
Status: DISCONTINUED | OUTPATIENT
Start: 2021-08-08 | End: 2021-08-09

## 2021-08-08 RX ORDER — HYDROXYZINE HYDROCHLORIDE 25 MG/1
25 TABLET, FILM COATED ORAL 4 TIMES DAILY PRN
Status: DISCONTINUED | OUTPATIENT
Start: 2021-08-08 | End: 2021-08-12 | Stop reason: HOSPADM

## 2021-08-08 RX ORDER — ONDANSETRON 2 MG/ML
4 INJECTION INTRAMUSCULAR; INTRAVENOUS EVERY 6 HOURS PRN
Status: DISCONTINUED | OUTPATIENT
Start: 2021-08-08 | End: 2021-08-12 | Stop reason: HOSPADM

## 2021-08-08 RX ORDER — GABAPENTIN 400 MG/1
1600 CAPSULE ORAL AT BEDTIME
Status: DISCONTINUED | OUTPATIENT
Start: 2021-08-08 | End: 2021-08-12 | Stop reason: HOSPADM

## 2021-08-08 RX ORDER — LITHIUM CARBONATE 300 MG/1
600 TABLET, FILM COATED, EXTENDED RELEASE ORAL EVERY EVENING
Status: DISCONTINUED | OUTPATIENT
Start: 2021-08-08 | End: 2021-08-12 | Stop reason: HOSPADM

## 2021-08-08 RX ORDER — LIDOCAINE 40 MG/G
CREAM TOPICAL
Status: DISCONTINUED | OUTPATIENT
Start: 2021-08-08 | End: 2021-08-12 | Stop reason: HOSPADM

## 2021-08-08 RX ORDER — ONDANSETRON 4 MG/1
4 TABLET, ORALLY DISINTEGRATING ORAL EVERY 6 HOURS PRN
Status: DISCONTINUED | OUTPATIENT
Start: 2021-08-08 | End: 2021-08-12 | Stop reason: HOSPADM

## 2021-08-08 RX ORDER — LITHIUM CARBONATE 300 MG/1
300 TABLET, FILM COATED, EXTENDED RELEASE ORAL EVERY MORNING
Status: DISCONTINUED | OUTPATIENT
Start: 2021-08-09 | End: 2021-08-12 | Stop reason: HOSPADM

## 2021-08-08 RX ORDER — NALOXONE HYDROCHLORIDE 0.4 MG/ML
0.2 INJECTION, SOLUTION INTRAMUSCULAR; INTRAVENOUS; SUBCUTANEOUS
Status: DISCONTINUED | OUTPATIENT
Start: 2021-08-08 | End: 2021-08-12 | Stop reason: HOSPADM

## 2021-08-08 RX ORDER — GABAPENTIN 800 MG/1
800 TABLET ORAL 2 TIMES DAILY
Status: DISCONTINUED | OUTPATIENT
Start: 2021-08-09 | End: 2021-08-12 | Stop reason: HOSPADM

## 2021-08-08 RX ORDER — PHENOBARBITAL 32.4 MG/1
32.4 TABLET ORAL 3 TIMES DAILY
Status: DISCONTINUED | OUTPATIENT
Start: 2021-08-08 | End: 2021-08-08

## 2021-08-08 RX ORDER — METHADONE HYDROCHLORIDE 5 MG/5ML
70 SOLUTION ORAL DAILY
Status: DISCONTINUED | OUTPATIENT
Start: 2021-08-09 | End: 2021-08-12 | Stop reason: HOSPADM

## 2021-08-08 RX ORDER — LITHIUM CARBONATE 600 MG/1
600 CAPSULE ORAL EVERY EVENING
Status: DISCONTINUED | OUTPATIENT
Start: 2021-08-08 | End: 2021-08-08 | Stop reason: CLARIF

## 2021-08-08 RX ORDER — ACETAMINOPHEN 500 MG
1000 TABLET ORAL ONCE
Status: COMPLETED | OUTPATIENT
Start: 2021-08-08 | End: 2021-08-09

## 2021-08-08 RX ADMIN — LITHIUM CARBONATE 600 MG: 300 TABLET, EXTENDED RELEASE ORAL at 23:03

## 2021-08-08 RX ADMIN — GABAPENTIN 1600 MG: 400 CAPSULE ORAL at 21:17

## 2021-08-08 RX ADMIN — PHENOBARBITAL 32.4 MG: 32.4 TABLET ORAL at 21:18

## 2021-08-08 RX ADMIN — NICOTINE POLACRILEX 4 MG: 2 GUM, CHEWING BUCCAL at 18:56

## 2021-08-08 ASSESSMENT — ENCOUNTER SYMPTOMS
HEADACHES: 0
ABDOMINAL PAIN: 0
TREMORS: 1
NAUSEA: 0
VOMITING: 0
SHORTNESS OF BREATH: 0
FLANK PAIN: 0
PSYCHIATRIC NEGATIVE: 1
DIAPHORESIS: 1
EYES NEGATIVE: 1
LIGHT-HEADEDNESS: 1
MUSCULOSKELETAL NEGATIVE: 1
FEVER: 0

## 2021-08-08 NOTE — PHARMACY-ADMISSION MEDICATION HISTORY
"  Admission Medication History Completed by Pharmacy    See [facility] Admission Navigator for allergy information, preferred outpatient pharmacy, prior to admission medications and immunization status.     Medication History Sources:   Patient, Dispense Report, Care Everywhere (Kym Saeed ([phone])    Changes made to PTA medication list (reason):  Added:   Hydroxyzine 25 mg caps  Deleted:   Omeprazole 20 mg caps (1 cap daily) - patient no longer taking  Miralax 17 g packet (1 packet daily) - patient no longer taking  Phenobarbital 32.4 mg tab - Take 1 tablet at bedtime on 3/2. Take 2 tabs at bedtime x2 day. On 3/4 take 1 tab at bedtime x1 day - old prescription   Probiotic product - take 1 capsule by mouth daily - not taking  Changed:   Condensed previous gabapentin entries into 1 entry.  Changed lithium from 300 mg BID to 1 in the morning and 2 at bedtime  Changed methadone dose from 140 mg to 70 mg  Changed albuterol directions from q4h to q6h per most recent fill    Additional Information:  See previous note for methadone information.   Patient stated she stopped taking fluoxetine 2 weeks ago due to the side effect it gave her. The side effect began after her dose increased from 40 mg to 60 mg at the end of March. She stated is gave her \"too much of a coffee feeling\"    Prior to Admission medications    Medication Sig Last Dose Taking? Auth Provider   albuterol (PROAIR HFA/PROVENTIL HFA/VENTOLIN HFA) 108 (90 Base) MCG/ACT inhaler Inhale 1-2 puffs into the lungs every 6 hours as needed for shortness of breath / dyspnea or wheezing  8/6/2021 at Unknown time Yes Unknown, Entered By History   gabapentin (NEURONTIN) 800 MG tablet Take 800 mg in the morning, 800 mg in the afternoon, and 1,600 mg at bedtime 8/8/2021 at am Yes Unknown, Entered By History   hydrOXYzine (VISTARIL) 25 MG capsule Take 25 mg by mouth 4 times daily as needed for anxiety (or sleep) Past Week at Unknown time Yes Unknown, Entered By " History   lithium ER (LITHOBID) 300 MG CR tablet Take 300 mg in the morning and 600 mg at bedtime. 8/7/2021 at pm Yes Unknown, Entered By History   methadone (DOLPHINE) 5 MG/5ML solution Take 70 mg by mouth daily  8/8/2021 at 6AM Yes Unknown, Entered By History   nicotine polacrilex (NICORETTE) 4 MG gum Place 1 each (4 mg) inside cheek every hour as needed for other (nicotine withdrawal symptoms) 8/7/2021 at Unknown time Yes Bahman Sol MD   FLUoxetine (PROZAC) 20 MG capsule Take 60 mg by mouth daily  Stopped taking two weeks ago  Unknown, Entered By History       Date completed: 08/08/21    Medication history completed by: Omero CARRERO

## 2021-08-08 NOTE — ED PROVIDER NOTES
[facility] EMERGENCY DEPARTMENT ([facility])    8/08/21     ED 9  2:29 PM   History     Chief Complaint   Patient presents with     Addiction Problem     Pt seeking detox from benzo's; using for 10 years; went to treatment today and they sent her here after reported seizure      The history is provided by the patient and medical records.     Jihan Gill is a 46 year old female  with history of bipolar 1 disorder on lithium, hepatitis C, polysubstance use disorder (benzodiazepines, methamphetamines, and opiates) who presents seeking detox from benzodiazepines.  She presented for chemical dependency treatment today, reported having a seizure and she was sent to the ED for further evaluation. She has been on methadone maintenance in the past. She also has had detox from benzodiazepines in the past using phenobarbital, last attempt at detox here was in March.  Patient has been trying to get into a chemical dependency treatment facility.  She states that this facility has both detox and treatment available.  She presented to treatment today and noted feeling generally unwell, sweaty, shaky and weak.  They found that she had been erroneously admitted into treatment centerwhen she needs detox first and was sent to the ED for further evaluation.  Patient states that she has been using benzodiazepines off and on since age 19. Lately been using 7-8 mg a day of alprazolam or klonopin bought off the street by friends who also go to the methadone clinic.  Patient states that she is trying to attain sobriety, even going as far as switching methadone clinics to avoid being around her usual connections.  She last used on Friday, 2 days ago.  She is feeling shaky, weak and sweaty.  She notes prior history of withdrawal seizure in 2012.  No suicidal ideation.  She is on methadone 70 mg as well as Prozac, lithium, gabapentin, vistaril.    She has not had a Covid vaccination.  She states she gets reactions to  vaccinations.  Social History: Works as a  but is currently on unemployment. 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 Patient has been under commitment in the past for substance use, this was last explored in 2019.  No subsequent commitment.     I have reviewed the Medications, Allergies, Past Medical and Surgical History, and Social History in the [employer] system.  Past Medical History:   Diagnosis Date     Arthritis      Bipolar 1 disorder (H)      Chronic hepatitis C without hepatic coma (H)      Depressive disorder     postpartum     Depressive disorder      Major depression, recurrent (H)      Opiate addiction (H)      Seizures (H)     narcotic withdrawal     Substance abuse (H)      Urinary calculus, unspecified 2001    Renal stones       Past Surgical History:   Procedure Laterality Date     C/SECTION, LOW TRANSVERSE      [id number]     ENT SURGERY       GYN SURGERY       TONSILLECTOMY         Past medical history, past surgical history, medications, and allergies were reviewed with the patient. Additional pertinent items: None    No family history on file.    Social History     Tobacco Use     Smoking status: Never Smoker     Smokeless tobacco: Never Used   Substance Use Topics     Alcohol use: Yes     Comment: States no EtOH since 2008.        Social history was reviewed with the patient. Additional pertinent items: None    Review of Systems   Constitutional: Positive for diaphoresis. Negative for fever.   Eyes: Negative.    Respiratory: Negative for shortness of breath.    Cardiovascular: Negative for chest pain.   Gastrointestinal: Negative for abdominal pain, nausea and vomiting.   Genitourinary: Negative for flank pain.   Musculoskeletal: Negative.    Skin: Negative for rash.   Neurological: Positive for tremors and light-headedness. Negative for headaches.   Psychiatric/Behavioral: Negative.    All other systems reviewed and are negative.          Physical Exam   BP:  138/81  Pulse: 109  Temp: (!) 100.5  F (38.1  C)  Resp: 18  SpO2: 95 %    Physical Exam    Physical Exam   Constitutional:   well nourished, well developed, resting comfortably   HENT:   Head: Normocephalic and atraumatic.   Eyes: Conjunctivae are normal. Pupils are equal, round, and reactive to light.    pharynx has no erythema or exudate, mucous membranes are moist  Neck:   no adenopathy, no bony tenderness  Cardiovascular: tachycardia without murmurs or gallops  Pulmonary/Chest: Clear to auscultation bilaterally, with no wheezes or retractions. No respiratory distress.  Normal work of breathing.  GI: Soft with good bowel sounds.  Non-tender, non-distended, with no guarding, no rebound, no peritoneal signs.   Back:  No bony or CVA tenderness   Musculoskeletal:  no edema   Skin: Skin is warm and diaphoretic. No rash noted.   Neurological: alert and oriented to person, place, and time. Nonfocal exam, tremulous  Psychiatric:  normal mood and affect.     ED Course     ED Course as of Aug 08 1553   Sun Aug 08, 2021   1552 Lithium level     Procedures          The medical record was reviewed and interpreted.  Current labs reviewed and interpreted.                 Results for orders placed or performed during the hospital encounter of 08/08/21 (from the past 24 hour(s))   CBC with platelets differential    Narrative    The following orders were created for panel order CBC with platelets differential.  Procedure                               Abnormality         Status                     ---------                               -----------         ------                     CBC with platelets and d...[[id number]]                      Final result                 Please view results for these tests on the individual orders.   Comprehensive metabolic panel   Result Value Ref Range    Sodium 137 133 - 144 mmol/L    Potassium 3.4 3.4 - 5.3 mmol/L    Chloride 103 94 - 109 mmol/L    Carbon Dioxide (CO2) 28 20 - 32 mmol/L    Anion  Gap 6 3 - 14 mmol/L    Urea Nitrogen 7 7 - 30 mg/dL    Creatinine 0.74 0.52 - 1.04 mg/dL    Calcium 9.7 8.5 - 10.1 mg/dL    Glucose 171 (H) 70 - 99 mg/dL    Alkaline Phosphatase 127 40 - 150 U/L    AST 28 0 - 45 U/L    ALT 44 0 - 50 U/L    Protein Total 8.6 6.8 - 8.8 g/dL    Albumin 4.1 3.4 - 5.0 g/dL    Bilirubin Total 0.3 0.2 - 1.3 mg/dL    GFR Estimate >90 >60 mL/min/1.73m2   CBC with platelets and differential   Result Value Ref Range    WBC Count 4.4 4.0 - 11.0 10e3/uL    RBC Count 4.94 3.80 - 5.20 10e6/uL    Hemoglobin 14.5 11.7 - 15.7 g/dL    Hematocrit 46.1 35.0 - 47.0 %    MCV 93 78 - 100 fL    MCH 29.4 26.5 - 33.0 pg    MCHC 31.5 31.5 - 36.5 g/dL    RDW 13.1 10.0 - 15.0 %    Platelet Count 191 150 - 450 10e3/uL    % Neutrophils 41 %    % Lymphocytes 37 %    % Monocytes 18 %    % Eosinophils 2 %    % Basophils 1 %    % Immature Granulocytes 1 %    NRBCs per 100 WBC 0 <1 /100    Absolute Neutrophils 1.9 1.6 - 8.3 10e3/uL    Absolute Lymphocytes 1.6 0.8 - 5.3 10e3/uL    Absolute Monocytes 0.8 0.0 - 1.3 10e3/uL    Absolute Eosinophils 0.1 0.0 - 0.7 10e3/uL    Absolute Basophils 0.0 0.0 - 0.2 10e3/uL    Absolute Immature Granulocytes 0.0 <=0.0 10e3/uL    Absolute NRBCs 0.0 10e3/uL   Extra Tube    Narrative    The following orders were created for panel order Extra Tube.  Procedure                               Abnormality         Status                     ---------                               -----------         ------                     Extra Blue Top Tube[[id number]]                              In process                 Extra Red Top Tube[[id number]]                               In process                   Please view results for these tests on the individual orders.   Urine Drugs of Abuse Screen    Narrative    The following orders were created for panel order Urine Drugs of Abuse Screen.  Procedure                               Abnormality         Status                     ---------                                -----------         ------                     Drug abuse screen 1 urin...[[id number]]                                                   Please view results for these tests on the individual orders.   UA with Microscopic reflex to Culture    Specimen: Urine, Midstream   Result Value Ref Range    Color Urine Yellow Colorless, Straw, Light Yellow, Yellow    Appearance Urine Clear Clear    Glucose Urine Negative Negative mg/dL    Bilirubin Urine Negative Negative    Ketones Urine Negative Negative mg/dL    Specific Gravity Urine 1.023 1.003 - 1.035    Blood Urine Negative Negative    pH Urine 6.5 5.0 - 7.0    Protein Albumin Urine 10  (A) Negative mg/dL    Urobilinogen Urine Normal Normal, 2.0 mg/dL    Nitrite Urine Negative Negative    Leukocyte Esterase Urine Small (A) Negative    Mucus Urine Present (A) None Seen /LPF    RBC Urine 3 (H) <=2 /HPF    WBC Urine 8 (H) <=5 /HPF    Squamous Epithelials Urine 6 (H) <=1 /HPF    Hyaline Casts Urine 1 <=2 /LPF    Narrative    Urine Culture not indicated   Asymptomatic COVID-19 Virus (Coronavirus) by PCR Nasopharyngeal    Specimen: Nasopharyngeal; Swab    Narrative    The following orders were created for panel order Asymptomatic COVID-19 Virus (Coronavirus) by PCR Nasopharyngeal.  Procedure                               Abnormality         Status                     ---------                               -----------         ------                     SARS-COV2 (COVID-19) Vir...[[id number]]                      In process                   Please view results for these tests on the individual orders.     Medications   acetaminophen (TYLENOL) tablet 1,000 mg (1,000 mg Oral Not Given 8/8/21 1522)              Assessments & Plan (with Medical Decision Making)       I have reviewed the nursing notes.  EMERGENCY DEPARTMENT COURSE: Patient was seen and examined at 1416 pm.       Laboratory studies show an unremarkable CBC and comprehensive metabolic panel apart from  hyperglycemia with a glucose of 171.  Lithium level is therapeutic at 0.4.  UA is contaminated with squamous epithelial cells.  Urine tox screen is pending.  COVID-19 studies are pending.    The patient is a 46-year-old female with a history of polysubstance abuse here with benzodiazepine withdrawal and tremulousness.  She will be admitted here to [facility] for detox.  A Covid test is pending at the time of this dictation.    Given the likely wait for a detox bed, I consulted with psychiatry and spoke with Dr. Henderson regarding starting phenobarbital for benzodiazepine withdrawal in the emergency department.  He agrees that this would be a good idea and recommends starting her on 32.4 mg p.o. 3 times daily.  The first dose was ordered here in the ED.    I have reviewed the findings, diagnosis, plan and need for follow up with the patient.    New Prescriptions    No medications on file       Final diagnoses:   Benzodiazepine withdrawal without complication (H)    I, Veronica Lopez, am serving as a trained medical scribe to document services personally performed by Teresa Mays MD based on the provider's statements to me on August 8, 2021.  This document has been checked and approved by the attending provider.    I, Teresa Mays MD, was physically present and have reviewed and verified the accuracy of this note documented by Veronica Lopez, medical scribe.     This note was created in part by the use of Dragon voice recognition dictation system. Inadvertent grammatical errors and typographical errors may still exist.  Teresa Mays MD    8/8/2021   [facility] EMERGENCY DEPARTMENT       Teresa Mays MD  08/08/21 [id number]

## 2021-08-08 NOTE — ED NOTES
Lab contacted about Pt not having her urine drug results posted yet and stated they were running them now.  Pt resting currently with eyes closed.

## 2021-08-08 NOTE — ED NOTES
Pt states she fell during the night at the treatment center last night and her roommate states she thinks she had a seizure but couldn't see anything in the dark. Pt states no hx of seizures.  Pt noted to have borderline temp and elevated HR.  PT states having urinary freq but states that might be her norm.  Pt also mentions bodyache, hot and cold chills and has a cough but then mentions that is her norm because she vaps.  Pt placed on precautions.      Pt states that she needs to get into detox and then has a plan to go to treatment this week.

## 2021-08-08 NOTE — PHARMACY
Methadone Clinic Information Note    Clinic Name[facility]   Clinic Location ([facility]): [facility]  Phone Number: [phone]    Verified methadone dose with on-call staff.  Dose: 70 mg per day  Last dosed in clinic 8/7/21 @ 0644  Take out for 8/8/21  Return to clinic 8/9/21.    Omero MEHTA

## 2021-08-08 NOTE — TELEPHONE ENCOUNTER
Patient cleared and ready for behavioral bed placement: Yes    S Pt is a 46 year old female at the [place] ed    B Pt has been benzo dependent. Pt tried to go to treatment today and was sent to ed for being in withdrawal. Pt has been abusing aparzolam and klonopine up to 8mg daily. Pt last used Friday night. Pt is getting substances on the streets outside methadone clinic. PT has a history of szs. Pt takes methadone 70mg. Pt denies MH concerns. Pt is medically cleared pending labs.    A Vol    R Placed on adult worklist intake awaiting labs pt temp over 100 need negative covid19

## 2021-08-09 LAB
ALBUMIN SERPL-MCNC: 3.4 G/DL (ref 3.4–5)
ALP SERPL-CCNC: 106 U/L (ref 40–150)
ALT SERPL W P-5'-P-CCNC: 38 U/L (ref 0–50)
ANION GAP SERPL CALCULATED.3IONS-SCNC: 3 MMOL/L (ref 3–14)
AST SERPL W P-5'-P-CCNC: 27 U/L (ref 0–45)
ATRIAL RATE - MUSE: 73 BPM
BASOPHILS # BLD AUTO: 0 10E3/UL (ref 0–0.2)
BASOPHILS NFR BLD AUTO: 0 %
BILIRUB SERPL-MCNC: 0.2 MG/DL (ref 0.2–1.3)
BUN SERPL-MCNC: 11 MG/DL (ref 7–30)
CALCIUM SERPL-MCNC: 9 MG/DL (ref 8.5–10.1)
CHLORIDE BLD-SCNC: 102 MMOL/L (ref 94–109)
CO2 SERPL-SCNC: 30 MMOL/L (ref 20–32)
CREAT SERPL-MCNC: 0.67 MG/DL (ref 0.52–1.04)
DIASTOLIC BLOOD PRESSURE - MUSE: NORMAL MMHG
EOSINOPHIL # BLD AUTO: 0.1 10E3/UL (ref 0–0.7)
EOSINOPHIL NFR BLD AUTO: 3 %
ERYTHROCYTE [DISTWIDTH] IN BLOOD BY AUTOMATED COUNT: 13.2 % (ref 10–15)
GFR SERPL CREATININE-BSD FRML MDRD: >90 ML/MIN/1.73M2
GLUCOSE BLD-MCNC: 152 MG/DL (ref 70–99)
HCT VFR BLD AUTO: 41.4 % (ref 35–47)
HGB BLD-MCNC: 13.5 G/DL (ref 11.7–15.7)
IMM GRANULOCYTES # BLD: 0 10E3/UL
IMM GRANULOCYTES NFR BLD: 1 %
INTERPRETATION ECG - MUSE: NORMAL
LYMPHOCYTES # BLD AUTO: 2 10E3/UL (ref 0.8–5.3)
LYMPHOCYTES NFR BLD AUTO: 53 %
MCH RBC QN AUTO: 30.1 PG (ref 26.5–33)
MCHC RBC AUTO-ENTMCNC: 32.6 G/DL (ref 31.5–36.5)
MCV RBC AUTO: 92 FL (ref 78–100)
MONOCYTES # BLD AUTO: 0.6 10E3/UL (ref 0–1.3)
MONOCYTES NFR BLD AUTO: 16 %
NEUTROPHILS # BLD AUTO: 1 10E3/UL (ref 1.6–8.3)
NEUTROPHILS NFR BLD AUTO: 27 %
NRBC # BLD AUTO: 0 10E3/UL
NRBC BLD AUTO-RTO: 0 /100
P AXIS - MUSE: 65 DEGREES
PLATELET # BLD AUTO: 170 10E3/UL (ref 150–450)
POTASSIUM BLD-SCNC: 3.6 MMOL/L (ref 3.4–5.3)
PR INTERVAL - MUSE: 150 MS
PROT SERPL-MCNC: 7.2 G/DL (ref 6.8–8.8)
QRS DURATION - MUSE: 86 MS
QT - MUSE: 408 MS
QTC - MUSE: 449 MS
R AXIS - MUSE: 79 DEGREES
RBC # BLD AUTO: 4.48 10E6/UL (ref 3.8–5.2)
SODIUM SERPL-SCNC: 135 MMOL/L (ref 133–144)
SYSTOLIC BLOOD PRESSURE - MUSE: NORMAL MMHG
T AXIS - MUSE: 64 DEGREES
VENTRICULAR RATE- MUSE: 73 BPM
WBC # BLD AUTO: 3.8 10E3/UL (ref 4–11)

## 2021-08-09 PROCEDURE — 250N000013 HC RX MED GY IP 250 OP 250 PS 637: Performed by: INTERNAL MEDICINE

## 2021-08-09 PROCEDURE — 250N000013 HC RX MED GY IP 250 OP 250 PS 637: Performed by: EMERGENCY MEDICINE

## 2021-08-09 PROCEDURE — 80053 COMPREHEN METABOLIC PANEL: CPT | Performed by: INTERNAL MEDICINE

## 2021-08-09 PROCEDURE — 250N000013 HC RX MED GY IP 250 OP 250 PS 637: Performed by: NURSE PRACTITIONER

## 2021-08-09 PROCEDURE — 99221 1ST HOSP IP/OBS SF/LOW 40: CPT | Performed by: NURSE PRACTITIONER

## 2021-08-09 PROCEDURE — 120N000002 HC R&B MED SURG/OB UMMC

## 2021-08-09 PROCEDURE — 36415 COLL VENOUS BLD VENIPUNCTURE: CPT | Performed by: INTERNAL MEDICINE

## 2021-08-09 PROCEDURE — 99232 SBSQ HOSP IP/OBS MODERATE 35: CPT | Performed by: HOSPITALIST

## 2021-08-09 PROCEDURE — 85025 COMPLETE CBC W/AUTO DIFF WBC: CPT | Performed by: INTERNAL MEDICINE

## 2021-08-09 RX ORDER — PHENOBARBITAL 16.2 MG/1
16.2 TABLET ORAL ONCE
Status: COMPLETED | OUTPATIENT
Start: 2021-08-09 | End: 2021-08-09

## 2021-08-09 RX ADMIN — NICOTINE POLACRILEX 4 MG: 4 GUM, CHEWING BUCCAL at 08:46

## 2021-08-09 RX ADMIN — METHADONE HYDROCHLORIDE 70 MG: 5 SOLUTION ORAL at 09:03

## 2021-08-09 RX ADMIN — HYDROXYZINE HYDROCHLORIDE 25 MG: 25 TABLET, FILM COATED ORAL at 01:30

## 2021-08-09 RX ADMIN — PHENOBARBITAL 48.6 MG: 16.2 TABLET ORAL at 14:46

## 2021-08-09 RX ADMIN — GABAPENTIN 1600 MG: 400 CAPSULE ORAL at 23:44

## 2021-08-09 RX ADMIN — GABAPENTIN 800 MG: 800 TABLET, FILM COATED ORAL at 14:46

## 2021-08-09 RX ADMIN — LITHIUM CARBONATE 300 MG: 300 TABLET, EXTENDED RELEASE ORAL at 08:45

## 2021-08-09 RX ADMIN — NICOTINE POLACRILEX 4 MG: 4 GUM, CHEWING BUCCAL at 20:24

## 2021-08-09 RX ADMIN — PHENOBARBITAL 48.6 MG: 16.2 TABLET ORAL at 20:22

## 2021-08-09 RX ADMIN — PHENOBARBITAL 16.2 MG: 16.2 TABLET ORAL at 12:36

## 2021-08-09 RX ADMIN — GABAPENTIN 800 MG: 800 TABLET, FILM COATED ORAL at 08:45

## 2021-08-09 RX ADMIN — FLUOXETINE 20 MG: 20 CAPSULE ORAL at 12:36

## 2021-08-09 RX ADMIN — ACETAMINOPHEN 1000 MG: 500 TABLET ORAL at 01:10

## 2021-08-09 RX ADMIN — LITHIUM CARBONATE 600 MG: 300 TABLET, EXTENDED RELEASE ORAL at 20:22

## 2021-08-09 RX ADMIN — PHENOBARBITAL 32.4 MG: 32.4 TABLET ORAL at 08:45

## 2021-08-09 ASSESSMENT — ACTIVITIES OF DAILY LIVING (ADL)
WEAR_GLASSES_OR_BLIND: NO
HEARING_DIFFICULTY_OR_DEAF: NO
DIFFICULTY_EATING/SWALLOWING: NO
DRESSING/BATHING_DIFFICULTY: NO
DIFFICULTY_COMMUNICATING: NO
CONCENTRATING,_REMEMBERING_OR_MAKING_DECISIONS_DIFFICULTY: NO
WALKING_OR_CLIMBING_STAIRS_DIFFICULTY: NO
TOILETING_ISSUES: NO
PATIENT_/_FAMILY_COMMUNICATION_STYLE: SPOKEN LANGUAGE (ENGLISH OR BILINGUAL)
DOING_ERRANDS_INDEPENDENTLY_DIFFICULTY: NO
FALL_HISTORY_WITHIN_LAST_SIX_MONTHS: NO

## 2021-08-09 NOTE — CONSULTS
[facility]   Initial Psychiatric Consult   Consult date: August 9, 2021         Reason for Consult, requesting source:    Assistance with benzodiazepine withdrawal  Requesting source: Praful Brooks    This visit was conducted via televideo conference using the CSID system due to COVID-19 infection. Patient consented to video visit. Patient located in her hospital bed. Provider located on 5 ortho. Start time was 9:52, end time was 10:27.         HPI:   Ms. Jihan Gill is a 46-year-old female who was admitted to [facility] on 8/8/21 after presenting to the emergency department seeking detox from benzodiazepines. Pt found to be COVID+ on admission. PMH significant for major depressive disorder, bipolar disorder, substance use disorder (benzos, stimulants, opiates), with a history of detox admissions (last in March 2021), CD commitment (last in 2019), psychiatric admissions (last in , and ECT (2017). Psychiatry is consulted for assistance with benzodiazepine withdrawal.     Per chart review, patient was supposed to attend inpatient CD treatment. She was called for a bed at the facility but once she arrived, they informed her that she would need to go to a detox facility first as they do not provide detox. She presented to [place] for detox, subsequently found to be COVID positive. Recently, has been using alprazolam and clonazepam, around 7 to 8 mg on average per day.     On interview today, patient discusses that she has been motivated to get off the benzodiazepines. Wanted to go to treatment, had a bed at [facility], but then began experiencing withdrawal symptoms at the treatment center and came to the hospital after about 1.5 days there. Reports that she was last in detox in March 2021 at [facility]. Says she was able to maintain sobriety until July 2021. Reports she had been thinking more about her life, had  "been focusing more on her son. She says her life has been slipping by her and she does not want to wake up at age 62 worried about where the years went. She says the benzodiazepines ultimately do not help with anything. She says she has has to spend up to $100 a day \"just to function.\" She currently lives with her mother. She receives unemployment income. Lost her job last year due to COVID. Had been in the spa industry. She has been feeling depressed, says she has had a low-level of depression since she was a teenager. ECT did help in the past. She has had periods of intermittent hopelessness, but she denies any thoughts to harm herself or end her life. Appetite has been up and down. Energy and motivation have been poor. She has feelings of shame and guilt at times. Sleep varies, depending on her chemical use. Has not taken fluoxetine for a couple of weeks, states the 60 mg dose makes her feels on-edge, jittery.         Past Psychiatric History:   Pt has a history of several psychiatric hospitalizations. History of several suicide attempts by overdosing on pills, jumping into a river, last overdose attempt in 2017. Sees Guanako Romero MD at [facility] for medications. Has been diagnosed with major depressive disorder, r/o bipolar disorder, cluster B traits, and substance-induced psychosis. Med trials include: Prozac, Paxil, Zoloft, Celexa, Lexapro, Effexor, Cymbalta, Wellbutrin, trazodone, Xanax, Ativan, Klonopin, Abilify, Geodon, Zyprexa, Seroquel, Risperdal, Ambien, Vistaril, BuSpar, gabapentin, lithium. History of ECT in 2014, 2017. No hx of MI commitment.         Substance Use and History:   History of benzodiazepine, opiate, and stimulant use. Most recently using about 6-8 mg per day on average of alprazolam and clonazepam. Some days will only use 3 mg and other days will use more, like 10-12 mg. Last use was 3 days ago. She is stable on 70 mg of methadone, hx of opioid dependence. Utox positive for cocaine. " History of multiple inpt CD treatments, hx of CD commitment in 2019. Last in detox March 2021 and was sober until July 2021. Supposed to go to [facility] but needs to detox first, and now quarantine due to COVID infection.         Past Medical History:   PAST MEDICAL HISTORY:   Past Medical History:   Diagnosis Date     Arthritis      Bipolar 1 disorder (H)      Chronic hepatitis C without hepatic coma (H)      Depressive disorder     postpartum     Depressive disorder      Major depression, recurrent (H)      Opiate addiction (H)      Seizures (H)     narcotic withdrawal     Substance abuse (H)      Urinary calculus, unspecified 2001    Renal stones       PAST SURGICAL HISTORY:   Past Surgical History:   Procedure Laterality Date     C/SECTION, LOW TRANSVERSE      [id number]     ENT SURGERY       GYN SURGERY       TONSILLECTOMY               Family History:   Mental illness on maternal side. Mother with depression and anxiety. Several family members with alcohol dependency.         Social History:   Pt grew up in [place], [place]. 2nd of four children. Parents  when she was 7. She was a high school graduate, dropped out of college. Currently on unemployment. Had been working as a  but lost her job during COVID.          Physical ROS:   The patient endorsed restlessness, anxiety. The remainder of 10-point review of systems was negative except as noted in HPI.         PTA Medications:     Medications Prior to Admission   Medication Sig Dispense Refill Last Dose     albuterol (PROAIR HFA/PROVENTIL HFA/VENTOLIN HFA) 108 (90 Base) MCG/ACT inhaler Inhale 1-2 puffs into the lungs every 6 hours as needed for shortness of breath / dyspnea or wheezing    8/6/2021 at Unknown time     gabapentin (NEURONTIN) 800 MG tablet Take 800 mg in the morning, 800 mg in the afternoon, and 1,600 mg at bedtime   8/8/2021 at am     hydrOXYzine (VISTARIL) 25 MG capsule Take 25 mg by mouth 4 times daily as needed for  anxiety (or sleep)   Past Week at Unknown time     lithium ER (LITHOBID) 300 MG CR tablet Take 300 mg in the morning and 600 mg at bedtime.   8/7/2021 at pm     methadone (DOLPHINE) 5 MG/5ML solution Take 70 mg by mouth daily    8/8/2021 at 6AM     nicotine polacrilex (NICORETTE) 4 MG gum Place 1 each (4 mg) inside cheek every hour as needed for other (nicotine withdrawal symptoms) 30 each 1 8/7/2021 at Unknown time     FLUoxetine (PROZAC) 20 MG capsule Take 60 mg by mouth daily    stopped taking 2 weeks ago          Allergies:     Allergies   Allergen Reactions     Abilify [Aripiprazole] Other (See Comments)     Tardive dyskinesia     Allopurinol      Compazine Other (See Comments)     Dystonia     Dramamine      Jaw lock.       Olanzapine Other (See Comments)     Tardive dyskinesia from Zyprexa - per patient     Reglan Other (See Comments)     dystonia     Seroquel [Quetiapine] Other (See Comments)     Tardive dyskinesia.           Labs:     Recent Results (from the past 48 hour(s))   Comprehensive metabolic panel    Collection Time: 08/08/21  2:43 PM   Result Value Ref Range    Sodium 137 133 - 144 mmol/L    Potassium 3.4 3.4 - 5.3 mmol/L    Chloride 103 94 - 109 mmol/L    Carbon Dioxide (CO2) 28 20 - 32 mmol/L    Anion Gap 6 3 - 14 mmol/L    Urea Nitrogen 7 7 - 30 mg/dL    Creatinine 0.74 0.52 - 1.04 mg/dL    Calcium 9.7 8.5 - 10.1 mg/dL    Glucose 171 (H) 70 - 99 mg/dL    Alkaline Phosphatase 127 40 - 150 U/L    AST 28 0 - 45 U/L    ALT 44 0 - 50 U/L    Protein Total 8.6 6.8 - 8.8 g/dL    Albumin 4.1 3.4 - 5.0 g/dL    Bilirubin Total 0.3 0.2 - 1.3 mg/dL    GFR Estimate >90 >60 mL/min/1.73m2   CBC with platelets and differential    Collection Time: 08/08/21  2:43 PM   Result Value Ref Range    WBC Count 4.4 4.0 - 11.0 10e3/uL    RBC Count 4.94 3.80 - 5.20 10e6/uL    Hemoglobin 14.5 11.7 - 15.7 g/dL    Hematocrit 46.1 35.0 - 47.0 %    MCV 93 78 - 100 fL    MCH 29.4 26.5 - 33.0 pg    MCHC 31.5 31.5 - 36.5 g/dL     RDW 13.1 10.0 - 15.0 %    Platelet Count 191 150 - 450 10e3/uL    % Neutrophils 41 %    % Lymphocytes 37 %    % Monocytes 18 %    % Eosinophils 2 %    % Basophils 1 %    % Immature Granulocytes 1 %    NRBCs per 100 WBC 0 <1 /100    Absolute Neutrophils 1.9 1.6 - 8.3 10e3/uL    Absolute Lymphocytes 1.6 0.8 - 5.3 10e3/uL    Absolute Monocytes 0.8 0.0 - 1.3 10e3/uL    Absolute Eosinophils 0.1 0.0 - 0.7 10e3/uL    Absolute Basophils 0.0 0.0 - 0.2 10e3/uL    Absolute Immature Granulocytes 0.0 <=0.0 10e3/uL    Absolute NRBCs 0.0 10e3/uL   Extra Blue Top Tube    Collection Time: 08/08/21  2:43 PM   Result Value Ref Range    Hold Specimen JIC    Extra Red Top Tube    Collection Time: 08/08/21  2:43 PM   Result Value Ref Range    Hold Specimen JIC    Lithium level    Collection Time: 08/08/21  2:43 PM   Result Value Ref Range    Lithium 0.4   mmol/L   CRP inflammation    Collection Time: 08/08/21  2:43 PM   Result Value Ref Range    CRP Inflammation 11.0 (H) 0.0 - 8.0 mg/L   Ferritin    Collection Time: 08/08/21  2:43 PM   Result Value Ref Range    Ferritin 65 12 - 150 ng/mL   CK total    Collection Time: 08/08/21  2:43 PM   Result Value Ref Range     30 - 225 U/L   D dimer quantitative    Collection Time: 08/08/21  2:43 PM   Result Value Ref Range    D-Dimer Quantitative 0.76 (H) 0.00 - 0.50 ug/mL FEU   HCG qualitative urine    Collection Time: 08/08/21  2:44 PM   Result Value Ref Range    hCG Urine Qualitative Negative Negative   UA with Microscopic reflex to Culture    Collection Time: 08/08/21  2:44 PM    Specimen: Urine, Midstream   Result Value Ref Range    Color Urine Yellow Colorless, Straw, Light Yellow, Yellow    Appearance Urine Clear Clear    Glucose Urine Negative Negative mg/dL    Bilirubin Urine Negative Negative    Ketones Urine Negative Negative mg/dL    Specific Gravity Urine 1.023 1.003 - 1.035    Blood Urine Negative Negative    pH Urine 6.5 5.0 - 7.0    Protein Albumin Urine 10  (A) Negative mg/dL     Urobilinogen Urine Normal Normal, 2.0 mg/dL    Nitrite Urine Negative Negative    Leukocyte Esterase Urine Small (A) Negative    Mucus Urine Present (A) None Seen /LPF    RBC Urine 3 (H) <=2 /HPF    WBC Urine 8 (H) <=5 /HPF    Squamous Epithelials Urine 6 (H) <=1 /HPF    Hyaline Casts Urine 1 <=2 /LPF   Drug abuse screen 1 urine (ED)    Collection Time: 08/08/21  2:44 PM   Result Value Ref Range    Amphetamines Urine Screen Negative Screen Negative    Barbiturates Urine Screen Negative Screen Negative    Benzodiazepines Urine Screen Positive (A) Screen Negative    Cannabinoids Urine Screen Negative Screen Negative    Cocaine Urine Screen Positive (A) Screen Negative    Opiates Urine Screen Negative Screen Negative   SARS-COV2 (COVID-19) Virus RT-PCR    Collection Time: 08/08/21  2:58 PM    Specimen: Nasopharyngeal; Swab   Result Value Ref Range    SARS CoV2 PCR Positive (A) Negative   EKG 12-lead, complete    Collection Time: 08/08/21  9:36 PM   Result Value Ref Range    Systolic Blood Pressure  mmHg    Diastolic Blood Pressure  mmHg    Ventricular Rate 73 BPM    Atrial Rate 73 BPM    AK Interval 150 ms    QRS Duration 86 ms     ms    QTc 449 ms    P Axis 65 degrees    R AXIS 79 degrees    T Axis 64 degrees    Interpretation ECG       Sinus rhythm  Possible Left atrial enlargement  Borderline ECG  When compared with ECG of 16-SEP-2019 15:36,  No significant change was found     Comprehensive metabolic panel    Collection Time: 08/09/21  5:44 AM   Result Value Ref Range    Sodium 135 133 - 144 mmol/L    Potassium 3.6 3.4 - 5.3 mmol/L    Chloride 102 94 - 109 mmol/L    Carbon Dioxide (CO2) 30 20 - 32 mmol/L    Anion Gap 3 3 - 14 mmol/L    Urea Nitrogen 11 7 - 30 mg/dL    Creatinine 0.67 0.52 - 1.04 mg/dL    Calcium 9.0 8.5 - 10.1 mg/dL    Glucose 152 (H) 70 - 99 mg/dL    Alkaline Phosphatase 106 40 - 150 U/L    AST 27 0 - 45 U/L    ALT 38 0 - 50 U/L    Protein Total 7.2 6.8 - 8.8 g/dL    Albumin 3.4 3.4 - 5.0  "g/dL    Bilirubin Total 0.2 0.2 - 1.3 mg/dL    GFR Estimate >90 >60 mL/min/1.73m2   CBC with platelets and differential    Collection Time: 08/09/21  5:44 AM   Result Value Ref Range    WBC Count 3.8 (L) 4.0 - 11.0 10e3/uL    RBC Count 4.48 3.80 - 5.20 10e6/uL    Hemoglobin 13.5 11.7 - 15.7 g/dL    Hematocrit 41.4 35.0 - 47.0 %    MCV 92 78 - 100 fL    MCH 30.1 26.5 - 33.0 pg    MCHC 32.6 31.5 - 36.5 g/dL    RDW 13.2 10.0 - 15.0 %    Platelet Count 170 150 - 450 10e3/uL    % Neutrophils 27 %    % Lymphocytes 53 %    % Monocytes 16 %    % Eosinophils 3 %    % Basophils 0 %    % Immature Granulocytes 1 %    NRBCs per 100 WBC 0 <1 /100    Absolute Neutrophils 1.0 (L) 1.6 - 8.3 10e3/uL    Absolute Lymphocytes 2.0 0.8 - 5.3 10e3/uL    Absolute Monocytes 0.6 0.0 - 1.3 10e3/uL    Absolute Eosinophils 0.1 0.0 - 0.7 10e3/uL    Absolute Basophils 0.0 0.0 - 0.2 10e3/uL    Absolute Immature Granulocytes 0.0 <=0.0 10e3/uL    Absolute NRBCs 0.0 10e3/uL          Physical and Psychiatric Examination:     BP 99/66 (BP Location: Left arm)   Pulse 71   Temp 98.4  F (36.9  C) (Oral)   Resp 16   SpO2 95%   Weight is 0 lbs 0 oz  There is no height or weight on file to calculate BMI.    Physical Exam:  I have reviewed the physical exam as documented by by the medical team and agree with findings and assessment and have no additional findings to add at this time.    Mental Status Exam:    Appearance: age-appearing, lying in hospital bed, adequate hygiene and grooming, wearing nasal cannula, mildly tearful  Behavior: cooperative, pleasant and calm, tearful  Orientation: alert and oriented to time, place and person  Movements: no tics, tremors, or dystonia observed  Mood: \"I'm okay\"  Affect: mildly tearful, dysphoric, mood-congruent  Speech: Normal rate, rhythm, tone  Memory: no impairment in immediate, recent, or remote memory  Fund of knowledge: average for development based on word choice  Concentration: attentive to " interview  Thought process and content: linear, coherent, organized; no delusions or paranoia endorsed or elicited. No auditory or visual hallucinations. No loosened associations.   Insight: fair, able to identify substance use as problematic  Judgement: fair, agreeable to treatment and detox  Safety: no suicidal or homicidal ideation, plan, or intent.            DSM-5 Diagnosis:   #1 Major Depressive Disorder, recurrent, moderate  #2 Unspecified anxiety disorder  #3 Rule out bipolar II disorder  #4 Sedative-hypnotic use disorder, benzodiazepines  #5 Benzodiazepine withdrawal  #6 Opiate use disorder, on methadone maintenance          Assessment:   Ms. Jihan Gill is a 46-year-old female who was admitted to [facility] on 8/8/21 after presenting to the emergency department seeking detox from benzodiazepines. Pt found to be COVID+ on admission. PMH significant for major depressive disorder, bipolar disorder, substance use disorder (benzos, stimulants, opiates), with a history of detox admissions (last in March 2021), CD commitment (last in 2019), psychiatric admissions (last in , and ECT (2017). Psychiatry is consulted for assistance with benzodiazepine withdrawal.     Mr. Gill has thus far received 2 doses of phenobarbital 32.4 mg. Last admission to detox in March, she was placed on 64.8 mg four times per day, which was decreased to 3 times per day after she became increasingly sedated. She reports that taper had gone well with those doses. She says she has been using about the same amount of benzodiazepines as she had prior to March. Has been using for about 1 month now. Uses Xanax and Klonopin which she purchases off the street, about 6-8 mg on average. I agreed to increase the phenobarbital to 48.6 mg TID and will continue to monitor her symptoms. She is having some sweating and restlessness and is having a hard time telling what is due to COVID versus withdrawal. Told her we use try to  keep a conservative approach so as to not suppress her respiratory drive, especially given that she is on methadone maintenance. She would like to restart fluoxetine for her depressed mood - states she gets too jittery at 60 mg. Will start back at 20 mg and increase to 40 mg after 5 days. After about 3 days, we can begin to taper phenobarbital.           Summary of Recommendations:   1) Increase phenobarbital to 48.6 mg TID. Continue this dose for about 3 days, then we can begin to taper. She can likely complete the taper at home. She will have to quarantine for probably 10-14 days before she can get back to inpatient CD treatment.    2) Restart fluoxetine at 20 mg per day x 5 days, then increase to 40 mg per day.    3) Continue lithium 300 mg in the morning and 600 mg at bedtime for mood stabilization. Level is a bit low at 0.4 but does not have a history of severe manic episodes so may not need a higher dose.     4) Continue PTA gabapentin dosing. Continue methadone maintenance 70 mg daily.    5) She is motivated to attend CD treatment. She will need to clarify whether she can return to [facility] after her quarantine period. She can likely return home to await CD treatment after acute withdrawals are managed.    6) Page me with additional questions or concerns during business hours. Will continue to follow along.      Martin Rodriguez, ROSALBA-BC, BLANCHE, CNP  [facility]   Contact information available via [facility] Paging/Directory

## 2021-08-09 NOTE — PLAN OF CARE
VS: /70   Pulse 71   Temp 97.6  F (36.4  C) (Oral)   Resp 16   SpO2 97%   Pt denies chest pain.    O2: >90% on room air when pt is awake. When pt began to fall asleep oxygen saturation dropped to 88%. Pt placed on 2L via nasal cannula and stats have remained above 90% for the rest of the night.   Pt has infrequent nonproductive cough.    Output: Voids spontaneously without difficulty.    Last BM: 8/7/21 per pt report   Activity: Up ad david in room, steady gait.    Up for meals? N/A   Skin: Intact   Pain: Pt reported headache and generalized aching. PRN one time dose of Tylenol given and pain decreased allowing pt to sleep through the night.    CMS: Intact, denies numbness or tingling. A&O x4   Dressing: None   Diet: Regular   LDA: No IV access   Equipment: IV pole/pump, and pt belongings.    Plan: Potential discharge 2-3 days based on withdrawal symptoms per Dr. Basilio's previous note.    Additional Info:

## 2021-08-09 NOTE — PROGRESS NOTES
Audio-Visit Details    Type of service:  Audio Visit    Video Start Time (time Audio started): 1230    Video End Time (time Audio  stopped): 1250    Originating Location (pt. Location): Other [facility]    Distant Location (provider location):  [facility]     Mode of Communication:  Video Conference via [employer]    Physician has received verbal consent for a Video Visit from the patient? Yes      Kerline Gonzalez MD      [facility], [place], [place]  Internal Medicine Progress Note      Assessment and Plans:        Jihan Gill is a 46 year old female admitted on 8/8/2021.  She has history of bipolar 1 disorder on lithium, hepatitis C, polysubstance use disorder (benzodiazepines, methamphetamines, and opiates) who presents seeking detox from benzodiazepines.  She is being admitted to the medical floor as she was found to be COVID positive and cannot be accommodated in inpatient detox unit      Withdrawal from Benzodiazepines  Benzodiazepine dependence    There is not a lot of clarity about how much alprazolam and klonopin she takes  Lately been using 7-8 mg a day of alprazolam and  klonopin bought off the street by friends   The ED team has originally discussed with Dr. Henderson regarding starting phenobarbital for benzodiazepine withdrawal in the emergency department.  He agrees that this would be a good idea and recommends starting her on 32.4 mg p.o. 3 times daily.  The first dose was ordered  in the ED.    Patient feels that the dose may be too low, and rcalls that previously, she may have been on 90 mg TID  She was seen by psychiatry and her dose of Phenobarbital has been adjusted to 48.6 mg TID.   Seizure precautions  Patient has been committed in the past for substance use       Bipolar disorder  Depression    Resume lithium 300 mg AM and 600 mg PM ( Unsure about compliance - Lithium level at  0.4 on 8/8)  Patient has not taken Prozac 60 mg in many weeks   Resume Gabapentin 800 g BID and 1600 mg at bedtime   Baseline EKG with Qtc at 449     H/O opiate abuse  Patient is on maintenance methadone (70 mg) through the [facility] clinic in [place]     COVID 19 infection:  Not Vaccinated  Had mild fever and intermittent couhg, altered taste. Otherwise no major symptoms. CXR clear, Not Oxygen dependant   CRP at 11. Ferritin at 65  No treatment indicated  Continue isolation precautions         Diet:  Regular adult diet   DVT Prophylaxis: Pneumatic Compression Devices  Lezama Catheter: Not present  Central Lines: None  Code Status:   Full     Kerline Gonzalez MD, MPH.  Internal Medicine Attending  [place], [place]    Click on the link below to page me.   Text Page  (7am - 5pm, M-F)    Subjective:      No covid symptoms noted.    Today she feels aching, sweaty, HA.    She feels crummy.    She has occasional cough. No sputum. Once every 2 hrs.    Fever is better.    No diarrhea. No abdominal pain.    Mild altered taste. NO changed in smell.    She was not vaccinated for covid.     -Data reviewed today: I reviewed all new labs and imaging results over the last 24 hours.     Exam:       Temp: 98.4  F (36.9  C) Temp src: Oral BP: 99/66 Pulse: 71   Resp: 16 SpO2: 95 % O2 Device: None (Room air)    There were no vitals filed for this visit.  Vital Signs with Ranges  Temp:  [97.6  F (36.4  C)-100.5  F (38.1  C)] 98.4  F (36.9  C)  Pulse:  [] 71  Resp:  [16-18] 16  BP: ()/(55-81) 99/66  SpO2:  [95 %-97 %] 95 %  No intake/output data recorded.    NO exam due to covid health crisis    Medications       FLUoxetine  20 mg Oral Daily    Followed by     [START ON 8/14/2021] FLUoxetine  40 mg Oral Daily     gabapentin  1,600 mg Oral At Bedtime     gabapentin  800 mg Oral BID     lithium ER  300 mg Oral QAM     lithium ER  600 mg Oral QPM     methadone  70 mg Oral Daily      PHENobarbital  48.6 mg Oral TID     sodium chloride (PF)  3 mL Intracatheter Q8H       Data   Recent Labs   Lab 08/09/21  0544 08/08/21  1443   WBC 3.8* 4.4   HGB 13.5 14.5   MCV 92 93    191    137   POTASSIUM 3.6 3.4   CHLORIDE 102 103   CO2 30 28   BUN 11 7   CR 0.67 0.74   ANIONGAP 3 6   ALEXEY 9.0 9.7   * 171*   ALBUMIN 3.4 4.1   PROTTOTAL 7.2 8.6   BILITOTAL 0.2 0.3   ALKPHOS 106 127   ALT 38 44   AST 27 28       Recent Results (from the past 24 hour(s))   XR Chest Port 1 View    Narrative    XR CHEST PORT 1 VIEW   8/8/2021 7:25 PM     HISTORY: COVID positive. fever    COMPARISON: 11/23/2020      Impression    IMPRESSION: No focal infiltrate or pleural effusion. Normal heart size  and pulmonary vascularity.    HANG GATICA MD         SYSTEM ID:  [id number]

## 2021-08-09 NOTE — ED NOTES
Patient signed out to me with plan for admission to detox however patient's Covid test came back positive therefore she will need a medical admission due to isolation rather than a detox admission.  Phenobarb was ordered for withdrawal however patient has been sleeping does not appear to have any current need for phenobarbital given no tremors or other major complications at this time.  Pharmacy was also questioning the need for it as well since she is positive for benzos and given her current exam.  Order will be held for now and we will continue to monitor for withdrawal symptoms before administering.  Patient discussed with medicine who will admit the patient.     Yuan Becker MD  08/08/21 1951

## 2021-08-09 NOTE — H&P
[facility]    History and Physical - Hospitalist Service       Date of Admission:  8/8/2021    Assessment & Plan      Jihan Gill is a 46 year old female admitted on 8/8/2021.  She has history of bipolar 1 disorder on lithium, hepatitis C, polysubstance use disorder (benzodiazepines, methamphetamines, and opiates) who presents seeking detox from benzodiazepines.  She is being admitted to the medical floor as she was found to be COVID positive and cannot be accommodated in inpatient detox unit     Withdrawal from Benzodiazepines  Benzodiazepine dependence  There is not a lot of clarity about how much alprazolam and klonopin she takes  Lately been using 7-8 mg a day of alprazolam and  klonopin bought off the street by friends   The ED team has originally discussed with Dr. Henderson regarding starting phenobarbital for benzodiazepine withdrawal in the emergency department.  He agrees that this would be a good idea and recommends starting her on 32.4 mg p.o. 3 times daily.  The first dose was ordered  in the ED.   Patient feels that the dose may be too low, and rcalls that previously, she may have been on 90 mg TID  Will consult psychiatry for assistance with tapering dose if the current dose is not sufficient  Seizure precautions   Patient has been committed in the past for substance use       Bipolar disorder  Depression  Resume lithium 300 mg AM and 600 mg PM ( Unsure about compliance - Lithium level at 0.4 on 8/8)  Patient has not taken Prozac 60 mg in many weeks   Resume Gabapentin 800 g BID and 1600 mg at bedtime   Baseline EKG with Qtc at 449    H/O opiate abuse  Patient is on maintenance methadone (70 mg) through the [facility] in [place]    COVID 19 infection:   Not Vaccinated   Asymptomatic. CXR clear, Not Oxygen dependant   CRP at 11. Ferritin at 65  No treatment indicated   Continue isolation precautions          Diet:  Regular adult diet    DVT Prophylaxis: Pneumatic Compression Devices  Lezama Catheter: Not present  Central Lines: None  Code Status:   Full     Clinically Significant Risk Factors Present on Admission                   Disposition Plan   Expected discharge: 2-3 days  recommended to prior to living arrangement once Withdrawal symptoms are complete . Patient will have to explore going to treatment place when termed not infective from COVID       The patient's care was discussed with the Bedside Nurse and Patient.    Praful Brooks MD  [facility]  Securely message with the Vocera Web Console (learn more here)  Text page via "[employer] Paging/Directory      ______________________________________________________________________    Chief Complaint   Benzodiazepine withdrawal   COVID 19 positive     History is obtained from the patient    History of Present Illness   Jihan Gill is a 46 year old female  with history of bipolar 1 disorder on lithium, hepatitis C, polysubstance use disorder (benzodiazepines, methamphetamines, and opiates) who presents seeking detox from benzodiazepines.  She presented for chemical dependency treatment today, reported having a seizure and she was sent to the ED for further evaluation. She has been on methadone maintenance in the past. She also has had detox from benzodiazepines in the past using phenobarbital, last attempt at detox here was in March.  Patient has been trying to get into a chemical dependency treatment facility.  She states that this facility has both detox and treatment available.  She presented to treatment today and noted feeling generally unwell, sweaty, shaky and weak.  They found that she had been erroneously admitted into treatment centerwhen she needs detox first and was sent to the ED for further evaluation.  Patient states that she has been using benzodiazepines off and on since age 19. Lately been using 7-8 mg a day of  alprazolam or klonopin bought off the street by friends who also go to the methadone clinic.  Patient states that she is trying to attain sobriety, even going as far as switching methadone clinics to avoid being around her usual connections.  She last used on Friday, 2 days ago.  She is feeling shaky, weak and sweaty.  She notes prior history of withdrawal seizure in 2012.  No suicidal ideation.  She is on methadone 70 mg as well as Prozac, lithium, gabapentin, vistaril. She says that she has been compliant with meds   Also mentions that he has been on heroin in the past, but has reduced this significantly. She did a line ay a party last week      She has not had a Covid vaccination.  She states she gets reactions to vaccinations.  Social History: Works as a  but is currently on unemployment. 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 Patient has been under commitment in the past for substance use, this was last explored in 2019.  No subsequent commitment.    She denies any chest pain or SOB   Denies subjective fevers. Endorses mild SOB, but no cough   Denies abdominal pain or diarrhea     Review of Systems    The 10 point Review of Systems is negative other than noted in the HPI or here.     Past Medical History    I have reviewed this patient's medical history and updated it with pertinent information if needed.   Past Medical History:   Diagnosis Date     Arthritis      Bipolar 1 disorder (H)      Chronic hepatitis C without hepatic coma (H)      Depressive disorder     postpartum     Depressive disorder      Major depression, recurrent (H)      Opiate addiction (H)      Seizures (H)     narcotic withdrawal     Substance abuse (H)      Urinary calculus, unspecified 2001    Renal stones       Past Surgical History   I have reviewed this patient's surgical history and updated it with pertinent information if needed.  Past Surgical History:   Procedure Laterality Date      C/SECTION, LOW TRANSVERSE      [id number]     ENT SURGERY       GYN SURGERY       TONSILLECTOMY         Social History   I have reviewed this patient's social history and updated it with pertinent information if needed.  Social History     Tobacco Use     Smoking status: Current Every Day Smoker     Packs/day: 0.50     Years: 8.00     Pack years: 4.00     Smokeless tobacco: Never Used   Substance Use Topics     Alcohol use: Not Currently     Comment: States no EtOH since 2008.     Drug use: Yes     Comment: Methadone 60mg/day street buy       Family History   I have reviewed this patient's family history and updated it with pertinent information if needed.  Family History   Problem Relation Age of Onset     No Known Problems Father      No Known Problems Mother      No Known Problems Sister      No Known Problems Brother      No significant family history, including no history of: Cancers, heart disease or strokes     Prior to Admission Medications   Prior to Admission Medications   Prescriptions Last Dose Informant Patient Reported? Taking?   FLUoxetine (PROZAC) 20 MG capsule stopped taking 2 weeks ago  Yes No   Sig: Take 60 mg by mouth daily    albuterol (PROAIR HFA/PROVENTIL HFA/VENTOLIN HFA) 108 (90 Base) MCG/ACT inhaler 8/6/2021 at Unknown time  Yes Yes   Sig: Inhale 1-2 puffs into the lungs every 6 hours as needed for shortness of breath / dyspnea or wheezing    gabapentin (NEURONTIN) 800 MG tablet 8/8/2021 at am  Yes Yes   Sig: Take 800 mg in the morning, 800 mg in the afternoon, and 1,600 mg at bedtime   hydrOXYzine (VISTARIL) 25 MG capsule Past Week at Unknown time  Yes Yes   Sig: Take 25 mg by mouth 4 times daily as needed for anxiety (or sleep)   lithium ER (LITHOBID) 300 MG CR tablet 8/7/2021 at pm  Yes Yes   Sig: Take 300 mg in the morning and 600 mg at bedtime.   methadone (DOLPHINE) 5 MG/5ML solution 8/8/2021 at 6AM  Yes Yes   Sig: Take 70 mg by mouth daily    nicotine polacrilex (NICORETTE) 4 MG gum  8/7/2021 at Unknown time  No Yes   Sig: Place 1 each (4 mg) inside cheek every hour as needed for other (nicotine withdrawal symptoms)      Facility-Administered Medications: None     Allergies   Allergies   Allergen Reactions     Abilify [Aripiprazole] Other (See Comments)     Tardive dyskinesia     Allopurinol      Compazine Other (See Comments)     Dystonia     Dramamine      Jaw lock.       Olanzapine Other (See Comments)     Tardive dyskinesia from Zyprexa - per patient     Reglan Other (See Comments)     dystonia     Seroquel [Quetiapine] Other (See Comments)     Tardive dyskinesia.        Physical Exam   Vital Signs: Temp: 98.4  F (36.9  C) Temp src: Oral BP: 101/56 Pulse: 77   Resp: 18 SpO2: 97 % O2 Device: None (Room air)    Weight: 0 lbs 0 oz    Constitutional: awake, alert, cooperative, no apparent distress, and appears stated age  Eyes: Lids and lashes normal, pupils equal, round and reactive to light, extra ocular muscles intact, sclera clear, conjunctiva normal  ENT: Normocephalic, without obvious abnormality, atraumatic, sinuses nontender on palpation, external ears without lesions, oral pharynx with moist mucous membranes  Respiratory: No increased work of breathing, good air exchange, clear to auscultation bilaterally, no crackles or wheezing  Cardiovascular: Normal apical impulse, regular rate and rhythm, normal S1 and S2, no S3 or S4, and no murmur noted  GI: No scars, normal bowel sounds, soft, non-distended, non-tender, no masses palpated, no hepatosplenomegally  Skin: no bruising or bleeding  Musculoskeletal: no lower extremity pitting edema present  Neurologic: Awake, alert, oriented to name, place and time.  Cranial nerves II-XII are grossly intact.     Data   Data reviewed today: I reviewed all medications, new labs and imaging results over the last 24 hours. I personally reviewed the chest x-ray image(s) showing No infiltrates..    Recent Labs   Lab 08/08/21  1443   WBC 4.4   HGB 14.5   MCV  93         POTASSIUM 3.4   CHLORIDE 103   CO2 28   BUN 7   CR 0.74   ANIONGAP 6   ALEXEY 9.7   *   ALBUMIN 4.1   PROTTOTAL 8.6   BILITOTAL 0.3   ALKPHOS 127   ALT 44   AST 28

## 2021-08-09 NOTE — UTILIZATION REVIEW
"    Admission Status; Secondary Review Determination         Under the authority of the Utilization Management Committee, the utilization review process indicated a secondary review on the above patient.  The review outcome is based on review of the medical records, discussions with staff, and applying clinical experience noted on the date of the review.        (X) Inpatient Status Appropriate - This patient's medical care is consistent with medical management for inpatient care and reasonable inpatient medical practice.      () Observation Status Appropriate - This patient does not meet hospital inpatient criteria and is placed in observation status. If this patient's primary payer is Medicare and was admitted as an inpatient, Condition Code 44 should be used and patient status changed to \"observation\".   () Admission Status NOT Appropriate - This patient's medical care is not consistent with medical management for Inpatient or Observation Status.          RATIONALE FOR DETERMINATION     \"Jihan Gill is a 46 year old female admitted on 8/8/2021.  She has history of bipolar 1 disorder on lithium, hepatitis C, polysubstance use disorder (benzodiazepines, methamphetamines, and opiates) who presents seeking detox from benzodiazepines.  She is being admitted to the medical floor as she was found to be COVID positive and cannot be accommodated in inpatient detox unit.\"    Psychiatry has seen the patient and recommend phenobarbital. Pt is on several medications such as gabapentin, prozac and lithium and will need close monitoring. The patient is likely to stay > 2 [place] for close monitoring and titration of her meds and inpatient status is appropriate.        The severity of illness, intensity of service provided, expected LOS and risk for adverse outcome make the care complex, high risk and appropriate for hospital admission.        The information on this document is developed by the utilization review team in " order for the business office to ensure compliance.  This only denotes the appropriateness of proper admission status and does not reflect the quality of care rendered.         The definitions of Inpatient Status and Observation Status used in making the determination above are those provided in the CMS Coverage Manual, Chapter 1 and Chapter 6, section 70.4.      Sincerely,     NELIDA HARRISON MD    Physician Advisor  Utilization Review/ Case Management  [facility].

## 2021-08-10 PROCEDURE — 120N000002 HC R&B MED SURG/OB UMMC

## 2021-08-10 PROCEDURE — 250N000013 HC RX MED GY IP 250 OP 250 PS 637: Performed by: NURSE PRACTITIONER

## 2021-08-10 PROCEDURE — 99207 PR CDG-MDM COMPONENT: MEETS MODERATE - UP CODED: CPT | Performed by: HOSPITALIST

## 2021-08-10 PROCEDURE — 250N000013 HC RX MED GY IP 250 OP 250 PS 637: Performed by: STUDENT IN AN ORGANIZED HEALTH CARE EDUCATION/TRAINING PROGRAM

## 2021-08-10 PROCEDURE — 250N000013 HC RX MED GY IP 250 OP 250 PS 637: Performed by: INTERNAL MEDICINE

## 2021-08-10 PROCEDURE — 99232 SBSQ HOSP IP/OBS MODERATE 35: CPT | Performed by: HOSPITALIST

## 2021-08-10 RX ORDER — ACETAMINOPHEN 325 MG/1
650 TABLET ORAL EVERY 4 HOURS PRN
Status: DISCONTINUED | OUTPATIENT
Start: 2021-08-10 | End: 2021-08-12 | Stop reason: HOSPADM

## 2021-08-10 RX ADMIN — GABAPENTIN 1600 MG: 400 CAPSULE ORAL at 23:46

## 2021-08-10 RX ADMIN — PHENOBARBITAL 48.6 MG: 16.2 TABLET ORAL at 20:22

## 2021-08-10 RX ADMIN — FLUOXETINE 20 MG: 20 CAPSULE ORAL at 08:51

## 2021-08-10 RX ADMIN — ACETAMINOPHEN 650 MG: 325 TABLET, FILM COATED ORAL at 01:58

## 2021-08-10 RX ADMIN — PHENOBARBITAL 48.6 MG: 16.2 TABLET ORAL at 14:31

## 2021-08-10 RX ADMIN — GABAPENTIN 800 MG: 800 TABLET, FILM COATED ORAL at 14:33

## 2021-08-10 RX ADMIN — GABAPENTIN 800 MG: 800 TABLET, FILM COATED ORAL at 08:52

## 2021-08-10 RX ADMIN — LITHIUM CARBONATE 600 MG: 300 TABLET, EXTENDED RELEASE ORAL at 20:22

## 2021-08-10 RX ADMIN — LITHIUM CARBONATE 300 MG: 300 TABLET, EXTENDED RELEASE ORAL at 08:52

## 2021-08-10 RX ADMIN — PHENOBARBITAL 48.6 MG: 16.2 TABLET ORAL at 08:52

## 2021-08-10 RX ADMIN — METHADONE HYDROCHLORIDE 70 MG: 5 SOLUTION ORAL at 08:58

## 2021-08-10 RX ADMIN — ACETAMINOPHEN 650 MG: 325 TABLET, FILM COATED ORAL at 10:27

## 2021-08-10 NOTE — PLAN OF CARE
Patient A/Ox4, very pleasant lady. VSS. Denies CP, SOB, dizziness/LH. LSCTA. +fl/BS. Voiding well in bathroom independently. CMS intact. Tolerating regular diet without NV. IS encouraged. Activity level is good, pt showered on eves and has been resting in bed for most of shift, able to sleep some. Pain rated as mildly sore, pt took one dose of PRN tylenol that overnight doctor ordered. Planning for detox once able to go. Patient has demonstrated ability to call appropriately. Patient is resting with call light within reach. Will continue to monitor.

## 2021-08-10 NOTE — PROGRESS NOTES
Audio-Visit Details    Type of service:  Audio Visit    Start Time (time Audio started): 1200noon    End Time (time Audio  stopped): 1210pm    Originating Location (pt. Location): Other [facility]    Distant Location (provider location):  [facility] MED SURG ORTHOPEDIC     Mode of Communication:  Audio Conference via [employer]    Physician has received verbal consent for a Video Visit from the patient? Yes      Kerline Gonzalez MD      [facility], [place], [place]  Internal Medicine Progress Note      Assessment and Plans:        Jihan Gill is a 46 year old female admitted on 8/8/2021.  She has history of bipolar 1 disorder on lithium, hepatitis C, polysubstance use disorder (benzodiazepines, methamphetamines, and opiates) who presents seeking detox from benzodiazepines.  She is being admitted to the medical floor as she was found to be COVID positive and cannot be accommodated in inpatient detox unit      Withdrawal from Benzodiazepines  Benzodiazepine dependence    There is not a lot of clarity about how much alprazolam and klonopin she takes  Lately been using 7-8 mg a day of alprazolam and  klonopin bought off the street by friends   The ED team has originally discussed with Dr. Henderson regarding starting phenobarbital for benzodiazepine withdrawal in the emergency department.  He agrees that this would be a good idea and recommends starting her on 32.4 mg p.o. 3 times daily.  The first dose was ordered  in the ED.    Patient feels that the dose may be too low, and rcalls that previously, she may have been on 90 mg TID  She was seen by psychiatry and her dose of Phenobarbital has been adjusted to 48.6 mg TID.   She is doing good with withdrawal. Now new issues.   Seizure precautions  Patient has been committed in the past for substance use       Bipolar disorder  Depression    Resume lithium 300 mg AM and 600 mg PM  ( Unsure about compliance - Lithium level at 0.4 on 8/8)  Patient has not taken Prozac 60 mg in many weeks   Resume Gabapentin 800 g BID and 1600 mg at bedtime   Baseline EKG with Qtc at 449     H/O opiate abuse  Patient is on maintenance methadone (70 mg) through the [facility] in [place]     COVID 19 infection:  Not Vaccinated  Had mild fever and mild intermittent cough, altered taste. Otherwise no major symptoms. CXR clear.   She required may 1 liter of oxygen overnight. Now taken off.   Respiratory symptoms are better. We will continue to monitor.   CRP at 11. Ferritin at 65  No treatment indicated  Continue isolation precautions         Diet:  Regular adult diet   DVT Prophylaxis: Pneumatic Compression Devices  Lezama Catheter: Not present  Central Lines: None  Code Status:   Full     Kerline Gonzalez MD, MPH.  Internal Medicine Attending  [place], [place]    Click on the link below to page me.   Text Page  (7am - 5pm, M-F)    Subjective:      She feels better.    Less cough. No chest pain. No sob.    No fevers.    No nausea or vomiting   Less craving.    Less tremors.    No vomiting or diarrhea   No agitation or anxiety    -Data reviewed today: I reviewed all new labs and imaging results over the last 24 hours.     Exam:       Temp: 97.7  F (36.5  C) Temp src: Oral BP: 106/68 Pulse: 66   Resp: 16 SpO2: 90 % O2 Device: None (Room air)    There were no vitals filed for this visit.  Vital Signs with Ranges  Temp:  [97.7  F (36.5  C)-99.5  F (37.5  C)] 97.7  F (36.5  C)  Pulse:  [66-74] 66  Resp:  [16] 16  BP: (106-127)/(68-89) 106/68  SpO2:  [90 %-95 %] 90 %  No intake/output data recorded.    NO exam due to covid health crisis    Medications       FLUoxetine  20 mg Oral Daily    Followed by     [START ON 8/14/2021] FLUoxetine  40 mg Oral Daily     gabapentin  1,600 mg Oral At Bedtime     gabapentin  800 mg Oral BID     lithium ER  300 mg Oral QAM     lithium ER  600  mg Oral QPM     methadone  70 mg Oral Daily     PHENobarbital  48.6 mg Oral TID     sodium chloride (PF)  3 mL Intracatheter Q8H       Data   Recent Labs   Lab 08/09/21  0544 08/08/21  1443   WBC 3.8* 4.4   HGB 13.5 14.5   MCV 92 93    191    137   POTASSIUM 3.6 3.4   CHLORIDE 102 103   CO2 30 28   BUN 11 7   CR 0.67 0.74   ANIONGAP 3 6   ALEXEY 9.0 9.7   * 171*   ALBUMIN 3.4 4.1   PROTTOTAL 7.2 8.6   BILITOTAL 0.2 0.3   ALKPHOS 106 127   ALT 38 44   AST 27 28       No results found for this or any previous visit (from the past 24 hour(s)).

## 2021-08-10 NOTE — PLAN OF CARE
VS: /68 (BP Location: Left arm)   Pulse 66   Temp 97.7  F (36.5  C) (Oral)   Resp 16   SpO2 94%      O2: Pt is on room air during the day. Occasional desaturation at night when on room air to 88% per night shift. When pt slept during the day, I had the pt utilize 1 L nasal canula if desaturation occurs. Not needed at of 1500.   Output: Voids adequately and spontaneously   Last BM: 8/10 per pt report. Bowel sounds active    Activity: Up ad david   Up for meals? yes   Skin: intact   Pain: Generalized aching pain. Given PRN tylenol    CMS: Intact to baseline   Dressing: None    Diet: Regular. No nausea    LDA: No PIV    Equipment: Personal belongings.    Plan: tbd    Additional Info: Pt has complained of general discomfort today. Stating if the phenobarbitol was increased she believes her comfort would increase because of past experiences with withdrawal. Psychiatry was contacted and they want to continue with current POC. Said they will try to contact the pt this evening if they are able

## 2021-08-10 NOTE — ED TRIAGE NOTES
"Per EMS this is the 3rd encounter this month for psych eval to different hospitals. Today pt was running on the streets acting \"bizarre\", confused, stating her child was locked in the car but there was nobody at the car she was pointing; and when asked, she said her kid is 18 yrs old.  PD placed her on a hold, breathalyzer was 0.00. Now pt is walking, cooperative, talking to self.   " NURSING DISCHARGE NOTE    Discharged Home via Wheelchair. Accompanied by Support staff  Belongings Taken by patient/family. PIV removed. Discharge AVS given to pt.  Discussed straining of urine at home and discussed f/u orders/ prescriptions- pt is in

## 2021-08-10 NOTE — PLAN OF CARE
"  VS: /89 (BP Location: Left arm)   Pulse 74   Temp 99.1  F (37.3  C) (Oral)   Resp 16   SpO2 95%     No chest pain. C/O some activity fatigue.    O2: Weaned to 1 L nasal canula to keep o2 >90%   Output: Voiding adequately    Last BM: 8/7 per pt report   Activity: Up ad david.    Up for meals? yes   Skin: Intact. Neck appeared matias but this seemed to improve over the shift   Pain: \"I feel achy\" . Pt wouldn't describe it as pain per her report   CMS: To baseline. Denies numbness and tingling    Dressing: none   Diet: Regular    LDA: No PIV access. No IV medications ordered    Equipment: IV pole and pump. Personal belongings. Seizure precautions    Plan: Expected discharge: 2-3 days  recommended to prior to living arrangement once Withdrawal symptoms are complete . Patient will have to explore going to treatment place when termed not infective from COVID      (per MD initial report by Dr Basilio).    Additional Info: Pt calls appropriately and makes needs known. Pt took a shower this evening and tolerated well       "

## 2021-08-11 PROCEDURE — 99232 SBSQ HOSP IP/OBS MODERATE 35: CPT | Performed by: HOSPITALIST

## 2021-08-11 PROCEDURE — 250N000013 HC RX MED GY IP 250 OP 250 PS 637: Performed by: NURSE PRACTITIONER

## 2021-08-11 PROCEDURE — 120N000002 HC R&B MED SURG/OB UMMC

## 2021-08-11 PROCEDURE — 250N000013 HC RX MED GY IP 250 OP 250 PS 637: Performed by: INTERNAL MEDICINE

## 2021-08-11 PROCEDURE — 99231 SBSQ HOSP IP/OBS SF/LOW 25: CPT | Performed by: NURSE PRACTITIONER

## 2021-08-11 PROCEDURE — 250N000013 HC RX MED GY IP 250 OP 250 PS 637: Performed by: STUDENT IN AN ORGANIZED HEALTH CARE EDUCATION/TRAINING PROGRAM

## 2021-08-11 RX ORDER — PHENOBARBITAL 16.2 MG/1
TABLET ORAL
Qty: 42 TABLET | Refills: 0 | Status: SHIPPED | OUTPATIENT
Start: 2021-08-11 | End: 2021-08-21

## 2021-08-11 RX ADMIN — PHENOBARBITAL 48.6 MG: 16.2 TABLET ORAL at 13:59

## 2021-08-11 RX ADMIN — PHENOBARBITAL 48.6 MG: 16.2 TABLET ORAL at 08:43

## 2021-08-11 RX ADMIN — FLUOXETINE 20 MG: 20 CAPSULE ORAL at 08:43

## 2021-08-11 RX ADMIN — LITHIUM CARBONATE 600 MG: 300 TABLET, EXTENDED RELEASE ORAL at 20:50

## 2021-08-11 RX ADMIN — NICOTINE POLACRILEX 4 MG: 4 GUM, CHEWING BUCCAL at 13:59

## 2021-08-11 RX ADMIN — GABAPENTIN 1600 MG: 400 CAPSULE ORAL at 20:49

## 2021-08-11 RX ADMIN — GABAPENTIN 800 MG: 800 TABLET, FILM COATED ORAL at 08:43

## 2021-08-11 RX ADMIN — METHADONE HYDROCHLORIDE 70 MG: 5 SOLUTION ORAL at 08:44

## 2021-08-11 RX ADMIN — GABAPENTIN 800 MG: 800 TABLET, FILM COATED ORAL at 13:59

## 2021-08-11 RX ADMIN — LITHIUM CARBONATE 300 MG: 300 TABLET, EXTENDED RELEASE ORAL at 08:43

## 2021-08-11 RX ADMIN — PHENOBARBITAL 48.6 MG: 16.2 TABLET ORAL at 20:49

## 2021-08-11 RX ADMIN — ACETAMINOPHEN 650 MG: 325 TABLET, FILM COATED ORAL at 15:53

## 2021-08-11 NOTE — CONSULTS
Psychiatry Consultation; Follow up              Reason for Consult, requesting source:    Discharge planning, benzo withdrawal  Requesting source: Praful Brooks     Visit was conducted via telephone due to COVID-19 infection. [employer] system unavailable at the time of visit.     Start time: 1435  End time: 1440         Interim history:    Ms. Jihan Gill is a 46-year-old female who was admitted to [facility] on 8/8/21 after presenting to the emergency department seeking detox from benzodiazepines. Pt found to be COVID+ on admission. PMH significant for major depressive disorder, bipolar disorder, substance use disorder (benzos, stimulants, opiates), with a history of detox admissions (last in March 2021), CD commitment (last in 2019), psychiatric admissions, and ECT (2017). Psychiatry is consulted for assistance with benzodiazepine withdrawal.     On interview today, patient reports that she is feeling good overall. She feels that the phenobarbital is helping to adequate manage her withdrawal symptoms. Initially, she did not think the dose was high enough, but now is noticing that it is helping. She reports she has been spending a lot of time sleeping here. She complains of the construction noises next to her room. She is agreeable to finish her phenobarbital taper at home. She plans to return to the [facility] for CD treatment when she finishes her 10-day quarantine period. Mood is stable, no suicidal thinking.          Medications:     Current Facility-Administered Medications   Medication     acetaminophen (TYLENOL) tablet 650 mg     FLUoxetine (PROzac) capsule 20 mg    Followed by     [START ON 8/14/2021] FLUoxetine (PROzac) capsule 40 mg     gabapentin (NEURONTIN) capsule 1,600 mg     gabapentin (NEURONTIN) tablet 800 mg     hydrOXYzine (ATARAX) tablet 25 mg     lidocaine (LMX4) cream     lidocaine 1 % 0.1-1 mL     lithium ER (LITHOBID) CR tablet 300 mg      "lithium ER (LITHOBID) CR tablet 600 mg     magnesium hydroxide (MILK OF MAGNESIA) suspension 30 mL     melatonin tablet 1 mg     methadone (DOLPHINE) solution 70 mg     naloxone (NARCAN) injection 0.2 mg    Or     naloxone (NARCAN) injection 0.4 mg    Or     naloxone (NARCAN) injection 0.2 mg    Or     naloxone (NARCAN) injection 0.4 mg     nicotine polacrilex (NICORETTE) gum 4 mg     ondansetron (ZOFRAN-ODT) ODT tab 4 mg    Or     ondansetron (ZOFRAN) injection 4 mg     PHENobarbital (LUMINAL) tablet 48.6 mg     polyethylene glycol (MIRALAX) Packet 17 g     sodium chloride (PF) 0.9% PF flush 3 mL     sodium chloride (PF) 0.9% PF flush 3 mL              MSE:     Unable to visualize patient due to telephone visit. Pt is calm, cooperative on the phone. She is alert and fully oriented. Mood is \"good.\" Speech is normal for rate, rhythm, and tone. Fund of knowledge is average for development. Recent and remote recall is grossly intact. Concentration is intact. Thoughts are linear, coherent, goal-directed. No evidence of anomalous perceptions. No delusions or paranoia.  No loosened associations. Insight and judgement are fair. No suicidal or homicidal ideation, plan, or intent.     Vital signs:  Temp: 98.1  F (36.7  C) Temp src: Oral BP: 114/62 Pulse: 64   Resp: 16 SpO2: 92 % O2 Device: None (Room air) Oxygen Delivery: 1 LPM      Estimated body mass index is 41.93 kg/m  as calculated from the following:    Height as of 2/22/21: 1.651 m (5' 5\").    Weight as of 2/22/21: 114.3 kg (252 lb).              DSM-5 Diagnosis:   #1 Major Depressive Disorder, recurrent, moderate  #2 Unspecified anxiety disorder  #3 Rule out bipolar II disorder  #4 Sedative-hypnotic use disorder, benzodiazepines  #5 Benzodiazepine withdrawal  #6 Opiate use disorder, on methadone maintenance          Assessment:   Ms. Jihan Gill is a 46-year-old female who was admitted to [facility] on 8/8/21 after presenting to the emergency " department seeking detox from benzodiazepines. Pt found to be COVID+ on admission. PMH significant for major depressive disorder, bipolar disorder, substance use disorder (benzos, stimulants, opiates), with a history of detox admissions (last in March 2021), CD commitment (last in 2019), psychiatric admissions (last in , and ECT (2017). Psychiatry is consulted for assistance with benzodiazepine withdrawal.     Ms. Gill is doing well today. Withdrawal symptoms are adequately managed. She remains on phenobarbital 48.6 mg TID. She continues fluoxetine 20 mg daily. Lithium and gabapentin are continued at PTA doses as well. She can be discharged tomorrow to continue her phenobarbital taper at home. Plans to go to CD treatment at [facility] after she completes COVID quarantine. Mood is stable, no SI.           Summary of Recommendations:   Continue phenobarbital taper:  On 8/12, she will continue 48.6 mg TID   8/13: 32.4 mg AM, 48.6 mg PM, 48.6 mg HS  8/14: 32.4 mg AM, 32.4 mg PM, 48.6 mg HS  8/15: 32.4 mg AM, 32.4 mg PM, 32.4 mg HS  8/16: 16.2 mg AM, 32.4 mg PM, 32.4 mg HS   8/17: 16.2 mg AM, 16.2 mg PM, 32.4 mg HS  8/18: 16.2 mg AM, 16.2 mg PM, 16.2 mg HS  8/19: 16.2 mg AM, 16.2 mg HS  8/20: 16.2 mg HS  8/21: Stop    - continue fluoxetine 20 mg daily, gabapentin 800 mg BID and 1600 at bedtime, lithium  mg AM and 600 mg HS, Methadone 70 mg daily.     - Pt will be in contact with [facility] regarding return to  treatment        Martin Rodriguez, ROSALBA-BC, APRN, CNP  [facility]   Contact information available via [facility] Paging/Directory

## 2021-08-11 NOTE — PLAN OF CARE
Patient A/Ox4. VSS. Denies CP, SOB, dizziness/LH. LSCTA. +fl/BS. Voiding well in bathroom. CMS intact. Tolerating regular diet without NV. Activity level is good, pt sleeping for most of shift. Denied pain thus far, has PRN tylenol available. Patient has demonstrated ability to call appropriately. Patient is resting with call light within reach. Will continue to monitor.

## 2021-08-11 NOTE — PROGRESS NOTES
Care Management Initial Consult    Message left for [employer] Restricted Recipient line. Asking to have restrictions lifted for Yahir Rodriguez NP to prescribe phenobarbital.     [facility] Restricted Recipient Line  Phone [phone]      Lifestyle & Psychosocial Needs:  Social Determinants of Health     Tobacco Use: High Risk     Smoking Tobacco Use: Current Every Day Smoker     Smokeless Tobacco Use: Never Used   Alcohol Use:      Frequency of Alcohol Consumption:      Average Number of Drinks:      Frequency of Binge Drinking:    Financial Resource Strain:      Difficulty of Paying Living Expenses:    Food Insecurity:      Worried About Running Out of Food in the Last Year:      Ran Out of Food in the Last Year:    Transportation Needs:      Lack of Transportation (Medical):      Lack of Transportation (Non-Medical):    Physical Activity:      Days of Exercise per Week:      Minutes of Exercise per Session:    Stress:      Feeling of Stress :    Social Connections:      Frequency of Communication with Friends and Family:      Frequency of Social Gatherings with Friends and Family:      Attends Gnosticist Services:      Active Member of Clubs or Organizations:      Attends Club or Organization Meetings:      Marital Status:    Intimate Partner Violence:      Fear of Current or Ex-Partner:      Emotionally Abused:      Physically Abused:      Sexually Abused:    Depression:      PHQ-2 Score:    Housing Stability:      Unable to Pay for Housing in the Last Year:      Number of Places Lived in the Last Year:      Unstable Housing in the Last Year:        Helene Price RN, BSN  Care Coordinator, [address] [place] [address]  Phone [phone]  Pager [phone]

## 2021-08-11 NOTE — PROGRESS NOTES
Audio-Visit Details    Type of service:  Audio Visit    Start Time (time Audio started): 1250p    End Time (time Audio  stopped): 1.02pm    Originating Location (pt. Location): Other 5 Ortho    Distant Location (provider location):  [facility] MED SURG ORTHOPEDIC     Mode of Communication:  Audio Conference via [employer]    Physician has received verbal consent for a Video Visit from the patient? Yes      Kerline Gonzalez MD      [facility], [place], [place]  Internal Medicine Progress Note      Assessment and Plans:        Jihan Gill is a 46 year old female admitted on 8/8/2021.  She has history of bipolar 1 disorder on lithium, hepatitis C, polysubstance use disorder (benzodiazepines, methamphetamines, and opiates) who presents seeking detox from benzodiazepines.  She is being admitted to the medical floor as she was found to be COVID positive and cannot be accommodated in inpatient detox unit      Withdrawal from Benzodiazepines  Benzodiazepine dependence    There is not a lot of clarity about how much alprazolam and klonopin she takes  Lately been using 7-8 mg a day of alprazolam and  klonopin bought off the street by friends   The ED team has originally discussed with Dr. Henderson regarding starting phenobarbital for benzodiazepine withdrawal in the emergency department.  He agrees that this would be a good idea and recommends starting her on 32.4 mg p.o. 3 times daily.  The first dose was ordered  in the ED.    Patient feels that the dose may be too low, and rcalls that previously, she may have been on 90 mg TID  She was seen by psychiatry and her dose of Phenobarbital has been adjusted to 48.6 mg TID.   She is doing good with withdrawal. Now new issues.   Seizure precautions  Patient has been committed in the past for substance use       Bipolar disorder  Depression    Resume lithium 300 mg AM and 600 mg PM (  Unsure about compliance - Lithium level at 0.4 on 8/8)  Patient has not taken Prozac 60 mg in many weeks   Resume Gabapentin 800 g BID and 1600 mg at bedtime   Baseline EKG with Qtc at 449     H/O opiate abuse  Patient is on maintenance methadone (70 mg) through the [facility] clinic in [place]     COVID 19 infection:  Not Vaccinated  Had mild fever and mild intermittent cough, altered taste. Otherwise no major symptoms. CXR clear.   She required may 1 liter of oxygen overnight. Now taken off.   Respiratory symptoms are better. We will continue to monitor.   CRP at 11. Ferritin at 65  No treatment indicated  Continue isolation precautions         Diet:  Regular adult diet   DVT Prophylaxis: Pneumatic Compression Devices  Lezama Catheter: Not present  Central Lines: None  Code Status:   Full     Kerline Gonzalez MD, MPH.  Internal Medicine Attending  [place], [place]    Click on the link below to page me.   Text Page  (7am - 5pm, M-F)    Subjective:      She feels better.    She feels better with covid   No fevers.    No nausea or vomiting   Less craving.    Less tremors.    No vomiting or diarrhea   No agitation or anxiety    -Data reviewed today: I reviewed all new labs and imaging results over the last 24 hours.     Exam:       Temp: 98.1  F (36.7  C) Temp src: Oral BP: 114/62 Pulse: 64   Resp: 16 SpO2: 92 % O2 Device: None (Room air)    There were no vitals filed for this visit.  Vital Signs with Ranges  Temp:  [98  F (36.7  C)-98.1  F (36.7  C)] 98.1  F (36.7  C)  Pulse:  [64-66] 64  Resp:  [16] 16  BP: (107-114)/(54-62) 114/62  SpO2:  [92 %-97 %] 92 %  No intake/output data recorded.    NO exam due to covid health crisis    Medications       FLUoxetine  20 mg Oral Daily    Followed by     [START ON 8/14/2021] FLUoxetine  40 mg Oral Daily     gabapentin  1,600 mg Oral At Bedtime     gabapentin  800 mg Oral BID     lithium ER  300 mg Oral QAM     lithium ER  600 mg Oral QPM      methadone  70 mg Oral Daily     PHENobarbital  48.6 mg Oral TID     sodium chloride (PF)  3 mL Intracatheter Q8H       Data   Recent Labs   Lab 08/09/21  0544 08/08/21  1443   WBC 3.8* 4.4   HGB 13.5 14.5   MCV 92 93    191    137   POTASSIUM 3.6 3.4   CHLORIDE 102 103   CO2 30 28   BUN 11 7   CR 0.67 0.74   ANIONGAP 3 6   ALEXEY 9.0 9.7   * 171*   ALBUMIN 3.4 4.1   PROTTOTAL 7.2 8.6   BILITOTAL 0.2 0.3   ALKPHOS 106 127   ALT 38 44   AST 27 28       No results found for this or any previous visit (from the past 24 hour(s)).

## 2021-08-11 NOTE — CONSULTS
Social Work Progress Note    SW spoke with the pt via phone, as she is COVID positive and SW cannot enter the room.  PATRICIA provided the pt's bedside RN Rehana a list of outpatient CD resources to provide to the pt.  The pt relayed that she is a restricted recipient through [facility] - her regular pharmacy is [facility] located at [address], [place] [address], ph: [phone].  The pt relayed that she spoke with Dr. Gonzalez and he relayed that RYLEY Rodriguez will be the provider prescribing the phenobarbital taper, so he will need to be added to her restriction list in order to get the medications filled.  SW relayed that she will inform RACHELL Esquivel to work on lifting the restriction.  The pt was not sure if Dr. Gonzalez also needed to be added in order to prescribe medications.  PATRICIA passed on the information to RACHELL Esquivel.     Sandi Ding [facility]  5 Ortho & 8A   Ph: [phone]  Pager: [phone]

## 2021-08-11 NOTE — PLAN OF CARE
VS: VSS. Denies CP/SOB. A/O x4.    O2: >90% on RA, infrequent nonproductive cough. Lung sounds clear.    Output: Voiding adequate amounts w/o pain or difficulty    Last BM: 8/10   Activity: Up independently, steady gait    Up for meals? yes   Skin: Intact    Pain: Denies    CMS: Intact    Dressing: None    Diet: Regular, tolerating well    LDA: PIV saline locked    Equipment: Iv pole    Plan: Anticipated discharge home tomorrow. Continue phenobarbital taper at home. Plans to go to CD treatment at [facility] after she completes COVID quarantine   Additional Info:

## 2021-08-11 NOTE — PLAN OF CARE
VS: VSS, pt denied CP or SOB.   O2: Room air this evening, sat. > 90%.   Output: Voiding adequate amount in bathroom.    Last BM: 08/10/21   Activity: Up independent in the room.    Skin: Intact.    Pain: Denied pain or discomfort this evening.    CMS: CMS and neuro intact to baseline.    Dressing: None.    Diet: Regular tolerating okay.    LDA: None.    Equipment: Personal belongings.    Plan: TBD.    Additional Info:

## 2021-08-11 NOTE — PROGRESS NOTES
Phenobarbital script was faxed to [id number] in [place], [place] (Fax [phone]). Placed hard copy in the chart.

## 2021-08-12 VITALS
RESPIRATION RATE: 16 BRPM | DIASTOLIC BLOOD PRESSURE: 48 MMHG | SYSTOLIC BLOOD PRESSURE: 101 MMHG | TEMPERATURE: 97.2 F | HEART RATE: 60 BPM | OXYGEN SATURATION: 97 %

## 2021-08-12 PROCEDURE — 250N000013 HC RX MED GY IP 250 OP 250 PS 637: Performed by: INTERNAL MEDICINE

## 2021-08-12 PROCEDURE — 250N000013 HC RX MED GY IP 250 OP 250 PS 637: Performed by: NURSE PRACTITIONER

## 2021-08-12 PROCEDURE — 99239 HOSP IP/OBS DSCHRG MGMT >30: CPT | Performed by: HOSPITALIST

## 2021-08-12 PROCEDURE — 99207 PR CDG-CODE INCORRECT PER BILLING BASED ON TIME: CPT | Performed by: HOSPITALIST

## 2021-08-12 RX ORDER — ACETAMINOPHEN 325 MG/1
650 TABLET ORAL EVERY 4 HOURS PRN
Qty: 30 TABLET | Refills: 0 | Status: ON HOLD | OUTPATIENT
Start: 2021-08-12 | End: 2023-04-05

## 2021-08-12 RX ADMIN — FLUOXETINE 20 MG: 20 CAPSULE ORAL at 09:01

## 2021-08-12 RX ADMIN — PHENOBARBITAL 48.6 MG: 16.2 TABLET ORAL at 09:01

## 2021-08-12 RX ADMIN — LITHIUM CARBONATE 300 MG: 300 TABLET, EXTENDED RELEASE ORAL at 09:00

## 2021-08-12 RX ADMIN — GABAPENTIN 800 MG: 800 TABLET, FILM COATED ORAL at 09:01

## 2021-08-12 RX ADMIN — METHADONE HYDROCHLORIDE 70 MG: 5 SOLUTION ORAL at 09:00

## 2021-08-12 NOTE — PROGRESS NOTES
Care Management POST Discharge Note    Discharge Date: 08/12/2021         Additional Information:  Patient called post discharge, went to [facility] pharmacy, unable to  phenobarbital as restriction had not been lifted. Patient provided name and # of her CM in restrictedprogram.  Riddhi @  [phone].   Left VM for Riddhi requesting restriction be removed for  This patient at [facility], Provided name of prescriber and NPI #.    Yahir Rodriguez  [id number]  Provide  This RNCC CB number.    3:35 PM Addendum:   Received call from Riddhi at [facility] , Riddhi was very angry, states she did not receive any calls directly or on the restricted line , just heard from patient she was unable to p/u her pheno barbital at [facility]. States patient needs this today, so she is working on referral. States she cannot just lift a restriction on a [employer] patient as this would be a $5000 fine, When we have a restricted patient we need to start working on them right away, need at least 2 days to get a referral. Riddhi states patient states  has been asking since admit to please start process to have restriction removed. Was informed that clinic can be contacted to see if PCP will write script. Informed  Riddhi I would try and reach clinic, was abruptly told she needed to go and get working on this referral as patient cannot go with out this med. Was left with not a clear picture of where things  regards to medication being secured for patient. This RNCC will try and reach out to clinic/PCP.    3:49 PM Addendum   This RNCC reached out to clinic, was given name of PCP Valentine Spaulding and transferred to clinic. Spoke with PCP clinic in [facility], explained situation and with patient needing this medication ASAP and override not being in place before discharge. Provided dosage of phenobarbital and explained that it is imperative that patient receive this medication today. Provided my call back #. Wasminformed  that this info would be sent over too PCP .    August 13, 2021  This RNCC attempted to reach Jihan as follow up to make sure she received her medications. No answer left VM message.     Rosa Maria CUELLON RN CCM  RN Care Coordinator [address] [place] [id number]  [email or web address]   Office: [phone]   Pager: [phone]    To contact Weekend RNCC, dial [phone] and enter job code [phone] at prompt. This pager can not be contacted by text page or outside line.

## 2021-08-12 NOTE — PLAN OF CARE
VS: Declined VS overnight. Denies CP/SOB. A/O x4.    O2: >90% on RA, infrequent nonproductive cough.   Output: Voiding adequate amounts, no new issues   Last BM: 8/10   Activity: Up independently in room   Up for meals? yes   Skin: Intact    Pain: Denies    CMS: Intact    Dressing: None    Diet: Regular, tolerating well    LDA: PIV saline locked    Equipment: Personal belongings in room   Plan: Anticipated discharge home today. Continue phenobarbital taper at home. Plans to go to CD treatment at [facility] after she completes COVID quarantine.   .Additional Info:  Very pleasant lady, sarcastic and good sense of humor.

## 2021-08-12 NOTE — PLAN OF CARE
Pagetan SALINAS regarding need for 2 days of gabapentin 800 mg TID to be filled @ [id number] - [place], [place] - [address] AT [facility] [place] [address] d/t psych MD has not yet filled medication, for discharge, per pt, until ordered by primary psych MD. Pt discharged, with home medications filled at home pharmacy above, and medication script faxed to above pharmacy for PHENobarbital medication. Pt discharged with mask, AVS reviewed without further questions, via patient transport for discharge home.       VS: VSS.    O2: 97% on room air. Denies SOB, denies difficulty breathing.    Output: Voiding spontaneously using toilet in bathroom.   Last BM: 8/10/2021. Pt denies constipation.    Activity: Independent.    Up for meals? Yes.    Skin: Intact.    Pain: Denies.    CMS: Intact.    Dressing: None.    Diet: Regular, thin. Takes pills whole without difficulty.    LDA: None.    Equipment: None.    Plan: Pt discharged with AVS, medication script, and patient belongings. See above.    Additional Info: Pt A&Ox4, advocating for needs. AVS reviewed with pt without further questions. Discharge medication script sent with patient and faxed - see above.

## 2021-08-13 ENCOUNTER — PATIENT OUTREACH (OUTPATIENT)
Dept: CARE COORDINATION | Facility: CLINIC | Age: 46
End: 2021-08-13

## 2021-08-13 DIAGNOSIS — Z71.89 OTHER SPECIFIED COUNSELING: ICD-10-CM

## 2021-08-13 NOTE — PROGRESS NOTES
Clinic Care Coordination Contact  [facility]/Voicemail       Clinical Data: Care Coordinator Outreach  Outreach attempted x 1.  Left message on patient's voicemail with call back information and requested return call.  Plan:Care Coordinator will try to reach patient again in 1-2 business days.          Misha Braxton MA  Clinic Care Coordination

## 2021-08-13 NOTE — DISCHARGE SUMMARY
[facility]  Discharge Summary    Jihan Gill MRN# [id number]   YOB: 1975 Age: 46 year old     Date of Admission:  8/8/2021  Date of Discharge:  8/13/2021  Admitting Physician:  Tahira Bateman MD  Discharge Physician:  Kerline Gonzalez MD  Discharging Service:  Internal Medicine     Primary Provider: Marty, Cass[facility]            Discharge Diagnosis:     Primary Discharge Diagnosis:    Withdrawal from Benzodiazepines  Benzodiazepine dependence    Bipolar disorder  Depression   H/O opiate abuse  COVID 19 infection    Past Medical History:   Diagnosis Date     Arthritis      Bipolar 1 disorder (H)      Chronic hepatitis C without hepatic coma (H)      Depressive disorder     postpartum     Depressive disorder      Major depression, recurrent (H)      Opiate addiction (H)      Seizures (H)     narcotic withdrawal     Substance abuse (H)      Urinary calculus, unspecified 2001    Renal stones                  Discharge Medications:       Discharge Medication List as of 8/12/2021  9:42 AM      START taking these medications    Details   acetaminophen (TYLENOL) 325 MG tablet Take 2 tablets (650 mg) by mouth every 4 hours as needed for mild pain or fever, Disp-30 tablet, R-0, E-Prescribe      PHENobarbital (LUMINAL) 16.2 MG tablet On 8/12 take 3 tabs PM, 3 tabs HS; 8/13 take 2 tabs AM, 3 tabs PM, 3 tabs HS; 8/14 take 2 tabs AM, 2 tabs PM, 3 tabs HS; 8/15 2 tabs three times per day; 8/16 1 tab AM, 2 tabs PM, 2 tabs HS; 8/17 1 tab AM, 1 tab PM, 2 tabs PM; 8/18 1 tab three times per  day; 8/19 1 tab AM and 1 tab HS; 8/20 1 tab HS; 8/21 STOP, Disp-42 tablet, R-0, Local Print         CONTINUE these medications which have NOT CHANGED    Details   albuterol (PROAIR HFA/PROVENTIL HFA/VENTOLIN HFA) 108 (90 Base) MCG/ACT inhaler Inhale 1-2 puffs into the lungs every 6 hours as needed for shortness of breath / dyspnea or wheezing , HistoricalPharmacy may dispense brand  covered by insurance (Proair, or proventil or ventolin or generic albuterol inhaler)      gabapentin (NEURONTIN) 800 MG tablet Take 800 mg in the morning, 800 mg in the afternoon, and 1,600 mg at bedtime, Historical      hydrOXYzine (VISTARIL) 25 MG capsule Take 25 mg by mouth 4 times daily as needed for anxiety (or sleep), Historical      lithium ER (LITHOBID) 300 MG CR tablet Take 300 mg in the morning and 600 mg at bedtime., Historical      nicotine polacrilex (NICORETTE) 4 MG gum Place 1 each (4 mg) inside cheek every hour as needed for other (nicotine withdrawal symptoms), Disp-30 each, R-1, E-Prescribe      FLUoxetine (PROZAC) 20 MG capsule Take 60 mg by mouth daily , Historical         STOP taking these medications       methadone (DOLPHINE) 5 MG/5ML solution Comments:   Reason for Stopping:                    Hospital Course:     Jihan Gill is a 46 year old female admitted on 8/8/2021.  She has history of bipolar 1 disorder on lithium, hepatitis C, polysubstance use disorder (benzodiazepines, methamphetamines, and opiates) who presents seeking detox from benzodiazepines.  She is being admitted to the medical floor as she was found to be COVID positive and cannot be accommodated in inpatient detox unit      Withdrawal from Benzodiazepines  Benzodiazepine dependence     There is not a lot of clarity about how much alprazolam and klonopin she takes  Lately been using 7-8 mg a day of alprazolam and  klonopin bought off the street by friends   The ED team has originally discussed with Dr. Henderson regarding starting phenobarbital for benzodiazepine withdrawal in the emergency department.  He agrees that this would be a good idea and recommends starting her on 32.4 mg p.o. 3 times daily.  The first dose was ordered  in the ED.     Patient feels that the dose may be too low, and rcalls that previously, she may have been on 90 mg TID  She was seen by psychiatry and her dose of Phenobarbital has been  adjusted to 48.6 mg TID.  She did well on this dose. She is now started on phenobarb taper. She had mild withdrawal symptoms with anxiety, craving and HA and jitteriness. Now all those are gone.   She is doing good with withdrawal. Now new issues.   Patient has been committed in the past for substance use  She was seen by psychiatry and addiction team.   Give covid, she wont be able to go to any inpatient program, until she is cleared from COVID 19 Standpoint.  She will be in quarantine for 10 days from  08/08/21. After that she is supposed to go to drug rehab program.       Bipolar disorder  Depression     Resume lithium 300 mg AM and 600 mg PM ( Unsure about compliance - Lithium level at 0.4 on 8/8)  Patient has not taken Prozac 60 mg in many weeks   Resume Gabapentin 800 g BID and 1600 mg at bedtime   Baseline EKG with Qtc at 449     COVID 19 infection:  Not Vaccinated  Had mild fever and mild intermittent cough, altered taste. Otherwise no major symptoms. CXR clear.   She required may 1 liter of oxygen overnight. Now taken off. Her symptoms have gotten better.   Respiratory symptoms are better. We will continue to monitor.   CRP at 11. Ferritin at 65  No treatment indicated  Give covid, she wont be able to go to any inpatient program, until she is cleared from COVID 19 [facility].  She will be in quarantine for 10 days from  08/08/21. After that she is supposed to go to drug rehab program.      Diet:  Regular adult diet   DVT Prophylaxis: Pneumatic Compression Devices  Lezama Catheter: Not present  Central Lines: None  Code Status:   Full          Discharge Disposition:   Discharged to home           Condition on Discharge:   Discharge condition: Stable   Code status on discharge: Full Code           Procedures:     None          Consultations:   Consultation during this admission received from Psychiatry.               Final Day of Progress before Discharge:   Physical Exam:    Blood pressure 101/48, pulse 60,  temperature 97.2  F (36.2  C), temperature source Oral, resp. rate 16, SpO2 97 %, not currently breastfeeding.     GENERAL: Alert and oriented x 3. NAD.   HEENT: Anicteric sclera. PERRL. Mucous membranes moist and without lesions.   CV: RRR. S1, S2. No murmurs appreciated.   RESPIRATORY: Effort normal. Lungs CTAB with no wheezing, rales, rhonchi.   GI: Abdomen soft and non distended with normoactive bowel sounds present in all quadrants. No tenderness, rebound, guarding.      Data:  All laboratory data reviewed             Significant Results:     Results for orders placed or performed during the hospital encounter of 08/08/21   XR Chest Port 1 View     Status: None    Narrative    XR CHEST PORT 1 VIEW   8/8/2021 7:25 PM     HISTORY: COVID positive. fever    COMPARISON: 11/23/2020      Impression    IMPRESSION: No focal infiltrate or pleural effusion. Normal heart size  and pulmonary vascularity.    HANG GATICA MD         SYSTEM ID:  [id number]   CBC with platelets differential     Status: None    Narrative    The following orders were created for panel order CBC with platelets differential.  Procedure                               Abnormality         Status                     ---------                               -----------         ------                     CBC with platelets and d...[[id number]]                      Final result                 Please view results for these tests on the individual orders.   Comprehensive metabolic panel     Status: Abnormal   Result Value Ref Range    Sodium 137 133 - 144 mmol/L    Potassium 3.4 3.4 - 5.3 mmol/L    Chloride 103 94 - 109 mmol/L    Carbon Dioxide (CO2) 28 20 - 32 mmol/L    Anion Gap 6 3 - 14 mmol/L    Urea Nitrogen 7 7 - 30 mg/dL    Creatinine 0.74 0.52 - 1.04 mg/dL    Calcium 9.7 8.5 - 10.1 mg/dL    Glucose 171 (H) 70 - 99 mg/dL    Alkaline Phosphatase 127 40 - 150 U/L    AST 28 0 - 45 U/L    ALT 44 0 - 50 U/L    Protein Total 8.6 6.8 - 8.8 g/dL     Albumin 4.1 3.4 - 5.0 g/dL    Bilirubin Total 0.3 0.2 - 1.3 mg/dL    GFR Estimate >90 >60 mL/min/1.73m2   HCG qualitative urine     Status: Normal   Result Value Ref Range    hCG Urine Qualitative Negative Negative   UA with Microscopic reflex to Culture     Status: Abnormal    Specimen: Urine, Midstream   Result Value Ref Range    Color Urine Yellow Colorless, Straw, Light Yellow, Yellow    Appearance Urine Clear Clear    Glucose Urine Negative Negative mg/dL    Bilirubin Urine Negative Negative    Ketones Urine Negative Negative mg/dL    Specific Gravity Urine 1.023 1.003 - 1.035    Blood Urine Negative Negative    pH Urine 6.5 5.0 - 7.0    Protein Albumin Urine 10  (A) Negative mg/dL    Urobilinogen Urine Normal Normal, 2.0 mg/dL    Nitrite Urine Negative Negative    Leukocyte Esterase Urine Small (A) Negative    Mucus Urine Present (A) None Seen /LPF    RBC Urine 3 (H) <=2 /HPF    WBC Urine 8 (H) <=5 /HPF    Squamous Epithelials Urine 6 (H) <=1 /HPF    Hyaline Casts Urine 1 <=2 /LPF    Narrative    Urine Culture not indicated   CBC with platelets and differential     Status: None   Result Value Ref Range    WBC Count 4.4 4.0 - 11.0 10e3/uL    RBC Count 4.94 3.80 - 5.20 10e6/uL    Hemoglobin 14.5 11.7 - 15.7 g/dL    Hematocrit 46.1 35.0 - 47.0 %    MCV 93 78 - 100 fL    MCH 29.4 26.5 - 33.0 pg    MCHC 31.5 31.5 - 36.5 g/dL    RDW 13.1 10.0 - 15.0 %    Platelet Count 191 150 - 450 10e3/uL    % Neutrophils 41 %    % Lymphocytes 37 %    % Monocytes 18 %    % Eosinophils 2 %    % Basophils 1 %    % Immature Granulocytes 1 %    NRBCs per 100 WBC 0 <1 /100    Absolute Neutrophils 1.9 1.6 - 8.3 10e3/uL    Absolute Lymphocytes 1.6 0.8 - 5.3 10e3/uL    Absolute Monocytes 0.8 0.0 - 1.3 10e3/uL    Absolute Eosinophils 0.1 0.0 - 0.7 10e3/uL    Absolute Basophils 0.0 0.0 - 0.2 10e3/uL    Absolute Immature Granulocytes 0.0 <=0.0 10e3/uL    Absolute NRBCs 0.0 10e3/uL   Drug abuse screen 1 urine (ED)     Status: Abnormal    Result Value Ref Range    Amphetamines Urine Screen Negative Screen Negative    Barbiturates Urine Screen Negative Screen Negative    Benzodiazepines Urine Screen Positive (A) Screen Negative    Cannabinoids Urine Screen Negative Screen Negative    Cocaine Urine Screen Positive (A) Screen Negative    Opiates Urine Screen Negative Screen Negative   SARS-COV2 (COVID-19) Virus RT-PCR     Status: Abnormal    Specimen: Nasopharyngeal; Swab   Result Value Ref Range    SARS CoV2 PCR Positive (A) Negative    Narrative    Testing was performed using the eneida  SARS-CoV-2 & Influenza A/B Assay on the eneida  [employer]  System.  This test should be ordered for the detection of SARS-COV-2 in individuals who meet SARS-CoV-2 clinical and/or epidemiological criteria. Test performance is unknown in asymptomatic patients.  This test is for in vitro diagnostic use under the FDA EUA for laboratories certified under CLIA to perform moderate and/or high complexity testing. This test has not been FDA cleared or approved.  A negative test does not rule out the presence of PCR inhibitors in the specimen or target RNA in concentration below the limit of detection for the assay. The possibility of a false negative should be considered if the patient's recent exposure or clinical presentation suggests COVID-19.  [facility] are certified under the Clinical Laboratory Improvement Amendments of 1988 (CLIA-88) as qualified to perform moderate and/or high complexity laboratory testing.   Extra Blue Top Tube     Status: None   Result Value Ref Range    Hold Specimen JIC    Extra Red Top Tube     Status: None   Result Value Ref Range    Hold Specimen JIC    Lithium level     Status: Normal   Result Value Ref Range    Lithium 0.4   mmol/L   CRP inflammation     Status: Abnormal   Result Value Ref Range    CRP Inflammation 11.0 (H) 0.0 - 8.0 mg/L   Ferritin     Status: Normal   Result Value Ref Range    Ferritin 65 12 - 150 ng/mL   CK  total     Status: Normal   Result Value Ref Range     30 - 225 U/L   D dimer quantitative     Status: Abnormal   Result Value Ref Range    D-Dimer Quantitative 0.76 (H) 0.00 - 0.50 ug/mL FEU    Narrative    This D-dimer assay is intended for use in conjunction with a clinical pretest probability assessment model to exclude pulmonary embolism (PE) and deep venous thrombosis (DVT) in outpatients suspected of PE or DVT. The cut-off value is 0.50 ug/mL FEU.   Comprehensive metabolic panel     Status: Abnormal   Result Value Ref Range    Sodium 135 133 - 144 mmol/L    Potassium 3.6 3.4 - 5.3 mmol/L    Chloride 102 94 - 109 mmol/L    Carbon Dioxide (CO2) 30 20 - 32 mmol/L    Anion Gap 3 3 - 14 mmol/L    Urea Nitrogen 11 7 - 30 mg/dL    Creatinine 0.67 0.52 - 1.04 mg/dL    Calcium 9.0 8.5 - 10.1 mg/dL    Glucose 152 (H) 70 - 99 mg/dL    Alkaline Phosphatase 106 40 - 150 U/L    AST 27 0 - 45 U/L    ALT 38 0 - 50 U/L    Protein Total 7.2 6.8 - 8.8 g/dL    Albumin 3.4 3.4 - 5.0 g/dL    Bilirubin Total 0.2 0.2 - 1.3 mg/dL    GFR Estimate >90 >60 mL/min/1.73m2   CBC with platelets differential     Status: Abnormal    Narrative    The following orders were created for panel order CBC with platelets differential.  Procedure                               Abnormality         Status                     ---------                               -----------         ------                     CBC with platelets and d...[[id number]]  Abnormal            Final result                 Please view results for these tests on the individual orders.   CBC with platelets and differential     Status: Abnormal   Result Value Ref Range    WBC Count 3.8 (L) 4.0 - 11.0 10e3/uL    RBC Count 4.48 3.80 - 5.20 10e6/uL    Hemoglobin 13.5 11.7 - 15.7 g/dL    Hematocrit 41.4 35.0 - 47.0 %    MCV 92 78 - 100 fL    MCH 30.1 26.5 - 33.0 pg    MCHC 32.6 31.5 - 36.5 g/dL    RDW 13.2 10.0 - 15.0 %    Platelet Count 170 150 - 450 10e3/uL    % Neutrophils 27 %     % Lymphocytes 53 %    % Monocytes 16 %    % Eosinophils 3 %    % Basophils 0 %    % Immature Granulocytes 1 %    NRBCs per 100 WBC 0 <1 /100    Absolute Neutrophils 1.0 (L) 1.6 - 8.3 10e3/uL    Absolute Lymphocytes 2.0 0.8 - 5.3 10e3/uL    Absolute Monocytes 0.6 0.0 - 1.3 10e3/uL    Absolute Eosinophils 0.1 0.0 - 0.7 10e3/uL    Absolute Basophils 0.0 0.0 - 0.2 10e3/uL    Absolute Immature Granulocytes 0.0 <=0.0 10e3/uL    Absolute NRBCs 0.0 10e3/uL   EKG 12-lead, complete     Status: None   Result Value Ref Range    Systolic Blood Pressure  mmHg    Diastolic Blood Pressure  mmHg    Ventricular Rate 73 BPM    Atrial Rate 73 BPM    DC Interval 150 ms    QRS Duration 86 ms     ms    QTc 449 ms    P Axis 65 degrees    R AXIS 79 degrees    T Axis 64 degrees    Interpretation ECG       Sinus rhythm  Possible Left atrial enlargement  Borderline ECG  When compared with ECG of 16-SEP-2019 15:36,  No significant change was found  Confirmed by MD GARRETT, BRIDGET ([id number]) on 8/9/2021 4:52:04 PM     Psychiatry IP Consult: assistance with benzodiazepine withdrawal; Consultant may enter orders: Yes; Patient to be seen: Routine; Call back #: [phone]; Requesting provider? Hospitalist (if different from attending physician)     Status: None ()    Yahir Weir CNP     8/9/2021 11:24 AM    [facility]   Initial Psychiatric Consult   Consult date: August 9, 2021         Reason for Consult, requesting source:    Assistance with benzodiazepine withdrawal  Requesting source: Praful Brooks    This visit was conducted via televideo conference using the   [employer] system due to COVID-19 infection. Patient consented to   video visit. Patient located in her hospital bed. Provider   located on 5 ortho. Start time was 9:52, end time was 10:27.         HPI:   Ms. Jihan Gill is a 46-year-old female who was admitted to   [facility] on 8/8/21 after  "presenting to the   emergency department seeking detox from benzodiazepines. Pt found   to be COVID+ on admission. PMH significant for major depressive   disorder, bipolar disorder, substance use disorder (benzos,   stimulants, opiates), with a history of detox admissions (last in   March 2021), CD commitment (last in 2019), psychiatric admissions   (last in , and ECT (2017). Psychiatry is consulted for assistance   with benzodiazepine withdrawal.     Per chart review, patient was supposed to attend inpatient CD   treatment. She was called for a bed at the facility but once she   arrived, they informed her that she would need to go to a detox   facility first as they do not provide detox. She presented to   [place] for detox, subsequently found to be COVID positive.   Recently, has been using alprazolam and clonazepam, around 7 to 8   mg on average per day.     On interview today, patient discusses that she has been motivated   to get off the benzodiazepines. Wanted to go to treatment, had a   bed at [facility], but then began experiencing   withdrawal symptoms at the treatment center and came to the   hospital after about 1.5 days there. Reports that she was last in   detox in March 2021 at [facility]. Says she was   able to maintain sobriety until July 2021. Reports she had been   thinking more about her life, had been focusing more on her son.   She says her life has been slipping by her and she does not want   to wake up at age 62 worried about where the years went. She says   the benzodiazepines ultimately do not help with anything. She   says she has has to spend up to $100 a day \"just to function.\"   She currently lives with her mother. She receives unemployment   income. Lost her job last year due to COVID. Had been in the spa   industry. She has been feeling depressed, says she has had a   low-level of depression since she was a teenager. ECT did help in   the past. She has had " periods of intermittent hopelessness, but   she denies any thoughts to harm herself or end her life. Appetite   has been up and down. Energy and motivation have been poor. She   has feelings of shame and guilt at times. Sleep varies, depending   on her chemical use. Has not taken fluoxetine for a couple of   weeks, states the 60 mg dose makes her feels on-edge, jittery.         Past Psychiatric History:   Pt has a history of several psychiatric hospitalizations. History   of several suicide attempts by overdosing on pills, jumping into   a river, last overdose attempt in 2017. Sees Guanako Romero MD at   [facility] for medications. Has been diagnosed with major   depressive disorder, r/o bipolar disorder, cluster B traits, and   substance-induced psychosis. Med trials include: Prozac, Paxil,   Zoloft, Celexa, Lexapro, Effexor, Cymbalta, Wellbutrin,   trazodone, Xanax, Ativan, Klonopin, Abilify, Geodon, Zyprexa,   Seroquel, Risperdal, Ambien, Vistaril, BuSpar, gabapentin,   lithium. History of ECT in 2014, 2017. No hx of MI commitment.         Substance Use and History:   History of benzodiazepine, opiate, and stimulant use. Most   recently using about 6-8 mg per day on average of alprazolam and   clonazepam. Some days will only use 3 mg and other days will use   more, like 10-12 mg. Last use was 3 days ago. She is stable on 70   mg of methadone, hx of opioid dependence. Utox positive for   cocaine. History of multiple inpt CD treatments, hx of CD   commitment in 2019. Last in detox March 2021 and was sober until   July 2021. Supposed to go to [facility] but needs to   detox first, and now quarantine due to COVID infection.         Past Medical History:   PAST MEDICAL HISTORY:   Past Medical History:   Diagnosis Date     Arthritis      Bipolar 1 disorder (H)      Chronic hepatitis C without hepatic coma (H)      Depressive disorder     postpartum     Depressive disorder      Major depression, recurrent (H)       Opiate addiction (H)      Seizures (H)     narcotic withdrawal     Substance abuse (H)      Urinary calculus, unspecified 2001    Renal stones       PAST SURGICAL HISTORY:   Past Surgical History:   Procedure Laterality Date     C/SECTION, LOW TRANSVERSE      [id number]     ENT SURGERY       GYN SURGERY       TONSILLECTOMY               Family History:   Mental illness on maternal side. Mother with depression and   anxiety. Several family members with alcohol dependency.         Social History:   Pt grew up in [place], [place]. 2nd of four children. Parents    when she was 7. She was a high school graduate, dropped   out of college. Currently on unemployment. Had been working as a    but lost her job during COVID.          Physical ROS:   The patient endorsed restlessness, anxiety. The remainder of   10-point review of systems was negative except as noted in HPI.         PTA Medications:     Medications Prior to Admission   Medication Sig Dispense Refill Last Dose     albuterol (PROAIR HFA/PROVENTIL HFA/VENTOLIN HFA) 108 (90 Base)   MCG/ACT inhaler Inhale 1-2 puffs into the lungs every 6 hours as   needed for shortness of breath / dyspnea or wheezing    8/6/2021   at Unknown time     gabapentin (NEURONTIN) 800 MG tablet Take 800 mg in the   morning, 800 mg in the afternoon, and 1,600 mg at bedtime     8/8/2021 at am     hydrOXYzine (VISTARIL) 25 MG capsule Take 25 mg by mouth 4   times daily as needed for anxiety (or sleep)   Past Week at   Unknown time     lithium ER (LITHOBID) 300 MG CR tablet Take 300 mg in the   morning and 600 mg at bedtime.   8/7/2021 at pm     methadone (DOLPHINE) 5 MG/5ML solution Take 70 mg by mouth   daily    8/8/2021 at 6AM     nicotine polacrilex (NICORETTE) 4 MG gum Place 1 each (4 mg)   inside cheek every hour as needed for other (nicotine withdrawal   symptoms) 30 each 1 8/7/2021 at Unknown time     FLUoxetine (PROZAC) 20 MG capsule Take 60 mg by mouth daily       stopped taking 2 weeks ago          Allergies:     Allergies   Allergen Reactions     Abilify [Aripiprazole] Other (See Comments)     Tardive dyskinesia     Allopurinol      Compazine Other (See Comments)     Dystonia     Dramamine      Jaw lock.       Olanzapine Other (See Comments)     Tardive dyskinesia from Zyprexa - per patient     Reglan Other (See Comments)     dystonia     Seroquel [Quetiapine] Other (See Comments)     Tardive dyskinesia.           Labs:     Recent Results (from the past 48 hour(s))   Comprehensive metabolic panel    Collection Time: 08/08/21  2:43 PM   Result Value Ref Range    Sodium 137 133 - 144 mmol/L    Potassium 3.4 3.4 - 5.3 mmol/L    Chloride 103 94 - 109 mmol/L    Carbon Dioxide (CO2) 28 20 - 32 mmol/L    Anion Gap 6 3 - 14 mmol/L    Urea Nitrogen 7 7 - 30 mg/dL    Creatinine 0.74 0.52 - 1.04 mg/dL    Calcium 9.7 8.5 - 10.1 mg/dL    Glucose 171 (H) 70 - 99 mg/dL    Alkaline Phosphatase 127 40 - 150 U/L    AST 28 0 - 45 U/L    ALT 44 0 - 50 U/L    Protein Total 8.6 6.8 - 8.8 g/dL    Albumin 4.1 3.4 - 5.0 g/dL    Bilirubin Total 0.3 0.2 - 1.3 mg/dL    GFR Estimate >90 >60 mL/min/1.73m2   CBC with platelets and differential    Collection Time: 08/08/21  2:43 PM   Result Value Ref Range    WBC Count 4.4 4.0 - 11.0 10e3/uL    RBC Count 4.94 3.80 - 5.20 10e6/uL    Hemoglobin 14.5 11.7 - 15.7 g/dL    Hematocrit 46.1 35.0 - 47.0 %    MCV 93 78 - 100 fL    MCH 29.4 26.5 - 33.0 pg    MCHC 31.5 31.5 - 36.5 g/dL    RDW 13.1 10.0 - 15.0 %    Platelet Count 191 150 - 450 10e3/uL    % Neutrophils 41 %    % Lymphocytes 37 %    % Monocytes 18 %    % Eosinophils 2 %    % Basophils 1 %    % Immature Granulocytes 1 %    NRBCs per 100 WBC 0 <1 /100    Absolute Neutrophils 1.9 1.6 - 8.3 10e3/uL    Absolute Lymphocytes 1.6 0.8 - 5.3 10e3/uL    Absolute Monocytes 0.8 0.0 - 1.3 10e3/uL    Absolute Eosinophils 0.1 0.0 - 0.7 10e3/uL    Absolute Basophils 0.0 0.0 - 0.2 10e3/uL    Absolute Immature  Granulocytes 0.0 <=0.0 10e3/uL    Absolute NRBCs 0.0 10e3/uL   Extra Blue Top Tube    Collection Time: 08/08/21  2:43 PM   Result Value Ref Range    Hold Specimen JIC    Extra Red Top Tube    Collection Time: 08/08/21  2:43 PM   Result Value Ref Range    Hold Specimen JIC    Lithium level    Collection Time: 08/08/21  2:43 PM   Result Value Ref Range    Lithium 0.4   mmol/L   CRP inflammation    Collection Time: 08/08/21  2:43 PM   Result Value Ref Range    CRP Inflammation 11.0 (H) 0.0 - 8.0 mg/L   Ferritin    Collection Time: 08/08/21  2:43 PM   Result Value Ref Range    Ferritin 65 12 - 150 ng/mL   CK total    Collection Time: 08/08/21  2:43 PM   Result Value Ref Range     30 - 225 U/L   D dimer quantitative    Collection Time: 08/08/21  2:43 PM   Result Value Ref Range    D-Dimer Quantitative 0.76 (H) 0.00 - 0.50 ug/mL FEU   HCG qualitative urine    Collection Time: 08/08/21  2:44 PM   Result Value Ref Range    hCG Urine Qualitative Negative Negative   UA with Microscopic reflex to Culture    Collection Time: 08/08/21  2:44 PM    Specimen: Urine, Midstream   Result Value Ref Range    Color Urine Yellow Colorless, Straw, Light Yellow, Yellow    Appearance Urine Clear Clear    Glucose Urine Negative Negative mg/dL    Bilirubin Urine Negative Negative    Ketones Urine Negative Negative mg/dL    Specific Gravity Urine 1.023 1.003 - 1.035    Blood Urine Negative Negative    pH Urine 6.5 5.0 - 7.0    Protein Albumin Urine 10  (A) Negative mg/dL    Urobilinogen Urine Normal Normal, 2.0 mg/dL    Nitrite Urine Negative Negative    Leukocyte Esterase Urine Small (A) Negative    Mucus Urine Present (A) None Seen /LPF    RBC Urine 3 (H) <=2 /HPF    WBC Urine 8 (H) <=5 /HPF    Squamous Epithelials Urine 6 (H) <=1 /HPF    Hyaline Casts Urine 1 <=2 /LPF   Drug abuse screen 1 urine (ED)    Collection Time: 08/08/21  2:44 PM   Result Value Ref Range    Amphetamines Urine Screen Negative Screen Negative    Barbiturates  Urine Screen Negative Screen Negative    Benzodiazepines Urine Screen Positive (A) Screen Negative    Cannabinoids Urine Screen Negative Screen Negative    Cocaine Urine Screen Positive (A) Screen Negative    Opiates Urine Screen Negative Screen Negative   SARS-COV2 (COVID-19) Virus RT-PCR    Collection Time: 08/08/21  2:58 PM    Specimen: Nasopharyngeal; Swab   Result Value Ref Range    SARS CoV2 PCR Positive (A) Negative   EKG 12-lead, complete    Collection Time: 08/08/21  9:36 PM   Result Value Ref Range    Systolic Blood Pressure  mmHg    Diastolic Blood Pressure  mmHg    Ventricular Rate 73 BPM    Atrial Rate 73 BPM    NV Interval 150 ms    QRS Duration 86 ms     ms    QTc 449 ms    P Axis 65 degrees    R AXIS 79 degrees    T Axis 64 degrees    Interpretation ECG       Sinus rhythm  Possible Left atrial enlargement  Borderline ECG  When compared with ECG of 16-SEP-2019 15:36,  No significant change was found     Comprehensive metabolic panel    Collection Time: 08/09/21  5:44 AM   Result Value Ref Range    Sodium 135 133 - 144 mmol/L    Potassium 3.6 3.4 - 5.3 mmol/L    Chloride 102 94 - 109 mmol/L    Carbon Dioxide (CO2) 30 20 - 32 mmol/L    Anion Gap 3 3 - 14 mmol/L    Urea Nitrogen 11 7 - 30 mg/dL    Creatinine 0.67 0.52 - 1.04 mg/dL    Calcium 9.0 8.5 - 10.1 mg/dL    Glucose 152 (H) 70 - 99 mg/dL    Alkaline Phosphatase 106 40 - 150 U/L    AST 27 0 - 45 U/L    ALT 38 0 - 50 U/L    Protein Total 7.2 6.8 - 8.8 g/dL    Albumin 3.4 3.4 - 5.0 g/dL    Bilirubin Total 0.2 0.2 - 1.3 mg/dL    GFR Estimate >90 >60 mL/min/1.73m2   CBC with platelets and differential    Collection Time: 08/09/21  5:44 AM   Result Value Ref Range    WBC Count 3.8 (L) 4.0 - 11.0 10e3/uL    RBC Count 4.48 3.80 - 5.20 10e6/uL    Hemoglobin 13.5 11.7 - 15.7 g/dL    Hematocrit 41.4 35.0 - 47.0 %    MCV 92 78 - 100 fL    MCH 30.1 26.5 - 33.0 pg    MCHC 32.6 31.5 - 36.5 g/dL    RDW 13.2 10.0 - 15.0 %    Platelet Count 170 150 - 450  "10e3/uL    % Neutrophils 27 %    % Lymphocytes 53 %    % Monocytes 16 %    % Eosinophils 3 %    % Basophils 0 %    % Immature Granulocytes 1 %    NRBCs per 100 WBC 0 <1 /100    Absolute Neutrophils 1.0 (L) 1.6 - 8.3 10e3/uL    Absolute Lymphocytes 2.0 0.8 - 5.3 10e3/uL    Absolute Monocytes 0.6 0.0 - 1.3 10e3/uL    Absolute Eosinophils 0.1 0.0 - 0.7 10e3/uL    Absolute Basophils 0.0 0.0 - 0.2 10e3/uL    Absolute Immature Granulocytes 0.0 <=0.0 10e3/uL    Absolute NRBCs 0.0 10e3/uL          Physical and Psychiatric Examination:     BP 99/66 (BP Location: Left arm)   Pulse 71   Temp 98.4  F   (36.9  C) (Oral)   Resp 16   SpO2 95%   Weight is 0 lbs 0 oz  There is no height or weight on file to   calculate BMI.    Physical Exam:  I have reviewed the physical exam as documented by by the medical   team and agree with findings and assessment and have no   additional findings to add at this time.    Mental Status Exam:    Appearance: age-appearing, lying in hospital bed, adequate   hygiene and grooming, wearing nasal cannula, mildly tearful  Behavior: cooperative, pleasant and calm, tearful  Orientation: alert and oriented to time, place and person  Movements: no tics, tremors, or dystonia observed  Mood: \"I'm okay\"  Affect: mildly tearful, dysphoric, mood-congruent  Speech: Normal rate, rhythm, tone  Memory: no impairment in immediate, recent, or remote memory  Fund of knowledge: average for development based on word choice  Concentration: attentive to interview  Thought process and content: linear, coherent, organized; no   delusions or paranoia endorsed or elicited. No auditory or visual   hallucinations. No loosened associations.   Insight: fair, able to identify substance use as problematic  Judgement: fair, agreeable to treatment and detox  Safety: no suicidal or homicidal ideation, plan, or intent.            DSM-5 Diagnosis:   #1 Major Depressive Disorder, recurrent, moderate  #2 Unspecified anxiety " disorder  #3 Rule out bipolar II disorder  #4 Sedative-hypnotic use disorder, benzodiazepines  #5 Benzodiazepine withdrawal  #6 Opiate use disorder, on methadone maintenance          Assessment:   Ms. Jihan Gill is a 46-year-old female who was admitted to   [facility] on 8/8/21 after presenting to the   emergency department seeking detox from benzodiazepines. Pt found   to be COVID+ on admission. PMH significant for major depressive   disorder, bipolar disorder, substance use disorder (benzos,   stimulants, opiates), with a history of detox admissions (last in   March 2021), CD commitment (last in 2019), psychiatric admissions   (last in , and ECT (2017). Psychiatry is consulted for assistance   with benzodiazepine withdrawal.     Mr. Gill has thus far received 2 doses of phenobarbital 32.4   mg. Last admission to detox in March, she was placed on 64.8 mg   four times per day, which was decreased to 3 times per day after   she became increasingly sedated. She reports that taper had gone   well with those doses. She says she has been using about the same   amount of benzodiazepines as she had prior to March. Has been   using for about 1 month now. Uses Xanax and Klonopin which she   purchases off the street, about 6-8 mg on average. I agreed to   increase the phenobarbital to 48.6 mg TID and will continue to   monitor her symptoms. She is having some sweating and   restlessness and is having a hard time telling what is due to   COVID versus withdrawal. Told her we use try to keep a   conservative approach so as to not suppress her respiratory   drive, especially given that she is on methadone maintenance. She   would like to restart fluoxetine for her depressed mood - states   she gets too jittery at 60 mg. Will start back at 20 mg and   increase to 40 mg after 5 days. After about 3 days, we can begin   to taper phenobarbital.           Summary of Recommendations:   1) Increase  phenobarbital to 48.6 mg TID. Continue this dose for   about 3 days, then we can begin to taper. She can likely complete   the taper at home. She will have to quarantine for probably 10-14   days before she can get back to inpatient CD treatment.    2) Restart fluoxetine at 20 mg per day x 5 days, then increase to   40 mg per day.    3) Continue lithium 300 mg in the morning and 600 mg at bedtime   for mood stabilization. Level is a bit low at 0.4 but does not   have a history of severe manic episodes so may not need a higher   dose.     4) Continue PTA gabapentin dosing. Continue methadone maintenance   70 mg daily.    5) She is motivated to attend CD treatment. She will need to   clarify whether she can return to [facility] after her   quarantine period. She can likely return home to await CD   treatment after acute withdrawals are managed.    6) Page me with additional questions or concerns during business   hours. Will continue to follow along.      Martin Rodriguez, PMNEERAJP-BC, BLANCHE, CNP  [facility]   Contact information available via [facility] Paging/Directory              Care Management / Social Work IP Consult     Status: None ()    Narrative    TimurCynthia, MSW     8/11/2021  2:17 PM  Social Work Progress Note    SW spoke with the pt via phone, as she is COVID positive and SW   cannot enter the room.  PATRICIA provided the pt's bedside RN Rheana a   list of outpatient CD resources to provide to the pt.  The pt   relayed that she is a restricted recipient [facility] - [facility]   [facility] at [address] [place] [address], ph: [phone].  The pt relayed that she spoke with   Dr. Gonzalez and he relayed that RYLEY Rodriguez will be the provider   prescribing the phenobarbital taper, so he will need to be added   to her restriction list in order to get the medications filled.    SW relayed that she will inform RACHELL Esquivel to work on  lifting the   restriction.  The pt was not sure if Dr. Gonzalez also needed to be   added in order to prescribe medications.  PATRICIA passed on the   information to RACHELL Esquivel.     Sandi Ding [place] [address] [address]   Ph: [phone]  Pager: [phone]     Psychiatry IP Consult: f/u phenobarbital taper; Consultant may enter orders: Yes; Patient to be seen: Routine; Call back #: [phone]; Requesting provider? Hospitalist (if different from attending physician)     Status: None ()    Narrative    Yahir Rodriguez CNP     8/11/2021  3:15 PM      Psychiatry Consultation; Follow up              Reason for Consult, requesting source:    Discharge planning, benzo withdrawal  Requesting source: Praful Brooks     Visit was conducted via telephone due to COVID-19 infection.   [employer] system unavailable at the time of visit.     Start time: 1435  End time: 1440         Interim history:    Ms. Jihan Gill is a 46-year-old female who was admitted to   [facility] on 8/8/21 after presenting to the   emergency department seeking detox from benzodiazepines. Pt found   to be COVID+ on admission. PMH significant for major depressive   disorder, bipolar disorder, substance use disorder (benzos,   stimulants, opiates), with a history of detox admissions (last in   March 2021), CD commitment (last in 2019), psychiatric   admissions, and ECT (2017). Psychiatry is consulted for   assistance with benzodiazepine withdrawal.     On interview today, patient reports that she is feeling good   overall. She feels that the phenobarbital is helping to adequate   manage her withdrawal symptoms. Initially, she did not think the   dose was high enough, but now is noticing that it is helping. She   reports she has been spending a lot of time sleeping here. She   complains of the construction noises next to her room. She is   agreeable to finish her phenobarbital taper at home. She plans to  "  return to the [facility] for CD treatment when she   finishes her 10-day quarantine period. Mood is stable, no   suicidal thinking.          Medications:     Current Facility-Administered Medications   Medication     acetaminophen (TYLENOL) tablet 650 mg     FLUoxetine (PROzac) capsule 20 mg    Followed by     [START ON 8/14/2021] FLUoxetine (PROzac) capsule 40 mg     gabapentin (NEURONTIN) capsule 1,600 mg     gabapentin (NEURONTIN) tablet 800 mg     hydrOXYzine (ATARAX) tablet 25 mg     lidocaine (LMX4) cream     lidocaine 1 % 0.1-1 mL     lithium ER (LITHOBID) CR tablet 300 mg     lithium ER (LITHOBID) CR tablet 600 mg     magnesium hydroxide (MILK OF MAGNESIA) suspension 30 mL     melatonin tablet 1 mg     methadone (DOLPHINE) solution 70 mg     naloxone (NARCAN) injection 0.2 mg    Or     naloxone (NARCAN) injection 0.4 mg    Or     naloxone (NARCAN) injection 0.2 mg    Or     naloxone (NARCAN) injection 0.4 mg     nicotine polacrilex (NICORETTE) gum 4 mg     ondansetron (ZOFRAN-ODT) ODT tab 4 mg    Or     ondansetron (ZOFRAN) injection 4 mg     PHENobarbital (LUMINAL) tablet 48.6 mg     polyethylene glycol (MIRALAX) Packet 17 g     sodium chloride (PF) 0.9% PF flush 3 mL     sodium chloride (PF) 0.9% PF flush 3 mL              MSE:     Unable to visualize patient due to telephone visit. Pt is calm,   cooperative on the phone. She is alert and fully oriented. Mood   is \"good.\" Speech is normal for rate, rhythm, and tone. Fund of   knowledge is average for development. Recent and remote recall is   grossly intact. Concentration is intact. Thoughts are linear,   coherent, goal-directed. No evidence of anomalous perceptions. No   delusions or paranoia.  No loosened associations. Insight and   judgement are fair. No suicidal or homicidal ideation, plan, or   intent.     Vital signs:  Temp: 98.1  F (36.7  C) Temp src: Oral BP: 114/62 Pulse: 64     Resp: 16 SpO2: 92 % O2 Device: None (Room air) Oxygen " "Delivery: 1   LPM      Estimated body mass index is 41.93 kg/m  as calculated from the   following:    Height as of 2/22/21: 1.651 m (5' 5\").    Weight as of 2/22/21: 114.3 kg (252 lb).              DSM-5 Diagnosis:   #1 Major Depressive Disorder, recurrent, moderate  #2 Unspecified anxiety disorder  #3 Rule out bipolar II disorder  #4 Sedative-hypnotic use disorder, benzodiazepines  #5 Benzodiazepine withdrawal  #6 Opiate use disorder, on methadone maintenance          Assessment:   Ms. Jihan Gill is a 46-year-old female who was admitted to   [facility] on 8/8/21 after presenting to the   emergency department seeking detox from benzodiazepines. Pt found   to be COVID+ on admission. PMH significant for major depressive   disorder, bipolar disorder, substance use disorder (benzos,   stimulants, opiates), with a history of detox admissions (last in   March 2021), CD commitment (last in 2019), psychiatric admissions   (last in , and ECT (2017). Psychiatry is consulted for assistance   with benzodiazepine withdrawal.     Ms. Gill is doing well today. Withdrawal symptoms are   adequately managed. She remains on phenobarbital 48.6 mg TID. She   continues fluoxetine 20 mg daily. Lithium and gabapentin are   continued at PTA doses as well. She can be discharged tomorrow to   continue her phenobarbital taper at home. Plans to go to CD   treatment at [facility] after she completes COVID   quarantine. Mood is stable, no SI.           Summary of Recommendations:   Continue phenobarbital taper:  On 8/12, she will continue 48.6 mg TID   8/13: 32.4 mg AM, 48.6 mg PM, 48.6 mg HS  8/14: 32.4 mg AM, 32.4 mg PM, 48.6 mg HS  8/15: 32.4 mg AM, 32.4 mg PM, 32.4 mg HS  8/16: 16.2 mg AM, 32.4 mg PM, 32.4 mg HS   8/17: 16.2 mg AM, 16.2 mg PM, 32.4 mg HS  8/18: 16.2 mg AM, 16.2 mg PM, 16.2 mg HS  8/19: 16.2 mg AM, 16.2 mg HS  8/20: 16.2 mg HS  8/21: Stop    - continue fluoxetine 20 mg daily, gabapentin " 800 mg BID and 1600   at bedtime, lithium  mg AM and 600 mg HS, Methadone 70 mg   daily.     - Pt will be in contact with [facility] regarding   return to CD treatment        ROSALBA Prescott-BC, APRN, CNP  [facility]   Contact information available via [facility] Paging/Directory                   Psychiatry IP Consult: discharge planning, Benzo withdrawl; Consultant may enter orders: Yes; Patient to be seen: Routine; Call back #: [phone]; Requesting provider? Hospitalist (if different from attending physician)     Status: None ()    Yahir Weir CNP     8/11/2021  3:15 PM      Psychiatry Consultation; Follow up              Reason for Consult, requesting source:    Discharge planning, benzo withdrawal  Requesting source: Praful Brooks     Visit was conducted via telephone due to COVID-19 infection.   [employer] system unavailable at the time of visit.     Start time: 1435  End time: 1440         Interim history:    Ms. Jihan Gill is a 46-year-old female who was admitted to   [facility] on 8/8/21 after presenting to the   emergency department seeking detox from benzodiazepines. Pt found   to be COVID+ on admission. PMH significant for major depressive   disorder, bipolar disorder, substance use disorder (benzos,   stimulants, opiates), with a history of detox admissions (last in   March 2021), CD commitment (last in 2019), psychiatric   admissions, and ECT (2017). Psychiatry is consulted for   assistance with benzodiazepine withdrawal.     On interview today, patient reports that she is feeling good   overall. She feels that the phenobarbital is helping to adequate   manage her withdrawal symptoms. Initially, she did not think the   dose was high enough, but now is noticing that it is helping. She   reports she has been spending a lot of time sleeping here. She   complains of the construction noises next  "to her room. She is   agreeable to finish her phenobarbital taper at home. She plans to   return to the [facility] for CD treatment when she   finishes her 10-day quarantine period. Mood is stable, no   suicidal thinking.          Medications:     Current Facility-Administered Medications   Medication     acetaminophen (TYLENOL) tablet 650 mg     FLUoxetine (PROzac) capsule 20 mg    Followed by     [START ON 8/14/2021] FLUoxetine (PROzac) capsule 40 mg     gabapentin (NEURONTIN) capsule 1,600 mg     gabapentin (NEURONTIN) tablet 800 mg     hydrOXYzine (ATARAX) tablet 25 mg     lidocaine (LMX4) cream     lidocaine 1 % 0.1-1 mL     lithium ER (LITHOBID) CR tablet 300 mg     lithium ER (LITHOBID) CR tablet 600 mg     magnesium hydroxide (MILK OF MAGNESIA) suspension 30 mL     melatonin tablet 1 mg     methadone (DOLPHINE) solution 70 mg     naloxone (NARCAN) injection 0.2 mg    Or     naloxone (NARCAN) injection 0.4 mg    Or     naloxone (NARCAN) injection 0.2 mg    Or     naloxone (NARCAN) injection 0.4 mg     nicotine polacrilex (NICORETTE) gum 4 mg     ondansetron (ZOFRAN-ODT) ODT tab 4 mg    Or     ondansetron (ZOFRAN) injection 4 mg     PHENobarbital (LUMINAL) tablet 48.6 mg     polyethylene glycol (MIRALAX) Packet 17 g     sodium chloride (PF) 0.9% PF flush 3 mL     sodium chloride (PF) 0.9% PF flush 3 mL              MSE:     Unable to visualize patient due to telephone visit. Pt is calm,   cooperative on the phone. She is alert and fully oriented. Mood   is \"good.\" Speech is normal for rate, rhythm, and tone. Fund of   knowledge is average for development. Recent and remote recall is   grossly intact. Concentration is intact. Thoughts are linear,   coherent, goal-directed. No evidence of anomalous perceptions. No   delusions or paranoia.  No loosened associations. Insight and   judgement are fair. No suicidal or homicidal ideation, plan, or   intent.     Vital signs:  Temp: 98.1  F (36.7  C) Temp src: " "Oral BP: 114/62 Pulse: 64     Resp: 16 SpO2: 92 % O2 Device: None (Room air) Oxygen Delivery: 1   LPM      Estimated body mass index is 41.93 kg/m  as calculated from the   following:    Height as of 2/22/21: 1.651 m (5' 5\").    Weight as of 2/22/21: 114.3 kg (252 lb).              DSM-5 Diagnosis:   #1 Major Depressive Disorder, recurrent, moderate  #2 Unspecified anxiety disorder  #3 Rule out bipolar II disorder  #4 Sedative-hypnotic use disorder, benzodiazepines  #5 Benzodiazepine withdrawal  #6 Opiate use disorder, on methadone maintenance          Assessment:   Ms. Jihan Gill is a 46-year-old female who was admitted to   [facility] on 8/8/21 after presenting to the   emergency department seeking detox from benzodiazepines. Pt found   to be COVID+ on admission. PMH significant for major depressive   disorder, bipolar disorder, substance use disorder (benzos,   stimulants, opiates), with a history of detox admissions (last in   March 2021), CD commitment (last in 2019), psychiatric admissions   (last in , and ECT (2017). Psychiatry is consulted for assistance   with benzodiazepine withdrawal.     Ms. Gill is doing well today. Withdrawal symptoms are   adequately managed. She remains on phenobarbital 48.6 mg TID. She   continues fluoxetine 20 mg daily. Lithium and gabapentin are   continued at PTA doses as well. She can be discharged tomorrow to   continue her phenobarbital taper at home. Plans to go to CD   treatment at [facility] after she completes COVID   quarantine. Mood is stable, no SI.           Summary of Recommendations:   Continue phenobarbital taper:  On 8/12, she will continue 48.6 mg TID   8/13: 32.4 mg AM, 48.6 mg PM, 48.6 mg HS  8/14: 32.4 mg AM, 32.4 mg PM, 48.6 mg HS  8/15: 32.4 mg AM, 32.4 mg PM, 32.4 mg HS  8/16: 16.2 mg AM, 32.4 mg PM, 32.4 mg HS   8/17: 16.2 mg AM, 16.2 mg PM, 32.4 mg HS  8/18: 16.2 mg AM, 16.2 mg PM, 16.2 mg HS  8/19: 16.2 mg AM, 16.2 mg " HS  8/20: 16.2 mg HS  8/21: Stop    - continue fluoxetine 20 mg daily, gabapentin 800 mg BID and 1600   at bedtime, lithium  mg AM and 600 mg HS, Methadone 70 mg   daily.     - Pt will be in contact with [facility] regarding   return to  treatment        Martin Rodriguez, TABP-BC, APRN, CNP  [facility]   Contact information available via [facility] Paging/Directory                   Asymptomatic COVID-19 Virus (Coronavirus) by PCR Nasopharyngeal     Status: Abnormal    Specimen: Nasopharyngeal; Swab    Narrative    The following orders were created for panel order Asymptomatic COVID-19 Virus (Coronavirus) by PCR Nasopharyngeal.  Procedure                               Abnormality         Status                     ---------                               -----------         ------                     SARS-COV2 (COVID-19) Vir...[[id number]]  Abnormal            Final result                 Please view results for these tests on the individual orders.   Urine Drugs of Abuse Screen     Status: Abnormal    Narrative    The following orders were created for panel order Urine Drugs of Abuse Screen.  Procedure                               Abnormality         Status                     ---------                               -----------         ------                     Drug abuse screen 1 urin...[[id number]]  Abnormal            Final result                 Please view results for these tests on the individual orders.   Extra Tube     Status: None    Narrative    The following orders were created for panel order Extra Tube.  Procedure                               Abnormality         Status                     ---------                               -----------         ------                     Extra Blue Top Tube[[id number]]                              Final result               Extra Red Top Tube[[id number]]                               Final result                  Please view results for these tests on the individual orders.      No results found for this or any previous visit (from the past 48 hour(s)).             Pending Results:   Unresulted Labs Ordered in the Past 30 Days of this Admission     No orders found from 7/9/2021 to 8/9/2021.                  Discharge Instructions and Follow-Up:     Discharge Procedure Orders   Reason for your hospital stay   Order Comments: Admitted for COVID 19+ and substance withdrawal     Activity   Order Comments: Your activity upon discharge: activity as tolerated     Order Specific Question Answer Comments   Is discharge order? Yes      Adult [facility] Follow-up and recommended labs and tests   Order Comments: Follow up with primary care provider, [facility], within 7 days to evaluate medication change, to evaluate treatment change, and for hospital follow- up.  The following labs/tests are recommended: none.      Appointments on [place] and/or [facility] (with [facility] or [facility] provider or service). Call [phone] if you haven't heard regarding these appointments within 7 days of discharge.     Diet   Order Comments: Follow this diet upon discharge: Orders Placed This Encounter      Combination Diet Regular Diet Adult     Order Specific Question Answer Comments   Is discharge order? Yes             Kerline Gonzalez MD  Internal Medicine Staff Hospitalist  [phone]  August 13, 2021        Time spent on patient: 35 minutes total including face to face and coordinating care time reviewing current illness, any medication changes, and the care plan for today.

## 2021-08-14 NOTE — PROGRESS NOTES
Clinic Care Coordination Contact  [facility]/Voicemail    Clinical Data: Care Coordinator Outreach - TCM     Outreach attempted x 2.  Left generic message on patient's mother's voicemail with 24/7 nurse triage phone number, should she have any questions/concerns.      Plan:  Care Coordinator will do no further outreaches at this time.      Jackie Huerta RN Clinic Care Coordination

## 2021-08-22 ENCOUNTER — HOSPITAL ENCOUNTER (EMERGENCY)
Facility: CLINIC | Age: 46
Discharge: HOME | End: 2021-08-22
Attending: EMERGENCY MEDICINE | Admitting: EMERGENCY MEDICINE
Payer: COMMERCIAL

## 2021-08-22 VITALS
SYSTOLIC BLOOD PRESSURE: 121 MMHG | OXYGEN SATURATION: 100 % | RESPIRATION RATE: 16 BRPM | HEART RATE: 83 BPM | DIASTOLIC BLOOD PRESSURE: 81 MMHG | TEMPERATURE: 97.8 F

## 2021-08-22 DIAGNOSIS — F13.20 BENZODIAZEPINE DEPENDENCE (H): ICD-10-CM

## 2021-08-22 DIAGNOSIS — U07.1 2019 NOVEL CORONAVIRUS DISEASE (COVID-19): ICD-10-CM

## 2021-08-22 LAB
ALBUMIN SERPL-MCNC: 4 G/DL (ref 3.4–5)
ALP SERPL-CCNC: 132 U/L (ref 40–150)
ALT SERPL W P-5'-P-CCNC: 37 U/L (ref 0–50)
ANION GAP SERPL CALCULATED.3IONS-SCNC: 6 MMOL/L (ref 3–14)
AST SERPL W P-5'-P-CCNC: 25 U/L (ref 0–45)
BASOPHILS # BLD AUTO: 0 10E3/UL (ref 0–0.2)
BASOPHILS NFR BLD AUTO: 0 %
BILIRUB SERPL-MCNC: 0.6 MG/DL (ref 0.2–1.3)
BUN SERPL-MCNC: 16 MG/DL (ref 7–30)
CALCIUM SERPL-MCNC: 9.3 MG/DL (ref 8.5–10.1)
CHLORIDE BLD-SCNC: 107 MMOL/L (ref 94–109)
CO2 SERPL-SCNC: 24 MMOL/L (ref 20–32)
CREAT SERPL-MCNC: 0.61 MG/DL (ref 0.52–1.04)
EOSINOPHIL # BLD AUTO: 0.1 10E3/UL (ref 0–0.7)
EOSINOPHIL NFR BLD AUTO: 0 %
ERYTHROCYTE [DISTWIDTH] IN BLOOD BY AUTOMATED COUNT: 12.8 % (ref 10–15)
GFR SERPL CREATININE-BSD FRML MDRD: >90 ML/MIN/1.73M2
GLUCOSE BLD-MCNC: 86 MG/DL (ref 70–99)
HCT VFR BLD AUTO: 42 % (ref 35–47)
HGB BLD-MCNC: 13.7 G/DL (ref 11.7–15.7)
IMM GRANULOCYTES # BLD: 0.1 10E3/UL
IMM GRANULOCYTES NFR BLD: 0 %
LITHIUM SERPL-SCNC: <0.2 MMOL/L
LYMPHOCYTES # BLD AUTO: 3.1 10E3/UL (ref 0.8–5.3)
LYMPHOCYTES NFR BLD AUTO: 27 %
MCH RBC QN AUTO: 29.4 PG (ref 26.5–33)
MCHC RBC AUTO-ENTMCNC: 32.6 G/DL (ref 31.5–36.5)
MCV RBC AUTO: 90 FL (ref 78–100)
MONOCYTES # BLD AUTO: 0.8 10E3/UL (ref 0–1.3)
MONOCYTES NFR BLD AUTO: 7 %
NEUTROPHILS # BLD AUTO: 7.7 10E3/UL (ref 1.6–8.3)
NEUTROPHILS NFR BLD AUTO: 66 %
NRBC # BLD AUTO: 0 10E3/UL
NRBC BLD AUTO-RTO: 0 /100
PLATELET # BLD AUTO: 258 10E3/UL (ref 150–450)
POTASSIUM BLD-SCNC: 3.5 MMOL/L (ref 3.4–5.3)
PROT SERPL-MCNC: 8.2 G/DL (ref 6.8–8.8)
RBC # BLD AUTO: 4.66 10E6/UL (ref 3.8–5.2)
SARS-COV-2 RNA RESP QL NAA+PROBE: POSITIVE
SODIUM SERPL-SCNC: 137 MMOL/L (ref 133–144)
WBC # BLD AUTO: 11.7 10E3/UL (ref 4–11)

## 2021-08-22 PROCEDURE — 85025 COMPLETE CBC W/AUTO DIFF WBC: CPT | Performed by: EMERGENCY MEDICINE

## 2021-08-22 PROCEDURE — 250N000011 HC RX IP 250 OP 636: Performed by: EMERGENCY MEDICINE

## 2021-08-22 PROCEDURE — U0005 INFEC AGEN DETEC AMPLI PROBE: HCPCS | Performed by: EMERGENCY MEDICINE

## 2021-08-22 PROCEDURE — 80178 ASSAY OF LITHIUM: CPT | Performed by: EMERGENCY MEDICINE

## 2021-08-22 PROCEDURE — C9803 HOPD COVID-19 SPEC COLLECT: HCPCS | Performed by: EMERGENCY MEDICINE

## 2021-08-22 PROCEDURE — 36415 COLL VENOUS BLD VENIPUNCTURE: CPT | Performed by: EMERGENCY MEDICINE

## 2021-08-22 PROCEDURE — 82040 ASSAY OF SERUM ALBUMIN: CPT | Performed by: EMERGENCY MEDICINE

## 2021-08-22 PROCEDURE — 99284 EMERGENCY DEPT VISIT MOD MDM: CPT | Performed by: EMERGENCY MEDICINE

## 2021-08-22 PROCEDURE — 90791 PSYCH DIAGNOSTIC EVALUATION: CPT

## 2021-08-22 PROCEDURE — 99285 EMERGENCY DEPT VISIT HI MDM: CPT | Mod: 25 | Performed by: EMERGENCY MEDICINE

## 2021-08-22 RX ORDER — ONDANSETRON 4 MG/1
4 TABLET, ORALLY DISINTEGRATING ORAL ONCE
Status: COMPLETED | OUTPATIENT
Start: 2021-08-22 | End: 2021-08-22

## 2021-08-22 RX ADMIN — ONDANSETRON 4 MG: 4 TABLET, ORALLY DISINTEGRATING ORAL at 19:08

## 2021-08-22 NOTE — ED PROVIDER NOTES
[facility] EMERGENCY DEPARTMENT ([facility])   August 22, 2021  History     Chief Complaint   Patient presents with     Addiction Problem     Coming off Benzo, hasn't eaten in a week. Last use: 2 days ago. 20 mg of Xanax daily.      Suicidal     SI without a plan.      HPI  Jihan Gill is a 46 year old female with PMH significant for DM 2, benzodiazepine dependence with withdrawal, bipolar 1 disorder on lithium, hepatitis C, polysubstance use disorder (benzodiazepine, methamphetamine, and opiates), and positive for COVID-19 on 8/8/2021 who presents for possible benzo withdrawal. Patient reports 3-4 days of nausea, decreased appetite, and fatigue. She notes difficulty sleeping. She was discharged from the hospital on 8/12/21 after being admitted for benzo withdrawal. She was incidentally found to be COVID+ on admission. She was discharged on a phenobarbital taper. She has not been taking this. She states she began using benzos again as soon as she left the hospital. She states that she has not felt well since discharge and thought the benzos would help. She gets them from the streets. She denies temors, seizures, or fevers. She is supposed to enter a drug rehab facility after a 10 day quarantine (due to her COVID status).     PAST MEDICAL HISTORY:   Past Medical History:   Diagnosis Date     Arthritis      Bipolar 1 disorder (H)      Chronic hepatitis C without hepatic coma (H)      Depressive disorder     postpartum     Depressive disorder      Major depression, recurrent (H)      Opiate addiction (H)      Seizures (H)     narcotic withdrawal     Substance abuse (H)      Urinary calculus, unspecified 2001    Renal stones       PAST SURGICAL HISTORY:   Past Surgical History:   Procedure Laterality Date     C/SECTION, LOW TRANSVERSE      [id number]     ENT SURGERY       GYN SURGERY       TONSILLECTOMY         Past medical history, past surgical history, medications, and allergies were reviewed with the  patient. Additional pertinent items: None    FAMILY HISTORY:   Family History   Problem Relation Age of Onset     No Known Problems Father      No Known Problems Mother      No Known Problems Sister      No Known Problems Brother        SOCIAL HISTORY:   Social History     Tobacco Use     Smoking status: Current Every Day Smoker     Packs/day: 0.50     Years: 8.00     Pack years: 4.00     Smokeless tobacco: Never Used   Substance Use Topics     Alcohol use: Not Currently     Comment: States no EtOH since 2008.     Social history was reviewed with the patient. Additional pertinent items: None      Discharge Medication List as of 8/22/2021 10:50 PM      CONTINUE these medications which have NOT CHANGED    Details   acetaminophen (TYLENOL) 325 MG tablet Take 2 tablets (650 mg) by mouth every 4 hours as needed for mild pain or fever, Disp-30 tablet, R-0, E-Prescribe      albuterol (PROAIR HFA/PROVENTIL HFA/VENTOLIN HFA) 108 (90 Base) MCG/ACT inhaler Inhale 1-2 puffs into the lungs every 6 hours as needed for shortness of breath / dyspnea or wheezing , HistoricalPharmacy may dispense brand covered by insurance (Proair, or proventil or ventolin or generic albuterol inhaler)      FLUoxetine (PROZAC) 20 MG capsule Take 60 mg by mouth daily , Historical      gabapentin (NEURONTIN) 800 MG tablet Take 800 mg in the morning, 800 mg in the afternoon, and 1,600 mg at bedtime, Historical      hydrOXYzine (VISTARIL) 25 MG capsule Take 25 mg by mouth 4 times daily as needed for anxiety (or sleep), Historical      lithium ER (LITHOBID) 300 MG CR tablet Take 300 mg in the morning and 600 mg at bedtime., Historical      nicotine polacrilex (NICORETTE) 4 MG gum Place 1 each (4 mg) inside cheek every hour as needed for other (nicotine withdrawal symptoms), Disp-30 each, R-1, E-Prescribe         STOP taking these medications       PHENobarbital (LUMINAL) 16.2 MG tablet Comments:   Reason for Stopping:                  Allergies   Allergen  Reactions     Abilify [Aripiprazole] Other (See Comments)     Tardive dyskinesia     Allopurinol      Compazine Other (See Comments)     Dystonia     Dramamine      Jaw lock.       Olanzapine Other (See Comments)     Tardive dyskinesia from Zyprexa - per patient     Reglan Other (See Comments)     dystonia     Seroquel [Quetiapine] Other (See Comments)     Tardive dyskinesia.         Review of Systems  A complete review of systems was performed with pertinent positives and negatives noted in the HPI, and all other systems negative.    Physical Exam   BP: 116/72  Pulse: 81  Temp: 97.3  F (36.3  C)  Resp: 18  SpO2: 98 %      Physical Exam  Vitals and nursing note reviewed.   Constitutional:       General: She is not in acute distress.     Appearance: Normal appearance. She is not diaphoretic.   HENT:      Head: Normocephalic and atraumatic.      Mouth/Throat:      Mouth: Mucous membranes are moist.      Pharynx: No oropharyngeal exudate.   Eyes:      General: No scleral icterus.     Pupils: Pupils are equal, round, and reactive to light.   Cardiovascular:      Rate and Rhythm: Normal rate and regular rhythm.      Pulses: Normal pulses.      Heart sounds: Normal heart sounds.   Pulmonary:      Effort: Pulmonary effort is normal. No respiratory distress.      Breath sounds: Normal breath sounds.   Abdominal:      General: Bowel sounds are normal.      Palpations: Abdomen is soft.      Tenderness: There is no abdominal tenderness.   Musculoskeletal:         General: No tenderness.      Cervical back: Neck supple.   Skin:     General: Skin is warm.      Findings: No rash.   Neurological:      General: No focal deficit present.      Mental Status: She is alert and oriented to person, place, and time.   Psychiatric:         Mood and Affect: Mood normal.         Behavior: Behavior normal.         ED Course        Procedures         Results for orders placed or performed during the hospital encounter of 08/22/21 (from the  past 24 hour(s))   Symptomatic COVID-19 Virus (Coronavirus) by PCR Nasopharyngeal    Specimen: Nasopharyngeal; Swab   Result Value Ref Range    SARS CoV2 PCR Positive (A) Negative    Narrative    Testing was performed using the Xpert Xpress SARS-CoV-2 Assay on the  Cepheid Gene-Xpert Instrument Systems. Additional information about  this Emergency Use Authorization (EUA) assay can be found via the Lab  Guide. This test should be ordered for the detection of SARS-CoV-2 in  individuals who meet SARS-CoV-2 clinical and/or epidemiological  criteria. Test performance is unknown in asymptomatic patients. This  test is for in vitro diagnostic use under the FDA EUA for  laboratories certified under CLIA to perform high complexity testing.  This test has not been FDA cleared or approved. A negative result  does not rule out the presence of PCR inhibitors in the specimen or  target RNA in concentration below the limit of detection for the  assay. The possibility of a false negative should be considered if  the patient's recent exposure or clinical presentation suggests  COVID-19. This test was validated by the [facility]. This laboratory is certified under  the Clinical Laboratory Improvement Amendments of 1988 (CLIA-88) as  qualified to perform high complexity laboratory testing.     CBC with platelets differential    Narrative    The following orders were created for panel order CBC with platelets differential.  Procedure                               Abnormality         Status                     ---------                               -----------         ------                     CBC with platelets and d...[[id number]]  Abnormal            Final result                 Please view results for these tests on the individual orders.   Comprehensive metabolic panel   Result Value Ref Range    Sodium 137 133 - 144 mmol/L    Potassium 3.5 3.4 - 5.3 mmol/L    Chloride 107 94 - 109  mmol/L    Carbon Dioxide (CO2) 24 20 - 32 mmol/L    Anion Gap 6 3 - 14 mmol/L    Urea Nitrogen 16 7 - 30 mg/dL    Creatinine 0.61 0.52 - 1.04 mg/dL    Calcium 9.3 8.5 - 10.1 mg/dL    Glucose 86 70 - 99 mg/dL    Alkaline Phosphatase 132 40 - 150 U/L    AST 25 0 - 45 U/L    ALT 37 0 - 50 U/L    Protein Total 8.2 6.8 - 8.8 g/dL    Albumin 4.0 3.4 - 5.0 g/dL    Bilirubin Total 0.6 0.2 - 1.3 mg/dL    GFR Estimate >90 >60 mL/min/1.73m2   Lithium level   Result Value Ref Range    Lithium <0.2   mmol/L   CBC with platelets and differential   Result Value Ref Range    WBC Count 11.7 (H) 4.0 - 11.0 10e3/uL    RBC Count 4.66 3.80 - 5.20 10e6/uL    Hemoglobin 13.7 11.7 - 15.7 g/dL    Hematocrit 42.0 35.0 - 47.0 %    MCV 90 78 - 100 fL    MCH 29.4 26.5 - 33.0 pg    MCHC 32.6 31.5 - 36.5 g/dL    RDW 12.8 10.0 - 15.0 %    Platelet Count 258 150 - 450 10e3/uL    % Neutrophils 66 %    % Lymphocytes 27 %    % Monocytes 7 %    % Eosinophils 0 %    % Basophils 0 %    % Immature Granulocytes 0 %    NRBCs per 100 WBC 0 <1 /100    Absolute Neutrophils 7.7 1.6 - 8.3 10e3/uL    Absolute Lymphocytes 3.1 0.8 - 5.3 10e3/uL    Absolute Monocytes 0.8 0.0 - 1.3 10e3/uL    Absolute Eosinophils 0.1 0.0 - 0.7 10e3/uL    Absolute Basophils 0.0 0.0 - 0.2 10e3/uL    Absolute Immature Granulocytes 0.1 (H) <=0.0 10e3/uL    Absolute NRBCs 0.0 10e3/uL     Medications   ondansetron (ZOFRAN-ODT) ODT tab 4 mg (4 mg Oral Given 8/22/21 1908)             Assessments & Plan (with Medical Decision Making)   Patient was seen and examined.  She has normal vitals on initial assessment.  She states that she last used benzodiazepines yesterday.  Her symptoms seem more consistent with acute Covid infection rather than benzodiazepine withdrawal.  I did obtain labs that show she is still positive for Covid.  Her other labs are unremarkable.  She does not appear acutely dehydrated.  Her vitals have remained stable without any intervention throughout her emergency  department stay.  At this time, I do not believe that the patient warrants admission for benzodiazepine withdrawal as she has no signs or symptoms of this.  She also does not meet criteria for admission for Covid.  I explained these findings to the patient.  She states that her mom will not let her stay with her because she has been using benzos again.  She states that she has not been taking her psych meds because she has not picked them up.  She did not start the phenobarbital taper when she was last discharged.  Patient is requesting to sleep in the emergency department overnight.  There is no indication for this at this time.  I recommend the patient be discharged.  Encouraged her to pursue inpatient rehab once her Covid symptoms have resolved.  She was discharged in stable condition.    I have reviewed the nursing notes.    I have reviewed the findings, diagnosis, plan and need for follow up with the patient.    Discharge Medication List as of 8/22/2021 10:50 PM          Final diagnoses:   2019 novel coronavirus disease (COVID-19)   Benzodiazepine dependence (H)       Palma Weesm DO  8/22/2021   [facility] EMERGENCY DEPARTMENT     Palma Weems DO  08/23/21 0014

## 2021-08-23 NOTE — ED NOTES
Patient unable to provide urine sample at this time. Patient cooperative with lab draw. Patient denies SI and HI to writer. Respirations even and unlabored. Skin warm and dry.

## 2021-09-18 ENCOUNTER — HEALTH MAINTENANCE LETTER (OUTPATIENT)
Age: 46
End: 2021-09-18

## 2021-10-05 ENCOUNTER — HOSPITAL ENCOUNTER (EMERGENCY)
Facility: CLINIC | Age: 46
Discharge: HOME | End: 2021-10-05
Attending: EMERGENCY MEDICINE | Admitting: EMERGENCY MEDICINE
Payer: COMMERCIAL

## 2021-10-05 ENCOUNTER — APPOINTMENT (OUTPATIENT)
Dept: GENERAL RADIOLOGY | Facility: CLINIC | Age: 46
End: 2021-10-05
Attending: EMERGENCY MEDICINE
Payer: COMMERCIAL

## 2021-10-05 VITALS
HEART RATE: 91 BPM | RESPIRATION RATE: 18 BRPM | BODY MASS INDEX: 38.32 KG/M2 | WEIGHT: 230 LBS | TEMPERATURE: 99.3 F | DIASTOLIC BLOOD PRESSURE: 55 MMHG | HEIGHT: 65 IN | SYSTOLIC BLOOD PRESSURE: 122 MMHG

## 2021-10-05 DIAGNOSIS — J45.901 MODERATE ASTHMA WITH EXACERBATION, UNSPECIFIED WHETHER PERSISTENT: ICD-10-CM

## 2021-10-05 PROCEDURE — 250N000009 HC RX 250: Performed by: EMERGENCY MEDICINE

## 2021-10-05 PROCEDURE — 99283 EMERGENCY DEPT VISIT LOW MDM: CPT | Mod: 25

## 2021-10-05 PROCEDURE — 71046 X-RAY EXAM CHEST 2 VIEWS: CPT

## 2021-10-05 PROCEDURE — 94640 AIRWAY INHALATION TREATMENT: CPT

## 2021-10-05 RX ORDER — ALBUTEROL SULFATE 0.83 MG/ML
2.5 SOLUTION RESPIRATORY (INHALATION) EVERY 4 HOURS PRN
Qty: 100 ML | Refills: 0 | Status: SHIPPED | OUTPATIENT
Start: 2021-10-05 | End: 2022-01-08

## 2021-10-05 RX ORDER — ALBUTEROL SULFATE 0.83 MG/ML
5 SOLUTION RESPIRATORY (INHALATION) ONCE
Status: COMPLETED | OUTPATIENT
Start: 2021-10-05 | End: 2021-10-05

## 2021-10-05 RX ADMIN — ALBUTEROL SULFATE 5 MG: 2.5 SOLUTION RESPIRATORY (INHALATION) at 09:30

## 2021-10-05 ASSESSMENT — ENCOUNTER SYMPTOMS
COUGH: 1
VOICE CHANGE: 1
FEVER: 0
SHORTNESS OF BREATH: 1

## 2021-10-05 ASSESSMENT — MIFFLIN-ST. JEOR: SCORE: 1694.15

## 2021-12-25 ENCOUNTER — HOSPITAL ENCOUNTER (OUTPATIENT)
Facility: CLINIC | Age: 46
Setting detail: OBSERVATION
Discharge: HOME | End: 2021-12-26
Attending: EMERGENCY MEDICINE | Admitting: PSYCHIATRY & NEUROLOGY
Payer: COMMERCIAL

## 2021-12-25 DIAGNOSIS — F13.20 SEDATIVE, HYPNOTIC OR ANXIOLYTIC DEPENDENCE (H): ICD-10-CM

## 2021-12-25 DIAGNOSIS — F41.9 ANXIETY DISORDER, UNSPECIFIED: ICD-10-CM

## 2021-12-25 DIAGNOSIS — F60.3 BORDERLINE PERSONALITY DISORDER (H): ICD-10-CM

## 2021-12-25 DIAGNOSIS — F11.10 OPIOID USE DISORDER, MILD, ON MAINTENANCE THERAPY (H): ICD-10-CM

## 2021-12-25 DIAGNOSIS — F33.2 SEVERE EPISODE OF RECURRENT MAJOR DEPRESSIVE DISORDER, WITHOUT PSYCHOTIC FEATURES (H): ICD-10-CM

## 2021-12-25 LAB
AMPHETAMINES UR QL SCN: ABNORMAL
BARBITURATES UR QL: ABNORMAL
BENZODIAZ UR QL: ABNORMAL
CANNABINOIDS UR QL SCN: ABNORMAL
COCAINE UR QL: ABNORMAL
OPIATES UR QL SCN: ABNORMAL
PCP UR QL SCN: ABNORMAL
SARS-COV-2 RNA RESP QL NAA+PROBE: NEGATIVE

## 2021-12-25 PROCEDURE — G0378 HOSPITAL OBSERVATION PER HR: HCPCS

## 2021-12-25 PROCEDURE — U0003 INFECTIOUS AGENT DETECTION BY NUCLEIC ACID (DNA OR RNA); SEVERE ACUTE RESPIRATORY SYNDROME CORONAVIRUS 2 (SARS-COV-2) (CORONAVIRUS DISEASE [COVID-19]), AMPLIFIED PROBE TECHNIQUE, MAKING USE OF HIGH THROUGHPUT TECHNOLOGIES AS DESCRIBED BY CMS-2020-01-R: HCPCS | Performed by: EMERGENCY MEDICINE

## 2021-12-25 PROCEDURE — C9803 HOPD COVID-19 SPEC COLLECT: HCPCS

## 2021-12-25 PROCEDURE — 80307 DRUG TEST PRSMV CHEM ANLYZR: CPT | Performed by: EMERGENCY MEDICINE

## 2021-12-25 PROCEDURE — 99285 EMERGENCY DEPT VISIT HI MDM: CPT | Mod: 25

## 2021-12-25 PROCEDURE — 250N000013 HC RX MED GY IP 250 OP 250 PS 637: Performed by: NURSE PRACTITIONER

## 2021-12-25 PROCEDURE — 99220 PR INITIAL OBSERVATION CARE,LEVEL III: CPT | Performed by: NURSE PRACTITIONER

## 2021-12-25 PROCEDURE — 90791 PSYCH DIAGNOSTIC EVALUATION: CPT

## 2021-12-25 RX ORDER — ONDANSETRON 4 MG/1
4-8 TABLET, ORALLY DISINTEGRATING ORAL EVERY 8 HOURS PRN
Status: DISCONTINUED | OUTPATIENT
Start: 2021-12-25 | End: 2021-12-26 | Stop reason: HOSPADM

## 2021-12-25 RX ORDER — LORAZEPAM 0.5 MG/1
0.5 TABLET ORAL DAILY
Status: DISCONTINUED | OUTPATIENT
Start: 2021-12-25 | End: 2021-12-26 | Stop reason: HOSPADM

## 2021-12-25 RX ORDER — GABAPENTIN 800 MG/1
1600 TABLET ORAL AT BEDTIME
Status: DISCONTINUED | OUTPATIENT
Start: 2021-12-25 | End: 2021-12-26 | Stop reason: HOSPADM

## 2021-12-25 RX ORDER — VILAZODONE HYDROCHLORIDE 10 MG/1
10 TABLET ORAL DAILY
COMMUNITY
End: 2021-12-26

## 2021-12-25 RX ORDER — CLONIDINE HYDROCHLORIDE 0.1 MG/1
0.1 TABLET ORAL EVERY 6 HOURS PRN
Status: DISCONTINUED | OUTPATIENT
Start: 2021-12-25 | End: 2021-12-26 | Stop reason: HOSPADM

## 2021-12-25 RX ORDER — VILAZODONE HYDROCHLORIDE 20 MG/1
20 TABLET ORAL DAILY
Status: DISCONTINUED | OUTPATIENT
Start: 2021-12-26 | End: 2021-12-26 | Stop reason: HOSPADM

## 2021-12-25 RX ORDER — HYDROXYZINE HYDROCHLORIDE 25 MG/1
25-50 TABLET, FILM COATED ORAL EVERY 6 HOURS PRN
Status: DISCONTINUED | OUTPATIENT
Start: 2021-12-25 | End: 2021-12-26 | Stop reason: HOSPADM

## 2021-12-25 RX ORDER — LORAZEPAM 0.5 MG/1
0.5 TABLET ORAL DAILY
Status: ON HOLD | COMMUNITY
End: 2022-01-09

## 2021-12-25 RX ORDER — GABAPENTIN 800 MG/1
800 TABLET ORAL 2 TIMES DAILY
Status: DISCONTINUED | OUTPATIENT
Start: 2021-12-25 | End: 2021-12-26 | Stop reason: HOSPADM

## 2021-12-25 RX ORDER — METHADONE HYDROCHLORIDE 10 MG/5ML
90 SOLUTION ORAL DAILY
COMMUNITY

## 2021-12-25 RX ORDER — DIPHENHYDRAMINE HCL 25 MG
50 CAPSULE ORAL
Status: DISCONTINUED | OUTPATIENT
Start: 2021-12-25 | End: 2021-12-26 | Stop reason: HOSPADM

## 2021-12-25 RX ORDER — LANOLIN ALCOHOL/MO/W.PET/CERES
3 CREAM (GRAM) TOPICAL
Status: DISCONTINUED | OUTPATIENT
Start: 2021-12-25 | End: 2021-12-26 | Stop reason: HOSPADM

## 2021-12-25 RX ADMIN — LORAZEPAM 0.5 MG: 0.5 TABLET ORAL at 16:58

## 2021-12-25 RX ADMIN — GABAPENTIN 1600 MG: 800 TABLET, FILM COATED ORAL at 22:40

## 2021-12-25 RX ADMIN — GABAPENTIN 800 MG: 800 TABLET, FILM COATED ORAL at 16:58

## 2021-12-25 RX ADMIN — DIPHENHYDRAMINE HYDROCHLORIDE 50 MG: 25 CAPSULE ORAL at 22:40

## 2021-12-25 ASSESSMENT — ENCOUNTER SYMPTOMS
FEVER: 0
SHORTNESS OF BREATH: 0
COUGH: 0

## 2021-12-25 ASSESSMENT — ACTIVITIES OF DAILY LIVING (ADL)
HYGIENE/GROOMING: INDEPENDENT
ORAL_HYGIENE: INDEPENDENT
HYGIENE/GROOMING: INDEPENDENT
DRESS: SCRUBS (BEHAVIORAL HEALTH)
DRESS: SCRUBS (BEHAVIORAL HEALTH)
ORAL_HYGIENE: INDEPENDENT

## 2021-12-25 ASSESSMENT — MIFFLIN-ST. JEOR: SCORE: 1721.37

## 2021-12-25 NOTE — ED PROVIDER NOTES
History   Chief Complaint:  Depression     The history is provided by the patient.      Jihan Gill is a 46 year old female with history of major depression, bipolar II disorder, and substance abuse who presents alone for depression. For three weeks now, she's been living a normal life, however she is not okay in the inside. She states that while at work, she questioned why she is even living. She states that if she didn't have a 20 year old son to take care of, she would not be here. Furthermore, she feels bad that she does not want to be a mother who is always depressed. She has been taking lorazepam, methadone for her past Opioid addition from 5 years ago, and adderall. She has been sober from alcohol and substances for 5 years now. The patient denies having any chest pain, shortness of breath, fevers, or coughs.    Review of Systems   Constitutional: Negative for fever.   Respiratory: Negative for cough and shortness of breath.    Cardiovascular: Negative for chest pain.   Psychiatric/Behavioral:        Depression   All other systems reviewed and are negative.        Allergies:  Abilify  Allopurinol  Compazine  Dramamine  Olanzapine  Reglan  Seroquel    Medications:  FLUoxetine  gabapentin   hydrOXYzine  lithium ER   nicotine polacrilex   Lorazepam  Methadone     Past Medical History:     Arthritis    Chronic hepatitis C without hepatic coma   Major depression, recurrent   Opiate addiction   Seizures   Substance abuse   Urinary calculus, unspecified   Degeneration of lumbar or lumbosacral intervertebral disc  Methadone overdose  Suicide attempt   Lithium overdose, intentional self-harm, initial encounter   Borderline personality disorder  Bipolar II disorder   Benzodiazepine dependence   Acute hepatitis C virus infection  Suicidal ideation  Chronic pain syndrome  Benzodiazepine withdrawal without complication       Past Surgical History:    C/section, low transverse   ENT surgery  GYN  "surgery  Tonsillectomy     Family History:    Colon cancer - Father  Diabetes - Mother    Social History:  The patient arrived to the emergency room alone.  The patient has a 20 year old son.    Physical Exam     Patient Vitals for the past 24 hrs:   BP Temp Temp src Pulse Resp SpO2 Height Weight   12/25/21 1433 133/84 99  F (37.2  C) Oral 87 16 -- 1.651 m (5' 5\") 107 kg (236 lb)   12/25/21 1205 (!) 146/80 98.5  F (36.9  C) Temporal 100 18 98 % -- --     Physical Exam  Eye:  Pupils are equal, round, and reactive.  Extraocular movements intact.    ENT:  No rhinorrhea.  Moist mucus membranes.  Normal tongue and tonsil.    Cardiac:  Regular rate and rhythm.  No murmurs, gallops, or rubs.    Pulmonary:  Clear to auscultation bilaterally.  No wheezes, rales, or rhonchi.    Abdomen:  Positive bowel sounds.  Abdomen is soft and non-distended, without focal tenderness.    Musculoskeletal:  Normal movement of all extremities without evidence for deficit.    Skin:  Warm and dry without rashes.    Neurologic:  Non-focal exam without asymmetric weakness or numbness.     Psychiatric:  Depressed affect with otherwise appropriate interaction with examiner.    Emergency Department Course         Laboratory:  Labs Ordered and Resulted from Time of ED Arrival to Time of ED Departure   DRUG ABUSE SCREEN 77 URINE (FL, RH, SH) - Abnormal       Result Value    Amphetamines Urine Screen Positive (*)     Barbiturates Urine Screen Negative      Benzodiazepines Urine Screen Positive (*)     Cannabinoids Urine Screen Negative      Cocaine Urine Screen Negative      Opiates Urine Screen Negative      PCP Urine Screen Negative     COVID-19 VIRUS (CORONAVIRUS) BY PCR - Normal    SARS CoV2 PCR Negative          Emergency Department Course:  Reviewed:  I reviewed nursing notes, vitals, past medical history, Care Everywhere and [facility]    Assessments:  1227 I obtained history and examined the patient as noted above.   1315 I rechecked the patient and " explained findings.     Disposition:  The patient was transferred to [facility].     Impression & Plan     Medical Decision Making:  This unfortunate 46-year-old woman with a history of depression presents to us with passive thoughts of suicidal ideation.  She feels hopeless, noting that she would end her life if not for her son.  She presents to us requesting help.  She has no other physical concerns today.  Her vital signs are reviewed and are unremarkable.  I feel she is an ideal candidate for our EMPATH unit.  With a negative COVID test and unremarkable drug screen, I feel she is safe for transfer to their area for thorough investigation of the psychiatric issues along with assistance that we can provide.  The patient is in support of this as well and will be transferred there.    Diagnosis:    ICD-10-CM    1. Depression, unspecified depression type  F32.A        Discharge Medications:  New Prescriptions    No medications on file       Scribe Disclosure:  ADA, Sara Pardo, am serving as a scribe at 12:08 PM on 12/25/2021 to document services personally performed by Trierweiler, Chad A, MD based on my observations and the provider's statements to me.      Trierweiler, Chad A, MD  01/20/22 3634

## 2021-12-25 NOTE — ED NOTES
Pt has a long Hx of substance abuse and is on Methadone 150 mg daily. She said she did not take any today. Pharmacy attempted to call [place] [facility] and said we could not get any Methadone until Monday and we cannot confirm the dose until then. She states she has 2 doses at home.  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 Pt aware that she cannot get her Methadone until Monday.  46 year old female with history of substance abuse/PTSD received from ED due to increase depression. Reports some vague SI of not wanting to be here Nursing and risk assessments completed. Assessments reviewed with LMHP and physician. Video monitoring in progress, patient informed.  Admission information reviewed with patient. Patient given a tour of [facility] and instructions on using the facility. Questions regarding [facility] addressed. Pt search completed and belongings inventoried.

## 2021-12-25 NOTE — ED PROVIDER NOTES
EmPATH Unit - Initial Psychiatric Observation Note  [facility]  Observation Initiation Date: Dec 25, 2021    Jihan Gill MRN: [id number]   Age: 46 year old YOB: 1975     History     Chief Complaint   Patient presents with     Depression     HPI  iJhan Gill is a 46 year old female with a past history notable for major depressive disorder, anxiety, possible bipolar II disorder, borderline personality disorder, opioid use disorder on methadone maintenance, sedative-hypnotic use disorder (benzodiazepines), with history of suicide attempts (last in Feb. 2021 by lithium overdose), history of psychiatric hospitalizations (last in 2019), and detox admissions (last in August 2021). Patient presented to the emergency department today complaining of increased symptoms of depression as well as passive suicidal ideation. She was evaluated in the ED, medically cleared, and transferred to the EmPATH unit. On interview today, patient describes feeling quite depressed lately. She reports that she has felt increasingly sad, hopeless, anhedonic. She feels like life is a chore. She has not been sleeping well, complaining of middle insomnia. Her appetite has been reduced. She has been experiencing passive suicidal thoughts, thinking she would be better off dead. Cites her 20 year old son as the main reason she is still alive. She was started on vilazodone 10 mg about 2 months ago. She initially noticed improvement in her mood and energy, but no longer feels this dose is adequate. Reports she tends to worry about a number of different things, has difficulty relaxing. She at times has a hard time focusing at work. She feels isolated, has a limited support network. She has been working with a therapist weekly at her methadone clinic. Reports her methadone dose has been increased over the last couple of months, now to 150 mg. Denies side effects at this dose. She also reports that she took  an Adderall on Thursday to help her focus through some training at work. She wonders whether this may have impacted her mood as well. Reports this was from an old prescription she had from 1.5 years ago. She is no longer seeking benzodiazepines from illicit sources. States she has only been taking the prescribed 0.5 mg dose of lorazepam per day.     Past Medical History  Past Medical History:   Diagnosis Date     Arthritis      Bipolar 1 disorder (H)      Chronic hepatitis C without hepatic coma (H)      Depressive disorder     postpartum     Depressive disorder      Major depression, recurrent (H)      Opiate addiction (H)      Seizures (H)     narcotic withdrawal     Substance abuse (H)      Urinary calculus, unspecified 2001    Renal stones     Past Surgical History:   Procedure Laterality Date     C/SECTION, LOW TRANSVERSE      [id number]     ENT SURGERY       GYN SURGERY       TONSILLECTOMY       albuterol (PROAIR HFA/PROVENTIL HFA/VENTOLIN HFA) 108 (90 Base) MCG/ACT inhaler  gabapentin (NEURONTIN) 800 MG tablet  LORazepam (ATIVAN) 0.5 MG tablet  methadone HCl 10 MG/5ML SOLN  nicotine polacrilex (NICORETTE) 4 MG gum  vilazodone (VIIBRYD) 10 MG TABS tablet  acetaminophen (TYLENOL) 325 MG tablet  albuterol (PROVENTIL) (2.5 MG/3ML) 0.083% neb solution      Allergies   Allergen Reactions     Abilify [Aripiprazole] Other (See Comments)     Tardive dyskinesia     Allopurinol      Compazine Other (See Comments)     Dystonia     Dramamine      Jaw lock.       Olanzapine Other (See Comments)     Tardive dyskinesia from Zyprexa - per patient     Reglan Other (See Comments)     dystonia     Seroquel [Quetiapine] Other (See Comments)     Tardive dyskinesia.      Family History  Mental illness on maternal side. Mother with depression and anxiety. Several family members with alcohol dependency.    Social History   Social History     Tobacco Use     Smoking status: Current Every Day Smoker     Packs/day: 0.50     Years: 8.00      "Pack years: 4.00     Smokeless tobacco: Never Used   Substance Use Topics     Alcohol use: Not Currently     Comment: States no EtOH since 2008.     Drug use: Yes     Comment: Methadone 60mg/day street buy      Past medical history, past surgical history, medications, allergies, family history, and social history were reviewed with the patient. No additional pertinent items.       Review of Systems  A complete review of systems was performed with pertinent positives and negatives noted in the HPI, and all other systems negative.    Physical Examination   BP: (!) 146/80  Pulse: 100  Temp: 98.5  F (36.9  C)  Resp: 18  Height: 165.1 cm (5' 5\")  Weight: 107 kg (236 lb)  SpO2: 98 %    Physical Exam  General: Appears stated age.   Neuro: Alert and fully oriented. Extremities appear to demonstrate normal strength on visual inspection.   Integumentary/Skin: no rash visualized, normal color    Psychiatric Examination   Appearance: awake, alert, adequately groomed, dressed in hospital scrubs and appeared as age stated  Attitude:  cooperative  Eye Contact:  good  Mood:  sad  and depressed  Affect:  mood congruent, intensity is blunted and tearful  Speech:  clear, coherent and normal prosody  Psychomotor Behavior:  no evidence of tardive dyskinesia, dystonia, or tics and intact station, gait and muscle tone  Thought Process:  logical, linear and goal oriented  Associations:  no loose associations  Thought Content:  endorsing passive suicidal ideation, wishing she were dead at times. Denies active plans or intent. Denies homicidal ideation.  No delusions or paranoia. Denies auditory or visual hallucinations.  Insight:  fair  Judgement:  fair  Oriented to:  time, person, and place  Attention Span and Concentration:  intact  Recent and Remote Memory:  intact  Language: able to name/identify objects without impairment  Fund of Knowledge: intact with awareness of current and past events    ED Course        Labs Ordered and Resulted " from Time of ED Arrival to Time of ED Departure   DRUG ABUSE SCREEN 77 URINE (FL, RH, SH) - Abnormal       Result Value    Amphetamines Urine Screen Positive (*)     Barbiturates Urine Screen Negative      Benzodiazepines Urine Screen Positive (*)     Cannabinoids Urine Screen Negative      Cocaine Urine Screen Negative      Opiates Urine Screen Negative      PCP Urine Screen Negative     COVID-19 VIRUS (CORONAVIRUS) BY PCR - Normal    SARS CoV2 PCR Negative         Assessments & Plan (with Medical Decision Making)   Patient presenting with increasing depressive symptoms with feelings of hopelessness and passive suicidal ideation. She has a history of opioid dependence, is currently on methadone maintenance. Also with history of benzodiazepine dependence, denies taking more than prescribed currently. Nursing notes reviewed noting no acute issues.     I have reviewed the assessment completed by the [facility].     During the observation period, the patient did not require medications for agitation, and did not require restraints/seclusion for patient and/or provider safety.     The patient was found to have a psychiatric condition that would benefit from an observation stay in the emergency department for further psychiatric stabilization and/or coordination of a safe disposition. The observation plan includes serial assessments of psychiatric condition, potential administration of medications if indicated, further disposition pending the patient's psychiatric course during the monitoring period.     Preliminary diagnosis:    ICD-10-CM    1. Severe episode of recurrent major depressive disorder, without psychotic features (H)  F33.2    2. Anxiety disorder, unspecified  F41.9    3. Opioid use disorder, mild, on maintenance therapy (H)  F11.10    4. Sedative, hypnotic or anxiolytic dependence (H)  F13.20    5.  Borderline Personality Disorder (H)) F60.3         Treatment Plan:  1) Will increase Viibryd to 20 mg daily for  symptoms of depression and anxiety  2) Will continue PTA dose of gabapentin 800 mg AM, 800 mg afternoon, and 1600 mg HS  3) Continue PTA lorazepam 0.5 mg daily (prescribed by her outpatient provider, verified in PDMP)  4) Pt requests diphenhydramine 50 mg and melatonin 3 mg at bedtime for sleep  5) Unfortunately, the pharmacist is unable to verify patient's methadone dosing today due to the holiday. Recommend to ask the pharmacist to re-attempt dosage verification tomorrow morning ([facility]). Pt reports she is prescribed Methadone 150 mg daily.   6) Will have clonidine 0.1 mg q6h prn available for any opioid withdrawal symptoms overnight  7) Hydroxyzine 25-50 mg q6h prn for anxiety or opioid withdrawal symptoms  8) Zofran 4 mg to 8 mg q8 hours prn for nausea or vomiting  9) Patient would benefit from individual psychotherapy, or perhaps IOP if her work schedule allows  10) She was encouraged to participate in AA and obtain a sponsor for additional support  11) Will admit patient to observation status, allow her to stay overnight, and recommend reevaluation tomorrow morning.    --  Yahir Rodriguez CNP   [facility] EMERGENCY DEPT  EmPATH Unit  12/25/2021      Yahir Rodriguez CNP  12/25/21 1927

## 2021-12-25 NOTE — ED TRIAGE NOTES
"Pt has had an increase in depression.  States that not exactly SI, but more of a \" I don't want to be here anymore.\"  "

## 2021-12-25 NOTE — TREATMENT PLAN
EmPATH Treatment Plan    Client's Name: Jihan Gill  YOB: 1975    DSM-5 Diagnoses:     296.30 (F33.9) Major Depressive Disorder, Recurrent Episode, Unspecified _ and With anxious distress - rule out     301.83 (F60.3) Borderline Personality Disorder - provisional      300.02 (F41.1) Generalized Anxiety Disorder - by history       Psychosocial / Contextual Factors: Patient presented to the emPATH unit with the following concerns: Pt reports increased depressive symptoms and passive suicidal ideation.      Anticipated number of sessions or this episode of care: 1-4    MeasurableTreatment Goal(s) related to diagnosis / functional impairment(s)    Goal 1: Patient will reduce SI intensity and establish sense of hope for self and the future?     Objective #A  Patient will identify positives in their life.   Patient will: make a list of positives: relationships, achievements, support, etc.?  Status: New as of December 25, 2021    Intervention(s)  LMHP will assist pt in exploring/identifying positives.    Objective #B Identify and replace negative thinking patterns  Patient will discuss underlying assumptions and self talk that may be contributing to hopelessness.    Status: New as of December 25, 2021    Intervention(s)  LMHP will assist client in developing an awareness of and identifying cognitive messages that?reinforce hopelessness.      Goal 2: Patient will identify cognitive distortions in relation to their anxiety and explore alternatives.     Objective #A     Patient will identify thoughts and beliefs about self and the world as they relate to anxiety.    Status: New as of December 25, 2021    Intervention(s)  LMHP will guide discussion and assist patient in identification of negative beliefs.    Objective #B  Patient will challenge negative cognitions.    Status: New as of December 25, 2021    Intervention(s)  LMHP will guide patient in examining the evidence for and against beliefs.              Appearance:   Appropriate    Eye Contact:   Good    Psychomotor Behavior: Normal    Attitude:   Cooperative  Attentive   Orientation:   All   Speech    Rate / Production: Normal/ Responsive Normal     Volume:  Normal    Mood:    Anxious  Depressed  Fearful   Affect:    Appropriate  Tearful Worrisome    Thought Content:  Clear  Perservative  Suicidal   Thought Form:  Coherent    Insight:    Fair  and Intellectual Insight          PLAN:   Patient will board in emPATH until patient and treatment deem it appropriate to either discharge or admit to a higher level of care.       Marlene Rangel, LICSW                                                           ________

## 2021-12-25 NOTE — CONSULTS
12/25/2021  Jihan Gill 1975     [facility] Crisis Assessment    Patient was assessed: in person  Patient location: [facility]    Referral Data and Chief Complaint  Jihan is a 46 year old who uses she/her pronouns. Patient presented to the ED alone and was referred to the ED by self. Patient is presenting to the ED for the following concerns: Pt reports increased depressive symptoms and passive suicidal ideation.      Informed Consent and Assessment Methods    Patient is her own guardian. Writer met with patient and explained the crisis assessment process, including applicable information disclosures and limits to confidentiality, assessed understanding of the process, and obtained consent to proceed with the assessment. Patient was observed to be able to participate in the assessment as evidenced by verbal consent. Assessment methods included conducting a formal interview with patient, review of medical records, collaboration with medical staff, and obtaining relevant collateral information from family and community providers when available.    Narrative Summary of Presenting Problem and Current Functioning  What led to the patient presenting for crisis services, factors that make the crisis life threatening or complex, stressors, how is this disrupting the patient's life, and how current functioning is in comparison to baseline. How is patient presenting during the assessment.     Pt reports worsening depressive symptoms including anhedonia, sleep disturbance, chronic feelings of doom and fear as well as sleep and appetite disturbance. The pt has a history of polysubstance use and mental health concerns. She has been off illicit substances for about 90 days and is struggling to maintain hope, arjun or pleasure in her life. 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 She has few friends and  "limited support, her methadone counselor is the only support she identifies.     The pt describes an ongoing sense of doom that she lose everything and have to start over again. She states \"I have had to start over so many times, I don't think I can do it.\" The pt feels she is living \"groundhog day\", the same day over and over again. It is exhausting and filled with fear. She desires connection to others, a feeling of safety and the ability to function normally. She cites her son as her only protective factor and feels that if she did commit suicide, it would be an impulsive act as she does not want to do that to her son. She also then asks \"but what good am I doing him?\"      The pt states the only thing that brings her peace is sleeping or watching TV. She is unsure what she needs, perhaps ECT as it has been effective in the past. Prior to assessment, secondary to chart review, this writer had strong concern for drug seeking behavior and/or withdrawal as the pt has a strong history of both. After assessment these concerns are minimized. Pt denies use outside of her prescriptions, does not appear altered and has not requested additional medications. Pt does state that she did not take her methadone this morning and is quite concerned that we are unable to get it until Monday. She states \"stay here and be sick or go home and not be safe, life is always full of hard decisions.\"     Pt has many derogatory statements about herself mixed into her report of current symptoms, \"I fail at everything, what good am I doing my son, I always lose everything.\"     History of the Crisis  Duration of the current crisis, coping skills attempted to reduce the crisis, community resources used, and past presentations.    Pt has an extensive history of mental health concerns and polysubstance use with multiple suicide attempts and overdoses. Chart review shows from 2/2021 to 9/2021, the pt was in and out of hospitals for withdrawal, " mental health concerns, overdoses and ED visits. Pt is currently in recovery from illicit substances for 90 days and is connected to MAT and psychiatric care. Pt is functioning better now than she has in many months but feels her depressive symptoms have increased and she is seeking help for that here. Pt has had ECT in the past and feels desperate enough now to consider that as an intervention.     Collateral Information  Extensive chart review        Risk Assessment    Risk of Harm to Self     ESS-6  1.a. Over the past 2 weeks, have you had thoughts of killing yourself? Yes  1.b. Have you ever attempted to kill yourself and, if yes, when did this last happen? Yes Feb 2021   2. Recent or current suicide plan? No   3. Recent or current intent to act on ideation? No  4. Lifetime psychiatric hospitalization? Yes  5. Pattern of excessive substance use? Yes  6. Current irritability, agitation, or aggression? No  Scoring note: BOTH 1a and 1b must be yes for it to score 1 point, if both are not yes it is zero. All others are 1 point per number. If all questions 1a/1b - 6 are no, risk is negligible. If one of 1a/1b is yes, then risk is mild. If either question 2 or 3, but not both, is yes, then risk is automatically moderate regardless of total score. If both 2 and 3 are yes, risk is automatically high regardless of total score.     Score: 3, moderate risk    The patient has the following risk factors for suicide: depressive symptoms, isolation, lack of support, poor impulse control, prior suicide attempt and recent loss    Is the patient experiencing current suicidal ideation: Yes. Passive wish to be dead without thoughts or plan.     Is the patient engaging in preparatory suicide behaviors (formulating how to act on plan, giving away possessions, saying goodbye, displaying dramatic behavior changes, etc)? No    Does the patient have access to firearms or other lethal means? no    The patient has the following protective  factors: voluntarily seeking mental health support, displays resiliency , established relationship community mental health provider(s), displays insight, sense of obligation to people/pets and safe/stable housing    Support system information: Pt cites her methadone counselor as her best/only support.     Patient strengths: Pt is intelligent and has the means to access care. Pt is gainfully employed, has established providers and shows great resiliency in her current stage of recovery.     Does the patient engage in non-suicidal self-injurious behavior (NSSI/SIB)? no    Is the patient vulnerable to sexual exploitation?  No    Is the patient experiencing abuse or neglect? no    Is the patient a vulnerable adult? No      Risk of Harm to Others  The patient has the following risk factors of harm to others: no risk factors identified    Does the patient have thoughts of harming others? No    Is the patient engaging in sexually inappropriate behavior?  no       Current Substance Abuse    Is there recent substance abuse? no Pt has misused prescriptions in the past, supplementing her prescribed benzos with street benzos. We have no way to determine if that is happening now. Also, the pt reports taking an Adderall from an old prescription on Thursday due to a training at work. She shared this unprompted and did test positive for amphetamines. Again, we have no way to determine if this is the case.     Was a urine drug screen or blood alcohol level obtained: Yes .    CAGE AID  Have you felt you ought to cut down on your drinking or drug use?  Yes  Have people annoyed you by criticizing your drinking or drug use? Yes  Have you felt bad or guilty about your drinking or drug use? Yes  Have you ever had a drink or used drugs first thing in the morning to steady your nerves or to get rid of a hangover? Yes  Score: 4/4       Current Symptoms/Concerns    Symptoms  Attention, hyperactivity, and impulsivity symptoms present: Yes:  Impulsive    Anxiety symptoms present: Yes: Generalized Symptoms: Cognitive anxiety - feelings of doom, racing thoughts, difficulty concentrating  and Excessive worry      Appetite symptoms present: Yes: Loss of Appetite and Recent Weight Loss     Behavioral difficulties present: No     Cognitive impairment symptoms present: No    Depressive symptoms present: Yes Appetite change/weight change , Crying or feels like crying, Excessive guilt , Feelings of hopelessness , Isolative , Loss of interest / Anhedonia , Low self esteem , Sleep disturbance  and Thoughts of suicide/death      Eating disorder symptoms present: No    Learning disabilities, cognitive challenges, and/or developmental disorder symptoms present: No     Manic/hypomanic symptoms present: No    Personality and interpersonal functioning difficulties present : Yes: Impaired Interpersonal Functioning    Psychosis symptoms present: No      Sleep difficulties present: Yes: Difficulty staying sleep  and Excessive sleep     Substance abuse disorder symptoms present: No     Trauma and stressor related symptoms present: Yes: Intrusions: Flashbacks and Intense psychological distress when you are exposed to reminders/cues of the event, Negative Cognitions/Mood: Persistent negative beliefs about oneself, others, or the world, Feelings of detachment from others and Inability to experience positive emotions and Alterations in arousal/reactivity: Hypervigilance and Sleep disturbance           Mental Status Exam   Affect: Appropriate   Appearance: Appropriate    Attention Span/Concentration: Attentive?    Eye Contact: Engaged   Fund of Knowledge: Appropriate    Language /Speech Content: Fluent   Language /Speech Volume: Normal    Language /Speech Rate/Productions: Articulate    Recent Memory: Intact   Remote Memory: Poor   Mood: Anxious and Depressed    Orientation to Person: Yes    Orientation to Place: Yes   Orientation to Time of Day: Yes    Orientation to Date: Yes     Situation (Do they understand why they are here?): Yes    Psychomotor Behavior: Normal    Thought Content: Clear and Suicidal   Thought Form: Goal Directed       Mental Health and Substance Abuse History    History  Current and historical diagnoses or mental health concerns: Pt has a history of polysubstance use, Bipolar Disorder, Borderline Personality Disorder and PTSD.     Prior MH services (inpatient, programmatic care, outpatient, etc) : Yes Multiple IP admissions, MARIANNE treatments and therapy.     History of substance abuse: Yes Poly    Prior MAR[facility]NE services (inpatient, programmatic care, detox, outpatient, etc) : Yes , multiple    History of commitment: No    Family history of MH/MARIANNE: No    Trauma history: Yes Details not discussed    Medication  Psychotropic medications: Yes. Pt is currently taking Vybrid, Ativan and methadone. Medication compliant: Yes. Recent medication changes: No    Current Care Team  Primary Care Provider: Yes. Name: Valentine Spaulding DO  . Location: [facility]. Date of last visit: 10/6/21. Frequency: PRN. Perceived helpfulness: yes.    Psychiatrist: Yes. Name: Kev Alicea. Location: [facility]. Date of last visit: 10 days ago. Frequency: 3 months. Perceived helpfulness: yes.    Therapist: No    : No    CTSS or ARMHS: No    ACT Team: No    Other: Yes. Name: Unknown, methadone counselor. Location: [place]. Date of last visit: 12/23/21. Frequency: Weekly. Perceived helpfulness: Yes.    Release of Information  Was a release of information signed: No. Reason: Not at this time      Biopsychosocial Information    Socioeconomic Information  Current living situation: Lives alone in a town home she owns    Employment/income source: Works in IT    Relevant legal issues: None discussed    Cultural, Orthodox, or spiritual influences on mental health care: None discussed    Is the patient active in the  or a : No      Relevant Medical Concerns   Patient identifies  concerns with completing ADLs? Yes     Patient can ambulate independently? Yes     Other medical concerns? No     History of concussion or TBI? No        Diagnosis    296.30 (F33.9) Major Depressive Disorder, Recurrent Episode, Unspecified _ and With anxious distress - rule out     301.83 (F60.3) Borderline Personality Disorder - provisional      300.02 (F41.1) Generalized Anxiety Disorder - by history       Therapeutic Intervention  The following therapeutic methodologies were employed when working with the patient: establishing rapport, active listening, assessing dimensions of crisis, identifying additional supports and alternative coping skills, brief supportive therapy, trauma informed care and treatment planning. Patient response to intervention: positive.      Disposition  Recommended disposition: Individual Therapy, Medication Management and Programmatic Care: IOP if compatable with work schedule      Reviewed case and recommendations with attending provider. Attending Name: Martin Rodriguez CNP     Attending concurs with disposition: Yes      Patient concurs with disposition: Yes      Guardian concurs with disposition: NA     Final disposition: Pending.     Inpatient Details (if applicable):  Is patient admitted voluntarily:    Patient aware of potential for transfer if there is not appropriate placement?      Patient is willing to travel outside of the [facility] for placement?       Behavioral Intake Notified?        Clinical Substantiation of Recommendations   Rationale with supporting factors for disposition and diagnosis.     Pt presents to ED voluntarily to get help with increased symptoms of depression, including passive SI.  Pt has a history of suicide attempts, polysubstance use and accidental overdose. Pt currently reports no excess substance use and persistent, worsening depressive symptoms. Pt has limited supports and is new to her recovery. Pt has significant protective factors of employment and her  son. Pt is not seeing a therapist beyond her methadone therapist and would benefit from trauma therapy and/or IOP programmatic care.     Pt will remain on [facility] for the night with an increase to Viibryd and engage in therapeutic interventions with LMHP. Reassess tomorrow, likely discharge with therapy and/or IOP resources.     Assessment Details  Patient interview started at: 2:30pm and completed at: 3:20pm.    Total duration spent on the patient case in minutes: 1.75 hrs     CPT code(s) utilized: 13307 - Psychotherapy for Crisis - 60 (30-74*) min       Aftercare and Safety Planning  Follow up plans with MH/MARIANNE services: Yes pending      Aftercare plan placed in the AVS and provided to patient: pending    [facility] Rangel [facility]      Aftercare Plan  If I am feeling unsafe or I am in a crisis, I will:   Contact my established care providers   Call the National Suicide Prevention Lifeline: 484.240.6200   Go to the nearest emergency room   Call 911     Warning signs that I or other people might notice when a crisis is developing for me: Isolating, excessive sleep, crying or substance use.     Things I am able to do on my own to cope or help me feel better: Speak with your son or your counselor.     Things that I am able to do with others to cope or help me better: Seek additional therapy, get backl to exercise and call friends.      Things I can use or do for distraction: Walks, cooking and TV     Changes I can make to support my mental health and wellness: Take medications as sprescibed, seek additional therapy and consider IOP.      People in my life that I can ask for help: Methadone counselor, psychaitrist     [facility] has a mental health crisis team you can call 24/7: [facility] Mobile Crisis  [phone] (adults)  [phone] (children)      Crisis Lines  Crisis Text Line  Text 273236  You will be connected with a trained live crisis counselor to provide support.    [facility] Line   "1.800.SUICIDE [9034984]      Community Resources  Fast Tracker  Linking people to mental health and substance use disorder resources  [employer] Concept[employer].org     [place] Mental Health Warm Line  Peer to peer support  Monday thru Saturday, 12 pm to 10 pm  633.991.9395 or 1.336.191.1158  Text \"Support\" to 12658    National [place] on Mental Illness (ERNST)  507.053.6369 or 1.888.ERNST.HELPS        Mental Health Apps  My3  https://my3app.org/    VirtualHopeBox  https://TradeHero.org/apps/virtual-hope-box/            "

## 2021-12-25 NOTE — ED NOTES
Patient refused VS. Patient up ambulating with regular non-labored respirations. Denies any physical complaints. No concern for unstable VS.

## 2021-12-26 VITALS
RESPIRATION RATE: 16 BRPM | TEMPERATURE: 98.3 F | HEIGHT: 65 IN | OXYGEN SATURATION: 92 % | HEART RATE: 72 BPM | WEIGHT: 236 LBS | SYSTOLIC BLOOD PRESSURE: 123 MMHG | DIASTOLIC BLOOD PRESSURE: 80 MMHG | BODY MASS INDEX: 39.32 KG/M2

## 2021-12-26 PROCEDURE — 99217 PR OBSERVATION CARE DISCHARGE: CPT | Performed by: NURSE PRACTITIONER

## 2021-12-26 PROCEDURE — G0378 HOSPITAL OBSERVATION PER HR: HCPCS

## 2021-12-26 PROCEDURE — 250N000013 HC RX MED GY IP 250 OP 250 PS 637: Performed by: NURSE PRACTITIONER

## 2021-12-26 RX ORDER — VILAZODONE HYDROCHLORIDE 10 MG/1
20 TABLET ORAL DAILY
Refills: 0 | Status: ON HOLD | COMMUNITY
Start: 2021-12-26 | End: 2022-01-12

## 2021-12-26 RX ADMIN — CLONIDINE HYDROCHLORIDE 0.1 MG: 0.1 TABLET ORAL at 08:48

## 2021-12-26 RX ADMIN — LORAZEPAM 0.5 MG: 0.5 TABLET ORAL at 06:41

## 2021-12-26 RX ADMIN — HYDROXYZINE HYDROCHLORIDE 50 MG: 25 TABLET ORAL at 08:48

## 2021-12-26 RX ADMIN — VILAZODONE HYDROCHLORIDE 20 MG: 20 TABLET ORAL at 08:48

## 2021-12-26 RX ADMIN — GABAPENTIN 800 MG: 800 TABLET, FILM COATED ORAL at 08:48

## 2021-12-26 ASSESSMENT — ACTIVITIES OF DAILY LIVING (ADL): HYGIENE/GROOMING: INDEPENDENT

## 2021-12-26 NOTE — DISCHARGE INSTRUCTIONS
"YOU WERE SEEN AT [facility] FOR THOUGHTS OF SUICIDE. TODAY YOU AGREE THAT YOU ARE NOT HAVING SUICIDE THOUGHTS AND ARE ABLE TO MAINTAIN YOUR SAFETY. YOUR [facility]ONE CL[facility] AND [facility] BOTH OFFER THERAPY.  IF YOU [facility] TO [facility] YOU MAY CALL [facility] ONE CALL: [phone]    Aftercare Plan  If I am feeling unsafe or I am in a crisis, I will:   Contact my established care providers   Call the National Suicide Prevention Lifeline: 686.903.1897   Go to the nearest emergency room   Call 836      Warning signs that I or other people might notice when a crisis is developing for me: Isolating, excessive sleep, crying or substance use, hopelessness, feeling pressure to do too much.      Things I am able to do on my own to cope or help me feel better: watch some comedy, Speak with your son or your counselor.      Things that I am able to do with others to cope or help me better: Seek additional therapy, get back to exercise and call friends.       Things I can use or do for distraction: Walks, cooking and TV      Changes I can make to support my mental health and wellness: NA groups Take medications as sprescibed, seek additional therapy and consider IOP.       People in my life that I can ask for help: Methadone counselor, psychaitrist      Your [facility] has a mental health crisis team you can call 24/7: [facility] Mobile Crisis  [phone] (adults)  [phone] (children)        Crisis Lines  Crisis Text Line  Text 033607  You will be connected with a trained live crisis counselor to provide support.     National Hope Line  1.800.SUICIDE [2669506]        Community Resources  Fast Tracker  Linking people to mental health and substance use disorder resources  fasttrackermn.org      [place] Mental Health Warm Line  Peer to peer support  Monday thru Saturday, 12 pm to 10 pm  161.900.4633 or 1.932.644.6472  Text \"Support\" to 34357     National [place] on Mental " Illness (ERNST)  214.108.5686 or 1.888.ERNST.HELPS        Mental Health Apps  My3  https://myBooyahpp.org/     VirtualHopeBox  [email or web address] [email or web address]/apps/virtual-hope-box/    SHANTAL    Mindfulness

## 2021-12-26 NOTE — ED NOTES
Patient is A&O, mood is anxious, with full range affect. Speech is circumstantial. Patient has been visible on the unit. Patient currently denies any suicidal ideations. Also denies any hallucinations. Plan is for patient to spend the night on observation. She denies any withdrawal symptoms. Anxiety managed with lorazepam. Nursing will continue to monitor for safety.

## 2021-12-26 NOTE — ED NOTES
Patient denies suicidal ideation. Reviewed safety plan, follow up care plan, and out patient rescources given to the patient. Reviewed medication regime including any changes to existing medications and reviewed new medications. Patient verbalizes understanding of theses things along with all discharge instructions.  Patient has a copy of the AVS and verbalizes understanding of them. All belongings brought into the hospital were given to the patient at discharge.  Patient escorted off the unit. Patient discharged to home via uber [email or web address].

## 2021-12-26 NOTE — ED NOTES
[facility] and Progress [facility]    Client Name: Jihan Gill  Date: December 26, 2021       Presenting issue that brought patient to the emPATH unit:   Patient presented to the ED alone and was referred to the ED by self. Patient is presenting to the ED for the following concerns: Pt reports increased depressive symptoms and passive suicidal ideation.      Current presentation on the unit:   Pt reportedly rested and ate, cared for self appropriately during the stay. Pt has denied having SI to staff and this writer. Pt reports she is experiencing methadone withdrawal symptoms and feels sick and restless. Pt is seen to be fidgety and restless, tossing and turning while trying to rest this morning. Pt states that she would like to go home and that her mother is staying at her home currently. Pt reports that she experienced increased depressive and anxious symptoms in the context of the holidays and the pressure at work and consequently developed passive thoughts of wanting to die. Pt reports these thoughts are resolved and she expresses future oriented thinking, planning for work, scheduled appointments, mother at her home. Pt reports she would like to start counseling and has investigated this through her methadone clinic. Pt declines offered referrals at this time and agrees she is able to schedule with her current providers. Pt identifies coping strategies: comedy, talking with loved ones.     Current risk to self or others? No    Summary of therapeutic interventions completed with patient:   Reassessment, review of safety plan    Treatment objectives addressed in this session:   SI resolved, identified coping strategies    Progress on treatment goals:   Resolved SI, safety plan including coping strategies.     Additional collateral information:   The following information was received from Divya whose relationship to the patient is parent, pt living with mom. Information was obtained over the  "phone. Their phone number is [phone] and they last had contact with patient on yesterday.    What happened today: mom notes she has suspected pt to be be using this week, periods of being 'out of it\" and slurred speech. Yesterday mom thought that pt \"was doing fine\" and then went to the ED    What is different about patient's functioning:   Pt is reported to have a 25 year struggle with substance abuse and addiction. Pt has struggled with meth abuse, benzo dependence, other drug abuse. Pt reportedly abuses OTC meds such as benadryl and \"takes excessive amounts when she takes anything\" such as taking 6 tylenol at a time. Pt mother reports that pt has been sober for various periods, but does lie regarding relapses. Pt mother denies concerns for pt mental health decompensating. Pt mother reports that pt has been able to work her job, gets to her methadone clinic early and then spends day at work. Pt has been leaving home in the evening and coming home impaired.     Concern about alcohol/drug use: Yes unknown    What do you think the patient needs: stay clean, work, care for herself    Has patient made comments about wanting to kill themselves/others:  No pt mother reports pt has not made these statement to mom ever.  Pt mother denies having ever known pt to make a suicide attempt.   If d/c is recommended, can they take part in safety/aftercare planning: Yes safety plan information is shared with pt mother at pt request       Mental Status:     Appearance:   Appropriate  Disheveled    Eye Contact:   Fair    Psychomotor Behavior: Restless    Attitude:   Cooperative    Orientation:   All   Speech    Rate / Production: Normal/ Responsive    Volume:  Normal    Mood:    Euthymic   Affect:    Subdued    Thought Content:  Clear    Thought Form:  Coherent  Goal Directed    Insight:    Fair       Plan: Pt crisis is resolved, pt will discharge home to her mother's home. Pt agrees she will have access to therapy at both " methadone clinic and [facility]. Pt is followed by established psychiatric prescriber at [facility]. Pt received methadone care in the community    Disposition: Individual therapy , Medication management and Other: narcotics annonymous group support    Rationale for disposition: pt crisis is resolved, pt has established outpatient providers, pt endorses baseline experience of symptoms    Reviewed assessment with attending provider: BLANCHE Guevara     Total time spent with patient:.25 hrs     CPT code: n/a insufficient time      MARY Madrigal     YOU WERE SEEN AT [facility] FOR THOUGHTS OF SUICIDE. TODAY YOU AGREE THAT YOU ARE NOT HAVING SUICIDE THOUGHTS AND ARE ABLE TO MAINTAIN YOUR SAFETY. YOUR METHADONE CLINIC AND [facility]OC[facility]ATED [facility]ICS OF [facility]YCH[facility] BOTH OFFER THERAPY.  IF YOU WANT TO SCHEDULE THERAPY WITH [facility] YOU MAY CALL [facility] ONE CALL: [phone]    Aftercare Plan  If I am feeling unsafe or I am in a crisis, I will:   Contact my established care providers   Call the National Suicide Prevention Lifeline: 653.345.8917   Go to the nearest emergency room   Call 414      Warning signs that I or other people might notice when a crisis is developing for me: Isolating, excessive sleep, crying or substance use, hopelessness, feeling pressure to do too much.      Things I am able to do on my own to cope or help me feel better: watch some comedy, Speak with your son or your counselor.      Things that I am able to do with others to cope or help me better: Seek additional therapy, get back to exercise and call friends.       Things I can use or do for distraction: Walks, cooking and TV      Changes I can make to support my mental health and wellness: NA groups, Take medications as sprescibed, seek additional therapy and consider IOP.       People in my life that I can ask for help: Methadone counselor, psychaitrist      [facility] has a mental health crisis team you can call 24/7: [facility] Mobile Crisis   "[phone] (adults)  [phone] (children)        Crisis Lines  Crisis Text Line  Text 150009  You will be connected with a trained live crisis counselor to provide support.     National Hope Line  1.800.SUICIDE [3533167]        Community Resources  Fast Tracker  Linking people to mental health and substance use disorder resources  [employer].org      [place] Mental Health Warm Line  Peer to peer support  Monday thru Saturday, 12 pm to 10 pm  259.937.0019 or 6.773.788.5933  Text \"Support\" to 02436     National [employer] on Mental Illness (ERNST)  767.054.2715 or 1.888.ERNST.HELPS           Mental Health Apps  My3  https://myPower2SMEpp.org/     VirtualHopeBox  https://Ideatory.org/apps/virtual-hope-box/    WOJUAN ALBERTOOT    Mindfulness                             "

## 2021-12-26 NOTE — ED PROVIDER NOTES
"[facility] - Psychiatry  Combined Observation Note and Discharge Summary  [facility] Emergency Department  Observation Initiation Date: Dec 25, 2021    Jihan Gill MRN: [id number]   Age: 46 year old YOB: 1975     History     Chief Complaint   Patient presents with     Depression     HPI  Jihan Gill is a 46 year old female with a past history notable for bipolar II disorder, currently depressed, and opioid addiction, currently treated with oral methadone through a Methadone Clinic who presents to the ED with heightened depression.  She was medically cleared and transferred to [facility] for psychiatric assessment.  Patient was placed on observation status with home medications being resumed.  On examination today, patient reports she is feeling \"better.\"  She no longer is endorsing as intensified depression or thoughts of wanting to be dead.  She states she just needed a safe space where she could collect her thoughts and put her life in perspective.  She shares how she feels guilty and like a disappointment to family that she has not gained better control of her substance use addiction.  [place] exacerbated her feelings due to the emphasis on family.  She notes her depressive symptoms were starting to increase 2-4 weeks ago.  She receives outpatient psychiatry services, where her Viibryd dose was recently increased from 10 mg to 20 mg to target these symptoms.  She notes she has not been on the increased dose long enough to notice an improvement. She is tolerating it well without side effects. She denies any von or psychosis.  She reports some difficulty with sleep, noting benadryl typically helps. She notes she slept restlessly last night, woke up feeling a little anxious and received PRN clonidine and  atarax, in addition to her scheduled gabapentin and ativan.  She is resting comfortably in the sensory room.  She states she is feeling stable for discharge and is requesting to " return home this morning.      Past Medical History  Past Medical History:   Diagnosis Date     Arthritis      Bipolar 1 disorder (H)      Chronic hepatitis C without hepatic coma (H)      Depressive disorder     postpartum     Depressive disorder      Major depression, recurrent (H)      Opiate addiction (H)      Seizures (H)     narcotic withdrawal     Substance abuse (H)      Urinary calculus, unspecified 2001    Renal stones     Past Surgical History:   Procedure Laterality Date     C/SECTION, LOW TRANSVERSE      [id number]     ENT SURGERY       GYN SURGERY       TONSILLECTOMY       albuterol (PROAIR HFA/PROVENTIL HFA/VENTOLIN HFA) 108 (90 Base) MCG/ACT inhaler  gabapentin (NEURONTIN) 800 MG tablet  LORazepam (ATIVAN) 0.5 MG tablet  methadone HCl 10 MG/5ML SOLN  nicotine polacrilex (NICORETTE) 4 MG gum  vilazodone (VIIBRYD) 10 MG TABS tablet  acetaminophen (TYLENOL) 325 MG tablet  albuterol (PROVENTIL) (2.5 MG/3ML) 0.083% neb solution      Allergies   Allergen Reactions     Abilify [Aripiprazole] Other (See Comments)     Tardive dyskinesia     Allopurinol      Compazine Other (See Comments)     Dystonia     Dramamine      Jaw lock.       Olanzapine Other (See Comments)     Tardive dyskinesia from Zyprexa - per patient     Reglan Other (See Comments)     dystonia     Seroquel [Quetiapine] Other (See Comments)     Tardive dyskinesia.      Family History  Family History   Problem Relation Age of Onset     No Known Problems Father      No Known Problems Mother      No Known Problems Sister      No Known Problems Brother      Social History   Social History     Tobacco Use     Smoking status: Current Every Day Smoker     Packs/day: 0.50     Years: 8.00     Pack years: 4.00     Smokeless tobacco: Never Used   Substance Use Topics     Alcohol use: Not Currently     Comment: States no EtOH since 2008.     Drug use: Yes     Comment: Methadone 60mg/day street buy      Past medical history, past surgical history, medications,  "allergies, family history, and social history were reviewed with the patient. No additional pertinent items.       Review of Systems  A complete review of systems was performed with pertinent positives and negatives noted in the HPI, and all other systems negative.    Physical Examination   BP: (!) 146/80  Pulse: 100  Temp: 98.5  F (36.9  C)  Resp: 18  Height: 165.1 cm (5' 5\")  Weight: 107 kg (236 lb)  SpO2: 98 %    Physical Exam  General: Appears stated age.   Neuro: Alert and fully oriented. Extremities appear to demonstrate normal strength on visual inspection.   Integumentary/Skin: no rash visualized, normal color    Psychiatric Examination   Appearance: awake, alert  Attitude:  cooperative  Eye Contact:  good  Mood:  better  Affect:  mood congruent  Speech:  clear, coherent  Psychomotor Behavior:  no evidence of tardive dyskinesia, dystonia, or tics  Thought Process:  logical, linear and goal oriented  Associations:  no loose associations  Thought Content:  no evidence of suicidal ideation or homicidal ideation and no evidence of psychotic thought  Insight:  good  Judgement:  intact  Oriented to:  time, person, and place  Attention Span and Concentration:  intact  Recent and Remote Memory:  intact  Language: able to name/identify objects without impairment  Fund of Knowledge: intact with awareness of current and past events    ED Course        Labs Ordered and Resulted from Time of ED Arrival to Time of ED Departure   DRUG ABUSE SCREEN 77 URINE (FL, RH, SH) - Abnormal       Result Value    Amphetamines Urine Screen Positive (*)     Barbiturates Urine Screen Negative      Benzodiazepines Urine Screen Positive (*)     Cannabinoids Urine Screen Negative      Cocaine Urine Screen Negative      Opiates Urine Screen Negative      PCP Urine Screen Negative     COVID-19 VIRUS (CORONAVIRUS) BY PCR - Normal    SARS CoV2 PCR Negative         Assessments & Plan (with Medical Decision Making)   Patient presenting with " heightened depression in the context of a Bipolar II affect disorder with co-current opioid addiction. Nursing notes reviewed noting no acute issues.     I have reviewed the assessment completed by the [facility].     During the observation period, the patient did not require medications for agitation, and did not require restraints/seclusion for patient and/or provider safety.    The patient was found to have a psychiatric condition that would benefit from an observation stay in the emergency department for further psychiatric stabilization and/or coordination of a safe disposition. The observation plan includes serial assessments of psychiatric condition, potential administration of medications if indicated, further disposition pending the patient's psychiatric course during the monitoring period.     Preliminary diagnosis:    ICD-10-CM    1. Severe episode of recurrent major depressive disorder, without psychotic features (H)  F33.2    2. Anxiety disorder, unspecified  F41.9    3. Opioid use disorder, mild, on maintenance therapy (H)  F11.10    4. Sedative, hypnotic or anxiolytic dependence (H)  F13.20    5. Borderline personality disorder (H)  F60.3         Treatment Plan:  -Discharge home per patient request as crisis stabilization is achieved.  Patient denies self harm thoughts and is due for a dose of methadone which she has at home.  -Discussed medication regime.  Patient is content with current medications.  Viibryd was recently increased outpatient to target mood.  We did discuss adding Lithium, which patient reports she was on for 10 years, found it helpful, but no longer feels her symptoms are severe enough to warrant resuming it.  -Anxiety controlled with daily dose of ativan and gabapentin three times a day scheduled.  -Continue benadryl for sleep.  -She will follow up with her established psychiatrist for medication management.  -She plan to pursue therapy through her methadone clinic.  Educated patient on  the benefits of therapy for depression and the positive impact it can have on recovery.  -Supportive therapy provided around patients history of addiction, challenges and emotional struggles associated with it.    After the period in observation care, the patient's circumstances and mental state were safe for outpatient management. After counseling on the diagnosis, work-up, and treatment plan, the patient was discharged. Close follow-up with a psychiatrist and/or therapist was recommended and community psychiatric resources were provided. Patient is to return to the ED if any urgent or potentially life-threatening concerns.      At the time of discharge, the patient's acute suicide risk was determined to be low due to the following factors: Reduction in the intensity of mood/anxiety symptoms that preceded the admission, denial of suicidal thoughts, denies feeling helpless or helpless, not currently under the influence of alcohol or illicit substances, denies experiencing command hallucinations, no immediate access to firearms. The patient's acute risk could be higher if noncompliant with their treatment plan, medications, follow-up appointments or using illicit substances or alcohol. Protective factors include: social supports, children, stable housing.    --  BLANCHE Kingsley CNP   [facility] EMERGENCY DEPT  EmPATH Unit  12/25/2021         Marcela Morelos APRN CNP  12/26/21 1057

## 2021-12-26 NOTE — ED NOTES
Patient is anxious and restless.  She is attempting to sleep in a sensory room.  She feels that she is going through withdrawal. Requesting whatever she can have for anxiety. She was given clonidine and hydroxyzine along with neurontin and viibryd.  She denies suicidal ideation at this time.

## 2021-12-27 ENCOUNTER — PATIENT OUTREACH (OUTPATIENT)
Dept: CARE COORDINATION | Facility: CLINIC | Age: 46
End: 2021-12-27
Payer: COMMERCIAL

## 2021-12-27 DIAGNOSIS — Z71.89 OTHER SPECIFIED COUNSELING: ICD-10-CM

## 2021-12-27 NOTE — PROGRESS NOTES
Clinic Care Coordination Contact  [facility]/Voicemail       Clinical Data: Care Coordinator Outreach  Outreach attempted x 1.  Left message on patient's voicemail with call back information and requested return call.  Plan: Care Coordinator will try to reach patient again in 1-2 business days.    PAT De Los Santos  [phone]  [facility]

## 2021-12-28 NOTE — PROGRESS NOTES
Clinic Care Coordination Contact  [facility]/Voicemail       Clinical Data: Care Coordinator Outreach  Outreach attempted x 2.  Left message on patient's voicemail with call back information and requested return call.    Plan: Care Coordinator will do no further outreaches at this time.    PAT De Los Santos  [phone]  [facility]

## 2022-01-08 ENCOUNTER — TELEPHONE (OUTPATIENT)
Dept: BEHAVIORAL HEALTH | Facility: CLINIC | Age: 47
End: 2022-01-08

## 2022-01-08 ENCOUNTER — HOSPITAL ENCOUNTER (EMERGENCY)
Facility: CLINIC | Age: 47
Discharge: ANOTHER HOSPITAL | End: 2022-01-09
Attending: EMERGENCY MEDICINE | Admitting: EMERGENCY MEDICINE
Payer: COMMERCIAL

## 2022-01-08 DIAGNOSIS — F13.930 BENZODIAZEPINE WITHDRAWAL WITHOUT COMPLICATION (H): ICD-10-CM

## 2022-01-08 DIAGNOSIS — F41.9 ANXIETY DISORDER, UNSPECIFIED TYPE: ICD-10-CM

## 2022-01-08 DIAGNOSIS — F13.10 BENZODIAZEPINE ABUSE (H): ICD-10-CM

## 2022-01-08 DIAGNOSIS — F11.10 OPIOID USE DISORDER, MILD, ON MAINTENANCE THERAPY (H): ICD-10-CM

## 2022-01-08 DIAGNOSIS — F33.9 RECURRENT MAJOR DEPRESSIVE DISORDER, REMISSION STATUS UNSPECIFIED (H): ICD-10-CM

## 2022-01-08 LAB
ALBUMIN SERPL-MCNC: 3.5 G/DL (ref 3.4–5)
ALP SERPL-CCNC: 135 U/L (ref 40–150)
ALT SERPL W P-5'-P-CCNC: 25 U/L (ref 0–50)
AMPHETAMINES UR QL SCN: ABNORMAL
ANION GAP SERPL CALCULATED.3IONS-SCNC: 3 MMOL/L (ref 3–14)
AST SERPL W P-5'-P-CCNC: 22 U/L (ref 0–45)
BARBITURATES UR QL: ABNORMAL
BASOPHILS # BLD AUTO: 0 10E3/UL (ref 0–0.2)
BASOPHILS NFR BLD AUTO: 0 %
BENZODIAZ UR QL: ABNORMAL
BILIRUB SERPL-MCNC: 0.3 MG/DL (ref 0.2–1.3)
BUN SERPL-MCNC: 9 MG/DL (ref 7–30)
CALCIUM SERPL-MCNC: 9.8 MG/DL (ref 8.5–10.1)
CANNABINOIDS UR QL SCN: ABNORMAL
CHLORIDE BLD-SCNC: 106 MMOL/L (ref 94–109)
CO2 SERPL-SCNC: 28 MMOL/L (ref 20–32)
COCAINE UR QL: ABNORMAL
CREAT SERPL-MCNC: 0.63 MG/DL (ref 0.52–1.04)
EOSINOPHIL # BLD AUTO: 0.2 10E3/UL (ref 0–0.7)
EOSINOPHIL NFR BLD AUTO: 2 %
ERYTHROCYTE [DISTWIDTH] IN BLOOD BY AUTOMATED COUNT: 13.3 % (ref 10–15)
GFR SERPL CREATININE-BSD FRML MDRD: >90 ML/MIN/1.73M2
GLUCOSE BLD-MCNC: 118 MG/DL (ref 70–99)
HCT VFR BLD AUTO: 41.5 % (ref 35–47)
HGB BLD-MCNC: 13.6 G/DL (ref 11.7–15.7)
IMM GRANULOCYTES # BLD: 0 10E3/UL
IMM GRANULOCYTES NFR BLD: 0 %
LYMPHOCYTES # BLD AUTO: 4.3 10E3/UL (ref 0.8–5.3)
LYMPHOCYTES NFR BLD AUTO: 45 %
MCH RBC QN AUTO: 29.2 PG (ref 26.5–33)
MCHC RBC AUTO-ENTMCNC: 32.8 G/DL (ref 31.5–36.5)
MCV RBC AUTO: 89 FL (ref 78–100)
MONOCYTES # BLD AUTO: 0.5 10E3/UL (ref 0–1.3)
MONOCYTES NFR BLD AUTO: 6 %
NEUTROPHILS # BLD AUTO: 4.4 10E3/UL (ref 1.6–8.3)
NEUTROPHILS NFR BLD AUTO: 47 %
NRBC # BLD AUTO: 0 10E3/UL
NRBC BLD AUTO-RTO: 0 /100
OPIATES UR QL SCN: ABNORMAL
PCP UR QL SCN: ABNORMAL
PLATELET # BLD AUTO: 234 10E3/UL (ref 150–450)
POTASSIUM BLD-SCNC: 4.1 MMOL/L (ref 3.4–5.3)
PROT SERPL-MCNC: 7.9 G/DL (ref 6.8–8.8)
RBC # BLD AUTO: 4.66 10E6/UL (ref 3.8–5.2)
SARS-COV-2 RNA RESP QL NAA+PROBE: NEGATIVE
SODIUM SERPL-SCNC: 137 MMOL/L (ref 133–144)
WBC # BLD AUTO: 9.4 10E3/UL (ref 4–11)

## 2022-01-08 PROCEDURE — 36415 COLL VENOUS BLD VENIPUNCTURE: CPT | Performed by: NURSE PRACTITIONER

## 2022-01-08 PROCEDURE — 87635 SARS-COV-2 COVID-19 AMP PRB: CPT | Performed by: EMERGENCY MEDICINE

## 2022-01-08 PROCEDURE — 90791 PSYCH DIAGNOSTIC EVALUATION: CPT

## 2022-01-08 PROCEDURE — 99207 PR CONSULT E&M CHANGED TO SUBSEQUENT LEVEL: CPT | Performed by: NURSE PRACTITIONER

## 2022-01-08 PROCEDURE — C9803 HOPD COVID-19 SPEC COLLECT: HCPCS

## 2022-01-08 PROCEDURE — 80307 DRUG TEST PRSMV CHEM ANLYZR: CPT | Performed by: NURSE PRACTITIONER

## 2022-01-08 PROCEDURE — 250N000013 HC RX MED GY IP 250 OP 250 PS 637: Performed by: NURSE PRACTITIONER

## 2022-01-08 PROCEDURE — 85004 AUTOMATED DIFF WBC COUNT: CPT | Performed by: NURSE PRACTITIONER

## 2022-01-08 PROCEDURE — 99215 OFFICE O/P EST HI 40 MIN: CPT | Performed by: NURSE PRACTITIONER

## 2022-01-08 PROCEDURE — 80053 COMPREHEN METABOLIC PANEL: CPT | Performed by: NURSE PRACTITIONER

## 2022-01-08 PROCEDURE — 99285 EMERGENCY DEPT VISIT HI MDM: CPT | Mod: 25

## 2022-01-08 RX ORDER — GABAPENTIN 800 MG/1
800 TABLET ORAL 2 TIMES DAILY
Status: DISCONTINUED | OUTPATIENT
Start: 2022-01-08 | End: 2022-01-09 | Stop reason: HOSPADM

## 2022-01-08 RX ORDER — VILAZODONE HYDROCHLORIDE 20 MG/1
20 TABLET ORAL DAILY
Status: DISCONTINUED | OUTPATIENT
Start: 2022-01-09 | End: 2022-01-09 | Stop reason: HOSPADM

## 2022-01-08 RX ORDER — DIAZEPAM 5 MG
10 TABLET ORAL ONCE
Status: COMPLETED | OUTPATIENT
Start: 2022-01-08 | End: 2022-01-08

## 2022-01-08 RX ORDER — DIAZEPAM 5 MG
10 TABLET ORAL
Status: DISCONTINUED | OUTPATIENT
Start: 2022-01-08 | End: 2022-01-09 | Stop reason: HOSPADM

## 2022-01-08 RX ORDER — METHADONE HYDROCHLORIDE 10 MG/ML
70 CONCENTRATE ORAL DAILY
Status: DISCONTINUED | OUTPATIENT
Start: 2022-01-09 | End: 2022-01-09 | Stop reason: HOSPADM

## 2022-01-08 RX ORDER — CLONIDINE HYDROCHLORIDE 0.1 MG/1
0.1 TABLET ORAL EVERY 6 HOURS PRN
Status: DISCONTINUED | OUTPATIENT
Start: 2022-01-08 | End: 2022-01-09 | Stop reason: HOSPADM

## 2022-01-08 RX ORDER — GABAPENTIN 400 MG/1
1600 CAPSULE ORAL AT BEDTIME
Status: DISCONTINUED | OUTPATIENT
Start: 2022-01-08 | End: 2022-01-09 | Stop reason: HOSPADM

## 2022-01-08 RX ADMIN — DIAZEPAM 10 MG: 5 TABLET ORAL at 16:39

## 2022-01-08 RX ADMIN — GABAPENTIN 1600 MG: 400 CAPSULE ORAL at 22:00

## 2022-01-08 RX ADMIN — GABAPENTIN 800 MG: 800 TABLET, FILM COATED ORAL at 19:43

## 2022-01-08 ASSESSMENT — ACTIVITIES OF DAILY LIVING (ADL)
HYGIENE/GROOMING: HANDWASHING
DRESS: STREET CLOTHES
ORAL_HYGIENE: INDEPENDENT

## 2022-01-08 ASSESSMENT — ENCOUNTER SYMPTOMS
NERVOUS/ANXIOUS: 1
HALLUCINATIONS: 0

## 2022-01-08 ASSESSMENT — MIFFLIN-ST. JEOR: SCORE: 1680.55

## 2022-01-08 NOTE — ED NOTES
"46 year old female received to [facility] in an anxious, tearful, depressed state. Reports she is abusing benzodiazepines. Buying them off the street and spending \"a lot\" of money. \" My job is stressful and I began to use benzo's to function and now I am working to afford to buy the benzo's that I need to function-I am so tired and scared-I can not take living like this anymore\" Reports ruminating all day long about when she will be able to get more pills. Struggling with depression and hopelessness. Suicidal thoughts but no plan \" I won't do this to my son\". Has attempted suicide in the past. Cooperative with evaluation process. Motivated to get help. Nursing assessment complete including patient and medication profiles. Risk assessments completed addressing suicide,fall,skin,nutrition and safety issues. Care plan initiated. Assessments reviewed with physician and admit orders received. Video monitoring in progress, Patient Informed.  "

## 2022-01-08 NOTE — ED PROVIDER NOTES
"  History   Chief Complaint:  Depression    The history is provided by the patient.      Jihan Gill is a 46 year old female with a history of major depression, bipolar, and PTSD who presents alone for depression. Per Chart Review, the patient was seen here on 12/25/20201 for depression and transferred to [facility] where her nicotine polacrilex prescription was upped. Since then, she has been taking all her medications and felt worse than before, \"it feels like I'm taking a lot (of medications)\". When asked about suicidal ideations, the patient states the thought comes and goes and she has attempted in the past. However, she's stopped those attempts since having her 20 year old son, who currently lives in [place], [place]. The patient denies having any hallucinations or no alcohol use (quit in her mid 30's). The patient works and tends to just hangout with her work buddies for her social life, other than with her family. She does not have a close nit group that she hangs out with. She is COVID vaccinated and had COVID in July/August of 2021.    Review of Systems   Psychiatric/Behavioral: Negative for hallucinations, self-injury and suicidal ideas. The patient is nervous/anxious.         Depression  No alcohol use   All other systems reviewed and are negative.      Allergies:  Abilify [Aripiprazole]  Allopurinol  Compazine  Dramamine  Olanzapine  Reglan  Seroquel [Quetiapine]    Medications:  albuterol   gabapentin   LORazepam   methadone   nicotine polacrilex   vilazodone     Past Medical History:     Arthritis               Chronic hepatitis C without hepatic coma        Major depression, recurrent      Opiate addiction   Seizures   Substance abuse   Urinary calculus, unspecified    Degeneration of lumbar or lumbosacral intervertebral disc  Methadone overdose  Suicide attempt   Lithium overdose, intentional self-harm, initial encounter   Borderline personality disorder  Bipolar II disorder   Benzodiazepine " dependence   Acute hepatitis C virus infection  Suicidal ideation  Chronic pain syndrome  Benzodiazepine withdrawal without complication       Past Surgical History:    C/section, low transverse   ENT surgery  GYN surgery  Tonsillectomy     Family History:    Colon cancer - Father  Diabetes - Mother    Social History:  The patient presents alone.  The patient has a 20 year old son who lives in [place], [place].    Physical Exam     Patient Vitals for the past 24 hrs:   BP Temp Temp src Pulse Resp SpO2   01/08/22 1151 118/70 98.4  F (36.9  C) Oral 93 18 95 %     Physical Exam  SKIN:  Warm, dry.  HEMATOLOGIC/IMMUNOLOGIC/LYMPHATIC:  No pallor.  EYES:  Conjunctivae normal.  CARDIOVASCULAR:  Regular rate and rhythm.  No murmur.  RESPIRATORY:  No respiratory distress, breath sounds equal and normal.  GASTROINTESTINAL:  Soft, nontender abdomen.  MUSCULOSKELETAL: Overweight body habitus.  NEUROLOGIC:  Alert, conversant.  PSYCHIATRIC: Depressed mood, tearful during interview, no psychotic features.    Emergency Department Course     Laboratory:  Labs Ordered and Resulted from Time of ED Arrival to Time of ED Departure   COVID-19 VIRUS (CORONAVIRUS) BY PCR - Normal       Result Value    SARS CoV2 PCR Negative        Emergency Department Course:    Mental Health Risk Assessment      PSS-3    Date and Time Over the past 2 weeks have you felt down, depressed, or hopeless? Over the past 2 weeks have you had thoughts of killing yourself? Have you ever attempted to kill yourself? When did this last happen? User   01/08/22 1152 yes yes yes -- EARL      C-SSRS ([place])    Date and Time Q1 Wished to be Dead (Past Month) Q2 Suicidal Thoughts (Past Month) Q3 Suicidal Thought Method Q4 Suicidal Intent without Specific Plan Q5 Suicide Intent with Specific Plan Q6 Suicide Behavior (Lifetime) Within the Past 3 Months? RETIRED: Level of Risk per Screen Screening Not Complete User   01/08/22 1152 yes yes no no no no -- -- -- EARL               Suicide assessment completed by mental health (D.E.C., LCSW, etc.)    Reviewed:  I reviewed nursing notes, vitals, past medical history, Care Everywhere and [facility]    Assessments:  1302 I obtained history and examined the patient as noted above.   1330 I rechecked the patient and explained findings.     Disposition:  The patient was transferred to [facility].     Impression & Plan     CMS Diagnoses: None      Medical Decision Making:  This patient returns to the ED after a fairly recent visit and mental health assessment and treatment.  Feels she is not improving or in fact may be worsening regarding her mental health.  She has been medically compliant.  I thought the patient [facility]  The patient agreed with this plan.  COVID test negative.  Transferred to [facility] for further care.    Diagnosis:    ICD-10-CM    1. Depression, unspecified depression type  F32.A    2. Benzodiazepine abuse (H)  F13.10        Discharge Medications:  New Prescriptions    No medications on file       Scribe Disclosure:  I, Sara Pardo, am serving as a scribe at 1:11 PM on 1/8/2022 to document services personally performed by Joey Graham MD based on my observations and the provider's statements to me.     Joey Graham MD  01/08/22 5401

## 2022-01-08 NOTE — ED PROVIDER NOTES
"[facility] - Psychiatric Consultation  [facility] Emergency Department    Jihan Gill MRN: [id number]   Age: 46 year old YOB: 1975     History     Chief Complaint   Patient presents with     Depression     HPI  Jihan Gill is a 46 year old female with history notable for major depressive disorder, opioid addiction, currently treated with oral methadone through a [facility]one Clinic, and benzodiazepine dependence who presents to the ED with heightened depression and a desire to detox from benzodiazepine's.  She was medically cleared in the ED and transferred to the [facility] for psychiatric assessment.  On examination, patient is tearful. She states how her addition has been controlling her life stating \"I don't feel like I am even living anymore.\"  She states she is spending all her money on buying benzo's off the street commenting she is putting herself in dangerous situations.  She believes she has been consuming anywhere up to 10 mg of Ativan daily. She is feeling depressed and hopeless.  She was recently hospitalized here over [place].  At that time, she did admit to abusing ativan.  She states she wants to confront her addiction as she knows it is negatively impacting her mental health and ability to enjoy life.  She anticipates once she is free from the benzo's, her depression will improve as well.     Past Medical History  Past Medical History:   Diagnosis Date     Arthritis      Bipolar 1 disorder (H)      Chronic hepatitis C without hepatic coma (H)      Depressive disorder     postpartum     Depressive disorder      Major depression, recurrent (H)      Opiate addiction (H)      Seizures (H)     narcotic withdrawal     Substance abuse (H)      Urinary calculus, unspecified 2001    Renal stones     Past Surgical History:   Procedure Laterality Date     C/SECTION, LOW TRANSVERSE      [id number]     ENT SURGERY       GYN SURGERY       TONSILLECTOMY       acetaminophen (TYLENOL) 325 MG " "tablet  albuterol (PROAIR HFA/PROVENTIL HFA/VENTOLIN HFA) 108 (90 Base) MCG/ACT inhaler  gabapentin (NEURONTIN) 800 MG tablet  LORazepam (ATIVAN) 0.5 MG tablet  methadone HCl 10 MG/5ML SOLN  vilazodone (VIIBRYD) 10 MG TABS tablet      Allergies   Allergen Reactions     Abilify [Aripiprazole] Other (See Comments)     Tardive dyskinesia     Allopurinol      Compazine Other (See Comments)     Dystonia     Dramamine      Jaw lock.       Olanzapine Other (See Comments)     Tardive dyskinesia from Zyprexa - per patient     Reglan Other (See Comments)     dystonia     Seroquel [Quetiapine] Other (See Comments)     Tardive dyskinesia.      Family History  Family History   Problem Relation Age of Onset     No Known Problems Father      No Known Problems Mother      No Known Problems Sister      No Known Problems Brother      Social History   Social History     Tobacco Use     Smoking status: Current Every Day Smoker     Packs/day: 0.50     Years: 8.00     Pack years: 4.00     Smokeless tobacco: Never Used   Substance Use Topics     Alcohol use: Not Currently     Comment: States no EtOH since 2008.     Drug use: Yes     Comment: Methadone 60mg/day street buy      Past medical history, past surgical history, medications, allergies, family history, and social history were reviewed with the patient. No additional pertinent items.       Review of Systems  A complete review of systems was performed with pertinent positives and negatives noted in the HPI, and all other systems negative.    Physical Examination   BP: 118/70  Pulse: 93  Temp: 98.4  F (36.9  C)  Resp: 18  Height: 165.1 cm (5' 5\")  Weight: 103 kg (227 lb)  SpO2: 95 %    Physical Exam  General: Appears stated age.   Neuro: Alert and fully oriented. Extremities appear to demonstrate normal strength on visual inspection.   Integumentary/Skin: no rash visualized, normal color    Psychiatric Examination   Appearance: awake, alert  Attitude:  cooperative  Eye Contact:  " good  Mood:  sad  and depressed  Affect:  mood congruent  Speech:  clear, coherent  Psychomotor Behavior:  no evidence of tardive dyskinesia, dystonia, or tics  Thought Process:  logical, linear and goal oriented  Associations:  no loose associations  Thought Content:  no evidence of suicidal ideation or homicidal ideation and no evidence of psychotic thought  Insight:  good  Judgement:  intact  Oriented to:  time, person, and place  Attention Span and Concentration:  intact  Recent and Remote Memory:  intact  Language: able to name/identify objects without impairment  Fund of Knowledge: intact with awareness of current and past events    ED Course        Labs Ordered and Resulted from Time of ED Arrival to Time of ED Departure   COVID-19 VIRUS (CORONAVIRUS) BY PCR - Normal       Result Value    SARS CoV2 PCR Negative     COMPREHENSIVE METABOLIC PANEL       Assessments & Plan (with Medical Decision Making)   Patient presenting with heightened depression in the context of benzodiazepine addiction.  Patient has a history of opioid addiction and is maintained on Methadone 150 mg daily through the [facility]one Clinic per her report..  Interestingly, urine toxicology is positive for only amphetamines, reporting her last use of benzodiazepines was this morning. She is seeking inpatient detox treatment.  Valium 10 mg will be ordered to cover any withdrawal while we wait for a bed. Currently she is not displaying any symptoms of withdrawal.    Patient was hospitalized at the [facility] from 08/08/21-08/12/21 for withdrawal from benzodiazepines where she was started on phenobarbital for withdrawal.  On 8/22/21, she presented to [facility] ED stating she relapsed on benzodiazepines after discharge and did not take the phenobarbital taper as prescribed. On 8/30/21-9/1/21, patient was hospitalized at [facility] for an opiate overdose with respiratory failure then was discharged to a rehab facility. She presented to [facility] on  12/25/21 for depression, reporting 90 days of sobriety denying any illecit substance use, other than taking an old prescription of Adderall that she had.    Nursing notes reviewed noting no acute issues.     I have reviewed the assessment completed by the [facility].     Preliminary diagnosis:    ICD-10-CM    1. Benzodiazepine abuse (H)  F13.10    2. Opioid use disorder, mild, on maintenance therapy (H)  F11.10    3. Anxiety disorder, unspecified type  F41.9    4. Recurrent major depressive disorder, remission status unspecified (H)  F33.9    5. Benzodiazepine withdrawal without complication (H)  F13.230         Treatment Plan:  -Transfer to inpatient detox for benzodiazepine withdrawal.  -Will medicate with Valium 10 mg for withdrawal.  -Labs ordered for inpatient transfer: CMP, CBC, and urine toxicology.  -Reorder home medications: Viibryd 20 mg daily for depression. Gabapentin 800 mg in the morning and afternoon and 1600 mg at night.  -Current Methadone dosing per patient is 150 mg every morning.  She has received her dose for today before arriving to the ED. Unable to confirm dose with Methadone clinic as they are closed for the weekend.  Chart review from past office visits have varying doses-so until actual dose can be confirmed; with give half of reported dose.  -Clonidine 0.1 mg every every 6 hours as needed for opioid withdrawal symptoms.  -Patient anticipates once she safely withdraws from ativan, she would like to follow up with her therapist at the Methadone clinic, noting they have an IOP program there she would be interested in attending.      --  BLANCHE Kingsley CNP [facility] EMERGENCY DEPT  [facility] Unit  1/8/2022      Marcela Morelos APRN CNP  01/08/22 1712       Marcela Morelos APRN CNP  01/08/22 1714       Marcela Morelos APRN CNP  01/08/22 2034       Marcela oMrelos APRN CNP  01/08/22 2125

## 2022-01-08 NOTE — ED TRIAGE NOTES
Pt here 2 weeks ago for same. Pt meds increased but she still feels her depression is getting worse.  Pt denies being suicidal

## 2022-01-08 NOTE — CONSULTS
"1/8/2022  Jihan Gill 1975     [facility] Crisis Assessment    Patient was assessed: in person  Patient location: [facility]    Referral Data and Chief Complaint  Jihan is a 46 year old who uses she/her pronouns. Patient presented to the ED alone and was referred to the ED by self. Patient is presenting to the ED for the following concerns: depression, anxiety and benzodiazepine abuse. .      Informed Consent and Assessment Methods    Patient is her own guardian. Writer met with patient and explained the crisis assessment process, including applicable information disclosures and limits to confidentiality, assessed understanding of the process, and obtained consent to proceed with the assessment. Patient was observed to be able to participate in the assessment as evidenced by cooperative, answered all questions. Assessment methods included conducting a formal interview with patient, review of medical records, collaboration with medical staff, and obtaining relevant collateral information from family and community providers when available.    Narrative Summary of Presenting Problem and Current Functioning  What led to the patient presenting for crisis services, factors that make the crisis life threatening or complex, stressors, how is this disrupting the patient's life, and how current functioning is in comparison to baseline. How is patient presenting during the assessment.     [facility] reports \"I was not honest with you last time I was here\".  Patient was seen on December 25, 2021 for depression and substance use.  She reports she is abusing benzodiapines taking 20 mg per day for the last month with the last use was this morning taking 4 mg.  She reports \"I am tired of not living my life and I want to get help\".  She has been abusing benzos on/off since the age of 18.  She has a hx of opiate use disorder and currently on Methadone 150 mg through [facility] " "and has been on Methadone for several years.   The patient reports she has been spending up to $700 on benzos.  She reports recently testing positive for fentyal as she gave her UA to her Meth[facility]one Clinic.  Patient reports when she left [facility] recommendation was to start therapy and increase her Vybrid.  She reports taking her medications as prescribed.   She denies abusing any other drugs.      Pt reports symptoms of anhedonia, sleep disturbance, chronic feelings of doom and fear as well as sleep and appetite disturbance. Patient expresses being \"tired of doing this\" referencing using substances in order to function during the day, buying drugs and not having stable mental health.  She has been trying to get into detox and has called around for several days now and is open to getting into detox for benzos and also address her mental health.       History of the Crisis  Duration of the current crisis, coping skills attempted to reduce the crisis, community resources used, and past presentations.    Pt has an extensive history of mental health concners and polysubstance use with multiple suicide attempts and overdose.  Chart review shows from 2/2021 to 9/2021, the patient was in and out of hospitals for withdrawal, mental health concerns, overdoses and ED visits.  Pt is currently in recovery from illicit substances for 90 days and is connected to MAT and psychiatric care.  Pt has had ECT int he pats and feels desperate enough now to consider that as an intervention.   Collateral Information  Patient provided name of her mother Divya Buchanan ([phone]) and this writer called the mother x2 and she did not return call to this writer to obtain collateral.     Risk Assessment    Risk of Harm to Self     ESS-6  1.a. Over the past 2 weeks, have you had thoughts of killing yourself? Yes  1.b. Have you ever attempted to kill yourself and, if yes, when did this last happen? Yes February 2021   2. Recent or current suicide " plan? No   3. Recent or current intent to act on ideation? No  4. Lifetime psychiatric hospitalization? Yes  5. Pattern of excessive substance use? Yes  6. Current irritability, agitation, or aggression? No  Scoring note: BOTH 1a and 1b must be yes for it to score 1 point, if both are not yes it is zero. All others are 1 point per number. If all questions 1a/1b - 6 are no, risk is negligible. If one of 1a/1b is yes, then risk is mild. If either question 2 or 3, but not both, is yes, then risk is automatically moderate regardless of total score. If both 2 and 3 are yes, risk is automatically high regardless of total score.     Score: 3, moderate risk    The patient has the following risk factors for suicide: substance abuse, depressive symptoms, poor decision making and prior suicide attempt    Is the patient experiencing current suicidal ideation: No    Is the patient engaging in preparatory suicide behaviors (formulating how to act on plan, giving away possessions, saying goodbye, displaying dramatic behavior changes, etc)? No    Does the patient have access to firearms or other lethal means? no    The patient has the following protective factors: voluntarily seeking mental health support, established relationship community mental health provider(s), expresses desire to engage in treatment, safe/stable housing and fulfilling employment    Support system information: Pt cities her methadone counselor as her best/only support.     Patient strengths: Pt is intelligent and has the means to access care.  Pt is gainfully employed and has established providers and shows great resiliency in her current stage of recovery.     Does the patient engage in non-suicidal self-injurious behavior (NSSI/SIB)? no    Is the patient vulnerable to sexual exploitation?  No    Is the patient experiencing abuse or neglect? no    Is the patient a vulnerable adult? No      Risk of Harm to Others  The patient has the following risk factors of  harm to others: no risk factors identified    Does the patient have thoughts of harming others? No    Is the patient engaging in sexually inappropriate behavior?  no       Current Substance Abuse    Is there recent substance abuse? Substance type(s): Benzos Frequency: daily Quantity: 20 mg Method: oral Duration: past month Last use: today    Was a urine drug screen or blood alcohol level obtained: Yes utox pending collection    CAGE AID  Have you felt you ought to cut down on your drinking or drug use?  Yes  Have people annoyed you by criticizing your drinking or drug use? Yes  Have you felt bad or guilty about your drinking or drug use? Yes  Have you ever had a drink or used drugs first thing in the morning to steady your nerves or to get rid of a hangover? Yes  Score: 4/4       Current Symptoms/Concerns    Symptoms  Attention, hyperactivity, and impulsivity symptoms present: No    Anxiety symptoms present: Yes: Panic attacks and Generalized Symptoms: Cognitive anxiety - feelings of doom, racing thoughts, difficulty concentrating  and Excessive worry      Appetite symptoms present: Yes: Loss of Appetite     Behavioral difficulties present: No     Cognitive impairment symptoms present: No    Depressive symptoms present: Yes Appetite change/weight change , Crying or feels like crying, Depressed mood, Excessive guilt , Feelings of helplessness , Feelings of hopelessness , Feelings of worthlessness , Impaired concentration, Low self esteem  and Sleep disturbance      Eating disorder symptoms present: No    Learning disabilities, cognitive challenges, and/or developmental disorder symptoms present: No     Manic/hypomanic symptoms present: No    Personality and interpersonal functioning difficulties present : Yes: Impaired Interpersonal Functioning    Psychosis symptoms present: No      Sleep difficulties present: Yes: Difficulty falling asleep  and Excessive sleep     Substance abuse disorder symptoms present: Yes  Substance(s) taken in larger amounts or over a longer period than intended, Persistent desire or unsuccessful efforts to cut down or control use, A great deal of time is spent in activities necessary to obtain substance(s), use substance(s), or recover from their effects, Cravings or strong desire to use and Substance abuse is continued despite knowledge of having a persistent or recurrent physical or psychological problem that has been caused of exacerbated by substance use     Trauma and stressor related symptoms present: Yes: Intrusions: Flashbacks and Intense psychological distress when you are exposed to reminders/cues of the event           Mental Status Exam   Affect: Labile   Appearance: Appropriate    Attention Span/Concentration: Attentive?    Eye Contact: Engaged   Fund of Knowledge: Appropriate    Language /Speech Content: Fluent   Language /Speech Volume: Normal    Language /Speech Rate/Productions: Articulate    Recent Memory: Intact   Remote Memory: Intact   Mood: Anxious, Depressed and Sad    Orientation to Person: Yes    Orientation to Place: Yes   Orientation to Time of Day: Yes    Orientation to Date: Yes    Situation (Do they understand why they are here?): Yes    Psychomotor Behavior: Normal    Thought Content: Clear   Thought Form: Intact       Mental Health and Substance Abuse History    History  Current and historical diagnoses or mental health concerns: Pt has a hx of polysubstance use. Bipolar Disorder, Borderline Personality Disorder and PTSD.     Prior MH services (inpatient, programmatic care, outpatient, etc) : Yes Multiple IP admissions, [facility] treatments and therapy    History of substance abuse: Yes Hx of polysubstance abuse.  Currently abusing benzos    Prior [facility] services (inpatient, programmatic care, detox, outpatient, etc) : Yes Patient reports having multiple [facility] treatments in the past.     History of commitment: No    Family history of MH/MARIANNE: No    Trauma history: Yes Details not  discussed    Medication  Psychotropic medications: Yes. Pt is currently taking Vybrid, Methadone. Medication compliant: Yes. Recent medication changes: No    Current Care Team  Primary Care Provider: Yes. Name: Valentine Spaulding DO. Location: [facility] [place]. Date of last visit: 10/6/21. Frequency: PRN. Perceived helpfulness: yes.    Psychiatrist: Yes. Name: Kev Alicea. Location: [facility]. Date of last visit: 2 weeeks ago. Frequency: 3 months. Perceived helpfulness: yes.    Therapist: Yes. Name: Whitney. Location: [facility]. Date of last visit: weekly. Frequency: this week. Perceived helpfulness: yes.    : No    CTSS or ARMHS: No    ACT Team: No    Other: No    Release of Information  Was a release of information signed: Yes. Providers included on the release: Deanne Spaulding and Whitney      Biopsychosocial Information    Socioeconomic Information  Current living situation: Lives alone in a town home she owns      Employment/income source: Works in IT    Relevant legal issues: None discussed    Cultural, Yazdanism, or spiritual influences on mental health care: Denies    Is the patient active in the  or a : No      Relevant Medical Concerns   Patient identifies concerns with completing ADLs? No     Patient can ambulate independently? Yes     Other medical concerns? No     History of concussion or TBI? No        Diagnosis    296.33 (F33.2) Major Depressive Disorder, Recurrent Episode, Severe _ and With anxious distress - provisional      300.02 (F41.1) Generalized Anxiety Disorder - provisional     F60.3 borderline Personality Disorder- provisional   F43.10 Postraumatic Disorder     Therapeutic Intervention  The following therapeutic methodologies were employed when working with the patient: establishing rapport, active listening, assessing dimensions of crisis and brief supportive therapy. Patient response to intervention: Pt  is engaged .      Disposition  Recommended disposition: Inpatient Mental Health      Reviewed case and recommendations with attending provider. Attending Name: BLANCHE Morelos CNP      Attending concurs with disposition: Yes      Patient concurs with disposition: Yes      Guardian concurs with disposition: NA     Final disposition: Inpatient mental health .     Inpatient Details (if applicable):  Is patient admitted voluntarily:Yes    Patient aware of potential for transfer if there is not appropriate placement? NA     [facility]      Behavioral Intake Notified? Yes: Date: 1/8/2022 Time: 1835.       Clinical Substantiation of Recommendations   Rationale with supporting factors for disposition and diagnosis.     Pt is a 46 year old female with hx of polysubstance abuse disorder, mood, anxiety and PTSD presents today with benzo abuse and worsening symptoms of depression.  Patient is requesting detox and to address her mental health symptoms.  Patient denies SI/HI.  Patient will be admitted to MI/CD at [facility] for further asssessment and stabilization.       Assessment Details  Patient interview started at: 1400 and completed at: 1500.    Total duration spent on the patient case in minutes: 1.0 hrs     CPT code(s) utilized: 26287 - Psychotherapy for Crisis - 60 (30-74*) min       Aftercare and Safety Planning    MARY Garcia

## 2022-01-09 ENCOUNTER — HOSPITAL ENCOUNTER (INPATIENT)
Facility: CLINIC | Age: 47
LOS: 4 days | Discharge: HOME | End: 2022-01-13
Attending: PSYCHIATRY & NEUROLOGY | Admitting: PSYCHIATRY & NEUROLOGY
Payer: COMMERCIAL

## 2022-01-09 VITALS
HEART RATE: 67 BPM | WEIGHT: 227 LBS | SYSTOLIC BLOOD PRESSURE: 106 MMHG | RESPIRATION RATE: 16 BRPM | DIASTOLIC BLOOD PRESSURE: 71 MMHG | TEMPERATURE: 98.2 F | BODY MASS INDEX: 37.82 KG/M2 | HEIGHT: 65 IN | OXYGEN SATURATION: 94 %

## 2022-01-09 DIAGNOSIS — F13.930 BENZODIAZEPINE WITHDRAWAL WITHOUT COMPLICATION (H): Primary | ICD-10-CM

## 2022-01-09 PROBLEM — F32.A DEPRESSION WITH SUICIDAL IDEATION: Status: ACTIVE | Noted: 2022-01-09

## 2022-01-09 PROBLEM — R45.851 DEPRESSION WITH SUICIDAL IDEATION: Status: ACTIVE | Noted: 2022-01-09

## 2022-01-09 PROCEDURE — 99223 1ST HOSP IP/OBS HIGH 75: CPT | Mod: AI | Performed by: PSYCHIATRY & NEUROLOGY

## 2022-01-09 PROCEDURE — 250N000013 HC RX MED GY IP 250 OP 250 PS 637: Performed by: PHYSICIAN ASSISTANT

## 2022-01-09 PROCEDURE — 99232 SBSQ HOSP IP/OBS MODERATE 35: CPT | Performed by: PHYSICIAN ASSISTANT

## 2022-01-09 PROCEDURE — 250N000013 HC RX MED GY IP 250 OP 250 PS 637: Performed by: PSYCHIATRY & NEUROLOGY

## 2022-01-09 PROCEDURE — HZ2ZZZZ DETOXIFICATION SERVICES FOR SUBSTANCE ABUSE TREATMENT: ICD-10-PCS | Performed by: PSYCHIATRY & NEUROLOGY

## 2022-01-09 PROCEDURE — 99207 PR CONSULT E&M CHANGED TO SUBSEQUENT LEVEL: CPT | Performed by: PHYSICIAN ASSISTANT

## 2022-01-09 PROCEDURE — 128N000001 HC R&B CD/MH ADULT

## 2022-01-09 RX ORDER — METHADONE HYDROCHLORIDE 10 MG/1
70 TABLET ORAL ONCE
Status: DISCONTINUED | OUTPATIENT
Start: 2022-01-09 | End: 2022-01-09

## 2022-01-09 RX ORDER — METHADONE HYDROCHLORIDE 5 MG/5ML
150 SOLUTION ORAL DAILY
Status: DISCONTINUED | OUTPATIENT
Start: 2022-01-10 | End: 2022-01-09

## 2022-01-09 RX ORDER — DICYCLOMINE HYDROCHLORIDE 10 MG/1
20 CAPSULE ORAL 3 TIMES DAILY PRN
Status: DISCONTINUED | OUTPATIENT
Start: 2022-01-09 | End: 2022-01-13 | Stop reason: HOSPADM

## 2022-01-09 RX ORDER — NALOXONE HYDROCHLORIDE 0.4 MG/ML
0.2 INJECTION, SOLUTION INTRAMUSCULAR; INTRAVENOUS; SUBCUTANEOUS
Status: DISCONTINUED | OUTPATIENT
Start: 2022-01-09 | End: 2022-01-13 | Stop reason: HOSPADM

## 2022-01-09 RX ORDER — VILAZODONE HYDROCHLORIDE 10 MG/1
20 TABLET ORAL DAILY
Status: DISCONTINUED | OUTPATIENT
Start: 2022-01-09 | End: 2022-01-13 | Stop reason: HOSPADM

## 2022-01-09 RX ORDER — LOPERAMIDE HCL 2 MG
2 CAPSULE ORAL EVERY 4 HOURS PRN
Status: DISCONTINUED | OUTPATIENT
Start: 2022-01-09 | End: 2022-01-13 | Stop reason: HOSPADM

## 2022-01-09 RX ORDER — ONDANSETRON 4 MG/1
4 TABLET, ORALLY DISINTEGRATING ORAL EVERY 6 HOURS PRN
Status: DISCONTINUED | OUTPATIENT
Start: 2022-01-09 | End: 2022-01-13 | Stop reason: HOSPADM

## 2022-01-09 RX ORDER — CLONIDINE HYDROCHLORIDE 0.1 MG/1
0.1 TABLET ORAL EVERY 6 HOURS PRN
Status: DISCONTINUED | OUTPATIENT
Start: 2022-01-09 | End: 2022-01-13 | Stop reason: HOSPADM

## 2022-01-09 RX ORDER — HYDROXYZINE HYDROCHLORIDE 25 MG/1
25-50 TABLET, FILM COATED ORAL EVERY 4 HOURS PRN
Status: DISCONTINUED | OUTPATIENT
Start: 2022-01-09 | End: 2022-01-13 | Stop reason: HOSPADM

## 2022-01-09 RX ORDER — MAGNESIUM HYDROXIDE/ALUMINUM HYDROXICE/SIMETHICONE 120; 1200; 1200 MG/30ML; MG/30ML; MG/30ML
30 SUSPENSION ORAL EVERY 4 HOURS PRN
Status: DISCONTINUED | OUTPATIENT
Start: 2022-01-09 | End: 2022-01-13 | Stop reason: HOSPADM

## 2022-01-09 RX ORDER — GABAPENTIN 800 MG/1
800 TABLET ORAL 2 TIMES DAILY
Status: DISCONTINUED | OUTPATIENT
Start: 2022-01-10 | End: 2022-01-13 | Stop reason: HOSPADM

## 2022-01-09 RX ORDER — GABAPENTIN 800 MG/1
1600 TABLET ORAL AT BEDTIME
Status: DISCONTINUED | OUTPATIENT
Start: 2022-01-09 | End: 2022-01-13 | Stop reason: HOSPADM

## 2022-01-09 RX ORDER — AMOXICILLIN 250 MG
1 CAPSULE ORAL 2 TIMES DAILY PRN
Status: DISCONTINUED | OUTPATIENT
Start: 2022-01-09 | End: 2022-01-13 | Stop reason: HOSPADM

## 2022-01-09 RX ORDER — LANOLIN ALCOHOL/MO/W.PET/CERES
3 CREAM (GRAM) TOPICAL
Status: DISCONTINUED | OUTPATIENT
Start: 2022-01-09 | End: 2022-01-13 | Stop reason: HOSPADM

## 2022-01-09 RX ORDER — PHENOBARBITAL 64.8 MG/1
64.8 TABLET ORAL 3 TIMES DAILY
Status: DISCONTINUED | OUTPATIENT
Start: 2022-01-09 | End: 2022-01-12

## 2022-01-09 RX ORDER — HYDROXYZINE HYDROCHLORIDE 25 MG/1
25 TABLET, FILM COATED ORAL EVERY 4 HOURS PRN
Status: DISCONTINUED | OUTPATIENT
Start: 2022-01-09 | End: 2022-01-09

## 2022-01-09 RX ORDER — NALOXONE HYDROCHLORIDE 0.4 MG/ML
0.4 INJECTION, SOLUTION INTRAMUSCULAR; INTRAVENOUS; SUBCUTANEOUS
Status: DISCONTINUED | OUTPATIENT
Start: 2022-01-09 | End: 2022-01-13 | Stop reason: HOSPADM

## 2022-01-09 RX ORDER — ALBUTEROL SULFATE 90 UG/1
1-2 AEROSOL, METERED RESPIRATORY (INHALATION) EVERY 6 HOURS PRN
Status: DISCONTINUED | OUTPATIENT
Start: 2022-01-09 | End: 2022-01-13 | Stop reason: HOSPADM

## 2022-01-09 RX ORDER — LOPERAMIDE HCL 2 MG
2 CAPSULE ORAL 4 TIMES DAILY PRN
Status: DISCONTINUED | OUTPATIENT
Start: 2022-01-09 | End: 2022-01-13 | Stop reason: HOSPADM

## 2022-01-09 RX ORDER — METHADONE HYDROCHLORIDE 5 MG/5ML
70 SOLUTION ORAL ONCE
Status: COMPLETED | OUTPATIENT
Start: 2022-01-09 | End: 2022-01-09

## 2022-01-09 RX ORDER — METHOCARBAMOL 500 MG/1
500 TABLET, FILM COATED ORAL 3 TIMES DAILY PRN
Status: DISCONTINUED | OUTPATIENT
Start: 2022-01-09 | End: 2022-01-13 | Stop reason: HOSPADM

## 2022-01-09 RX ORDER — IBUPROFEN 600 MG/1
600 TABLET, FILM COATED ORAL EVERY 6 HOURS PRN
Status: DISCONTINUED | OUTPATIENT
Start: 2022-01-09 | End: 2022-01-13 | Stop reason: HOSPADM

## 2022-01-09 RX ORDER — ACETAMINOPHEN 325 MG/1
650 TABLET ORAL EVERY 4 HOURS PRN
Status: DISCONTINUED | OUTPATIENT
Start: 2022-01-09 | End: 2022-01-13 | Stop reason: HOSPADM

## 2022-01-09 RX ORDER — METHADONE HYDROCHLORIDE 5 MG/5ML
70 SOLUTION ORAL ONCE
Status: DISCONTINUED | OUTPATIENT
Start: 2022-01-09 | End: 2022-01-09

## 2022-01-09 RX ORDER — GABAPENTIN 300 MG/1
900 CAPSULE ORAL 3 TIMES DAILY
Status: DISCONTINUED | OUTPATIENT
Start: 2022-01-09 | End: 2022-01-09

## 2022-01-09 RX ADMIN — GABAPENTIN 900 MG: 300 CAPSULE ORAL at 14:02

## 2022-01-09 RX ADMIN — GABAPENTIN 1600 MG: 800 TABLET ORAL at 20:24

## 2022-01-09 RX ADMIN — VILAZODONE HYDROCHLORIDE 20 MG: 10 TABLET ORAL at 08:35

## 2022-01-09 RX ADMIN — PHENOBARBITAL 64.8 MG: 64.8 TABLET ORAL at 14:02

## 2022-01-09 RX ADMIN — ACETAMINOPHEN 650 MG: 325 TABLET, FILM COATED ORAL at 08:35

## 2022-01-09 RX ADMIN — HYDROXYZINE HYDROCHLORIDE 25 MG: 25 TABLET ORAL at 11:07

## 2022-01-09 RX ADMIN — PHENOBARBITAL 64.8 MG: 64.8 TABLET ORAL at 20:25

## 2022-01-09 RX ADMIN — GABAPENTIN 900 MG: 300 CAPSULE ORAL at 08:35

## 2022-01-09 RX ADMIN — PHENOBARBITAL 64.8 MG: 64.8 TABLET ORAL at 05:50

## 2022-01-09 RX ADMIN — METHADONE HYDROCHLORIDE 70 MG: 5 SOLUTION ORAL at 11:03

## 2022-01-09 ASSESSMENT — MIFFLIN-ST. JEOR: SCORE: 1671.47

## 2022-01-09 NOTE — TELEPHONE ENCOUNTER
"S: 6:27PM- [facility],  called to request detox inpt for 46 yrs old female in the [facility].     B: Pt presents to the ED seeking detox from benzodiazepine and seeking help for worsening depression and anxiety.      Pt reports that she has been using 20 mg of benzodiazepine daily in the last month.  Pt has a hx of MDD, borderline personality d/o, and polysubstance use.  Pt gets them off the streets.  Pt reports her last use was this morning.  Pt reports that she has a hx of w/d seizures secondary to Benzo withdrawal.  Can t remember when that was.  That was years ago when her doctor cut her off from benzo. She reports abusing benzos on/ff since age 18.  Pt reports she is on 150mg of methadone through [facility] and has been on methadone for many years.     There is a hx of SA.  She has had numerous treatments and detoxes.  Pt denies stressors.  She has chronic substance use and reports that her depression has worsen.  Pt reports feeling hopelessness.  She has suicidal ideation but has no plan. She said \"I wont do this to my son.\"  She is followed by psychiatry.      Her UTOX is positive for amphetamines.  She was in the ER 2 weeks ago and tested positive for benzo and amphetamines.     ED MD Notes that Pt is taking up to 10mg of ativan daily.     Ambulates independently.   Feels shakey, feels hot, dizzy in ED.  Medically cleared.      COVID: negative  UTOX: positive for Amphetamines.    CBC: WNL  CMP: WNL  [id number]  Vitals: stable      A:  Vol.  Cooperative now.     R:  8:10PM- Dr. Gonzalez accepts for 3A/[facility].  MICD admit.     [facility] charged notified.     [facility] RN notified to call for report.     Patient cleared and ready for behavioral bed placement: Yes  "

## 2022-01-09 NOTE — PROGRESS NOTES
Patient spent much of the shift sleeping in recliner chair. Expressed that she continues to feel depressed. Agreeable with plan to go to [facility]. Report to be called at 00:30.

## 2022-01-09 NOTE — PROGRESS NOTES
Pt transferred to [address] via ambulance. Left [facility] at 0505. Belongings sent with patient. Vital signs stable. Pt cooperative and agreeable to plan.

## 2022-01-09 NOTE — PLAN OF CARE
Problem: Substance Withdrawal  Goal: Substance Withdrawal  Description: Signs and symptoms of listed problems will be absent or manageable.  Outcome: No Change     Patient continues to be monitored for benzodiazepine withdrawal. Also monitor COWS. Patient was given 70mg (regular dose is 150mg per patient) of methadone; 1/2 dose verified by pharmacy.     Staff attempted to reach patient's methadone clinic but was unable due to the clinic being closed today. Staff should contact [phone] on Monday to verify dose. JOSE was filled and signed for the Methadone Clinic by the patient.    COWS: 3 and 3.  MSSA: 5 and 5.    Patient reported anxiety and depression 3/10; hydroxyzine 25mg was administered. Denied SI/SIB.    Patient complained of back pain 2/10; tylenol 650mg was administered.    We'll continue to monitor.

## 2022-01-09 NOTE — PHARMACY
Patient Methadone Information Note    Clinic name and location: [facility] [place]   Clinic phone number: [phone]  Current methadone dose: Patient states dose is 150 mg daily. Clinic is closed Saturday and Sunday with no on call service so unable to confirm dosing.   Last methadone dose taken: Saturday 1/8     Discussed methadone dosing with BLANCHE Guevara CNP. Did a chart review on previous doses and evaluated patient scenario. Patient will be admitted inpatient so will need methadone dose Sunday prior to transfer. Agreed on giving half of patient reported dose (70 mg) for safety until dosing can be confirmed. Dosing to be confirmed on Monday by staff taking care of patient depending on location/transfer.     Trinity Hernandez, PharmD, BCCCP

## 2022-01-09 NOTE — PHARMACY-ADMISSION MEDICATION HISTORY
Addendum 1/9/22 @ 16:05 -     Pharmacist unable to verify methadone dose with [facility] ([phone]), as clinic is closed on weekend. Methadone dose in PTA med list based on patient report alone. Pharmacist on 1/10/22 AM to call clinic and verify dose (clinic opens at 09:00 on Monday), will write note including methadone clinic and dosing information once obtained.    Nicollette McMann, PharmD  [facility]  Emergency Department: Ascom *[id number]        Admission Medication History status for the 1/9/2022 admission is complete.  See EPIC admission navigator for Prior to Admission medications.    Medication history sources:  patient and dispense report      Medication history source reliability: Good    Medication adherence:  Good    Changes made to PTA medication list (reason)  Added: n/a  Deleted:   1. Lorazepam - pt reports that she is not taking it anymore   Changed: n/a    Additional medication history information (including reliability of information, actions taken by pharmacist): None    Time spent in this activity: 15 mintues     Medication history completed by: Ulises ChD    Prior to Admission medications    Medication Sig Last Dose Taking? Auth Provider   acetaminophen (TYLENOL) 325 MG tablet Take 2 tablets (650 mg) by mouth every 4 hours as needed for mild pain or fever Unknown at Unknown time Yes Kerline Gonzalez MD   albuterol (PROAIR HFA/PROVENTIL HFA/VENTOLIN HFA) 108 (90 Base) MCG/ACT inhaler Inhale 1-2 puffs into the lungs every 6 hours as needed for shortness of breath / dyspnea or wheezing  Unknown at Unknown time Yes Unknown, Entered By History   methadone HCl 10 MG/5ML SOLN Take 150 mg by mouth 1/8/2022 at Unknown time Yes Reported, Patient   vilazodone (VIIBRYD) 10 MG TABS tablet Take 2 tablets (20 mg) by mouth daily 1/8/2022 at Unknown time Yes Marcela Morelos, APRN CNP   gabapentin (NEURONTIN) 800 MG tablet Take 800 mg  in the morning, 800 mg in the afternoon, and 1,600 mg at bedtime 1/8/2022  Unknown, Entered By History
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
## 2022-01-09 NOTE — PROGRESS NOTES
"Called to give report to [facility] at 0030 as planned, however unit says patient is currently \"4th in queue\" and can't take report at this time.   "

## 2022-01-09 NOTE — PROGRESS NOTES
01/09/22 0603   Patient Belongings   Did you bring any home meds/supplements to the hospital?  No   Patient Belongings other (see comments)   Belongings Search Yes   Clothing Search Yes   Second Staff Jeannette and Vangie   Comment See Notes     Large blue storage:  - winter coat, winter boots, cig, long top, underwear, mask  Small blue bin:  -Phone, wall chargers    NO Security Env.  A               Admission:  I am responsible for any personal items that are not sent to the safe or pharmacy.  [facility] is not responsible for loss, theft or damage of any property in my possession.    Signature:  _________________________________ Date: _______  Time: _____                                              Staff Signature:  ____________________________ Date: ________  Time: _____      2nd Staff person, if patient is unable/unwilling to sign:    Signature: ________________________________ Date: ________  Time: _____     Discharge:  [facility] has returned all of my personal belongings:    Signature: _________________________________ Date: ________  Time: _____                                          Staff Signature:  ____________________________ Date: ________  Time: _____

## 2022-01-09 NOTE — PLAN OF CARE
Problem: Substance Withdrawal  Goal: Substance Withdrawal  Description: Signs and symptoms of listed problems will be absent or manageable.  Outcome: No Change   1600, COW's 2 & MSSA 4. We discussed the methadone which will be verified on Monday. Pt was in agreement and returned to bed.

## 2022-01-09 NOTE — CONSULTS
[facility]    Internal Medicine Initial Consult       Date of Admission: 1/9/2022  Consult Requested by: Bahman Sol MD  Reason for Consult: Co-Management of Benzodiazepine Withdrawal    Assessment & Recommendations  Jihan Gill is a 46 year old woman with a past medical history of depression and opiate use disorder who is admitted to station 3A with benzodiazepine withdrawal.        Benzodiazepine Withdrawal - Patient buying benzodiazepines off the street, supplementing her prescription.  Patient believes she is using 10-15mg daily.   -Management per psychiatry team.     Depression/Anxiety  Suicidal Ideations - Managed with Viibryd 20mg daily   -management per psychiatry team.     Opiate Use Disorder - Managed with methadone 150mg daily.  Patient was supposed to receive half of methadone dose prior to transfer, until pharmacy can confirm PTA dose.  Unfortunately, medication not administered prior to transfer  -Provide 70mg now     Chronic Hepatitis C - s/p treatment May 2021. HCV quant negative as of September 2021.  Negative for Hepatitis B.     Medicine will sign off, please page with any additional concerns.     Steff Harris PA-C  Hospitalist Service  Contact information available via [facility] Paging/Directory  ______________________________________________________________________    Reason for Admission  Benzodiazepine withdrawal    Chief Complaint   Depression    History of Present Illness   History is obtained from the patient and medical record.     Jihan Gill is a 46 year old year old woman with a PMH as listed above who is admitted to behavioral health for benzodiazepine withdrawal.     Patient states she is eager to come off the benzodiazepines as she is afraid of loosing her job.  She notes she uses a combination of a prescribed amount and supplementing by buying off the street.  She notes she was surprised her UDS was negative on  arrival as she always tests positive at the methadone clinic.     She confirms a past history of hepatitis C for which she received treatment for in May 2021.    She denies fever, chills, chest pain, palpitations, SOB, cough, nausea, vomiting abdominal pain, change in bowel or urinary habits         Review of Systems   10 point ROS performed and negative unless otherwise noted in HPI     Past Medical History    I have reviewed this patient's medical history and updated it with pertinent information if needed.   Past Medical History:   Diagnosis Date     Arthritis      Bipolar 1 disorder (H)      Chronic hepatitis C without hepatic coma (H)      Depressive disorder     postpartum     Depressive disorder      Major depression, recurrent (H)      Opiate addiction (H)      Seizures (H)     narcotic withdrawal     Substance abuse (H)      Urinary calculus, unspecified 2001    Renal stones        Past Surgical History   I have reviewed this patient's surgical history and updated it with pertinent information if needed.  Past Surgical History:   Procedure Laterality Date     C/SECTION, LOW TRANSVERSE      [id number]     ENT SURGERY       GYN SURGERY       TONSILLECTOMY          Social History   Social History     Tobacco Use     Smoking status: Current Every Day Smoker     Packs/day: 0.50     Years: 8.00     Pack years: 4.00     Smokeless tobacco: Never Used   Substance Use Topics     Alcohol use: Not Currently     Comment: States no EtOH since 2008.     Drug use: Yes     Comment: Methadone 60mg/day street buy       Family History   I have reviewed this patient's family history and updated it with pertinent information if needed.   Family History   Problem Relation Age of Onset     No Known Problems Father      No Known Problems Mother      No Known Problems Sister      No Known Problems Brother        Medications   Medications Prior to Admission   Medication Sig Dispense Refill Last Dose     acetaminophen (TYLENOL) 325 MG  "tablet Take 2 tablets (650 mg) by mouth every 4 hours as needed for mild pain or fever 30 tablet 0      albuterol (PROAIR HFA/PROVENTIL HFA/VENTOLIN HFA) 108 (90 Base) MCG/ACT inhaler Inhale 1-2 puffs into the lungs every 6 hours as needed for shortness of breath / dyspnea or wheezing         gabapentin (NEURONTIN) 800 MG tablet Take 800 mg in the morning, 800 mg in the afternoon, and 1,600 mg at bedtime        LORazepam (ATIVAN) 0.5 MG tablet Take 0.5 mg by mouth daily \" I was abusing this and buying it on the street-taking up to 10mg a day        methadone HCl 10 MG/5ML SOLN Take 150 mg by mouth        vilazodone (VIIBRYD) 10 MG TABS tablet Take 2 tablets (20 mg) by mouth daily  0        Allergies      Allergies   Allergen Reactions     Abilify [Aripiprazole] Other (See Comments)     Tardive dyskinesia     Allopurinol      Compazine Other (See Comments)     Dystonia     Dramamine      Jaw lock.       Olanzapine Other (See Comments)     Tardive dyskinesia from Zyprexa - per patient     Reglan Other (See Comments)     dystonia     Seroquel [Quetiapine] Other (See Comments)     Tardive dyskinesia.        Physical Exam   /70   Pulse 67   Temp 97.2  F (36.2  C) (Temporal)   Resp 16   Ht 1.651 m (5' 5\")   Wt 102.1 kg (225 lb)   SpO2 95%   BMI 37.44 kg/m     GENERAL: Pleasant and cooperative, in NAD  HEENT: Anicteric sclera. Mucous membranes moist.   CV: RRR. S1, S2. No murmurs appreciated.   RESPIRATORY: Effort normal on room air. Lungs CTAB with no wheezing, rales, rhonchi.   GI: Abdomen soft, non distended, non tender.   NEUROLOGICAL: No focal deficits. Moves all extremities.   EXTREMITIES: No peripheral edema. Warm and well perfused.   SKIN: No jaundice. No rashes.     Data   Data reviewed today: I reviewed all medications, new labs and imaging results over the last 24 hours.   Results for SALINAS GALINDO ALISE (MRN [id number]) as of 1/9/2022 08:20   Ref. Range 1/8/2022 16:52   Sodium Latest Ref Range: " 133 - 144 mmol/L 137   Potassium Latest Ref Range: 3.4 - 5.3 mmol/L 4.1   Chloride Latest Ref Range: 94 - 109 mmol/L 106   Carbon Dioxide Latest Ref Range: 20 - 32 mmol/L 28   Urea Nitrogen Latest Ref Range: 7 - 30 mg/dL 9   Creatinine Latest Ref Range: 0.52 - 1.04 mg/dL 0.63   GFR Estimate Latest Ref Range: >60 mL/min/1.73m2 >90   Calcium Latest Ref Range: 8.5 - 10.1 mg/dL 9.8   Anion Gap Latest Ref Range: 3 - 14 mmol/L 3   Albumin Latest Ref Range: 3.4 - 5.0 g/dL 3.5   Protein Total Latest Ref Range: 6.8 - 8.8 g/dL 7.9   Bilirubin Total Latest Ref Range: 0.2 - 1.3 mg/dL 0.3   Alkaline Phosphatase Latest Ref Range: 40 - 150 U/L 135   ALT Latest Ref Range: 0 - 50 U/L 25   AST Latest Ref Range: 0 - 45 U/L 22   Glucose Latest Ref Range: 70 - 99 mg/dL 118 (H)   WBC Latest Ref Range: 4.0 - 11.0 10e3/uL 9.4   Hemoglobin Latest Ref Range: 11.7 - 15.7 g/dL 13.6   Hematocrit Latest Ref Range: 35.0 - 47.0 % 41.5   Platelet Count Latest Ref Range: 150 - 450 10e3/uL 234   RBC Count Latest Ref Range: 3.80 - 5.20 10e6/uL 4.66   MCV Latest Ref Range: 78 - 100 fL 89   MCH Latest Ref Range: 26.5 - 33.0 pg 29.2   MCHC Latest Ref Range: 31.5 - 36.5 g/dL 32.8   RDW Latest Ref Range: 10.0 - 15.0 % 13.3   % Neutrophils Latest Units: % 47   % Lymphocytes Latest Units: % 45   % Monocytes Latest Units: % 6   % Eosinophils Latest Units: % 2   % Basophils Latest Units: % 0   Absolute Basophils Latest Ref Range: 0.0 - 0.2 10e3/uL 0.0   Absolute Eosinophils Latest Ref Range: 0.0 - 0.7 10e3/uL 0.2   Absolute Immature Granulocytes Latest Ref Range: <=0.4 10e3/uL 0.0

## 2022-01-09 NOTE — PLAN OF CARE
"SBAR      S = Situation:   Jihan Gill is a 46 year old year old female voluntarily admitted to detox from benzodiazepine. MICD.    B  = Background:   Patient reported using 10-15mg of xanax off the street and prescribed ativan 0.5mg daily. Inconsistent with ED note which stated that the patient was using 10mg of ativan. Last use was on 1/8/22 at 0830am. Utox was only positive for amphetamines.    Patient has a history of bipolar, depression, and anxiety. Patient reported spending a lot of money buying benzo's from the street and would like to stop \"wasting\" her money.     A  =  Assessment:   Patient was calm and cooperative with the admission process.      MSSA=3; phenobarbital 64.8mg was administered.     Patient endorsed anxiety and depression. Denied SI/SIB.    Vital Signs: /70   Pulse 67   Temp 97.2  F (36.2  C) (Temporal)   Resp 16   Ht 1.651 m (5' 5\")   Wt 102.1 kg (225 lb)   SpO2 95%   BMI 37.44 kg/m    R =   Request or Recommendation:   MSSA protocol with phenobarbital 64.8mg TID was ordered. PTA medications ordered: gabapentin changed to 900mg TID. Methadone not ordered; needs to be verified through [facility] in [place], [place]. JOSE was filled and signed for the Methadone Clinic. Precautions: seizure, suicide and withdrawal.   "

## 2022-01-09 NOTE — SAFE
Jihan ALISE Gill  January 8, 2022  SAFE Note    Critical Safety Issues: Pt is a 46 year old female with hx of polysubstance abuse, mood  And anxiety disorder presents to the ED today with chief complaint requesting detox from benzos.  Pt also reporting worseing symptoms of depression and anxiety. She is also on Methadone 150 mg for a few years.   She denies SI/HI.  Does have hx of suicide attempts.  No hx of aggression.  Pt is cooperative during her stay in [facility]      Current Suicidal Ideation/Self-Injurious Concerns/Methods: None - N/A      Current or Historical Inappropriate Sexual Behavior: No      Current or Historical Aggression/Homicidal Ideation: None - N/A      Triggers: chronic substance abuse and depression/anxiety worsening.     Updated care team: Yes: Lala ANDERSON, Jethro MANCIA CNP    For additional details see full LMHP assessment.       LOUANN GarciaSW

## 2022-01-10 LAB
CHOLEST SERPL-MCNC: 185 MG/DL
HDLC SERPL-MCNC: 39 MG/DL
LDLC SERPL CALC-MCNC: 115 MG/DL
NONHDLC SERPL-MCNC: 146 MG/DL
T4 FREE SERPL-MCNC: 0.95 NG/DL (ref 0.76–1.46)
TRIGL SERPL-MCNC: 155 MG/DL
TSH SERPL DL<=0.005 MIU/L-ACNC: 0.15 MU/L (ref 0.4–4)

## 2022-01-10 PROCEDURE — 99231 SBSQ HOSP IP/OBS SF/LOW 25: CPT | Performed by: PSYCHIATRY & NEUROLOGY

## 2022-01-10 PROCEDURE — 250N000013 HC RX MED GY IP 250 OP 250 PS 637: Performed by: PSYCHIATRY & NEUROLOGY

## 2022-01-10 PROCEDURE — 36415 COLL VENOUS BLD VENIPUNCTURE: CPT | Performed by: PSYCHIATRY & NEUROLOGY

## 2022-01-10 PROCEDURE — 84443 ASSAY THYROID STIM HORMONE: CPT | Performed by: PSYCHIATRY & NEUROLOGY

## 2022-01-10 PROCEDURE — 128N000001 HC R&B CD/MH ADULT

## 2022-01-10 PROCEDURE — 80061 LIPID PANEL: CPT | Performed by: PSYCHIATRY & NEUROLOGY

## 2022-01-10 PROCEDURE — 84439 ASSAY OF FREE THYROXINE: CPT | Performed by: PSYCHIATRY & NEUROLOGY

## 2022-01-10 RX ORDER — METHADONE HYDROCHLORIDE 5 MG/5ML
150 SOLUTION ORAL DAILY
Status: DISCONTINUED | OUTPATIENT
Start: 2022-01-10 | End: 2022-01-13 | Stop reason: HOSPADM

## 2022-01-10 RX ADMIN — ACETAMINOPHEN 650 MG: 325 TABLET, FILM COATED ORAL at 10:22

## 2022-01-10 RX ADMIN — GABAPENTIN 1600 MG: 800 TABLET ORAL at 19:15

## 2022-01-10 RX ADMIN — PHENOBARBITAL 64.8 MG: 64.8 TABLET ORAL at 09:01

## 2022-01-10 RX ADMIN — PHENOBARBITAL 64.8 MG: 64.8 TABLET ORAL at 19:16

## 2022-01-10 RX ADMIN — METHADONE HYDROCHLORIDE 150 MG: 5 SOLUTION ORAL at 10:30

## 2022-01-10 RX ADMIN — VILAZODONE HYDROCHLORIDE 20 MG: 10 TABLET ORAL at 09:00

## 2022-01-10 RX ADMIN — PHENOBARBITAL 64.8 MG: 64.8 TABLET ORAL at 14:30

## 2022-01-10 RX ADMIN — GABAPENTIN 800 MG: 800 TABLET ORAL at 09:00

## 2022-01-10 RX ADMIN — ACETAMINOPHEN 650 MG: 325 TABLET, FILM COATED ORAL at 18:11

## 2022-01-10 RX ADMIN — HYDROXYZINE HYDROCHLORIDE 50 MG: 25 TABLET ORAL at 19:16

## 2022-01-10 RX ADMIN — GABAPENTIN 800 MG: 800 TABLET ORAL at 14:30

## 2022-01-10 ASSESSMENT — ACTIVITIES OF DAILY LIVING (ADL)
HYGIENE/GROOMING: INDEPENDENT
DRESS: INDEPENDENT
HYGIENE/GROOMING: INDEPENDENT
ORAL_HYGIENE: INDEPENDENT
DRESS: INDEPENDENT
LAUNDRY: WITH SUPERVISION
ORAL_HYGIENE: INDEPENDENT

## 2022-01-10 NOTE — PROGRESS NOTES
[facility], [place]   Psychiatric Progress Note        Interim history   This is a 46 year old female with MDD, recurrent, severe without psychotic features  Passive suicidal ideation, resolved  PTSD  Anxiety Disorder, unspecified  R/O Panic Disorder  Cluster B traits, R/O Borderline Personality Disorder  Sedative Hypnotic Use Disorder, severe, in withdrawal  Opiate Use Disorder, severe, on methadone maintenance  Amphetamine Use Disorder, severe  Nicotine Use Disorder, moderate to severe  History of Hep C.Pt seen in rounds.   The patient's care was discussed with the treatment team during the daily team meeting and/or staff's chart notes were reviewed.  Staff report patient has been visible in the milieu,  no acute eventsovernight.     Patient's mood is better  Energy Level:MODERATE  Sleep:No concerns, sleeps well through night  Appetite:normal motivation interest improving   Denied Suicidal/homicidal ideation/plan intent.  Denied psychosis   prior suicde attempts 2004 lithium overdose  No access to gun  Pt is in benzo  withdrawal still being monitered every 4 hrs for it,   Pt mssa score are monitered  Tolerating meds and has no side effects.              Medications:     Current Facility-Administered Medications   Medication     acetaminophen (TYLENOL) tablet 650 mg     albuterol (PROVENTIL HFA/VENTOLIN HFA) inhaler     alum & mag hydroxide-simethicone (MAALOX) suspension 30 mL     cloNIDine (CATAPRES) tablet 0.1 mg     dicyclomine (BENTYL) capsule 20 mg     gabapentin (NEURONTIN) tablet 1,600 mg     gabapentin (NEURONTIN) tablet 800 mg     hydrOXYzine (ATARAX) tablet 25-50 mg     ibuprofen (ADVIL/MOTRIN) tablet 600 mg     loperamide (IMODIUM) capsule 2 mg     loperamide (IMODIUM) capsule 2 mg     melatonin tablet 3 mg     methadone (DOLPHINE) solution 150 mg     methocarbamol (ROBAXIN) tablet 500 mg     naloxone (NARCAN) injection 0.2 mg    Or     naloxone (NARCAN) injection 0.4 mg     "Or     naloxone (NARCAN) injection 0.2 mg    Or     naloxone (NARCAN) injection 0.4 mg     ondansetron (ZOFRAN-ODT) ODT tab 4 mg     PHENobarbital (LUMINAL) tablet 64.8 mg     senna-docusate (SENOKOT-S/PERICOLACE) 8.6-50 MG per tablet 1 tablet     vilazodone (VIIBRYD) tablet 20 mg             Allergies:     Allergies   Allergen Reactions     Abilify [Aripiprazole] Other (See Comments)     Tardive dyskinesia     Allopurinol      Compazine Other (See Comments)     Dystonia     Dramamine      Jaw lock.       Olanzapine Other (See Comments)     Tardive dyskinesia from Zyprexa - per patient     Reglan Other (See Comments)     dystonia     Seroquel [Quetiapine] Other (See Comments)     Tardive dyskinesia.             Psychiatric Examination:   Blood pressure 103/58, pulse 69, temperature 97.4  F (36.3  C), temperature source Temporal, resp. rate 16, height 1.651 m (5' 5\"), weight 102.1 kg (225 lb), SpO2 97 %, not currently breastfeeding.  Weight is 225 lbs 0 oz  Body mass index is 37.44 kg/m .    Appearance:  awake, alert and adequately groomed  Attitude:  cooperative  Eye Contact:  good  Mood:  better  Affect:  appropriate and in normal range and mood congruent  Speech:  clear, coherent rate /rhythm are good  Psychomotor Behavior:  no evidence of tardive dyskinesia, dystonia, or tics and intact station, gait and muscle tone  Throught Process:  logical  Associations:  no loose associations  Thought Content:  no evidence of suicidal ideation or homicidal ideation, no evidence of psychotic thought, no auditory hallucinations present and no visual hallucinations present  Insight:  good  Judgement:  intact  Oriented to:  time, person, and place  Attention Span and Concentration:  intact  Recent and Remote Memory:  intact  Language fund of knowledge are adequate         Labs:     No results found for: NTBNPI, NTBNP  Lab Results   Component Value Date    WBC 9.4 01/08/2022    HGB 13.6 01/08/2022    HCT 41.5 01/08/2022    MCV 89 " 01/08/2022     01/08/2022     Lab Results   Component Value Date    INR 1.03 03/19/2019         DX MDD, recurrent, severe without psychotic features  Passive suicidal ideation, resolved  PTSD  Anxiety Disorder, unspecified  R/O Panic Disorder  Cluster B traits, R/O Borderline Personality Disorder  Sedative Hypnotic Use Disorder, severe, in withdrawal  Opiate Use Disorder, severe, on methadone maintenance  Amphetamine Use Disorder, severe  Nicotine Use Disorder, moderate to severe  History of Hep C     PLAN    - Continue MSSA protocol  - Phenobarbital 64.8 mg TID    MSSA    Eating Disturbances: ate and enjoyed all of it or not applicable  Tremor: 1 - not visibly apparent but can be felt by the examiner placing his fingertip slightly against the patient's fingertips  Sleep Disturbance: slept through the night or not applicable  Clouding of Sensorium: no evidence  Hallucinations: 0 - none  Quality of Contact: 0 - awareness of examiner and people around him/her  Agitation: 1 - somewhat more than normal activity  Paroxysmal Sweats: 1 - barely perceptible sweating  Temperature: 99.5 or below  Pulse: 0 - 69 or below  Total MSSA Score: 4  Depression/Anxiety:  - Continue Viibryd 20 mg daily (increased in [facility] unit). May consider increasing to 40 mg daily prior to discharge if ongoing depressive symptoms  - Continue PTA Gabapentin 800 mg BID and 1600 mg at bedtime for now  - Continue hydroxyzine 25-50 mg q4h prn for acute anxiety  - Continue Trazodone 50 mg at bedtime prn for sleep disturbances       Opiate Use Disorder, severe, on methadone maintenance:  Methadone dose confirmed 150 mg   Laboratory/Imaging: reviewed with patient   Consults: internal medicine consult reviewed  Patient will be treated in therapeutic milieu with appropriate individual and group therapies as described.  PDMP CHECKED     Supportive psychotheraoy provided, nicko talked about recovery enviroment, relapse prevention, triggers to  use.  Discussed with patient many issues of addiction,triggers, relapse, and establishing a solid recovery program.  Asked pt to be med complinat   Medical diagnoses to be addressed this admission:    Plan:       Assessment & Recommendations  Jihan Gill is a 46 year old woman with a past medical history of depression and opiate use disorder who is admitted to station 3A with benzodiazepine withdrawal.        Benzodiazepine Withdrawal - Patient buying benzodiazepines off the street, supplementing her prescription.  Patient believes she is using 10-15mg daily.   -Management per psychiatry team.      Depression/Anxiety  Suicidal Ideations - Managed with Viibryd 20mg daily   -management per psychiatry team.      Opiate Use Disorder - Managed with methadone 150mg daily.  Patient was supposed to receive half of methadone dose prior to transfer, until pharmacy can confirm PTA dose.  Unfortunately, medication not administered prior to transfer  -Provide 70mg now      Chronic Hepatitis C - s/p treatment May 2021. HCV quant negative as of September 2021.  Negative for Hepatitis B.      Medicine will sign off, please page with any additional concerns.      Legal Status: voluntary    Safety Assessment:   Checks:  15 min  Precautions: withdrawal precautions  Pt has not required locked seclusion or restraints in the past 24 hours to maintain safety, please refer to RN documentation for further details.  Discussed with patient many issues of addiction,triggers, relapse, and establishing a solid recovery program.  Able to give informed consent:  YES   Discussed Risks/Benefits/Side Effects/Alternatives: YES    After discussion of the indications, risks, benefits, alternatives and consequences of no treatment, the patient elects to complete detox and do trt

## 2022-01-10 NOTE — PHARMACY-ADMISSION MEDICATION HISTORY
Methadone Clinic Information Note    Clinic Name: [facility] Location & Phone Number: [phone]    Verified patient's current methadone dose is 150 mg daily.   Last dose was administered on 1/8/22.   Does patient receive take-home doses? Yes x1 for 1/9/22 - clinic closed on Sundays    Zev Gonzalez PharmD, BCPS  [facility] Building: [facility] *[id number]

## 2022-01-10 NOTE — PROGRESS NOTES
Case Management Team Note-    Patient reports that she is currently receiving Methadone MAT services through [facility]. Per chart review she has been discharged from multiple MAT programs due to noncompliance with their rules. Patient's chart also indicates various ER and hospitalizations due to an opiate overdose on fentanyl, benzo withdrawals, and Methamphetamine Abuse. Patient downplayed her use of fentanyl, stating that she only tried once, however chart indicates that she has reported IV use of fentanyl in the past year, and stated that the reason she had a positive amphetamine UA was because she has a prescription of adderall from a year and a half ago and she only takes it when she has to do training for work. Patient is open to residential treatment but states that she needs to work as she has a lot of bills and pays for her son's school in [place]. Her son does not live with her. Reports she is a contract  at [employer] but was unsure of which location she actually works out of. Writer encouraged her to reach out to HR department to see if she can apply for a medical leave in order to keep her job. She was also told to call her [facility]  and discuss the possibility of short term disability and also consider unemployment. Patient stated that ideally she would like to enter [facility] IOP w/ Lodging program however it appears to have very little hours of programming, and she wants to be able to work while in it. Writer encouraged her to complete CD Assessment paperwork and follow through with recommendations of CM.

## 2022-01-10 NOTE — PROGRESS NOTES
Writer reports she is on the [facility] Restricted Recipient Program. Restricted to [facility] in [facility], and to provider Dr. Valentine Spaulding at [facility]. Has a  through their program. Patient also reports that she has a CM through [facility] (Fer Cisneros) that she has worked with for a few years.

## 2022-01-10 NOTE — PROGRESS NOTES
Enrolled in Restricted Recipient Program     Provider number [id number], GAYE ROSSI DO is a provider for a Restricted Recipient for: Physician Services ,  Nurse Practitioner Services   Provider number [id number], P[facility] NICOL[facility] is a provider for a Restricted Recipient for: Physician Services ,  Diagnostic Lab ,  Nurse Practitioner Services   Provider number [id number], P[facility] NICOL[facility] is a provider for a Restricted Recipient for: Inpatient Hospital ,  Outpatient Hospital ,  Physician Services ,  Diagnostic Lab   Provider number [id number], [facility][id number] is a provider for a Restricted Recipient for: Pharmacy

## 2022-01-10 NOTE — PROGRESS NOTES
Narx Scores  Narcotic  100  Sedative  211  Stimulant  000  Explanation and Guidance  Overdose Risk Score  120  (Range 000-999)  Explanation and Guidance  State Indicators (0)  Details  RX Graph   Narcotic   Buprenorphine   Sedative   Stimulant   Other  Learn how to use graph  All Prescribers  Prescribers  9 - Kev Nunn  8 - Guanako Mayer MD  7 - Yahir Rodriguez  6 - NAJMA Lyn  5 - Nu Dove,  4 - Cathy Carpio  3 - IZZY Lopez  2 - Bahman Slo  1 - Colleen SUÁREZ Msn F  Timeline  01/10  2m  6m  1y  2y  Disclaimer  Morphine Milligram Equivalent Prescribed Over Time  Last 30 Days  Last 60 Days  Last 90 Days  Last 1 Year  Last 2 Years  MME  Timeframe  0  1  2  12/12/21  12/27/21  1/10/22  0  MME per Day Avg.  0  MME per RX  Disclaimer  Lorazepam MgEq (LME) Prescribed Over Time  Last 30 Days  Last 60 Days  Last 90 Days  Last 1 Year  Last 2 Years  LME  Timeframe  0  1  2  12/12/21  12/27/21  1/10/22  0.6  LME Per Day Avg.  9  LME mg Per Rx  Disclaimer  Buprenorphine (mg) Prescribed Over Time  Last 30 Days  Last 60 Days  Last 90 Days  Last 1 Year  Last 2 Years  mg  Timeframe  0  1  2  12/12/21  12/27/21  1/10/22  0  mg Per Day Avg.  0  Avg mg Per Rx  Disclaimer  RX Summary  Summary  Total Prescriptions 20  Total Private Pay 0  Total Prescribers 9  Total Pharmacies 2  Opioids* (excluding Buprenorphine)  Current Qty 0  Current MME/day 0.00  30 Day Avg MME/day 0.00  Buprenorphine*  Current Qty 0  Current mg/day 0.00  30 Day Avg mg/day 0.00  RX Summary Expanded  Narcotics (excluding Buprenorphine)  30 Day Avg. MME 0.00  90 Day Avg. MME 0.00  Rx Count/12 Months 0  Prescriber #/6 Months 0  Pharmacy #/6 Months 0  Current Quantity 0  Buprenorphine  30 Day Avg. mg/day 0.00  90 Day Avg. mg/day 0.00  Rx Count/12 Months 0  Prescriber #/6 Months 0  Pharmacy #/6 Months 0  Current Quantity 0  Sedatives  30 Day Avg. LME 0.60  90 Day Avg. LME 0.33  Rx Count/12 Months 5  Prescriber #/6  Months 2  Pharmacy #/6 Months 1  Current Quantity 0  Stimulants  30 Day Avg. mg/day 0.00  90 Day Avg. mg/day 0.00  Rx Count/12 Months 0  Prescriber #/6 Months 0  Pharmacy #/6 Months 0  Current Quantity 0  Prescriptions  Total: 20  Private Pay: 0  Showing 1-15 of 20 Items View 15 Items    1   of 2   Filled  Written  Sold  ID  Drug  QTY  Days  Prescriber  RX #  Dispenser  Refill  Daily Dose*  Pymt Type     12/30/2021 11/11/2021 12/30/2021 2   Gabapentin 800 Mg Tablet  120.00 30 Sc Gene [id number] [facility] ([facility]305) 1/2  [place]icaid [place]  12/12/2021 12/07/2021 12/12/2021 2   Lorazepam 0.5 Mg Tablet  30.00 30 Sc Gene [id number] [facility] (5305) 0/5 0.50 LME [place]  11/26/2021 11/11/2021 11/28/2021 2   Gabapentin 800 Mg Tablet  120.00 30 Sc Gene [id number] [facility] (5305) 0/2  [place]  11/18/2021 11/18/2021 11/18/2021 2   Lorazepam 0.5 Mg Tablet  30.00 30 Sc Gene [id number] [facility] (5305) 0/0 0.50 L[place]  10/30/2021 09/24/2021 10/30/2021 2   Gabapentin 800 Mg Tablet  120.00 30 Br Tessie [id number] [facility] (5303) 1/2  [place]  10/02/2021 09/24/2021 10/07/2021 2   Gabapentin 800 Mg Tablet  120.00 30 Br Tessie [id number] [facility] (5305) 0/2  [place]  09/10/2021 09/01/2021 09/10/2021 2   Gabapentin 800 Mg Tablet  120.00 30 Br Tessie [id number] [facility] (5301) 0/0  [place]  08/13/2021 08/12/2021 08/13/2021 2   Gabapentin 800 Mg Tablet  120.00 30 Br Tessie [id number] [facility] (5301) 0/0  [place]  08/12/2021 08/12/2021 08/12/2021 2   Phenobarbital 16.2 Mg Tablet  42.00 8 Suad Fro [id number] [facility] (5309) 0/0 2.83 LME [place]  07/27/2021 06/02/2021 07/27/2021 2   Gabapentin 800 Mg Tablet  120.00 30 Br Tessie [id number] [facility] (5305) 2/2  [place]  06/30/2021 06/02/2021 06/30/2021 2   Gabapentin 800 Mg Tablet  120.00 30 Br Tessie [id number] [facility] (5305) 1/2  [place]  06/02/2021 06/02/2021 06/03/2021 2   Gabapentin 800 Mg Tablet  120.00 30 Br Tessie [id number] [facility] (5305) 0/2  [place]  05/20/2021 05/10/2021 05/20/2021 2   Gabapentin 400 Mg Capsule  210.00 30 Er Rodriguez [id number] [facility]  (5825) 0/0  [place]  05/11/2021 03/02/2021 05/11/2021 2   Phenobarbital 100 Mg Tablet  9.00 4 Ki Fee [id number] [facility] (0769) 0/0 7.42 LME [place]  04/22/2021 04/22/2021 04/22/2021 1   Gabapentin 400 Mg Capsule  240.00 30 Kr Hen [id number] [place] (6408) 0/0  [place]  Disclaimer  Showing 1-15 of 20 Items View 15 Items    1   of 2   Providers  Total: 9  Showing 1-9 of 9 Items View 15 Items  1 of 1   Name  Address  City  State  Zipcode  Phone   Nu NAJMA Dove, Aprn [address] [facility] [id number] [phone]  Kika Briscoe Do [address] [place] [address] [phone]  Colleen SUÁREZ Msn Fnp-C [place], Msn [address] [place] [address] [phone]  Sreejaya Veluvali [address] [facility] [address] [phone]  [facility] [address] [place] [address] [phone]  Jim Leyva MD [address] [place] [address] [phone]  Yahir Rodriguez, [facility] [address] [facility] [address] [phone]  Guanako Mayer MD [address] [place] [address] [phone]  Ilya Servin-C [address] [place] [address] [phone]  Showing 1-9 of 9 Items View 15 Items  1 of 1   Pharmacies  Total: 2  Showing 1-2 of 2 Items View 15 Items  1 of 1   Name  Address  [facility]  State  Zipcode  Phone   [facility] [place] [phone] [facility] [place] [address] [phone]  [place] ([phone]) [address] [address] [phone]  Showing 1-2 of 2 Items View 15 Items  1 of 1   The report provided is based upon the search criteria entered and the corresponding data as it has been reported by dispenser(s). If erroneous information is identified or additional information is needed, please contact the dispenser or the prescriber provided on the report. Date Sold signifies the date the prescription was sold (left the pharmacy). The absence of Date Sold does not necessarily indicate the  prescription was not dispensed. Fill Date represents the date the medication was filled or prepared by the pharmacy. Note, federal regulation (CFR Title 42: Part 2) requires patient consent prior to releasing certain patient data from federally funded opioid treatment programs (OTPs). As such, controlled substances dispensed from OTPs for medication-assisted treatment may not appear in the [place]  report. Morphine milligram equivalent (MME) conversion factors published by the CDC are used in the MME calculation. Per the CDC, the MME conversion factor is intended only for analytic purposes where prescription data are used to retrospectively calculate daily MME to inform analyses of risks associated with opioid prescribing. This value does not constitute clinical guidance or recommendations for converting patients from one form of opioid analgesic to another. Per the CDC, the conversion factors for drugs prescribed or provided, as part of medication-assisted treatment for opioid use disorder should not be used to benchmark against MME dosage thresholds meant for opioids prescribed for pain. Buprenorphine products listed in the CDC s MME file do not have an associated conversion factor. Lastly, the CDC notes, in clinical practice, calculating MME for methadone often involves a sliding-scale approach, whereby the conversion factor increases with increasing dose. The conversion factor of 3 for methadone presented in this file could underestimate MME for a given patient. This report contains confidential information, including patient identifiers, and is not a public record. The information on this report must be treated as protected health information and is only to be disclosed to others as authorized by applicable state and Federal regulations.

## 2022-01-10 NOTE — PROGRESS NOTES
Writer met with patient on 1/10/22 to discuss aftercare plans. Patient reported that she is registered with [facility]. Patient signed an JOSE and writer was able to verify that that the patient has an appointment with her counselor Whitney Morgan ([phone]) scheduled on 1/11/22 at 7 am and is able to reschedule if patient is still in detox. Patient also receives psychiatry services from [facility].    Patient was encouraged to set up an appointment with her primary care provider, since she stated she had not seen her primary in over a year. Patient stated that she will set up an appointment with her as soon as she can. AVS is updated.

## 2022-01-10 NOTE — PLAN OF CARE
Problem: Substance Withdrawal  Goal: Substance Withdrawal  Description: Signs and symptoms of listed problems will be absent or manageable.  Outcome: Improving  Flowsheets (Taken 1/10/2022 1524)  Substance Withdrawal Assessed: all   Continues in detox status on benzodiazepine withdrawal. MSSA scores 4 and 7. Appetite good. Fluids encouraged. Social with peers and staff on unit. Denies thoughts of self harm. Offers no complaints of physical discomfort.

## 2022-01-10 NOTE — PLAN OF CARE
Behavioral Team Discussion: (1/10/2022)    Continued Stay Criteria/Rationale: Patient admitted for benzodiazepine withdrawal and Use Disorder and Suicidal Ideations.  Plan: The following services will be provided to the patient; psychiatric assessment, medication management, therapeutic milieu, individual and group support, and skills groups.   Participants: 3A Provider: Dr. Bahman Sol MD; 3A RN: Chaya Pride RN; 3A CM's: Shayy Soni .  Summary/Recommendation: Providers will assess today for treatment recommendations, discharge planning, and aftercare plans. CM will meet with pt for discharge planning.   Medical/Physical: None noted  Precautions:   Behavioral Orders   Procedures     Code 1 - Restrict to Unit     Routine Programming     As clinically indicated     Seizure precautions     Status 15     Every 15 minutes.     Suicide precautions     Patients on Suicide Precautions should have a Combination Diet ordered that includes a Diet selection(s) AND a Behavioral Tray selection for Safe Tray - with utensils, or Safe Tray - NO utensils       Withdrawal precautions     Rationale for change in precautions or plan: N/A  Progress: Initial.

## 2022-01-11 PROCEDURE — G0177 OPPS/PHP; TRAIN & EDUC SERV: HCPCS

## 2022-01-11 PROCEDURE — 250N000013 HC RX MED GY IP 250 OP 250 PS 637: Performed by: PSYCHIATRY & NEUROLOGY

## 2022-01-11 PROCEDURE — 99231 SBSQ HOSP IP/OBS SF/LOW 25: CPT | Performed by: PSYCHIATRY & NEUROLOGY

## 2022-01-11 PROCEDURE — 128N000001 HC R&B CD/MH ADULT

## 2022-01-11 RX ADMIN — GABAPENTIN 1600 MG: 800 TABLET ORAL at 19:07

## 2022-01-11 RX ADMIN — METHADONE HYDROCHLORIDE 150 MG: 5 SOLUTION ORAL at 08:34

## 2022-01-11 RX ADMIN — ACETAMINOPHEN 650 MG: 325 TABLET, FILM COATED ORAL at 19:08

## 2022-01-11 RX ADMIN — GABAPENTIN 800 MG: 800 TABLET ORAL at 14:05

## 2022-01-11 RX ADMIN — VILAZODONE HYDROCHLORIDE 20 MG: 10 TABLET ORAL at 08:33

## 2022-01-11 RX ADMIN — PHENOBARBITAL 64.8 MG: 64.8 TABLET ORAL at 14:05

## 2022-01-11 RX ADMIN — GABAPENTIN 800 MG: 800 TABLET ORAL at 08:32

## 2022-01-11 RX ADMIN — PHENOBARBITAL 64.8 MG: 64.8 TABLET ORAL at 08:33

## 2022-01-11 RX ADMIN — PHENOBARBITAL 64.8 MG: 64.8 TABLET ORAL at 19:08

## 2022-01-11 RX ADMIN — ACETAMINOPHEN 650 MG: 325 TABLET, FILM COATED ORAL at 10:45

## 2022-01-11 ASSESSMENT — ACTIVITIES OF DAILY LIVING (ADL)
DRESS: INDEPENDENT
HYGIENE/GROOMING: INDEPENDENT
ORAL_HYGIENE: INDEPENDENT

## 2022-01-11 NOTE — PLAN OF CARE
Problem: Substance Withdrawal  Goal: Substance Withdrawal  Description: Signs and symptoms of listed problems will be absent or manageable.  Outcome: Improving  Flowsheets (Taken 1/11/2022 1230)  Substance Withdrawal Assessed: all  Substance Withdrawal Present: anxiety   Continues in detox status on benzodiazepine withdrawal protocol. MSSA scores 3 and 2. Phenobarbital taper to start today. Appetite good. Fluids encouraged. Social with peers and staff on unit. Pleasant and cooperative. Denies thoughts of self harm. Will continue to monitor.

## 2022-01-11 NOTE — PROGRESS NOTES
[facility], [place]   Psychiatric Progress Note        Interim history   This is a 46 year old female with MDD, recurrent, severe without psychotic features  Passive suicidal ideation, resolved  PTSD  Anxiety Disorder, unspecified  R/O Panic Disorder  Cluster B traits, R/O Borderline Personality Disorder  Sedative Hypnotic Use Disorder, severe, in withdrawal  Opiate Use Disorder, severe, on methadone maintenance  Amphetamine Use Disorder, severe  Nicotine Use Disorder, moderate to severe  History of Hep C.Pt seen in rounds.   The patient's care was discussed with the treatment team during the daily team meeting and/or staff's chart notes were reviewed.  Staff report patient has been visible in the milieu,  no acute eventsovernight.     Patient's mood is better  Energy Level:MODERATE  Sleep:No concerns, sleeps well through night  Appetite:normal motivation interest improving   Denied Suicidal/homicidal ideation/plan intent.  Denied psychosis   prior suicde attempts 2004 lithium overdose  No access to gun  Pt is in benzo  withdrawal still being monitered every 4 hrs for it,   Pt mssa score are monitered  Tolerating meds and has no side effects.              Medications:     Current Facility-Administered Medications   Medication     acetaminophen (TYLENOL) tablet 650 mg     albuterol (PROVENTIL HFA/VENTOLIN HFA) inhaler     alum & mag hydroxide-simethicone (MAALOX) suspension 30 mL     cloNIDine (CATAPRES) tablet 0.1 mg     dicyclomine (BENTYL) capsule 20 mg     gabapentin (NEURONTIN) tablet 1,600 mg     gabapentin (NEURONTIN) tablet 800 mg     hydrOXYzine (ATARAX) tablet 25-50 mg     ibuprofen (ADVIL/MOTRIN) tablet 600 mg     loperamide (IMODIUM) capsule 2 mg     loperamide (IMODIUM) capsule 2 mg     melatonin tablet 3 mg     methadone (DOLPHINE) solution 150 mg     methocarbamol (ROBAXIN) tablet 500 mg     naloxone (NARCAN) injection 0.2 mg    Or     naloxone (NARCAN) injection 0.4 mg     "Or     naloxone (NARCAN) injection 0.2 mg    Or     naloxone (NARCAN) injection 0.4 mg     ondansetron (ZOFRAN-ODT) ODT tab 4 mg     PHENobarbital (LUMINAL) tablet 64.8 mg     senna-docusate (SENOKOT-S/PERICOLACE) 8.6-50 MG per tablet 1 tablet     vilazodone (VIIBRYD) tablet 20 mg             Allergies:     Allergies   Allergen Reactions     Abilify [Aripiprazole] Other (See Comments)     Tardive dyskinesia     Allopurinol      Compazine Other (See Comments)     Dystonia     Dramamine      Jaw lock.       Olanzapine Other (See Comments)     Tardive dyskinesia from Zyprexa - per patient     Reglan Other (See Comments)     dystonia     Seroquel [Quetiapine] Other (See Comments)     Tardive dyskinesia.             Psychiatric Examination:   Blood pressure 105/73, pulse 62, temperature 97.4  F (36.3  C), temperature source Temporal, resp. rate 16, height 1.651 m (5' 5\"), weight 102.1 kg (225 lb), SpO2 95 %, not currently breastfeeding.  Weight is 225 lbs 0 oz  Body mass index is 37.44 kg/m .    Appearance:  awake, alert and adequately groomed  Attitude:  cooperative  Eye Contact:  good  Mood:  better  Affect:  appropriate and in normal range and mood congruent  Speech:  clear, coherent rate /rhythm are good  Psychomotor Behavior:  no evidence of tardive dyskinesia, dystonia, or tics and intact station, gait and muscle tone  Throught Process:  logical  Associations:  no loose associations  Thought Content:  no evidence of suicidal ideation or homicidal ideation, no evidence of psychotic thought, no auditory hallucinations present and no visual hallucinations present  Insight:  good  Judgement:  intact  Oriented to:  time, person, and place  Attention Span and Concentration:  intact  Recent and Remote Memory:  intact  Language fund of knowledge are adequate         Labs:     No results found for: NTBNPI, NTBNP  Lab Results   Component Value Date    WBC 9.4 01/08/2022    HGB 13.6 01/08/2022    HCT 41.5 01/08/2022    MCV 89 " 01/08/2022     01/08/2022     Lab Results   Component Value Date    INR 1.03 03/19/2019         DX MDD, recurrent, severe without psychotic features  Passive suicidal ideation, resolved  PTSD  Anxiety Disorder, unspecified  R/O Panic Disorder  Cluster B traits, R/O Borderline Personality Disorder  Sedative Hypnotic Use Disorder, severe, in withdrawal  Opiate Use Disorder, severe, on methadone maintenance  Amphetamine Use Disorder, severe  Nicotine Use Disorder, moderate to severe  History of Hep C     PLAN    - Continue MSSA protocol  - Phenobarbital 64.8 mg TID  Start taper tomorrow    MSSA    Eating Disturbances: ate and enjoyed all of it or not applicable  Tremor: 0 - no tremor  Sleep Disturbance: slept through the night or not applicable  Clouding of Sensorium: no evidence  Hallucinations: 0 - none  Quality of Contact: 0 - awareness of examiner and people around him/her  Agitation: 1 - somewhat more than normal activity  Paroxysmal Sweats: 1 - barely perceptible sweating  Temperature: 99.5 or below  Pulse: 0 - 69 or below  Total MSSA Score: 3  Depression/Anxiety:  - Continue Viibryd 20 mg daily (increased in [facility] unit). May consider increasing to 40 mg daily prior to discharge if ongoing depressive symptoms  - Continue PTA Gabapentin 800 mg BID and 1600 mg at bedtime for now  - Continue hydroxyzine 25-50 mg q4h prn for acute anxiety  - Continue Trazodone 50 mg at bedtime prn for sleep disturbances       Opiate Use Disorder, severe, on methadone maintenance:  Methadone dose confirmed 150 mg   Laboratory/Imaging: reviewed with patient   Consults: internal medicine consult reviewed  Patient will be treated in therapeutic milieu with appropriate individual and group therapies as described.  PDMP CHECKED     Supportive psychotheraoy provided, nicko talked about recovery enviroment, relapse prevention, triggers to use.  Discussed with patient many issues of addiction,triggers, relapse, and establishing a solid  recovery program.  Asked pt to be med complinat   Medical diagnoses to be addressed this admission:    Plan:       Assessment & Recommendations  Jihan Gill is a 46 year old woman with a past medical history of depression and opiate use disorder who is admitted to station 3A with benzodiazepine withdrawal.        Benzodiazepine Withdrawal - Patient buying benzodiazepines off the street, supplementing her prescription.  Patient believes she is using 10-15mg daily.   -Management per psychiatry team.      Depression/Anxiety  Suicidal Ideations - Managed with Viibryd 20mg daily   -management per psychiatry team.      Opiate Use Disorder - Managed with methadone 150mg daily.  Patient was supposed to receive half of methadone dose prior to transfer, until pharmacy can confirm PTA dose.  Unfortunately, medication not administered prior to transfer  -Provide 70mg now      Chronic Hepatitis C - s/p treatment May 2021. HCV quant negative as of September 2021.  Negative for Hepatitis B.      Medicine will sign off, please page with any additional concerns.      Legal Status: voluntary    Safety Assessment:   Checks:  15 min  Precautions: withdrawal precautions  Pt has not required locked seclusion or restraints in the past 24 hours to maintain safety, please refer to RN documentation for further details.  Discussed with patient many issues of addiction,triggers, relapse, and establishing a solid recovery program.  Able to give informed consent:  YES   Discussed Risks/Benefits/Side Effects/Alternatives: YES    After discussion of the indications, risks, benefits, alternatives and consequences of no treatment, the patient elects to complete detox and do trt

## 2022-01-11 NOTE — PLAN OF CARE
Patient has remained in her room sleeping for the entire shift.  No concerns reported.  Continue with care plan.

## 2022-01-11 NOTE — PLAN OF CARE
Problem: Substance Withdrawal  Goal: Substance Withdrawal  Description: Signs and symptoms of listed problems will be absent or manageable.  Outcome: Improving     Problem: Behavioral Health Plan of Care  Goal: Plan of Care Review  Outcome: Improving  Flowsheets (Taken 1/10/2022 1600)  Plan of Care Reviewed With: patient  Patient Agreement with Plan of Care: agrees  Patient is alert and oriented x 4, she is independent of cares and all Adls. Patient is cooperative, calm, her affect was appropriate. Patient was visible at the lounge, she was sociable, watched TV and engaged appropriately with peers. Patient c/o being sweaty and general pain, she requested for tylenol, prn which was effective. Patient stated that she was anxious and sad, at scale of 6/10, hydroxyzine 50 mg prn given. Patient also wanted to know when she will discharge, writer explained that it will be determined but as at now it is on hold. Patient had a good appetite, she is medication compliant, no stated side effects. Patient denies SI/HISIB and no AVH. Patient continues withdraw monitoring, her MSSA was 3 and 3, did not meet criteria for prn. Patient has no other concerns, vs's, will continue to monitor.

## 2022-01-12 PROCEDURE — 258N000003 HC RX IP 258 OP 636: Performed by: PSYCHIATRY & NEUROLOGY

## 2022-01-12 PROCEDURE — 250N000013 HC RX MED GY IP 250 OP 250 PS 637: Performed by: PSYCHIATRY & NEUROLOGY

## 2022-01-12 PROCEDURE — 91300 HC RX IP 250 OP 636: CPT | Performed by: PSYCHIATRY & NEUROLOGY

## 2022-01-12 PROCEDURE — 99232 SBSQ HOSP IP/OBS MODERATE 35: CPT | Performed by: PSYCHIATRY & NEUROLOGY

## 2022-01-12 PROCEDURE — 250N000011 HC RX IP 250 OP 636: Performed by: PSYCHIATRY & NEUROLOGY

## 2022-01-12 PROCEDURE — H2032 ACTIVITY THERAPY, PER 15 MIN: HCPCS

## 2022-01-12 PROCEDURE — 0002A HC ADMIN COVID VAC PFIZER, 2ND DOSE: CPT | Performed by: PSYCHIATRY & NEUROLOGY

## 2022-01-12 PROCEDURE — 128N000001 HC R&B CD/MH ADULT

## 2022-01-12 RX ORDER — VILAZODONE HYDROCHLORIDE 10 MG/1
20 TABLET ORAL DAILY
Qty: 60 TABLET | Refills: 0 | Status: SHIPPED | OUTPATIENT
Start: 2022-01-12 | End: 2022-03-22

## 2022-01-12 RX ORDER — DIPHENHYDRAMINE HCL 50 MG
50 CAPSULE ORAL
Status: DISCONTINUED | OUTPATIENT
Start: 2022-01-12 | End: 2022-01-13 | Stop reason: HOSPADM

## 2022-01-12 RX ORDER — GABAPENTIN 800 MG/1
800 TABLET ORAL 2 TIMES DAILY
Qty: 120 TABLET | Refills: 0 | Status: SHIPPED | OUTPATIENT
Start: 2022-01-12 | End: 2022-08-22

## 2022-01-12 RX ORDER — METHOCARBAMOL 500 MG/1
500 TABLET, FILM COATED ORAL 3 TIMES DAILY PRN
Qty: 30 TABLET | Refills: 0 | Status: ON HOLD | OUTPATIENT
Start: 2022-01-12 | End: 2023-04-05

## 2022-01-12 RX ORDER — LANOLIN ALCOHOL/MO/W.PET/CERES
3 CREAM (GRAM) TOPICAL
Qty: 30 TABLET | Refills: 0 | Status: ON HOLD | OUTPATIENT
Start: 2022-01-12 | End: 2023-04-05

## 2022-01-12 RX ORDER — DIPHENHYDRAMINE HYDROCHLORIDE 50 MG/ML
50 INJECTION INTRAMUSCULAR; INTRAVENOUS
Status: DISCONTINUED | OUTPATIENT
Start: 2022-01-12 | End: 2022-01-13 | Stop reason: HOSPADM

## 2022-01-12 RX ORDER — ALBUTEROL SULFATE 90 UG/1
1-2 AEROSOL, METERED RESPIRATORY (INHALATION) EVERY 6 HOURS PRN
Qty: 18 G | Refills: 0 | Status: ON HOLD | OUTPATIENT
Start: 2022-01-12 | End: 2023-04-05

## 2022-01-12 RX ORDER — PHENOBARBITAL 32.4 MG/1
32.4 TABLET ORAL EVERY MORNING
Status: COMPLETED | OUTPATIENT
Start: 2022-01-13 | End: 2022-01-13

## 2022-01-12 RX ORDER — PHENOBARBITAL 32.4 MG/1
32.4 TABLET ORAL AT BEDTIME
Qty: 6 TABLET | Refills: 0 | Status: SHIPPED | OUTPATIENT
Start: 2022-01-12 | End: 2022-03-22

## 2022-01-12 RX ADMIN — PHENOBARBITAL 64.8 MG: 64.8 TABLET ORAL at 08:54

## 2022-01-12 RX ADMIN — ACETAMINOPHEN 650 MG: 325 TABLET, FILM COATED ORAL at 08:57

## 2022-01-12 RX ADMIN — GABAPENTIN 800 MG: 800 TABLET ORAL at 08:54

## 2022-01-12 RX ADMIN — PHENOBARBITAL 97.2 MG: 64.8 TABLET ORAL at 20:15

## 2022-01-12 RX ADMIN — GABAPENTIN 1600 MG: 800 TABLET ORAL at 20:11

## 2022-01-12 RX ADMIN — ACETAMINOPHEN 650 MG: 325 TABLET, FILM COATED ORAL at 13:50

## 2022-01-12 RX ADMIN — RNA INGREDIENT BNT-162B2 0.3 ML: 0.23 INJECTION, SUSPENSION INTRAMUSCULAR at 11:46

## 2022-01-12 RX ADMIN — VILAZODONE HYDROCHLORIDE 20 MG: 10 TABLET ORAL at 08:54

## 2022-01-12 RX ADMIN — GABAPENTIN 800 MG: 800 TABLET ORAL at 13:51

## 2022-01-12 RX ADMIN — METHADONE HYDROCHLORIDE 150 MG: 5 SOLUTION ORAL at 08:54

## 2022-01-12 ASSESSMENT — ACTIVITIES OF DAILY LIVING (ADL)
LAUNDRY: WITH SUPERVISION
HYGIENE/GROOMING: INDEPENDENT
ORAL_HYGIENE: INDEPENDENT
DRESS: INDEPENDENT
HYGIENE/GROOMING: INDEPENDENT
ORAL_HYGIENE: INDEPENDENT
DRESS: INDEPENDENT

## 2022-01-12 NOTE — DISCHARGE INSTRUCTIONS
Behavioral Discharge Planning and Instructions  THANK YOU FOR CHOOSING THE [facility]  3A  [phone]    Summary: You were admitted to Station 3A on 1/9/2022 for detoxification from benzodiazepines.  A medical exam was performed that included lab work. You have met with a  and opted to return to [facility] to their out patient program at the [place] location.  Please take care and make your recovery a priority, Jihan!    Major Treatments, Procedures and Findings:  You have withdrawn from benzodiazepines  using phenobarital.  You have met with a  to develop a treatment plan for discharge.  You have had labs drawn and those results have been reviewed with you. Please take a copy of your lab work with you to your next primary care physician appointment.    Main Diagnoses: Per Dr. 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    Disposition: Home    Primary Provider:  [facility] [facility]    [address] [facility].    [place], [place] [id number] [phone]   Valentine Spaulding, DO     Please follow up with primary MD  within 30 days for post hospitalization checkup.    Follow up:  [facility]  [address] [facility], [place], [place] [id number] [phone]    Symptoms to Report:  If you experience more anxiety, confusion, sleeplessness, deep sadness or thoughts of suicide, notify your treatment team or notify your primary care physician. IF ANY OF THE SYMPTOMS YOU ARE EXPERIENCING ARE A MEDICAL EMERGENCY CALL 911 IMMEDIATELY.  "    Lifestyle Adjustment: Adjust your lifestyle to get enough sleep, relaxation, exercise and  good nutrition. Continue to develop healthy coping skills to decrease stress and promote a sober living environment. Do not use alcohol, illegal drugs or addictive medications other than what is currently prescribed. AA, NA, and  Sponsor are excellent resources for support.       Facts about COVID19 at www.cdc.gov/COVID19 and www.[place].gov/covid19    Keeping hands clean is one of the most important steps we can take to avoid getting sick and spreading germs to others.  Please wash your hands frequently and lather with soap for at least 20 seconds!    Resources:     Resources for on line recovery meetings:  *due to covid-19 AA/NA meetings are being held online*  AA meetings can be found online; search for them at: http://aa-intergroup.org/directory.php  AA meetings via [place] for [place] area can be found online at: https://aaRetrofit AmericaneapolApparcando.org/find-a-meeting/holiday-closings/  NA meetings via [place] for [place] area can be found online at: https://sites.[employer].com/view/mnregionofnarcoticsanonymous/home?authuser=2  Www.[employer]  has online resources for meeting and recovery care including Podcast \"Let's Talk:Addiction & Recovery Podcasts  Www.mnrecMapplas.org     DISCHARGE RESOURCES:  -SMART Recovery - self management for addiction recovery:  www.smartrecovery.org    -Pathways ~ A Health Crisis Resource & Support Center: 622.120.7509.  -[facility] [phone]   -[facility] Behavioral Intake 577-521-6320 or [phone].  -Suicide Awareness Voices of Education (SAVE) (www.save.org): 862-169-SGSS (3727)  -National Suicide Prevention Line (www.mentalhealthmn.org): 834-187-FWDS (5796)  -National [place] on Mental Illness (www.[place].zunilda.org): 844.292.1362 or 300-683-0558.  -Keyo9tviq: text the word LIFE to 04142 for immediate support and crisis intervention  -Mental Health Consumer/Survivor Network of [place] " (www.[facility].net): 700.481.6529 or 510-226-2393  -Mental Health Association of [place] (www.mentalhealth.org): 892.277.4895 or 804-706-1819     -Substance Abuse and Mental Health Services (www.samhsa.gov)  -Harm Reduction Coalition (www. Harmreduction.org)  -www.prescribetoprevent.org or http://prescribetoprevent.org/video  -Poison control 2-281-821-1544   **[place] Opioid Prevention Coalition: www.opioidcoalition.org    Sober Support Group Information:  AA/NA & Sponsor/Support  -Alcoholics Anonymous (www.alcoholics-anonymous.org): for local information 24 hours/day  -AA Intergroup service office in [place] (http://www.aastpaul.org/) 457.971.1734  -AA Intergroup service office in [facility]: 444.367.8153. (http://www.aaminneaDepartment of Health and Human Servicesis.org/)  -Narcotics Anonymous (www.naminn[place].org) (754) 689-2881   **Sober Fun Activities: www.soberHighconactivities.[employer]/[facility]//[facility] Recovery Connection ([facility])  [facility] connects people seeking recovery to resources that help foster and sustain long-term recovery.  Whether you are seeking resources for treatment, transportation, housing, job training, education, health care or other pathways to recovery, [facility] is a great place to start.    Phone: 643.212.1722.  www.min[place]otaCanvace.[employer] (Great listing of all types of recovery and non-recovery related resources)    General Medication Instructions:   See your medication sheet(s) for instructions.   Take all medicines as directed.  Make no changes unless your doctor suggests them.     Any follow up concerns:  Nursing questions call the [facility] [phone]  Medical Record call [phone]  Outpatient Behavioral Intake call [phone]  LP+ Wait List/Bed Availability call [phone]    The entire treatment team has appreciated the opportunity to work with you Jihan.  We wish you the best in the future and with your lifelong recovery goals. Please bring this discharge folder with you to all follow up  appointments.  It contains your lab results, diagnosis, medication list and discharge recommendations.    THANK YOU FOR CHOOSING THE [facility]

## 2022-01-12 NOTE — PLAN OF CARE
"Pt continues to be monitored for benzodiazepine withdrawal. MSSA = 1 this shift.  Pt denies withdrawal symptoms. She is visible in the lounge. She is pleasant and cooperative. She denies SI/SIB/HI. She is currently on a phenobarbital taper.  Plan is to discharge to home tomorrow.  /75   Pulse 75   Temp 97.4  F (36.3  C) (Temporal)   Resp 16   Ht 1.651 m (5' 5\")   Wt 102.1 kg (225 lb)   SpO2 96%   BMI 37.44 kg/m      "

## 2022-01-12 NOTE — PROGRESS NOTES
[facility], [place]   Psychiatric Progress Note        Interim history   This is a 46 year old female with MDD, recurrent, severe without psychotic features  Passive suicidal ideation, resolved  PTSD  Anxiety Disorder, unspecified  R/O Panic Disorder  Cluster B traits, R/O Borderline Personality Disorder  Sedative Hypnotic Use Disorder, severe, in withdrawal  Opiate Use Disorder, severe, on methadone maintenance  Amphetamine Use Disorder, severe  Nicotine Use Disorder, moderate to severe  History of Hep C.Pt seen in rounds.   The patient's care was discussed with the treatment team during the daily team meeting and/or staff's chart notes were reviewed.  Staff report patient has been visible in the milieu,  no acute eventsovernight.     Patient's mood is better  Energy Level:MODERATE  Sleep:No concerns, sleeps well through night  Appetite:normal motivation interest improving   Denied Suicidal/homicidal ideation/plan intent.  Denied psychosis   prior suicde attempts 2004 lithium overdose  No access to gun  Pt is in benzo  withdrawal still being monitered every 4 hrs for it,   Pt mssa score are monitered  Tolerating meds and has no side effects.              Medications:     Current Facility-Administered Medications   Medication     acetaminophen (TYLENOL) tablet 650 mg     albuterol (PROVENTIL HFA/VENTOLIN HFA) inhaler     alum & mag hydroxide-simethicone (MAALOX) suspension 30 mL     cloNIDine (CATAPRES) tablet 0.1 mg     dicyclomine (BENTYL) capsule 20 mg     gabapentin (NEURONTIN) tablet 1,600 mg     gabapentin (NEURONTIN) tablet 800 mg     hydrOXYzine (ATARAX) tablet 25-50 mg     ibuprofen (ADVIL/MOTRIN) tablet 600 mg     loperamide (IMODIUM) capsule 2 mg     loperamide (IMODIUM) capsule 2 mg     melatonin tablet 3 mg     methadone (DOLPHINE) solution 150 mg     methocarbamol (ROBAXIN) tablet 500 mg     naloxone (NARCAN) injection 0.2 mg    Or     naloxone (NARCAN) injection 0.4 mg     "Or     naloxone (NARCAN) injection 0.2 mg    Or     naloxone (NARCAN) injection 0.4 mg     ondansetron (ZOFRAN-ODT) ODT tab 4 mg     PHENobarbital (LUMINAL) tablet 64.8 mg     senna-docusate (SENOKOT-S/PERICOLACE) 8.6-50 MG per tablet 1 tablet     vilazodone (VIIBRYD) tablet 20 mg             Allergies:     Allergies   Allergen Reactions     Abilify [Aripiprazole] Other (See Comments)     Tardive dyskinesia     Allopurinol      Compazine Other (See Comments)     Dystonia     Dramamine      Jaw lock.       Olanzapine Other (See Comments)     Tardive dyskinesia from Zyprexa - per patient     Reglan Other (See Comments)     dystonia     Seroquel [Quetiapine] Other (See Comments)     Tardive dyskinesia.             Psychiatric Examination:   Blood pressure 96/64, pulse 61, temperature 97.4  F (36.3  C), temperature source Temporal, resp. rate 16, height 1.651 m (5' 5\"), weight 102.1 kg (225 lb), SpO2 96 %, not currently breastfeeding.  Weight is 225 lbs 0 oz  Body mass index is 37.44 kg/m .    Appearance:  awake, alert and adequately groomed  Attitude:  cooperative  Eye Contact:  good  Mood:  better  Affect:  appropriate and in normal range and mood congruent  Speech:  clear, coherent rate /rhythm are good  Psychomotor Behavior:  no evidence of tardive dyskinesia, dystonia, or tics and intact station, gait and muscle tone  Throught Process:  logical  Associations:  no loose associations  Thought Content:  no evidence of suicidal ideation or homicidal ideation, no evidence of psychotic thought, no auditory hallucinations present and no visual hallucinations present  Insight:  good  Judgement:  intact  Oriented to:  time, person, and place  Attention Span and Concentration:  intact  Recent and Remote Memory:  intact  Language fund of knowledge are adequate         Labs:     No results found for: NTBNPI, NTBNP  Lab Results   Component Value Date    WBC 9.4 01/08/2022    HGB 13.6 01/08/2022    HCT 41.5 01/08/2022    MCV 89 " "01/08/2022     01/08/2022     Lab Results   Component Value Date    INR 1.03 03/19/2019         DX MDD, recurrent, severe without psychotic features  Passive suicidal ideation, resolved  PTSD  Anxiety Disorder, unspecified  R/O Panic Disorder  Cluster B traits, R/O Borderline Personality Disorder  Sedative Hypnotic Use Disorder, severe, in withdrawal  Opiate Use Disorder, severe, on methadone maintenance  Amphetamine Use Disorder, severe  Nicotine Use Disorder, moderate to severe  History of Hep C     PLAN    - Continue MSSA protocol  -We will start phenobarbital taper phenobarbital 64.8 mg 90 mg at bedtime    Patient wants to take covid \"booster it was ordered    Patient is restricted medications ordered for discharge     MSSA    Eating Disturbances: ate and enjoyed all of it or not applicable  Tremor: 0 - no tremor  Sleep Disturbance: slept through the night or not applicable  Clouding of Sensorium: no evidence  Hallucinations: 0 - none  Quality of Contact: 0 - awareness of examiner and people around him/her  Agitation: 0 - normal activity  Paroxysmal Sweats: 0 - no observed sweating  Temperature: 99.5 or below  Pulse: 0 - 69 or below  Total MSSA Score: 1  Depression/Anxiety:  - Continue Viibryd 20 mg daily (increased in [facility] unit). May consider increasing to 40 mg daily prior to discharge if ongoing depressive symptoms  - Continue PTA Gabapentin 800 mg BID and 1600 mg at bedtime for now  - Continue hydroxyzine 25-50 mg q4h prn for acute anxiety  - Continue Trazodone 50 mg at bedtime prn for sleep disturbances       Opiate Use Disorder, severe, on methadone maintenance:  Methadone dose confirmed 150 mg   Laboratory/Imaging: reviewed with patient   Consults: internal medicine consult reviewed  Patient will be treated in therapeutic milieu with appropriate individual and group therapies as described.  PDMP CHECKED     Supportive psychotheraoy provided, nicko talked about recovery enviroment, relapse " prevention, triggers to use.  Discussed with patient many issues of addiction,triggers, relapse, and establishing a solid recovery program.  Asked pt to be med complinat   Medical diagnoses to be addressed this admission:    Plan:       Assessment & Recommendations  Jihan Gill is a 46 year old woman with a past medical history of depression and opiate use disorder who is admitted to station [facility]A with benzodiazepine withdrawal.        Benzodiazepine Withdrawal - Patient buying benzodiazepines off the street, supplementing her prescription.  Patient believes she is using 10-15mg daily.   -Management per psychiatry team.      Depression/Anxiety  Suicidal Ideations - Managed with Viibryd 20mg daily   -management per psychiatry team.      Opiate Use Disorder - Managed with methadone 150mg daily.  Patient was supposed to receive half of methadone dose prior to transfer, until pharmacy can confirm PTA dose.  Unfortunately, medication not administered prior to transfer  -Provide 70mg now      Chronic Hepatitis C - s/p treatment May 2021. HCV quant negative as of September 2021.  Negative for Hepatitis B.      Medicine will sign off, please page with any additional concerns.      Legal Status: voluntary    Safety Assessment:   Checks:  15 min  Precautions: withdrawal precautions  Pt has not required locked seclusion or restraints in the past 24 hours to maintain safety, please refer to RN documentation for further details.  Discussed with patient many issues of addiction,triggers, relapse, and establishing a solid recovery program.  Able to give informed consent:  YES   Discussed Risks/Benefits/Side Effects/Alternatives: YES    After discussion of the indications, risks, benefits, alternatives and consequences of no treatment, the patient elects to complete detox and do trt

## 2022-01-12 NOTE — PROGRESS NOTES
Pt was given the Pfizer Vaccine in the right arm patient education done . Pt monitor for 1/2 hour no adverse reaction noted .

## 2022-01-12 NOTE — PROGRESS NOTES
"Jihan  attended a Life Skills group this evening that involved sharing reflections according to question prompts. Discussed the importance of her son in her life and her recent choice to reach out for help (through the detox unit), rather than pretend like \"everything's ok.\"     01/11/22 1900   Occupational Therapy   Type of Intervention structured groups   Response Initiates, socially acceptable   Hours 1       "

## 2022-01-12 NOTE — PLAN OF CARE
Pt is monitored for benzodiazepine withdrawal, MSSA of 2, 2. Pt received scheduled phenobarb.    Pt visible in milieu, denied SI

## 2022-01-12 NOTE — PLAN OF CARE
Problem: Substance Withdrawal  Goal: Substance Withdrawal  Description: Signs and symptoms of listed problems will be absent or manageable.  Outcome: No Change     Behavioral  Pt appeared sleeping comfortably overnight; breathing was even and unlabored.      Medical  Pt continues in benzodiazepine withdrawl.

## 2022-01-13 VITALS
WEIGHT: 225 LBS | HEIGHT: 65 IN | BODY MASS INDEX: 37.49 KG/M2 | RESPIRATION RATE: 16 BRPM | TEMPERATURE: 97.5 F | OXYGEN SATURATION: 96 % | DIASTOLIC BLOOD PRESSURE: 72 MMHG | HEART RATE: 68 BPM | SYSTOLIC BLOOD PRESSURE: 110 MMHG

## 2022-01-13 PROCEDURE — 250N000013 HC RX MED GY IP 250 OP 250 PS 637: Performed by: PSYCHIATRY & NEUROLOGY

## 2022-01-13 PROCEDURE — 99239 HOSP IP/OBS DSCHRG MGMT >30: CPT | Performed by: PSYCHIATRY & NEUROLOGY

## 2022-01-13 RX ADMIN — METHADONE HYDROCHLORIDE 150 MG: 5 SOLUTION ORAL at 08:44

## 2022-01-13 RX ADMIN — ACETAMINOPHEN 650 MG: 325 TABLET, FILM COATED ORAL at 08:40

## 2022-01-13 RX ADMIN — GABAPENTIN 800 MG: 800 TABLET ORAL at 08:38

## 2022-01-13 RX ADMIN — PHENOBARBITAL 32.4 MG: 32.4 TABLET ORAL at 08:38

## 2022-01-13 RX ADMIN — VILAZODONE HYDROCHLORIDE 20 MG: 10 TABLET ORAL at 08:38

## 2022-01-13 NOTE — PLAN OF CARE
Problem: Substance Withdrawal  Goal: Substance Withdrawal  Description: Signs and symptoms of listed problems will be absent or manageable.  Outcome: Improving     Patient appeared to have slept for 6.25 hours. MSSA 1. No valium given. No complaint by patient and no psychiatric issues noted. Safety checks done. Will continue to monitor.

## 2022-01-13 NOTE — PLAN OF CARE
"  Problem: Substance Withdrawal  Goal: Substance Withdrawal  Description: Signs and symptoms of listed problems will be absent or manageable.  Outcome: Improving  Patient communicate needs appropriately, denies pain and discomfort. Participated in group and has been visible on the unit. Ate 100% of meal and no nausea or vomiting. Rated anxiety 3/10 and depression 2/10, no auditory or visual hallucination, received schedule Gabapentin and phenobarbital.  Contracted for safety, no attempts to elope from unit.   MSSA: 1 and 1  No prn medication given, denies SI/SIB/HI and medication compliant.    /74 (BP Location: Left arm)   Pulse 64   Temp 97.3  F (36.3  C) (Temporal)   Resp 16   Ht 1.651 m (5' 5\")   Wt 102.1 kg (225 lb)   SpO2 96%   BMI 37.44 kg/m         "

## 2022-01-13 NOTE — PLAN OF CARE
"  Problem: General Rehab Plan of Care  Goal: Therapeutic Recreation/Music Therapy Goal  Description: The patient and/or their representative will achieve their patient-specific goals related to the plan of care.  The patient-specific goals include:  Outcome: No Change     Jihan attended art therapy group for 1 hour (4 patients total). She reported feeling \"good\" today. 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 She participated in the art activity to make two drawings- one of how your life might look a year from now if you do make changes and another drawing of how your life might look a year from now if you don't make changes. Jihan's drawing expressed a lot of emotion. She shared with the group and talked about the dark feelings that come up when she's using and how she feels much more happy when she is sober. She interacted appropriately, made supportive comments to peers, and was a positive participant.   "

## 2022-01-13 NOTE — DISCHARGE SUMMARY
Jihan Gill MRN# [id number]   Age: 46 year old YOB: 1975     Date of Admission:  1/9/2022  Date of Discharge:  1/13/2022  Admitting Physician:  Bahman Sol MD  Discharge Physician:  Bahman Sol MD      DISCHARGE  DX    MDD, recurrent, severe without psychotic features    PTSD  Anxiety Disorder, unspecified  R/O Panic Disorder  Cluster B traits, R/O Borderline Personality Disorder  Sedative Hypnotic Use Disorder, severe,   Opiate Use Disorder, severe, on methadone maintenance  Amphetamine Use Disorder, severe  Nicotine Use Disorder, moderate to severe  History of Hep C         Event Leading to Hospitalization:     See Admission note by admitting provider for patient encounter. for additional details.          Hospital Course:   PATIENT was admitted to Station [facility]with attending  Alea Castelan MD DR veluvali, please review the detailed admit note on 1/9/22   The patient was placed under status 15 (15 minute checks) to ensure patient safety.  Patient was started on a phenobarbital taper and she started the taper and is tolerating the taper  She was continued on methadone maintenance  Viibryd was continued 20 mg this medication was new medication she started taking it and it helped her mood  - Continue PTA Gabapentin 800 mg BID and 1600 mg at bedtime for now  MSSA protocol was initiated due to the patient's history of alcohol abuse and concern for withdrawal symptoms.  CBC, BMP and utox obtained.    All outpatient medications were continued    PATIENTdid participate in groups and was visible in the milieu.     The patient's symptoms of  improved.     Patients energy motivation , sleep appetite improved.  Pt completed detox . It was un eventful.    - Continue Viibryd 20 mg daily- Continue PTA Gabapentin 800 mg BID and 1600 mg at bedtime for now    Discussed with patient medications for craving.  Spoke with patient about triggers coping skills relapse  prevention.    CONSULTS DONE DURING PATIENTS HOSPITALIZATION.  Patient was seen by medicine on date1/9/22    This as per their medical consult  Assessment & Recommendations  Jihan Gill is a 46 year old woman with a past medical history of depression and opiate use disorder who is admitted to station 3A with benzodiazepine withdrawal.        Benzodiazepine Withdrawal - Patient buying benzodiazepines off the street, supplementing her prescription.  Patient believes she is using 10-15mg daily.   -Management per psychiatry team.      Depression/Anxiety  Suicidal Ideations - Managed with Viibryd 20mg daily   -management per psychiatry team.      Opiate Use Disorder - Managed with methadone 150mg daily.  Patient was supposed to receive half of methadone dose prior to transfer, until pharmacy can confirm PTA dose.  Unfortunately, medication not administered prior to transfer  -Provide 70mg now      Chronic Hepatitis C - s/p treatment May 2021. HCV quant negative as of September 2021.  Negative for Hepatitis B.      Medicine will sign off, please page with any additional concerns.              Pt was seen by cm  As per recommendations from cm    Writer met with patient on 1/10/22 to discuss aftercare plans. Patient reported that she is registered with [facility] at the [facility]. Patient signed an JOSE and writer was able to verify that that the patient has an appointment with her counselor Whitney Morgan ([phone]) scheduled on 1/11/22 at 7 am and is able to reschedule if patient is still in detox. Patient also receives psychiatry services from [facility].     Patient was encouraged to set up an appointment with her primary care provider, since she stated she had not seen her primary in over a year. Patient stated that she will set up an appointment with her as soon as she can. AVS is updated.         Labs:reviewed with patient     No results found for this or any previous visit (from the past 48  hour(s)).      Recent Results (from the past 240 hour(s))   Asymptomatic COVID-19 Virus (Coronavirus) by PCR Nasopharyngeal    Collection Time: 01/08/22 11:55 AM    Specimen: Nasopharyngeal; Swab   Result Value Ref Range    SARS CoV2 PCR Negative Negative   Drug abuse screen 77 urine (FL, RH, SH)    Collection Time: 01/08/22  4:48 PM   Result Value Ref Range    Amphetamines Urine Screen Positive (A) Screen Negative    Barbiturates Urine Screen Negative Screen Negative    Benzodiazepines Urine Screen Negative Screen Negative    Cannabinoids Urine Screen Negative Screen Negative    Cocaine Urine Screen Negative Screen Negative    Opiates Urine Screen Negative Screen Negative    PCP Urine Screen Negative Screen Negative   Comprehensive metabolic panel    Collection Time: 01/08/22  4:52 PM   Result Value Ref Range    Sodium 137 133 - 144 mmol/L    Potassium 4.1 3.4 - 5.3 mmol/L    Chloride 106 94 - 109 mmol/L    Carbon Dioxide (CO2) 28 20 - 32 mmol/L    Anion Gap 3 3 - 14 mmol/L    Urea Nitrogen 9 7 - 30 mg/dL    Creatinine 0.63 0.52 - 1.04 mg/dL    Calcium 9.8 8.5 - 10.1 mg/dL    Glucose 118 (H) 70 - 99 mg/dL    Alkaline Phosphatase 135 40 - 150 U/L    AST 22 0 - 45 U/L    ALT 25 0 - 50 U/L    Protein Total 7.9 6.8 - 8.8 g/dL    Albumin 3.5 3.4 - 5.0 g/dL    Bilirubin Total 0.3 0.2 - 1.3 mg/dL    GFR Estimate >90 >60 mL/min/1.73m2   CBC with platelets and differential    Collection Time: 01/08/22  4:52 PM   Result Value Ref Range    WBC Count 9.4 4.0 - 11.0 10e3/uL    RBC Count 4.66 3.80 - 5.20 10e6/uL    Hemoglobin 13.6 11.7 - 15.7 g/dL    Hematocrit 41.5 35.0 - 47.0 %    MCV 89 78 - 100 fL    MCH 29.2 26.5 - 33.0 pg    MCHC 32.8 31.5 - 36.5 g/dL    RDW 13.3 10.0 - 15.0 %    Platelet Count 234 150 - 450 10e3/uL    % Neutrophils 47 %    % Lymphocytes 45 %    % Monocytes 6 %    % Eosinophils 2 %    % Basophils 0 %    % Immature Granulocytes 0 %    NRBCs per 100 WBC 0 <1 /100    Absolute Neutrophils 4.4 1.6 - 8.3 10e3/uL     Absolute Lymphocytes 4.3 0.8 - 5.3 10e3/uL    Absolute Monocytes 0.5 0.0 - 1.3 10e3/uL    Absolute Eosinophils 0.2 0.0 - 0.7 10e3/uL    Absolute Basophils 0.0 0.0 - 0.2 10e3/uL    Absolute Immature Granulocytes 0.0 <=0.4 10e3/uL    Absolute NRBCs 0.0 10e3/uL   TSH with free T4 reflex and/or T3 as indicated    Collection Time: 01/10/22 10:26 AM   Result Value Ref Range    TSH 0.15 (L) 0.40 - 4.00 mU/L   Lipid panel    Collection Time: 01/10/22 10:26 AM   Result Value Ref Range    Cholesterol 185 <200 mg/dL    Triglycerides 155 (H) <150 mg/dL    Direct Measure HDL 39 (L) >=50 mg/dL    LDL Cholesterol Calculated 115 (H) <=100 mg/dL    Non HDL Cholesterol 146 (H) <130 mg/dL   T4 free    Collection Time: 01/10/22 10:26 AM   Result Value Ref Range    Free T4 0.95 0.76 - 1.46 ng/dL            Because this patient meets criteria for an Alcohol Use Disorder, I performed the following brief intervention on the date of this note:              1) Expressed concern that the patient is drinking at unhealthy levels known to increase their risk of alcohol related problems              2) Gave feedback linking alcohol use and health, including personalized feedback explaining how alcohol use can interact with their medical and/or psychiatric problems, and with prescribed medications.              3) Advised patient to abstain.    PT counseled on nicotine cessation and nicotine replacement provided    Discussed with patient many issues of addiction,triggers, relapse, and establishing a solid recovery program.    DISCHARGE MENTAL STATUS EXAMINATION:  The patient is alert, oriented x3.  Good fund of knowledge.  Good use of language.  Recent and remote memory, language, fund of knowledge are all adequate.  Euthymic mood congruent affect  Speech normal rate/rhythm linear tp no loose asso,The patient does not have any active suicidal or homicidal ideation.  Does not have any auditory or visual hallucination.  Fair insight/judgment At  this time, the patient was stable to be discharged.        Pt was not determined to not be a danger to himself or others. At the current time of discharge, the patient does not meet criteria for involuntary hospitalization. On the day of discharge, the patient reports that they do not have suicidal or homicidal ideation and would never hurt themselves or others. Steps taken to minimize risk include: assessing patient s behavior and thought process daily during hospital stay, discharging patient with adequate plan for follow up for mental and physical health and discussing safety plan of returning to the hospital should the patient ever have thoughts of harming themselves or others. Therefore, based on all available evidence including the factors cited above, the patient does not appear to be at imminent risk for self-harm, and is appropriate for outpatient level of care.     Educated about side effects/risk vs benefits /alternative including non treatment.Pt consented to be on medication.     .Total time spent on discharge summary more than 35 min  More than  20 min  planning, coordination of care, medication reconciliation and performance of physical exam on day of discharge.Care was coordinated with unit RN and unit therapist         Medication List      Started    melatonin 3 MG tablet  3 mg, Oral, AT BEDTIME PRN     methocarbamol 500 MG tablet  Commonly known as: ROBAXIN  500 mg, Oral, 3 TIMES DAILY PRN     PHENobarbital 32.4 MG tablet  Commonly known as: LUMINAL  32.4 mg, Oral, AT BEDTIME, On 1/13 take 3 tablets at bedtimeOn 1/14 take 2 tablets at bedtimeOn 1/15 take 1 tablets at bedtime  Notes to patient: Taper  1/13, Take 3 tablets at bedtime  1/14, Take 2 tablets at bedtime  1/15, Take 1 tablet at bedtime.  Then stop        Modified    gabapentin 800 MG tablet  Commonly known as: NEURONTIN  800 mg, Oral, 2 TIMES DAILY, Take 800 mg in the morning, 800 mg in the afternoon, and 1,600 mg at bedtime  What  "changed:     how much to take    how to take this    when to take this             Disposition: home     Facts about COVID19 at www.cdc.gov/COVID19 and www.[place].gov/covid19     Keeping hands clean is one of the most important steps we can take to avoid getting sick and spreading germs to others.  Please wash your hands frequently and lather with soap for at least 20 seconds!     Medical Follow-Up:pcp in 3-4 weeks         Treatment Follow-Up:methadone clinic out pt cd trt  .       Disposition: Home     Primary Provider:  [facility] [facility]    [facility] [address]    [phone]   Valentine Spaulding, DO     Please follow up with primary MD  within 30 days for post hospitalization checkup.     Follow up:  [facility]  [address] [place] [address], [place], [place] [id number] [phone]           \"Much or all of the text in this note was generated through the use of Dragon Dictate voice to text software. Errors in spelling or words which appear to be out of contact are unintentional, may be present due having escaped editing\"     "

## 2022-01-13 NOTE — PLAN OF CARE
Pt verbalized complete understanding of all discharge instructions. Pt discharged to home transported by med cab.

## 2022-01-14 ENCOUNTER — PATIENT OUTREACH (OUTPATIENT)
Dept: CARE COORDINATION | Facility: CLINIC | Age: 47
End: 2022-01-14
Payer: COMMERCIAL

## 2022-01-14 DIAGNOSIS — Z71.89 OTHER SPECIFIED COUNSELING: ICD-10-CM

## 2022-01-14 NOTE — PROGRESS NOTES
Clinic Care Coordination Contact  [facility]/Voicemail       Clinical Data: Care Coordinator Outreach  Outreach attempted x 1.  Left message on patient's voicemail with call back information and requested return call.  Plan: CC PATRICIA will attempt to reach patient again in 1-2 business days.    PAT Hodgson   Social Work Clinic Care Coordinator   [facility]  PH: [phone]  [email or web address]

## 2022-01-17 NOTE — PROGRESS NOTES
Clinic Care Coordination Contact  [facility]: Post-Discharge Note  SITUATION                                                      Admission:    Admission Date: 01/09/22   Reason for Admission: MDD, recurrent, severe without psychotic features  Discharge:   Discharge Date: 01/13/22  Discharge Diagnosis: MDD, recurrent, severe without psychotic features    BACKGROUND                                                      PATIENT was admitted to Station 3Awith attending  Alea Castelan MD DR veluvali, please review the detailed admit note on 1/9/22   The patient was placed under status 15 (15 minute checks) to ensure patient safety.  Patient was started on a phenobarbital taper and she started the taper and is tolerating the taper  She was continued on methadone maintenance  Viibryd was continued 20 mg this medication was new medication she started taking it and it helped her mood  - Continue PTA Gabapentin 800 mg BID and 1600 mg at bedtime for now  MSSA protocol was initiated due to the patient's history of alcohol abuse and concern for withdrawal symptoms.  CBC, BMP and utox obtained.     All outpatient medications were continued     PATIENTdid participate in groups and was visible in the milieu.      The patient's symptoms of  improved.     Patients energy motivation , sleep appetite improved.  Pt completed detox . It was un eventful.     - Continue Viibryd 20 mg daily- Continue PTA Gabapentin 800 mg BID and 1600 mg at bedtime for now     Discussed with patient medications for craving.  Spoke with patient about triggers coping skills relapse prevention.     CONSULTS DONE DURING PATIENTS HOSPITALIZATION.  Patient was seen by medicine on date1/9/22     This as per their medical consult  Assessment & Recommendations  Jihan Gill is a 46 year old woman with a past medical history of depression and opiate use disorder who is admitted to station 3A with benzodiazepine withdrawal.         Benzodiazepine Withdrawal - Patient buying benzodiazepines off the street, supplementing her prescription.  Patient believes she is using 10-15mg daily.   -Management per psychiatry team.      Depression/Anxiety  Suicidal Ideations - Managed with Viibryd 20mg daily   -management per psychiatry team.      Opiate Use Disorder - Managed with methadone 150mg daily.  Patient was supposed to receive half of methadone dose prior to transfer, until pharmacy can confirm PTA dose.  Unfortunately, medication not administered prior to transfer  -Provide 70mg now      Chronic Hepatitis C - s/p treatment May 2021. HCV quant negative as of September 2021.  Negative for Hepatitis B.      Medicine will sign off, please page with any additional concerns.                  ASSESSMENT      Enrollment  Primary Care Care Coordination Status: Unable to Reach    Discharge Assessment  How are you doing now that you are home?: Patient shares that she is doing well and had an appointment at [facility] with her Suboxone PCP. Patient reports that she would like a therapist and may try and get in touch with a trauma therapist that she had an appt set up with before hospitalization. SW offers to also send a referral to [facility] and patient is agreeable to this. Patient shares that she was let go from her job at [employer] due to a wallet situation. SW suggested she reach out to a supervisor for more clarification around reason for being let go. Patient declines need for additional resources at this time.  How are your symptoms? (Red Flag symptoms escalate to triage hotline per guidelines): Improved  Do you feel your condition is stable enough to be safe at home until your provider visit?: Yes  Does the patient have their discharge instructions? : Yes  Does the patient have questions regarding their discharge instructions? : No  Were you started on any new medications or were there changes to any of your previous medications? :  Yes  Does the patient have all of their medications?: Yes  Do you have questions regarding any of your medications? : No  Do you have all of your needed medical supplies or equipment (DME)?  (i.e. oxygen tank, CPAP, cane, etc.): Yes  Discharge follow-up appointment scheduled within 14 calendar days? : Yes  Discharge Follow Up Appointment Date: 01/17/22  Discharge Follow Up Appointment Scheduled with?: Specialty Care Provider (Suboxone PCP.)    Post-op ([facility]A Only)  If the patient had a surgery or procedure, do they have any questions for a nurse?: No    Post-op (Clinicians Only)  Did the patient have surgery or a procedure: No  Fever: No  Chills: No      PLAN                                                      Outpatient Plan:  Primary Provider:  [facility] [address] [facility]    [address]    [place], [place] [address] [phone]   Valentine Spaulding, DO     Please follow up with primary MD  within 30 days for post hospitalization checkup.     Follow up:  [facility]  [address] [address] [phone]        No future appointments.      For any urgent concerns, please contact our 24 hour nurse triage line: [phone] ([phone])         PAT Aguilar

## 2022-01-22 ENCOUNTER — HOSPITAL ENCOUNTER (EMERGENCY)
Facility: CLINIC | Age: 47
Discharge: HOME | End: 2022-01-23
Attending: EMERGENCY MEDICINE | Admitting: EMERGENCY MEDICINE
Payer: COMMERCIAL

## 2022-01-22 DIAGNOSIS — R11.2 NAUSEA AND VOMITING, INTRACTABILITY OF VOMITING NOT SPECIFIED, UNSPECIFIED VOMITING TYPE: ICD-10-CM

## 2022-01-22 DIAGNOSIS — F13.90 BENZODIAZEPINE MISUSE: ICD-10-CM

## 2022-01-22 PROCEDURE — 96361 HYDRATE IV INFUSION ADD-ON: CPT

## 2022-01-22 PROCEDURE — 93005 ELECTROCARDIOGRAM TRACING: CPT

## 2022-01-22 PROCEDURE — 99284 EMERGENCY DEPT VISIT MOD MDM: CPT | Mod: 25

## 2022-01-22 PROCEDURE — 96374 THER/PROPH/DIAG INJ IV PUSH: CPT

## 2022-01-23 VITALS
OXYGEN SATURATION: 96 % | TEMPERATURE: 98.8 F | SYSTOLIC BLOOD PRESSURE: 122 MMHG | DIASTOLIC BLOOD PRESSURE: 75 MMHG | HEART RATE: 89 BPM | RESPIRATION RATE: 15 BRPM

## 2022-01-23 LAB
ALBUMIN SERPL-MCNC: 3.7 G/DL (ref 3.4–5)
ALP SERPL-CCNC: 117 U/L (ref 40–150)
ALT SERPL W P-5'-P-CCNC: 27 U/L (ref 0–50)
ANION GAP SERPL CALCULATED.3IONS-SCNC: 4 MMOL/L (ref 3–14)
APAP SERPL-MCNC: <2 MG/L (ref 10–30)
AST SERPL W P-5'-P-CCNC: 47 U/L (ref 0–45)
ATRIAL RATE - MUSE: 87 BPM
BASOPHILS # BLD AUTO: 0 10E3/UL (ref 0–0.2)
BASOPHILS NFR BLD AUTO: 0 %
BILIRUB SERPL-MCNC: 0.2 MG/DL (ref 0.2–1.3)
BUN SERPL-MCNC: 16 MG/DL (ref 7–30)
CALCIUM SERPL-MCNC: 9.4 MG/DL (ref 8.5–10.1)
CHLORIDE BLD-SCNC: 101 MMOL/L (ref 94–109)
CO2 SERPL-SCNC: 32 MMOL/L (ref 20–32)
CREAT SERPL-MCNC: 0.69 MG/DL (ref 0.52–1.04)
DIASTOLIC BLOOD PRESSURE - MUSE: NORMAL MMHG
EOSINOPHIL # BLD AUTO: 0.2 10E3/UL (ref 0–0.7)
EOSINOPHIL NFR BLD AUTO: 2 %
ERYTHROCYTE [DISTWIDTH] IN BLOOD BY AUTOMATED COUNT: 13.2 % (ref 10–15)
ETHANOL SERPL-MCNC: <0.01 G/DL
GFR SERPL CREATININE-BSD FRML MDRD: >90 ML/MIN/1.73M2
GLUCOSE BLD-MCNC: 78 MG/DL (ref 70–99)
HCG SERPL QL: NEGATIVE
HCT VFR BLD AUTO: 39.8 % (ref 35–47)
HGB BLD-MCNC: 12.6 G/DL (ref 11.7–15.7)
IMM GRANULOCYTES # BLD: 0 10E3/UL
IMM GRANULOCYTES NFR BLD: 0 %
INTERPRETATION ECG - MUSE: NORMAL
LYMPHOCYTES # BLD AUTO: 3.3 10E3/UL (ref 0.8–5.3)
LYMPHOCYTES NFR BLD AUTO: 40 %
MCH RBC QN AUTO: 29.2 PG (ref 26.5–33)
MCHC RBC AUTO-ENTMCNC: 31.7 G/DL (ref 31.5–36.5)
MCV RBC AUTO: 92 FL (ref 78–100)
MONOCYTES # BLD AUTO: 0.5 10E3/UL (ref 0–1.3)
MONOCYTES NFR BLD AUTO: 6 %
NEUTROPHILS # BLD AUTO: 4.3 10E3/UL (ref 1.6–8.3)
NEUTROPHILS NFR BLD AUTO: 52 %
NRBC # BLD AUTO: 0 10E3/UL
NRBC BLD AUTO-RTO: 0 /100
P AXIS - MUSE: 84 DEGREES
PLATELET # BLD AUTO: 214 10E3/UL (ref 150–450)
POTASSIUM BLD-SCNC: 3.8 MMOL/L (ref 3.4–5.3)
PR INTERVAL - MUSE: 152 MS
PROT SERPL-MCNC: 7.6 G/DL (ref 6.8–8.8)
QRS DURATION - MUSE: 82 MS
QT - MUSE: 366 MS
QTC - MUSE: 440 MS
R AXIS - MUSE: 94 DEGREES
RBC # BLD AUTO: 4.32 10E6/UL (ref 3.8–5.2)
SALICYLATES SERPL-MCNC: 2 MG/DL
SODIUM SERPL-SCNC: 137 MMOL/L (ref 133–144)
SYSTOLIC BLOOD PRESSURE - MUSE: NORMAL MMHG
T AXIS - MUSE: 70 DEGREES
VENTRICULAR RATE- MUSE: 87 BPM
WBC # BLD AUTO: 8.3 10E3/UL (ref 4–11)

## 2022-01-23 PROCEDURE — 250N000011 HC RX IP 250 OP 636: Performed by: EMERGENCY MEDICINE

## 2022-01-23 PROCEDURE — 82077 ASSAY SPEC XCP UR&BREATH IA: CPT | Performed by: EMERGENCY MEDICINE

## 2022-01-23 PROCEDURE — 85025 COMPLETE CBC W/AUTO DIFF WBC: CPT | Performed by: EMERGENCY MEDICINE

## 2022-01-23 PROCEDURE — 258N000003 HC RX IP 258 OP 636: Performed by: EMERGENCY MEDICINE

## 2022-01-23 PROCEDURE — 80143 DRUG ASSAY ACETAMINOPHEN: CPT | Performed by: EMERGENCY MEDICINE

## 2022-01-23 PROCEDURE — 36415 COLL VENOUS BLD VENIPUNCTURE: CPT | Performed by: EMERGENCY MEDICINE

## 2022-01-23 PROCEDURE — 84703 CHORIONIC GONADOTROPIN ASSAY: CPT | Performed by: EMERGENCY MEDICINE

## 2022-01-23 PROCEDURE — 80179 DRUG ASSAY SALICYLATE: CPT | Performed by: EMERGENCY MEDICINE

## 2022-01-23 PROCEDURE — 80053 COMPREHEN METABOLIC PANEL: CPT | Performed by: EMERGENCY MEDICINE

## 2022-01-23 RX ORDER — ONDANSETRON 2 MG/ML
4 INJECTION INTRAMUSCULAR; INTRAVENOUS EVERY 30 MIN PRN
Status: DISCONTINUED | OUTPATIENT
Start: 2022-01-23 | End: 2022-01-23 | Stop reason: HOSPADM

## 2022-01-23 RX ORDER — SODIUM CHLORIDE 9 MG/ML
INJECTION, SOLUTION INTRAVENOUS CONTINUOUS
Status: DISCONTINUED | OUTPATIENT
Start: 2022-01-23 | End: 2022-01-23 | Stop reason: HOSPADM

## 2022-01-23 RX ADMIN — ONDANSETRON 4 MG: 2 INJECTION INTRAMUSCULAR; INTRAVENOUS at 01:00

## 2022-01-23 RX ADMIN — SODIUM CHLORIDE 1000 ML: 9 INJECTION, SOLUTION INTRAVENOUS at 00:59

## 2022-01-23 ASSESSMENT — ENCOUNTER SYMPTOMS
VOMITING: 1
NAUSEA: 1

## 2022-01-23 NOTE — ED PROVIDER NOTES
History   Chief Complaint:  Ingestion    The history is limited by the condition of the patient.      Jihan Gill is a 46 year old female with history of benzodiazepine withdrawal who presents with nausea and vomiting. Her friend gave her Narcan, afterward she began to vomit and he called 911. She reports taking her usual methadone earlier this morning. Said she smoked something. Different responses to what she actually smoked including xanax, klonopin and marijuana. She may have had other substances as well. Denies SI.    Review of Systems   Unable to perform ROS: Acuity of condition   Gastrointestinal: Positive for nausea and vomiting.     Allergies:  Abilify [Aripiprazole]  Allopurinol  Compazine  Dramamine  Olanzapine  Reglan  Seroquel  Antihistamines Diphenhydramine  Sulfa  Acetaminophen  Buspirone  Droperidol  Hydrocodone  Ketorolac Tromethamine    Medications:  albuterol  gabapentin  methadone  methocarbamol  phenobarbital  vilazodone  Atomoxetine  Buprenorphine  Cephalexin  Clonidine  Doxepin  Fluconazole  Fluoxetine  Glecaprevir-pibrentasvir   Hydroxyzine pamoate  Lithium carbonate  Loperamide  Lorazepam  Nicotine  Omeprazole  Ondansetron  PEG-3350  Povidone-Iodine    Past Medical History:     Arthritis   Bipolar 1 disorder  Chronic hepatitis C without hepatic coma  Depressive disorder   Opiate addiction  Seizures  Substance abuse  Urinary calculus  Degeneration of lumbar or lumbosacral intervertebral disc  Methadone overdose  Suicide attempt  Lithium overdose  Borderline personality disorder  Bipolar II disorder  Benzodiazepine dependence  Suicidal ideation  Chronic pain syndrome  Benzodiazepine withdrawal without complication  Alcohol use disorder  Anemia  Aphthous ulcer of mouth  Meningitis  Cocaine abuse  Constipation  Cutaneous candidiasis  Hallucinations  Psychiatric hospitalization  HPV  Hyperglycemia  Hypokalemia  IUD  Left breast abscess  Lipoma of abdominal wall  Methamphetamine use  disorder  Morbid obesity  Pneumonia  Prolonged QT interval  Transaminates  Vitamin D insufficiency    Past Surgical History:     section, low transverse  ENT surgery  GYN surgery  Tonsillectomy and adenoidectomy    Family History:    Father: Colon cancer  Mother: Depression, Obesity, Type II diabetes  Sister: Depression, Obesity    Social History:  Patient arrived at ED via EMS.  She is currently unaccompanied.    Physical Exam     Patient Vitals for the past 24 hrs:   BP Temp Temp src Pulse Resp SpO2   22 0600 126/64 -- -- 72 16 91 %   22 0500 116/68 -- -- 75 -- 91 %   22 0400 113/57 -- -- 78 -- 91 %   22 0300 98/57 -- -- 87 -- 91 %   22 0200 112/49 -- -- 88 -- 91 %   22 0045 -- -- -- -- -- 94 %   22 0030 -- -- -- -- -- 96 %   22 0015 96/41 -- -- 79 -- 97 %   22 0001 -- 98.8  F (37.1  C) Temporal -- -- --   22 0000 112/75 98  F (36.7  C) Temporal 80 14 94 %       Physical Exam  General: Lying on her side  Eyes:  The pupils are equal and round    Conjunctivae and sclerae are normal  ENT:    Wearing a mask  Neck:  Normal range of motion  CV:  Regular rate, regular rhythm     Skin warm and well perfused   Resp:  Non labored breathing on room air    No tachypnea    No cough heard  GI:  Abdomen is soft, there is no rigidity    No distension    No rebound tenderness     No abdominal tenderness  MS:  Normal muscular tone  Skin:  No rash or acute skin lesions noted  Neuro:   Sleeping but easily arousable    Mildly lethargic      Speech is slightly slurred    Face is symmetric.     Moves all extremities equally  Psych: Flat affect    Emergency Department Course   ECG  ECG obtained at 223, ECG read at 224  Normal sinus rhythm  Rightward axis  Borderline ECG   No significant change as compared to prior, dated 21.  Rate 87 bpm. HI interval 152 ms. QRS duration 82 ms. QT/QTc 366/440 ms. P-R-T axes 84 94 70.     Laboratory:  Labs Ordered and Resulted from  Time of ED Arrival to Time of ED Departure   COMPREHENSIVE METABOLIC PANEL - Abnormal       Result Value    Sodium 137      Potassium 3.8      Chloride 101      Carbon Dioxide (CO2) 32      Anion Gap 4      Urea Nitrogen 16      Creatinine 0.69      Calcium 9.4      Glucose 78      Alkaline Phosphatase 117      AST 47 (*)     ALT 27      Protein Total 7.6      Albumin 3.7      Bilirubin Total 0.2      GFR Estimate >90     ACETAMINOPHEN LEVEL - Abnormal    Acetaminophen <2 (*)    ETHYL ALCOHOL LEVEL - Normal    Alcohol ethyl <0.01     HCG QUALITATIVE PREGNANCY - Normal    hCG Serum Qualitative Negative     SALICYLATE LEVEL - Normal    Salicylate 2     CBC WITH PLATELETS AND DIFFERENTIAL    WBC Count 8.3      RBC Count 4.32      Hemoglobin 12.6      Hematocrit 39.8      MCV 92      MCH 29.2      MCHC 31.7      RDW 13.2      Platelet Count 214      % Neutrophils 52      % Lymphocytes 40      % Monocytes 6      % Eosinophils 2      % Basophils 0      % Immature Granulocytes 0      NRBCs per 100 WBC 0      Absolute Neutrophils 4.3      Absolute Lymphocytes 3.3      Absolute Monocytes 0.5      Absolute Eosinophils 0.2      Absolute Basophils 0.0      Absolute Immature Granulocytes 0.0      Absolute NRBCs 0.0        Reviewed:  I reviewed nursing notes, vitals, past medical history and Care Everywhere    Assessments:  0010 I obtained history and examined the patient as noted above.   0408 I rechecked the patient.  0643 I rechecked the patient.    Interventions:  0059 NS 1L IV Bolus  0100 Ondansetron 4 mg IV    Disposition:  The patient was discharged to home.     Impression & Plan     CMS Diagnoses: None    Medical Decision Making:  Jihan Gill is a 46-year-old female who presented to the emergency department with ingestion.  Patient likely smoked something though its unclear exactly what she smoked.  She was given Narcan by someone on scene and then started vomiting after this.  She denies that she smoked any  methadone or took any extra methadone.  She was recently admitted for benzodiazepine withdrawal and I suspect that she took benzodiazepines today causing mild altered mental status. No evidence of head trauma and did not think imaging of brain was indicated. Patient cleared in the emergency department and discharged once clinically sober. On recheck, patient endorses using xanax yesterday then given narcan by friend which caused her to vomit because she is on methadone. No mental health concerns and walked well in morning.    Diagnosis:    ICD-10-CM    1. Benzodiazepine misuse  F13.90    2. Nausea and vomiting, intractability of vomiting not specified, unspecified vomiting type  R11.2      Scribe Disclosure:  I, Leo Andrew, am serving as a scribe at 12:23 AM on 1/23/2022 to document services personally performed by Bety Carter MD based on my observations and the provider's statements to me.            Bety Carter MD  01/23/22 0700

## 2022-01-23 NOTE — ED TRIAGE NOTES
"Patient comes by ambulance; hx of methadone use. Patient went to visit a friend this evening; friend reports she was acting \"different\" and \"out of it\". Friend was concerned and gave patient a dose of Narcan x1 IM, then called 911. En route via EMS, patient was vomiting continuously and displayed erratic behavior, per EMS pupils dilated. Given 4mg IM Zofran x1 and 50 Benadryl IM x1 by EMS. Patient denies any other drug or alcohol use today; states she did take her methadone dose this AM per usual. Pt A&Ox4.  "

## 2022-01-23 NOTE — ED NOTES
Hourly Round  Mood/Behavior: Sleeping  Comment:  Constant Monitoring: No, no staff available. MD and Charge Nurse notified

## 2022-01-23 NOTE — ED NOTES
Bed: ED17  Expected date:   Expected time:   Means of arrival:   Comments:  [place] [address] 46f nausea/vomiting eta 2064

## 2022-03-22 ENCOUNTER — HOSPITAL ENCOUNTER (OUTPATIENT)
Facility: CLINIC | Age: 47
Setting detail: OBSERVATION
Discharge: HOME | End: 2022-03-23
Attending: EMERGENCY MEDICINE | Admitting: NURSE PRACTITIONER
Payer: COMMERCIAL

## 2022-03-22 DIAGNOSIS — F13.10 SEDATIVE, HYPNOTIC OR ANXIOLYTIC ABUSE, CONTINUOUS (H): ICD-10-CM

## 2022-03-22 DIAGNOSIS — R45.851 SUICIDAL IDEATION: ICD-10-CM

## 2022-03-22 DIAGNOSIS — F11.10 OPIOID ABUSE (H): ICD-10-CM

## 2022-03-22 DIAGNOSIS — F41.8 OTHER SPECIFIED ANXIETY DISORDERS: ICD-10-CM

## 2022-03-22 DIAGNOSIS — F33.9 DEPRESSION, RECURRENT (H): ICD-10-CM

## 2022-03-22 LAB — SARS-COV-2 RNA RESP QL NAA+PROBE: NEGATIVE

## 2022-03-22 PROCEDURE — 250N000013 HC RX MED GY IP 250 OP 250 PS 637: Performed by: NURSE PRACTITIONER

## 2022-03-22 PROCEDURE — U0005 INFEC AGEN DETEC AMPLI PROBE: HCPCS | Performed by: EMERGENCY MEDICINE

## 2022-03-22 PROCEDURE — 99285 EMERGENCY DEPT VISIT HI MDM: CPT | Mod: 25

## 2022-03-22 PROCEDURE — 99220 PR INITIAL OBSERVATION CARE,LEVEL III: CPT | Performed by: NURSE PRACTITIONER

## 2022-03-22 PROCEDURE — 90791 PSYCH DIAGNOSTIC EVALUATION: CPT

## 2022-03-22 PROCEDURE — C9803 HOPD COVID-19 SPEC COLLECT: HCPCS

## 2022-03-22 PROCEDURE — G0378 HOSPITAL OBSERVATION PER HR: HCPCS

## 2022-03-22 RX ORDER — ONDANSETRON 4 MG/1
4 TABLET, ORALLY DISINTEGRATING ORAL EVERY 6 HOURS PRN
Status: DISCONTINUED | OUTPATIENT
Start: 2022-03-22 | End: 2022-03-23 | Stop reason: HOSPADM

## 2022-03-22 RX ORDER — HYDROXYZINE HYDROCHLORIDE 25 MG/1
25-50 TABLET, FILM COATED ORAL 3 TIMES DAILY PRN
Status: DISCONTINUED | OUTPATIENT
Start: 2022-03-22 | End: 2022-03-23 | Stop reason: HOSPADM

## 2022-03-22 RX ORDER — GABAPENTIN 800 MG/1
800 TABLET ORAL 2 TIMES DAILY
Status: ON HOLD | COMMUNITY
End: 2023-04-05

## 2022-03-22 RX ORDER — GABAPENTIN 800 MG/1
800 TABLET ORAL 3 TIMES DAILY
Status: DISCONTINUED | OUTPATIENT
Start: 2022-03-22 | End: 2022-03-23 | Stop reason: HOSPADM

## 2022-03-22 RX ORDER — CLONIDINE HYDROCHLORIDE 0.1 MG/1
0.1 TABLET ORAL 4 TIMES DAILY PRN
Status: DISCONTINUED | OUTPATIENT
Start: 2022-03-22 | End: 2022-03-23 | Stop reason: HOSPADM

## 2022-03-22 RX ADMIN — GABAPENTIN 800 MG: 800 TABLET, FILM COATED ORAL at 20:57

## 2022-03-22 RX ADMIN — HYDROXYZINE HYDROCHLORIDE 50 MG: 25 TABLET ORAL at 17:49

## 2022-03-22 RX ADMIN — CLONIDINE HYDROCHLORIDE 0.1 MG: 0.1 TABLET ORAL at 17:50

## 2022-03-22 ASSESSMENT — ENCOUNTER SYMPTOMS
VOMITING: 0
ABDOMINAL PAIN: 0
COUGH: 0
NAUSEA: 0
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
## 2022-03-22 NOTE — ED PROVIDER NOTES
[facility] Unit - Initial Psychiatric Observation Note  [facility] Emergency Department  Observation Initiation Date: Mar 22, 2022    Jihan Gill MRN: [id number]   Age: 46 year old YOB: 1975     History     Chief Complaint   Patient presents with     Suicidal     HPI  Jihan Gill is a 46 year old female with a past history notable for polysubstance use (amphetamines, benzodiazepines, opioids), depression, cluster B traits, anxiety, and suicidal ideation who presented to the emergency department for evaluation of suicidal ideation. She had been thinking about overdosing on medication. She was last admitted to  detox for benzodiazepine withdrawal on 1/9/22. [facility] She is on Methadone maintenance for her opioid use, receives her doses at [facility]. During this current visit, patient was found to be medically stable after an evaluation in the emergency department, and transferred to [facility] for further psychiatric assessment. On evaluation today, patient reports a history of recent substance use. She describes last using methamphetamines and fentanyl about 3-4 days ago. She tells writer that she has also been using benzodiazepines in the form of pressed pills. She believes she was using around 5 to 6 mg per day, but is unsure due to not being sure what was contained in the pills. She currently feels mildly nauseous, fatigued. She denies any ongoing suicidal ideation at this time. She would like to stay overnight to be monitored for withdrawal symptoms. Reports she was attempting to get into detox earlier this week but was unsuccessful. Vital signs reviewed. She is mildly tachycardic with pulse of 98, temp 99.4. She is open to a chemical dependency evaluation and states she would be willing to attend treatment at this time. She is only taking gabapentin and methadone at this time. Was not able to tolerate vilazodone and has not been taking.     Past  Medical History  Past Medical History:   Diagnosis Date     Arthritis      Bipolar 1 disorder (H)      Chronic hepatitis C without hepatic coma (H)      Depressive disorder     postpartum     Depressive disorder      Major depression, recurrent (H)      Opiate addiction (H)      Seizures (H)     narcotic withdrawal     Substance abuse (H)      Urinary calculus, unspecified 2001    Renal stones     Past Surgical History:   Procedure Laterality Date     C/SECTION, LOW TRANSVERSE      [id number]     ENT SURGERY       GYN SURGERY       TONSILLECTOMY       gabapentin (NEURONTIN) 800 MG tablet  melatonin 3 MG tablet  methadone HCl 10 MG/5ML SOLN  methocarbamol (ROBAXIN) 500 MG tablet  acetaminophen (TYLENOL) 325 MG tablet  albuterol (PROAIR HFA/PROVENTIL HFA/VENTOLIN HFA) 108 (90 Base) MCG/ACT inhaler  gabapentin (NEURONTIN) 800 MG tablet      Allergies   Allergen Reactions     Abilify [Aripiprazole] Other (See Comments)     Tardive dyskinesia     Allopurinol      Compazine Other (See Comments)     Dystonia     Dramamine      Jaw lock.       Olanzapine Other (See Comments)     Tardive dyskinesia from Zyprexa - per patient     Reglan Other (See Comments)     dystonia     Seroquel [Quetiapine] Other (See Comments)     Tardive dyskinesia.      Family History  Family History   Problem Relation Age of Onset     No Known Problems Father      No Known Problems Mother      No Known Problems Sister      No Known Problems Brother      Social History   Social History     Tobacco Use     Smoking status: Current Every Day Smoker     Packs/day: 0.50     Years: 8.00     Pack years: 4.00     Smokeless tobacco: Never Used   Substance Use Topics     Alcohol use: Not Currently     Comment: States no EtOH since 2008.     Drug use: Yes     Comment: Methadone 60mg/day street buy      Past medical history, past surgical history, medications, allergies, family history, and social history were reviewed with the patient. No additional pertinent items.   "     Review of Systems  A complete review of systems was performed with pertinent positives and negatives noted in the HPI, and all other systems negative.    Physical Examination   BP: (!) 147/62  Pulse: 105  Temp: 98.5  F (36.9  C)  Resp: 18  Height: 165.1 cm (5' 5\")  Weight: 99.3 kg (219 lb)  SpO2: 97 %    Physical Exam  General: Appears stated age.   Neuro: Alert and fully oriented. Extremities appear to demonstrate normal strength on visual inspection.   Integumentary/Skin: no rash visualized, normal color    Psychiatric Examination   Appearance: awake, alert, adequately groomed, dressed in hospital scrubs and appeared as age stated  Attitude:  cooperative  Eye Contact:  fair  Mood:  anxious  Affect:  mood congruent, intensity is blunted and restricted range  Speech:  clear, coherent and normal prosody  Psychomotor Behavior:  no evidence of tardive dyskinesia, dystonia, or tics and intact station, gait and muscle tone  Thought Process:  logical, linear and goal oriented  Associations:  no loose associations  Thought Content:  no evidence of suicidal ideation or homicidal ideation, no evidence of psychotic thought, no auditory hallucinations present and no visual hallucinations present  Insight:  fair  Judgement:  fair  Oriented to:  time, person, and place  Attention Span and Concentration:  intact  Recent and Remote Memory:  intact  Language: able to name/identify objects without impairment  Fund of Knowledge: intact with awareness of current and past events    ED Course        Labs Ordered and Resulted from Time of ED Arrival to Time of ED Departure   COVID-19 VIRUS (CORONAVIRUS) BY PCR - Normal       Result Value    SARS CoV2 PCR Negative         Assessments & Plan (with Medical Decision Making)   Patient presenting with suicidal ideation as well as complaints of substance withdrawal. Reports she was taking pressed pills, unclear what was contained in them. She believes she was using 5-6 mg per day of " benzodiazepines. At this time, would like to try to avoid benzodiazepines but they can be administered if withdrawal symptoms are present. For now, will continue to monitor vital signs every 4 hours and have prn medications available for anxiety and nausea. Nursing notes reviewed noting no acute issues.     I have reviewed the assessment completed by the [facility].     During the observation period, the patient did not require medications for agitation, and did not require restraints/seclusion for patient and/or provider safety.     The patient was found to have a psychiatric condition that would benefit from an observation stay in the emergency department for further psychiatric stabilization and/or coordination of a safe disposition. The observation plan includes serial assessments of psychiatric condition, potential administration of medications if indicated, further disposition pending the patient's psychiatric course during the monitoring period.     Preliminary diagnosis:    ICD-10-CM    1. Sedative, hypnotic or anxiolytic abuse, continuous (H)  F13.10    2. Opioid abuse (H)  F11.10    3. Other specified anxiety disorders  F41.8    4. Depression, recurrent (H)  F33.9    5. Suicidal ideation  R45.851         Treatment Plan:  - Continue gabapentin 800 mg TID  - Pharmacy consult to verify methadone dosing. Pt reports dose is 190 mg daily. Will resume once verified  - Patient not currently demonstrating symptoms of benzodiazepine withdrawal. Last use, 3-4 days ago. Will monitor vital signs every 4 hours and peripherally monitor for the emergence of any benzodiazepine withdrawal. Would like to avoid giving benzodiazepines if able.   - Patient agreeable to CD consult - recommend she consider inpatient CD treatment. Avoid any non-prescribed mind or mood-altering substances  - Will order PRNs including hydroxyzine 25 mg to 50 mg TID prn, clonidine 0.1 mg QID prn for restlessness/anxiety, and ondansetron 4 mg   - Patient  will be registered to observation status overnight with plan for reevaluation tomorrow morning    --  Yahir Rodriguez CNP   [facility] EMERGENCY DEPT  [facility] Unit  3/22/2022        Yahir Rodriguez CNP  03/22/22 3324

## 2022-03-22 NOTE — ED TRIAGE NOTES
PD transferred pt to ED; thoughts of SI, she has been tried to get to treatment for METH, she lost her job and she worries about paying for her sons school; she is voluntary and has used [facility] before

## 2022-03-22 NOTE — ED PROVIDER NOTES
History     Chief Complaint:  Suicidal       HPI   Jihan Gill is a 46 year old female who presents for evaluation of suicidal ideation.  Symptoms been ongoing for a while.  She became concerned about her safety today and presents for evaluation.  She has concerned that she would overdose on medication.  She has attempted to harm her self before and has been admitted to the hospital for mental health. She has no physical complaints today.    ROS:  Review of Systems   Respiratory: Negative for cough.    Cardiovascular: Negative for chest pain.   Gastrointestinal: Negative for abdominal pain, nausea and vomiting.   Psychiatric/Behavioral: Positive for suicidal ideas.   All other systems reviewed and are negative.        Allergies:  Abilify [Aripiprazole]  Allopurinol  Compazine  Dramamine  Olanzapine  Reglan  Seroquel [Quetiapine]     Medications:    gabapentin (NEURONTIN) 800 MG tablet  melatonin 3 MG tablet  methadone HCl 10 MG/5ML SOLN  methocarbamol (ROBAXIN) 500 MG tablet  acetaminophen (TYLENOL) 325 MG tablet  albuterol (PROAIR HFA/PROVENTIL HFA/VENTOLIN HFA) 108 (90 Base) MCG/ACT inhaler  gabapentin (NEURONTIN) 800 MG tablet        Past Medical History:    Past Medical History:   Diagnosis Date     Arthritis      Bipolar 1 disorder (H)      Chronic hepatitis C without hepatic coma (H)      Depressive disorder      Depressive disorder      Major depression, recurrent (H)      Opiate addiction (H)      Seizures (H)      Substance abuse (H)      Urinary calculus, unspecified 2001     Patient Active Problem List   Diagnosis     Degeneration of lumbar or lumbosacral intervertebral disc     Major depression, recurrent (H)     Opiate addiction (H)     Methadone overdose (H)     Suicide attempt (H)     Depression     Lithium overdose, intentional self-harm, initial encounter (H)     Borderline personality disorder (H)     Bipolar II disorder (H)     Benzodiazepine dependence (H)     Acute hepatitis C virus  "infection     Bipolar disorder (H)     Suicidal ideation     Bipolar 1 disorder (H)     Chronic pain syndrome     Benzodiazepine withdrawal without complication (H)     Depression with suicidal ideation        Past Surgical History:    Past Surgical History:   Procedure Laterality Date     C/SECTION, LOW TRANSVERSE      [id number]     ENT SURGERY       GYN SURGERY       TONSILLECTOMY          Family History:    family history includes No Known Problems in her brother, father, mother, and sister.    Social History:   reports that she has been smoking. She has a 4.00 pack-year smoking history. She has never used smokeless tobacco. She reports previous alcohol use. She reports current drug use.  PCP: Clinic, [facility]     Physical Exam     Patient Vitals for the past 24 hrs:   BP Temp Temp src Pulse Resp SpO2 Height Weight   03/22/22 1353 129/89 99.4  F (37.4  C) Oral 98 16 97 % 1.651 m (5' 5\") 99.3 kg (219 lb)   03/22/22 1221 (!) 147/62 98.5  F (36.9  C) Oral 105 18 97 % -- --        Physical Exam  Constitutional: Well appearing.  HEENT: Atraumatic.  Moist mucous membranes.  Neck: Soft.  Supple.    Cardiac: Regular rate and rhythm.  No murmur or rub.  Respiratory: Clear to auscultation bilaterally.  No respiratory distress.   Abdomen: Soft and nontender. Nondistended.  Musculoskeletal: No edema.  Normal range of motion.  Neurologic: Alert and oriented x3.  Normal tone and bulk.    Skin: No rashes.  No edema.  Psych: Normal affect.  Normal behavior.    Emergency Department Course       Laboratory:  Labs Ordered and Resulted from Time of ED Arrival to Time of ED Departure   COVID-19 VIRUS (CORONAVIRUS) BY PCR - Normal       Result Value    SARS CoV2 PCR Negative          Procedures       Emergency Department Course:    Mental Health Risk Assessment      PSS-3    Date and Time Over the past 2 weeks have you felt down, depressed, or hopeless? Over the past 2 weeks have you had thoughts of killing yourself? Have " you ever attempted to kill yourself? When did this last happen? User   03/22/22 1238 -- yes yes more than 6 months ago SP   03/22/22 1222 yes yes yes more than 6 months ago KLB      C-SSRS ([place])    Date and Time Q1 Wished to be Dead (Past Month) Q2 Suicidal Thoughts (Past Month) Q3 Suicidal Thought Method Q4 Suicidal Intent without Specific Plan Q5 Suicide Intent with Specific Plan Q6 Suicide Behavior (Lifetime) Within the Past 3 Months? RETIRED: Level of Risk per Screen Screening Not Complete User   03/22/22 1419 yes yes yes no yes yes -- -- -- PMH   03/22/22 1238 yes yes yes no yes yes -- -- -- SP   03/22/22 1222 yes yes yes no no yes -- -- -- KLB              Suicide assessment completed by mental health (D.E.C., LCSW, etc.)    Reviewed:  I reviewed nursing notes, vitals and past medical history    Interventions:  Medications - No data to display     Disposition:  The patient was transferred to [facility].     Impression & Plan    Covid-19  Jihan Gill was evaluated during a global COVID-19 pandemic, which necessitated consideration that the patient might be at risk for infection with the SARS-CoV-2 virus that causes COVID-19.   Applicable protocols for evaluation were followed during the patient's care.   COVID-19 was considered as part of the patient's evaluation. The plan for testing is: a test was obtained during this visit.    Medical Decision Making:  Jihan Gill is a 46-year-old woman who is afebrile and hemodynamically stable.  She is suicidal.  She is medically cleared after negative Covid test.  She is familiar with the [facility] unit will be transferred there for further evaluation.  She was in stable condition at time of transfer to [facility].    Diagnosis:  1.  Suicidal ideation    Discharge Medications:  New Prescriptions    No medications on file        3/22/2022   MD Fredis He Nicholas J, MD  03/22/22 4111
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
## 2022-03-22 NOTE — ED NOTES
Pt reports having increasing SI. Pt reports Hx of using Fentanyl and Meth, last use 3-4 days ago. Reports fears that she will overdose.

## 2022-03-22 NOTE — ED NOTES
46 year old female with history of depression, anxiety, suicidal ideation and polysubstance abuse received from ED due to suicidal ideation with a plan to overdose. Reports methamphetamine use 3-4 days ago-uses this monthly and she uses pressed pills daily-she states different things could be in them like opiates or benzodiazapines.  She states she goes to [facility] in [place] for methodone daily.  States that she is on 190mg.  Patient states that she does feel somewhat paranoid.  She is cooperative, anxious and depressed.  She is flat and tearful.  She was not sure she could keep herself from suicide outside the hospital. Nursing and risk assessments completed. Assessments reviewed with LMHP and physician. Video monitoring in progress, patient informed.  Admission information reviewed with patient. Patient given a tour of [facility] and instructions on using the facility. Questions regarding [facility] addressed. Pt search completed and belongings inventoried.

## 2022-03-23 VITALS
RESPIRATION RATE: 16 BRPM | TEMPERATURE: 97.7 F | HEART RATE: 78 BPM | HEIGHT: 65 IN | WEIGHT: 219 LBS | BODY MASS INDEX: 36.49 KG/M2 | DIASTOLIC BLOOD PRESSURE: 61 MMHG | OXYGEN SATURATION: 91 % | SYSTOLIC BLOOD PRESSURE: 95 MMHG

## 2022-03-23 PROCEDURE — G0378 HOSPITAL OBSERVATION PER HR: HCPCS

## 2022-03-23 PROCEDURE — 250N000013 HC RX MED GY IP 250 OP 250 PS 637: Performed by: NURSE PRACTITIONER

## 2022-03-23 PROCEDURE — 250N000011 HC RX IP 250 OP 636: Performed by: NURSE PRACTITIONER

## 2022-03-23 PROCEDURE — 99217 PR OBSERVATION CARE DISCHARGE: CPT | Performed by: NURSE PRACTITIONER

## 2022-03-23 RX ORDER — METHOCARBAMOL 500 MG/1
500 TABLET, FILM COATED ORAL 3 TIMES DAILY PRN
Status: DISCONTINUED | OUTPATIENT
Start: 2022-03-23 | End: 2022-03-23 | Stop reason: HOSPADM

## 2022-03-23 RX ORDER — METHADONE HYDROCHLORIDE 10 MG/ML
190 CONCENTRATE ORAL DAILY
Status: DISCONTINUED | OUTPATIENT
Start: 2022-03-23 | End: 2022-03-23 | Stop reason: HOSPADM

## 2022-03-23 RX ORDER — ACETAMINOPHEN 325 MG/1
650 TABLET ORAL EVERY 4 HOURS PRN
Status: DISCONTINUED | OUTPATIENT
Start: 2022-03-23 | End: 2022-03-23 | Stop reason: HOSPADM

## 2022-03-23 RX ADMIN — METHADONE HYDROCHLORIDE 190 MG: 10 CONCENTRATE ORAL at 08:59

## 2022-03-23 RX ADMIN — ACETAMINOPHEN 650 MG: 325 TABLET, FILM COATED ORAL at 08:58

## 2022-03-23 RX ADMIN — GABAPENTIN 800 MG: 800 TABLET, FILM COATED ORAL at 08:23

## 2022-03-23 RX ADMIN — HYDROXYZINE HYDROCHLORIDE 50 MG: 25 TABLET ORAL at 08:23

## 2022-03-23 RX ADMIN — ONDANSETRON 4 MG: 4 TABLET, ORALLY DISINTEGRATING ORAL at 08:24

## 2022-03-23 RX ADMIN — GABAPENTIN 800 MG: 800 TABLET, FILM COATED ORAL at 13:25

## 2022-03-23 RX ADMIN — METHOCARBAMOL 500 MG: 500 TABLET ORAL at 09:01

## 2022-03-23 NOTE — ED NOTES
Pt has been soundly sleeping through the night. No signs of distress noted. Respirations were even and unlabored. Will continue to monitor.

## 2022-03-23 NOTE — ED NOTES
Patient denies suicidal ideation. Early this am patient asked for valium for benzodiazepine withdrawal.  She did not want to do her CD assesment as she said she did one last week with Karma. She said she was having nausea, anxiety and general discomfort rated at a 4.  Her COWS is 5.  Informed the Nurse Practitioner Marcela Morelos and she did see the patient. Patient can be discharged when she feels better today. Patient was given her methodone, zofran, neurontin, tylenol, hydroxyzine and robaxin.  Patient is flat and depressed.

## 2022-03-23 NOTE — ED PROVIDER NOTES
"[facility] Unit - Psychiatric Observation Discharge Summary  [facility] Emergency Department  Discharge Date: 3/23/2022    Jihan Gill MRN: [id number]   Age: 46 year old YOB: 1975     Brief HPI & Initial ED Course     Chief Complaint   Patient presents with     Suicidal     HPI  Jihan Gill is a 46 year old female with history notable for polysubstance use, depression, cluster B traits,anxiety, and suicidal ideation who presents to the ED for evaluation of suicidal ideation complicated by continued benzodiazapine abuse and stimulant abuse.  She currently is maintained on methadone maintenance for her opoid use.  She was transferred to [facility] for psychiatric assessment where she was seen by Yahir Rodriguez NP who placed her on observation status while resuming her PTA medications and monitoring for withdrawal symptoms while seeking chemical dependency resources.    Patient is seen for follow up of suicidal ideation and polysubstance use.  On exam, atient denies any suicidal ideation. Nursing reports an uneventful night.  No signs or symptoms of withdrawal.  VSS stable, blood pressure actually on the lower spectrum as prn clonidine has been held. No seizure activity.  Sleeping soundly in the sensory room.  Appetite normal.Tolerating medications without adverse side effects.  This morning she endorses feels like \"curling up in a ball\" and feeling nauseated and anxious.  She is due for her morning dose of methadone.  She states she has a prescription for ativan at home.  She is anticipating returning home where she lives with her mother later today once her symptoms have subsided.        Physical Examination   BP: 104/65  Pulse: 68  Temp: 97.6  F (36.4  C)  Resp: 15  Height: 165.1 cm (5' 5\")  Weight: 99.3 kg (219 lb)  SpO2: 93 %    Physical Exam  General: Appears stated age.   Neuro: Alert and fully oriented. Extremities appear to demonstrate normal strength on visual inspection. "   Integumentary/Skin: no rash visualized, normal color    Psychiatric Examination   Appearance: awake, alert  Attitude:  cooperative  Eye Contact:  poor , head covered under a blanket  Mood:  anxious  Affect:  intensity is normal  Speech:  clear, coherent  Psychomotor Behavior:  no evidence of tardive dyskinesia, dystonia, or tics  Thought Process:  logical  Associations:  no loose associations  Thought Content:  no evidence of suicidal ideation or homicidal ideation and no evidence of psychotic thought  Insight:  good  Judgement:  intact  Oriented to:  time, person, and place  Attention Span and Concentration:  intact  Recent and Remote Memory:  intact  Language: able to name/identify objects without impairment  Fund of Knowledge: intact with awareness of current and past events    Results        Labs Ordered and Resulted from Time of ED Arrival to Time of ED Departure   COVID-19 VIRUS (CORONAVIRUS) BY PCR - Normal       Result Value    SARS CoV2 PCR Negative         Observation Course   The patient was found to have a psychiatric condition that would benefit from an observation stay in the emergency department for further psychiatric stabilization and/or coordination of a safe disposition. The plan upon observation admission included serial assessments of psychiatric condition, potential administration of medications if indicated, further disposition pending the patient's psychiatric course during the monitoring period.     Serial assessments of the patient's psychiatric condition were performed. Nursing notes were reviewed. During the observation period, the patient did not require medications for agitation, and did not require restraints/seclusion for patient and/or provider safety.     After a period of working with the treatment team on the [facility] unit, the patient's mental state improved to allow a safe transition to outpatient care. After counseling on the diagnosis, work-up, and treatment plan, the patient  was discharged. Close follow-up with a psychiatrist and/or therapist was recommended and community psychiatric resources were provided. Patient is to return to the ED if any urgent or potentially life-threatening concerns.     Discharge Diagnoses:   Final diagnoses:   Sedative, hypnotic or anxiolytic abuse, continuous (H)   Opioid abuse (H)   Other specified anxiety disorders   Depression, recurrent (H)   Suicidal ideation       Treatment Plan:  -Discharge home with safety plan and CD resources.  Patient had a formal CD assessment completed within the past two weeks at another facility.  [facility] is retrieving this report and following the recommendations given at time of assessment.  -methadone 190 mg this morning for opioid addiction.  Nursing called the methadone clinic she is affiliated with to verify dose and will inform them of to days administration.  -Zofran, tylenol and robaxin ordered for discomfort and nausea.  -Resume home medications at time of  Discharge:  Gabapentin 800 mg TID  Robaxin 500 mg TID PRN    At the time of discharge, the patient's acute suicide risk was determined to be low due to the following factors: Reduction in the intensity of mood/anxiety symptoms that preceded the admission, denial of suicidal thoughts, denies feeling helpless or helpless, not currently under the influence of alcohol or illicit substances, denies experiencing command hallucinations, no immediate access to firearms. The patient's acute risk could be higher if noncompliant with their treatment plan, medications, follow-up appointments or using illicit substances or alcohol. Protective factors include: social supports, children, stable housing.    --  BLANCHE Kingsley CNP  [facility] EMERGENCY DEPT  [facility] Unit  3/23/2022      Marcela Morelos APRN CNP  03/23/22 0834

## 2022-03-23 NOTE — TREATMENT PLAN
[facility] Treatment Plan    Client's Name: Jihan Gill  YOB: 1975    DSM-5 Diagnoses: F11.99; F43.10; F60.3; F32.9; F41.9; F31.9    Psychosocial / Contextual Factors: Patient presented to the [facility]PATH unit with the following concerns: suicidal ideation, substance abuse    Anticipated number of sessions or this episode of care: 1-4    MeasurableTreatment Goal(s) related to diagnosis / functional impairment(s)    Goal 1: Stabilize the suicidal crisis?      Objective: Identify life factors/triggers that preceded the SI?      Patient will: express feelings related to SI in order to explore factors/triggers?      ?LMHP will: assist patient in becoming aware of life factors and triggers that may have been?      ?precursors to SI?      Objective: participate in therapy session around emotional issues resulting in suicidal thoughts?      ?Patient will: discuss feelings and emotional sources of stress, hopelessness?      ?LMHP will: encourage pt to express feelings and emotions?      Goal 2: Patient will identify the impact of substance abuse on their functioning.      Objective #A: Patient will identify at least 2 specific examples of how other mood-altering substances and behaviors are addicting and how their use will trigger the disease cycle.      Intervention(s): LMHP will guide facilitated discussion.       Objective #B: Patient will identify strategies to cope with urges to use.      Intervention(s): LMHP will provide psychoeducation.               Appearance:   Appropriate    Eye Contact:   Fair    Psychomotor Behavior: Normal    Attitude:   Cooperative    Orientation:   All   Speech    Rate / Production: Normal     Volume:  Normal    Mood:    Anxious    Affect:    Blunted    Thought Content:  Clear    Thought Form:  Coherent  Logical    Insight:    Fair           PLAN:   Patient will board in [facility] until patient and treatment deem it appropriate to either discharge or admit to a higher level of  care.      James Kumar, [facility]                                                           ________

## 2022-03-23 NOTE — ED NOTES
Spoke to the nurse Naz at [facility] methodone clinic and they stated that her dose of liquid methodone is 190mg and she picked it up yesterday.  Reported to Marcela the nurse practioner.

## 2022-03-23 NOTE — CONSULTS
3/23/2022  I spoke with pt's LMHP, James, who stated he was just informed that pt completed a MARIANNE CA about a week ago and they are going to obtain that assessment. This consult is going to be closed at this time, but can be reordered if pt needs a MARIANNE CA does need one.    Fatimah Venegas MA [facility]  [phone]

## 2022-03-23 NOTE — DISCHARGE INSTRUCTIONS
If I am feeling unsafe or I am in a crisis, I will:   Contact my established care providers   Call the National Suicide Prevention Lifeline: 250.570.7896   Go to the nearest emergency room   Call 098          Warning signs that I or other people might notice when a crisis is developing for me: Isolating, low energy.    Things I am able to do on my own to cope or help me feel better: Stretching, walking, call people that I know.     Things that I am able to do with others to cope or help me better: Talking with friends.     Things I can use or do for distraction: Read, watch television, listen to music.     Changes I can make to support my mental health and wellness: Stop using, getting into therapy.     People in my life that I can ask for help: Sisters and friends.     Your [facility] has a mental health crisis team you can call 24/7: [facility] Crisis Line Number: [phone]    Other things that are important when I m in crisis: Listen to my concerns. Be with me.     Additional resources and information: Pt recently completed a CD assessment at [facility]. Discussed getting assessment and referrals from them to discuss treatment options. Pt reports understanding and willing to follow through with their established outpatient mental health providers. Pt declined any other services at this time.    Crisis Lines  Crisis Text Line  Text 389082  You will be connected with a trained live crisis counselor to provide support.    Gambling Hotline  1.800.698.hope [4673]    National Hope Line  1.800.SUICIDE [5214159]    National Suicide Prevention Lifeline  Free and confidential support  1.305.890.TALK [9337]  http://suicidepreventionlifeline.org    The [employer] (LGBTQ Youth Crisis Line)  8.708.131.5774  text START to 296-370    's Crisis Line  1.504.958.5074 (Press 1)  or text 804274    Community Resources  Fast Tracker  Linking people to mental health and substance use disorder  "resources  [employer]     [place] Mental Health Warm Line  Peer to peer support  Monday thru Saturday, 12 pm to 10 pm  649.997.2082 or 5.771.153.2534  Text \"Support\" to 12505    National [place] on Mental Illness (ERNST)  716.538.0606 or 1.888.ERNST.HELPS    Walk-in Counseling Center  Free mental health counseling  [address] [facility]  230.693.8816    Mental Health Apps  My3  https://Cortona3D.org/    VirtualHopeBox  https://Applimationorg/apps/virtual-hope-box/    Suicide Safety Plan (WakeMate)    Calm Harm    "

## 2022-03-23 NOTE — ED NOTES
Patient denies suicidal ideation. Reviewed safety plan, follow up care plan, and out patient rescources given to the patient. Reviewed medication regime. Patient verbalizes understanding of these things along with all discharge instructions.  Patient has a copy of the AVS and verbalizes understanding of them. All belongings brought into the hospital were given to the patient at discharge.  Patient escorted off the unit. Patient discharged to home via cab at 1430.

## 2022-03-23 NOTE — ED NOTES
Pt has been sleeping throughout shift. Pt reports that she may be going through withdrawal from recent benzo use but clinically does not appear to be withdrawing. Vital signs have been stable and pt denies that she is hungry. Pt given clonidine and atarax to help manage anxiety and any possible withdrawal concerns. Vitals are being checked every 4 hours per nurse practitioner instruction. Pt was given a urine specimen cup but has not gotten up to use the restroom yet. Pt will continue to be monitored.

## 2022-03-24 ENCOUNTER — PATIENT OUTREACH (OUTPATIENT)
Dept: CARE COORDINATION | Facility: CLINIC | Age: 47
End: 2022-03-24
Payer: COMMERCIAL

## 2022-03-24 DIAGNOSIS — Z71.89 OTHER SPECIFIED COUNSELING: ICD-10-CM

## 2022-03-24 NOTE — PROGRESS NOTES
Clinic Care Coordination Contact  [facility]/Voicemail       Clinical Data: Care Coordinator Outreach  Outreach attempted x 1.  Left message on patient's voicemail with call back information and requested return call.  Plan: CC will attempt to reach patient again in 1-2 business days.    PAT Hodgson   Social Work Clinic Care Coordinator   [facility]  PH: [phone]  [email or web address]

## 2022-03-25 NOTE — PROGRESS NOTES
Clinic Care Coordination Contact  [facility]/Voicemail       Clinical Data: Care Coordinator Outreach  Outreach attempted x 2. Unable to leave message as voicemail box is full.  Plan: CC will make no further attempts to reach patient at this time.    PAT Hodgson   Social Work Clinic Care Coordinator   [facility]  [facility]  PH: [phone]  [email or web address]

## 2022-06-25 ENCOUNTER — HEALTH MAINTENANCE LETTER (OUTPATIENT)
Age: 47
End: 2022-06-25

## 2022-08-08 ENCOUNTER — HOSPITAL ENCOUNTER (EMERGENCY)
Facility: CLINIC | Age: 47
Discharge: HOME | End: 2022-08-08
Attending: EMERGENCY MEDICINE | Admitting: EMERGENCY MEDICINE
Payer: COMMERCIAL

## 2022-08-08 VITALS
TEMPERATURE: 98.9 F | OXYGEN SATURATION: 96 % | SYSTOLIC BLOOD PRESSURE: 114 MMHG | DIASTOLIC BLOOD PRESSURE: 74 MMHG | BODY MASS INDEX: 37.19 KG/M2 | HEART RATE: 110 BPM | RESPIRATION RATE: 18 BRPM | WEIGHT: 223.2 LBS | HEIGHT: 65 IN

## 2022-08-08 DIAGNOSIS — F39 MOOD DISORDER (H): ICD-10-CM

## 2022-08-08 DIAGNOSIS — F11.21 SEVERE OPIOID USE DISORDER, IN EARLY REMISSION, ON MAINTENANCE THERAPY (H): ICD-10-CM

## 2022-08-08 DIAGNOSIS — F15.93: ICD-10-CM

## 2022-08-08 DIAGNOSIS — F41.9 ANXIETY: ICD-10-CM

## 2022-08-08 LAB
AMPHETAMINES UR QL SCN: ABNORMAL
BARBITURATES UR QL: ABNORMAL
BENZODIAZ UR QL: ABNORMAL
CANNABINOIDS UR QL SCN: ABNORMAL
COCAINE UR QL: ABNORMAL
HCG UR QL: NEGATIVE
OPIATES UR QL SCN: ABNORMAL
PCP UR QL SCN: ABNORMAL
SARS-COV-2 RNA RESP QL NAA+PROBE: NEGATIVE

## 2022-08-08 PROCEDURE — 99285 EMERGENCY DEPT VISIT HI MDM: CPT | Mod: 25

## 2022-08-08 PROCEDURE — 81025 URINE PREGNANCY TEST: CPT | Performed by: EMERGENCY MEDICINE

## 2022-08-08 PROCEDURE — 80307 DRUG TEST PRSMV CHEM ANLYZR: CPT | Performed by: EMERGENCY MEDICINE

## 2022-08-08 PROCEDURE — 250N000013 HC RX MED GY IP 250 OP 250 PS 637: Performed by: NURSE PRACTITIONER

## 2022-08-08 PROCEDURE — 99284 EMERGENCY DEPT VISIT MOD MDM: CPT | Mod: 25 | Performed by: NURSE PRACTITIONER

## 2022-08-08 PROCEDURE — U0003 INFECTIOUS AGENT DETECTION BY NUCLEIC ACID (DNA OR RNA); SEVERE ACUTE RESPIRATORY SYNDROME CORONAVIRUS 2 (SARS-COV-2) (CORONAVIRUS DISEASE [COVID-19]), AMPLIFIED PROBE TECHNIQUE, MAKING USE OF HIGH THROUGHPUT TECHNOLOGIES AS DESCRIBED BY CMS-2020-01-R: HCPCS | Performed by: EMERGENCY MEDICINE

## 2022-08-08 PROCEDURE — 250N000013 HC RX MED GY IP 250 OP 250 PS 637: Performed by: EMERGENCY MEDICINE

## 2022-08-08 PROCEDURE — 90791 PSYCH DIAGNOSTIC EVALUATION: CPT

## 2022-08-08 PROCEDURE — C9803 HOPD COVID-19 SPEC COLLECT: HCPCS

## 2022-08-08 RX ORDER — LORAZEPAM 1 MG/1
1 TABLET ORAL ONCE
Status: COMPLETED | OUTPATIENT
Start: 2022-08-08 | End: 2022-08-08

## 2022-08-08 RX ORDER — CLONIDINE HYDROCHLORIDE 0.1 MG/1
1 TABLET ORAL AT BEDTIME
COMMUNITY
Start: 2022-08-02 | End: 2023-02-24

## 2022-08-08 RX ORDER — IBUPROFEN 600 MG/1
600 TABLET, FILM COATED ORAL EVERY 6 HOURS PRN
Status: DISCONTINUED | OUTPATIENT
Start: 2022-08-08 | End: 2022-08-08 | Stop reason: HOSPADM

## 2022-08-08 RX ORDER — DULOXETIN HYDROCHLORIDE 20 MG/1
40 CAPSULE, DELAYED RELEASE ORAL DAILY
COMMUNITY
Start: 2022-07-26 | End: 2023-02-24

## 2022-08-08 RX ORDER — CLONAZEPAM 0.5 MG/1
0.25 TABLET ORAL DAILY
COMMUNITY
Start: 2022-07-26 | End: 2023-02-24

## 2022-08-08 RX ORDER — BUSPIRONE HYDROCHLORIDE 30 MG/1
30 TABLET ORAL 2 TIMES DAILY
COMMUNITY
Start: 2022-07-19 | End: 2023-02-24

## 2022-08-08 RX ORDER — LISDEXAMFETAMINE DIMESYLATE 40 MG/1
40 CAPSULE ORAL EVERY MORNING
COMMUNITY
Start: 2022-08-02 | End: 2023-02-24

## 2022-08-08 RX ORDER — QUETIAPINE FUMARATE 50 MG/1
50 TABLET, FILM COATED ORAL AT BEDTIME
COMMUNITY
Start: 2022-04-28 | End: 2023-02-24

## 2022-08-08 RX ADMIN — LORAZEPAM 1 MG: 1 TABLET ORAL at 16:04

## 2022-08-08 RX ADMIN — IBUPROFEN 600 MG: 600 TABLET ORAL at 19:00

## 2022-08-08 ASSESSMENT — ENCOUNTER SYMPTOMS
DECREASED CONCENTRATION: 1
HALLUCINATIONS: 1

## 2022-08-08 NOTE — PROGRESS NOTES
"47 year old female with history of bipolar, borderline personality disorder, and polysubstance abuse. Received from ED due to increased anxiety and relapsed on cocaine and \"some other stuff, I can't remember exactly what I took.\" Reports she came to [facility] to get away from the chaos of her home and reset. States her son has been acting out and she has been overwhelmed at home. Requesting repetitively to speak to a psychiatrist. Hoping to discharge tonight. Denies SI/HI. Nursing and risk assessments completed. Assessments reviewed with LMHP and physician. Video monitoring in progress, patient informed.     Admission information reviewed with patient. Patient given a tour of [facility] and instructions on using the facility. Questions regarding [facility] addressed. Pt search completed and belongings inventoried.  "

## 2022-08-08 NOTE — ED NOTES
Video Observation initiated, patient informed.     Katherine Wang RN    Safety search, pt changed into scrubs

## 2022-08-08 NOTE — ED PROVIDER NOTES
History   Chief Complaint:  Psychiatric Evaluation     The history is provided by the patient. The history is limited by the condition of the patient.      Jihan Gill is a 47 year old female with history of bipolar 1 & 2 disorder, opioid abuse, benzodiazepine dependence and borderline personality disorder who presents for psychiatric evaluation. The patient tells us that this past weekend she relapsed and used cocaine + amphetamines and feels bad about it. She notes that she sees a nurse psychiatrist and that recently they have been changing her medications every time she starts to feel better which is very frustrating for her. In addition, she says that her friends are telling her that she has recently been seeing things that aren't actually there. The patient denies suicidal ideation, homicidal ideation or drug use today.     Review of Systems   Unable to perform ROS: Mental status change   Psychiatric/Behavioral: Positive for decreased concentration and hallucinations. Negative for self-injury and suicidal ideas.   10 point review of systems performed and is negative except as above and in HPI.    Allergies:  Abilify   Allopurinol  Compazine  Dramamine  Olanzapine  Reglan  Seroquel  Antihistamines Diphenhydramine  Sulfa  Acetaminophen  Buspirone  Droperidol  Hydrocodone  Ketorolac Tromethamine    Medications:  Albuterol   Gabapentin   Methadone  Robaxin     Past Medical History:     Anxiety   Opioid abuse  Depression w suicidal ideation    Sedative, hypnotic or anxiolytic abuse, continuous  Chronic pain syndrome  Bipolar 1 disorder  Acute hepatitis C virus infection   Benzodiazepine dependence  Bipolar 2 disorder  Borderline personality disorder  Lithium overdose  Methadone overdose  Opiate addiction   Degeneration of lumbar or lumbosacral intervertebral disc      Past Surgical History:    C section   ENT surgery  GYN surgery  Tonsillectomy      Family History:    The patient denies past family  "history.     Social History:  The patient presents to the ED alone    Physical Exam     Patient Vitals for the past 24 hrs:   BP Temp Temp src Pulse Resp SpO2 Height Weight   08/08/22 1730 114/74 98.9  F (37.2  C) Oral 110 18 96 % 1.651 m (5' 5\") 101.2 kg (223 lb 3.2 oz)   08/08/22 1547 (!) 117/94 -- -- 115 20 99 % 1.651 m (5' 5\") 99.8 kg (220 lb)     Physical Exam    General: Resting on the gurney, appears uncomfortable  Head:  The scalp, face, and head appear normal  Mouth/Throat: Mucus membranes are moist  CV:  Regular rate    Normal S1 and S2  No pathological murmur   Resp:  Breath sounds clear and equal bilaterally    Non-labored, no retractions or accessory muscle use    No coarseness    No wheezing   GI:  Abdomen is soft, no rigidity    No tenderness to palpation  MS:  Normal motor assessment of all extremities.    Good capillary refill noted.  Skin:  No rash or lesions noted.  Neuro:  Speech is rapid but fluent. No apparent deficit.  Psych:  Awake. Alert. Picking at her hands. Anxious affect, consistent with stated mood. Occasionally appears to respond to internal stimuli. Easily re directed. No thoughts of harming herself or others.     Emergency Department Course     ECG  ECG results from 01/22/22   EKG 12-lead, tracing only     Value    Systolic Blood Pressure     Diastolic Blood Pressure     Ventricular Rate 87    Atrial Rate 87    PA Interval 152    QRS Duration 82        QTc 440    P Axis 84    R AXIS 94    T Axis 70    Interpretation ECG      Sinus rhythm  Rightward axis  Borderline ECG  When compared with ECG of 08-AUG-2021 21:36,  No significant change was found  Confirmed by GENERATED REPORT, COMPUTER ([id number]),  Kristen Nolasco ([id number]) on 1/23/2022 3:10:39 AM       Laboratory:  Labs Ordered and Resulted from Time of ED Arrival to Time of ED Departure   DRUG ABUSE SCREEN 77 URINE (FL, RH, SH) - Abnormal       Result Value    Amphetamines Urine Screen Positive (*)     Barbiturates Urine " Screen Negative      Benzodiazepines Urine Screen Negative      Cannabinoids Urine Screen Negative      Cocaine Urine Screen Positive (*)     Opiates Urine Screen Negative      PCP Urine Screen Negative     COVID-19 VIRUS (CORONAVIRUS) BY PCR - Normal    SARS CoV2 PCR Negative     HCG QUALITATIVE URINE - Normal    hCG Urine Qualitative Negative       Emergency Department Course:    Reviewed:  I reviewed nursing notes, vitals, past medical history and Care Everywhere    Assessments:  1413 I obtained history and examined the patient as noted above.     Interventions:  1604 Ativan 1 mg PO   1900 Ibuprofen 600 mg PO     Disposition:  The patient was transferred to [facility].     Impression & Plan     Medical Decision Making:  Jihan Gill is a 47 year old female who presents for evaluation of anxiety.  There is  history of anxiety in the past and they are on medications.   There is no signs at this point of a general medical problem causing anxiety (PE, hyperthyroidism, other metabolic derangement, infection, etc).  There are no signs of serious decompensation warranting psychiatric hospitalization or 72 hold (no suicidal or homicidal ideation, no danger to self).  Patient was subsequently trasnferred to [facility] for psychiatric evaluation.       Diagnosis:    ICD-10-CM    1. Anxiety  F41.9    2. Paranoia (H)  F22      Scribe Disclosure:  I, Yuan Morgan, am serving as a scribe at 2:37 PM on 8/8/2022 to document services personally performed by Allison Zheng MD, based on my observations and the provider's statements to me.            Allison Zheng MD  08/08/22 2047

## 2022-08-08 NOTE — ED TRIAGE NOTES
Pt arrived by public transportation, disorganized thinking, mood swings. Needs psych eval     Triage Assessment     Row Name 08/08/22 1421       Triage Assessment (Adult)    Airway WDL WDL       Respiratory WDL    Respiratory WDL WDL       Skin Circulation/Temperature WDL    Skin Circulation/Temperature WDL WDL       Cardiac WDL    Cardiac WDL WDL       Peripheral/Neurovascular WDL    Peripheral Neurovascular WDL WDL       Cognitive/Neuro/Behavioral WDL    Cognitive/Neuro/Behavioral WDL WDL
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
## 2022-08-08 NOTE — ED NOTES
"Pt irritable and having auditory hallucinations and responding to them.  Insists staff is talking about her and going to   File a report\"  States she could hear someone talking about \"her dad and not reproducing the gene pool\"  Writer used Emotional support and empathetic listening  "

## 2022-08-08 NOTE — ED NOTES
ED to [facility] Checklist    Patient searched and belongings removed: Yes/No: Yes   Patient informed about [facility]: Yes/No: Yes Patient has been to [facility] in the past.  Medically stable at this time: Yes/No: Yes  COVID-19 status: Negative  Independent Ambulation at Baseline: Yes/No: Yes  Follows Directions: Yes/No: Yes  PRN Medications Order/ Order Set: Yes/No: No  Bedside Handoff: Yes/No: Yes

## 2022-08-09 NOTE — PROGRESS NOTES
Patient agreeable to discharge plan. Discharge instructions reviewed with patient including follow-up care plan. Reviewed safety plan and outpatient resources. Denies SI and HI. All belongings that were brought into the hospital have been returned to patient. Escorted off the unit at 2000 accompanied by [facility] staff. Discharged to home via self-arranged ride.

## 2022-08-09 NOTE — ED PROVIDER NOTES
[facility] - Psychiatric Consultation  [facility] Emergency Department    Jihan Gill MRN: [id number]   Age: 47 year old YOB: 1975     History     Chief Complaint   Patient presents with     Psychiatric Evaluation     HPI  Jihan Gill is a 47 year old female with history notable for mood disorder, ADHD, anxiety, polysubstance use disorder,most notably cocaine and methamphetamine per urine toxicology, who presents to the ED with concerns related to withdrawing from stimulants.  Patient was evaluated by the ED provider, who medically cleared patient to transfer to [facility] for psychiatric assessment, this is reviewed along with all pertinent labs and tests performed.    On examination, patient describes relapsing on methamphetamine and cocaine after spending time with friends and has been using for the past 2 days where she has not obtained any sleep. She reports racing thoughts, heightened anxiety and fatigue from staying awake for two days. She has not taken her scheduled medications during this time. She states she has been clean from substances for the past two months. She is on maintaince therapy for opioid dependence and has a therapist through the methadone clinic which she finds helpful in these situations. She has an appointment there tomorrow. Patient denies any suicidal thoughts or urges to harm herself or others. She is not experiencing any psychotic features. She is seeking options to manage symptoms of withdrawal. She was considering staying on observation status for further monitoring, yet decided she would prefer to return home to recover.    Past Medical History  Past Medical History:   Diagnosis Date     Arthritis      Bipolar 1 disorder (H)      Chronic hepatitis C without hepatic coma (H)      Depressive disorder     postpartum     Depressive disorder      Major depression, recurrent (H)      Opiate addiction (H)      Seizures (H)     narcotic withdrawal     Substance  abuse (H)      Urinary calculus, unspecified 2001    Renal stones     Past Surgical History:   Procedure Laterality Date     C/SECTION, LOW TRANSVERSE      [id number]     ENT SURGERY       GYN SURGERY       TONSILLECTOMY       albuterol (PROAIR HFA/PROVENTIL HFA/VENTOLIN HFA) 108 (90 Base) MCG/ACT inhaler  busPIRone HCl (BUSPAR) 30 MG tablet  clonazePAM (KLONOPIN) 0.5 MG tablet  DULoxetine (CYMBALTA) 20 MG capsule  gabapentin (NEURONTIN) 800 MG tablet  methadone HCl 10 MG/5ML SOLN  methocarbamol (ROBAXIN) 500 MG tablet  VYVANSE 40 MG capsule  acetaminophen (TYLENOL) 325 MG tablet  gabapentin (NEURONTIN) 800 MG tablet  melatonin 3 MG tablet      Allergies   Allergen Reactions     Abilify [Aripiprazole] Other (See Comments)     Tardive dyskinesia     Allopurinol      Compazine Other (See Comments)     Dystonia     Dramamine      Jaw lock.       Olanzapine Other (See Comments)     Tardive dyskinesia from Zyprexa - per patient     Reglan Other (See Comments)     dystonia     Seroquel [Quetiapine] Other (See Comments)     Tardive dyskinesia.      Family History  Family History   Problem Relation Age of Onset     No Known Problems Father      No Known Problems Mother      No Known Problems Sister      No Known Problems Brother      Social History   Social History     Tobacco Use     Smoking status: Current Every Day Smoker     Packs/day: 0.50     Years: 8.00     Pack years: 4.00     Smokeless tobacco: Never Used   Substance Use Topics     Alcohol use: Not Currently     Comment: States no EtOH since 2008.     Drug use: Yes     Comment: Methadone 60mg/day street buy      Past medical history, past surgical history, medications, allergies, family history, and social history were reviewed with the patient. No additional pertinent items.       Review of Systems  A complete review of systems was performed with pertinent positives and negatives noted in the HPI, and all other systems negative.    Physical Examination   BP: (!)  "117/94  Pulse: 115  Temp: 98.9  F (37.2  C)  Resp: 20  Height: 165.1 cm (5' 5\")  Weight: 99.8 kg (220 lb)  SpO2: 99 %    Physical Exam  General: Appears stated age.   Neuro: Alert and fully oriented. Extremities appear to demonstrate normal strength on visual inspection.   Integumentary/Skin: no rash visualized, normal color    Psychiatric Examination   Appearance: awake, alert and fatigued  Attitude:  cooperative  Eye Contact:  good  Mood:  anxious  Affect:  intensity is heightened  Speech:  clear, coherent and somewhat pressured and rapid  Psychomotor Behavior:  no evidence of tardive dyskinesia, dystonia, or tics  Thought Process:  logical, linear and goal oriented  Associations:  no loose associations  Thought Content:  no evidence of suicidal ideation or homicidal ideation and no evidence of psychotic thought  Insight:  good  Judgement:  intact  Oriented to:  time, person, and place  Attention Span and Concentration:  intact  Recent and Remote Memory:  intact  Language: able to name/identify objects without impairment  Fund of Knowledge: intact with awareness of current and past events    ED Course        Labs Ordered and Resulted from Time of ED Arrival to Time of ED Departure   DRUG ABUSE SCREEN 77 URINE (FL, RH, SH) - Abnormal       Result Value    Amphetamines Urine Screen Positive (*)     Barbiturates Urine Screen Negative      Benzodiazepines Urine Screen Negative      Cannabinoids Urine Screen Negative      Cocaine Urine Screen Positive (*)     Opiates Urine Screen Negative      PCP Urine Screen Negative     COVID-19 VIRUS (CORONAVIRUS) BY PCR - Normal    SARS CoV2 PCR Negative     HCG QUALITATIVE URINE - Normal    hCG Urine Qualitative Negative         Assessments & Plan (with Medical Decision Making)   Patient presenting with insomnia, racing thoughts and heightened anxiety  In the context of polysubstance use disorder, currently on methadone therapy, further complicated by recent stimulant usage with " subsequent withdrawal symptoms. Nursing notes reviewed noting no acute issues.     I have reviewed the assessment completed by the [facility].     The Prescription Drug Monitoring Program (PDMP) database was accessed to verify accuracy of patients prescribed controlled substances.    After a period of working with the treatment team on the [facility] unit, the patient's mental state improved to allow a safe transition to outpatient care. After counseling on the diagnosis, work-up, and treatment plan, the patient was discharged. Close follow-up with a psychiatrist and/or therapist was recommended and community psychiatric resources were provided. Patient is to return to the ED if any urgent or potentially life-threatening concerns.     At the time of discharge, the patient's acute suicide risk was determined to be low due to the following factors: Reduction in the intensity of mood/anxiety symptoms that preceded the admission, denial of suicidal thoughts, denies feeling helpless or helpless, not currently under the influence of alcohol or illicit substances, denies experiencing command hallucinations, no immediate access to firearms. The patient's acute risk could be higher if noncompliant with their treatment plan, medications, follow-up appointments or using illicit substances or alcohol. Protective factors include: social supports, stable housing.    Preliminary diagnosis:    ICD-10-CM    1. Anxiety  F41.9    2. ADHD     3 Mood disorder (H)  F39    4 Other stimulant use, unspecified with withdrawal (H)  F15.93    5 Severe opioid use disorder, in early remission, on maintenance therapy (H)  F11.21         Treatment Plan:  -Discharge home with safety plan.  -Medication change was considered today. Current medications are providing clinical benefit with good tolerability. Thus, no changes were made.  -Continue 30 mg BID anxiety  -klonopin 0.25 daily for benzo dependence off slowly with provider.  -Duloxetine 40 mg daily  mood  -gabapentin 800 mg TID anxiety  -Methadone dosing per methadone clinic  -Vyvanse 40 mg daily ADHD  -Seroquel 50 mg nightly for anxiety, sleep and mood  clonidine 0.1 mg nightly for ADHD and anxiety.  -Encouraged patient to restart her medications. Especially the clonidine which will help with her elevated pulse noted to be 110.  -Recommended increasing fluid intake and obtaining restorative sleep with the use of the Seroquel.  -Follow up with established outpatient supports.    --  BLANCHE Kingsley CNP   [facility] EMERGENCY DEPT  EmPATH Unit  8/8/2022      Marcela Morelos APRN CNP  08/08/22 2010

## 2022-08-09 NOTE — DISCHARGE INSTRUCTIONS
Aftercare Plan  If I am feeling unsafe or I am in a crisis, I will:   Contact my established care providers   Call the National Suicide Prevention Lifeline: 988  Go to the nearest emergency room   Call 911     Warning signs that I or other people might notice when a crisis is developing for me: feeling unsafe    Things I am able to do on my own to cope or help me feel better: Listen to music, watch a movie/TV, go for a walk     Things that I am able to do with others to cope or help me better: talk, play a game, watch a movie, go for a walk     Things I can use or do for distraction: Work, get out of the house, spend time with friends or family     Changes I can make to support my mental health and wellness: Consider abstaining from use, increase physical activity, strive for at least 7 hours of sleep a night     People in my life that I can ask for help: My parents, nephew, sister, son, friends, providers     Your [facility] has a mental health crisis team you can call 24/7: [facility] Mobile Crisis  [phone]     Other things that are important when I'm in crisis: Utilize your coping skills & support people    Additional resources and information:     Resume services with [facility] within 7 days    TIPP?stands for?Temperature,?Intense exercise,?Paced breathing, and?Paired muscle relaxation.     TEMPERATURE     When we re upset, our bodies often feel hot. To counter this, splash your face with cold water, hold an ice cube, or let the car s AC blow on your face. Changing your body temperature will help you cool down--both physically and emotionally.     INTENSE EXERCISE     Do intense exercise to match your intense emotion. You re not a marathon runner? That s okay, you don t need to be. Sprint down to the end of the street, jump in the pool for a few laps, or do jumping jacks until you ve tired yourself out. Increasing oxygen flow helps decrease stress levels. Plus, it s hard to stay dangerously upset when you re  "exhausted.     PACED BREATHING     Even something as simple as controlling your breath can have a profound impact on reducing emotional pain. There are many different types of breathing exercises. If you have a favorite, breathe it out. If you don t, try a technique called  box breathing . Each breath interval will be four seconds long. Take in air four seconds, hold it in four seconds, breathe out four, and hold four. And then start again. Continue to focus on this breathing pattern until you feel more calm. Steady breathing reduces your body s fight or flight response.     PAIRED MUSCLE RELAXATION     The science of paired muscle relaxation is fascinating. When you tighten a voluntary muscle, relax it, and allow it to rest, the muscle will become more relaxed than it was before it was tightened. Relaxed muscles require less oxygen,?so your breathing and heart rate will slow down. Try this technique by focusing on a group of muscles, such as the muscles in your arms. Tighten the muscles as much as you can for five seconds. Then let go of the tension. Let the muscles relax, and you ll begin to relax, as well.           Crisis Lines  Crisis Text Line  Text 628095  You will be connected with a trained live crisis counselor to provide support.    Por espanol, texto  MARI a 423243 o texto a 442-AYUDAME en WhatsApp    [employer] (LGBTQ Youth Crisis Line)  5.744.449.0643  text START to 068-069      Community Resources  Fast Tracker  Linking people to mental health and substance use disorder resources  fasttrackermn.org     [place] Mental Health Warm Line  Peer to peer support  Monday thru Saturday, 12 pm to 10 pm  953.078.1801 or 9.732.120.3304  Text \"Support\" to 76849    National [place] on Mental Illness (ERNST)  906.739.5937 or 1.888.ERNST.HELPS      Mental Health Apps  My3  https://myInspirispp.org/    VirtualHopeBox  https://Mindscore.org/apps/virtual-hope-box/      Additional Information  Today " you were seen by a licensed mental health professional through Triage and Transition services, Behavioral Healthcare Providers ([facility])  for a crisis assessment in the Emergency Department at [facility].  It is recommended that you follow up with your established providers (psychiatrist, mental health therapist, and/or primary care doctor - as relevant) as soon as possible. Coordinators from [facility] will be calling you in the next 24-48 hours to ensure that you have the resources you need.  You can also contact [facility] coordinators directly at [phone]. You may have been scheduled for or offered an appointment with a mental health provider. [facility] maintains an extensive network of licensed behavioral health providers to connect patients with the services they need.  We do not charge providers a fee to participate in our referral network.  We match patients with providers based on a patient's specific needs, insurance coverage, and location.  Our first effort will be to refer you to a provider within your care system, and will utilize providers outside your care system as needed.

## 2022-08-20 ENCOUNTER — HEALTH MAINTENANCE LETTER (OUTPATIENT)
Age: 47
End: 2022-08-20

## 2022-08-22 ENCOUNTER — HOSPITAL ENCOUNTER (EMERGENCY)
Facility: CLINIC | Age: 47
Discharge: HOME | End: 2022-08-22
Attending: EMERGENCY MEDICINE | Admitting: EMERGENCY MEDICINE
Payer: COMMERCIAL

## 2022-08-22 VITALS
WEIGHT: 225 LBS | RESPIRATION RATE: 14 BRPM | OXYGEN SATURATION: 96 % | BODY MASS INDEX: 37.49 KG/M2 | SYSTOLIC BLOOD PRESSURE: 118 MMHG | DIASTOLIC BLOOD PRESSURE: 80 MMHG | HEIGHT: 65 IN | TEMPERATURE: 98.8 F | HEART RATE: 95 BPM

## 2022-08-22 DIAGNOSIS — F22 PARANOIA (H): ICD-10-CM

## 2022-08-22 DIAGNOSIS — F41.9 ANXIETY: ICD-10-CM

## 2022-08-22 DIAGNOSIS — F90.9 ATTENTION DEFICIT HYPERACTIVITY DISORDER (ADHD), UNSPECIFIED ADHD TYPE: ICD-10-CM

## 2022-08-22 DIAGNOSIS — F31.9 BIPOLAR AFFECTIVE DISORDER, REMISSION STATUS UNSPECIFIED (H): ICD-10-CM

## 2022-08-22 PROCEDURE — 99284 EMERGENCY DEPT VISIT MOD MDM: CPT | Performed by: EMERGENCY MEDICINE

## 2022-08-22 PROCEDURE — 90791 PSYCH DIAGNOSTIC EVALUATION: CPT

## 2022-08-22 PROCEDURE — 99285 EMERGENCY DEPT VISIT HI MDM: CPT | Mod: 25 | Performed by: EMERGENCY MEDICINE

## 2022-08-22 PROCEDURE — 250N000013 HC RX MED GY IP 250 OP 250 PS 637: Performed by: EMERGENCY MEDICINE

## 2022-08-22 RX ORDER — IBUPROFEN 600 MG/1
600 TABLET, FILM COATED ORAL ONCE
Status: COMPLETED | OUTPATIENT
Start: 2022-08-22 | End: 2022-08-22

## 2022-08-22 RX ADMIN — IBUPROFEN 600 MG: 600 TABLET ORAL at 12:02

## 2022-08-22 ASSESSMENT — ENCOUNTER SYMPTOMS
HYPERACTIVE: 0
NERVOUS/ANXIOUS: 1
SLEEP DISTURBANCE: 1

## 2022-08-22 ASSESSMENT — ACTIVITIES OF DAILY LIVING (ADL): ADLS_ACUITY_SCORE: 37

## 2022-08-22 NOTE — ED TRIAGE NOTES
Patient currently seeing Jeanie Mejía [facility] [phone]. If patient signs a release of information, Jeanie will be willing to send information regarding what is going on with Patient.

## 2022-08-22 NOTE — DISCHARGE INSTRUCTIONS
You have been scheduled with the [facility] for a virtual Diagnostic Assessment appointment on 8/26/22 at 8am.    Adult Mental Health or Substance Use Disorder Appointments:  90-minute appointment time  Release of Information will be sent through Doc-u-Sign for your electronic signature    You will receive a phone call 1-2 days prior to your scheduled time to confirm and remind you of the appointment.  You will receive intake forms by email to be completed prior to your appointment.  On the day of your appointment, you will receive a call 30 minutes prior to your scheduled appointment to check in and prepare for the virtual visit.  A video link will be sent to you by email or in a text where you will join the virtual visit.    If you need to change this appointment for any reason, please call our Behavioral Access Scheduling office at [phone].  Please note, we ask for at least a 24-hour notice.  Any late cancelations will be considered a no-show.          Aftercare Plan  If I am feeling unsafe or I am in a crisis, I will:   Contact my established care providers   Call the National Suicide Prevention Lifeline: 988  Go to the nearest emergency room   Call 911     Warning signs that I or other people might notice when a crisis is developing for me: increased substance use, any thoughts to harm myself or others    Things I am able to do on my own to cope or help me feel better: Grounding Techniques:  Try to notice where you are, your surroundings including the people, the sounds like the TV or radio.  Concentrate on your breathing. Take a deep cleansing breath from your diaphragm. Count the breaths as you exhale. Make sure you breath slowly.  Hold something that you find comforting, for some it may be a stuffed animal or a blanket. Notice how it feels in your hands. Is it hard or soft?  During a non-crisis time make a list of positive affirmations. Print them out and keep them handy for  times of intense anxiety. At those times, read them aloud.  Try the IMedExchange game:  Name 5 things you can see in the room with you  Name 4 things you can feel ( chair on my back  or  feet on floor )   Name 3 things you can hear right now ( people talking  or  tv )   Name 2 things you can smell right now (or, 2 things you like the smell of)   Name 1 good thing about yourself  Create A Safe Place  Image a safe place -- it can be a real or imaginary place:   What do you see -- especially colors?   What sounds do you hear?   What sensations do you feel?   What smells do you smell?   What people or animals would you want in your safe place?   Imagine a protective bubble, wall or boundary around your safe place.   Imagine a door or gate with a guard at your safe place.   Image a lock and key to your safe place and only you can unlock it.  You can draw or make a collage that represents your safe place.   Choose a souvenir of your safe place -- a color, an object, a song.   Keep your image of your safe place so you can come back to it when you need to.      Things that I am able to do with others to cope or help me better: talking to my therapist or medication providers     Things I can use or do for distraction: Reduce Extreme Emotion  QUICKLY:  Changing Your Body Chemistry      T:  Change your body Temperature to change your autonomic nervous system   Use Ice Water to calm yourself down FAST   Put your face in a bowl of ice water (this is the best way; have the person keep his/her face in ice water for 30-45 seconds - initial research is showing that the longer s/he can hold her/his face in the water, the better the response), or   Splash ice water on your face, or hold an ice pack on your face      I:  Intensely exercise to calm down a body revved up by emotion   Examples: running, walking fast, jumping, playing basketball, weight lifting, swimming, calisthenics, etc.   Engage in exercises that DO NOT include violent  behaviors. Exercises that utilize violent behaviors tend to function as  behavioral rehearsal,  and rather than calming the person down, may actually  rev  the person up more, increasing the likelihood of violence, and lessening the likelihood that they will  burn off  energy     P:  Progressively relax your muscles   Starting with your hands, moving to your forearms, upper arms, shoulders, neck, forehead, eyes, cheeks and lips, tongue and teeth, chest, upper back, stomach, buttocks, thighs, calves, ankles, feet   Tense (10 seconds,   of the way), then relax each muscle (all the way)   Notice the tension   Notice the difference when relaxed (by tensing first, and then relaxing, you are able to get a more thorough relaxation than by simply relaxing)     P: Paced breathing to relax   The standard technique is to begin with counting the number of steps one takes for a typical inhale, then counting the steps one takes for a typical exhale, and then lengthening the amount of steps for the exhalation by one or two steps.  OR  Repeat this pattern for 1-2 minutes  Inhale for four (4) seconds   Exhale for six (6) to eight (8) seconds   Research demonstrated that one can change one's overall level of anxiety by doing this exercise for even a few minutes per day     Changes I can make to support my mental health and wellness: attend dual diagnosis mental health and chemical dependency evaluation for possible intensive outpatient programming consideration     People in my life that I can ask for help: mom, therapist, medication management providers     Your [facility] has a mental health crisis team you can call 24/7: [facility] Mobile Crisis  [phone]     Additional resources and information:    Crisis Lines  Crisis Text Line  Text 912683  You will be connected with a trained live crisis counselor to provide support.    Por espanol, texto  MARI a 177483 o texto a 442-AYUDAME en WhatsApp    [employer] (LGBTQ  "Youth Crisis Line)  8.546.165.0485  text START to 275-800      Community Resources  Fast Tracker  Linking people to mental health and substance use disorder resources  [employer].HashTip     [place] Mental Health Warm Line  Peer to peer support  Monday thru Saturday, 12 pm to 10 pm  253.657.7328 or 0.761.727.3411  Text \"Support\" to 17684    National [place] on Mental Illness (ERNST)  397.796.7851 or 1.888.ERNST.HELPS      Mental Health Apps  My3  https://[employer].HashTip/    VirtualHopeBox  https://[employer]/apps/virtual-hope-box/      Additional Information  Today you were seen by a licensed mental health professional through Triage and Transition services, Behavioral Healthcare Providers ([facility])  for a crisis assessment in the Emergency Department at [facility].  It is recommended that you follow up with your established providers (psychiatrist, mental health therapist, and/or primary care doctor - as relevant) as soon as possible. Coordinators from [facility] will be calling you in the next 24-48 hours to ensure that you have the resources you need.  You can also contact [facility] coordinators directly at [phone]. You may have been scheduled for or offered an appointment with a mental health provider. [facility] maintains an extensive network of licensed behavioral health providers to connect patients with the services they need.  We do not charge providers a fee to participate in our referral network.  We match patients with providers based on a patient's specific needs, insurance coverage, and location.  Our first effort will be to refer you to a provider within your care system, and will utilize providers outside your care system as needed.    "

## 2022-08-22 NOTE — ED TRIAGE NOTES
Patient presents today after calling 911 due to paranoid activity of someone following her. Patient has history of PTSD and recent medication changes. Patient has been seeing a new psychiatrist for the past 6 weeks. Patient reports starting Vyvanse and recently increasing dose. Patient reports dose might be too much. Patient currently has an arrest warrant and was thinking the people were following her for that. Patient reports insomnia.     Triage Assessment     Row Name 08/22/22 1049       Triage Assessment (Adult)    Airway WDL WDL       Respiratory WDL    Respiratory WDL WDL       Skin Circulation/Temperature WDL    Skin Circulation/Temperature WDL WDL       Cardiac WDL    Cardiac WDL WDL       Peripheral/Neurovascular WDL    Peripheral Neurovascular WDL WDL       Cognitive/Neuro/Behavioral WDL    Cognitive/Neuro/Behavioral WDL X    Level of Consciousness alert    Arousal Level opens eyes spontaneously    Orientation oriented x 4    Speech clear    Mood/Behavior cooperative;calm       [place] Coma Scale    Best Eye Response 4-->(E4) spontaneous    Best Motor Response 6-->(M6) obeys commands    Best Verbal Response 5-->(V5) oriented    [place] Coma Scale Score 15

## 2022-08-22 NOTE — CONSULTS
Diagnostic Evaluation Consultation  Crisis Assessment    Patient was assessed: In Person  Patient location: [facility] Adult ED  Release of information faxed for signature.     Referral Data and Chief Complaint  Patient is a 47 year old female presenting to the [facility] Adult ED for the following concerns: paranoia.      Informed Consent and Assessment Methods     Patient is her own guardian. Writer met with patient and explained the crisis assessment process, including applicable information disclosures and limits to confidentiality, assessed understanding of the process, and obtained consent to proceed with the assessment. Patient was observed to be able to participate in the assessment as evidenced by verbal consent. Assessment methods included conducting a formal interview with patient, review of medical records, collaboration with medical staff, and obtaining relevant collateral information from family and community providers when available..     Over the course of this crisis assessment provided reassurance, offered validation, engaged patient in problem solving and disposition planning, worked with patient on safety and aftercare planning, assisted in processing patient's thoughts and feeling relating to presenting concerns, and provided psychoeducation. Patient's response to interventions was fully engaged.     Summary of Patient Situation     Patient reports multiple medication changes in the past week including tapering off Clonopin and increasing Vyvanse. Patient reports not sleeping well during this time due to increased anxiety. Patient reports worry about returning to intermediate for missing court for DWI hearing. Patient reports waking up today and wanting to run away. Patient was able to attend work but had to leave due to crying. Patient went to the bus stop to take the bus home, but reports feeling worried some cars were following her. Patient reports calling the [place] Police. Patient  "reports the police reassured her there were no people or cars following her. Patient reports paranoia typically happens to her when she uses methamphetamine, but denies any such use recently. Patient then states she is \"tired of the same cycle\".     Brief Psychosocial History  Patient reports living with her mother in [place]. Patient has a young adult son. 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 Patient reports taking the bus to work since her 's license was revoked following multiple DWIs. Patient reports missing her most recent court date and worrying she will have to go back to detention. Patient reports history of trauma from a , so the thought of returning to detention is triggering for her.    Significant Clinical History     Patient is her own legal guardian. Patient has history of chemical dependency civil commitment multiple times, most recently through [facility] in 2019. Patient is not currently under civil commitment, as all cases are closed. Patient has history of multiple inpatient mental health admissions, most recently 04/17/2022 - 04/28/2022 at [facility]. Patient endorses history of numerous chemical dependency treatments. Patient reports she has medication management with Jeanie Johnston, MS, RN, PHN, APRN, CNM, PMHNP-BC at [facility]. Patient also receives methadone maintenance. Patient reports compliance with medication regiment. Patient has individual therapy weekly, next appointment today at 1pm. Patient reports she recently began EMDR but felt this was \"too much\".      Collateral Information  Per electronic medical record note recommendation,  left voicemail for medication management provider Jeanie Johnston at [phone]. Upon return call, patient's medication management provider reports patient has not been the most reliable historian, omitting history of polysubstance abuse and related legal involvement. " Patient's provider was unaware patient was on methadone maintenance for the first 2 months of providing services, no reported history of anxiolytic abuse, and no known history of incarceration. Patient's provider reports patient provides just enough information to get what she wants, and lies so well patient herself often believes it. Patient's provider was in agreement with recommendation for follow up in outpatient clinic, as well as referral for higher level care: intensive outpatient dual diagnosis treatment.     Risk Assessment  ESS-6  1.a. Over the past 2 weeks, have you had thoughts of killing yourself? No  1.b. Have you ever attempted to kill yourself and, if yes, when did this last happen? Yes OD on [place] in 2018.   2. Recent or current suicide plan? No   3. Recent or current intent to act on ideation? No  4. Lifetime psychiatric hospitalization? Yes  5. Pattern of excessive substance use? Yes  6. Current irritability, agitation, or aggression? No  Scoring note: BOTH 1a and 1b must be yes for it to score 1 point, if both are not yes it is zero. All others are 1 point per number. If all questions 1a/1b - 6 are no, risk is negligible. If one of 1a/1b is yes, then risk is mild. If either question 2 or 3, but not both, is yes, then risk is automatically moderate regardless of total score. If both 2 and 3 are yes, risk is automatically high regardless of total score.      Score: 2, moderate risk      Does the patient have access to lethal means? No     Does the patient engage in non-suicidal self-injurious behavior (NSSI/SIB)? No     Does the patient have thoughts of harming others? No     Is the patient engaging in sexually inappropriate behavior?  No       Current Substance Abuse     Is there recent substance abuse? Patient has history of alcohol, opiate and stimulant abuse. Patient denies any current substance use concerns.     Was a urine drug screen or blood alcohol level obtained: Ordered, never  collected.       Mental Status Exam     Affect: Appropriate   Appearance: Appropriate    Attention Span/Concentration: Inattentive  Eye Contact: Engaged   Fund of Knowledge: Appropriate    Language /Speech Content: Fluent   Language /Speech Volume: Normal    Language /Speech Rate/Productions: Normal    Recent Memory: Intact   Remote Memory: Intact   Mood: Anxious    Orientation to Person: Yes    Orientation to Place: Yes   Orientation to Time of Day: Yes    Orientation to Date: Yes    Situation (Do they understand why they are here?): Yes    Psychomotor Behavior: Normal    Thought Content: Hallucinations   Thought Form: Intact      History of commitment: Patient has been on chemical dependency civil commitment multiple times, most recently through [facility] in 2019. Patient is not currently under civil commitment, as all cases are closed.       Medication    Psychotropic medications:   No current facility-administered medications for this encounter.     Current Outpatient Medications   Medication     acetaminophen (TYLENOL) 325 MG tablet     albuterol (PROAIR HFA/PROVENTIL HFA/VENTOLIN HFA) 108 (90 Base) MCG/ACT inhaler     ASHWAGANDHA PO     busPIRone HCl (BUSPAR) 30 MG tablet     clonazePAM (KLONOPIN) 0.5 MG tablet     cloNIDine (CATAPRES) 0.1 MG tablet     DULoxetine (CYMBALTA) 20 MG capsule     gabapentin (NEURONTIN) 800 MG tablet     melatonin 3 MG tablet     methadone HCl 10 MG/5ML SOLN     methocarbamol (ROBAXIN) 500 MG tablet     QUEtiapine (SEROQUEL) 50 MG tablet     VYVANSE 40 MG capsule     Medication changes made in the last two weeks: Patient reports her medication management provider has been tapering her Clonopin the past 1 week, while increasing her Vyvanse simultaneously.       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     3 Unspecified stimulant-related disorder; Unspecified amphetamine or other stimulant-related disorder F15.99 By history.        Clinical Summary and Substantiation of Recommendations    Patient cites primary stressor(s) as history of polysubstance use and related legal troubles. Patient has also had multiple recent medication changes and believe these to be contributing to increased anxiety. Patient denies self-injurious behavior. Patient  denies suicidal ideation, intent, and plan. Patient  denies homicidal ideation, intent, and plan. Patient  endorses symptoms of psychosis. Patient does not appear to be responding to internal stimuli. Patient  denies current substance use but did not complete utox during emergency department encounter.  Disposition    Recommended disposition: Individual Therapy, Medication Management, Programmatic Care       Reviewed case and recommendations with attending provider. Attending Name: Dr. Ashley Zambrano       Attending concurs with disposition: Yes       Patient concurs with disposition: Yes. Patient reports feeling her therapy and medication are not enough support, so she would be open to seeking higher level of care.     Final disposition: Patient has therapy appointment at 1pm today. Patient was also scheduled for dual diagnosis intensive outpatient intake on 8/26/22 at 8am virtually through our assessment center.    You have been scheduled with the [facility] for a virtual Diagnostic Assessment appointment on 8/26/22 at 8am.    Adult Mental Health or Substance Use Disorder Appointments:  90-minute appointment time  Release of Information will be sent through [facility]-u-Sign for your electronic signature    You will receive a phone call 1-2 days prior to your scheduled time to confirm and remind you of the appointment.  You will  receive intake forms by email to be completed prior to your appointment.  On the day of your appointment, you will receive a call 30 minutes prior to your scheduled appointment to check in and prepare for the virtual visit.  A video link will be sent to you by email or in a text where you will join the virtual visit.    If you need to change this appointment for any reason, please call our Behavioral Access Scheduling office at [phone].  Please note, we ask for at least a 24-hour notice.  Any late cancelations will be considered a no-show.     Outpatient Details (if applicable):   Aftercare plan and appointments placed in the AVS and provided to patient: Yes    Was lethal means counseling provided as a part of aftercare planning? No, patient denied any thought to harm herself or others.       Assessment Details    Patient interview started at: 11:15am and completed at: 11:45am.     Total duration spent on the patient case in minutes: .50 hrs      CPT code(s) utilized: 78639 - Psychotherapy for Crisis - 60 (30-74*) min     DILIA Merino  DEC - Triage & Transition Services    Aftercare Plan  If I am feeling unsafe or I am in a crisis, I will:   Contact my established care providers   Call the National Suicide Prevention Lifeline: 988  Go to the nearest emergency room   Call 911     Warning signs that I or other people might notice when a crisis is developing for me: increased substance use, any thoughts to harm myself or others    Things I am able to do on my own to cope or help me feel better: Grounding Techniques:    Try to notice where you are, your surroundings including the people, the sounds like the TV or radio.    Concentrate on your breathing. Take a deep cleansing breath from your diaphragm. Count the breaths as you exhale. Make sure you breath slowly.    Hold something that you find comforting, for some it may be a stuffed animal or a blanket. Notice how it feels in your hands. Is it hard or  soft?    During a non-crisis time make a list of positive affirmations. Print them out and keep them handy for times of intense anxiety. At those times, read them aloud.  Try the [employer] game:    Name 5 things you can see in the room with you    Name 4 things you can feel ( chair on my back  or  feet on floor )     Name 3 things you can hear right now ( people talking  or  tv )     Name 2 things you can smell right now (or, 2 things you like the smell of)     Name 1 good thing about yourself  Create A Safe Place    Image a safe place -- it can be a real or imaginary place:     What do you see -- especially colors?     What sounds do you hear?     What sensations do you feel?     What smells do you smell?     What people or animals would you want in your safe place?     Imagine a protective bubble, wall or boundary around your safe place.     Imagine a door or gate with a guard at your safe place.     Image a lock and key to your safe place and only you can unlock it.    You can draw or make a collage that represents your safe place.     Choose a souvenir of your safe place -- a color, an object, a song.     Keep your image of your safe place so you can come back to it when you need to.      Things that I am able to do with others to cope or help me better: talking to my therapist or medication providers     Things I can use or do for distraction: Reduce Extreme Emotion  QUICKLY:  Changing Your Body Chemistry      T:  Change your body Temperature to change your autonomic nervous system   ? Use Ice Water to calm yourself down FAST   ? Put your face in a bowl of ice water (this is the best way; have the person keep his/her face in ice water for 30-45 seconds - initial research is showing that the longer s/he can hold her/his face in the water, the better the response), or   ? Splash ice water on your face, or hold an ice pack on your face      I:  Intensely exercise to calm down a body revved up by emotion   ? Examples:  running, walking fast, jumping, playing basketball, weight lifting, swimming, calisthenics, etc.   ? Engage in exercises that DO NOT include violent behaviors. Exercises that utilize violent behaviors tend to function as  behavioral rehearsal,  and rather than calming the person down, may actually  rev  the person up more, increasing the likelihood of violence, and lessening the likelihood that they will  burn off  energy     P:  Progressively relax your muscles   ? Starting with your hands, moving to your forearms, upper arms, shoulders, neck, forehead, eyes, cheeks and lips, tongue and teeth, chest, upper back, stomach, buttocks, thighs, calves, ankles, feet   ? Tense (10 seconds,   of the way), then relax each muscle (all the way)   ? Notice the tension   ? Notice the difference when relaxed (by tensing first, and then relaxing, you are able to get a more thorough relaxation than by simply relaxing)     P: Paced breathing to relax   ? The standard technique is to begin with counting the number of steps one takes for a typical inhale, then counting the steps one takes for a typical exhale, and then lengthening the amount of steps for the exhalation by one or two steps.  OR  ? Repeat this pattern for 1-2 minutes  ? Inhale for four (4) seconds   ? Exhale for six (6) to eight (8) seconds   ? Research demonstrated that one can change one's overall level of anxiety by doing this exercise for even a few minutes per day     Changes I can make to support my mental health and wellness: attend dual diagnosis mental health and chemical dependency evaluation for possible intensive outpatient programming consideration     People in my life that I can ask for help: mom, therapist, medication management providers     Your [facility] has a mental health crisis team you can call 24/7: [facility] Mobile Crisis  [phone]     Additional resources and information:    Crisis Lines  Crisis Text Line  Text 636005  You will be  "connected with a trained live crisis counselor to provide support.    Por césar, hayleyo  MARI a 466573 o texto a 442-AYUDAME en WhatsApp    [employer] (LGBTQ Youth Crisis Line)  4.544.262.9561  text START to 554-396      [employer]  Fast Tracker  Linking people to mental health and substance use disorder resources  Get Fractaln.Skillz     [place] [facility] Warm Line  Peer to peer support  Monday thru Saturday, 12 pm to 10 pm  048.106.3157 or 0.202.929.9522  Text \"Support\" to 26537    National [place] on Mental [employer]ness (ERNST)  826.919.5728 or 1.888.ERNST.HELPS      Mental Health Apps  My3  https://[employer].org/    VirtualHopeBox  https://[employer]/apps/virtual-hope-box/      Additional Information  Today you were seen by a licensed mental health professional through Triage and Transition services, [facility] ([facility])  for a crisis assessment in the Emergency Department at [facility].  It is recommended that you follow up with your established providers (psychiatrist, mental health therapist, and/or primary care doctor - as relevant) as soon as possible. Coordinators from [facility] will be calling you in the next 24-48 hours to ensure that you have the resources you need.  You can also contact [facility] coordinators directly at [phone]. You may have been scheduled for or offered an appointment with a mental health provider. [facility] maintains an extensive network of licensed behavioral health providers to connect patients with the services they need.  We do not charge providers a fee to participate in our referral network.  We match patients with providers based on a patient's specific needs, insurance coverage, and location.  Our first effort will be to refer you to a provider within your care system, and will utilize providers outside your care system as needed.    "

## 2022-08-25 ENCOUNTER — TELEPHONE (OUTPATIENT)
Dept: BEHAVIORAL HEALTH | Facility: CLINIC | Age: 47
End: 2022-08-25

## 2022-08-26 ENCOUNTER — TELEPHONE (OUTPATIENT)
Dept: BEHAVIORAL HEALTH | Facility: CLINIC | Age: 47
End: 2022-08-26

## 2022-08-26 NOTE — TELEPHONE ENCOUNTER
Tried reaching out to patient to check them in for their virtual Dual eval today, 8/26/2022 at 0800. Called their listed home number, but no answer so a voice message was left for patient to return call to check in. Also called their listed mobile number, but it says call cannot be completed at this time.

## 2022-08-26 NOTE — TELEPHONE ENCOUNTER
A second voice message was left on their home number for patient to return call to check in for their virtual appointment today. Also tried their mobile number again, but got the same message that call cannot be completed at this time.

## 2022-11-02 VITALS
TEMPERATURE: 98.6 F | HEIGHT: 65 IN | RESPIRATION RATE: 16 BRPM | HEART RATE: 89 BPM | SYSTOLIC BLOOD PRESSURE: 112 MMHG | OXYGEN SATURATION: 95 % | BODY MASS INDEX: 39.99 KG/M2 | WEIGHT: 240 LBS | DIASTOLIC BLOOD PRESSURE: 70 MMHG

## 2022-11-02 PROCEDURE — 99284 EMERGENCY DEPT VISIT MOD MDM: CPT

## 2022-11-03 ENCOUNTER — HOSPITAL ENCOUNTER (EMERGENCY)
Facility: CLINIC | Age: 47
Discharge: HOME | End: 2022-11-03
Attending: EMERGENCY MEDICINE | Admitting: EMERGENCY MEDICINE
Payer: COMMERCIAL

## 2022-11-03 DIAGNOSIS — Z86.59 HISTORY OF PSYCHIATRIC DISORDER: ICD-10-CM

## 2022-11-03 DIAGNOSIS — T50.904A OVERDOSE OF UNDETERMINED INTENT, INITIAL ENCOUNTER: ICD-10-CM

## 2022-11-03 LAB
ALBUMIN SERPL-MCNC: 4.1 G/DL (ref 3.4–5)
ALP SERPL-CCNC: 144 U/L (ref 40–150)
ALT SERPL W P-5'-P-CCNC: 39 U/L (ref 0–50)
ANION GAP SERPL CALCULATED.3IONS-SCNC: 4 MMOL/L (ref 3–14)
APAP SERPL-MCNC: <2 MG/L (ref 10–30)
AST SERPL W P-5'-P-CCNC: 25 U/L (ref 0–45)
ATRIAL RATE - MUSE: 72 BPM
BASOPHILS # BLD MANUAL: 0 10E3/UL (ref 0–0.2)
BASOPHILS NFR BLD MANUAL: 0 %
BILIRUB SERPL-MCNC: 0.2 MG/DL (ref 0.2–1.3)
BUN SERPL-MCNC: 6 MG/DL (ref 7–30)
CALCIUM SERPL-MCNC: 9.9 MG/DL (ref 8.5–10.1)
CHLORIDE BLD-SCNC: 102 MMOL/L (ref 94–109)
CO2 SERPL-SCNC: 32 MMOL/L (ref 20–32)
CREAT SERPL-MCNC: 0.69 MG/DL (ref 0.52–1.04)
DIASTOLIC BLOOD PRESSURE - MUSE: NORMAL MMHG
EOSINOPHIL # BLD MANUAL: 0.3 10E3/UL (ref 0–0.7)
EOSINOPHIL NFR BLD MANUAL: 3 %
ERYTHROCYTE [DISTWIDTH] IN BLOOD BY AUTOMATED COUNT: 13.1 % (ref 10–15)
ETHANOL SERPL-MCNC: <0.01 G/DL
GFR SERPL CREATININE-BSD FRML MDRD: >90 ML/MIN/1.73M2
GLUCOSE BLD-MCNC: 95 MG/DL (ref 70–99)
HCT VFR BLD AUTO: 43.4 % (ref 35–47)
HGB BLD-MCNC: 13.8 G/DL (ref 11.7–15.7)
INTERPRETATION ECG - MUSE: NORMAL
LYMPHOCYTES # BLD MANUAL: 7.8 10E3/UL (ref 0.8–5.3)
LYMPHOCYTES NFR BLD MANUAL: 72 %
MCH RBC QN AUTO: 29.9 PG (ref 26.5–33)
MCHC RBC AUTO-ENTMCNC: 31.8 G/DL (ref 31.5–36.5)
MCV RBC AUTO: 94 FL (ref 78–100)
MONOCYTES # BLD MANUAL: 0.2 10E3/UL (ref 0–1.3)
MONOCYTES NFR BLD MANUAL: 2 %
NEUTROPHILS # BLD MANUAL: 2.5 10E3/UL (ref 1.6–8.3)
NEUTROPHILS NFR BLD MANUAL: 23 %
P AXIS - MUSE: 60 DEGREES
PATH REV: ABNORMAL
PLAT MORPH BLD: ABNORMAL
PLATELET # BLD AUTO: 250 10E3/UL (ref 150–450)
POTASSIUM BLD-SCNC: 3.6 MMOL/L (ref 3.4–5.3)
PR INTERVAL - MUSE: 158 MS
PROT SERPL-MCNC: 8.3 G/DL (ref 6.8–8.8)
QRS DURATION - MUSE: 78 MS
QT - MUSE: 408 MS
QTC - MUSE: 446 MS
R AXIS - MUSE: 66 DEGREES
RBC # BLD AUTO: 4.62 10E6/UL (ref 3.8–5.2)
RBC MORPH BLD: ABNORMAL
SALICYLATES SERPL-MCNC: 3 MG/DL
SARS-COV-2 RNA RESP QL NAA+PROBE: NEGATIVE
SODIUM SERPL-SCNC: 138 MMOL/L (ref 133–144)
SYSTOLIC BLOOD PRESSURE - MUSE: NORMAL MMHG
T AXIS - MUSE: 39 DEGREES
VENTRICULAR RATE- MUSE: 72 BPM
WBC # BLD AUTO: 10.9 10E3/UL (ref 4–11)

## 2022-11-03 PROCEDURE — 82310 ASSAY OF CALCIUM: CPT | Performed by: EMERGENCY MEDICINE

## 2022-11-03 PROCEDURE — 85007 BL SMEAR W/DIFF WBC COUNT: CPT | Performed by: EMERGENCY MEDICINE

## 2022-11-03 PROCEDURE — 82077 ASSAY SPEC XCP UR&BREATH IA: CPT | Performed by: EMERGENCY MEDICINE

## 2022-11-03 PROCEDURE — U0005 INFEC AGEN DETEC AMPLI PROBE: HCPCS | Performed by: EMERGENCY MEDICINE

## 2022-11-03 PROCEDURE — 93005 ELECTROCARDIOGRAM TRACING: CPT

## 2022-11-03 PROCEDURE — C9803 HOPD COVID-19 SPEC COLLECT: HCPCS

## 2022-11-03 PROCEDURE — 80143 DRUG ASSAY ACETAMINOPHEN: CPT | Performed by: EMERGENCY MEDICINE

## 2022-11-03 PROCEDURE — 36415 COLL VENOUS BLD VENIPUNCTURE: CPT | Performed by: EMERGENCY MEDICINE

## 2022-11-03 PROCEDURE — 85027 COMPLETE CBC AUTOMATED: CPT | Performed by: EMERGENCY MEDICINE

## 2022-11-03 PROCEDURE — 80179 DRUG ASSAY SALICYLATE: CPT | Performed by: EMERGENCY MEDICINE

## 2022-11-03 ASSESSMENT — ACTIVITIES OF DAILY LIVING (ADL)
ADLS_ACUITY_SCORE: 35
ADLS_ACUITY_SCORE: 35

## 2022-11-03 NOTE — ED PROVIDER NOTES
"  History   Chief Complaint:  Drug Overdose     HPI  History supplemented by electronic chart review    Jihan Gill is a 47 year old female with history of polysubstance abuse, intentional drug overdose, borderline personality, bipolar disorder, depression and anxiety who presents by EMS with drug overdose. The patient reports she took about 20 tablets of 800 mg gabapentin last evening at 8pm because she was frustrated and \"did not want to feel anything\". She currently feels tired. She denies any other drugs or alcohol use. No recent cough, fever, vomiting or diarrhea.  No other recent symptoms.    Review of Systems   All other systems reviewed and are negative.      Allergies:  Buspirone  Droperidol  Ketorolac Tromethamine  Abilify   Acetaminophen  Allopurinol  Compazine  Dramamine  Hydrocodone  Olanzapine  Reglan  Seroquel   Sulfa Drugs    Medications:  Cymbalta  Gabapentin   Albuterol   Vyvanse  Methadone   Klonopin  Catapres    Past Medical History:     Depression   Opiate addiction   Methadone overdose  SI  Borderline personality disorder  Bipolar II disorder   Benzodiazepine dependence   Chronic pain syndrome   anxiety   polysubstance abuse   Degeneration of lumbar disc    Alcohol use disorder  anemia  Arthritis   meningitis   SAJAN   Kidney stone  HPV  Drug overdose   Morbid obesity   pneumonia   Type II diabetes   Hallucinations     Past Surgical History:      Tonsillectomy     Social History:  The patient presents to the ED alone  Alcohol Use: Denies  Drug Use: Denies  PCP: Clinic, [facility]     Physical Exam     Patient Vitals for the past 24 hrs:   BP Temp Temp src Pulse Resp SpO2 Height Weight   22 2355 112/70 98.6  F (37  C) Oral 89 16 95 % 1.651 m (5' 5\") 108.9 kg (240 lb)       Physical Exam  General: Woman sitting upright in triage room 2  HENT: mucous membranes moist  Eyes: PERRL without nystagmus  CV: extremities well perfused, regular rhythm  Resp: normal " effort, speaks in full phrases, no stridor, no cough observed  GI: abdomen soft and nontender, no guarding  MSK: no bony tenderness   Skin: appropriately warm and dry, left forearm tattoo  Neuro: alert, clear speech, oriented, normal tone in extremities, ambulatory  Psych: calm, cooperative, denies feeling suicidal, no evidence of hallucinations    Emergency Department Course   ECG  ECG taken at 0311, ECG read at 0715  NSR  Normal ECG   Rate 72 bpm. MI interval 158 ms. QRS duration 78 ms. QT/QTc 408/446 ms. P-R-T axes 60 66 39.       Laboratory:  Labs Ordered and Resulted from Time of ED Arrival to Time of ED Departure   COMPREHENSIVE METABOLIC PANEL - Abnormal       Result Value    Sodium 138      Potassium 3.6      Chloride 102      Carbon Dioxide (CO2) 32      Anion Gap 4      Urea Nitrogen 6 (*)     Creatinine 0.69      Calcium 9.9      Glucose 95      Alkaline Phosphatase 144      AST 25      ALT 39      Protein Total 8.3      Albumin 4.1      Bilirubin Total 0.2      GFR Estimate >90     ACETAMINOPHEN LEVEL - Abnormal    Acetaminophen <2 (*)    DIFFERENTIAL - Abnormal    % Neutrophils 23      % Lymphocytes 72      % Monocytes 2      % Eosinophils 3      % Basophils 0      Absolute Neutrophils 2.5      Absolute Lymphocytes 7.8 (*)     Absolute Monocytes 0.2      Absolute Eosinophils 0.3      Absolute Basophils 0.0      RBC Morphology Confirmed RBC Indices      Platelet Assessment        Value: Automated Count Confirmed. Platelet morphology is normal.    Pathologist Review Comments       SALICYLATE LEVEL - Normal    Salicylate 3     ETHYL ALCOHOL LEVEL - Normal    Alcohol ethyl <0.01     COVID-19 VIRUS (CORONAVIRUS) BY PCR - Normal    SARS CoV2 PCR Negative     CBC WITH PLATELETS AND DIFFERENTIAL - Normal    WBC Count 10.9      RBC Count 4.62      Hemoglobin 13.8      Hematocrit 43.4      MCV 94      MCH 29.9      MCHC 31.8      RDW 13.1      Platelet Count 250       Emergency Department Course:        Reviewed:  I reviewed nursing notes, vitals, past medical history and Care Everywhere    Assessments:  0714 I obtained history and examined the patient as noted above.      Consults:  Poison Control Center was called    Interventions:  None     Disposition:  The patient was transferred to [facility].     Impression & Plan   Medical Decision Making:  I evaluated her many hours after her intentional ingestion of gabapentin, patient denies feeling suicidal but does have a significant psychiatric history including history of overdose.  Given the time that has elapsed since her overdose, and combination with reassuring lab tests and input from the Poison Control Center who was contacted just after midnight, she is medically cleared, fortunately without evidence of toxidrome, endorgan damage, need for antidote, or need for further medical monitoring.  Vital signs are satisfactory, as is her neurologic status.  The process of transfer the [facility] unit for further psychiatric evaluation was discussed with the patient who agrees with this.    Diagnosis:    ICD-10-CM    1. Overdose of undetermined intent, initial encounter  T50.904A       2. History of psychiatric disorder  Z86.59         Scribe Disclosure:  I, Bran Hair, am serving as a scribe at 7:12 AM on 11/3/2022 to document services personally performed by Geronimo Corbin MD based on my observations and the provider's statements to me.            Geronimo Corbin MD  11/03/22 0757

## 2022-11-03 NOTE — ED TRIAGE NOTES
"Pt took approx 20 tablets of 800 mg gabapentin po four hours ago. Pt called ems herself from friend's house. Pt states she did it \"out of compulsion\", denies si/hi. Pt drowsy and sleeping in triage.      Triage Assessment     Row Name 11/02/22 5381       Respiratory WDL    Respiratory WDL WDL       Cardiac WDL    Cardiac WDL WDL       Cognitive/Neuro/Behavioral WDL    Cognitive/Neuro/Behavioral WDL WDL  drowsy in triage              "

## 2022-11-03 NOTE — ED NOTES
Per poison control, peak time has passed and other than tylenol level, no further interventions necessary.

## 2022-11-19 ENCOUNTER — HEALTH MAINTENANCE LETTER (OUTPATIENT)
Age: 47
End: 2022-11-19

## 2023-02-24 ENCOUNTER — HOSPITAL ENCOUNTER (OUTPATIENT)
Facility: CLINIC | Age: 48
Setting detail: OBSERVATION
Discharge: NURSING, REHAB OR LONG-TERM CARE | End: 2023-02-26
Attending: EMERGENCY MEDICINE | Admitting: EMERGENCY MEDICINE
Payer: COMMERCIAL

## 2023-02-24 DIAGNOSIS — F41.1 GAD (GENERALIZED ANXIETY DISORDER): ICD-10-CM

## 2023-02-24 DIAGNOSIS — F13.220: ICD-10-CM

## 2023-02-24 DIAGNOSIS — F33.1 MAJOR DEPRESSIVE DISORDER, RECURRENT EPISODE, MODERATE (H): ICD-10-CM

## 2023-02-24 DIAGNOSIS — F11.20 UNCOMPLICATED OPIOID DEPENDENCE (H): ICD-10-CM

## 2023-02-24 LAB — SARS-COV-2 RNA RESP QL NAA+PROBE: NEGATIVE

## 2023-02-24 PROCEDURE — G0378 HOSPITAL OBSERVATION PER HR: HCPCS

## 2023-02-24 PROCEDURE — U0003 INFECTIOUS AGENT DETECTION BY NUCLEIC ACID (DNA OR RNA); SEVERE ACUTE RESPIRATORY SYNDROME CORONAVIRUS 2 (SARS-COV-2) (CORONAVIRUS DISEASE [COVID-19]), AMPLIFIED PROBE TECHNIQUE, MAKING USE OF HIGH THROUGHPUT TECHNOLOGIES AS DESCRIBED BY CMS-2020-01-R: HCPCS | Performed by: EMERGENCY MEDICINE

## 2023-02-24 PROCEDURE — C9803 HOPD COVID-19 SPEC COLLECT: HCPCS

## 2023-02-24 PROCEDURE — 250N000013 HC RX MED GY IP 250 OP 250 PS 637: Performed by: PSYCHIATRY & NEUROLOGY

## 2023-02-24 PROCEDURE — 90791 PSYCH DIAGNOSTIC EVALUATION: CPT

## 2023-02-24 PROCEDURE — U0005 INFEC AGEN DETEC AMPLI PROBE: HCPCS | Performed by: EMERGENCY MEDICINE

## 2023-02-24 PROCEDURE — 99285 EMERGENCY DEPT VISIT HI MDM: CPT | Mod: 25

## 2023-02-24 PROCEDURE — 99223 1ST HOSP IP/OBS HIGH 75: CPT | Mod: AI | Performed by: PSYCHIATRY & NEUROLOGY

## 2023-02-24 RX ORDER — ACETAMINOPHEN 325 MG/1
650 TABLET ORAL EVERY 4 HOURS PRN
Status: DISCONTINUED | OUTPATIENT
Start: 2023-02-24 | End: 2023-02-26 | Stop reason: HOSPADM

## 2023-02-24 RX ORDER — CLONAZEPAM 1 MG/1
1 TABLET ORAL 2 TIMES DAILY
Status: DISCONTINUED | OUTPATIENT
Start: 2023-02-24 | End: 2023-02-24

## 2023-02-24 RX ORDER — CLONAZEPAM 1 MG/1
1 TABLET ORAL 2 TIMES DAILY
Status: DISCONTINUED | OUTPATIENT
Start: 2023-02-24 | End: 2023-02-26 | Stop reason: HOSPADM

## 2023-02-24 RX ORDER — ALBUTEROL SULFATE 90 UG/1
1-2 AEROSOL, METERED RESPIRATORY (INHALATION) EVERY 6 HOURS PRN
Status: DISCONTINUED | OUTPATIENT
Start: 2023-02-24 | End: 2023-02-26 | Stop reason: HOSPADM

## 2023-02-24 RX ORDER — METHOCARBAMOL 500 MG/1
500 TABLET, FILM COATED ORAL 3 TIMES DAILY PRN
Status: DISCONTINUED | OUTPATIENT
Start: 2023-02-24 | End: 2023-02-26 | Stop reason: HOSPADM

## 2023-02-24 RX ORDER — CLONAZEPAM 1 MG/1
1 TABLET ORAL 2 TIMES DAILY
COMMUNITY
End: 2023-03-01

## 2023-02-24 RX ORDER — GABAPENTIN 800 MG/1
1600 TABLET ORAL AT BEDTIME
Status: ON HOLD | COMMUNITY
End: 2023-04-05

## 2023-02-24 RX ORDER — GABAPENTIN 800 MG/1
800 TABLET ORAL 2 TIMES DAILY
Status: DISCONTINUED | OUTPATIENT
Start: 2023-02-24 | End: 2023-02-26 | Stop reason: HOSPADM

## 2023-02-24 RX ORDER — GABAPENTIN 800 MG/1
1600 TABLET ORAL AT BEDTIME
Status: DISCONTINUED | OUTPATIENT
Start: 2023-02-24 | End: 2023-02-26 | Stop reason: HOSPADM

## 2023-02-24 RX ADMIN — CLONAZEPAM 1 MG: 1 TABLET ORAL at 16:29

## 2023-02-24 RX ADMIN — GABAPENTIN 800 MG: 800 TABLET, COATED ORAL at 16:29

## 2023-02-24 RX ADMIN — FLUOXETINE 20 MG: 20 CAPSULE ORAL at 19:55

## 2023-02-24 RX ADMIN — CLONAZEPAM 1 MG: 1 TABLET ORAL at 21:54

## 2023-02-24 RX ADMIN — GABAPENTIN 1600 MG: 800 TABLET, COATED ORAL at 21:54

## 2023-02-24 ASSESSMENT — COLUMBIA-SUICIDE SEVERITY RATING SCALE - C-SSRS
ATTEMPT LIFETIME: YES
LETHALITY/MEDICAL DAMAGE CODE MOST RECENT POTENTIAL ATTEMPT: BEHAVIOR NOT LIKELY TO RESULT IN INJURY
REASONS FOR IDEATION LIFETIME: COMPLETELY TO END OR STOP THE PAIN (YOU COULDN'T GO ON LIVING WITH THE PAIN OR HOW YOU WERE FEELING)
5. HAVE YOU STARTED TO WORK OUT OR WORKED OUT THE DETAILS OF HOW TO KILL YOURSELF? DO YOU INTEND TO CARRY OUT THIS PLAN?: NO
TOTAL  NUMBER OF INTERRUPTED ATTEMPTS LIFETIME: NO
TOTAL  NUMBER OF ABORTED OR SELF INTERRUPTED ATTEMPTS LIFETIME: NO
6. HAVE YOU EVER DONE ANYTHING, STARTED TO DO ANYTHING, OR PREPARED TO DO ANYTHING TO END YOUR LIFE?: NO
1. HAVE YOU WISHED YOU WERE DEAD OR WISHED YOU COULD GO TO SLEEP AND NOT WAKE UP?: YES
LETHALITY/MEDICAL DAMAGE CODE MOST RECENT ACTUAL ATTEMPT: MINOR PHYSICAL DAMAGE
LETHALITY/MEDICAL DAMAGE CODE FIRST POTENTIAL ATTEMPT: BEHAVIOR NOT LIKELY TO RESULT IN INJURY
LETHALITY/MEDICAL DAMAGE CODE MOST LETHAL POTENTIAL ATTEMPT: BEHAVIOR NOT LIKELY TO RESULT IN INJURY
REASONS FOR IDEATION PAST MONTH: DOES NOT APPLY
3. HAVE YOU BEEN THINKING ABOUT HOW YOU MIGHT KILL YOURSELF?: YES
LETHALITY/MEDICAL DAMAGE CODE MOST LETHAL ACTUAL ATTEMPT: MINOR PHYSICAL DAMAGE
4. HAVE YOU HAD THESE THOUGHTS AND HAD SOME INTENTION OF ACTING ON THEM?: NO
5. HAVE YOU STARTED TO WORK OUT OR WORKED OUT THE DETAILS OF HOW TO KILL YOURSELF? DO YOU INTEND TO CARRY OUT THIS PLAN?: YES
LETHALITY/MEDICAL DAMAGE CODE FIRST ACTUAL ATTEMPT: MINOR PHYSICAL DAMAGE
MOST RECENT DATE: 66415
ATTEMPT PAST THREE MONTHS: NO
2. HAVE YOU ACTUALLY HAD ANY THOUGHTS OF KILLING YOURSELF?: NO
4. HAVE YOU HAD THESE THOUGHTS AND HAD SOME INTENTION OF ACTING ON THEM?: YES
2. HAVE YOU ACTUALLY HAD ANY THOUGHTS OF KILLING YOURSELF?: YES
1. IN THE PAST MONTH, HAVE YOU WISHED YOU WERE DEAD OR WISHED YOU COULD GO TO SLEEP AND NOT WAKE UP?: NO
TOTAL  NUMBER OF ACTUAL ATTEMPTS LIFETIME: 4

## 2023-02-24 ASSESSMENT — ACTIVITIES OF DAILY LIVING (ADL)
ADLS_ACUITY_SCORE: 37

## 2023-02-24 ASSESSMENT — ENCOUNTER SYMPTOMS: ABDOMINAL PAIN: 0

## 2023-02-24 NOTE — ED PROVIDER NOTES
EmPATH Unit - Initial Psychiatric Observation Note  [facility] Emergency Department  Observation Initiation Date: Feb 24, 2023    Jihan Gill MRN: [id number]   Age: 47 year old YOB: 1975     History     Chief Complaint   Patient presents with     Depression     HPI  Jihan Gill is a 47 year old female with a past history notable for major depressive disorder, ASIA, and substance use disorders involving opiates and benzodiazepines.  She presented to the emergency department reporting heightened anxiety and depressed mood.  She was determined to be medically stable and transferred to the EmPATH unit for psychiatric assessment.  Additional information indicates that the patient was recently discharged from an outside hospital where she received a brief course of ECT, later discontinued due to poor tolerability, and started on Prozac for antianxiety and antidepressant treatment.  Upon returning home, mood and anxiety symptoms continue to worsen in the context of various stressors.  She spent much of our time explaining that she is not gaining a meaningful response to Klonopin and would like to change back to Ativan.  She implied that she was receiving Ativan until it was switched to Klonopin in hopes of a longer half-life.  She has noted decreased effectiveness and is primarily seeking an alternative benzodiazepine to better treat her anxiety.  She is tolerating Prozac without side effects and recalls doing very well with the medication in the past however at higher doses.  She recalls that the medication eventually lost effectiveness and was subsequently changed to a different antidepressant.  She states that she has been able to abstain from illicit substances since November 11 of last year.  She denied active suicidal thoughts.  She identifies having a young child who was protective against suicide.  There was no indication of psychosis or homicidal thoughts.    Past Medical History  Past  Medical History:   Diagnosis Date     Arthritis      Bipolar 1 disorder (H)      Chronic hepatitis C without hepatic coma (H)      Depressive disorder     postpartum     Depressive disorder      Major depression, recurrent (H)      Opiate addiction (H)      Seizures (H)     narcotic withdrawal     Substance abuse (H)      Urinary calculus, unspecified 2001    Renal stones     Past Surgical History:   Procedure Laterality Date     C/SECTION, LOW TRANSVERSE      [id number]     ENT SURGERY       GYN SURGERY       TONSILLECTOMY       acetaminophen (TYLENOL) 325 MG tablet  albuterol (PROAIR HFA/PROVENTIL HFA/VENTOLIN HFA) 108 (90 Base) MCG/ACT inhaler  ASHWAGANDHA PO  clonazePAM (KLONOPIN) 1 MG tablet  FLUoxetine (PROZAC) 20 MG capsule  gabapentin (NEURONTIN) 800 MG tablet  gabapentin (NEURONTIN) 800 MG tablet  melatonin 3 MG tablet  methocarbamol (ROBAXIN) 500 MG tablet  methadone HCl 10 MG/5ML SOLN      Allergies   Allergen Reactions     Buspirone Hives     Droperidol Anxiety and Other (See Comments)     SHAKING  SHAKING  Tardive Dyskinesia  Tardive Dyskinesia  SHAKING  Tardive Dyskinesia  Tardive Dyskinesia       Ketorolac Tromethamine Hives     Abilify [Aripiprazole] Other (See Comments)     Tardive dyskinesia     Acetaminophen Other (See Comments)     Multiple suicide attempts by tylenol, hoards pills.  Multiple suicide attempts by tylenol, hoards pills.  Multiple suicide attempts by tylenol, hoards pills.  Multiple suicide attempts by tylenol, hoards pills.       Allopurinol      Compazine Other (See Comments)     Dystonia     Dramamine      Jaw lock.       Hydrocodone Nausea and Vomiting     Olanzapine Other (See Comments)     Tardive dyskinesia from Zyprexa - per patient     Reglan Other (See Comments)     dystonia     Seroquel [Quetiapine] Other (See Comments)     Tardive dyskinesia.      Sulfa Drugs Other (See Comments)     dystonia  dystonia       Family History  Family History   Problem Relation Age of Onset      "No Known Problems Father      No Known Problems Mother      No Known Problems Sister      No Known Problems Brother      Social History   Social History     Tobacco Use     Smoking status: Every Day     Packs/day: 0.50     Years: 8.00     Pack years: 4.00     Types: Cigarettes     Smokeless tobacco: Never   Substance Use Topics     Alcohol use: Not Currently     Comment: States no EtOH since 2008.     Drug use: Yes     Comment: Methadone 60mg/day street buy          Review of Systems  A medically appropriate review of systems was performed with pertinent positives and negatives noted in the HPI, and all other systems negative.    Physical Examination   BP: 127/79  Pulse: 104  Temp: 98.9  F (37.2  C)  Resp: 20  Height: 165.1 cm (5' 5\")  Weight: 99.8 kg (220 lb)  SpO2: 97 %    Physical Exam  General: Appears stated age.   Neuro: Alert and fully oriented. Extremities appear to demonstrate normal strength on visual inspection.   Integumentary/Skin: no rash visualized, normal color    Psychiatric Examination   Appearance: awake, alert  Attitude:  cooperative  Eye Contact:  fair  Mood:  anxious and sad   Affect:  mood congruent  Speech:  clear, coherent  Psychomotor Behavior:  no evidence of tardive dyskinesia, dystonia, or tics  Thought Process:  logical and linear  Associations:  no loose associations  Thought Content:  no evidence of suicidal ideation or homicidal ideation and no evidence of psychotic thought  Insight:  fair  Judgement:  fair  Oriented to:  time, person, and place  Attention Span and Concentration:  fair  Recent and Remote Memory:  fair  Language: able to name/identify objects without impairment  Fund of Knowledge: intact with awareness of current and past events    ED Course        Labs Ordered and Resulted from Time of ED Arrival to Time of ED Departure   COVID-19 VIRUS (CORONAVIRUS) BY PCR - Normal       Result Value    SARS CoV2 PCR Negative     HCG QUALITATIVE URINE       Assessments & Plan (with " Medical Decision Making)   Patient presenting with concern for ongoing anxiety and dissatisfaction with her current prescribed benzodiazepine.  She is requesting to switch back to Ativan from Klonopin.  Review of the [place] prescription database shows no history of Ativan being prescribed over the past 1 year.  Frequent prescriptions for Klonopin are noted on her prescription history.  Her treatment plan was focused on directing requests for controlled substances to come from a single prescriber on an outpatient basis while optimizing antidepressant and antianxiety medications.  Nursing notes reviewed noting no acute issues.     I have reviewed the assessment completed by the [facility].     During the observation period, the patient did not require medications for agitation, and did not require restraints/seclusion for patient and/or provider safety.     The patient was found to have a psychiatric condition that would benefit from an observation stay in the emergency department for further psychiatric stabilization and/or coordination of a safe disposition. The observation plan includes serial assessments of psychiatric condition, potential administration of medications if indicated, further disposition pending the patient's psychiatric course during the monitoring period.     Preliminary diagnosis:    ICD-10-CM    1. Depression, unspecified depression type  F32.A       2. Major depressive disorder, recurrent episode, moderate (H)  F33.1       3. ASIA (generalized anxiety disorder)  F41.1       4. Uncomplicated opioid dependence (H)  F11.20       5. 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  -Continue Klonopin 1 mg twice a day for management of anxiety.  The patient is seeking to change this medication to Ativan.  We discussed  the importance of coordinating these requests with her current outpatient prescriber as well as her methadone program.  -Labs have been ordered including a urine drug screen and pregnancy test  -Referral for outpatient psychiatric medication management  -Educated on pursuing GeneSight testing to rule out metabolism abnormalities complicating her response to medications.  -Enter to observation status and reassess tomorrow    --  Anderson Monroe MD   [facility] EMERGENCY DEPT  EmPATH Unit  2/24/2023        Anderson Monroe MD  02/24/23 2178

## 2023-02-24 NOTE — ED NOTES
emPATH [facility] ED Note    Patient signed JOSE for her [facility] Facility [facility] [place].   is Roland Montemayor Ph:[phone].  Called Roland phone number to provider verification that patient is here on [facility] unit and will be staying on OBS tonight.   Left vm asking Mr. Mnotemayor to return this writer phone call to provide verification of patient's current status and that she will be staying overnight here on [facility].   Awaiting for return phone call from Mr. Montemayor.     MARY Garcia

## 2023-02-24 NOTE — ED TRIAGE NOTES
Pt feeling depressed and anxious - recently went back on Prozac couple weeks at [place]  Pt hoping to adjust medication and talk with someone in empath      Triage Assessment     Row Name 02/24/23 1050       Triage Assessment (Adult)    Airway WDL WDL       Respiratory WDL    Respiratory WDL WDL       Cardiac WDL    Cardiac WDL WDL       Cognitive/Neuro/Behavioral WDL    Cognitive/Neuro/Behavioral WDL WDL

## 2023-02-24 NOTE — ED NOTES
"Patient transferred from ED triage at 1350. She denies suicidal ideation.  She states she has been sober since November.  She states she is here due to depression and anxiety. She was at [facility] and started ECT but she said it did not go well so they did not do more.  \"I woke up and freaked out and did not know where I was\" . She was placed on prozac and wants something different. She would also like to switch from clonopin to ativan.Patient is pleasant and cooperative. Nursing and risk assessments completed.  Assessments reviewed with LMHP and physician. Video monitoring in progress, patient informed.  Admission information reviewed with patient. Patient given a tour of [facility] and instructions on using the facility. Questions regarding [facility] addressed. Pt search completed and belongings inventoried.    "

## 2023-02-24 NOTE — CARE PLAN
Jihan Gill  February 24, 2023  Plan of Care Hand-off Note     Patient Care Path: Observation    Plan for Care:     Patient presents to [facility] with chief complaint of worsening symptoms of anxiety and depression. Patient reporting passive SI with no specific plan.  Pt able to contract for safety.        Critical Safety Issues: Patient reporting passive SI of wishing she was not here but denies any active intent or plan.  Able to contract for no self harm.     Overview:  This patient is a child/adolescent: No    This patient has additional special visitor precautions: No    Legal Status: Voluntary    Contacts:   [facility]: Contact [phone]    Psychiatry Consult:      Updated RN and Consulting Psychiatric Provider regarding plan of care.    LOUANN GarciaSW

## 2023-02-24 NOTE — ED PROVIDER NOTES
History     Chief Complaint:  Depression     The history is provided by the patient.      Jihan Gill is a 47 year old female with a history of depression who presents with worsening depression.  She states that she was seen at an OSH recently and given Prozac again, but questions that she does not be that it worked for her in the past.  She also states that she feels more tired on Klonopin and feels like she does not want to go to bed.  She has no suicidal ideation and has not taken any steps to harm self, but is very interested in speaking to a mental health professional here today.    Denies any abdominal pain or chest pain, no medical complaint this time.    Independent Historian:   None - Patient Only    Review of External Notes: Yes, appreciate mental health history    ROS:  Review of Systems   Cardiovascular: Negative for chest pain.   Gastrointestinal: Negative for abdominal pain.   Psychiatric/Behavioral: Negative for self-injury.   All other systems reviewed and are negative.    Allergies:  Buspirone  Droperidol  Ketorolac Tromethamine  Abilify [Aripiprazole]  Acetaminophen  Allopurinol  Compazine  Dramamine  Hydrocodone  Olanzapine  Reglan  Seroquel [Quetiapine]  Sulfa Drugs     Medications:    Albuterol   Methocarbamol   Clonazepam  Ashwagandha   Fluoxetine  Gabapentin  Methadone  Quetiapine    Past Medical History:    Arthritis  Bipolar I disorder  Chronic hepatitis C  Depression  Opiate addiction  Seizures  Substance abuse  Urinary calculus  Methadone overdose  Suicide attempt  Degeneration of lumbar intervertebral disc  Bipolar II disorder  Benzodiazapine dependence  Suicidal ideation  Cocaine use disorder  Lipoma of abdominal wall  Anemia  Tylenol overdose  Meningitis   Obesity   Benzodiazapine withdrawal  Vitamin D insufficiency   Traumatic brain injury    Past Surgical History:     section  Tonsillectomy  I&D breast, left     Family History:    Colon cancer  Depression   Obesity  "    Social History:  Presents alone  Presents via private vehicle   Endorses smoking cigarettes and current drug use    Physical Exam     Patient Vitals for the past 24 hrs:   BP Temp Temp src Pulse Resp SpO2 Height Weight   02/24/23 1800 110/74 99  F (37.2  C) Oral 74 16 95 % -- --   02/24/23 1425 121/64 98.9  F (37.2  C) Oral 95 16 94 % 1.651 m (5' 5\") 105.7 kg (233 lb)   02/24/23 1100 127/79 98.9  F (37.2  C) Oral 104 20 97 % 1.651 m (5' 5\") 99.8 kg (220 lb)      Physical Exam  Vitals: reviewed by me  General: Pt seen on hospital chair, pleasant, cooperative, and alert to conversation  Eyes: Tracking well, clear conjunctiva BL  ENT: MMM, midline trachea.   Lungs: No tachypnea, no accessory muscle use. No respiratory distress.   MSK: no joint effusion.  No evidence of trauma  Skin: No rash  Neuro: Clear speech and no facial droop.  Psych: Not RIS, no e/o AH/VH    Emergency Department Course   Laboratory:  Labs Ordered and Resulted from Time of ED Arrival to Time of ED Departure   COVID-19 VIRUS (CORONAVIRUS) BY PCR - Normal       Result Value    SARS CoV2 PCR Negative     HCG QUALITATIVE URINE      Emergency Department Course & Assessments:      Interventions:  Medications   acetaminophen (TYLENOL) tablet 650 mg (has no administration in time range)   albuterol (PROVENTIL HFA/VENTOLIN HFA) inhaler (has no administration in time range)   gabapentin (NEURONTIN) tablet 800 mg (800 mg Oral $Given 2/24/23 1629)   gabapentin (NEURONTIN) tablet 1,600 mg (has no administration in time range)   methocarbamol (ROBAXIN) tablet 500 mg (has no administration in time range)   clonazePAM (klonoPIN) tablet 1 mg (1 mg Oral $Given 2/24/23 1629)   FLUoxetine (PROzac) capsule 40 mg (has no administration in time range)   FLUoxetine (PROzac) capsule 20 mg (20 mg Oral $Given 2/24/23 1955)        Consultations/Discussion of Management or Tests:  [facility] and mental health team    Social Determinants of Health affecting care:   History of " depression and mental health    Assessments:  I obtained history and examined the patient as noted above.     Disposition:  The patient was transferred to [facility].     Impression & Plan    Medical Decision Making:  This is a very pleasant 47-year-old female who presents emergency room with significant depression.  She has no medical concerns at this time, normal vital signs here, does not have any plan for active suicidal ideation, and while she does have a history of attempts, she does not have any recent attempts here and denies any ingestions.  I do think she is medically cleared at this time, will plan for transfer to [facility] as I do think that this is the next best step in her management.    Diagnosis:    ICD-10-CM    1. Depression, unspecified depression type  F32.A       2. Major depressive disorder, recurrent episode, moderate (H)  F33.1       3. ASIA (generalized anxiety disorder)  F41.1       4. Uncomplicated opioid dependence (H)  F11.20       5. 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     2/24/2023   Willam Nava MD Fitzgerald, Michael Maxwell Kreofsky, MD  02/24/23 2051
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
## 2023-02-25 LAB
AMPHETAMINES UR QL SCN: ABNORMAL
BARBITURATES UR QL SCN: ABNORMAL
BENZODIAZ UR QL SCN: ABNORMAL
BZE UR QL SCN: ABNORMAL
CANNABINOIDS UR QL SCN: ABNORMAL
HCG UR QL: NEGATIVE
OPIATES UR QL SCN: ABNORMAL
PCP QUAL URINE (ROCHE): ABNORMAL

## 2023-02-25 PROCEDURE — 250N000013 HC RX MED GY IP 250 OP 250 PS 637: Performed by: NURSE PRACTITIONER

## 2023-02-25 PROCEDURE — 99232 SBSQ HOSP IP/OBS MODERATE 35: CPT | Performed by: NURSE PRACTITIONER

## 2023-02-25 PROCEDURE — G0378 HOSPITAL OBSERVATION PER HR: HCPCS

## 2023-02-25 PROCEDURE — 81025 URINE PREGNANCY TEST: CPT | Performed by: PSYCHIATRY & NEUROLOGY

## 2023-02-25 PROCEDURE — 80307 DRUG TEST PRSMV CHEM ANLYZR: CPT | Performed by: PSYCHIATRY & NEUROLOGY

## 2023-02-25 PROCEDURE — 250N000013 HC RX MED GY IP 250 OP 250 PS 637: Performed by: PSYCHIATRY & NEUROLOGY

## 2023-02-25 RX ORDER — QUETIAPINE FUMARATE 25 MG/1
12.5-25 TABLET, FILM COATED ORAL EVERY 6 HOURS PRN
Qty: 14 TABLET | Refills: 0 | Status: SHIPPED | OUTPATIENT
Start: 2023-02-25 | End: 2023-03-14

## 2023-02-25 RX ORDER — METHADONE HYDROCHLORIDE 10 MG/ML
185 CONCENTRATE ORAL DAILY
Status: DISCONTINUED | OUTPATIENT
Start: 2023-02-25 | End: 2023-02-26 | Stop reason: HOSPADM

## 2023-02-25 RX ADMIN — CLONAZEPAM 1 MG: 1 TABLET ORAL at 21:21

## 2023-02-25 RX ADMIN — QUETIAPINE 25 MG: 25 TABLET, FILM COATED ORAL at 17:07

## 2023-02-25 RX ADMIN — FLUOXETINE 40 MG: 20 CAPSULE ORAL at 08:03

## 2023-02-25 RX ADMIN — CLONAZEPAM 1 MG: 1 TABLET ORAL at 08:03

## 2023-02-25 RX ADMIN — GABAPENTIN 800 MG: 800 TABLET, COATED ORAL at 14:00

## 2023-02-25 RX ADMIN — GABAPENTIN 1600 MG: 800 TABLET, COATED ORAL at 21:21

## 2023-02-25 RX ADMIN — METHADONE HYDROCHLORIDE 185 MG: 10 CONCENTRATE ORAL at 10:00

## 2023-02-25 RX ADMIN — GABAPENTIN 800 MG: 800 TABLET, COATED ORAL at 08:03

## 2023-02-25 ASSESSMENT — ACTIVITIES OF DAILY LIVING (ADL)
ADLS_ACUITY_SCORE: 37

## 2023-02-25 ASSESSMENT — COLUMBIA-SUICIDE SEVERITY RATING SCALE - C-SSRS
TOTAL  NUMBER OF ABORTED OR SELF INTERRUPTED ATTEMPTS SINCE LAST CONTACT: NO
SUICIDE, SINCE LAST CONTACT: NO
6. HAVE YOU EVER DONE ANYTHING, STARTED TO DO ANYTHING, OR PREPARED TO DO ANYTHING TO END YOUR LIFE?: NO
TOTAL  NUMBER OF INTERRUPTED ATTEMPTS SINCE LAST CONTACT: NO
1. SINCE LAST CONTACT, HAVE YOU WISHED YOU WERE DEAD OR WISHED YOU COULD GO TO SLEEP AND NOT WAKE UP?: NO
2. HAVE YOU ACTUALLY HAD ANY THOUGHTS OF KILLING YOURSELF?: NO
ATTEMPT SINCE LAST CONTACT: NO

## 2023-02-25 NOTE — ED PROVIDER NOTES
EmPATH Unit - Psychiatric Consultation  [facility] Emergency Department    Jihan Gill MRN: [id number]   Age: 47 year old YOB: 1975     History     Chief Complaint   Patient presents with     Depression     HPI  HPI  Jihan Gill is a 47 year old female with a past history notable for major depressive disorder, ASIA, and substance use disorders involving opiates and benzodiazepines.  She presented to the emergency department reporting heightened anxiety and depressed mood.  She was determined to be medically stable and transferred to the [facility] unit for psychiatric assessment.  Additional information indicates that the patient was recently discharged from an outside hospital where she received a brief course of ECT, later discontinued due to poor tolerability, and started on Prozac for antianxiety and antidepressant treatment.  Upon returning home, mood and anxiety symptoms continue to worsen in the context of various stressors.      Patient seen by Dr. Monroe yesterday for initial evaluation. Documentation reviewed, noting that patient was requesting to switch from clonazepam back to lorazepam, which she apparently used historically to help manage anxiety symptoms. Dr. Monroe talked with patient about speaking with her outpatient provider to facilitate any changes to her benzodiazepine regimen. Fluoxetine was increased from 20 mg to 40 mg this morning to target symptoms of depression and anxiety.     Patient seen for follow-up today. She endorses ongoing anxiety symptoms, feeling as though clonazepam does not adequately treat her anxiety. She reports feeling more dulled on clonazepam. She reports that her anxiety symptoms come and go and she would prefer to have something available as needed instead of relying on a scheduled medication. Spoke with patient about benzodiazepines and the fact that they tend to be more of a bandaid and do not really solve the underlying issue of anxiety.  They can perpetuate the cycle of addiction, especially in someone prone to struggling with chemical dependency. Discussed that anxiety really needs to be addressed from a therapy perspective as well, learning ways to cope with and manage distressing feelings. Patient verbalized understanding. She talked about her brief admission to [facility], experiencing an adverse reaction to ECT. She mentions that they restarted her fluoxetine, which she had taken for 10 years previously, noting that it lost efficacy. She states that she would prefer to try a different antidepressant. She believes she had tried Trintellix at some point and recalls that it had worked better than fluoxetine. We discuss a plan to reinitiate Trintellix while utilizing quetiapine as needed to help address her more immediate anxiety concerns. She has not experienced any suicidal thinking today. She plans to remain on observation status for an additional night so she can get her dose of methadone in the morning.     Past Medical History  Past Medical History:   Diagnosis Date     Arthritis      Bipolar 1 disorder (H)      Chronic hepatitis C without hepatic coma (H)      Depressive disorder     postpartum     Depressive disorder      Major depression, recurrent (H)      Opiate addiction (H)      Seizures (H)     narcotic withdrawal     Substance abuse (H)      Urinary calculus, unspecified 2001    Renal stones     Past Surgical History:   Procedure Laterality Date     C/SECTION, LOW TRANSVERSE      [id number]     ENT SURGERY       GYN SURGERY       TONSILLECTOMY       acetaminophen (TYLENOL) 325 MG tablet  albuterol (PROAIR HFA/PROVENTIL HFA/VENTOLIN HFA) 108 (90 Base) MCG/ACT inhaler  ASHWAGANDHA PO  clonazePAM (KLONOPIN) 1 MG tablet  gabapentin (NEURONTIN) 800 MG tablet  gabapentin (NEURONTIN) 800 MG tablet  melatonin 3 MG tablet  methocarbamol (ROBAXIN) 500 MG tablet  QUEtiapine (SEROQUEL) 25 MG tablet  vortioxetine (TRINTELLIX) 10 MG tablet  methadone  "HCl 10 MG/5ML SOLN      Allergies   Allergen Reactions     Buspirone Hives     Droperidol Anxiety and Other (See Comments)     SHAKING  SHAKING  Tardive Dyskinesia  Tardive Dyskinesia  SHAKING  Tardive Dyskinesia  Tardive Dyskinesia       Ketorolac Tromethamine Hives     Abilify [Aripiprazole] Other (See Comments)     Tardive dyskinesia     Acetaminophen Other (See Comments)     Multiple suicide attempts by tylenol, hoards pills.  Multiple suicide attempts by tylenol, hoards pills.  Multiple suicide attempts by tylenol, hoards pills.  Multiple suicide attempts by tylenol, hoards pills.       Allopurinol      Compazine Other (See Comments)     Dystonia     Dramamine      Jaw lock.       Hydrocodone Nausea and Vomiting     Olanzapine Other (See Comments)     Tardive dyskinesia from Zyprexa - per patient     Reglan Other (See Comments)     dystonia     Sulfa Drugs Other (See Comments)     dystonia  dystonia       Family History  Family History   Problem Relation Age of Onset     No Known Problems Father      No Known Problems Mother      No Known Problems Sister      No Known Problems Brother      Social History   Social History     Tobacco Use     Smoking status: Every Day     Packs/day: 0.50     Years: 8.00     Pack years: 4.00     Types: Cigarettes     Smokeless tobacco: Never   Substance Use Topics     Alcohol use: Not Currently     Comment: States no EtOH since 2008.     Drug use: Yes     Comment: Methadone 60mg/day street buy          Review of Systems  A medically appropriate review of systems was performed with pertinent positives and negatives noted in the HPI, and all other systems negative.    Physical Examination   BP: 127/79  Pulse: 104  Temp: 98.9  F (37.2  C)  Resp: 20  Height: 165.1 cm (5' 5\")  Weight: 99.8 kg (220 lb)  SpO2: 97 %    Physical Exam  General: Appears stated age.   Neuro: Alert and fully oriented. Extremities appear to demonstrate normal strength on visual inspection.   Integumentary/Skin: " no rash visualized, normal color    Psychiatric Examination   Appearance: awake, alert, adequately groomed, appeared as age stated and casually dressed  Attitude:  cooperative  Eye Contact:  good  Mood:  anxious and sad   Affect:  mood congruent, intensity is normal and reactive  Speech:  clear, coherent and normal prosody  Psychomotor Behavior:  no evidence of tardive dyskinesia, dystonia, or tics  Thought Process:  linear and goal oriented  Associations:  no loose associations  Thought Content:  no evidence of suicidal ideation or homicidal ideation and no evidence of psychotic thought  Insight:  fair  Judgement:  fair  Oriented to:  time, person, and place  Attention Span and Concentration:  intact  Recent and Remote Memory:  intact  Language: able to name/identify objects without impairment  Fund of Knowledge: intact with awareness of current and past events    ED Course        Labs Ordered and Resulted from Time of ED Arrival to Time of ED Departure   DRUG ABUSE SCREEN 77 URINE (FL, RH, SH) - Abnormal       Result Value    Amphetamines Urine Screen Negative      Barbituates Urine Screen Negative      Benzodiazepine Urine Screen Positive (*)     Cannabinoids Urine Screen Negative      Opiates Urine Screen Negative      PCP Urine Screen Negative      Cocaine Urine Screen Negative     COVID-19 VIRUS (CORONAVIRUS) BY PCR - Normal    SARS CoV2 PCR Negative     HCG QUALITATIVE URINE - Normal    hCG Urine Qualitative Negative         Assessments & Plan (with Medical Decision Making)   Patient presenting with concern for ongoing anxiety and dissatisfaction with her current prescribed benzodiazepine.  She is requesting to switch back to Ativan from Klonopin.  Review of the [place] prescription database shows no history of Ativan being prescribed over the past 1 year.  Frequent prescriptions for Klonopin are noted on her prescription history.  Her treatment plan was focused on directing requests for controlled  substances to come from a single prescriber on an outpatient basis while optimizing antidepressant and antianxiety medications. Nursing notes reviewed noting no acute issues.     I have reviewed the assessment completed by the [facility].     Preliminary diagnosis:    ICD-10-CM    1. Depression, unspecified depression type  F32.A       2. Major depressive disorder, recurrent episode, moderate (H)  F33.1       3. ASIA (generalized anxiety disorder)  F41.1       4. Uncomplicated opioid dependence (H)  F11.20       5. 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 Patient does not wish to continue, noting that it was previously ineffective. Long half-life should help to prevent any withdrawal symptoms.   - Start Trintellix 10 mg daily for treatment of depression and anxiety. Prescription printed.  - Will order quetiapine 12.5 mg to 25 mg q4h prn for anxiety symptoms. Prescription printed.   - Continue clonazepam 1 mg bid for anxiety. Reinforced that she will need to follow-up outpatient for changes to her benzodiazepine regimen.   - Continue methadone maintenance 185 mg daily  - Patient has been scheduled with outpatient psychiatrist as well as individual therapist.  - Currently enrolled in [facility] outpatient counseling - encouraged to continue  - Pt will remain on observation status overnight. If she chooses to discharge prior to being seen by psychiatry tomorrow, she is not holdable. Otherwise, reassess tomorrow.     --  Yahir Rodriguez CNP   [facility] EMERGENCY DEPT  EmPATH Unit  2/24/2023      Yahir Rodriguez CNP  02/25/23 [id number]
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
## 2023-02-25 NOTE — ED NOTES
emPATH [facility] ED Note    Roland from patient sober living called back.  Updated him with signed JOSE that patient is here on Empath unit and will be staying overnight and will be re-assessed in the morning.  Informed patient that confirmation of patient being here on Empath was done tonight.       MARY Garcia

## 2023-02-25 NOTE — ED NOTES
Spoke to to Chevy who owns the sober house. Let him know that patient is here and will come home tomorrow.  Patient took 25 mg of seroquel after the provider saw the patient.  Tomorrow staff will bring her prescriptions for seroquel and trintilix to the hospital outpatient pharmacy after it opens at 0900.  Patient's AVS is done so patient may leave to go back to the sober house after her prescriptions are filled.  Prescriptions are in her chart.

## 2023-02-25 NOTE — ED NOTES
Patient pleasant and cooperative this shift. Ate dinner, watched TV, napped and took prescribed medications. Patient did not request any PRN medications. Patient aware she will start increased dose of Prozac tomorrow, 40mg. Methadone clinic to be contacted tomorrow to confirm dose. Patient received her methadone today prior to arriving to hospital. JOSE signed for sober house this shift. Per patient request sober house was contacted to let them know she will be spending the night on EmPath tonight.

## 2023-02-25 NOTE — ED NOTES
Patient upset that she is not getting ativan.  She feels like no one wants to change her meds.  She stated she will go home and feel the same way because she does not have prn ativan.

## 2023-02-25 NOTE — ED NOTES
Patient  states that she will need to stay overnight as the methadone clinic is not open until Monday. At this time a call was put into array earlier to order the methadone. Still waiting for them to call back. Patient was not happy that she did not get ativan ordered for her instead of clonopin.  She does not like taking it on a schedule but would rather take it prn.  The writer let her know that she should get her own provider to order the ativan.  She stated that she does not have a psychiatrist and has not had one since last summer but goes to her medical doctor.  Writer talked to her about getting a psychiatrist.  She states she is still depressed and has fleeting thoughts of suicide.

## 2023-02-25 NOTE — ED NOTES
Verified the methadone dose with Ivanna ZIMMER at [facility] in [place]. Her last dose was 185mg of liquid methadone given 2/24 at 0824.

## 2023-02-25 NOTE — DISCHARGE INSTRUCTIONS
You have been scheduled with a VIrtual Medication Appointment with:        Date: Tuesday, 2/28/2023  Time: 1:00 pm - 2:00 pm   Provider: Jacki Arana MD, MD   Location: [facility], [address] [place] [address], [address], [place] [address]   Phone: [phone]   Type: Telepsychiatry    Patient Instructions  Please fill New Patient Form by using following link. All forms need to be completed 24 hours prior to the appointment date/time by going to [email or web address] Please call us on [phone] 24 hours prior to your scheduled appointment to confirm that you are able to attend. We will provide you information about how to log into video call software when you call.    2. You have been scheduled with Virtual Therapy Appointment with:       Date: Wednesday, 3/1/2023  Time: 2:00 pm - 2:55 pm   Provider: Jeremy Reid MA  [facility]   Location: [facility], [address] [place] [address], [address], [place] [address]   Phone: [phone]   Type: Teletherapy    Patient Instructions   Intake paperwork will be emailed out from [facility] Client Portal- please complete prior to your scheduled appointment.    3. Return to the Emergency Department if things become worse    Aftercare Plan    Please sign the Release of Information (JOSE) prior to leaving the hospital. By signing you are allowing us to share visit information with current providers and to make recommended referrals on your behalf.     Follow up with established providers and supports as scheduled. Continue taking medications as prescribed. Abstain from drugs and alcohol. Utilize your [facility] crisis team as needed. They are available 24/7. Contact information is listed below.     The following appointment(s) and/or referral(s) were made on your behalf. If you need to make changes or cancel please contact the clinic/provider directly.         If I am feeling unsafe or I am in a crisis, I will:    Contact my established care providers   Call the National Suicide Prevention Lifeline: 440.731.9545   Go to the nearest emergency room   Call 911     Warning signs that I or other people might notice when a crisis is developing for me: changes to sleep, appetite or mood, increased anger, agitation or irritability, feeling depressed or hopeless, spending more time alone or talking less, increased crying, decreased productivity, seeing or hearing things that aren't there, thoughts of not wanting to live anymore or of actually killing myself, thoughts of hurting others    Things I am able to do on my own to cope or help me feel better: watching a favorite tv show or movie, listening to music I enjoy, going outside and breathing fresh air, going for a walk or exercising, taking a shower or bath, a cold or hot beverage, a healthy snack, drawing/coloring/painting, journaling, singing or dancing, deep breathing     I can try practicing square breathing when I begin to feel anxious - inhale through the nose for the count of 4 and the first line on the square. Exhale through the mouth for the count of 4 for the second line of the square. Repeat to complete the square. Repeat the square as many times as needed.    I can also use my five senses to practice mindfulness and grounding. What are five things I can see, four things I can hear, three things I can feel, two things I can smell, and one thing I can taste.     Things that I am able to do with others to cope or help me feel better: sometimes just talking or spending time with someone else, sharing a meal or having coffee, watching a movie or playing a game, going for a walk or exercising    I can also use community resources including mental health hotlines, [facility] crisis teams, or apps.     Things I can use or do for distraction: movies/tv, music, reading, games, drawing/coloring/painting or other art, essential oils, exercise, cleaning/organizing, puzzles, crossword  "puzzles, word search, Sudoku       I can also download a meditation or relaxation niranjan, like Calm, Headspace, or Insight Timer (all three offer a free version)    Changes I can make to support my mental health and wellness: Attend scheduled mental health therapy and psychiatric appointments. Take my medications as prescribed. Maintain a daily schedule/routine. Abstain from all mood altering substances, including drugs, alcohol, or medications not currently prescribed to me. Implement a self-care routine.      People in my life that I can ask for help: friends or family, trusted teachers/staff/colleagues, trusted members of my community or place of [employer], mental health crisis lines, or 911    Your [facility] has a mental health crisis team you can call 24/7: [facility] Adult, [phone]    Other things that are important when I m in crisis: to remember that the feelings I am having right now are temporary, and it won't feel like this forever, and that it is okay and important to ask for help    Crisis Lines  Crisis Text Line  Text 181294  You will be connected with a trained live crisis counselor to provide support.    Por espanol, texto  MARI a [id number] o texto a 442-AYUDAME en WhatsApp    National Hope Line  1.800.SUICIDE [4889402]      Community Resources  Fast Tracker  Linking people to mental health and substance use disorder resources  fasttrackermn.org     [place] Mental Health Warm Line  Peer to peer support  Monday thru Saturday, 12 pm to 10 pm  875.555.4033 or 0.628.053.5588  Text \"Support\" to 60399    National [place] on Mental Illness (ERNST)  606.294.5050 or 1.888.ERNST.HELPS      Mental Health Apps  My3  https://my3app.org/    VirtualHopeBox  https://[employer].org/apps/virtual-hope-box/      Additional Information  Today you were seen by a licensed mental health professional through Triage and Transition services, Behavioral Healthcare Providers ([facility])  for a crisis assessment in the " Emergency Department at [facility].  It is recommended that you follow up with your established providers (psychiatrist, mental health therapist, and/or primary care doctor - as relevant) as soon as possible. Coordinators from [facility] will be calling you in the next 24-48 hours to ensure that you have the resources you need.  You can also contact [facility] coordinators directly at [phone]. You may have been scheduled for or offered an appointment with a mental health provider. [facility] maintains an extensive network of licensed behavioral health providers to connect patients with the services they need.  We do not charge providers a fee to participate in our referral network.  We match patients with providers based on a patient's specific needs, insurance coverage, and location.  Our first effort will be to refer you to a provider within your care system, and will utilize providers outside your care system as needed.

## 2023-02-26 VITALS
SYSTOLIC BLOOD PRESSURE: 109 MMHG | HEART RATE: 67 BPM | OXYGEN SATURATION: 96 % | RESPIRATION RATE: 16 BRPM | BODY MASS INDEX: 38.82 KG/M2 | WEIGHT: 233 LBS | DIASTOLIC BLOOD PRESSURE: 75 MMHG | HEIGHT: 65 IN | TEMPERATURE: 98.1 F

## 2023-02-26 PROCEDURE — 250N000013 HC RX MED GY IP 250 OP 250 PS 637: Performed by: PSYCHIATRY & NEUROLOGY

## 2023-02-26 PROCEDURE — 250N000013 HC RX MED GY IP 250 OP 250 PS 637: Performed by: NURSE PRACTITIONER

## 2023-02-26 PROCEDURE — G0378 HOSPITAL OBSERVATION PER HR: HCPCS

## 2023-02-26 PROCEDURE — 99239 HOSP IP/OBS DSCHRG MGMT >30: CPT | Performed by: NURSE PRACTITIONER

## 2023-02-26 RX ORDER — CLONAZEPAM 1 MG/1
1 TABLET ORAL 2 TIMES DAILY PRN
Qty: 10 TABLET | Refills: 0 | Status: SHIPPED | OUTPATIENT
Start: 2023-02-26 | End: 2023-03-13

## 2023-02-26 RX ADMIN — GABAPENTIN 800 MG: 800 TABLET, COATED ORAL at 07:46

## 2023-02-26 RX ADMIN — CLONAZEPAM 1 MG: 1 TABLET ORAL at 07:46

## 2023-02-26 RX ADMIN — VORTIOXETINE 10 MG: 10 TABLET, FILM COATED ORAL at 07:46

## 2023-02-26 RX ADMIN — METHADONE HYDROCHLORIDE 185 MG: 10 CONCENTRATE ORAL at 07:55

## 2023-02-26 ASSESSMENT — ACTIVITIES OF DAILY LIVING (ADL)
ADLS_ACUITY_SCORE: 37

## 2023-02-26 ASSESSMENT — COLUMBIA-SUICIDE SEVERITY RATING SCALE - C-SSRS
2. HAVE YOU ACTUALLY HAD ANY THOUGHTS OF KILLING YOURSELF?: NO
SUICIDE, SINCE LAST CONTACT: NO
TOTAL  NUMBER OF ABORTED OR SELF INTERRUPTED ATTEMPTS SINCE LAST CONTACT: NO
TOTAL  NUMBER OF INTERRUPTED ATTEMPTS SINCE LAST CONTACT: NO
ATTEMPT SINCE LAST CONTACT: NO
6. HAVE YOU EVER DONE ANYTHING, STARTED TO DO ANYTHING, OR PREPARED TO DO ANYTHING TO END YOUR LIFE?: NO
1. SINCE LAST CONTACT, HAVE YOU WISHED YOU WERE DEAD OR WISHED YOU COULD GO TO SLEEP AND NOT WAKE UP?: NO

## 2023-02-26 NOTE — PROGRESS NOTES
Patient agreed to discharge plan going back to the sober house. Discharge instructions reviewed with patient including follow-up care plan. Medications dispensed at [facility] explained to patient who verbalized understanding. Patient insurance was able to dispense on three tabs of Thrintellix today and the others can be done later.Reviewed safety plan and outpatient resources. Denies SI and HI. All belongings that were brought into the hospital have been returned to patient. Escorted off the unit at door six accompanied by [facility] staff. Discharged to previous sober house in [place] via [facility].

## 2023-02-26 NOTE — ED NOTES
[facility] Reassessment and Progress Note    Client Name: Jihan Gill  Date: February 26, 2023       Presenting issue that brought patient to the [facility]PATH unit: Patient was admitted to [facility] with chief complaint of anxiety and depression.       Current presentation on the unit: The patient reports worried about discharging today as she is worried about her medications. She is worried that maybe her Klonopin may not be there and wanting to speak to APRN regarding getting another dose of Klonopin to have.  She is set up to see psychiatry on Tuesday for ongoing medication management.  She did get her Methadone this morning.  Denies SI/HI.      Current risk to self or others? No    Summary of therapeutic interventions completed with patient: Supportive Couneling    Treatment objectives addressed in this session: Discharge    Progress on treatment goals: Patient has met goals    Additional collateral information:      [place] Suicide Severity Rating Scale Full Clinical Version:02/24/2023  Suicidal Ideation  1. Wish to be Dead (Lifetime): Yes  1. Wish to be Dead (Past 1 Month): No  2. Non-Specific Active Suicidal Thoughts (Lifetime): Yes  2. Non-Specific Active Suicidal Thoughts (Past 1 Month): No  3. Active Suicidal Ideation with any Methods (Not Plan) Without Intent to Act (Lifetime): Yes  3. Active Suicidal Ideation with any Methods (Not Plan) Without Intent to Act (Past 1 Month): No  4. Active Suicidal Ideation with Some Intent to Act, Without Specific Plan (Lifetime): Yes  4. Active Suicidal Ideation with Some Intent to Act, Without Specific Plan (Past 1 Month): No  5. Active Suicidal Ideation with Specific Plan and Intent (Lifetime): Yes  5. Active Suicidal Ideation with Specific Plan and Intent (Past 1 Month): No  Intensity of Ideation  Most Severe Ideation Rating (Lifetime): 5  Most Severe Ideation Rating (Past 1 Month): 1  Frequency (Lifetime): Daily or almost daily  Frequency (Past 1 Month): 2-5 times  in week  Duration (Lifetime): 4-8 hours/most of day  Duration (Past 1 Month): Less than 1 hour/some of the time  Controllability (Lifetime): Can control thoughts with some difficulty  Controllability (Past 1 Month): Easily able to control thoughts  Deterrents (Lifetime): Uncertain that deterrents stopped you  Deterrents (Past 1 Month): Deterrents definitely stopped you from attempting suicide  Reasons for Ideation (Lifetime): Completely to end or stop the pain (You couldn't go on living with the pain or how you were feeling)  Reasons for Ideation (Past 1 Month): Does not apply  Suicidal Behavior  Actual Attempt (Lifetime): Yes  Total Number of Actual Attempts (Lifetime): 4  Actual Attempt (Past 3 Months): No  Has subject engaged in non-suicidal self-injurious behavior? (Lifetime): Yes  Has subject engaged in non-suicidal self-injurious behavior? (Past 3 Months): No  Interrupted Attempts (Lifetime): No  Aborted or Self-Interrupted Attempt (Lifetime): No  Preparatory Acts or Behavior (Lifetime): No  C-SSRS Risk (Lifetime/Recent)  Calculated C-SSRS Risk Score (Lifetime/Recent): Moderate Risk    [place] Suicide Severity Rating Scale Since Last Contact: 02/26/2023  Suicidal Ideation (Since Last Contact)  1. Wish to be Dead (Since Last Contact): No  2. Non-Specific Active Suicidal Thoughts (Since Last Contact): No  Suicidal Behavior (Since Last Contact)  Actual Attempt (Since Last Contact): No  Has subject engaged in non-suicidal self-injurious behavior? (Since Last Contact): No  Interrupted Attempts (Since Last Contact): No  Aborted or Self-Interrupted Attempt (Since Last Contact): No  Preparatory Acts or Behavior (Since Last Contact): No  Suicide (Since Last Contact): No  Actual/Potential Lethality (Most Lethal Attempt)  Actual Lethality/Medical Damage Code (Most Lethal Attempt): Minor physical damage  Potential Lethality Code (Most Lethal Attempt): Behavior not likely to result in injury  C-SSRS Risk (Since Last  Contact)  Calculated C-SSRS Risk Score (Since Last Contact): No Risk Indicated    Validity of evaluation is not impacted by presenting factors during interview as patient is alert and oriented x4..   Comments regarding subjective versus objective responses to [place] tool:   Environmental or Psychosocial Events: challenging interpersonal relationships and unemployment/underemployment  Chronic Risk Factors: history of suicide attempts (see previous notes), history of psychiatric hospitalization and personality disorders   Warning Signs: anxiety, agitation, unable to sleep, sleeping all the time  Protective Factors: able to access care without barriers, supportive ongoing medical and mental health care relationships and help seeking  Interpretation of Risk Scoring, Risk Mitigation Interventions and Safety Plan:  Patient able to return to sober housing.  She is working with Methadone worker on new residential placement.   She denies SI/HI.   She has mental health follow up scheduled from [facility] on Tuesday and Wednesday of this week.       Mental Status:     Appearance:   Appropriate    Eye Contact:   Fair    Psychomotor Behavior: Normal    Attitude:   Cooperative    Orientation:   All   Speech    Rate / Production: Normal/ Responsive    Volume:  Normal    Mood:    Anxious    Affect:    Appropriate    Thought Content:  Clear    Thought Form:  Coherent    Insight:    Good       Plan: Patient saw BLANCHE.  Patient discharged back to sober living.  Has appointments with psychiatry and psychology this week.     Disposition: Individual therapy  and Medication management    Rationale for disposition: Patient anxiety is stable.  Can be managed as outpatient.     Reviewed assessment with attending provider: Baudilio MANCIA CNP     Diagnosis: BPD, MDD, ASIA    Total time spent with patient:.50 hrs     CPT code: 63243 - Psychotherapy (with patient) - 30 (16-37*) min      MARY Garcia
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
## 2023-02-26 NOTE — ED NOTES
Pt is calm and cooperative, affect is blunt and brighter insight. Pt was medication compliant this morning. Pt admits sleeping well last night and would be discharging this morning. Methadone delivered and administered.

## 2023-03-01 ENCOUNTER — HOSPITAL ENCOUNTER (EMERGENCY)
Facility: CLINIC | Age: 48
Discharge: HOME | End: 2023-03-01
Attending: EMERGENCY MEDICINE | Admitting: EMERGENCY MEDICINE
Payer: COMMERCIAL

## 2023-03-01 VITALS
BODY MASS INDEX: 38.85 KG/M2 | OXYGEN SATURATION: 99 % | WEIGHT: 233.2 LBS | HEART RATE: 99 BPM | SYSTOLIC BLOOD PRESSURE: 146 MMHG | RESPIRATION RATE: 16 BRPM | DIASTOLIC BLOOD PRESSURE: 88 MMHG | TEMPERATURE: 99.9 F | HEIGHT: 65 IN

## 2023-03-01 DIAGNOSIS — Z76.0 ENCOUNTER FOR MEDICATION REFILL: ICD-10-CM

## 2023-03-01 PROCEDURE — 99284 EMERGENCY DEPT VISIT MOD MDM: CPT | Performed by: EMERGENCY MEDICINE

## 2023-03-01 PROCEDURE — 99283 EMERGENCY DEPT VISIT LOW MDM: CPT | Performed by: EMERGENCY MEDICINE

## 2023-03-01 PROCEDURE — 250N000013 HC RX MED GY IP 250 OP 250 PS 637: Performed by: EMERGENCY MEDICINE

## 2023-03-01 RX ORDER — CLONAZEPAM 1 MG/1
1 TABLET ORAL ONCE
Status: COMPLETED | OUTPATIENT
Start: 2023-03-01 | End: 2023-03-01

## 2023-03-01 RX ORDER — CLONAZEPAM 1 MG/1
1 TABLET ORAL 2 TIMES DAILY PRN
Qty: 5 TABLET | Refills: 0 | Status: SHIPPED | OUTPATIENT
Start: 2023-03-01 | End: 2023-03-22

## 2023-03-01 RX ADMIN — CLONAZEPAM 1 MG: 1 TABLET ORAL at 20:13
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
## 2023-03-02 NOTE — ED TRIAGE NOTES
Triage Assessment     Row Name 03/01/23 1925       Triage Assessment (Adult)    Airway WDL WDL

## 2023-03-02 NOTE — DISCHARGE INSTRUCTIONS
Refills given for klonopin and trintellix     Continue working with your pmd and contact your new mental health support clinic for medication management and therapy.

## 2023-03-13 ENCOUNTER — HOSPITAL ENCOUNTER (OUTPATIENT)
Facility: CLINIC | Age: 48
Setting detail: OBSERVATION
Discharge: HOME | End: 2023-03-15
Attending: EMERGENCY MEDICINE | Admitting: EMERGENCY MEDICINE
Payer: COMMERCIAL

## 2023-03-13 DIAGNOSIS — F11.20 OPIOID USE DISORDER, SEVERE, ON MAINTENANCE THERAPY (H): ICD-10-CM

## 2023-03-13 DIAGNOSIS — F33.1 MAJOR DEPRESSIVE DISORDER, RECURRENT EPISODE, MODERATE (H): ICD-10-CM

## 2023-03-13 DIAGNOSIS — F13.20 BENZODIAZEPINE DEPENDENCE, CONTINUOUS (H): ICD-10-CM

## 2023-03-13 DIAGNOSIS — F60.3 BORDERLINE PERSONALITY DISORDER (H): ICD-10-CM

## 2023-03-13 DIAGNOSIS — F41.9 ANXIETY: ICD-10-CM

## 2023-03-13 LAB
ANION GAP SERPL CALCULATED.3IONS-SCNC: 9 MMOL/L (ref 7–15)
ATRIAL RATE - MUSE: 51 BPM
BASOPHILS # BLD AUTO: 0 10E3/UL (ref 0–0.2)
BASOPHILS NFR BLD AUTO: 0 %
BUN SERPL-MCNC: 10.8 MG/DL (ref 6–20)
CALCIUM SERPL-MCNC: 10.8 MG/DL (ref 8.6–10)
CHLORIDE SERPL-SCNC: 97 MMOL/L (ref 98–107)
CREAT SERPL-MCNC: 0.73 MG/DL (ref 0.51–0.95)
DEPRECATED HCO3 PLAS-SCNC: 32 MMOL/L (ref 22–29)
DIASTOLIC BLOOD PRESSURE - MUSE: NORMAL MMHG
EOSINOPHIL # BLD AUTO: 0.1 10E3/UL (ref 0–0.7)
EOSINOPHIL NFR BLD AUTO: 1 %
ERYTHROCYTE [DISTWIDTH] IN BLOOD BY AUTOMATED COUNT: 13.3 % (ref 10–15)
GFR SERPL CREATININE-BSD FRML MDRD: >90 ML/MIN/1.73M2
GLUCOSE SERPL-MCNC: 120 MG/DL (ref 70–99)
HCG SERPL QL: NEGATIVE
HCT VFR BLD AUTO: 39.6 % (ref 35–47)
HGB BLD-MCNC: 12.6 G/DL (ref 11.7–15.7)
IMM GRANULOCYTES # BLD: 0 10E3/UL
IMM GRANULOCYTES NFR BLD: 1 %
INTERPRETATION ECG - MUSE: NORMAL
LYMPHOCYTES # BLD AUTO: 3.2 10E3/UL (ref 0.8–5.3)
LYMPHOCYTES NFR BLD AUTO: 36 %
MCH RBC QN AUTO: 29.6 PG (ref 26.5–33)
MCHC RBC AUTO-ENTMCNC: 31.8 G/DL (ref 31.5–36.5)
MCV RBC AUTO: 93 FL (ref 78–100)
MONOCYTES # BLD AUTO: 0.4 10E3/UL (ref 0–1.3)
MONOCYTES NFR BLD AUTO: 5 %
NEUTROPHILS # BLD AUTO: 4.9 10E3/UL (ref 1.6–8.3)
NEUTROPHILS NFR BLD AUTO: 57 %
NRBC # BLD AUTO: 0 10E3/UL
NRBC BLD AUTO-RTO: 0 /100
P AXIS - MUSE: 30 DEGREES
PLATELET # BLD AUTO: 223 10E3/UL (ref 150–450)
POTASSIUM SERPL-SCNC: 4.5 MMOL/L (ref 3.4–5.3)
PR INTERVAL - MUSE: 140 MS
QRS DURATION - MUSE: 76 MS
QT - MUSE: 474 MS
QTC - MUSE: 436 MS
R AXIS - MUSE: 67 DEGREES
RBC # BLD AUTO: 4.26 10E6/UL (ref 3.8–5.2)
SARS-COV-2 RNA RESP QL NAA+PROBE: NEGATIVE
SODIUM SERPL-SCNC: 138 MMOL/L (ref 136–145)
SYSTOLIC BLOOD PRESSURE - MUSE: NORMAL MMHG
T AXIS - MUSE: 53 DEGREES
TSH SERPL DL<=0.005 MIU/L-ACNC: 0.94 UIU/ML (ref 0.3–4.2)
VENTRICULAR RATE- MUSE: 51 BPM
WBC # BLD AUTO: 8.7 10E3/UL (ref 4–11)

## 2023-03-13 PROCEDURE — 99285 EMERGENCY DEPT VISIT HI MDM: CPT | Mod: 25

## 2023-03-13 PROCEDURE — 84703 CHORIONIC GONADOTROPIN ASSAY: CPT | Performed by: EMERGENCY MEDICINE

## 2023-03-13 PROCEDURE — 96360 HYDRATION IV INFUSION INIT: CPT

## 2023-03-13 PROCEDURE — 258N000003 HC RX IP 258 OP 636: Performed by: EMERGENCY MEDICINE

## 2023-03-13 PROCEDURE — 84443 ASSAY THYROID STIM HORMONE: CPT | Performed by: EMERGENCY MEDICINE

## 2023-03-13 PROCEDURE — 90791 PSYCH DIAGNOSTIC EVALUATION: CPT

## 2023-03-13 PROCEDURE — G0378 HOSPITAL OBSERVATION PER HR: HCPCS

## 2023-03-13 PROCEDURE — 85025 COMPLETE CBC W/AUTO DIFF WBC: CPT | Performed by: EMERGENCY MEDICINE

## 2023-03-13 PROCEDURE — C9803 HOPD COVID-19 SPEC COLLECT: HCPCS

## 2023-03-13 PROCEDURE — 80048 BASIC METABOLIC PNL TOTAL CA: CPT | Performed by: EMERGENCY MEDICINE

## 2023-03-13 PROCEDURE — 93005 ELECTROCARDIOGRAM TRACING: CPT

## 2023-03-13 PROCEDURE — 36415 COLL VENOUS BLD VENIPUNCTURE: CPT | Performed by: EMERGENCY MEDICINE

## 2023-03-13 PROCEDURE — 250N000013 HC RX MED GY IP 250 OP 250 PS 637: Performed by: NURSE PRACTITIONER

## 2023-03-13 PROCEDURE — U0005 INFEC AGEN DETEC AMPLI PROBE: HCPCS | Performed by: EMERGENCY MEDICINE

## 2023-03-13 RX ORDER — CLONAZEPAM 0.5 MG/1
0.5 TABLET ORAL 2 TIMES DAILY PRN
Status: DISCONTINUED | OUTPATIENT
Start: 2023-03-13 | End: 2023-03-14

## 2023-03-13 RX ORDER — GABAPENTIN 800 MG/1
1600 TABLET ORAL AT BEDTIME
Status: DISCONTINUED | OUTPATIENT
Start: 2023-03-13 | End: 2023-03-15 | Stop reason: HOSPADM

## 2023-03-13 RX ORDER — LORAZEPAM 1 MG/1
1 TABLET ORAL 3 TIMES DAILY
COMMUNITY
End: 2023-03-13

## 2023-03-13 RX ORDER — ACETAMINOPHEN 325 MG/1
650 TABLET ORAL EVERY 4 HOURS PRN
Status: DISCONTINUED | OUTPATIENT
Start: 2023-03-13 | End: 2023-03-15 | Stop reason: HOSPADM

## 2023-03-13 RX ORDER — HYDROXYZINE HYDROCHLORIDE 25 MG/1
25 TABLET, FILM COATED ORAL 2 TIMES DAILY PRN
Status: ON HOLD | COMMUNITY
End: 2023-04-05

## 2023-03-13 RX ADMIN — SODIUM CHLORIDE 1000 ML: 9 INJECTION, SOLUTION INTRAVENOUS at 15:00

## 2023-03-13 RX ADMIN — ACETAMINOPHEN 650 MG: 325 TABLET, FILM COATED ORAL at 20:10

## 2023-03-13 ASSESSMENT — COLUMBIA-SUICIDE SEVERITY RATING SCALE - C-SSRS
ATTEMPT SINCE LAST CONTACT: NO
2. HAVE YOU ACTUALLY HAD ANY THOUGHTS OF KILLING YOURSELF?: YES
1. SINCE LAST CONTACT, HAVE YOU WISHED YOU WERE DEAD OR WISHED YOU COULD GO TO SLEEP AND NOT WAKE UP?: YES
5. HAVE YOU STARTED TO WORK OUT OR WORKED OUT THE DETAILS OF HOW TO KILL YOURSELF? DO YOU INTEND TO CARRY OUT THIS PLAN?: NO
TOTAL  NUMBER OF INTERRUPTED ATTEMPTS SINCE LAST CONTACT: NO
TOTAL  NUMBER OF ABORTED OR SELF INTERRUPTED ATTEMPTS SINCE LAST CONTACT: NO
SUICIDE, SINCE LAST CONTACT: NO
6. HAVE YOU EVER DONE ANYTHING, STARTED TO DO ANYTHING, OR PREPARED TO DO ANYTHING TO END YOUR LIFE?: NO

## 2023-03-13 ASSESSMENT — ACTIVITIES OF DAILY LIVING (ADL)
ADLS_ACUITY_SCORE: 37

## 2023-03-13 NOTE — ED NOTES
Writer picked up patient from Triage with bolus/IV pole still attached to patient. Patient was sleeping and difficult to arouse, but ultimately transported to [facility]. Unstable on her feet at time. Still has complaints about being dizzy. IV will remain in place for now.

## 2023-03-13 NOTE — ED TRIAGE NOTES
Pt reports increasing depression and intrusive thoughts. Recently started on ativan. Was taking trintellix and ran out- waiting on authorization from insurance. Hypotensive. Reports she relapsed on fentanyl recently. Denies drug use today and states that she did not take any ativan today. Slurred speech noted. Appears to be somewhat drowsy.     Triage Assessment       Row Name 03/13/23 9809       Triage Assessment (Adult)    Airway WDL WDL       Respiratory WDL    Respiratory WDL WDL       Skin Circulation/Temperature WDL    Skin Circulation/Temperature WDL WDL       Cardiac WDL    Cardiac WDL WDL       Peripheral/Neurovascular WDL    Peripheral Neurovascular WDL WDL       Cognitive/Neuro/Behavioral WDL    Cognitive/Neuro/Behavioral WDL X   slurred speech    Level of Consciousness alert    Orientation oriented x 4    Mood/Behavior calm;cooperative       Pupils (CN II)    Pupil Size Left 3 mm    Pupil Size Right 3 mm       [place] Coma Scale    Best Eye Response 4-->(E4) spontaneous    Best Motor Response 6-->(M6) obeys commands    Best Verbal Response 5-->(V5) oriented    [place] Coma Scale Score 15
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
## 2023-03-13 NOTE — ED NOTES
47 year old female received from ED due to worsening depression. Patient was recently here at [facility] on 2/24. Reports having a very stressful home situation at her sober house. Does not get along with her house mates. Patient sometimes feels threatened by them. Patient also notes that her Trintillex is in Prior Authorization and has not taken it in the last week. Notes that her anxiety and depression have not improved since leaving here last time. Patient denies drug/alcohol use, but it was previously noted by ED MD that she relapsed on Fentanyl 2 days ago. Denies SI/HI and hallucinations. Presents with a flat affect and appear depressed and anxious in mood. Pleasant and cooperative. Patient saw her psychiatrist today and has a therapist. At her psychiatry appt., patient tells this Writer that provider wants to replace Klonopin with Ativan temporarily to help with her anxiety. Patient is open to other medication changes and spending the night on observation.    Nursing and risk assessments completed. Assessments reviewed with [facility] and physician. Admission information reviewed with patient. Patient given a tour of [facility] and instructions on using the facility. Questions regarding [facility] addressed. Pt safety search completed.

## 2023-03-13 NOTE — ED NOTES
Patient reports taking 185 mg of Methdone. Goes to [facility] in [place]. Their hours are 0530 to 1100. Phone number to call in the morning to confirm dose is [phone].

## 2023-03-14 PROBLEM — F11.20 OPIOID USE DISORDER, SEVERE, ON MAINTENANCE THERAPY (H): Status: ACTIVE | Noted: 2022-03-22

## 2023-03-14 PROCEDURE — 99223 1ST HOSP IP/OBS HIGH 75: CPT | Mod: AI | Performed by: PSYCHIATRY & NEUROLOGY

## 2023-03-14 PROCEDURE — 250N000009 HC RX 250: Performed by: NURSE PRACTITIONER

## 2023-03-14 PROCEDURE — 250N000013 HC RX MED GY IP 250 OP 250 PS 637: Performed by: PSYCHIATRY & NEUROLOGY

## 2023-03-14 PROCEDURE — G0378 HOSPITAL OBSERVATION PER HR: HCPCS

## 2023-03-14 PROCEDURE — 250N000013 HC RX MED GY IP 250 OP 250 PS 637: Performed by: NURSE PRACTITIONER

## 2023-03-14 RX ORDER — GABAPENTIN 800 MG/1
800 TABLET ORAL AT BEDTIME
Status: DISCONTINUED | OUTPATIENT
Start: 2023-03-14 | End: 2023-03-14

## 2023-03-14 RX ORDER — HYDROXYZINE HYDROCHLORIDE 25 MG/1
25 TABLET, FILM COATED ORAL 3 TIMES DAILY PRN
Status: DISCONTINUED | OUTPATIENT
Start: 2023-03-14 | End: 2023-03-15 | Stop reason: HOSPADM

## 2023-03-14 RX ORDER — METHADONE HYDROCHLORIDE 10 MG/ML
185 CONCENTRATE ORAL DAILY
Status: DISCONTINUED | OUTPATIENT
Start: 2023-03-14 | End: 2023-03-15 | Stop reason: HOSPADM

## 2023-03-14 RX ORDER — QUETIAPINE FUMARATE 25 MG/1
12.5-25 TABLET, FILM COATED ORAL EVERY 6 HOURS PRN
Qty: 20 TABLET | Refills: 0 | Status: ON HOLD | OUTPATIENT
Start: 2023-03-14 | End: 2023-04-05

## 2023-03-14 RX ORDER — CLONAZEPAM 1 MG/1
1 TABLET ORAL 2 TIMES DAILY PRN
Status: DISCONTINUED | OUTPATIENT
Start: 2023-03-14 | End: 2023-03-15 | Stop reason: HOSPADM

## 2023-03-14 RX ORDER — GABAPENTIN 800 MG/1
800 TABLET ORAL 2 TIMES DAILY
Status: DISCONTINUED | OUTPATIENT
Start: 2023-03-14 | End: 2023-03-15 | Stop reason: HOSPADM

## 2023-03-14 RX ADMIN — ACETAMINOPHEN 650 MG: 325 TABLET, FILM COATED ORAL at 20:11

## 2023-03-14 RX ADMIN — ACETAMINOPHEN 650 MG: 325 TABLET, FILM COATED ORAL at 09:26

## 2023-03-14 RX ADMIN — CLONAZEPAM 0.5 MG: 0.5 TABLET ORAL at 01:13

## 2023-03-14 RX ADMIN — CLONAZEPAM 1 MG: 1 TABLET ORAL at 10:04

## 2023-03-14 RX ADMIN — VORTIOXETINE 10 MG: 10 TABLET, FILM COATED ORAL at 12:04

## 2023-03-14 RX ADMIN — GABAPENTIN 1600 MG: 800 TABLET, COATED ORAL at 01:12

## 2023-03-14 RX ADMIN — CLONAZEPAM 1 MG: 1 TABLET ORAL at 21:12

## 2023-03-14 RX ADMIN — GABAPENTIN 800 MG: 800 TABLET, COATED ORAL at 11:17

## 2023-03-14 RX ADMIN — GABAPENTIN 800 MG: 800 TABLET, COATED ORAL at 18:02

## 2023-03-14 RX ADMIN — Medication: at 20:10

## 2023-03-14 RX ADMIN — GABAPENTIN 1600 MG: 800 TABLET, COATED ORAL at 22:24

## 2023-03-14 RX ADMIN — METHADONE HYDROCHLORIDE 185 MG: 10 CONCENTRATE ORAL at 11:17

## 2023-03-14 ASSESSMENT — ACTIVITIES OF DAILY LIVING (ADL)
ADLS_ACUITY_SCORE: 37

## 2023-03-14 NOTE — ED NOTES
Provider unable to meet with patient tonight. Patient remains calm and cooperative. She is resting now. Writer requested provider to order home medications. Plan is for observation tonight and will have initial assessment tomorrow AM. Patient aware and agreeable at this time.

## 2023-03-14 NOTE — ED NOTES
This writer called the [facility] and left the voicemail for the nursing staff to call back and confirm the dose of Methadone.

## 2023-03-14 NOTE — ED PROVIDER NOTES
[facility] Unit - Psychiatry  Combined Observation Note and Discharge Summary  [facility] Emergency Department  Observation Initiation Date: Mar 13, 2023    Jihan Gill MRN: [id number]   Age: 47 year old YOB: 1975     History     Chief Complaint   Patient presents with     Depression     HPI  Jihan Gill is a 47 year old female with a past history notable for major depressive disorder, borderline personality disorder, and anxiety further complicated by substance use disorders involving opiates and benzodiazepines.  She is currently prescribed methadone for maintenance treatment and receives prescriptions of clonazepam for management of anxiety.  She presented to the emergency department reporting depressed mood and suicidal ideation in the context of psychosocial stressors involving a conflict with the person whom she was residing with.  In the emergency department, she was noted to be medically stable and transferred to the [facility] unit for psychiatric assessment.  On examination today, she interprets maintaining sobriety with the recent exception of a brief relapse on fentanyl a few days ago.  She explains that she immediately disliked the effect and regretted her usage.  Following her last discharge from our unit, she was able to gain admission to a sober home however had negative interactions with other residents there leading to the patient finding housing with a peer whom she met through the methadone program.  She began to encounter difficulty in that setting, reporting a conflict with 1 another, which led to the patient leaving the home and coming to the hospital to seek help.  She was looking forward to a job interview today however is disappointed at the possibility of missing it.  She admits that she recently thought of suicide during a moment of emotional intensity however has no intent or desire to act on those thoughts as she finds it important to offer as much support as  possible to her son.  She explains that without her presence, her son will likely become homeless or experience similar hardships to what she has experienced.  She does not desire that outcome.  She further reports that her mood symptoms were well managed on Trintellix however she ran into some difficulty with obtaining a prior authorization which now appears to have been resolved.  She reports having the prescription sent to an outside pharmacy however transportation then became a barrier to obtaining that medication.  She also adds that her outpatient psychiatrist recently switched her Klonopin prescription to Ativan however she has not yet picked up the prescription from the pharmacy.  Today, she is hoping to renew her prescriptions and pursue placement at a crisis facility.  There is no indication of psychosis or homicidal thoughts.      Past Medical History  Past Medical History:   Diagnosis Date     Arthritis      Bipolar 1 disorder (H)      Chronic hepatitis C without hepatic coma (H)      Depressive disorder     postpartum     Depressive disorder      Major depression, recurrent (H)      Opiate addiction (H)      Seizures (H)     narcotic withdrawal     Substance abuse (H)      Urinary calculus, unspecified 2001    Renal stones     Past Surgical History:   Procedure Laterality Date     C/SECTION, LOW TRANSVERSE      [id number]     ENT SURGERY       GYN SURGERY       TONSILLECTOMY       acetaminophen (TYLENOL) 325 MG tablet  albuterol (PROAIR HFA/PROVENTIL HFA/VENTOLIN HFA) 108 (90 Base) MCG/ACT inhaler  clonazePAM (KLONOPIN) 1 MG tablet  gabapentin (NEURONTIN) 800 MG tablet  gabapentin (NEURONTIN) 800 MG tablet  hydrOXYzine (ATARAX) 25 MG tablet  melatonin 3 MG tablet  methadone HCl 10 MG/5ML SOLN  methocarbamol (ROBAXIN) 500 MG tablet  QUEtiapine (SEROQUEL) 25 MG tablet  vortioxetine (TRINTELLIX) 10 MG tablet      Allergies   Allergen Reactions     Buspirone Hives     Droperidol Anxiety and Other (See Comments)      SHAKING  SHAKING  Tardive Dyskinesia  Tardive Dyskinesia  SHAKING  Tardive Dyskinesia  Tardive Dyskinesia       Ketorolac Hives     Ketorolac Tromethamine Hives     Metoclopramide Anxiety, Other (See Comments) and Rash     Other reaction(s): Extrapyramidal Side Effect, Extrapyramidal Symptoms  Extrapyramidal Side Effect, Dystonia    Other reaction(s): Extrapyramidal Side Effect  dystonia  Extrapyramidal Side Effect, Dystonia  Other reaction(s): Extrapyramidal Side Effect, Other (see comments)  dystonia  Dystonia  Dystonia  Other reaction(s): Extrapyramidal Side Effect  dystonia  Extrapyramidal Side Effect, Dystonia  Other reaction(s): Extrapyramidal Side Effect, Other (see comments)  dystonia  Dystonia  Other reaction(s): Extrapyramidal Side Effect, Other (see comments)  dystonia  Dystonia  Dystonia       Prochlorperazine Anxiety and Other (See Comments)     Other reaction(s): Extrapyramidal Side Effect, Extrapyramidal Symptoms, Other (see comments)  Extrapyramidal Side Effect, dystonia  Dystonia  Other reaction(s): Extrapyramidal Side Effect  Dystonia  Extrapyramidal Side Effect, dystonia       Abilify [Aripiprazole] Other (See Comments)     Tardive dyskinesia     Acetaminophen Other (See Comments)     Multiple suicide attempts by tylenol, hoards pills.  Multiple suicide attempts by tylenol, hoards pills.  Multiple suicide attempts by tylenol, hoards pills.  Multiple suicide attempts by tylenol, hoards pills.       Allopurinol      Allopurinol      Compazine Other (See Comments)     Dystonia     Dimenhydrinate Other (See Comments)     Other reaction(s): Agitation, Other (see comments)  SHAKING  Jaw lock.  Other reaction(s): Agitation  Jaw lock.  SHAKING  Iv only can take regular  Other reaction(s): Agitation  Jaw lock.  SHAKING  Iv only can take regular  Iv only can take regular  Lock jaw  Shaking       Dramamine      Jaw lock.       Hydrocodone Nausea and Vomiting     Olanzapine Other (See Comments)      "Tardive dyskinesia from Zyprexa - per patient     Reglan Other (See Comments)     dystonia     Sulfa Drugs Other (See Comments)     dystonia  dystonia    Other reaction(s): Other (see comments)  dystonia  Dystonia        Diphenhydramine Anxiety     When given IV- causes regular noises to be amplified - only with IV dose, not pill form.     Family History  Family History   Problem Relation Age of Onset     No Known Problems Father      No Known Problems Mother      No Known Problems Sister      No Known Problems Brother      Social History   Social History     Tobacco Use     Smoking status: Every Day     Packs/day: 0.50     Years: 8.00     Pack years: 4.00     Types: Cigarettes     Smokeless tobacco: Never   Substance Use Topics     Alcohol use: Not Currently     Comment: States no EtOH since 2008.     Drug use: Yes     Comment: Methadone 60mg/day street buy          Review of Systems  A medically appropriate review of systems was performed with pertinent positives and negatives noted in the HPI, and all other systems negative.    Physical Examination   BP: (!) 86/57  Pulse: 52  Temp: 98.6  F (37  C)  Resp: 12  Height: 165.1 cm (5' 5\")  Weight: 104.3 kg (230 lb)  SpO2: 94 %    Physical Exam  General: Appears stated age.   Neuro: Alert and fully oriented. Extremities appear to demonstrate normal strength on visual inspection.   Integumentary/Skin: no rash visualized, normal color    Psychiatric Examination   Appearance: awake, alert  Attitude:  cooperative  Eye Contact:  fair  Mood:  anxious and sad   Affect:  mood congruent  Speech:  clear, coherent  Psychomotor Behavior:  no evidence of tardive dyskinesia, dystonia, or tics  Thought Process:  logical and linear  Associations:  no loose associations  Thought Content:  no evidence of suicidal ideation or homicidal ideation and no evidence of psychotic thought  Insight:  fair  Judgement:  fair  Oriented to:  time, person, and place  Attention Span and Concentration:  " fair  Recent and Remote Memory:  fair  Language: able to name/identify objects without impairment  Fund of Knowledge: intact with awareness of current and past events    ED Course        Labs Ordered and Resulted from Time of ED Arrival to Time of ED Departure   BASIC METABOLIC PANEL - Abnormal       Result Value    Sodium 138      Potassium 4.5      Chloride 97 (*)     Carbon Dioxide (CO2) 32 (*)     Anion Gap 9      Urea Nitrogen 10.8      Creatinine 0.73      Calcium 10.8 (*)     Glucose 120 (*)     GFR Estimate >90     COVID-19 VIRUS (CORONAVIRUS) BY PCR - Normal    SARS CoV2 PCR Negative     HCG QUALITATIVE PREGNANCY - Normal    hCG Serum Qualitative Negative     TSH WITH FREE T4 REFLEX - Normal    TSH 0.94     CBC WITH PLATELETS AND DIFFERENTIAL    WBC Count 8.7      RBC Count 4.26      Hemoglobin 12.6      Hematocrit 39.6      MCV 93      MCH 29.6      MCHC 31.8      RDW 13.3      Platelet Count 223      % Neutrophils 57      % Lymphocytes 36      % Monocytes 5      % Eosinophils 1      % Basophils 0      % Immature Granulocytes 1      NRBCs per 100 WBC 0      Absolute Neutrophils 4.9      Absolute Lymphocytes 3.2      Absolute Monocytes 0.4      Absolute Eosinophils 0.1      Absolute Basophils 0.0      Absolute Immature Granulocytes 0.0      Absolute NRBCs 0.0         Assessments & Plan (with Medical Decision Making)   Patient presenting with depressed mood and suicidal thoughts in the context of various psychosocial stressors while reporting difficulty accessing medications due to transportation barriers.  Despite reporting transportation difficulties, she has been able to attend to daily methadone clinic visits.  Her treatment goals today are to obtain a refill for benzodiazepine medications and her antidepressant then proceed to a crisis residence.  Nursing notes reviewed noting no acute issues.     I have reviewed the assessment completed by the [facility].     During the observation period, the patient did  not require medications for agitation, and did not require restraints/seclusion for patient and/or provider safety.    The patient was found to have a psychiatric condition that would benefit from an observation stay in the emergency department for further psychiatric stabilization and/or coordination of a safe disposition. The observation plan includes serial assessments of psychiatric condition, potential administration of medications if indicated, further disposition pending the patient's psychiatric course during the monitoring period.     Preliminary diagnosis:    ICD-10-CM    1. Anxiety  F41.9       2. Borderline personality disorder (H)  F60.3       3. Major depressive disorder, recurrent episode, moderate (H)  F33.1       4. Opioid use disorder, severe, on maintenance therapy (H)  F11.20       5. Benzodiazepine dependence, continuous (H)  F13.20            Treatment Plan:  -Restart Viibryd 10 mg daily for mood and anxiety management.  -Resume methadone for treatment of her opiate use disorder.  -Resume clonazepam and gabapentin for treatment of her anxiety disorders.  -[place] prescription monitoring database was reviewed noting that over the past 35 days, the patient has received 59 days worth of clonazepam from multiple providers in varying amounts.  The patient was advised to maintain prescriptions from one designated prescriber.  Refills will not be provided today due to the pattern noted above as she should have access to an adequate supply of medication.  If for some reason she does not have access, gabapentin will help minimize potential withdrawal symptoms.  -Resume outpatient medication management appointments.  -We will attempt to secure a bed at a crisis residence if available although needing to comply with daily visits to the methadone clinic may be a barrier.  Alternatively, she may need to utilize community shelters while coordinating longer term options with her community case  manager.    After the period in observation care, the patient's circumstances and mental state were safe for outpatient management. After counseling on the diagnosis, work-up, and treatment plan, the patient was discharged. Close follow-up with a psychiatrist and/or therapist was recommended and community psychiatric resources were provided. Patient is to return to the ED if any urgent or potentially life-threatening concerns.      At the time of discharge, the patient's acute suicide risk was determined to be low due to the following factors: Reduction in the intensity of mood/anxiety symptoms that preceded the admission, denial of suicidal thoughts, denies feeling helpless or helpless, not currently under the influence of alcohol or illicit substances, denies experiencing command hallucinations, no immediate access to firearms. The patient's acute risk could be higher if noncompliant with their treatment plan, medications, follow-up appointments or using illicit substances or alcohol. Protective factors include: children    --  Anderson Monroe MD   [facility] EMERGENCY DEPT  [facility] Unit  3/13/2023         Anderson Monroe MD  03/14/23 1213

## 2023-03-14 NOTE — ED NOTES
Called [facility]  in [place] and verified that the patient is currently taking 185mg of methodone.  She goes in Monday through Saturday.

## 2023-03-14 NOTE — ED NOTES
Pt slept in a sensory room overnight. She was not feeling comfortable sleeping in recliner. No sings of distress noted. Respirations were even and unlabored. Will continue to monitor.
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
## 2023-03-14 NOTE — PLAN OF CARE
Jihan CARRERO Jairo  March 13, 2023  Plan of Care Hand-off Note     Patient Care Path: Observation    Plan for Care:   Patient presented to the ED for evaluation of suicidal thoughts. She has been off her Trintellix due to an insurance problem since the beginning of the month, has had unstable housing, and relapsed on Fentanyl 3-4 days ago. A lower level of care has been unsuccessful in treating and stabilizing patient s mental health symptoms. Patient will remain on [facility] unit under observation for continued monitoring, treatment and therapeutic intervention of mental health symptoms. Observation at [facility] could help mitigate the need for a more restrictive level of care in an inpatient setting.      Critical Safety Issues: None, patient contracts for safety and denies intent to harm herself.     Overview:  This patient is a child/adolescent: No    This patient has additional special visitor precautions: No    Legal Status: Voluntary    Contacts:   Primary Care Provider: Dr. Valente at [facility] ([phone])  Psychiatrist: Mykel Dos Santos CNP at [facility] ([phone])  Therapist: Jeremy Reid MA, [facility] at [facility] ([phone])  : Neli at [facility] ([phone])  Other - Methadone Clinic: [facility] in [place] ([phone])  *Patient has not signed ROIs at this time but verbalized willingness to do so for all providers.     Psychiatry Consult:  Adult Psychiatry Consult requested related to observation stay at [facility]. Psychiatry IP Consult Order Placed: Yes    Updated RN and Psych Associate regarding plan of care.    MARY Reece

## 2023-03-14 NOTE — PHARMACY
Methadone Clinic Information Note    Clinic Name: [facility]   Clinic Location (city): [place]  Phone Number: [phone]  Dose: 185mg  Last Dose: 3/13/23, doses daily in clinic - no take home doses    Thank you,  Jorje Godinez [facility]

## 2023-03-14 NOTE — DISCHARGE INSTRUCTIONS
If I am feeling unsafe or I am in a crisis, I will:   Contact my established care providers   Call the National Suicide Prevention Lifeline: 988   Go to the nearest emergency room   Call 911     Warning signs that I or other people might notice when a crisis is developing for me: Sweaty, throwing up, shaky, stop making sense, depressed, isolating myself, panicking.    Things I am able to do on my own to cope or help me feel better: Yoga, walking, meditation.     Things that I am able to do with others to cope or help me better: Go bowling.     Things I can use or do for distraction: Watching television, watching documentaries, true crime videos.     Changes I can make to support my mental health and wellness: Refrain from substances, continue working with established outpatient providers, attend methadone clinic appointments.    People in my life that I can ask for help: Dad and son.     Your [facility] has a mental health crisis team you can call 24/7: [facility] Crisis Line Number: [phone]; [facility] Crisis Line Number: [phone]; [facility] Crisis Line Number: [phone]     Other things that are important when I m in crisis: Talking and being physically near someone, interact with someone who is both empathetic to my situation and also work with me to problem solve.     Other resources: Pt created a profile and completed a referral to [facility]. Pt will call once discharged for bed placement assistance at a shelter.      Appointment information: Continue with established providers. No new appointments scheduled at this time.    Additional information:  Today you were seen by a licensed mental health professional through Triage and Transition services, [facility]  for a crisis assessment in the Emergency Department at [facility].  It is recommended that you follow up with your established providers (psychiatrist, mental health therapist,  and/or primary care doctor - as relevant) as soon as possible. Coordinators from [facility] will be calling you in the next 24-48 hours to ensure that you have the resources you need.  You can also contact [facility] coordinators directly at [phone]. You may have been scheduled for or offered an appointment with a mental health provider. [facility] maintains an extensive network of licensed behavioral health providers to connect patients with the services they need.  We do not charge providers a fee to participate in our referral network.  We match patients with providers based on a patient's specific needs, insurance coverage, and location.  Our first effort will be to refer you to a provider within your care system, and will utilize providers outside your care system as needed.    Follow up care options were discussed with patient.

## 2023-03-14 NOTE — ED NOTES
[facility] Discharge Note    Writer met with patient to discuss discharge planning. Reviewed current presentation on the unit and assessed for safety. Patient participated in aftercare and safety planning. The following plan was developed:    If I am feeling unsafe or I am in a crisis, I will:   Contact my established care providers   Call the National Suicide Prevention Lifeline: 988   Go to the nearest emergency room   Call 911     Warning signs that I or other people might notice when a crisis is developing for me: Sweaty, throwing up, shaky, stop making sense, depressed, isolating myself, panicking.    Things I am able to do on my own to cope or help me feel better: Yoga, walking, meditation.     Things that I am able to do with others to cope or help me better: Go bowling.     Things I can use or do for distraction: Watching television, watching documentaries, true crime videos.     Changes I can make to support my mental health and wellness: Refrain from substances, continue working with established outpatient providers, attend methadone clinic appointments.    People in my life that I can ask for help: Dad and son.     Your [facility] has a mental health crisis team you can call 24/7: [facility] Crisis Line Number: [phone]; [facility] Crisis Line Number: [phone]; [facility] Crisis Line Number: [phone]     Other things that are important when I m in crisis: Talking and being physically near someone, interact with someone who is both empathetic to my situation and also work with me to problem solve.     Other resources: Pt created a profile and completed a referral to [facility]. Pt will call once discharged for bed placement assistance at a shelter.      Appointment information: Continue with established providers. No new appointments scheduled at this time.    Additional information:  Today you were seen by a licensed mental health professional through Triage and  "Transition services, [facility] ([facility])  for a crisis assessment in the Emergency Department at [facility].  It is recommended that you follow up with your established providers (psychiatrist, mental health therapist, and/or primary care doctor - as relevant) as soon as possible. Coordinators from [facility] will be calling you in the next 24-48 hours to ensure that you have the resources you need.  You can also contact [facility] coordinators directly at [phone]. You may have been scheduled for or offered an appointment with a mental health provider. [facility] maintains an extensive network of licensed behavioral health providers to connect patients with the services they need.  We do not charge providers a fee to participate in our referral network.  We match patients with providers based on a patient's specific needs, insurance coverage, and location.  Our first effort will be to refer you to a provider within your care system, and will utilize providers outside your care system as needed.    Follow up care options were discussed with patient.     The plan for aftercare is as follows:   Pt met with Writer willingly to discuss discharge planning. [facility] did not have placement available due to Pt needing to go to methadone clinic daily and it would interrupt programming. When discussing [facility] with Pt, she declined searching there, stating \"that neighborhood is not good for my sobriety.\" Writer had Pt call [facility] connect and create a profile with them and coordinate with them for shelter placement. Pt denies SI, SI plan or intent, HI, AH/VH.    Writer also spoke with Neli ([facility] , [phone]) and informed of Pt looking for shelter placement given no bed availability for crisis residences and Pt not wanting to pursue placement at [facility]. Neli shared she is also looking into [facility] for shelter placement. Will advise Pt to update  on where Pt " will be going once discharged.

## 2023-03-14 NOTE — ED NOTES
Pt. has been anxious this am. Focused on getting medications ordered and started. Met with [facility] and psychiatry. Working on setting up place to reside after discharge. VSS. Ate meals. On the telephone most of the afternoon trying to secure belongings and make living arrangements. Denies currents SI.

## 2023-03-15 VITALS
BODY MASS INDEX: 38.32 KG/M2 | WEIGHT: 230 LBS | HEIGHT: 65 IN | TEMPERATURE: 98.5 F | RESPIRATION RATE: 18 BRPM | DIASTOLIC BLOOD PRESSURE: 74 MMHG | SYSTOLIC BLOOD PRESSURE: 120 MMHG | OXYGEN SATURATION: 95 % | HEART RATE: 63 BPM

## 2023-03-15 PROCEDURE — 250N000013 HC RX MED GY IP 250 OP 250 PS 637: Performed by: PSYCHIATRY & NEUROLOGY

## 2023-03-15 PROCEDURE — G0378 HOSPITAL OBSERVATION PER HR: HCPCS

## 2023-03-15 RX ADMIN — VORTIOXETINE 10 MG: 10 TABLET, FILM COATED ORAL at 07:45

## 2023-03-15 RX ADMIN — METHADONE HYDROCHLORIDE 185 MG: 10 CONCENTRATE ORAL at 08:02

## 2023-03-15 RX ADMIN — CLONAZEPAM 1 MG: 1 TABLET ORAL at 07:45

## 2023-03-15 RX ADMIN — GABAPENTIN 800 MG: 800 TABLET, COATED ORAL at 07:45

## 2023-03-15 ASSESSMENT — ACTIVITIES OF DAILY LIVING (ADL)
ADLS_ACUITY_SCORE: 37

## 2023-03-15 ASSESSMENT — COLUMBIA-SUICIDE SEVERITY RATING SCALE - C-SSRS
5. HAVE YOU STARTED TO WORK OUT OR WORKED OUT THE DETAILS OF HOW TO KILL YOURSELF? DO YOU INTEND TO CARRY OUT THIS PLAN?: NO
1. SINCE LAST CONTACT, HAVE YOU WISHED YOU WERE DEAD OR WISHED YOU COULD GO TO SLEEP AND NOT WAKE UP?: YES
TOTAL  NUMBER OF INTERRUPTED ATTEMPTS SINCE LAST CONTACT: NO
ATTEMPT SINCE LAST CONTACT: NO
6. HAVE YOU EVER DONE ANYTHING, STARTED TO DO ANYTHING, OR PREPARED TO DO ANYTHING TO END YOUR LIFE?: NO
TOTAL  NUMBER OF ABORTED OR SELF INTERRUPTED ATTEMPTS SINCE LAST CONTACT: NO
2. HAVE YOU ACTUALLY HAD ANY THOUGHTS OF KILLING YOURSELF?: YES
SUICIDE, SINCE LAST CONTACT: NO

## 2023-03-15 NOTE — ED NOTES
Patient agreeable to discharge plan. Discharge instructions reviewed with patient including follow-up care plan. Medications: Quetiapine and Vortioxetine prescribed and filled at [facility] and sent with patient. Reviewed safety plan and outpatient resources. All belongings that were brought into the hospital have been returned to patient. Escorted off the unit at 1445 accompanied by [facility] staff. Discharged to self. Arranged taxi ride to [facility] per patient's request, so patient could  additional prescribed medications.

## 2023-03-15 NOTE — ED NOTES
"Patient has been visible on the unit and mostly kept to herself this evening. She did not attend any unit programming. Patient's mood remains anxious throughout evening. She admitted to this writer she would not actually go through with her plans to hurt herself because \"I love my son too much.\" She currently denies SI.  "
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
## 2023-03-15 NOTE — ED NOTES
Pt slept through the night uneventfully. No signs of distress noted. Respirations were even and unlabored. Will continue to monitor.

## 2023-03-15 NOTE — ED NOTES
RN checked in with patient at start of shift. Patient stated she did not sleep well last night due to suicidal thoughts keeping her up. States her home life is stressful, which has contributed to her increased depression. Patient states she wants inpatient treatment for more stabilization. VS assessed, WNL.

## 2023-03-17 ENCOUNTER — PATIENT OUTREACH (OUTPATIENT)
Dept: CARE COORDINATION | Facility: CLINIC | Age: 48
End: 2023-03-17
Payer: COMMERCIAL

## 2023-03-17 NOTE — PROGRESS NOTES
"Clinic Care Coordination Contact  [facility]       Clinical Data: Care Coordinator Outreach  Outreach attempted x 2. Unable to leave message or connect with patient. \"Call cannot be completed at this time\"  Plan: Care Coordinator will make no further outreaches at this time.    PAT Hodgson   Social Work Clinic Care Coordinator   [facility]  PH: [phone]  [email or web address]    "

## 2023-03-22 ENCOUNTER — HOSPITAL ENCOUNTER (OUTPATIENT)
Facility: CLINIC | Age: 48
Setting detail: OBSERVATION
Discharge: STILL ADMITTED OR ADMINISTRATIVE | End: 2023-03-28
Attending: EMERGENCY MEDICINE | Admitting: EMERGENCY MEDICINE
Payer: COMMERCIAL

## 2023-03-22 DIAGNOSIS — F60.3 BORDERLINE PERSONALITY DISORDER (H): ICD-10-CM

## 2023-03-22 DIAGNOSIS — F11.20 OPIOID USE DISORDER, SEVERE, ON MAINTENANCE THERAPY, DEPENDENCE (H): ICD-10-CM

## 2023-03-22 DIAGNOSIS — F13.10 BENZODIAZEPINE ABUSE, CONTINUOUS (H): ICD-10-CM

## 2023-03-22 DIAGNOSIS — F33.9 RECURRENT MAJOR DEPRESSIVE DISORDER, REMISSION STATUS UNSPECIFIED (H): ICD-10-CM

## 2023-03-22 LAB
AMPHETAMINES UR QL SCN: ABNORMAL
BARBITURATES UR QL SCN: ABNORMAL
BENZODIAZ UR QL SCN: ABNORMAL
BZE UR QL SCN: ABNORMAL
CANNABINOIDS UR QL SCN: ABNORMAL
OPIATES UR QL SCN: ABNORMAL
PCP QUAL URINE (ROCHE): ABNORMAL
SARS-COV-2 RNA RESP QL NAA+PROBE: NEGATIVE

## 2023-03-22 PROCEDURE — C9803 HOPD COVID-19 SPEC COLLECT: HCPCS

## 2023-03-22 PROCEDURE — 99285 EMERGENCY DEPT VISIT HI MDM: CPT | Mod: 25

## 2023-03-22 PROCEDURE — U0003 INFECTIOUS AGENT DETECTION BY NUCLEIC ACID (DNA OR RNA); SEVERE ACUTE RESPIRATORY SYNDROME CORONAVIRUS 2 (SARS-COV-2) (CORONAVIRUS DISEASE [COVID-19]), AMPLIFIED PROBE TECHNIQUE, MAKING USE OF HIGH THROUGHPUT TECHNOLOGIES AS DESCRIBED BY CMS-2020-01-R: HCPCS | Performed by: EMERGENCY MEDICINE

## 2023-03-22 PROCEDURE — G0378 HOSPITAL OBSERVATION PER HR: HCPCS

## 2023-03-22 PROCEDURE — 250N000013 HC RX MED GY IP 250 OP 250 PS 637: Performed by: NURSE PRACTITIONER

## 2023-03-22 PROCEDURE — 99223 1ST HOSP IP/OBS HIGH 75: CPT | Performed by: NURSE PRACTITIONER

## 2023-03-22 PROCEDURE — 90791 PSYCH DIAGNOSTIC EVALUATION: CPT

## 2023-03-22 PROCEDURE — 80307 DRUG TEST PRSMV CHEM ANLYZR: CPT | Performed by: NURSE PRACTITIONER

## 2023-03-22 RX ORDER — GABAPENTIN 800 MG/1
1600 TABLET ORAL AT BEDTIME
Status: DISCONTINUED | OUTPATIENT
Start: 2023-03-22 | End: 2023-03-28 | Stop reason: HOSPADM

## 2023-03-22 RX ORDER — HYDROXYZINE HYDROCHLORIDE 25 MG/1
25 TABLET, FILM COATED ORAL 3 TIMES DAILY PRN
Status: DISCONTINUED | OUTPATIENT
Start: 2023-03-22 | End: 2023-03-28 | Stop reason: HOSPADM

## 2023-03-22 RX ORDER — ACETAMINOPHEN 325 MG/1
650 TABLET ORAL EVERY 4 HOURS PRN
Status: DISCONTINUED | OUTPATIENT
Start: 2023-03-22 | End: 2023-03-28 | Stop reason: HOSPADM

## 2023-03-22 RX ORDER — METHADONE HYDROCHLORIDE 10 MG/5ML
185 SOLUTION ORAL DAILY
Status: DISCONTINUED | OUTPATIENT
Start: 2023-03-23 | End: 2023-03-23

## 2023-03-22 RX ORDER — GABAPENTIN 800 MG/1
800 TABLET ORAL 2 TIMES DAILY
Status: DISCONTINUED | OUTPATIENT
Start: 2023-03-23 | End: 2023-03-28 | Stop reason: HOSPADM

## 2023-03-22 RX ORDER — LORAZEPAM 0.5 MG/1
0.5 TABLET ORAL DAILY
Status: DISCONTINUED | OUTPATIENT
Start: 2023-03-23 | End: 2023-03-28 | Stop reason: HOSPADM

## 2023-03-22 RX ORDER — LORAZEPAM 1 MG/1
1 TABLET ORAL 2 TIMES DAILY
Status: DISCONTINUED | OUTPATIENT
Start: 2023-03-22 | End: 2023-03-28 | Stop reason: HOSPADM

## 2023-03-22 RX ORDER — LORAZEPAM 1 MG/1
TABLET ORAL
Status: ON HOLD | COMMUNITY
End: 2023-04-05

## 2023-03-22 RX ORDER — LORAZEPAM 0.5 MG/1
0.5 TABLET ORAL DAILY
Status: DISCONTINUED | OUTPATIENT
Start: 2023-03-22 | End: 2023-03-22

## 2023-03-22 RX ADMIN — GABAPENTIN 1600 MG: 800 TABLET, COATED ORAL at 21:02

## 2023-03-22 RX ADMIN — HYDROXYZINE HYDROCHLORIDE 25 MG: 25 TABLET, FILM COATED ORAL at 19:33

## 2023-03-22 RX ADMIN — LORAZEPAM 1 MG: 1 TABLET ORAL at 19:33

## 2023-03-22 RX ADMIN — QUETIAPINE FUMARATE 25 MG: 25 TABLET, FILM COATED ORAL at 19:33

## 2023-03-22 ASSESSMENT — COLUMBIA-SUICIDE SEVERITY RATING SCALE - C-SSRS
6. HAVE YOU EVER DONE ANYTHING, STARTED TO DO ANYTHING, OR PREPARED TO DO ANYTHING TO END YOUR LIFE?: NO
ATTEMPT SINCE LAST CONTACT: YES
TOTAL  NUMBER OF ABORTED OR SELF INTERRUPTED ATTEMPTS SINCE LAST CONTACT: NO
1. SINCE LAST CONTACT, HAVE YOU WISHED YOU WERE DEAD OR WISHED YOU COULD GO TO SLEEP AND NOT WAKE UP?: YES
2. HAVE YOU ACTUALLY HAD ANY THOUGHTS OF KILLING YOURSELF?: YES
TOTAL  NUMBER OF INTERRUPTED ATTEMPTS SINCE LAST CONTACT: NO
REASONS FOR IDEATION SINCE LAST CONTACT: COMPLETELY TO END OR STOP THE PAIN (YOU COULDN'T GO ON LIVING WITH THE PAIN OR HOW YOU WERE FEELING)
5. HAVE YOU STARTED TO WORK OUT OR WORKED OUT THE DETAILS OF HOW TO KILL YOURSELF? DO YOU INTEND TO CARRY OUT THIS PLAN?: YES

## 2023-03-22 ASSESSMENT — ACTIVITIES OF DAILY LIVING (ADL)
ADLS_ACUITY_SCORE: 37

## 2023-03-22 NOTE — ED PROVIDER NOTES
History     Chief Complaint:  Suicidal       HPI   Jihan Gill is a 47 year old female with a history of anxiety, depression, and borderline personality disorder who presents with suicidal ideation. Patient states that she is in need of a mental health evaluation due to recent, worsening suicidal thoughts. Ideation includes running out [place] traffic on the highway to kill herself. She does not feel safe. No self-harm today. No homicidal ideation. Endorses mild general paranoia that people are mad at her or do not like her. Of note, she was seen in our [facility] unit 2 weeks ago, which she states was helpful at the time. She has not had any changes to medication since being seen 2 weeks ago. She does follow with a psychiatrist who she sees every two weeks, and a therapist who she sees every week. No homicidal ideation. No drug or alcohol use. Denies fever, sore throat, cough, or urinary issues.     Independent Historian:   None - Patient Only    Review of External Notes:   Reviewed ED note from 3/13/2023 with Dr. Monroe with psychiatry. Patient was placed in e[facility]ATH for 2 days.     ROS:  Review of Systems    Allergies:  Buspirone  Droperidol  Ketorolac  Ketorolac Tromethamine  Metoclopramide  Prochlorperazine  Abilify [Aripiprazole]  Acetaminophen  Allopurinol  Allopurinol  Compazine  Dimenhydrinate  Dramamine  Hydrocodone  Olanzapine  Reglan  Sulfa Drugs  Diphenhydramine     Medications:    albuterol  clonazepam  gabapentin   hydroxyzine  melatonin  methadone   methocarbamol   Quetiapine   Vortioxetine    Past Medical History:    Arthritis   Bipolar 1 disorder   Chronic hepatitis C   Depressive disorder   Seizures  Opiate addiction  Substance abuse   Urinary calculus     Past Surgical History:     section  Tonsillectomy      Family History:    No significant family history.     Social History:  The patient presents to the ED alone.   Drug use: None  Alcohol use: None   PCP: Clinic, [facility]  "[place]     Physical Exam     Patient Vitals for the past 24 hrs:   BP Temp Temp src Pulse Resp SpO2 Height Weight   03/22/23 1027 (!) 149/81 -- -- -- -- -- -- --   03/22/23 1026 -- 98  F (36.7  C) Oral 86 18 98 % 1.651 m (5' 5\") 102.1 kg (225 lb)        Physical Exam    Physical Exam   Constitutional:  Patient is oriented to person, place, and time. They appear well-developed and well-nourished. Mild distress secondary to mental health concerns.    HENT:    Face mask in place.   Mouth/Throat:   Face mask in place.   Eyes:    Conjunctivae normal and EOM are normal. Pupils are equal, round, and reactive to light.   Neck:    Normal range of motion.   Cardiovascular: Normal rate, regular rhythm and normal heart sounds.  Exam reveals no gallop and no friction rub.  No murmur heard.  Pulmonary/Chest:  Effort normal and breath sounds normal. Patient has no wheezes. Patient has no rales.   Musculoskeletal:  Normal range of motion. Patient exhibits no edema.   Neurological:   Patient is alert and oriented to person, place, and time. Patient has normal strength. No cranial nerve deficit or sensory deficit. GCS 15  Skin:   Skin is warm and dry. No rash noted. No erythema.   Psychiatric:   Depressed, thoughts of running into traffic to kill herself. Not homicidal, no hallucinations, mild general paranoia     Emergency Department Course     Laboratory:  Labs Ordered and Resulted from Time of ED Arrival to Time of ED Departure   COVID-19 VIRUS (CORONAVIRUS) BY PCR - Normal       Result Value    SARS CoV2 PCR Negative          Emergency Department Course & Assessments:    PSS-3    Date and Time Over the past 2 weeks have you felt down, depressed, or hopeless? Over the past 2 weeks have you had thoughts of killing yourself? Have you ever attempted to kill yourself? When did this last happen? User   03/22/23 1028 yes yes yes -- ARE              Suicide assessment completed by mental health (D.E.C., GENW, " etc.)    Assessments:  1130 I obtained history and examined patient as noted above.     Independent Interpretation (X-rays, CTs, rhythm strip):  None    Consultations/Discussion of Management or Tests:  None     Social Determinants of Health affecting care:   None    Disposition:  The patient was transferred to [facility].     Impression & Plan      Medical Decision Making:  Jihan Gill is a 47-year-old female presenting to the emergency room with worsening depression and thoughts of self-harm.  She states that she has not done anything to hurt herself today.  She states she has been taking her medications but of late has not felt safe at home.  She does not endorse any recent acute medical complaints or illnesses.  She does follow-up with psychiatry and therapy frequently but has suboptimal management of her mental health at this time.  Patient is requesting to go to [facility].  She is very calm and cooperative.  She is voluntary.  COVID swab is negative.  [facility].    Diagnosis:    ICD-10-CM    1. Other depression  F32.89       2. Suicidal ideation  R45.851              Scribe Disclosure:  Fatimah CABALLERO, am serving as a scribe at 11:45 AM on 3/22/2023 to document services personally performed by Jackie Patel MD based on my observations and the provider's statements to me.   3/22/2023   Jackie Patel MD Bochert, Michelle Ann, MD  03/22/23 8338
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
## 2023-03-22 NOTE — PROGRESS NOTES
Per ED report:  Jihan Gill is a 47 year old female with a history of anxiety, depression, and borderline personality disorder who presents with suicidal ideation. Patient states that she is in need of a mental health evaluation due to recent, worsening suicidal thoughts. Ideation includes running out into traffic on the highway to kill herself. She does not feel safe. No self-harm today. No homicidal ideation. Endorses mild paranoia. Of note, she was seen in our [facility] unit 2 weeks ago, which she states was helpful at the time. She has not had any changes to medication since being seen 2 weeks ago. She does follow with a psychiatrist who she sees every two weeks, and a therapist who she sees every week.   Nursing and risk assessments completed. Assessments reviewed with LMHP and physician. Admission information reviewed with patient. Patient given a tour of [facility] and instructions on using the facility. Questions regarding [facility] addressed. Pt safety search completed.
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
## 2023-03-23 PROCEDURE — 250N000013 HC RX MED GY IP 250 OP 250 PS 637: Performed by: NURSE PRACTITIONER

## 2023-03-23 PROCEDURE — 99233 SBSQ HOSP IP/OBS HIGH 50: CPT | Performed by: NURSE PRACTITIONER

## 2023-03-23 PROCEDURE — G0378 HOSPITAL OBSERVATION PER HR: HCPCS

## 2023-03-23 RX ORDER — METHADONE HYDROCHLORIDE 10 MG/ML
185 CONCENTRATE ORAL DAILY
Status: DISCONTINUED | OUTPATIENT
Start: 2023-03-23 | End: 2023-03-28 | Stop reason: HOSPADM

## 2023-03-23 RX ADMIN — LORAZEPAM 0.5 MG: 0.5 TABLET ORAL at 13:06

## 2023-03-23 RX ADMIN — VORTIOXETINE 10 MG: 10 TABLET, FILM COATED ORAL at 09:11

## 2023-03-23 RX ADMIN — GABAPENTIN 800 MG: 800 TABLET, COATED ORAL at 09:11

## 2023-03-23 RX ADMIN — VORTIOXETINE 10 MG: 10 TABLET, FILM COATED ORAL at 13:06

## 2023-03-23 RX ADMIN — GABAPENTIN 800 MG: 800 TABLET, COATED ORAL at 14:24

## 2023-03-23 RX ADMIN — LORAZEPAM 1 MG: 1 TABLET ORAL at 19:59

## 2023-03-23 RX ADMIN — GABAPENTIN 1600 MG: 800 TABLET, COATED ORAL at 21:02

## 2023-03-23 RX ADMIN — METHADONE HYDROCHLORIDE 185 MG: 10 CONCENTRATE ORAL at 10:22

## 2023-03-23 RX ADMIN — LORAZEPAM 1 MG: 1 TABLET ORAL at 09:05

## 2023-03-23 ASSESSMENT — ACTIVITIES OF DAILY LIVING (ADL)
ADLS_ACUITY_SCORE: 37

## 2023-03-23 NOTE — PROGRESS NOTES
Pt reports that she was able to sleep all night. She reports feeling a lot better today but still anxious and depressed with some SI. She verbally contracts for safety. Pt denies any VA/H. Plan for pt to stay on obs another night. Pt is agreeable with the plan.

## 2023-03-23 NOTE — PHARMACY
Methadone Clinic Information Note    Clinic Name: [facility]  Clinic Location (city): [place]  Phone Number: [phone]    Spoke to: MONICA Goncalves  Current Methadone Dose: 185 mg daily  Last dosed: 3/22/23 in clinic  Take home doses: NONE    Verified by: Jackie Kamara, PharmD
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
## 2023-03-24 PROCEDURE — 250N000013 HC RX MED GY IP 250 OP 250 PS 637: Performed by: NURSE PRACTITIONER

## 2023-03-24 PROCEDURE — G0378 HOSPITAL OBSERVATION PER HR: HCPCS

## 2023-03-24 PROCEDURE — 99233 SBSQ HOSP IP/OBS HIGH 50: CPT | Performed by: NURSE PRACTITIONER

## 2023-03-24 RX ORDER — CHLORAL HYDRATE 500 MG
1 CAPSULE ORAL DAILY
Status: DISCONTINUED | OUTPATIENT
Start: 2023-03-24 | End: 2023-03-28 | Stop reason: HOSPADM

## 2023-03-24 RX ADMIN — METHADONE HYDROCHLORIDE 185 MG: 10 CONCENTRATE ORAL at 08:48

## 2023-03-24 RX ADMIN — ACETAMINOPHEN 650 MG: 325 TABLET, FILM COATED ORAL at 10:22

## 2023-03-24 RX ADMIN — GABAPENTIN 1600 MG: 800 TABLET, COATED ORAL at 21:03

## 2023-03-24 RX ADMIN — VORTIOXETINE 20 MG: 20 TABLET, FILM COATED ORAL at 08:48

## 2023-03-24 RX ADMIN — OMEGA-3 FATTY ACIDS CAP 1000 MG 1 G: 1000 CAP at 15:01

## 2023-03-24 RX ADMIN — GABAPENTIN 800 MG: 800 TABLET, COATED ORAL at 15:02

## 2023-03-24 RX ADMIN — LORAZEPAM 1 MG: 1 TABLET ORAL at 08:48

## 2023-03-24 RX ADMIN — QUETIAPINE FUMARATE 25 MG: 25 TABLET, FILM COATED ORAL at 21:03

## 2023-03-24 RX ADMIN — LORAZEPAM 1 MG: 1 TABLET ORAL at 19:02

## 2023-03-24 RX ADMIN — LORAZEPAM 0.5 MG: 0.5 TABLET ORAL at 12:23

## 2023-03-24 RX ADMIN — GABAPENTIN 800 MG: 800 TABLET, COATED ORAL at 08:53

## 2023-03-24 ASSESSMENT — ACTIVITIES OF DAILY LIVING (ADL)
ADLS_ACUITY_SCORE: 37

## 2023-03-24 NOTE — ED NOTES
[facility] Note:  Writer received a phone call from Pt's , Neli, through [facility]. She shared she was returning a phone call message she received. Writer identified speaking with Neli when Pt was previously at [facility].   With Pt signing an JOSE previously to speak with Neli, Martyr shared similar situation of Pt not being approved by [employer] due to daily methadone clinic appointments and Pt being unable to have 10+ day prescription medication due to Pt having her prescriptions recently filled and unable to be filled while here at [facility]. Neli reports she has been attempting to work with Pt, yet reports Pt does not follow through with recommendations. Writer informed Pt will be staying at [facility]. Neli asked to speak with Pt.   Writer approached Pt if she would like to take the call from her, to which Pt declined and said she would call Neli back later.

## 2023-03-24 NOTE — ED PROVIDER NOTES
[facility] Unit - Psychiatric Consultation  [facility] Emergency Department    Jihan Gill MRN: [id number]   Age: 47 year old YOB: 1975     History     Chief Complaint   Patient presents with     Suicidal     HPI  Jihan Gill is a 47 year old female with a past history notable for major depressive disorder, borderline personality disorder, and anxiety further complicated by substance use disorders involving opiates and benzodiazepines. She is currently prescribed methadone for maintenance treatment and receives prescriptions of benzodiazapines for management of anxiety, most recently changing from klonopin to ativan on 3/15/23. She presents to the ED with suicidal ideation. Ideation included running [place] traffic on the highway with intent to be hit by a vehicle and killed. Patient was evaluated by the ED provider, who medically cleared patient to transfer to [facility] for psychiatric assessment, this is reviewed along with all pertinent labs and tests performed.    Patient seen for reassessment today. She continues to endorse ongoing severely depressed mood. She continues to feel hopeless. Continues to endorse thoughts of wanting to end her life. Reports feeling much the same as the past couple of days with little improvement. She worries about her ability to keep herself safe if she were to be discharged. She is open to going to a crisis residence. There have been barriers in finding a crisis facility due to patient taking methadone. She mentions experiencing nightmares and poor sleep overnight last night. Wakes up feeling somewhat startled. She recalls having a similar experience when she was receiving ECT about two months ago and wonders if this is related. We discuss that this far out from her two ECT treatments, this is unlikely to be related to anything she is experiencing currently. Patient is tolerating the increased dose of Trintellix well without evidence of side effects. She wonders  about nutritional options to improve her mood. Will remain at [facility] for an additional night while pursuing a crisis bed.     Past Medical History  Past Medical History:   Diagnosis Date     Arthritis      Bipolar 1 disorder (H)      Chronic hepatitis C without hepatic coma (H)      Depressive disorder     postpartum     Depressive disorder      Major depression, recurrent (H)      Opiate addiction (H)      Seizures (H)     narcotic withdrawal     Substance abuse (H)      Urinary calculus, unspecified 2001    Renal stones     Past Surgical History:   Procedure Laterality Date     C/SECTION, LOW TRANSVERSE      [id number]     ENT SURGERY       GYN SURGERY       TONSILLECTOMY       acetaminophen (TYLENOL) 325 MG tablet  albuterol (PROAIR HFA/PROVENTIL HFA/VENTOLIN HFA) 108 (90 Base) MCG/ACT inhaler  gabapentin (NEURONTIN) 800 MG tablet  gabapentin (NEURONTIN) 800 MG tablet  hydrOXYzine (ATARAX) 25 MG tablet  LORazepam (ATIVAN) 1 MG tablet  melatonin 3 MG tablet  methadone HCl 10 MG/5ML SOLN  methocarbamol (ROBAXIN) 500 MG tablet  QUEtiapine (SEROQUEL) 25 MG tablet  vortioxetine (TRINTELLIX) 10 MG tablet      Allergies   Allergen Reactions     Buspirone Hives     Droperidol Anxiety and Other (See Comments)     SHAKING  SHAKING  Tardive Dyskinesia  Tardive Dyskinesia  SHAKING  Tardive Dyskinesia  Tardive Dyskinesia       Ketorolac Hives     Ketorolac Tromethamine Hives     Metoclopramide Anxiety, Other (See Comments) and Rash     Other reaction(s): Extrapyramidal Side Effect, Extrapyramidal Symptoms  Extrapyramidal Side Effect, Dystonia    Other reaction(s): Extrapyramidal Side Effect  dystonia  Extrapyramidal Side Effect, Dystonia  Other reaction(s): Extrapyramidal Side Effect, Other (see comments)  dystonia  Dystonia  Dystonia  Other reaction(s): Extrapyramidal Side Effect  dystonia  Extrapyramidal Side Effect, Dystonia  Other reaction(s): Extrapyramidal Side Effect, Other (see comments)  dystonia  Dystonia  Other  reaction(s): Extrapyramidal Side Effect, Other (see comments)  dystonia  Dystonia  Dystonia       Prochlorperazine Anxiety and Other (See Comments)     Other reaction(s): Extrapyramidal Side Effect, Extrapyramidal Symptoms, Other (see comments)  Extrapyramidal Side Effect, dystonia  Dystonia  Other reaction(s): Extrapyramidal Side Effect  Dystonia  Extrapyramidal Side Effect, dystonia       Abilify [Aripiprazole] Other (See Comments)     Tardive dyskinesia     Acetaminophen Other (See Comments)     Multiple suicide attempts by tylenol, hoards pills.  Multiple suicide attempts by tylenol, hoards pills.  Multiple suicide attempts by tylenol, hoards pills.  Multiple suicide attempts by tylenol, hoards pills.       Allopurinol      Allopurinol      Compazine Other (See Comments)     Dystonia     Dimenhydrinate Other (See Comments)     Other reaction(s): Agitation, Other (see comments)  SHAKING  Jaw lock.  Other reaction(s): Agitation  Jaw lock.  SHAKING  Iv only can take regular  Other reaction(s): Agitation  Jaw lock.  SHAKING  Iv only can take regular  Iv only can take regular  Lock jaw  Shaking       Dramamine      Jaw lock.       Hydrocodone Nausea and Vomiting     Olanzapine Other (See Comments)     Tardive dyskinesia from Zyprexa - per patient     Reglan Other (See Comments)     dystonia     Sulfa Drugs Other (See Comments)     dystonia  dystonia    Other reaction(s): Other (see comments)  dystonia  Dystonia        Diphenhydramine Anxiety     When given IV- causes regular noises to be amplified - only with IV dose, not pill form.     Family History  Family History   Problem Relation Age of Onset     No Known Problems Father      No Known Problems Mother      No Known Problems Sister      No Known Problems Brother      Social History   Social History     Tobacco Use     Smoking status: Every Day     Packs/day: 0.50     Years: 8.00     Pack years: 4.00     Types: Cigarettes     Smokeless tobacco: Never   Substance  "Use Topics     Alcohol use: Not Currently     Comment: States no EtOH since 2008.     Drug use: Yes     Comment: Methadone 60mg/day street buy          Review of Systems  A medically appropriate review of systems was performed with pertinent positives and negatives noted in the HPI, and all other systems negative.    Physical Examination   BP: (!) 149/81  Pulse: 86  Temp: 98  F (36.7  C)  Resp: 18  Height: 165.1 cm (5' 5\")  Weight: 102.1 kg (225 lb)  SpO2: 98 %    Physical Exam  General: Appears stated age.   Neuro: Alert and fully oriented. Extremities appear to demonstrate normal strength on visual inspection.   Integumentary/Skin: no rash visualized, normal color    Psychiatric Examination   Appearance: awake, alert, adequately groomed, dressed in hospital scrubs and appeared as age stated  Attitude:  cooperative  Eye Contact:  fair  Mood:  depressed  Affect:  mood congruent and intensity is blunted  Speech:  clear, coherent and normal prosody  Psychomotor Behavior:  no evidence of tardive dyskinesia, dystonia, or tics  Thought Process:  linear and goal oriented  Associations:  no loose associations  Thought Content:  no evidence of psychotic thought, active suicidal ideation present and no homicidal ideation  Insight:  fair  Judgement:  fair  Oriented to:  time, person, and place  Attention Span and Concentration:  intact  Recent and Remote Memory:  intact  Language: able to name/identify objects without impairment  Fund of Knowledge: intact with awareness of current and past events    ED Course        Labs Ordered and Resulted from Time of ED Arrival to Time of ED Departure   DRUG ABUSE SCREEN 77 URINE (FL, RH, SH) - Abnormal       Result Value    Amphetamines Urine Screen Negative      Barbituates Urine Screen Negative      Benzodiazepine Urine Screen Positive (*)     Cannabinoids Urine Screen Negative      Opiates Urine Screen Negative      PCP Urine Screen Positive (*)     Cocaine Urine Screen Positive (*)  "   COVID-19 VIRUS (CORONAVIRUS) BY PCR - Normal    SARS CoV2 PCR Negative         Assessments & Plan (with Medical Decision Making)   Patient presenting with suicidal ideation in the context of depression, further complicated by ongoing substance abuse. Nursing notes reviewed noting no acute issues.     I have reviewed the assessment completed by the [facility].     Preliminary diagnosis:    ICD-10-CM    1. Benzodiazepine abuse, continuous (H)  F13.10       2. Borderline personality disorder (H)  F60.3       3. Opioid use disorder, severe, on maintenance therapy, dependence (H)  F11.20       4. Recurrent major depressive disorder, remission status unspecified (H)  F33.9            Treatment Plan:  - Order fish oil 1 gram daily. Patient interested in foods/supplements that may help to improve mood  - Continue Trintellix 20 mg daily for mood and anxiety symptoms  - Continue remainder of medications unchanged  - Patient continues to endorse active SI and does not feel safe to discharge. We are pursuing a crisis bed. [facility] is the only known crisis facility to allow patients on methadone. Unfortunately, no bed available today. Will recheck availability tomorrow.   - Patient will remain on observation status for an additional night while awaiting crisis residence    --  Yahir Rodriguez CNP   [facility] EMERGENCY DEPT  Em[facility] Unit  3/22/2023      Yahir Rodriguez CNP  03/24/23 1428

## 2023-03-24 NOTE — ED NOTES
LM Note:  Writer approached Pt to engage in reassessment. Pt was observed to be asleep. Pt did not wake after several verbal prompts. Plan to attempt reassessment when Pt is awake and able to participate.
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
## 2023-03-25 ENCOUNTER — TELEPHONE (OUTPATIENT)
Dept: BEHAVIORAL HEALTH | Facility: CLINIC | Age: 48
End: 2023-03-25
Payer: COMMERCIAL

## 2023-03-25 PROCEDURE — G0378 HOSPITAL OBSERVATION PER HR: HCPCS

## 2023-03-25 PROCEDURE — 250N000013 HC RX MED GY IP 250 OP 250 PS 637: Performed by: NURSE PRACTITIONER

## 2023-03-25 PROCEDURE — 99231 SBSQ HOSP IP/OBS SF/LOW 25: CPT | Performed by: PSYCHIATRY & NEUROLOGY

## 2023-03-25 RX ADMIN — GABAPENTIN 800 MG: 800 TABLET, COATED ORAL at 14:04

## 2023-03-25 RX ADMIN — METHADONE HYDROCHLORIDE 185 MG: 10 CONCENTRATE ORAL at 08:33

## 2023-03-25 RX ADMIN — GABAPENTIN 800 MG: 800 TABLET, COATED ORAL at 08:00

## 2023-03-25 RX ADMIN — OMEGA-3 FATTY ACIDS CAP 1000 MG 1 G: 1000 CAP at 08:00

## 2023-03-25 RX ADMIN — LORAZEPAM 0.5 MG: 0.5 TABLET ORAL at 11:42

## 2023-03-25 RX ADMIN — LORAZEPAM 1 MG: 1 TABLET ORAL at 19:01

## 2023-03-25 RX ADMIN — VORTIOXETINE 20 MG: 20 TABLET, FILM COATED ORAL at 07:56

## 2023-03-25 RX ADMIN — ACETAMINOPHEN 650 MG: 325 TABLET, FILM COATED ORAL at 11:42

## 2023-03-25 RX ADMIN — GABAPENTIN 1600 MG: 800 TABLET, COATED ORAL at 22:03

## 2023-03-25 RX ADMIN — LORAZEPAM 1 MG: 1 TABLET ORAL at 08:00

## 2023-03-25 RX ADMIN — QUETIAPINE FUMARATE 25 MG: 25 TABLET, FILM COATED ORAL at 20:53

## 2023-03-25 ASSESSMENT — ACTIVITIES OF DAILY LIVING (ADL)
ADLS_ACUITY_SCORE: 37

## 2023-03-25 NOTE — PROGRESS NOTES
Pt spent most of the shift resting in her recliner. She was visible on the unit. She made her needs known. Pt was med compliant and compliant with cares. Pt endorses passive SI but she verbally contracts for safety. VSS. No acute changes noted this shift. Plan to stay another night on obs and be reassessed in the morning.

## 2023-03-25 NOTE — PROGRESS NOTES
"Pt up watching TV, calm, cooperative with no complaints. Reports suicidal thoughts \"always there.\" She contracts for safety and feels safe here. She called [facility] and they have no beds available, she is to call Monday AM, as they will have beds then. Her Methadone Clinic is closed tomorrow- Sunday.She reports,  \" she does not feel safe or able to stay away from heroin if she goes to the streets and does not have her methadone. \" Writer will follow- up with [facility] and Provider.   "

## 2023-03-25 NOTE — PROGRESS NOTES
Pt had lunch and has been napping most of the day, voices no complaints, calm with no sign of distress.

## 2023-03-26 ENCOUNTER — TELEPHONE (OUTPATIENT)
Dept: BEHAVIORAL HEALTH | Facility: CLINIC | Age: 48
End: 2023-03-26
Payer: COMMERCIAL

## 2023-03-26 PROCEDURE — 250N000013 HC RX MED GY IP 250 OP 250 PS 637: Performed by: NURSE PRACTITIONER

## 2023-03-26 PROCEDURE — G0378 HOSPITAL OBSERVATION PER HR: HCPCS

## 2023-03-26 RX ADMIN — OMEGA-3 FATTY ACIDS CAP 1000 MG 1 G: 1000 CAP at 08:48

## 2023-03-26 RX ADMIN — QUETIAPINE FUMARATE 25 MG: 25 TABLET, FILM COATED ORAL at 21:00

## 2023-03-26 RX ADMIN — LORAZEPAM 1 MG: 1 TABLET ORAL at 21:00

## 2023-03-26 RX ADMIN — HYDROXYZINE HYDROCHLORIDE 25 MG: 25 TABLET, FILM COATED ORAL at 10:51

## 2023-03-26 RX ADMIN — VORTIOXETINE 20 MG: 20 TABLET, FILM COATED ORAL at 08:48

## 2023-03-26 RX ADMIN — GABAPENTIN 800 MG: 800 TABLET, COATED ORAL at 13:53

## 2023-03-26 RX ADMIN — LORAZEPAM 1 MG: 1 TABLET ORAL at 08:48

## 2023-03-26 RX ADMIN — LORAZEPAM 0.5 MG: 0.5 TABLET ORAL at 11:54

## 2023-03-26 RX ADMIN — GABAPENTIN 1600 MG: 800 TABLET, COATED ORAL at 21:00

## 2023-03-26 RX ADMIN — METHADONE HYDROCHLORIDE 185 MG: 10 CONCENTRATE ORAL at 08:49

## 2023-03-26 RX ADMIN — GABAPENTIN 800 MG: 800 TABLET, COATED ORAL at 08:47

## 2023-03-26 ASSESSMENT — ACTIVITIES OF DAILY LIVING (ADL)
ADLS_ACUITY_SCORE: 37

## 2023-03-26 NOTE — PROGRESS NOTES
Pt wakes minimally for AM medications, she is very sleepy, almost appears sedated. She reports she did not sleep well last night, NOC shift reports she does sleep @ NOC, pt also sleeps most of the day. She endorses SI/contracts for safety here, no HI, denies hallucinations. She is cooperative, eating and drinking well.

## 2023-03-26 NOTE — ED NOTES
"Triage & Transition Services [facility]     Progress Note    Patient: Jihan goes by \"Jihan,\" uses she/her pronouns  Date of Service: March 25, 2023  Site of Service:   Patient was seen in-person.     Presenting problem:  Please see initial DEC/[facility] Crisis Assessment completed by Writer on 3/22/2023 for complete assessment information. Notable concerns include suicidal ideation.     Individuals Present: Jihan & Florence Gonzalez [facility]    Session start: 1900  Session end: 1930  Session duration in minutes: 30  CPT utilized: 31383 - Psychotherapy (with patient) - 30 (16-37*) min     Current Presentation:   Pt was woken to met with the Pt.  She states that she took Seroquel last night due to anxiety to assist with sleep.  Pt reports that she feels better.  Writer informed Pt that she was on her 3rd day of observation status and that there needs to be a discussion about a path forward.  Pt becomes frustrated stating that she has a friend she could stay with but now states \"its too late to arrange that tonight\".  Pt then states that she can't get her Methadone doses if she discharges today as they are closed tomorrow.  Pt then becomes tearful stating that she can't discharge because she is suicidal.  Writer asked her to describe her ideation and she states that it has escalted to thinking about other plans including jumping from a building along with running into traffic.  PT states that she is unsafe to discharge.  Pt is tearful.   Writer attempted to challenge long term use of the ED as she states that she wants \"long term MH facilities\".  Writer informed her of [facility] and that she or her CM can be making referrals to such.      Additional Collateral Information:  n/a     Mental Status Exam     Affect: Flat   Appearance: Disheveled    Attention Span/Concentration: Attentive  Eye Contact: Avoidant   Fund of Knowledge: Appropriate    Language /Speech Content: Fluent   Language /Speech Volume: Soft    Language /Speech " Rate/Productions: Normal    Recent Memory: Intact   Remote Memory: Intact    Mood: Depressed    Orientation to Person: Yes    Orientation to Place: Yes   Orientation to Time of Day: Yes    Orientation to Date: Yes    Situation (Do they understand why they are here?): Yes    Psychomotor Behavior: Normal    Thought Content: Suicidal   Thought Form: Intact     Diagnosis:   F32.9 Unspecified Depressive Disorder  F43.10 Posttraumatic Stress Disorder  F10.20 Alcohol Use Disorder, severe, in sustained remission  F11.20 Opioid Use Disorder, severe, on a maintenance therapy  F15.20 Stimulant Use Disorder, amphetamine type substance, moderate, in sustained remission  F60.3 Borderline Personality Disorder    Therapeutic Intervention(s):   Provided active listening, unconditional positive regard, and validation. Engaged in safety planning.  Identified stress relief practices. Explored strategies for self-soothing.     Treatment Objective(s) Addressed:   The focus of this session was on rapport building, identifying and practicing coping strategies, safety planning and identifying an appropriate aftercare plan     Progress Towards Goals:   Patient reports worsening symptoms. Patient is not making progress towards treatment goals as evidenced by Pt report.     Case Management:   Calls to crisis were unsuccessful as they are currently full       Clinical Summary and Substantiation of Recommendations    A lower level of care has been unsuccessful in treating and stabilizing patient s mental health symptoms. Patient will remain on Em[facility] unit under observation for continued monitoring, treatment and therapeutic intervention of mental health symptoms. Observation at EmPATH could help mitigate the need for a more restrictive level of care in an inpatient setting.      Due to Pt's active suicidal ideation with escalated intent and planning Writer's recommendation is inpatient stabilization.    Admission to Inpatient Level of Care is  indicated due to:    1. Patient risk of severity of behavioral health disorder is appropriate to proposed level of care as indicated by:    Imminent Risk of Harm: Current plan for suicide or serious harm to self is present  And/or:  Behavioral health disorder is present and appropriate for inpatient care with both of the following:     Severe psychiatric, behavioral or other comorbid conditions are appropriate for management at inpatient mental health as indicated by at least one of the following:   o Negative symptoms, Comorbid substance use disorder, Impaired impulse control, judgement, or insight and Internalizing symptoms (sulking, dysphoria, anhedonia)     Severe dysfunction in daily living is present as indicated by at least one of the following:   o Extreme deterioration in social interactions, Complete neglect of self care with associated impairment in physical status and Complete inability to maintain any appropriate aspect of personal responsibility in any adult roles    2. Inpatient mental health services are necessary to meet patient needs and at least one of the following:  Specific condition related to admission diagnosis is present and judged likely to further improve at proposed level of care and Specific condition related to admission diagnosis is present and judged likely to deteriorate in absence of treatment at proposed level of care    3. Situation and expectations are appropriate for inpatient care, as indicated by one of the following:   Voluntary treatment at lower level of care is not feasible, Around-the-clock medical and nursing care to address symptoms and initiate intervention is required and Patient management/treatment at lower level of care is not feasible or is inappropriate         Florence Gonzalez [facility]

## 2023-03-26 NOTE — ED NOTES
Patient depressed and anxious.  She stays in her chair and sleeps. She remains suicidal with different plans.  Her affect is flat and at times she is tearful.  The plan for her is now to go inpatient.

## 2023-03-26 NOTE — TELEPHONE ENCOUNTER
Updated Bed Search @ 8:35 AM, 10:30 AM  Per chart review, intake can look in the metro for placement     [facility] has 0 appropriate beds available. Phone: [phone]  [facility] posting 0 available beds. Phone: [phone]  [facility] posting 0 available beds. Phone: [phone]  [facility] posting 0 available beds. Phone: [phone]  [facility] posting 0 available beds. Phone: [phone]  [facility] posting 0 available beds. Phone:[phone]  [facility] posting 0 available beds. Phone: [phone]. Negative covid required.      Pt remains on work list pending appropriate bed placement.

## 2023-03-26 NOTE — ED PROVIDER NOTES
[facility] Unit - Psychiatric Consultation  [facility] Emergency Department    Jihan Gill MRN: [id number]   Age: 47 year old YOB: 1975     History     Chief Complaint   Patient presents with     Suicidal     Per Yahir Rodriguez CNP, 3/24/2023:  HPI  Jihan Gill is a 47 year old female with a past history notable for major depressive disorder, borderline personality disorder, and anxiety further complicated by substance use disorders involving opiates and benzodiazepines. She is currently prescribed methadone for maintenance treatment and receives prescriptions of benzodiazapines for management of anxiety, most recently changing from klonopin to ativan on 3/15/23. She presents to the ED with suicidal ideation. Ideation included running [place] traffic on the highway with intent to be hit by a vehicle and killed. Patient was evaluated by the ED provider, who medically cleared patient to transfer to [facility] for psychiatric assessment, this is reviewed along with all pertinent labs and tests performed.     Patient seen for reassessment today. She continues to endorse ongoing severely depressed mood. She continues to feel hopeless. Continues to endorse thoughts of wanting to end her life. Reports feeling much the same as the past couple of days with little improvement. She worries about her ability to keep herself safe if she were to be discharged. She is open to going to a crisis residence. There have been barriers in finding a crisis facility due to patient taking methadone. She mentions experiencing nightmares and poor sleep overnight last night. Wakes up feeling somewhat startled. She recalls having a similar experience when she was receiving ECT about two months ago and wonders if this is related. We discuss that this far out from her two ECT treatments, this is unlikely to be related to anything she is experiencing currently. Patient is tolerating the increased dose of Trintellix well  "without evidence of side effects. She wonders about nutritional options to improve her mood. Will remain at [facility] for an additional night while pursuing a crisis bed.     Last psychiatry inpatient 4/2022 at [facility].     3/25/2023 reassessment:  Today patient reports her symptoms of intense depression continue.  Patient continues to feel incredibly hopeless and worthless.  She feels helpless.  She reports she continues to \"go over all the ways I could kill myself in my head.\"  She continues to ruminate.  She does not feel safe to discharge.  She does feel as though she would actively harm herself if she were to leave the hospital.  Physically she has been tolerating recent medication changes okay.  Denies any major negative side effects.  She is feeling safe on the unit.  Patient is agreeable to inpatient admission due to ongoing suicidal thoughts.    Past Medical History  Past Medical History:   Diagnosis Date     Arthritis      Bipolar 1 disorder (H)      Chronic hepatitis C without hepatic coma (H)      Depressive disorder     postpartum     Depressive disorder      Major depression, recurrent (H)      Opiate addiction (H)      Seizures (H)     narcotic withdrawal     Substance abuse (H)      Urinary calculus, unspecified 2001    Renal stones     Past Surgical History:   Procedure Laterality Date     C/SECTION, LOW TRANSVERSE      [id number]     ENT SURGERY       GYN SURGERY       TONSILLECTOMY       acetaminophen (TYLENOL) 325 MG tablet  albuterol (PROAIR HFA/PROVENTIL HFA/VENTOLIN HFA) 108 (90 Base) MCG/ACT inhaler  gabapentin (NEURONTIN) 800 MG tablet  gabapentin (NEURONTIN) 800 MG tablet  hydrOXYzine (ATARAX) 25 MG tablet  LORazepam (ATIVAN) 1 MG tablet  melatonin 3 MG tablet  methadone HCl 10 MG/5ML SOLN  methocarbamol (ROBAXIN) 500 MG tablet  QUEtiapine (SEROQUEL) 25 MG tablet  vortioxetine (TRINTELLIX) 10 MG tablet      Allergies   Allergen Reactions     Buspirone Hives     Droperidol Anxiety and Other (See " Comments)     SHAKING  SHAKING  Tardive Dyskinesia  Tardive Dyskinesia  SHAKING  Tardive Dyskinesia  Tardive Dyskinesia       Ketorolac Hives     Ketorolac Tromethamine Hives     Metoclopramide Anxiety, Other (See Comments) and Rash     Other reaction(s): Extrapyramidal Side Effect, Extrapyramidal Symptoms  Extrapyramidal Side Effect, Dystonia    Other reaction(s): Extrapyramidal Side Effect  dystonia  Extrapyramidal Side Effect, Dystonia  Other reaction(s): Extrapyramidal Side Effect, Other (see comments)  dystonia  Dystonia  Dystonia  Other reaction(s): Extrapyramidal Side Effect  dystonia  Extrapyramidal Side Effect, Dystonia  Other reaction(s): Extrapyramidal Side Effect, Other (see comments)  dystonia  Dystonia  Other reaction(s): Extrapyramidal Side Effect, Other (see comments)  dystonia  Dystonia  Dystonia       Prochlorperazine Anxiety and Other (See Comments)     Other reaction(s): Extrapyramidal Side Effect, Extrapyramidal Symptoms, Other (see comments)  Extrapyramidal Side Effect, dystonia  Dystonia  Other reaction(s): Extrapyramidal Side Effect  Dystonia  Extrapyramidal Side Effect, dystonia       Abilify [Aripiprazole] Other (See Comments)     Tardive dyskinesia     Acetaminophen Other (See Comments)     Multiple suicide attempts by tylenol, hoards pills.  Multiple suicide attempts by tylenol, hoards pills.  Multiple suicide attempts by tylenol, hoards pills.  Multiple suicide attempts by tylenol, hoards pills.       Allopurinol      Allopurinol      Compazine Other (See Comments)     Dystonia     Dimenhydrinate Other (See Comments)     Other reaction(s): Agitation, Other (see comments)  SHAKING  Jaw lock.  Other reaction(s): Agitation  Jaw lock.  SHAKING  Iv only can take regular  Other reaction(s): Agitation  Jaw lock.  SHAKING  Iv only can take regular  Iv only can take regular  Lock jaw  Shaking       Dramamine      Jaw lock.       Hydrocodone Nausea and Vomiting     Olanzapine Other (See Comments)  "    Tardive dyskinesia from Zyprexa - per patient     Reglan Other (See Comments)     dystonia     Sulfa Drugs Other (See Comments)     dystonia  dystonia    Other reaction(s): Other (see comments)  dystonia  Dystonia        Diphenhydramine Anxiety     When given IV- causes regular noises to be amplified - only with IV dose, not pill form.     Family History  Family History   Problem Relation Age of Onset     No Known Problems Father      No Known Problems Mother      No Known Problems Sister      No Known Problems Brother      Social History   Social History     Tobacco Use     Smoking status: Every Day     Packs/day: 0.50     Years: 8.00     Pack years: 4.00     Types: Cigarettes     Smokeless tobacco: Never   Substance Use Topics     Alcohol use: Not Currently     Comment: States no EtOH since 2008.     Drug use: Yes     Comment: Methadone 60mg/day street buy          Review of Systems  A medically appropriate review of systems was performed with pertinent positives and negatives noted in the HPI, and all other systems negative.    Physical Examination   BP: (!) 149/81  Pulse: 86  Temp: 98  F (36.7  C)  Resp: 18  Height: 165.1 cm (5' 5\")  Weight: 102.1 kg (225 lb)  SpO2: 98 %    Physical Exam  General: Appears stated age.   Neuro: Alert and fully oriented. Extremities appear to demonstrate normal strength on visual inspection.   Integumentary/Skin: no rash visualized, normal color    Psychiatric Examination   Appearance: awake, alert, appeared as age stated and slightly unkempt  Attitude:  cooperative  Eye Contact:  fair  Mood:  anxious and depressed  Affect:  mood congruent and Tearful  Speech:  clear, coherent, soft volume, slow  Psychomotor Behavior:  physical retardation  Thought Process:  logical, linear and goal oriented  Associations:  no loose associations  Thought Content:  active suicidal ideation present, plan for suicide present, no auditory hallucinations present and no visual hallucinations present; " no psychotic thought  Insight:  fair  Judgement:  fair  Oriented to:  time, person, and place  Attention Span and Concentration:  fair  Recent and Remote Memory:  fair  Language: able to name/identify objects without impairment  Fund of Knowledge: intact with awareness of current and past events    ED Course        Labs Ordered and Resulted from Time of ED Arrival to Time of ED Departure   DRUG ABUSE SCREEN 77 URINE (FL, RH, SH) - Abnormal       Result Value    Amphetamines Urine Screen Negative      Barbituates Urine Screen Negative      Benzodiazepine Urine Screen Positive (*)     Cannabinoids Urine Screen Negative      Opiates Urine Screen Negative      PCP Urine Screen Positive (*)     Cocaine Urine Screen Positive (*)    COVID-19 VIRUS (CORONAVIRUS) BY PCR - Normal    SARS CoV2 PCR Negative         Assessments & Plan (with Medical Decision Making)   Patient presenting with ongoing worsening depression and suicidal ideation in the context of ongoing substance abuse.  Patient continues to have intense feelings of hopelessness, worsening depression, and active suicidal ideation.  Patient with recent interrupted suicide attempt a couple weeks ago and ongoing thoughts of self-harm at this time.  Patient agreeable to inpatient psychiatric admission.  Patient is voluntary.  Nursing notes reviewed noting no acute issues.     I have reviewed the assessment completed by the [facility].     Preliminary diagnosis:    ICD-10-CM    1. Benzodiazepine abuse, continuous (H)  F13.10       2. Borderline personality disorder (H)  F60.3       3. Opioid use disorder, severe, on maintenance therapy, dependence (H)  F11.20       4. Recurrent major depressive disorder, remission status unspecified (H)  F33.9            Treatment Plan:  -Admit to inpatient psychiatry for safety and stabilization  -Continue Trintellix 20 mg daily for mood and anxiety  -Continue remainder PTA psychiatric medications unchanged at this time  -Continue Fish oil 1  "g daily for mood (started since patient was interested in supplements that could help improve mood symptoms)  -Medication education provided this visit including but not limited to: Rationale for medication, importance of medication adherence, medication interactions, common medication side effects, benefits of medications.  -Problem focused supportive therapy and education provided today related to patient's current and acute stressors, symptoms, and diagnoses.    --  Sharmaine Torres DO   [facility] EMERGENCY DEPT  [facility] Unit  3/25/2023     This note was created with voice recognition software. Inadvertent grammatical errors, typographical errors, and \"sound-a-like\" substitutions may occur due to limitations of the software.  Read the note carefully and apply context when erroneous substitutions have occurred. Thank you.        Sharmaine Torres DO  03/25/23 [id number]    "

## 2023-03-26 NOTE — TELEPHONE ENCOUNTER
No appropriate beds are currently available within the  system. Bed search update ([facility]) @ 12AM:        [facility]: @ cap per website  [facility]: @ cap per website  [facility]: @ cap per website. Per previous call piyush/ Alfonso @ 5:20PM they are full and don t expect any openings until Monday  [facility]: @ cap per website  [facility]: @ cap per website  [facility]: @ cap per website  [facility]: @ cap per website    Pt remains on work list until appropriate placement is available
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
## 2023-03-27 ENCOUNTER — TELEPHONE (OUTPATIENT)
Dept: BEHAVIORAL HEALTH | Facility: CLINIC | Age: 48
End: 2023-03-27
Payer: COMMERCIAL

## 2023-03-27 PROCEDURE — 250N000013 HC RX MED GY IP 250 OP 250 PS 637: Performed by: NURSE PRACTITIONER

## 2023-03-27 PROCEDURE — G0378 HOSPITAL OBSERVATION PER HR: HCPCS

## 2023-03-27 RX ADMIN — VORTIOXETINE 20 MG: 20 TABLET, FILM COATED ORAL at 08:44

## 2023-03-27 RX ADMIN — GABAPENTIN 1600 MG: 800 TABLET, COATED ORAL at 21:18

## 2023-03-27 RX ADMIN — LORAZEPAM 1 MG: 1 TABLET ORAL at 08:42

## 2023-03-27 RX ADMIN — LORAZEPAM 0.5 MG: 0.5 TABLET ORAL at 11:56

## 2023-03-27 RX ADMIN — GABAPENTIN 800 MG: 800 TABLET, COATED ORAL at 14:18

## 2023-03-27 RX ADMIN — OMEGA-3 FATTY ACIDS CAP 1000 MG 1 G: 1000 CAP at 08:43

## 2023-03-27 RX ADMIN — GABAPENTIN 800 MG: 800 TABLET, COATED ORAL at 08:43

## 2023-03-27 RX ADMIN — LORAZEPAM 1 MG: 1 TABLET ORAL at 20:07

## 2023-03-27 RX ADMIN — METHADONE HYDROCHLORIDE 185 MG: 10 CONCENTRATE ORAL at 08:41

## 2023-03-27 ASSESSMENT — ACTIVITIES OF DAILY LIVING (ADL)
ADLS_ACUITY_SCORE: 37
HYGIENE/GROOMING: INDEPENDENT
ADLS_ACUITY_SCORE: 37

## 2023-03-27 NOTE — ED NOTES
No significant changes this shift. Patient continues to be withdrawn and isolative. Slept for most of the evening. Endorses SI, but contracts for safety on the unit. Flat, blunted affect and appears depressed in mood. Pleasant and cooperative. Still waiting for IP placement. Will continue to monitor until bed is available. Remains voluntary.

## 2023-03-27 NOTE — ED NOTES
"Triage & Transition Services [facility]     Progress Note    Patient: Jihan goes by \"Jihan,\" uses she/her pronouns  Date of Service: March 27, 2023  Site of Service:   Patient was seen in-person.     Presenting problem:  Please see initial [facility] Crisis Assessment completed by IFTIKHAR Landa on 03/22/2023 for complete assessment information. Notable concerns include suicidal ideation.     Individuals Present: Jihan & IFTIKHAR Quintana    Session start: 1430  Session end: 1450  Session duration in minutes: 20  CPT utilized: 41456 - Psychotherapy (with patient) - 30 (16-37*) min        Current Presentation:   Pt met with Writer willingly for reassessment. Writer and Pt had been checking in with each other throughout the day and sat down to fully complete the reassessment. Pt reports continuing to experience mental health symptoms, such as lack of energy and ability to manage her emotions and rated her depression at a 7/10. Pt also endorses her anxiety \"comes and goes.\" Pt reports while on the unit, Pt experiences \"stressful\" situations on at [facility] and thinks she could not handle those situations if she was out in the community. Pt did not report any specific plans, yet knows she can remain safe while at [facility]. Pt reports improvement in appetite and getting caught up on her sleep, yet identifies the continued cycle of not being able to manage her mental health in the community any longer. Pt reports feeling as though she would benefit from inpatient due to having a safe place while she stabilizes.    Additional Collateral Information:  NA     Mental Status Exam     Affect: Blunted   Appearance: Appropriate    Attention Span/Concentration: Attentive  Eye Contact: Engaged   Fund of Knowledge: Appropriate    Language /Speech Content: Fluent   Language /Speech Volume: Normal    Language /Speech Rate/Productions: Normal    Recent Memory: Intact   Remote Memory: Intact   Mood: Anxious and Sad    Orientation to " Person: Yes    Orientation to Place: Yes   Orientation to Time of Day: Yes    Orientation to Date: Yes    Situation (Do they understand why they are here?): Yes    Psychomotor Behavior: Normal    Thought Content: Clear and Other: suicidal ideation on 03/27/2023   Thought Form: Intact     Diagnosis:   F32.9 Unspecified Depressive Disorder  F43.10 Posttraumatic Stress Disorder  F10.20 Alcohol Use Disorder, severe, in sustained remission  F11.20 Opioid Use Disorder, severe, on a maintenance therapy  F15.20 Stimulant Use Disorder, amphetamine type substance, moderate, in sustained remission  F60.3 Borderline Personality Disorder    Therapeutic Intervention(s):   Provided active listening, unconditional positive regard, and validation. Reviewed healthy living that supports positive mental health, including looking at sleep hygiene, regular movement, nutrition, and regular socialization.    Treatment Objective(s) Addressed:   The focus of this session was on identifying and practicing coping strategies, identifying treatment goals and exploring obstacles to safety in the community.     Progress Towards Goals:   Patient reports stable symptoms. Patient is not making progress towards treatment goals as evidenced by continuing to endorse experiencing suicidal ideation as recent as this morning, 03/27/2023.     Case Management:   NA       Clinical Summary and Substantiation of Recommendations    It is the recommendation of this clinician that the patient be admitted to inpatient mental health care for further treatment, stabilization and safety. Attempts at managing mental health symptoms and maintaining safety at a lower level of care have been unsuccessful. Patient s current mental health symptoms are requiring a secure setting due to: pt is endorsing suicidal ideation with a plan, but no intent and limited ability to engage in outpatient care. Pt reports feeling safe while in the hospital when she experiences her suicidal  ideation due to not being able to follow through with any plans, yet feels nothing would stop her if she was in the community.      Admission to Inpatient Level of Care is indicated due to:     1. Patient risk of severity of behavioral health disorder is appropriate to proposed level of care as indicated by:               Imminent Risk of Harm: Current plan for suicide or serious harm to self is present  And/or:  Behavioral health disorder is present and appropriate for inpatient care with both of the following:     Severe psychiatric, behavioral or other comorbid conditions are appropriate for management at inpatient mental health as indicated by at least one of the following:   ? Negative symptoms, Comorbid substance use disorder, Impaired impulse control, judgement, or insight and Internalizing symptoms (sulking, dysphoria, anhedonia)     Severe dysfunction in daily living is present as indicated by at least one of the following:   ? Extreme deterioration in social interactions, Complete neglect of self care with associated impairment in physical status and Complete inability to maintain any appropriate aspect of personal responsibility in any adult roles     2. Inpatient mental health services are necessary to meet patient needs and at least one of the following:  Specific condition related to admission diagnosis is present and judged likely to further improve at proposed level of care and Specific condition related to admission diagnosis is present and judged likely to deteriorate in absence of treatment at proposed level of care     3. Situation and expectations are appropriate for inpatient care, as indicated by one of the following:   Voluntary treatment at lower level of care is not feasible, Around-the-clock medical and nursing care to address symptoms and initiate intervention is required and Patient management/treatment at lower level of care is not feasible or is inappropriate     James Kumar [facility]

## 2023-03-27 NOTE — ED NOTES
Pt has been resting in her chair through the majority of the shift. Pt continues to feels depressed and anxious and not feeling well enough to handle being her own. Pt states she has these moment and feels like she needs some time to let the meds to help her. Pt denies SI at this time but is not sure if she can maintain safety outside of the hospital. Pt denies any sort of plan for suicide. Pt is pleasant dn cooperative. Pt has been med complaint feels like the meds are working for her. Plan to go IP to manage symptoms and continued SI. Nursing to continue to monitor.

## 2023-03-27 NOTE — TELEPHONE ENCOUNTER
R:  Np beds within [place] available to review for [facility].   Bed Search completed @ 8am & 1pm  Pt willing to go within the [facility].       [facility] is posting 0 beds.  Negative covid.    [facility], [facility], [facility] , and [facility]) @ Capacity -  0 beds. Negative covid.    [facility] has no beds and a short waitlist.  No avail to review. [facility] preferred.     [facility] is @ Capacity   Pt to remain on adult worklist

## 2023-03-27 NOTE — ED NOTES
Withdrawn , isolative, sleep the majority of the morning, has several stressors.  She feels safe now that she here, she is on Methadone for maintenance treatment.  Denies any SI, she feels safe here.

## 2023-03-28 ENCOUNTER — HOSPITAL ENCOUNTER (INPATIENT)
Facility: CLINIC | Age: 48
LOS: 9 days | Discharge: PSYCHIATRIC OR SUBSTANCE USE FACILITY | End: 2023-04-06
Attending: PSYCHIATRY & NEUROLOGY | Admitting: PSYCHIATRY & NEUROLOGY
Payer: COMMERCIAL

## 2023-03-28 VITALS
RESPIRATION RATE: 16 BRPM | SYSTOLIC BLOOD PRESSURE: 121 MMHG | DIASTOLIC BLOOD PRESSURE: 84 MMHG | BODY MASS INDEX: 36.35 KG/M2 | HEIGHT: 65 IN | OXYGEN SATURATION: 93 % | HEART RATE: 80 BPM | WEIGHT: 218.2 LBS | TEMPERATURE: 98.8 F

## 2023-03-28 DIAGNOSIS — F13.930 BENZODIAZEPINE WITHDRAWAL WITHOUT COMPLICATION (H): ICD-10-CM

## 2023-03-28 DIAGNOSIS — R61 GENERALIZED HYPERHIDROSIS: ICD-10-CM

## 2023-03-28 DIAGNOSIS — F33.9 DEPRESSION, RECURRENT (H): Primary | ICD-10-CM

## 2023-03-28 DIAGNOSIS — L29.9 ITCHING: ICD-10-CM

## 2023-03-28 DIAGNOSIS — G89.4 CHRONIC PAIN SYNDROME: ICD-10-CM

## 2023-03-28 DIAGNOSIS — F41.1 GAD (GENERALIZED ANXIETY DISORDER): ICD-10-CM

## 2023-03-28 DIAGNOSIS — F17.200 NICOTINE USE DISORDER: ICD-10-CM

## 2023-03-28 DIAGNOSIS — G47.09 OTHER INSOMNIA: ICD-10-CM

## 2023-03-28 DIAGNOSIS — M79.10 MYALGIA: ICD-10-CM

## 2023-03-28 PROCEDURE — G0378 HOSPITAL OBSERVATION PER HR: HCPCS

## 2023-03-28 PROCEDURE — HZ2ZZZZ DETOXIFICATION SERVICES FOR SUBSTANCE ABUSE TREATMENT: ICD-10-PCS | Performed by: PSYCHIATRY & NEUROLOGY

## 2023-03-28 PROCEDURE — 250N000013 HC RX MED GY IP 250 OP 250 PS 637: Performed by: NURSE PRACTITIONER

## 2023-03-28 PROCEDURE — 124N000002 HC R&B MH UMMC

## 2023-03-28 PROCEDURE — 99223 1ST HOSP IP/OBS HIGH 75: CPT | Mod: AI | Performed by: NURSE PRACTITIONER

## 2023-03-28 RX ORDER — AMOXICILLIN 250 MG
1 CAPSULE ORAL 2 TIMES DAILY PRN
Status: DISCONTINUED | OUTPATIENT
Start: 2023-03-28 | End: 2023-04-06 | Stop reason: HOSPADM

## 2023-03-28 RX ORDER — NALOXONE HYDROCHLORIDE 0.4 MG/ML
0.2 INJECTION, SOLUTION INTRAMUSCULAR; INTRAVENOUS; SUBCUTANEOUS
Status: DISCONTINUED | OUTPATIENT
Start: 2023-03-28 | End: 2023-04-06 | Stop reason: HOSPADM

## 2023-03-28 RX ORDER — MAGNESIUM HYDROXIDE/ALUMINUM HYDROXICE/SIMETHICONE 120; 1200; 1200 MG/30ML; MG/30ML; MG/30ML
30 SUSPENSION ORAL EVERY 4 HOURS PRN
Status: DISCONTINUED | OUTPATIENT
Start: 2023-03-28 | End: 2023-04-06 | Stop reason: HOSPADM

## 2023-03-28 RX ORDER — METHADONE HYDROCHLORIDE 5 MG/5ML
185 SOLUTION ORAL DAILY
Status: DISCONTINUED | OUTPATIENT
Start: 2023-03-29 | End: 2023-04-03

## 2023-03-28 RX ORDER — CHLORAL HYDRATE 500 MG
1 CAPSULE ORAL DAILY
Status: ON HOLD | COMMUNITY
End: 2023-04-05

## 2023-03-28 RX ORDER — PHENOBARBITAL 32.4 MG/1
32.4 TABLET ORAL 4 TIMES DAILY
Status: DISCONTINUED | OUTPATIENT
Start: 2023-03-28 | End: 2023-03-31

## 2023-03-28 RX ORDER — NALOXONE HYDROCHLORIDE 0.4 MG/ML
0.4 INJECTION, SOLUTION INTRAMUSCULAR; INTRAVENOUS; SUBCUTANEOUS
Status: DISCONTINUED | OUTPATIENT
Start: 2023-03-28 | End: 2023-04-06 | Stop reason: HOSPADM

## 2023-03-28 RX ORDER — GABAPENTIN 800 MG/1
800 TABLET ORAL
Status: DISCONTINUED | OUTPATIENT
Start: 2023-03-28 | End: 2023-04-06 | Stop reason: HOSPADM

## 2023-03-28 RX ORDER — PHENOBARBITAL 32.4 MG/1
32.4 TABLET ORAL 3 TIMES DAILY
Status: DISCONTINUED | OUTPATIENT
Start: 2023-03-28 | End: 2023-03-28

## 2023-03-28 RX ORDER — CLONIDINE HYDROCHLORIDE 0.1 MG/1
0.1 TABLET ORAL EVERY 6 HOURS PRN
Status: DISCONTINUED | OUTPATIENT
Start: 2023-03-28 | End: 2023-03-30

## 2023-03-28 RX ORDER — ACETAMINOPHEN 325 MG/1
650 TABLET ORAL EVERY 4 HOURS PRN
Status: DISCONTINUED | OUTPATIENT
Start: 2023-03-28 | End: 2023-04-06 | Stop reason: HOSPADM

## 2023-03-28 RX ORDER — METHOCARBAMOL 500 MG/1
500 TABLET, FILM COATED ORAL 3 TIMES DAILY PRN
Status: DISCONTINUED | OUTPATIENT
Start: 2023-03-28 | End: 2023-04-06 | Stop reason: HOSPADM

## 2023-03-28 RX ORDER — GABAPENTIN 800 MG/1
1600 TABLET ORAL EVERY EVENING
Status: DISCONTINUED | OUTPATIENT
Start: 2023-03-28 | End: 2023-04-06 | Stop reason: HOSPADM

## 2023-03-28 RX ADMIN — LORAZEPAM 0.5 MG: 0.5 TABLET ORAL at 11:23

## 2023-03-28 RX ADMIN — METHADONE HYDROCHLORIDE 185 MG: 10 CONCENTRATE ORAL at 07:54

## 2023-03-28 RX ADMIN — HYDROXYZINE HYDROCHLORIDE 25 MG: 25 TABLET, FILM COATED ORAL at 11:51

## 2023-03-28 RX ADMIN — OMEGA-3 FATTY ACIDS CAP 1000 MG 1 G: 1000 CAP at 07:54

## 2023-03-28 RX ADMIN — GABAPENTIN 800 MG: 800 TABLET ORAL at 14:56

## 2023-03-28 RX ADMIN — GABAPENTIN 1600 MG: 800 TABLET ORAL at 19:36

## 2023-03-28 RX ADMIN — PHENOBARBITAL 32.4 MG: 32.4 TABLET ORAL at 14:56

## 2023-03-28 RX ADMIN — GABAPENTIN 800 MG: 800 TABLET, COATED ORAL at 07:54

## 2023-03-28 RX ADMIN — PHENOBARBITAL 32.4 MG: 32.4 TABLET ORAL at 19:36

## 2023-03-28 RX ADMIN — VORTIOXETINE 20 MG: 20 TABLET, FILM COATED ORAL at 07:54

## 2023-03-28 RX ADMIN — LORAZEPAM 1 MG: 1 TABLET ORAL at 07:53

## 2023-03-28 RX ADMIN — METHOCARBAMOL 500 MG: 500 TABLET ORAL at 14:57

## 2023-03-28 ASSESSMENT — ACTIVITIES OF DAILY LIVING (ADL)
CONCENTRATING,_REMEMBERING_OR_MAKING_DECISIONS_DIFFICULTY: NO
FALL_HISTORY_WITHIN_LAST_SIX_MONTHS: NO
ADLS_ACUITY_SCORE: 37
WEAR_GLASSES_OR_BLIND: NO
TOILETING_ISSUES: NO
ADLS_ACUITY_SCORE: 37
ADLS_ACUITY_SCORE: 40
ADLS_ACUITY_SCORE: 37
WALKING_OR_CLIMBING_STAIRS_DIFFICULTY: NO
ADLS_ACUITY_SCORE: 57
ADLS_ACUITY_SCORE: 40
ADLS_ACUITY_SCORE: 40
DRESSING/BATHING_DIFFICULTY: NO
ADLS_ACUITY_SCORE: 37
DIFFICULTY_EATING/SWALLOWING: NO
ADLS_ACUITY_SCORE: 40
ADLS_ACUITY_SCORE: 37
ADLS_ACUITY_SCORE: 37
CHANGE_IN_FUNCTIONAL_STATUS_SINCE_ONSET_OF_CURRENT_ILLNESS/INJURY: NO
DOING_ERRANDS_INDEPENDENTLY_DIFFICULTY: NO
ADLS_ACUITY_SCORE: 40

## 2023-03-28 NOTE — PLAN OF CARE
"  Problem: Adult Behavioral Health Plan of Care  Goal: Individualized Daily Interaction Plan (IDIP)  Outcome: Progressing     Problem: Adult Behavioral Health Plan of Care  Goal: Patient-Specific Goal (Individualization)  Description: You can add care plan individualizations to a care plan. Examples of Individualization might be:  \"Parent requests to be called daily at 9am for status\", \"I have a hard time hearing out of my right ear\", or \"Do not touch me to wake me up as it startles me\".  Outcome: Progressing       Care plan initiated. Will monitor pt's symptoms and assist with acclimation to unit.     Nayeli Rodriguez RN                      "

## 2023-03-28 NOTE — PROGRESS NOTES
Pt arrived on the unit at 1245 on stretcher via med transport. Affect is calm, pleasant. Pt acknowledges some anxiety about being admitted, but accepts reassurance.     Pt is coop with down-to-gown search and vitals. Pt denied pain. Pt is seen by CNP once search and vitals done, she continues to meet with provider at this time.    Nayeli Rodriguez RN
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
## 2023-03-28 NOTE — PROGRESS NOTES
Admission completed, pt coop with process. MSSA is 11 at 1455. Provider notified. Vitals WNL. No concerns at this time as pt has scheduled phenobarbital.     Phenobarbital administered early secondary to provider's directive. Scheduled gabapentin, prn Robaxin also given. Pt is in common area with peers.     Will place seizure pads on floor beside bed per protocol.     Clothing misplaced by [facility], they are continuing to search for them. Pt concerned about having a bra. PA gave pt an unit sweatshirt for now.     Fasting labs tomorrow, report given to evening shift.    Nayeli Rodriguez RN

## 2023-03-28 NOTE — PROGRESS NOTES
Patient accepted at [facility] 32 under the care of Dr. Mays. Report given to MONICA Tyler. EMS called for transport and ETA 10:45 AM. Receiving unit updated with ETA. Patient informed of plan and is agreeable.
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
## 2023-03-28 NOTE — PROGRESS NOTES
Pt transferred to [place] [address] via EMS. Patient voluntary, ambulates independently to stretcher and gets on. Endorsing anxiety regarding transfer and accepts PRN hydroxyzine 25 mg. VSS. Report given to EMS. Receiving unit updated with ETA.

## 2023-03-28 NOTE — PLAN OF CARE
Tasks Complete:  Chart review   Team meeting  Primary CTC will follow up with pyscho social assessment as pt was observed resting in her room.    Current Symptoms include the following: See RN note     Addressed patient needs/concerns: No needs or concerns noted     Discharge Plan or Goal   Plan  Discharge to CD treatment     Housing  Homeless     Care Team   Primary Care Provider: Dr. Valente at [facility] ([phone])     Psychiatrist: Mykel Dos Santos CNP at [facility] ([phone])     Therapist: Jeremy Reid MA, LPCC at [facility] ([phone])     : Neli at [facility] ([phone])     Methadone Clinic: [facility] in [place] ([phone])     Barriers to Discharge   Ongoing stabilization     Referral Status  CD consult needed   CD residential referrals needed      Legal Status  Voluntary

## 2023-03-28 NOTE — PROGRESS NOTES
Pt observed in common area. Pt is awake and alert, she jiggles her leg anxiously. Demeanor is otherwise calm, pleasant. Writer advises pt to change position slowly as combination of meds could potentially make her a little unsteady. Pt expresses understanding.     Will report to evening shift.    Nayeli Rodriguez RN

## 2023-03-28 NOTE — PHARMACY
Please see pharmacy note from 3/23/23 under [facility] encounter for methadone confirmation note.      Dose previously confirmed by PharmD at [facility].     Clinic Name: [facility]  Clinic Location (city): [place]  Phone Number: [phone]    Current Methadone Dose: 185 mg daily  Last dosed: 3/22/23 in clinic  Take home doses: NONE

## 2023-03-29 PROCEDURE — H0001 ALCOHOL AND/OR DRUG ASSESS: HCPCS | Performed by: COUNSELOR

## 2023-03-29 PROCEDURE — 250N000011 HC RX IP 250 OP 636: Performed by: NURSE PRACTITIONER

## 2023-03-29 PROCEDURE — 250N000013 HC RX MED GY IP 250 OP 250 PS 637: Performed by: NURSE PRACTITIONER

## 2023-03-29 PROCEDURE — 124N000002 HC R&B MH UMMC

## 2023-03-29 PROCEDURE — 99232 SBSQ HOSP IP/OBS MODERATE 35: CPT | Performed by: NURSE PRACTITIONER

## 2023-03-29 RX ORDER — CHLORAL HYDRATE 500 MG
1 CAPSULE ORAL DAILY
Status: DISCONTINUED | OUTPATIENT
Start: 2023-03-29 | End: 2023-04-06 | Stop reason: HOSPADM

## 2023-03-29 RX ORDER — ONDANSETRON 4 MG/1
4 TABLET, ORALLY DISINTEGRATING ORAL EVERY 6 HOURS PRN
Status: DISCONTINUED | OUTPATIENT
Start: 2023-03-29 | End: 2023-04-06 | Stop reason: HOSPADM

## 2023-03-29 RX ADMIN — PHENOBARBITAL 32.4 MG: 32.4 TABLET ORAL at 16:31

## 2023-03-29 RX ADMIN — GABAPENTIN 800 MG: 800 TABLET ORAL at 14:08

## 2023-03-29 RX ADMIN — Medication 5 MG: at 20:49

## 2023-03-29 RX ADMIN — METHADONE HYDROCHLORIDE 185 MG: 5 SOLUTION ORAL at 08:59

## 2023-03-29 RX ADMIN — PHENOBARBITAL 32.4 MG: 32.4 TABLET ORAL at 20:49

## 2023-03-29 RX ADMIN — VORTIOXETINE 20 MG: 10 TABLET, FILM COATED ORAL at 08:13

## 2023-03-29 RX ADMIN — GABAPENTIN 800 MG: 800 TABLET ORAL at 08:13

## 2023-03-29 RX ADMIN — METHOCARBAMOL 500 MG: 500 TABLET ORAL at 18:05

## 2023-03-29 RX ADMIN — CLONIDINE HYDROCHLORIDE 0.1 MG: 0.1 TABLET ORAL at 18:05

## 2023-03-29 RX ADMIN — ACETAMINOPHEN 650 MG: 325 TABLET ORAL at 18:05

## 2023-03-29 RX ADMIN — PHENOBARBITAL 32.4 MG: 32.4 TABLET ORAL at 12:18

## 2023-03-29 RX ADMIN — GABAPENTIN 1600 MG: 800 TABLET ORAL at 20:49

## 2023-03-29 RX ADMIN — PHENOBARBITAL 32.4 MG: 32.4 TABLET ORAL at 08:13

## 2023-03-29 RX ADMIN — Medication 1 G: at 08:13

## 2023-03-29 RX ADMIN — ONDANSETRON 4 MG: 4 TABLET, ORALLY DISINTEGRATING ORAL at 12:18

## 2023-03-29 ASSESSMENT — ACTIVITIES OF DAILY LIVING (ADL)
ORAL_HYGIENE: INDEPENDENT
ADLS_ACUITY_SCORE: 40
DRESS: INDEPENDENT
ADLS_ACUITY_SCORE: 40
HYGIENE/GROOMING: INDEPENDENT
ADLS_ACUITY_SCORE: 40

## 2023-03-29 NOTE — PLAN OF CARE
Problem: Suicide Risk  Goal: Absence of Self-Harm  Outcome: Progressing   Patient is currently on Phenobarbital taper. He received 32.4 mg QID. Patient was up for breakfast and meds but retired to her room as soon as she was done. She reported anxiety 8/10, depression 3/10 denying SI and all other psych symptoms eula for safety.  Patient continues on MSSA  assessment and her score for this shift was 5 &4. Patient is on 185 mg of methadone maintenance daily. Patient had a CD assessment over the phone this morning. She plans to do MICD inpatient treatment with [facility]. She signed some concerns for them. Patient complained of having an upset stomach making it hard for her to go to unit activities. Patient was given Zofran with his medications at noon. Patient has CBC, CMP, HgbA1C, lipid panel, TSH and Vit D labs due tomorrow.Staff will continue to monitor and assist as needed.

## 2023-03-29 NOTE — PLAN OF CARE
Received patient in bed sleeping. Writer woke patient for Q4 MSSA assessment and, after discussion, patient declined. She also communicated that she did not want to be woken up overnight for the assessment, explaining that she wants it deferred until she when she wakes up/is awake. During the interaction, the patient was appropriate, issues/concerns were denied by patient and none were observed. Nursing will continue to monitor.

## 2023-03-29 NOTE — CONSULTS
3/29/2023  Pt completed her MARIANNE CA today.  ROIs for [facility] and [facility] were emailed to pt's CTC for pt to sign. Pt signed the ROIs and her MARIANNE CA was faxed to both treatment programs today.    [facility] Assessment ID: [id number]    Recommendations:   1)  Complete a MICD residential based or similar treatment program.   2)  Abstain from all mood-altering chemicals unless prescribed by a licensed provider.   3)  Attend, at minimum, 2 weekly support group meetings, such as 12 step based (AA/NA), SMART Recovery, Health Realizations, and/or Refuge Recovery meetings.     4)  Actively work with a female mentor/sponsor on a weekly basis.   5)  Follow all the recommendations of your treatment/medical providers.  6)  Continue to attend your scheduled individual psychotherapy appointments.      Clinical Substantiation:    Pt has a long history of substance use. She appears to struggle with cross addiction. She is currently homeless and she would benefit from an inpatient MARIANNE treatment program for additional support and structure. Pt appears to be externally motivated for sobriety.    Referrals/ Alternatives:  [facility] Access Team for Referrals  Phone: [phone]  Fax: [phone]  [facility], [facility], or [facility].    [facility]   RESIDENTIAL ADMISSIONS ([facility], [facility], [facility])  Phone: [phone]  Fax: [phone]  [email or web address]    MARIANNE consult completed by: Fatimah Griffin [facility].  Phone Number: [phone]  E-mail Address: [email or web address]  [address] [place] [address]     *Due to regulation of Title 42 of the Code of Federal Regulations (CFR) Part 2: Confidentiality laws apply to this note and the information wherein.  Thus, this note cannot be copy and pasted into any other health care staff's note nor can it be included in  general medical records sent to ANY outside agency without the patient's written consent.
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
## 2023-03-29 NOTE — PLAN OF CARE
"Problem: Suicide Risk  Goal: Absence of Self-Harm  Outcome: Progressing     Problem: Anxiety Signs/Symptoms  Goal: Optimized Energy Level (Anxiety Signs/Symptoms)  Outcome: Progressing   Goal Outcome Evaluation:    Pt has been in the lounge most of the shift, watching Tv with peers. Pt wiggling her feet most of the time,reported anxiety and depression 5, but refused any Prn medication offered. Denies any SI, SIB, HI, Hallucinations. Pt MSSA score around 1930 was 5 at 1930. VSS WNL (See flow sheet). Pt ate 100 % dinner. Denies any withdrawal symptoms. Pt requested medications early and went to bed stating\"I am tired. It has been a long day\". No other concerns. Will continue to monitor.  "

## 2023-03-29 NOTE — PROGRESS NOTES
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 Withdrawal, as manifested by either of the following: The characteristic withdrawal syndrome for alcohol/drug (refer to Criteria A and B of the criteria set for alcohol/drug withdrawal).    Specify if: In early remission:  After full criteria for alcohol/drug use disorder were previously met, none of the criteria for alcohol/drug use disorder have been met for at least 3 months but for less than 12 months (with the exception that Criterion A4,  Craving or a strong desire or urge to use alcohol/drug  may be met).     In sustained remission:   After full criteria for alcohol use disorder were previously met, non of the criteria for alcohol/drug use disorder have been met at any time during a period of 12 months or longer (with the exception that Criterion A4,  Craving or strong desire or urge to use alcohol/drug  may be met).     Specify if:   This additional specifier is used if the individual is in an environment where access to alcohol is restricted.    Mild: Presence of 2-3 symptoms  Moderate: Presence of  4-5 symptoms  Severe: Presence of 6 or more symptoms    Collateral information:   The patient's medical record at [facility] was reviewed and the information contained in the medical record supported the patient's account of her chemical use history and chemical use consequences.    Recommendations:   1)  Complete a MICD residential based or similar treatment program.   2)  Abstain from all mood-altering chemicals unless prescribed by a licensed provider.   3)  Attend, at minimum, 2 weekly support group meetings, such as 12 step based (AA/NA), SMART Recovery, Health Realizations, and/or Refuge Recovery meetings.     4)  Actively work with a female mentor/sponsor on a weekly basis.   5)  Follow all the recommendations of your treatment/medical providers.  6)  Continue to attend your scheduled individual psychotherapy appointments.      Clinical Substantiation:    Pt has a long history of substance use. She appears to struggle with cross addiction. She is currently homeless and she would benefit from an inpatient MARIANNE treatment program for additional support and structure. Pt appears to be externally motivated for sobriety.    Referrals/ Alternatives:  [facility] Team for Referrals  Phone: [phone]  Fax: [phone]  [facility], [place], or [facility].    [facility]   RESIDENTIAL ADMISSIONS ([facility], [facility], [facility])  Phone: [phone]  Fax: [phone]  [email or web address]  [email or web address]    MARIANNE consult completed by: Fatimah Griffin [facility].  Phone Number: [phone]  E-mail Address: [email or web address] Evaluation Department  [address] [place] [address]     *Due to regulation of Title 42 of the Code of Federal Regulations (CFR) Part 2: Confidentiality laws apply to this note and the information wherein.  Thus, this note cannot be copy and pasted into  any other health care staff's note nor can it be included in general medical records sent to ANY outside agency without the patient's written consent.

## 2023-03-29 NOTE — PROGRESS NOTES
Behavioral Health  Note    Behavioral Health  Spirituality Group Note    [address]    Name:    Jihan Gill                                                                     YOB: 1975    MRN:     [id number]                                                                       Age: 47 year old      Patient attended -led group, which included discussion of spirituality, coping with illness and building resilience.    Patient attended group for [facility] - spirituality groups are not billed.    The patient actively participated in group discussion      Umesh Johnson, PhD  Associate

## 2023-03-29 NOTE — PHARMACY-ADMISSION MEDICATION HISTORY
Admission Medication History Completed by Pharmacy    See [facility] Admission Navigator for allergy information, preferred outpatient pharmacy, prior to admission medications and immunization status.     Medication history sources:  Patient; Surescripts dispense report    Pertinent changes made to PTA medication list:  Added:   - Fish oil 1 gram capsules   Deleted: N/A  Changed:   - Vortioxetine 10 mg --> 20 mg daily (per patient)     Additional medication history information:   - Patient's lorazepam has been discontinued upon admission and replaced with phenobarbital.   - Patient denies taking any additional Rx/OTC medications other than the ones listed below.    Prior to Admission medications    Medication Sig Last Dose Taking? Auth Provider Long Term End Date   acetaminophen (TYLENOL) 325 MG tablet Take 2 tablets (650 mg) by mouth every 4 hours as needed for mild pain or fever Past Month Yes Kerline Gonzalez MD No    albuterol (PROAIR HFA/PROVENTIL HFA/VENTOLIN HFA) 108 (90 Base) MCG/ACT inhaler Inhale 1-2 puffs into the lungs every 6 hours as needed for shortness of breath / dyspnea or wheezing More than a month Yes Bahman Sol MD Yes    fish oil-omega-3 fatty acids 1000 MG capsule Take 1 g by mouth daily Past Week Yes Unknown, Entered By History     gabapentin (NEURONTIN) 800 MG tablet Take 1,600 mg by mouth At Bedtime 3/27/2023 Yes Reported, Patient Yes    gabapentin (NEURONTIN) 800 MG tablet Take 800 mg by mouth 2 times daily 3/28/2023 Yes Reported, Patient Yes    hydrOXYzine (ATARAX) 25 MG tablet Take 25 mg by mouth 2 times daily as needed for anxiety Past Week Yes Unknown, Entered By History No    LORazepam (ATIVAN) 1 MG tablet Take 1 mg by mouth in the morning and at night, then take 0.5 mg by mouth daily in the afternoon 3/28/2023 at AM DOSE Yes Reported, Patient No    melatonin 3 MG tablet Take 1 tablet (3 mg) by mouth nightly as needed for sleep Past Week Yes Bahman Sol MD     methadone HCl  10 MG/5ML SOLN Take 185 mg by mouth daily 3/28/2023 Yes Reported, Patient No    methocarbamol (ROBAXIN) 500 MG tablet Take 1 tablet (500 mg) by mouth 3 times daily as needed for muscle spasms Past Month Yes Bahman Sol MD     QUEtiapine (SEROQUEL) 25 MG tablet Take 0.5-1 tablets (12.5-25 mg) by mouth every 6 hours as needed (Anxiety or insomnia) Past Week Yes Anderson Monroe MD Yes    vortioxetine (TRINTELLIX) 20 MG tablet Take 20 mg by mouth daily 3/28/2023 Yes Unknown, Entered By History No           Date completed: 03/28/23    Medication history completed by:   Lucita Brock, PharmD  *[phone]

## 2023-03-29 NOTE — PLAN OF CARE
Patient slept approximately 6 hours during the overnight shift; appeared comfortable with even and unlabored breathing while asleep. Patient communicated she did not want to be woken up for MSSA (see details in note written earlier in the shift). No issues or concerns reported or observed. Safety checks completed at least every 15 minutes. Nursing will continue to monitor.    At approximately 0710, MSSA score was 2 (completed when patient woke up).

## 2023-03-30 LAB
ALBUMIN SERPL BCG-MCNC: 4.4 G/DL (ref 3.5–5.2)
ALP SERPL-CCNC: 106 U/L (ref 35–104)
ALT SERPL W P-5'-P-CCNC: 29 U/L (ref 10–35)
ANION GAP SERPL CALCULATED.3IONS-SCNC: 14 MMOL/L (ref 7–15)
AST SERPL W P-5'-P-CCNC: 22 U/L (ref 10–35)
BASOPHILS # BLD AUTO: 0 10E3/UL (ref 0–0.2)
BASOPHILS NFR BLD AUTO: 1 %
BILIRUB SERPL-MCNC: 0.2 MG/DL
BUN SERPL-MCNC: 15.9 MG/DL (ref 6–20)
CALCIUM SERPL-MCNC: 10.5 MG/DL (ref 8.6–10)
CHLORIDE SERPL-SCNC: 95 MMOL/L (ref 98–107)
CHOLEST SERPL-MCNC: 204 MG/DL
CREAT SERPL-MCNC: 0.55 MG/DL (ref 0.51–0.95)
DEPRECATED HCO3 PLAS-SCNC: 25 MMOL/L (ref 22–29)
EOSINOPHIL # BLD AUTO: 0.2 10E3/UL (ref 0–0.7)
EOSINOPHIL NFR BLD AUTO: 2 %
ERYTHROCYTE [DISTWIDTH] IN BLOOD BY AUTOMATED COUNT: 13.4 % (ref 10–15)
GFR SERPL CREATININE-BSD FRML MDRD: >90 ML/MIN/1.73M2
GLUCOSE SERPL-MCNC: 129 MG/DL (ref 70–99)
HBA1C MFR BLD: 5.8 %
HCT VFR BLD AUTO: 39.7 % (ref 35–47)
HDLC SERPL-MCNC: 48 MG/DL
HGB BLD-MCNC: 12.8 G/DL (ref 11.7–15.7)
IMM GRANULOCYTES # BLD: 0 10E3/UL
IMM GRANULOCYTES NFR BLD: 0 %
LDLC SERPL CALC-MCNC: 97 MG/DL
LYMPHOCYTES # BLD AUTO: 3 10E3/UL (ref 0.8–5.3)
LYMPHOCYTES NFR BLD AUTO: 49 %
MCH RBC QN AUTO: 30.3 PG (ref 26.5–33)
MCHC RBC AUTO-ENTMCNC: 32.2 G/DL (ref 31.5–36.5)
MCV RBC AUTO: 94 FL (ref 78–100)
MONOCYTES # BLD AUTO: 0.5 10E3/UL (ref 0–1.3)
MONOCYTES NFR BLD AUTO: 7 %
NEUTROPHILS # BLD AUTO: 2.5 10E3/UL (ref 1.6–8.3)
NEUTROPHILS NFR BLD AUTO: 41 %
NONHDLC SERPL-MCNC: 156 MG/DL
NRBC # BLD AUTO: 0 10E3/UL
NRBC BLD AUTO-RTO: 0 /100
PLATELET # BLD AUTO: 191 10E3/UL (ref 150–450)
POTASSIUM SERPL-SCNC: 4.1 MMOL/L (ref 3.4–5.3)
PROT SERPL-MCNC: 7.3 G/DL (ref 6.4–8.3)
RBC # BLD AUTO: 4.22 10E6/UL (ref 3.8–5.2)
SODIUM SERPL-SCNC: 134 MMOL/L (ref 136–145)
TRIGL SERPL-MCNC: 294 MG/DL
TSH SERPL DL<=0.005 MIU/L-ACNC: 0.75 UIU/ML (ref 0.3–4.2)
WBC # BLD AUTO: 6.2 10E3/UL (ref 4–11)

## 2023-03-30 PROCEDURE — 84443 ASSAY THYROID STIM HORMONE: CPT | Performed by: NURSE PRACTITIONER

## 2023-03-30 PROCEDURE — 80051 ELECTROLYTE PANEL: CPT | Performed by: NURSE PRACTITIONER

## 2023-03-30 PROCEDURE — 80053 COMPREHEN METABOLIC PANEL: CPT | Performed by: NURSE PRACTITIONER

## 2023-03-30 PROCEDURE — 93010 ELECTROCARDIOGRAM REPORT: CPT | Performed by: INTERNAL MEDICINE

## 2023-03-30 PROCEDURE — 250N000013 HC RX MED GY IP 250 OP 250 PS 637: Performed by: NURSE PRACTITIONER

## 2023-03-30 PROCEDURE — 99232 SBSQ HOSP IP/OBS MODERATE 35: CPT | Performed by: NURSE PRACTITIONER

## 2023-03-30 PROCEDURE — 83036 HEMOGLOBIN GLYCOSYLATED A1C: CPT | Performed by: NURSE PRACTITIONER

## 2023-03-30 PROCEDURE — 36415 COLL VENOUS BLD VENIPUNCTURE: CPT | Performed by: NURSE PRACTITIONER

## 2023-03-30 PROCEDURE — 124N000002 HC R&B MH UMMC

## 2023-03-30 PROCEDURE — 93005 ELECTROCARDIOGRAM TRACING: CPT

## 2023-03-30 PROCEDURE — 85025 COMPLETE CBC W/AUTO DIFF WBC: CPT | Performed by: NURSE PRACTITIONER

## 2023-03-30 PROCEDURE — 80061 LIPID PANEL: CPT | Performed by: NURSE PRACTITIONER

## 2023-03-30 PROCEDURE — 82306 VITAMIN D 25 HYDROXY: CPT | Performed by: NURSE PRACTITIONER

## 2023-03-30 RX ORDER — CLONIDINE HYDROCHLORIDE 0.1 MG/1
0.1 TABLET ORAL EVERY 6 HOURS PRN
Status: DISCONTINUED | OUTPATIENT
Start: 2023-03-30 | End: 2023-04-03

## 2023-03-30 RX ORDER — RAMELTEON 8 MG/1
8 TABLET ORAL
Status: DISCONTINUED | OUTPATIENT
Start: 2023-03-30 | End: 2023-04-06 | Stop reason: HOSPADM

## 2023-03-30 RX ADMIN — Medication 1 G: at 08:35

## 2023-03-30 RX ADMIN — GABAPENTIN 800 MG: 800 TABLET ORAL at 08:35

## 2023-03-30 RX ADMIN — VORTIOXETINE 20 MG: 10 TABLET, FILM COATED ORAL at 08:35

## 2023-03-30 RX ADMIN — GABAPENTIN 800 MG: 800 TABLET ORAL at 14:43

## 2023-03-30 RX ADMIN — PHENOBARBITAL 32.4 MG: 32.4 TABLET ORAL at 12:06

## 2023-03-30 RX ADMIN — PHENOBARBITAL 32.4 MG: 32.4 TABLET ORAL at 19:58

## 2023-03-30 RX ADMIN — CLONIDINE HYDROCHLORIDE 0.1 MG: 0.1 TABLET ORAL at 16:54

## 2023-03-30 RX ADMIN — PHENOBARBITAL 32.4 MG: 32.4 TABLET ORAL at 16:54

## 2023-03-30 RX ADMIN — PHENOBARBITAL 32.4 MG: 32.4 TABLET ORAL at 08:36

## 2023-03-30 RX ADMIN — GABAPENTIN 1600 MG: 800 TABLET ORAL at 19:58

## 2023-03-30 RX ADMIN — METHADONE HYDROCHLORIDE 185 MG: 5 SOLUTION ORAL at 08:36

## 2023-03-30 RX ADMIN — Medication 12.5 MG: at 12:06

## 2023-03-30 ASSESSMENT — ACTIVITIES OF DAILY LIVING (ADL)
HYGIENE/GROOMING: INDEPENDENT
ADLS_ACUITY_SCORE: 40
ADLS_ACUITY_SCORE: 40
ORAL_HYGIENE: INDEPENDENT
LAUNDRY: WITH SUPERVISION
ADLS_ACUITY_SCORE: 40
DRESS: INDEPENDENT
ADLS_ACUITY_SCORE: 40
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
## 2023-03-30 NOTE — PLAN OF CARE
Goal Outcome Evaluation:      Pt appeared to be a sleep @ all safety checks.  Pt slept 6.75 hours.  Pt's MSSA score @ 0000 was a 1.  Pt refused 0400 assessment asking if she would not be awaken in the middle of the night.

## 2023-03-30 NOTE — DISCHARGE INSTRUCTIONS
"Behavioral Discharge Planning and Instructions    Summary: You were admitted on 3/28/2023 due to suicidal ideation.  You were treated by BLANCHE Kerns CNP for ongoing psychiatric assessment and medication management.  You had opportunities to participate in therapeutic groups on the unit. You were discharged on 4/6/2023 from [facility] to [facility] Treatment Program [address] [address] Phone: [phone] Fax: [phone] .     Main Diagnosis:   Major depressive disorder, severe, recurrent  PTSD  Borderline personality disorder  Opioid use disorder, on Methadone maintenance  Sedative-hypnotic and anxiolytic use disorder, severe  Prediabetes  [facility]    Health Care Follow-up:   Adult Mental Health Case Management:  Salazar FAULKNER     Targeted Case Management      Direct: [phone]  Main: [phone]  Fax: [phone]      [facility] [place] [address]   [facility]       Methadone Assisted Treatment:  Whitney CUELLO, [facility] [place]  Counselor  Office Hours:  M-F 5:30am to 1:30pm    [address]  [facility] [address]  P:  [phone] ext. [phone]  F:  [phone]    Primary Care Physician:  Eleuterio Schultz [facility]  Phone [phone]     You were asked to schedule yourself for a \"post-hospitalization follow-up\"    Medication Management   ROSALBA Zee CNP  Monday April 10th at 9:00 AM via teleGogobot  [facility] [address], [address] [address]  Phone: [phone]  Fax: [phone]    Therapy:  You scheduled with a therapist but didn't remember the name  Monday April 10th at 4:00 PM via [employer]  [facility] [address], [address] [address]  Phone: [phone]  Fax: [phone]    You declined to sign an Authorization to Release Protected Health Information to your providers at [facility] and " [facility]    Attend all scheduled appointments with your outpatient providers. Call at least 24 hours in advance if you need to reschedule an appointment to ensure continued access to your outpatient providers.     Major Treatments, Procedures and Findings:  You were provided with: a psychiatric assessment, assessed for medical stability, medication evaluation and/or management, group therapy, CD evaluation/assessment, milieu management, and medical interventions    Symptoms to Report: feeling more aggressive, increased confusion, losing more sleep, mood getting worse, or thoughts of suicide    Early warning signs can include: increased depression or anxiety sleep disturbances increased thoughts or behaviors of suicide or self-harm  increased unusual thinking, such as paranoia or hearing voices    Safety and Wellness:  Take all medicines as directed.  Make no changes unless your doctor suggests them.      Follow treatment recommendations.  Refrain from alcohol and non-prescribed drugs.  Ask your support system to help you reduce your access to items that could harm yourself or others. If there is a concern for safety, call 911.    Resources:   Mental Health Crisis Resources  Throughout [place]: call **CRISIS (**077894)  Crisis Text Line: is available for free, 24/7 by texting [place] to 147256  Suicide Awareness Voices of Education (SAVE) (www.save.org): 917-801-ZCFJ (9023)  The National Suicide Prevention Lifeline is now: 988 Suicide and Crisis Lifeline. Call 988 anytime.  National [place] on Mental Illness (www.[place].zunilda.org): 724.810.2642 or 373-998-1751.  Fekl1vbde: text the word LIFE to 18753 for immediate support and crisis intervention  Mental Health Consumer/Survivor Network of [place] (www.mhcsn.net): 342.144.6719 or 382-693-7198  Mental Health Association of [place] (www.mentalhealth.org): 664.592.2166 or 221-896-6472  Peer Support Connection [place] Warmline (PSC) 1-703.365.6360 Available from 5pm - 9am (7  days a week/365 days a year)  [facility] 1-938.265.7122 Community Outreach for Psych Emergencies  [facility] 1-929.816.3618 [facility] Mental Crisis Program  Detox Facilities  Withdrawal Management ([address] [phone]  [facility] (in [place]) [phone]  [facility] [phone]  12-Step Support Groups  Alcoholics Anonymous  www.[place].org 691-586-9551 ([facility])  www.aastpaul.org 867-087-2792 ([facility])  Narcotics Anonymous www.[employer].Bluetest 892-970-4155  Marijuana Anonymous www.marijuana-anonymous.o  100-340-3051  Crystal Meth Anonymous www.crystalmeth.org  Cocaine Anonymous www.Stylefinchf[place].Scandlines  Non-12 Step Support Groups  Addiction Busters at [facility] http://www.[facility].org/program1.html  Smart Recovery www.smartrecovery.org  Recovery Héctor recoverydharma.org    General Medication Instructions:   See your medication sheet(s) for instructions.   Take all medicines as directed.  Make no changes unless your doctor suggests them.   Go to all your doctor visits.  Be sure to have all your required lab tests. This way, your medicines can be refilled on time.  Do not use any drugs not prescribed by your doctor.  Avoid alcohol.    Advance Directives:   Scanned document on file with [employer]? Minor-N/A  Is document scanned? Minor-N/A  Honoring Choices Your Rights Handout: Informed and given  Was more information offered? Materials given    The Treatment team has appreciated the opportunity to work with you. If you have any questions or concerns about your recent admission, you can contact the unit which can receive your call 24 hours a day, 7 days a week. They will be able to get in touch with a Provider if needed. The unit number is [phone].      You reported you were brought to [facility] from [facility] with none of your belongings. [employer] reports there are no belongings with your label. [employer] reports there is no items in lost and found from  [facility]: [phone]  [facility]: [phone]   [employer]: [phone]  You can also call Patient Relations at [phone] to file a complaint.    Prior to to arriving at [facility] on 3/22/2023, you checked in at [facility], but left without being seen. Their number is [phone].

## 2023-03-30 NOTE — PLAN OF CARE
Problem: Adult Behavioral Health Plan of Care  Goal: Optimized Coping Skills in Response to Life Stressors  Outcome: Progressing     Problem: Suicide Risk  Goal: Absence of Self-Harm  Outcome: Progressing     Problem: Anxiety Signs/Symptoms  Goal: Optimized Energy Level (Anxiety Signs/Symptoms)  Outcome: Progressing  Intervention: Optimize Energy Level  Recent Flowsheet Documentation  Taken 3/30/2023 1318 by Koki Smith RN  Patient Performed Hygiene: dressed  Activity (Behavioral Health): activity encouraged     Problem: Anxiety Signs/Symptoms  Goal: Optimized Energy Level (Anxiety Signs/Symptoms)  Intervention: Optimize Energy Level  Recent Flowsheet Documentation  Taken 3/30/2023 1318 by Koki Smith RN  Patient Performed Hygiene: dressed  Activity (Behavioral Health): activity encouraged     Problem: Depressive Signs/Symptoms  Goal: Optimized Energy Level (Depressive Signs/Symptoms)  Outcome: Progressing  Intervention: Optimize Energy Level  Recent Flowsheet Documentation  Taken 3/30/2023 1318 by Koki Smith RN  Patient Performed Hygiene: dressed  Activity (Behavioral Health): activity encouraged   Goal Outcome Evaluation:    Plan of Care Reviewed With: patient        The pt. Willing to engage in a 1:1.  Pt. Is resting in her bed, reading her book.  Pt. Reports that she is going to be discharged to a CD treatment center which she agrees with.  Pt. Denies suicidal ideation, self-injurious behavior, auditory or visual hallucinations.  The pt. Is reality based and can participate in a 1:1 conversation.  The pt. Reports that she has attended three CD treatments in the past but they were to address her drinking alcohol and not any other drugs.  The pt. States that she will be addressing her ativan addiction as well.  The pt. Appears motivated to attend CD treatment.  The pt.'s withdrawal score is 3 and 2 from this morning and at noon.  The pt. Seems very comfortable on the unit from a physical and  emotional status.  Currently the pt. Has no questions or requests.  Nursing staff will continue to assess the pt. On an emotional, physical, and cognitive status.

## 2023-03-30 NOTE — PLAN OF CARE
Problem: Anxiety Signs/Symptoms  Goal: Optimized Energy Level (Anxiety Signs/Symptoms)  Intervention: Optimize Energy Level  Recent Flowsheet Documentation  Taken 3/29/2023 1800 by Jessica Lynch, RN  Activity (Behavioral Health): activity encouraged   Goal Outcome Evaluation:    Plan of Care Reviewed With: patient        Patient rated both depression and anxiety 7 out of 10, but denied SI and all other psychiatric symptoms.  MSSA scores were 4 x 2, for diaphoresis and barely perceptible tremor.  Patient received PRN Clonidine, Melatonin, Robaxin and Tylenol  but denied need for Zofran.  She was seclusive to her room but eye contact was good and affect WNL.  Other than muscles spasms, withdrawal symptoms,  and generalized body aches she denied physical problems.  \BP 96/62 (BP Location: Right arm, Patient Position: Sitting)   Pulse 69   Temp 98  F (36.7  C) (Temporal)   Resp 16   Wt 104 kg (229 lb 3.2 oz)   SpO2 96%   BMI 38.14 kg/m

## 2023-03-30 NOTE — PLAN OF CARE
Assessment/Intervention/Current Symptoms and Care Coordination:    Upcoming appointments:  No future appointments.     Chart Review    Met with team to discuss patient care.    Precautions:      Seizure precautions      Withdrawal precautions      Suicide precautions     Patient room is not blocked.    Patient is not on SIO.    Pt signed ROIs for her  and methadone clinic.    MM with her methadone clinic informing them of pt's admission and that CTC will keep them informed on discharge planning.    Called pt's , provided update on pt admission and discharge plans. Informed  of pt's reports of benzo rx abuse and that pt declined to sign JOSE to prescriber. Sent [facility] email with CTC contact information to her email [email or web address].    Informed pt that CTC reached out to [facility] and [place] to inform them of pt admission.    Sent updated notes to access worker at [facility].    Discharge Plan or Goal:  Patient to discharge to substance use treatment.    Barriers to Discharge:  patient requires further psychiatric stabilization due to current symptomology, medication management with possible adjustments-phenobarbital taper may be barrier to treatment admission. We ensure MARIANNE treatment are aware of medications prior to admission.    Referral Status:  [facility] Access Team for Referrals  Phone: [phone]  Fax: [phone]  [place], [place], or [facility].     [facility]   RESIDENTIAL ADMISSIONS ([facility], [facility], [facility])  Phone: [phone]  Fax: [phone]  [email or web address]  [email or web address]    Legal Status:  voluntary

## 2023-03-31 LAB — DEPRECATED CALCIDIOL+CALCIFEROL SERPL-MC: 21 UG/L (ref 20–75)

## 2023-03-31 PROCEDURE — 250N000013 HC RX MED GY IP 250 OP 250 PS 637: Performed by: NURSE PRACTITIONER

## 2023-03-31 PROCEDURE — 99232 SBSQ HOSP IP/OBS MODERATE 35: CPT | Performed by: NURSE PRACTITIONER

## 2023-03-31 PROCEDURE — 124N000002 HC R&B MH UMMC

## 2023-03-31 RX ORDER — DIPHENHYDRAMINE HCL 25 MG
25 CAPSULE ORAL EVERY 6 HOURS PRN
Status: DISCONTINUED | OUTPATIENT
Start: 2023-03-31 | End: 2023-04-06 | Stop reason: HOSPADM

## 2023-03-31 RX ORDER — PHENOBARBITAL 32.4 MG/1
32.4 TABLET ORAL 4 TIMES DAILY
Status: COMPLETED | OUTPATIENT
Start: 2023-03-31 | End: 2023-04-01

## 2023-03-31 RX ORDER — PHENOBARBITAL 32.4 MG/1
32.4 TABLET ORAL 3 TIMES DAILY
Status: DISCONTINUED | OUTPATIENT
Start: 2023-04-02 | End: 2023-04-06 | Stop reason: HOSPADM

## 2023-03-31 RX ADMIN — METHOCARBAMOL 500 MG: 500 TABLET ORAL at 16:47

## 2023-03-31 RX ADMIN — GABAPENTIN 800 MG: 800 TABLET ORAL at 08:28

## 2023-03-31 RX ADMIN — PHENOBARBITAL 32.4 MG: 32.4 TABLET ORAL at 11:45

## 2023-03-31 RX ADMIN — GABAPENTIN 1600 MG: 800 TABLET ORAL at 19:43

## 2023-03-31 RX ADMIN — PHENOBARBITAL 32.4 MG: 32.4 TABLET ORAL at 08:28

## 2023-03-31 RX ADMIN — VORTIOXETINE 20 MG: 10 TABLET, FILM COATED ORAL at 08:29

## 2023-03-31 RX ADMIN — METHADONE HYDROCHLORIDE 185 MG: 5 SOLUTION ORAL at 08:31

## 2023-03-31 RX ADMIN — PHENOBARBITAL 32.4 MG: 32.4 TABLET ORAL at 19:44

## 2023-03-31 RX ADMIN — GABAPENTIN 800 MG: 800 TABLET ORAL at 14:08

## 2023-03-31 RX ADMIN — DIPHENHYDRAMINE HYDROCHLORIDE 25 MG: 25 CAPSULE ORAL at 19:45

## 2023-03-31 RX ADMIN — Medication 1 G: at 08:29

## 2023-03-31 RX ADMIN — Medication 12.5 MG: at 16:47

## 2023-03-31 RX ADMIN — PHENOBARBITAL 32.4 MG: 32.4 TABLET ORAL at 16:43

## 2023-03-31 ASSESSMENT — ACTIVITIES OF DAILY LIVING (ADL)
ADLS_ACUITY_SCORE: 40
DRESS: INDEPENDENT
HYGIENE/GROOMING: INDEPENDENT
ORAL_HYGIENE: INDEPENDENT
ADLS_ACUITY_SCORE: 40
LAUNDRY: WITH SUPERVISION
ADLS_ACUITY_SCORE: 40

## 2023-03-31 NOTE — PLAN OF CARE
"Goal Outcome Evaluation:    Plan of Care Reviewed With: patient            Patient was met at the [facility] this morning watching TV, she reported frustration for being here, dealing with other patients' related behaviors.  She reported tremors, sweats and withdrawal symptoms. MSSA scores 6 & 5. She was reminded that since she is on Methadone and Phenobarbital regimen, with gabapentin, she is covered and can utilize other forms of coping skills, examples discussed. Vital signs checks was repeated a couple times because her blood pressure is on the low side, concerned for the high dose of medications. The provider was notified during team about the low blood pressure.  Patient declined breakfast and stated that she can not eat after taking 185mls of methadone that leaves a \"horrible\" taste in her mouth. Patient is open to switch the methadone to 06:00 or change to concentrated formula.     Her affect remained flat, blunted with irritable mood. She rated anxiety 6/10, depression 6/10, denies si/hi/hallucinations and pain. She contracted for safety.   Phenobarbital dose taper order is in for 3x daily from 04/02/23.   "

## 2023-03-31 NOTE — PLAN OF CARE
Problem: Anxiety Signs/Symptoms  Goal: Optimized Energy Level (Anxiety Signs/Symptoms)  Intervention: Optimize Energy Level  Recent Flowsheet Documentation  Taken 3/30/2023 1900 by Jessica Lynch, RN  Activity (Behavioral Health): activity encouraged   Goal Outcome Evaluation:    Plan of Care Reviewed With: patient       Patient was seclusive to her room but denied SI.  She rated anxiety 6 and depression 4 out of 10.  Affect WNL and thoughts are reality based.  Patient denied physical problems; stated she'd had a few mild tremors earlier in the day and diaphoresis was decreased compared to yesterday.  MSSA scores 3 x 2.  Patient denied need for PRN medication; stated she would request PRN Rozerem if unable to sleep later.  /70 (BP Location: Left arm, Patient Position: Sitting)   Pulse 79   Temp 98.1  F (36.7  C) (Oral)   Resp 16   Wt 104 kg (229 lb 3.2 oz)   SpO2 96%   BMI 38.14 kg/m
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
## 2023-03-31 NOTE — PLAN OF CARE
Problem: Adult Behavioral Health Plan of Care  Goal: Plan of Care Review  3/31/2023 0625 by Pipe Sanchez, RN  Outcome: Progressing  3/31/2023 0625 by Pipe Sanchez, RN  Outcome: Progressing     Problem: Anxiety Signs/Symptoms  Goal: Optimized Energy Level (Anxiety Signs/Symptoms)  Outcome: Progressing     Problem: Sleep Disturbance  Goal: Adequate Sleep/Rest  Outcome: Progressing   Goal Outcome Evaluation:       Up and out on the unit for snacks X 1. Pt returned to room with some snacks. She went back to bed after eating her snacks and had been observed sleeping comfortably the the rest of the night. She declined MSSA assessment during this shift. She stated that it is not being done at night. No withdrawal symptoms noted. Pt slept for 5 hours. Will continue with same plan of care.

## 2023-04-01 PROCEDURE — 124N000002 HC R&B MH UMMC

## 2023-04-01 PROCEDURE — 250N000013 HC RX MED GY IP 250 OP 250 PS 637: Performed by: NURSE PRACTITIONER

## 2023-04-01 RX ADMIN — METHOCARBAMOL 500 MG: 500 TABLET ORAL at 20:24

## 2023-04-01 RX ADMIN — PHENOBARBITAL 32.4 MG: 32.4 TABLET ORAL at 12:02

## 2023-04-01 RX ADMIN — Medication 25 MG: at 20:24

## 2023-04-01 RX ADMIN — Medication 12.5 MG: at 12:01

## 2023-04-01 RX ADMIN — VORTIOXETINE 20 MG: 10 TABLET, FILM COATED ORAL at 08:05

## 2023-04-01 RX ADMIN — PHENOBARBITAL 32.4 MG: 32.4 TABLET ORAL at 08:05

## 2023-04-01 RX ADMIN — METHADONE HYDROCHLORIDE 185 MG: 5 SOLUTION ORAL at 08:06

## 2023-04-01 RX ADMIN — Medication 12.5 MG: at 08:06

## 2023-04-01 RX ADMIN — PHENOBARBITAL 32.4 MG: 32.4 TABLET ORAL at 22:04

## 2023-04-01 RX ADMIN — DIPHENHYDRAMINE HYDROCHLORIDE 25 MG: 25 CAPSULE ORAL at 12:02

## 2023-04-01 RX ADMIN — GABAPENTIN 1600 MG: 800 TABLET ORAL at 20:24

## 2023-04-01 RX ADMIN — PHENOBARBITAL 32.4 MG: 32.4 TABLET ORAL at 17:31

## 2023-04-01 RX ADMIN — Medication 1 G: at 08:05

## 2023-04-01 RX ADMIN — GABAPENTIN 800 MG: 800 TABLET ORAL at 08:05

## 2023-04-01 RX ADMIN — GABAPENTIN 800 MG: 800 TABLET ORAL at 14:21

## 2023-04-01 RX ADMIN — DIPHENHYDRAMINE HYDROCHLORIDE 25 MG: 25 CAPSULE ORAL at 20:23

## 2023-04-01 ASSESSMENT — ACTIVITIES OF DAILY LIVING (ADL)
DRESS: INDEPENDENT
ADLS_ACUITY_SCORE: 40
ADLS_ACUITY_SCORE: 40
LAUNDRY: WITH SUPERVISION
ADLS_ACUITY_SCORE: 40
ORAL_HYGIENE: INDEPENDENT
HYGIENE/GROOMING: INDEPENDENT
ADLS_ACUITY_SCORE: 40
ADLS_ACUITY_SCORE: 40

## 2023-04-01 NOTE — PLAN OF CARE
"  Problem: Adult Behavioral Health Plan of Care  Goal: Plan of Care Review  Outcome: Progressing  Flowsheets (Taken 4/1/2023 1200)  Patient Agreement with Plan of Care: agrees with comment (describe)   Goal Outcome Evaluation:    Plan of Care Reviewed With: patient        Pt presents with a blunted affect, calm mood. Pt is out in the milieu, interacts at times with peers and engages during check in, watching TV for the most part. Pt endorsed anxiety as high at start of shift. Later reported anxiety level was improved after receiving requested PRN quetiapine. Pt reports her depression is improved, however, states \"this environment is not helpful for my anxiety and depression because I feel locked up, other patients constantly yelling and there is no Pt I can really talk to\". Pt states she is looking forward to going to treatment. Pt equally endorsed frustration with being taken off Ativan. Pt denies SI/SIB/HI/AVH at this time.  Pt's appetite is fair, ate both meals. Medication compliant. Took a shower this morning.    PRNs  Seroquel 12.5 mg x 2 for anxiety/agitattion with some relief reported.  Benadryl 25 mg for itching after shower, with relief reported.     Soft BP: Pt is asymptomatic. Pt encourage to push fluids and to alert staff with dizziness. Pt verbalized understanding.  BP 96/76 (BP Location: Left arm, Patient Position: Sitting)   Pulse 72   Temp 98.2  F (36.8  C) (Oral)   Resp 16   Wt 104 kg (229 lb 3.2 oz)   SpO2 96%   BMI 38.14 kg/m             "

## 2023-04-01 NOTE — PLAN OF CARE
Problem: Anxiety Signs/Symptoms  Goal: Optimized Cognitive Function (Anxiety Signs/Symptoms)  Outcome: Progressing   Goal Outcome Evaluation:  Patient rated depression 4 and anxiety 5 out of 10.  She denied SI and all other psychiatric problems.  Affect was flatter this evening and patient spent less time in the milieu.  She requested 12.5 mg of PRN Seroquel which she reported was effective in reducing anxiety.  MSSA scores 3 x 2.   Patient continues to report significant diaphoresis but no tremor today.  She declined offer of PRN clonidine; said it was not effective against sweating or anxiety when she tried it previously.  Patient received PRN benadryl 25 mf per new order obtained at her request for pruritis.\/69 (BP Location: Right arm, Patient Position: Sitting, Cuff Size: Adult Large)   Pulse 76   Temp 97.4  F (36.3  C) (Temporal)   Resp 16   Wt 104 kg (229 lb 3.2 oz)   SpO2 95%   BMI 38.14 kg/m

## 2023-04-01 NOTE — PLAN OF CARE
Problem: Anxiety Signs/Symptoms  Goal: Improved Sleep (Anxiety Signs/Symptoms)  Outcome: Not Progressing     Problem: Adult Behavioral Health Plan of Care  Goal: Adheres to Safety Considerations for Self and Others  Outcome: Progressing     Problem: Suicide Risk  Goal: Absence of Self-Harm  Outcome: Progressing     Pt slept through the night without distress. Pt reported no pain or discomfort. No problems with behavior. No concerns reported by pt. Pt slept 7 hours.  Staff will continue to monitor.

## 2023-04-02 ENCOUNTER — APPOINTMENT (OUTPATIENT)
Dept: GENERAL RADIOLOGY | Facility: CLINIC | Age: 48
End: 2023-04-02
Attending: STUDENT IN AN ORGANIZED HEALTH CARE EDUCATION/TRAINING PROGRAM
Payer: COMMERCIAL

## 2023-04-02 LAB
ALBUMIN SERPL BCG-MCNC: 4 G/DL (ref 3.5–5.2)
ALP SERPL-CCNC: 97 U/L (ref 35–104)
ALT SERPL W P-5'-P-CCNC: 25 U/L (ref 10–35)
ANION GAP SERPL CALCULATED.3IONS-SCNC: 9 MMOL/L (ref 7–15)
AST SERPL W P-5'-P-CCNC: 23 U/L (ref 10–35)
BILIRUB SERPL-MCNC: 0.5 MG/DL
BUN SERPL-MCNC: 9.3 MG/DL (ref 6–20)
CALCIUM SERPL-MCNC: 9.3 MG/DL (ref 8.6–10)
CHLORIDE SERPL-SCNC: 96 MMOL/L (ref 98–107)
CREAT SERPL-MCNC: 0.7 MG/DL (ref 0.51–0.95)
CRP SERPL-MCNC: 75.16 MG/L
DEPRECATED HCO3 PLAS-SCNC: 30 MMOL/L (ref 22–29)
ERYTHROCYTE [DISTWIDTH] IN BLOOD BY AUTOMATED COUNT: 13.2 % (ref 10–15)
GFR SERPL CREATININE-BSD FRML MDRD: >90 ML/MIN/1.73M2
GLUCOSE SERPL-MCNC: 102 MG/DL (ref 70–99)
HCT VFR BLD AUTO: 37.4 % (ref 35–47)
HGB BLD-MCNC: 12.2 G/DL (ref 11.7–15.7)
MCH RBC QN AUTO: 29.9 PG (ref 26.5–33)
MCHC RBC AUTO-ENTMCNC: 32.6 G/DL (ref 31.5–36.5)
MCV RBC AUTO: 92 FL (ref 78–100)
PLATELET # BLD AUTO: 160 10E3/UL (ref 150–450)
POTASSIUM SERPL-SCNC: 3.8 MMOL/L (ref 3.4–5.3)
PROT SERPL-MCNC: 6.8 G/DL (ref 6.4–8.3)
RBC # BLD AUTO: 4.08 10E6/UL (ref 3.8–5.2)
SODIUM SERPL-SCNC: 135 MMOL/L (ref 136–145)
WBC # BLD AUTO: 5.5 10E3/UL (ref 4–11)

## 2023-04-02 PROCEDURE — 71046 X-RAY EXAM CHEST 2 VIEWS: CPT | Mod: 26 | Performed by: RADIOLOGY

## 2023-04-02 PROCEDURE — 87040 BLOOD CULTURE FOR BACTERIA: CPT | Performed by: STUDENT IN AN ORGANIZED HEALTH CARE EDUCATION/TRAINING PROGRAM

## 2023-04-02 PROCEDURE — 74018 RADEX ABDOMEN 1 VIEW: CPT | Mod: 26 | Performed by: RADIOLOGY

## 2023-04-02 PROCEDURE — 87486 CHLMYD PNEUM DNA AMP PROBE: CPT | Performed by: STUDENT IN AN ORGANIZED HEALTH CARE EDUCATION/TRAINING PROGRAM

## 2023-04-02 PROCEDURE — 250N000013 HC RX MED GY IP 250 OP 250 PS 637: Performed by: NURSE PRACTITIONER

## 2023-04-02 PROCEDURE — 84145 PROCALCITONIN (PCT): CPT

## 2023-04-02 PROCEDURE — 87633 RESP VIRUS 12-25 TARGETS: CPT | Performed by: STUDENT IN AN ORGANIZED HEALTH CARE EDUCATION/TRAINING PROGRAM

## 2023-04-02 PROCEDURE — 36415 COLL VENOUS BLD VENIPUNCTURE: CPT | Performed by: STUDENT IN AN ORGANIZED HEALTH CARE EDUCATION/TRAINING PROGRAM

## 2023-04-02 PROCEDURE — 124N000002 HC R&B MH UMMC

## 2023-04-02 PROCEDURE — 71046 X-RAY EXAM CHEST 2 VIEWS: CPT

## 2023-04-02 PROCEDURE — 86140 C-REACTIVE PROTEIN: CPT | Performed by: STUDENT IN AN ORGANIZED HEALTH CARE EDUCATION/TRAINING PROGRAM

## 2023-04-02 PROCEDURE — 74018 RADEX ABDOMEN 1 VIEW: CPT

## 2023-04-02 PROCEDURE — 250N000011 HC RX IP 250 OP 636: Performed by: NURSE PRACTITIONER

## 2023-04-02 PROCEDURE — 80053 COMPREHEN METABOLIC PANEL: CPT | Performed by: STUDENT IN AN ORGANIZED HEALTH CARE EDUCATION/TRAINING PROGRAM

## 2023-04-02 PROCEDURE — U0003 INFECTIOUS AGENT DETECTION BY NUCLEIC ACID (DNA OR RNA); SEVERE ACUTE RESPIRATORY SYNDROME CORONAVIRUS 2 (SARS-COV-2) (CORONAVIRUS DISEASE [COVID-19]), AMPLIFIED PROBE TECHNIQUE, MAKING USE OF HIGH THROUGHPUT TECHNOLOGIES AS DESCRIBED BY CMS-2020-01-R: HCPCS | Performed by: STUDENT IN AN ORGANIZED HEALTH CARE EDUCATION/TRAINING PROGRAM

## 2023-04-02 PROCEDURE — 85027 COMPLETE CBC AUTOMATED: CPT | Performed by: STUDENT IN AN ORGANIZED HEALTH CARE EDUCATION/TRAINING PROGRAM

## 2023-04-02 PROCEDURE — 83605 ASSAY OF LACTIC ACID: CPT | Performed by: STUDENT IN AN ORGANIZED HEALTH CARE EDUCATION/TRAINING PROGRAM

## 2023-04-02 PROCEDURE — 87804 INFLUENZA ASSAY W/OPTIC: CPT | Performed by: STUDENT IN AN ORGANIZED HEALTH CARE EDUCATION/TRAINING PROGRAM

## 2023-04-02 PROCEDURE — 99222 1ST HOSP IP/OBS MODERATE 55: CPT | Performed by: STUDENT IN AN ORGANIZED HEALTH CARE EDUCATION/TRAINING PROGRAM

## 2023-04-02 RX ADMIN — ONDANSETRON 4 MG: 4 TABLET, ORALLY DISINTEGRATING ORAL at 08:58

## 2023-04-02 RX ADMIN — PHENOBARBITAL 32.4 MG: 32.4 TABLET ORAL at 08:59

## 2023-04-02 RX ADMIN — PHENOBARBITAL 32.4 MG: 32.4 TABLET ORAL at 19:04

## 2023-04-02 RX ADMIN — GABAPENTIN 800 MG: 800 TABLET ORAL at 13:08

## 2023-04-02 RX ADMIN — DIPHENHYDRAMINE HYDROCHLORIDE 25 MG: 25 CAPSULE ORAL at 12:19

## 2023-04-02 RX ADMIN — VORTIOXETINE 20 MG: 10 TABLET, FILM COATED ORAL at 08:59

## 2023-04-02 RX ADMIN — ACETAMINOPHEN 650 MG: 325 TABLET ORAL at 15:50

## 2023-04-02 RX ADMIN — PHENOBARBITAL 32.4 MG: 32.4 TABLET ORAL at 13:08

## 2023-04-02 RX ADMIN — GABAPENTIN 800 MG: 800 TABLET ORAL at 08:58

## 2023-04-02 RX ADMIN — Medication 25 MG: at 19:04

## 2023-04-02 RX ADMIN — GABAPENTIN 1600 MG: 800 TABLET ORAL at 19:04

## 2023-04-02 RX ADMIN — METHADONE HYDROCHLORIDE 185 MG: 5 SOLUTION ORAL at 09:03

## 2023-04-02 RX ADMIN — METHOCARBAMOL 500 MG: 500 TABLET ORAL at 15:50

## 2023-04-02 RX ADMIN — SENNOSIDES AND DOCUSATE SODIUM 1 TABLET: 50; 8.6 TABLET ORAL at 12:21

## 2023-04-02 RX ADMIN — Medication 1 G: at 08:58

## 2023-04-02 RX ADMIN — ONDANSETRON 4 MG: 4 TABLET, ORALLY DISINTEGRATING ORAL at 05:09

## 2023-04-02 ASSESSMENT — ACTIVITIES OF DAILY LIVING (ADL)
ORAL_HYGIENE: INDEPENDENT
ADLS_ACUITY_SCORE: 40
LAUNDRY: WITH SUPERVISION
DRESS: INDEPENDENT
ADLS_ACUITY_SCORE: 40
ADLS_ACUITY_SCORE: 40
HYGIENE/GROOMING: INDEPENDENT
ADLS_ACUITY_SCORE: 40

## 2023-04-02 NOTE — PLAN OF CARE
Problem: Anxiety Signs/Symptoms  Goal: Optimized Energy Level (Anxiety Signs/Symptoms)  Intervention: Optimize Energy Level  Recent Flowsheet Documentation  Taken 4/1/2023 2000 by Jessica Lynch, RN  Activity (Behavioral Health): up ad david   Goal Outcome Evaluation:    Plan of Care Reviewed With: patient    Patient slept until 1730; stated she didn't sleep at all last night.  Affect remains flat.  She denied SI and stated her depression and anxiety are minimal.  She has been given Seroquel 25 mg PRN for anxiety and Benadryl 25 mg for itching.  MSSA 3 for diaphoresis, temp and pulse.  Patient reports no diaphoresis while sleeping but it resumed when she got up.\/77 (BP Location: Left arm, Patient Position: Sitting)   Pulse 80   Temp 98.2  F (36.8  C) (Oral)   Resp 16   Wt 104 kg (229 lb 3.2 oz)   SpO2 96%   BMI 38.14 kg/m

## 2023-04-02 NOTE — PLAN OF CARE
Problem: Anxiety Signs/Symptoms  Goal: Optimized Energy Level (Anxiety Signs/Symptoms)  Outcome: Progressing     Patient is currently on Phenobarbital taper 32.4 TID. Patient was up for breakfast and meds complained of feeling nauseous,requested for and received Zofran 4 mg with effect. She denied anxiety,depression SI /SIB/HI and all other psych symptoms eula for safety.  Patient continues on MSSA  assessment and her score for this shift was 5 &4. Patient is currently on 185 mg of methadone maintenance daily.  She complained of having an upset stomach and has not had a BM for four days but refused interventions saying it will make her even more nauseous with much encouragement she came back later and requested for senna. She spent most of the day in her room reading a book . She reports poor apatite did not eat breakfast nor lunch . Patient is encourage to drink more fluids and be more active to help with constipation. Staff will continue to monitor and assist as needed.   Patient's vital signs were at baseline./72 (BP Location: Right arm)   Pulse 87   Temp 97.2  F (36.2  C) (Temporal)   Resp 18   Wt 104 kg (229 lb 3.2 oz)   SpO2 96%   BMI 38.14 kg/m

## 2023-04-02 NOTE — PLAN OF CARE
"Patient slept approximately 5.25 hours during the overnight shift; appeared comfortable with even and unlabored breathing while asleep. Patient declined MSSA and communicated she did not want to be woken up during the overnight shift for the assessment. However, at approximately 0500 the patient was heard retching and she requested and received PRN zofran (see MAR). Patient reported she believed the sunbutter she ate earlier is what made her vomit. Since she was awake, MSSA was completed, and the patient scored a 6. At approximately 0545, the patient came to the milieu to get some ice water and reported she \"felt better\". No additional issues or concerns reported or observed. Safety checks completed at least every 15 minutes. Nursing will continue to monitor.  "

## 2023-04-03 LAB
ALBUMIN UR-MCNC: NEGATIVE MG/DL
APPEARANCE UR: CLEAR
ATRIAL RATE - MUSE: 66 BPM
BACTERIA #/AREA URNS HPF: ABNORMAL /HPF
BILIRUB UR QL STRIP: NEGATIVE
C PNEUM DNA SPEC QL NAA+PROBE: NOT DETECTED
COLOR UR AUTO: ABNORMAL
DIASTOLIC BLOOD PRESSURE - MUSE: NORMAL MMHG
FLUAV AG SPEC QL IA: NEGATIVE
FLUAV H1 2009 PAND RNA SPEC QL NAA+PROBE: NOT DETECTED
FLUAV H1 RNA SPEC QL NAA+PROBE: NOT DETECTED
FLUAV H3 RNA SPEC QL NAA+PROBE: NOT DETECTED
FLUAV RNA SPEC QL NAA+PROBE: NOT DETECTED
FLUBV AG SPEC QL IA: NEGATIVE
FLUBV RNA SPEC QL NAA+PROBE: NOT DETECTED
GLUCOSE UR STRIP-MCNC: NEGATIVE MG/DL
HADV DNA SPEC QL NAA+PROBE: NOT DETECTED
HCOV PNL SPEC NAA+PROBE: NOT DETECTED
HGB UR QL STRIP: NEGATIVE
HMPV RNA SPEC QL NAA+PROBE: NOT DETECTED
HPIV1 RNA SPEC QL NAA+PROBE: NOT DETECTED
HPIV2 RNA SPEC QL NAA+PROBE: NOT DETECTED
HPIV3 RNA SPEC QL NAA+PROBE: NOT DETECTED
HPIV4 RNA SPEC QL NAA+PROBE: NOT DETECTED
INTERPRETATION ECG - MUSE: NORMAL
KETONES UR STRIP-MCNC: NEGATIVE MG/DL
LACTATE SERPL-SCNC: 1.5 MMOL/L (ref 0.7–2)
LEUKOCYTE ESTERASE UR QL STRIP: ABNORMAL
M PNEUMO DNA SPEC QL NAA+PROBE: NOT DETECTED
NITRATE UR QL: NEGATIVE
P AXIS - MUSE: 58 DEGREES
PH UR STRIP: 7 [PH] (ref 5–7)
PR INTERVAL - MUSE: 142 MS
PROCALCITONIN SERPL IA-MCNC: 0.15 NG/ML
QRS DURATION - MUSE: 86 MS
QT - MUSE: 416 MS
QTC - MUSE: 436 MS
R AXIS - MUSE: 81 DEGREES
RBC URINE: 6 /HPF
RSV RNA SPEC QL NAA+PROBE: NOT DETECTED
RSV RNA SPEC QL NAA+PROBE: NOT DETECTED
RV+EV RNA SPEC QL NAA+PROBE: NOT DETECTED
SARS-COV-2 RNA RESP QL NAA+PROBE: NEGATIVE
SP GR UR STRIP: 1.01 (ref 1–1.03)
SQUAMOUS EPITHELIAL: 1 /HPF
SYSTOLIC BLOOD PRESSURE - MUSE: NORMAL MMHG
T AXIS - MUSE: 68 DEGREES
TRANSITIONAL EPI: 2 /HPF
UROBILINOGEN UR STRIP-MCNC: NORMAL MG/DL
VENTRICULAR RATE- MUSE: 66 BPM
WBC URINE: 54 /HPF

## 2023-04-03 PROCEDURE — 250N000013 HC RX MED GY IP 250 OP 250 PS 637: Performed by: PSYCHIATRY & NEUROLOGY

## 2023-04-03 PROCEDURE — 250N000013 HC RX MED GY IP 250 OP 250 PS 637: Performed by: NURSE PRACTITIONER

## 2023-04-03 PROCEDURE — 124N000002 HC R&B MH UMMC

## 2023-04-03 PROCEDURE — 99232 SBSQ HOSP IP/OBS MODERATE 35: CPT | Performed by: NURSE PRACTITIONER

## 2023-04-03 PROCEDURE — 81003 URINALYSIS AUTO W/O SCOPE: CPT | Performed by: STUDENT IN AN ORGANIZED HEALTH CARE EDUCATION/TRAINING PROGRAM

## 2023-04-03 PROCEDURE — 87086 URINE CULTURE/COLONY COUNT: CPT | Performed by: STUDENT IN AN ORGANIZED HEALTH CARE EDUCATION/TRAINING PROGRAM

## 2023-04-03 PROCEDURE — 99232 SBSQ HOSP IP/OBS MODERATE 35: CPT

## 2023-04-03 PROCEDURE — 258N000003 HC RX IP 258 OP 636: Performed by: STUDENT IN AN ORGANIZED HEALTH CARE EDUCATION/TRAINING PROGRAM

## 2023-04-03 RX ORDER — CLONIDINE HYDROCHLORIDE 0.1 MG/1
0.1 TABLET ORAL EVERY 6 HOURS PRN
Status: DISCONTINUED | OUTPATIENT
Start: 2023-04-03 | End: 2023-04-04

## 2023-04-03 RX ORDER — IBUPROFEN 600 MG/1
600 TABLET, FILM COATED ORAL EVERY 6 HOURS PRN
Status: DISCONTINUED | OUTPATIENT
Start: 2023-04-03 | End: 2023-04-06 | Stop reason: HOSPADM

## 2023-04-03 RX ORDER — METHADONE HYDROCHLORIDE 10 MG/ML
185 CONCENTRATE ORAL DAILY
Status: DISCONTINUED | OUTPATIENT
Start: 2023-04-04 | End: 2023-04-06 | Stop reason: HOSPADM

## 2023-04-03 RX ADMIN — PHENOBARBITAL 32.4 MG: 32.4 TABLET ORAL at 08:13

## 2023-04-03 RX ADMIN — PHENOBARBITAL 32.4 MG: 32.4 TABLET ORAL at 19:45

## 2023-04-03 RX ADMIN — METHADONE HYDROCHLORIDE 185 MG: 5 SOLUTION ORAL at 08:13

## 2023-04-03 RX ADMIN — PHENOBARBITAL 32.4 MG: 32.4 TABLET ORAL at 13:02

## 2023-04-03 RX ADMIN — IBUPROFEN 600 MG: 600 TABLET, FILM COATED ORAL at 22:54

## 2023-04-03 RX ADMIN — Medication 25 MG: at 23:40

## 2023-04-03 RX ADMIN — GABAPENTIN 1600 MG: 800 TABLET ORAL at 19:45

## 2023-04-03 RX ADMIN — DIPHENHYDRAMINE HYDROCHLORIDE 25 MG: 25 CAPSULE ORAL at 14:21

## 2023-04-03 RX ADMIN — ACETAMINOPHEN 650 MG: 325 TABLET ORAL at 13:03

## 2023-04-03 RX ADMIN — VORTIOXETINE 20 MG: 10 TABLET, FILM COATED ORAL at 08:13

## 2023-04-03 RX ADMIN — METHOCARBAMOL 500 MG: 500 TABLET ORAL at 18:02

## 2023-04-03 RX ADMIN — GABAPENTIN 800 MG: 800 TABLET ORAL at 08:13

## 2023-04-03 RX ADMIN — Medication 12.5 MG: at 08:13

## 2023-04-03 RX ADMIN — SODIUM CHLORIDE 1000 ML: 9 INJECTION, SOLUTION INTRAVENOUS at 01:00

## 2023-04-03 RX ADMIN — GABAPENTIN 800 MG: 800 TABLET ORAL at 13:02

## 2023-04-03 RX ADMIN — Medication 1 G: at 08:13

## 2023-04-03 RX ADMIN — ACETAMINOPHEN 650 MG: 325 TABLET ORAL at 18:02

## 2023-04-03 RX ADMIN — ACETAMINOPHEN 650 MG: 325 TABLET ORAL at 08:13

## 2023-04-03 RX ADMIN — DIPHENHYDRAMINE HYDROCHLORIDE 25 MG: 25 CAPSULE ORAL at 20:32

## 2023-04-03 ASSESSMENT — ACTIVITIES OF DAILY LIVING (ADL)
ADLS_ACUITY_SCORE: 40
DRESS: INDEPENDENT
ADLS_ACUITY_SCORE: 40
HYGIENE/GROOMING: INDEPENDENT
LAUNDRY: WITH SUPERVISION
ADLS_ACUITY_SCORE: 40
ORAL_HYGIENE: INDEPENDENT
ADLS_ACUITY_SCORE: 40

## 2023-04-03 NOTE — PROGRESS NOTES
IV NS 1000 ml bolus initiated(rate:350mL/hr).  Labs (blood Cx, Lactic acid) were drawn.  UA/UC specimen still needs to be processed.  She remains febrile.    Pt appears calm and cooperative.  On Call psychiatrist Dr Diamond was updated with patient status at this time.  No roommate order is provided at this time.

## 2023-04-03 NOTE — CONSULTS
"  [facility]  Internal Medicine Consult     Jihan Gill MRN# [id number]   Age: 47 year old YOB: 1975     Date of Admission: 3/28/2023  Date of Consult: 4/3/2023    Primary Care Provider: Clinic, [facility]    Requesting Service: Behavioral Health - Bisi Mays MD  Reason for Consult: General Medical Evaluation      SUBJECTIVE   CC:   \" I have not had a fever\"   Assessment and Plan/Recommendations:     Jihan Gill is a 47 year old woman with a history of major depressive disorder, PTSD, borderline personality disorder, opioid use disorder, fibromyalgia.  Internal medicine cross cover was paged on 4/2 for a fever of 103 and tachycardia.     SIRS  She has had intermittent muscle aches over the last couple of months.  Denies exposure to sick contacts.  Also endorses some hot flashes, went through menopause at 42.  Hot flashes could be attributed to menopause and patient agreeable to seeing gynecologist as an outpatient.  Abdominal x-ray positive for constipation.  Chest x-ray normal.  Acid 1.5, WBC 5.5.  Procalcitonin elevated at 75.16.  Urinalysis positive for large amount of leukocytes, increased WBCs, few bacteria.  Patient denies dysuria, frequency, nocturia, suprapubic pain, flank pain.  Respiratory panel normal, negative for influenza and COVID.  If urine or blood cultures abnormal, will start on antibiotic therapy.  -Urine culture pending  -Blood cultures pending  -We will consider antibiotic therapy pending cultures.  -CBC, procalcitonin and CRP in a.m.    Hypotension  Notified by nursing patient hypotensive, 88/58 HR 62. Recheck a few minutes later, 92/61 and HR 80. Could be due to infection but would expect tachycardia. Hypotension is a side effect of phenobarbital.   -continue to encourage fluid  -continue to monitor.       Medicine will continue to follow along.  Recommendations relayed to primary team via this progress note.  Thank you " for the opportunity to be involved in this patient's care.      BLANCHE Zarate CNP  Internal Medicine [facility] Hospitalist  Page job code [id number] (3B), [phone] (3A), or [phone] ([facility] and 4A)  Text paging via [facility] is appreciated  April 3, 2023         HPI:   Jihan Gill is a 47 year old woman with a history of major depressive disorder, PTSD, borderline personality disorder, opioid use disorder, fibromyalgia.  Internal medicine cross cover was paged on 4/2 for a fever of 103 and tachycardia.   Met with patient in exam room and discussed follow-up due to fever.  Patient states she has not had a fever since that episode.  Myalgias, chills and fever have all resolved.  She states she has felt like this on and off over the last few months.  Recently felt itchy all over for about a week long.  Not sure if it was due to food allergy, has not started any new medications.  Notes significant amount of emotional and mental stress.  Discussed urinalysis results.  Patient denies any signs or symptoms of urinary tract infection.  Is agreeable to waiting for cultures and can discuss antibiotic course if positive.  Denies joint pain, inflammation of joints.  No skin changes.       Past Medical History:     Past Medical History:   Diagnosis Date     Arthritis      Bipolar 1 disorder (H)      Chronic hepatitis C without hepatic coma (H)      Depressive disorder     postpartum     Depressive disorder      Major depression, recurrent (H)      Opiate addiction (H)      Seizures (H)     narcotic withdrawal     Substance abuse (H)      Urinary calculus, unspecified 2001    Renal stones        Reviewed and updated in [facility].     Past Surgical History:      Past Surgical History:   Procedure Laterality Date     C/SECTION, LOW TRANSVERSE      [id number]     ENT SURGERY       GYN SURGERY       TONSILLECTOMY           Social History:     Social History     Tobacco Use     Smoking status: Every Day     Packs/day: 0.50     Years: 8.00     Pack  years: 4.00     Types: Cigarettes     Smokeless tobacco: Never   Substance Use Topics     Alcohol use: Not Currently     Comment: States no EtOH since 2008.     Drug use: Yes     Comment: Methadone 60mg/day street buy        Family History:     Family History   Problem Relation Age of Onset     No Known Problems Father      No Known Problems Mother      No Known Problems Sister      No Known Problems Brother          Allergies:     Allergies   Allergen Reactions     Buspirone Hives     Droperidol Anxiety and Other (See Comments)     SHAKING  SHAKING  Tardive Dyskinesia  Tardive Dyskinesia  SHAKING  Tardive Dyskinesia  Tardive Dyskinesia       Ketorolac Hives     Ketorolac Tromethamine Hives     Metoclopramide Anxiety, Other (See Comments) and Rash     Other reaction(s): Extrapyramidal Side Effect, Extrapyramidal Symptoms  Extrapyramidal Side Effect, Dystonia    Other reaction(s): Extrapyramidal Side Effect  dystonia  Extrapyramidal Side Effect, Dystonia  Other reaction(s): Extrapyramidal Side Effect, Other (see comments)  dystonia  Dystonia  Dystonia  Other reaction(s): Extrapyramidal Side Effect  dystonia  Extrapyramidal Side Effect, Dystonia  Other reaction(s): Extrapyramidal Side Effect, Other (see comments)  dystonia  Dystonia  Other reaction(s): Extrapyramidal Side Effect, Other (see comments)  dystonia  Dystonia  Dystonia       Prochlorperazine Anxiety and Other (See Comments)     Other reaction(s): Extrapyramidal Side Effect, Extrapyramidal Symptoms, Other (see comments)  Extrapyramidal Side Effect, dystonia  Dystonia  Other reaction(s): Extrapyramidal Side Effect  Dystonia  Extrapyramidal Side Effect, dystonia       Abilify [Aripiprazole] Other (See Comments)     Tardive dyskinesia     Acetaminophen Other (See Comments)     Multiple suicide attempts by tylenol, hoards pills.  Multiple suicide attempts by tylenol, hoards pills.  Multiple suicide attempts by tylenol, hoards pills.  Multiple suicide attempts by  tylenol, hoards pills.       Allopurinol      Allopurinol      Compazine Other (See Comments)     Dystonia     Dimenhydrinate Other (See Comments)     Other reaction(s): Agitation, Other (see comments)  SHAKING  Jaw lock.  Other reaction(s): Agitation  Jaw lock.  SHAKING  Iv only can take regular  Other reaction(s): Agitation  Jaw lock.  SHAKING  Iv only can take regular  Iv only can take regular  Lock jaw  Shaking       Dramamine      Jaw lock.       Hydrocodone Nausea and Vomiting     Olanzapine Other (See Comments)     Tardive dyskinesia from Zyprexa - per patient     Reglan Other (See Comments)     dystonia     Sulfa Drugs Other (See Comments)     dystonia  dystonia    Other reaction(s): Other (see comments)  dystonia  Dystonia        Diphenhydramine Anxiety     When given IV- causes regular noises to be amplified - only with IV dose, not pill form.         Medications:   Reviewed. Please see MAR     Review of Systems:   10 point ROS of systems including Constitutional, Eyes, Respiratory, Cardiovascular, Gastroenterology, Genitourinary, Integumentary, Muscularskeletal, Psychiatric were all negative except for pertinent positives noted in my HPI.    OBJECTIVE   Physical Exam:   Vitals were reviewed  Blood pressure 99/66, pulse 74, temperature 98.5  F (36.9  C), temperature source Oral, resp. rate 16, weight 104 kg (229 lb 3.2 oz), SpO2 97 %, not currently breastfeeding.  General: Alert and oriented x3, well appearing   HEENT: Anicteric sclera, MMM  Cardiovascular: RRR, S1S2. No murmur noted  Lungs: CTAB without wheezing or crackles   GI: Abdomen soft, non-tender with bowel sounds present. No guarding or rebound   Vascular: No peripheral edema, distal pulses palpable  Neurologic: No focal deficits, CN II-XII grossly intact  Skin: No jaundice, rashes, or lesions        Data:        Lab Results   Component Value Date     04/02/2023     02/22/2021    Lab Results   Component Value Date    CHLORIDE 96  04/02/2023    CHLORIDE 102 11/03/2022    CHLORIDE 102 02/22/2021    Lab Results   Component Value Date    BUN 9.3 04/02/2023    BUN 6 11/03/2022    BUN 14 02/22/2021      Lab Results   Component Value Date    POTASSIUM 3.8 04/02/2023    POTASSIUM 3.6 11/03/2022    POTASSIUM 4.0 02/22/2021    Lab Results   Component Value Date    CO2 30 04/02/2023    CO2 32 11/03/2022    CO2 28 02/22/2021    Lab Results   Component Value Date    CR 0.70 04/02/2023    CR 0.60 02/22/2021        Lab Results   Component Value Date    WBC 5.5 04/02/2023    HGB 12.2 04/02/2023    HCT 37.4 04/02/2023    MCV 92 04/02/2023     04/02/2023     Lab Results   Component Value Date    WBC 5.5 04/02/2023

## 2023-04-03 NOTE — PLAN OF CARE
"Patient slept approximately 4.5 hours during the overnight shift.    Patient received scheduled 1000 mL saline bolus starting at approximately 0100. At approximately 0030, VS completed and MSSA was 4. Saline lock in place on left forearm- continued need for placement to be reassessed on day shift. Normal saline bolus completed at approximately 0340 (see co-nurse note). Clean catch urine specimen obtained and sent to lab for UA/UC at approximately 0330. Patient declined full vitals assessment at 0345 but allowed a temp which was 98.0 (oral- see flowsheet). Patient stated she is felling \"better\" but was tired and wanted to sleep. Patient was pleasant during interactions and extremely cooperative with getting the IV placed (it was difficult and requiring several separate attempts).    UA results back at 0421 was positive for leukocyte esterase, WBC, RBC, and bacteria (see results). UC and blood culture still pending.    No additional issues or concerns reported or observed. Safety checks completed at least every 15 minutes. Nursing will continue to monitor.    "

## 2023-04-03 NOTE — PLAN OF CARE
Problem: Anxiety Signs/Symptoms  Goal: Improved Mood Symptoms (Anxiety Signs/Symptoms)  Outcome: Progressing  Goal: Enhanced Social, Occupational or Functional Skills (Anxiety Signs/Symptoms)  Intervention: Promote Social, Occupational and Functional Ability  Recent Flowsheet Documentation  Taken 4/3/2023 1434 by Yamileth Edge, RN  Trust Relationship/Rapport:    care explained    choices provided    thoughts/feelings acknowledged     Pt was visible in milieu, minimally social with peers. Pt has been afebrile throughout this shift after having fevers yesterday and overnight. Still waiting on results from blood and urine cultures. Pt had an episode of hypotension around 1230, BP was 88/58 HR 62, rechecked it 2 minutes later and BP was 92/61 HR 80. IM and psychiatric provider notified of pt's low BP. Encouraged pt to drink more fluids. Pt is on MSSA d/t phenobarbitol taper and scores have been low, 2 this morning and this afternoon. Pt reports that she has been feeling much better physically today than she was yesterday. Pt is denying SI/SIB. Will continue to monitor.

## 2023-04-03 NOTE — PLAN OF CARE
Problem: Anxiety Signs/Symptoms  Goal: Optimized Energy Level (Anxiety Signs/Symptoms)  Intervention: Optimize Energy Level  Recent Flowsheet Documentation  Taken 4/2/2023 1800 by Jessica Lynch RN  Diversional Activity: reading  Activity (Behavioral Health): up ad david   Goal Outcome Evaluation:    Plan of Care Reviewed With: patient       Patient denied depression, anxiety, and SI, although she continues to request PRN seroquel.  She has felt sick since vomiting on the last night shift, and has been unable to eat any solid food today.  Her temperature and heart rate have been elevated on the evening shift, ranging from 99.5 to 103.0 and 80 to 115.  Other VS WNL.  She stated she is no longer constipated since receiving PRN senna on day shift.  MSSA score was 3 d/t elevated heart rate.  Patient denied withdrawal symptoms; no more diaphoresis but she is having intermittent chills.  PRN tylenol and robaxin did not relieve generalized body aches.  She was seen by IM roselia; labs were sent and xrays obtained. Melia RN is currently placing peripheral IV.  She is able to ambulate safely without dizziness and her thinking is reality based.    /71 (BP Location: Left arm)   Pulse 110   Temp (!) 101.8  F (38.8  C) (Oral)   Resp 18   Wt 104 kg (229 lb 3.2 oz)   SpO2 96%   BMI 38.14 kg/m
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
## 2023-04-03 NOTE — PROGRESS NOTES
Medicine Cross Cover Note       #SIRS with fever 103  Went to evaluate patient after being paged by nursing fever to 103F and tachycardia.  She reports generally feeling unwell for about the past 24 hours.  Did have emesis x2 yesterday evening, persistent poor appetite throughout the day but has been able to drink fluids throughout the day.  Denies shortness of breath, urinary complaints, chest pain, or abdominal pain.  Does have some muscle aches and pains that could be indicative of viral syndrome.  Physical exam is relatively benign without any abdominal tenderness or abnormal breath sounds on auscultation.  Patient was admitted with depression and benzodiazepine withdrawal, however, it has been roughly 10 days since she had any benzos so do not expect acute withdrawal as causing her current sx.  - Ordered lactic acid, CBC, CRP, CMP, and blood cultures  - Respiratory virus panel, influenza, and COVID swabs  - Portable chest x-ray and abdominal x-ray  -We will bolus 1 L IV fluids  - Patient hemodynamically stable on my assessment so feel she can remain on station 30 for now    Update:  - imaging unremarkable except some constipation   - Labs pending   - hand off given to overnight provider       Physical Exam   Constitutional: awake, alert, cooperative, in no acute distress. A&Ox3. Somewhat pale   Eyes: EOM, PERRLA. Sclera clear, conjunctiva normal. Anicteric   HENT: Normocephalic, without obvious abnormality, atraumatic.   Respiratory: No increased work of breathing. Clear to auscultation bilaterally, no crackles or wheezing  Cardiovascular: tachycardic but regular. No murmur or friction rub. No edema. No chest wall tenderness.  GI: Soft, non-tender without rebound or guarding. Bowel sounds are active.  No CVA tenderness   Skin: no bruising or bleeding. Normal skin color, No jaundice  Musculoskeletal:  Full range of motion noted.    Neurologic: Cranial nerves II-XII are grossly intact.   Neuropsychiatric:  General: normal, calm and normal eye contact. Normal affect      Jovan Palomino PA-C  Internal Medicine [facility]  Pager [phone]

## 2023-04-03 NOTE — PROGRESS NOTES
IV NS bolus completed at 03:40 and SL'd.  No concerns reported by Pt who remains calmly resting in bed.  Afebrile at this time.

## 2023-04-04 LAB
BACTERIA UR CULT: NORMAL
BASOPHILS # BLD AUTO: 0 10E3/UL (ref 0–0.2)
BASOPHILS NFR BLD AUTO: 0 %
CRP SERPL-MCNC: 45.14 MG/L
EOSINOPHIL # BLD AUTO: 0.2 10E3/UL (ref 0–0.7)
EOSINOPHIL NFR BLD AUTO: 3 %
ERYTHROCYTE [DISTWIDTH] IN BLOOD BY AUTOMATED COUNT: 13.2 % (ref 10–15)
HCT VFR BLD AUTO: 38.9 % (ref 35–47)
HGB BLD-MCNC: 12.7 G/DL (ref 11.7–15.7)
IMM GRANULOCYTES # BLD: 0 10E3/UL
IMM GRANULOCYTES NFR BLD: 0 %
LYMPHOCYTES # BLD AUTO: 2.7 10E3/UL (ref 0.8–5.3)
LYMPHOCYTES NFR BLD AUTO: 50 %
MCH RBC QN AUTO: 30.7 PG (ref 26.5–33)
MCHC RBC AUTO-ENTMCNC: 32.6 G/DL (ref 31.5–36.5)
MCV RBC AUTO: 94 FL (ref 78–100)
MONOCYTES # BLD AUTO: 0.5 10E3/UL (ref 0–1.3)
MONOCYTES NFR BLD AUTO: 9 %
NEUTROPHILS # BLD AUTO: 2.1 10E3/UL (ref 1.6–8.3)
NEUTROPHILS NFR BLD AUTO: 38 %
NRBC # BLD AUTO: 0 10E3/UL
NRBC BLD AUTO-RTO: 0 /100
PLATELET # BLD AUTO: 200 10E3/UL (ref 150–450)
PROCALCITONIN SERPL IA-MCNC: 0.08 NG/ML
RBC # BLD AUTO: 4.14 10E6/UL (ref 3.8–5.2)
WBC # BLD AUTO: 5.4 10E3/UL (ref 4–11)

## 2023-04-04 PROCEDURE — 124N000002 HC R&B MH UMMC

## 2023-04-04 PROCEDURE — 99232 SBSQ HOSP IP/OBS MODERATE 35: CPT | Performed by: NURSE PRACTITIONER

## 2023-04-04 PROCEDURE — 85025 COMPLETE CBC W/AUTO DIFF WBC: CPT

## 2023-04-04 PROCEDURE — 86140 C-REACTIVE PROTEIN: CPT

## 2023-04-04 PROCEDURE — 250N000013 HC RX MED GY IP 250 OP 250 PS 637: Performed by: PSYCHIATRY & NEUROLOGY

## 2023-04-04 PROCEDURE — 36415 COLL VENOUS BLD VENIPUNCTURE: CPT

## 2023-04-04 PROCEDURE — G0177 OPPS/PHP; TRAIN & EDUC SERV: HCPCS

## 2023-04-04 PROCEDURE — 250N000013 HC RX MED GY IP 250 OP 250 PS 637: Performed by: NURSE PRACTITIONER

## 2023-04-04 PROCEDURE — 84145 PROCALCITONIN (PCT): CPT

## 2023-04-04 RX ORDER — GLYCOPYRROLATE 1 MG/1
1 TABLET ORAL 2 TIMES DAILY
Status: DISCONTINUED | OUTPATIENT
Start: 2023-04-04 | End: 2023-04-06 | Stop reason: HOSPADM

## 2023-04-04 RX ORDER — HYDROXYZINE HYDROCHLORIDE 25 MG/1
25 TABLET, FILM COATED ORAL EVERY 4 HOURS PRN
Status: DISCONTINUED | OUTPATIENT
Start: 2023-04-04 | End: 2023-04-05

## 2023-04-04 RX ADMIN — METHADONE HYDROCHLORIDE 185 MG: 10 CONCENTRATE ORAL at 08:44

## 2023-04-04 RX ADMIN — ACETAMINOPHEN 650 MG: 325 TABLET ORAL at 13:38

## 2023-04-04 RX ADMIN — CLONIDINE HYDROCHLORIDE 0.1 MG: 0.1 TABLET ORAL at 11:06

## 2023-04-04 RX ADMIN — GLYCOPYRROLATE 1 MG: 1 TABLET ORAL at 13:38

## 2023-04-04 RX ADMIN — PHENOBARBITAL 32.4 MG: 32.4 TABLET ORAL at 18:57

## 2023-04-04 RX ADMIN — GABAPENTIN 800 MG: 800 TABLET ORAL at 09:00

## 2023-04-04 RX ADMIN — GABAPENTIN 1600 MG: 800 TABLET ORAL at 18:56

## 2023-04-04 RX ADMIN — ACETAMINOPHEN 650 MG: 325 TABLET ORAL at 08:43

## 2023-04-04 RX ADMIN — PHENOBARBITAL 32.4 MG: 32.4 TABLET ORAL at 08:59

## 2023-04-04 RX ADMIN — Medication 1 G: at 08:43

## 2023-04-04 RX ADMIN — GLYCOPYRROLATE 1 MG: 1 TABLET ORAL at 18:56

## 2023-04-04 RX ADMIN — HYDROXYZINE HYDROCHLORIDE 25 MG: 25 TABLET, FILM COATED ORAL at 13:38

## 2023-04-04 RX ADMIN — DIPHENHYDRAMINE HYDROCHLORIDE 25 MG: 25 CAPSULE ORAL at 18:58

## 2023-04-04 RX ADMIN — Medication 12.5 MG: at 08:43

## 2023-04-04 RX ADMIN — DIPHENHYDRAMINE HYDROCHLORIDE 25 MG: 25 CAPSULE ORAL at 08:43

## 2023-04-04 RX ADMIN — PHENOBARBITAL 32.4 MG: 32.4 TABLET ORAL at 13:38

## 2023-04-04 RX ADMIN — Medication 25 MG: at 21:35

## 2023-04-04 RX ADMIN — VORTIOXETINE 20 MG: 10 TABLET, FILM COATED ORAL at 08:43

## 2023-04-04 RX ADMIN — GABAPENTIN 800 MG: 800 TABLET ORAL at 13:38

## 2023-04-04 ASSESSMENT — ACTIVITIES OF DAILY LIVING (ADL)
ADLS_ACUITY_SCORE: 40
LAUNDRY: WITH SUPERVISION
DRESS: INDEPENDENT
ADLS_ACUITY_SCORE: 40
HYGIENE/GROOMING: INDEPENDENT
ADLS_ACUITY_SCORE: 40
DRESS: SCRUBS (BEHAVIORAL HEALTH);INDEPENDENT
ADLS_ACUITY_SCORE: 40
ORAL_HYGIENE: INDEPENDENT
ORAL_HYGIENE: INDEPENDENT
ADLS_ACUITY_SCORE: 40
ADLS_ACUITY_SCORE: 40
HYGIENE/GROOMING: INDEPENDENT
ADLS_ACUITY_SCORE: 40

## 2023-04-04 NOTE — PLAN OF CARE
"  Problem: Anxiety Signs/Symptoms  Goal: Optimized Energy Level (Anxiety Signs/Symptoms)  Intervention: Optimize Energy Level  Recent Flowsheet Documentation  Taken 4/3/2023 1800 by Jessica Lynch RN  Diversional Activity: reading  Activity (Behavioral Health): up ad david   Goal Outcome Evaluation:    Plan of Care Reviewed With: patient        Patient denied anxiety, depression, SI, and all other psychiatric problems.  MSSA was 2 for diaphoresis and temperature.  She has a headache which was not relieved by tylenol and robaxin.  Patient stated she has tolerated ibuprofen in the past despite ketorolac and hydrocodone allergies.  New order was obtained and 600 mg of ibuprofen was given at  2254.  VS are WNL and patient states she \"doesn't feel sick like yesterday, just the bad headache.\"  Patient denied other physical problems.  BP 99/69 (BP Location: Left arm, Patient Position: Sitting)   Pulse 68   Temp 97.9  F (36.6  C) (Temporal)   Resp 16   Wt 104 kg (229 lb 3.2 oz)   SpO2 98%   BMI 38.14 kg/m               "
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
## 2023-04-04 NOTE — PLAN OF CARE
04/03/23 2100   Interprofessional Rounds   Summary Pt referred to [facility], pharmacy approved methadone concentrate, ill over the weekend. [facility] treatment will need phenobarbital taper schedule if it remains at time of admission   Participants advanced practice nurse;CTC;nursing   Behavioral Team Discussion   Participants Jsamine Lundberg, APRN, CNP  Louis Osborne, APRN, CNP  Angela Smith, [facility], [facility]  Katherine Bangura, RN, Nurse Manager  Valeriano Biswas, MONICA, Patient Care Supervisor  Yamileth Edge RN   Progress Stabilization continues, illness does not appear to have impact psychiatric stability   Anticipated length of stay 7 days   Continued Stay Criteria/Rationale Requires direct discharge to [facility] treatment.   Medical/Physical Pt reported nausea and vomiting on Saturday night, and then on Sunday, she developed a fever of 103, chills, body aches and low appetite.  She was seen by internal medicine and diagnosed with SIRS.  She was given IV fluids. Eating minimimally. Remains on MSSA for phenobarbital taper   Plan Discharge to CD treatment   Anticipated Discharge Disposition substance use treatment     PRECAUTIONS AND SAFETY    Behavioral Orders   Procedures    Code 2    Routine Programming     As clinically indicated    Seizure precautions    Status 15     Every 15 minutes.    Suicide precautions     Patients on Suicide Precautions should have a Combination Diet ordered that includes a Diet selection(s) AND a Behavioral Tray selection for Safe Tray - with utensils, or Safe Tray - NO utensils      Withdrawal precautions       Safety  Safety WDL: WDL  Patient [place] [facility]  Observed Behavior: calm  Safety Measures: safety rounds completed, self-directed behavior promoted  Diversional Activity: reading  Suicidality: Status 15  Withdrawal: monitor withdrawal process  Seizure precautions: seizure side rail pads

## 2023-04-04 NOTE — PLAN OF CARE
Patient slept approximately 5.5 hours during the overnight shift; appeared comfortable with even and unlabored breathing while asleep. At approximately 2345, the patient specifically requested and received PRN Seroquel for anxiety (see MAR). MSSA at 0000 was 1.  No additional issues or concerns reported or observed. Safety checks completed at least every 15 minutes. Nursing will continue to monitor.

## 2023-04-04 NOTE — PLAN OF CARE
Problem: Anxiety Signs/Symptoms  Goal: Improved Mood Symptoms (Anxiety Signs/Symptoms)  Outcome: Progressing  Goal: Enhanced Social, Occupational or Functional Skills (Anxiety Signs/Symptoms)  Intervention: Promote Social, Occupational and Functional Ability  Recent Flowsheet Documentation  Taken 4/4/2023 0918 by Yamileth Edge RN  Trust Relationship/Rapport:    care explained    choices provided    thoughts/feelings acknowledged     Pt was visible in milieu, selectively social with peers. Affect is flat, restricted. Mood is anxious, irritable at times. Pt's BP's have been consistently low. Temporarily held pt's am gabapentin and phenobarbital doses which pt was very irritable about. Rechecked BP a few minutes later, order parameters met and gave pt the 2 held medications. Pt has not been experiencing any sx of acute infection and has been afebrile for over 24 hrs. Consulted Infection prevention about pt's blocked room and they stated that pt is approved to have a roommate. Pt had prn tylenol x2 for back pain and also took prn clonidine, hydroxyzine 25mg and seroquel 12.5 mg for anxiety during this shift. Pt denies SI/SIB. Will continue to monitor.

## 2023-04-04 NOTE — PLAN OF CARE
"OT PROGRESS NOTE:     04/04/23 1125   Group Therapy Session   Group Attendance attended group session   Time Session Began 1015   Time Session Ended 1100   Total Time (minutes) 45   Total # Attendees 5   Group Type expressive therapy;task skill   Group Topic Covered balanced lifestyle;coping skills/lifestyle management;emotions/expression;leisure exploration/use of leisure time   Group Session Detail Crafts/creative expression to promote focus/concentration, planning, problem solving, organization, exploration of positive leisure activities for structure, relapse prevention, and positive coping.    Patient Response/Contribution cooperative with task;did not discuss personal experience;did not share thoughts verbally;organized   Patient Participation Detail Initiated group, was given and completed a written self assessment. Identified RFA as: \"Issues at home, substance abuse for a long time.\" Further noted experiencing sadness, anxiety, feeling abandoned/numb/depressed and overwhelmed, helplessness, mood swings, guilt, despair, negative racing thoughts, trouble concentrating, nightmares, withdrawal from others, relationship issues, restlessness, problems starting/completing projects, trouble finding supports, procrastination and $ concerns. Self identified coping skills as : walking, reading, and watching documentaries.  Goals selected focus on: looking at self destructive behaviors  and effects, finding positive resources, manage anxiety and stress, and improve functioning. Supports listed as: finding a treatment program and stabilize on meds.  OT purpose was explained with a value of having involvement in tx plan, and provided options to meet self identified goals. Will assess further in the areas of organization, problem solving, and concentration. Flat affect with sullen mood. Followed 2 step verbal direction and multi step written directions with good results. Focused on creative task 45 minutes. With good " planning and problem solving. No social interaction with others and was superficial on approach by writer. Did ask for supplies needed assertively. Will continue to encourage and support consistent group attendance and participation to further address goals.

## 2023-04-04 NOTE — PLAN OF CARE
Called [facility] intake requesting status of referral.    Updated meridian regarding pt illness and recovery. Sent notes from over the weekend    Provided pt with printed info on Vayyar.

## 2023-04-05 PROCEDURE — G0177 OPPS/PHP; TRAIN & EDUC SERV: HCPCS

## 2023-04-05 PROCEDURE — 124N000002 HC R&B MH UMMC

## 2023-04-05 PROCEDURE — 99232 SBSQ HOSP IP/OBS MODERATE 35: CPT | Performed by: NURSE PRACTITIONER

## 2023-04-05 PROCEDURE — 250N000013 HC RX MED GY IP 250 OP 250 PS 637: Performed by: NURSE PRACTITIONER

## 2023-04-05 PROCEDURE — 250N000013 HC RX MED GY IP 250 OP 250 PS 637: Performed by: PSYCHIATRY & NEUROLOGY

## 2023-04-05 RX ORDER — ACETAMINOPHEN 325 MG/1
650 TABLET ORAL EVERY 4 HOURS PRN
Qty: 100 TABLET | Refills: 1 | Status: SHIPPED | OUTPATIENT
Start: 2023-04-05 | End: 2023-04-05

## 2023-04-05 RX ORDER — ALBUTEROL SULFATE 90 UG/1
1-2 AEROSOL, METERED RESPIRATORY (INHALATION) EVERY 6 HOURS PRN
Qty: 18 G | Refills: 1 | Status: SHIPPED | OUTPATIENT
Start: 2023-04-05

## 2023-04-05 RX ORDER — GLYCOPYRROLATE 1 MG/1
1 TABLET ORAL 2 TIMES DAILY
Qty: 60 TABLET | Refills: 1 | Status: SHIPPED | OUTPATIENT
Start: 2023-04-05 | End: 2024-07-11

## 2023-04-05 RX ORDER — METHOCARBAMOL 500 MG/1
500 TABLET, FILM COATED ORAL 3 TIMES DAILY PRN
Qty: 90 TABLET | Refills: 1 | Status: SHIPPED | OUTPATIENT
Start: 2023-04-05 | End: 2024-07-11

## 2023-04-05 RX ORDER — IBUPROFEN 600 MG/1
600 TABLET, FILM COATED ORAL EVERY 6 HOURS PRN
Qty: 60 TABLET | Refills: 1 | Status: SHIPPED | OUTPATIENT
Start: 2023-04-05 | End: 2023-04-05

## 2023-04-05 RX ORDER — HYDROXYZINE HYDROCHLORIDE 25 MG/1
25 TABLET, FILM COATED ORAL 2 TIMES DAILY PRN
Qty: 60 TABLET | Refills: 1 | Status: SHIPPED | OUTPATIENT
Start: 2023-04-05 | End: 2024-07-11

## 2023-04-05 RX ORDER — CHLORAL HYDRATE 500 MG
1 CAPSULE ORAL DAILY
Qty: 30 CAPSULE | Refills: 1 | Status: SHIPPED | OUTPATIENT
Start: 2023-04-05 | End: 2024-07-11

## 2023-04-05 RX ORDER — PHENOBARBITAL 32.4 MG/1
32.4 TABLET ORAL 2 TIMES DAILY
Qty: 18 TABLET | Refills: 0 | Status: SHIPPED | OUTPATIENT
Start: 2023-04-06 | End: 2024-07-11

## 2023-04-05 RX ORDER — QUETIAPINE FUMARATE 25 MG/1
12.5-25 TABLET, FILM COATED ORAL EVERY 4 HOURS PRN
Qty: 90 TABLET | Refills: 1 | Status: SHIPPED | OUTPATIENT
Start: 2023-04-05 | End: 2024-07-11

## 2023-04-05 RX ORDER — DIPHENHYDRAMINE HCL 25 MG
25 CAPSULE ORAL EVERY 6 HOURS PRN
Qty: 30 CAPSULE | Refills: 1 | Status: SHIPPED | OUTPATIENT
Start: 2023-04-05 | End: 2024-07-11

## 2023-04-05 RX ORDER — HYDROXYZINE HYDROCHLORIDE 25 MG/1
25 TABLET, FILM COATED ORAL EVERY 4 HOURS PRN
Status: DISCONTINUED | OUTPATIENT
Start: 2023-04-05 | End: 2023-04-06 | Stop reason: HOSPADM

## 2023-04-05 RX ORDER — ACETAMINOPHEN 325 MG/1
650 TABLET ORAL EVERY 4 HOURS PRN
Start: 2023-04-05 | End: 2023-05-20

## 2023-04-05 RX ORDER — GABAPENTIN 800 MG/1
TABLET ORAL
Qty: 120 TABLET | Refills: 1 | Status: SHIPPED | OUTPATIENT
Start: 2023-04-05 | End: 2024-07-11

## 2023-04-05 RX ORDER — IBUPROFEN 600 MG/1
600 TABLET, FILM COATED ORAL EVERY 6 HOURS PRN
Start: 2023-04-05 | End: 2023-05-20

## 2023-04-05 RX ORDER — RAMELTEON 8 MG/1
8 TABLET ORAL
Qty: 30 TABLET | Refills: 1 | Status: SHIPPED | OUTPATIENT
Start: 2023-04-05 | End: 2024-07-11

## 2023-04-05 RX ADMIN — GABAPENTIN 1600 MG: 800 TABLET ORAL at 19:06

## 2023-04-05 RX ADMIN — GABAPENTIN 800 MG: 800 TABLET ORAL at 13:26

## 2023-04-05 RX ADMIN — Medication 25 MG: at 08:17

## 2023-04-05 RX ADMIN — DIPHENHYDRAMINE HYDROCHLORIDE 25 MG: 25 CAPSULE ORAL at 19:07

## 2023-04-05 RX ADMIN — DIPHENHYDRAMINE HYDROCHLORIDE 25 MG: 25 CAPSULE ORAL at 13:29

## 2023-04-05 RX ADMIN — PHENOBARBITAL 32.4 MG: 32.4 TABLET ORAL at 13:26

## 2023-04-05 RX ADMIN — Medication 12.5 MG: at 13:29

## 2023-04-05 RX ADMIN — GLYCOPYRROLATE 1 MG: 1 TABLET ORAL at 08:16

## 2023-04-05 RX ADMIN — METHADONE HYDROCHLORIDE 185 MG: 10 CONCENTRATE ORAL at 08:15

## 2023-04-05 RX ADMIN — GABAPENTIN 800 MG: 800 TABLET ORAL at 08:16

## 2023-04-05 RX ADMIN — VORTIOXETINE 20 MG: 10 TABLET, FILM COATED ORAL at 08:15

## 2023-04-05 RX ADMIN — PHENOBARBITAL 32.4 MG: 32.4 TABLET ORAL at 08:16

## 2023-04-05 RX ADMIN — Medication 25 MG: at 19:06

## 2023-04-05 RX ADMIN — PHENOBARBITAL 32.4 MG: 32.4 TABLET ORAL at 19:07

## 2023-04-05 RX ADMIN — Medication 1 G: at 08:16

## 2023-04-05 RX ADMIN — GLYCOPYRROLATE 1 MG: 1 TABLET ORAL at 19:06

## 2023-04-05 ASSESSMENT — ACTIVITIES OF DAILY LIVING (ADL)
ADLS_ACUITY_SCORE: 40
ORAL_HYGIENE: INDEPENDENT
ADLS_ACUITY_SCORE: 40
LAUNDRY: WITH SUPERVISION
DRESS: INDEPENDENT
LAUNDRY: WITH SUPERVISION
ADLS_ACUITY_SCORE: 40
DRESS: INDEPENDENT
ORAL_HYGIENE: INDEPENDENT
ADLS_ACUITY_SCORE: 40
ADLS_ACUITY_SCORE: 40
HYGIENE/GROOMING: INDEPENDENT
ADLS_ACUITY_SCORE: 40
HYGIENE/GROOMING: INDEPENDENT
ADLS_ACUITY_SCORE: 40

## 2023-04-05 NOTE — PLAN OF CARE
Goal Outcome Evaluation:    Plan of Care Reviewed With: patient      Problem: Adult Behavioral Health Plan of Care  Goal: Plan of Care Review  Outcome: Progressing  Flowsheets (Taken 4/4/2023 1803)  Patient Agreement with Plan of Care: agrees     Problem: Suicide Risk  Goal: Absence of Self-Harm  Outcome: Progressing     Pt was a sleep when shift begin, until a round 1800 when writer and orientee woke pt to check in. Pt presented with flat/blunted affect which brightens some upon approach. Mood was labile, gets irritable easily, especially if needs are not made right a way. Denied pain, all mental health symptoms, and contracted for safety. Goal for the day was to groups, reports met the goal during shift. MSSA score was 2, due to vital signs, no withdrawal symptoms reported or observed. Pt requested PRN Seroquel at HS. Pt was compliant with all medications, no side effects reported or noted. Did not attend evening group but was visible in the milieu after dinning until a round 2130 when retired to bed. No safety concerns noted.

## 2023-04-05 NOTE — PLAN OF CARE
Problem: Adult Behavioral Health Plan of Care  Goal: Optimized Coping Skills in Response to Life Stressors  Outcome: Progressing     Problem: Anxiety Signs/Symptoms  Goal: Optimized Cognitive Function (Anxiety Signs/Symptoms)  Outcome: Progressing     Problem: Suicide Risk  Goal: Absence of Self-Harm  Outcome: Met   Goal Outcome Evaluation:     Pt. willing to engage in a 1:1 with staff.  Pt. reports that she is not suicidal, not experiencing  any self-injurious behaviors,   denies auditory/visual hallucinations.  Pt. States that she is ready for discharge and is motivated to attend CD treatment at [facility].  However she does state that she is concerned that the phenobarb artie may be completed too quickly.  Pt. was asked to discuss this with her psychiatric provider.  Pt.'s thoughts are reality based. Nursing staff will continue to assess the pt. On an emotional, physical, and cognitive status.

## 2023-04-05 NOTE — PROGRESS NOTES
Brief Medicine Note    Following patient for SIRS.     Today's vital signs, medications, and nursing notes were reviewed.    Laboratory and Imaging studies reviewed in the results review section of Epic. Pertinent studies are as below:         /76 (Patient Position: Sitting, Cuff Size: Adult Large)   Pulse 65   Temp 99  F (37.2  C) (Oral)   Resp 18   Wt 104 kg (229 lb 3.2 oz)   SpO2 97%   BMI 38.14 kg/m    General: A&O. NAD.       A/P:    SIRS  She has had intermittent muscle aches over the last couple of months.  Denies exposure to sick contacts.  Also endorses some hot flashes, went through menopause at 42.  Hot flashes could be attributed to menopause and patient agreeable to seeing gynecologist as an outpatient.  Abdominal x-ray positive for constipation.  Chest x-ray normal.  Acid 1.5, WBC 5.5.  Procalcitonin elevated at 75.16.  Urinalysis positive for large amount of leukocytes, increased WBCs, few bacteria.  Patient denies dysuria, frequency, nocturia, suprapubic pain, flank pain.  Respiratory panel normal, negative for influenza and COVID.  If urine or blood cultures abnormal, will start on antibiotic therapy. CRP trending down 75.16-->45.14 (hx of fibromyalgia). PCL 0.15-->0.08. Blood cultures- NGTD. Urine culture- normal, no bacteria growth  -no indication for abx therapy  -notify Medicine if patient develops s/s of infection    Currently, medically stable and internal medicine will sign off. Please contact if future questions or concerns arise. Thank you for the opportunity to be a part of this patient's care.      BLANCHE Zarate CNP  Internal Medicine [facility] Hospitalist  Page job code [id number] (3B), [phone] (3A), or [phone] ([facility] and 4A)  Text paging via [employer] is appreciated  April 5, 2023
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
## 2023-04-05 NOTE — PLAN OF CARE
Patient slept approximately 6 hours during the overnight shift; appeared comfortable with even and unlabored breathing while asleep but was noted to toss on turn sporadically through the night. No additional issues or concerns reported or observed. Safety checks completed at least every 15 minutes. Nursing will continue to monitor.
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
## 2023-04-05 NOTE — PLAN OF CARE
OT PROGRESS NOTE:    04/05/23 1248   Group Therapy Session   Group Attendance attended group session   Time Session Began 1015   Time Session Ended 1100   Total Time (minutes) 45   Total # Attendees 4   Group Type expressive therapy;task skill   Group Topic Covered balanced lifestyle;coping skills/lifestyle management;emotions/expression;leisure exploration/use of leisure time;problem-solving   Group Session Detail Crafts/creative expression to promote concentration, focus, planning, problem solving, decision making organization, exploration of a variety of leisure/coping skills and positive use of time for improved self management.       Patient Response/Contribution cooperative with task;did not discuss personal experience;organized   Patient Participation Detail Initiated group,  retrieved project and began independently working on complex task. Good attention to details and planning. Minimal social interaction with others. Remained focused on task x 45 minutes. Superficial and guarded when asked about future  And goals.plans

## 2023-04-05 NOTE — PLAN OF CARE
Problem: Depressive Signs/Symptoms  Goal: Improved Mood Symptoms (Depressive Signs/Symptoms)  Intervention: Promote Mood Improvement  Recent Flowsheet Documentation  Taken 4/5/2023 1822 by Camelia Underwood RN  Diversional Activity: television   Goal Outcome Evaluation:    Plan of Care Reviewed With: patient        Patient appeared guarded, dismissive and irritable. Denied pain, anxiety, depression, SI/HI/AVH and other mental symptoms. Patient slept until 6 PM. Patient attended community meeting and watched TV the rest of the shift. Patient was informed that the donated clothes are already in the locker. Patient checked the clothes to make sure that it's her size.  Patient's BP was low, patient denied dizziness/headache. Patient declined second set of vitals for MMSA.      MSSA score 1 at 1822 hrs    PRN given:     1907 hrs:benadryl and seroquel.      Temp: 98.7  F (37.1  C) Temp src: Oral BP: 93/65 Pulse: 59   Resp: 18 SpO2: 97 % O2 Device: None (Room air)

## 2023-04-06 VITALS
OXYGEN SATURATION: 99 % | RESPIRATION RATE: 18 BRPM | TEMPERATURE: 97.6 F | DIASTOLIC BLOOD PRESSURE: 58 MMHG | HEART RATE: 65 BPM | SYSTOLIC BLOOD PRESSURE: 101 MMHG | BODY MASS INDEX: 38.14 KG/M2 | WEIGHT: 229.2 LBS

## 2023-04-06 PROCEDURE — 250N000013 HC RX MED GY IP 250 OP 250 PS 637: Performed by: PSYCHIATRY & NEUROLOGY

## 2023-04-06 PROCEDURE — 99239 HOSP IP/OBS DSCHRG MGMT >30: CPT | Performed by: NURSE PRACTITIONER

## 2023-04-06 PROCEDURE — 250N000013 HC RX MED GY IP 250 OP 250 PS 637: Performed by: NURSE PRACTITIONER

## 2023-04-06 RX ADMIN — Medication 1 G: at 08:00

## 2023-04-06 RX ADMIN — DIPHENHYDRAMINE HYDROCHLORIDE 25 MG: 25 CAPSULE ORAL at 08:01

## 2023-04-06 RX ADMIN — GABAPENTIN 800 MG: 800 TABLET ORAL at 08:01

## 2023-04-06 RX ADMIN — Medication 12.5 MG: at 08:00

## 2023-04-06 RX ADMIN — VORTIOXETINE 20 MG: 10 TABLET, FILM COATED ORAL at 08:00

## 2023-04-06 RX ADMIN — METHADONE HYDROCHLORIDE 185 MG: 10 CONCENTRATE ORAL at 08:06

## 2023-04-06 RX ADMIN — GLYCOPYRROLATE 1 MG: 1 TABLET ORAL at 08:00

## 2023-04-06 RX ADMIN — PHENOBARBITAL 32.4 MG: 32.4 TABLET ORAL at 08:01

## 2023-04-06 ASSESSMENT — ACTIVITIES OF DAILY LIVING (ADL)
ADLS_ACUITY_SCORE: 40

## 2023-04-06 NOTE — PROGRESS NOTES
Discharge order entered YES    Patient discharging 4/6/2023 accompanied by Staff to [facility],  and [facility]. Discharge paperwork and medications reviewed with patient who verbalizes understanding.     Patient denies current or recent suicidal ideation    SIB denies    HI: denies current or recent homicidal ideation or behaviors.    Copies provided: AVS YES    Patient completed Personal Plan of Care, identifying reasons for hospitalization and goals for discharge.     Survey provided.

## 2023-04-06 NOTE — PLAN OF CARE
Pt accepted to [facility]. Scheduled  by [facility] for tomorrow between 10 am and 11am. Informed pt.    Informed pt  and methadone clinic of discharge tomorrow.    Asked pt if she would be open to signing JOSE for prescibers, she declined. Asked her to schedule with her providers at [facility] and her primary care provider. She scheduled at [facility] and provided CTC with dates and times of appointments.    Completed AVS.    Pt asked for assistance with clothing. She came to unit with only scrubs from [employer], stated that [facility] lost her belongings and phone.    Connected with pt CM and inquired if she would be able support pt in problem solving getting her belongings to [facility] as CM unable to  belongings. She said she would be able to. Provided pt with handbook of the street.    Asked evening HUC to assist CTC in obtaining a change of clothes from donation closet.

## 2023-04-06 NOTE — PLAN OF CARE
Problem: Sleep Disturbance  Goal: Adequate Sleep/Rest  Outcome: Progressing  Intervention: Promote Sleep/Rest  Flowsheets (Taken 4/6/2023 0257)  Sleep/Rest Enhancement:    regular sleep/rest pattern promoted    relaxation techniques promoted    noise level reduced  Patient was sleeping at the start of the shift and through the rest of the night. No complaints of pain nor discomfort. No behavioral concerns noted. Pt appeared to sleep 7 hours this shift. No PRN medications requested or given.

## 2023-04-08 LAB
BACTERIA BLD CULT: NO GROWTH
BACTERIA BLD CULT: NO GROWTH

## 2023-04-23 ENCOUNTER — HOSPITAL ENCOUNTER (EMERGENCY)
Facility: CLINIC | Age: 48
Discharge: HOME | End: 2023-04-23
Attending: EMERGENCY MEDICINE | Admitting: EMERGENCY MEDICINE
Payer: COMMERCIAL

## 2023-04-23 VITALS
OXYGEN SATURATION: 95 % | RESPIRATION RATE: 16 BRPM | SYSTOLIC BLOOD PRESSURE: 123 MMHG | TEMPERATURE: 97.6 F | DIASTOLIC BLOOD PRESSURE: 87 MMHG | HEART RATE: 90 BPM

## 2023-04-23 DIAGNOSIS — F32.A DEPRESSION, UNSPECIFIED DEPRESSION TYPE: ICD-10-CM

## 2023-04-23 DIAGNOSIS — F41.1 GENERALIZED ANXIETY DISORDER: ICD-10-CM

## 2023-04-23 DIAGNOSIS — F19.10 SUBSTANCE ABUSE (H): ICD-10-CM

## 2023-04-23 LAB
ATRIAL RATE - MUSE: 99 BPM
DIASTOLIC BLOOD PRESSURE - MUSE: NORMAL MMHG
FLUAV RNA SPEC QL NAA+PROBE: NEGATIVE
FLUBV RNA RESP QL NAA+PROBE: NEGATIVE
HOLD SPECIMEN: NORMAL
INTERPRETATION ECG - MUSE: NORMAL
P AXIS - MUSE: 75 DEGREES
PR INTERVAL - MUSE: 148 MS
QRS DURATION - MUSE: 86 MS
QT - MUSE: 352 MS
QTC - MUSE: 451 MS
R AXIS - MUSE: 81 DEGREES
RSV RNA SPEC NAA+PROBE: NEGATIVE
SARS-COV-2 RNA RESP QL NAA+PROBE: NEGATIVE
SYSTOLIC BLOOD PRESSURE - MUSE: NORMAL MMHG
T AXIS - MUSE: 60 DEGREES
VENTRICULAR RATE- MUSE: 99 BPM

## 2023-04-23 PROCEDURE — 93005 ELECTROCARDIOGRAM TRACING: CPT

## 2023-04-23 PROCEDURE — 250N000011 HC RX IP 250 OP 636: Performed by: EMERGENCY MEDICINE

## 2023-04-23 PROCEDURE — 96360 HYDRATION IV INFUSION INIT: CPT

## 2023-04-23 PROCEDURE — 87637 SARSCOV2&INF A&B&RSV AMP PRB: CPT | Performed by: EMERGENCY MEDICINE

## 2023-04-23 PROCEDURE — 90791 PSYCH DIAGNOSTIC EVALUATION: CPT

## 2023-04-23 PROCEDURE — 99285 EMERGENCY DEPT VISIT HI MDM: CPT | Mod: 25

## 2023-04-23 PROCEDURE — 250N000013 HC RX MED GY IP 250 OP 250 PS 637: Performed by: EMERGENCY MEDICINE

## 2023-04-23 PROCEDURE — 258N000003 HC RX IP 258 OP 636: Performed by: EMERGENCY MEDICINE

## 2023-04-23 RX ORDER — GABAPENTIN 800 MG/1
800 TABLET ORAL ONCE
Status: COMPLETED | OUTPATIENT
Start: 2023-04-23 | End: 2023-04-23

## 2023-04-23 RX ORDER — ONDANSETRON 4 MG/1
4 TABLET, ORALLY DISINTEGRATING ORAL ONCE
Status: COMPLETED | OUTPATIENT
Start: 2023-04-23 | End: 2023-04-23

## 2023-04-23 RX ADMIN — GABAPENTIN 800 MG: 800 TABLET, COATED ORAL at 20:32

## 2023-04-23 RX ADMIN — ONDANSETRON 4 MG: 4 TABLET, ORALLY DISINTEGRATING ORAL at 19:21

## 2023-04-23 RX ADMIN — SODIUM CHLORIDE 1000 ML: 9 INJECTION, SOLUTION INTRAVENOUS at 18:16

## 2023-04-23 ASSESSMENT — COLUMBIA-SUICIDE SEVERITY RATING SCALE - C-SSRS
ATTEMPT SINCE LAST CONTACT: NO
LETHALITY/MEDICAL DAMAGE CODE MOST LETHAL ACTUAL ATTEMPT: MINOR PHYSICAL DAMAGE
1. SINCE LAST CONTACT, HAVE YOU WISHED YOU WERE DEAD OR WISHED YOU COULD GO TO SLEEP AND NOT WAKE UP?: NO
TOTAL  NUMBER OF INTERRUPTED ATTEMPTS SINCE LAST CONTACT: NO
2. HAVE YOU ACTUALLY HAD ANY THOUGHTS OF KILLING YOURSELF?: NO
TOTAL  NUMBER OF ABORTED OR SELF INTERRUPTED ATTEMPTS SINCE LAST CONTACT: NO
6. HAVE YOU EVER DONE ANYTHING, STARTED TO DO ANYTHING, OR PREPARED TO DO ANYTHING TO END YOUR LIFE?: NO
SUICIDE, SINCE LAST CONTACT: NO

## 2023-04-23 ASSESSMENT — ENCOUNTER SYMPTOMS
NERVOUS/ANXIOUS: 1
HALLUCINATIONS: 0
TREMORS: 1
FEVER: 0
NAUSEA: 1

## 2023-04-23 ASSESSMENT — ACTIVITIES OF DAILY LIVING (ADL): ADLS_ACUITY_SCORE: 37

## 2023-04-23 NOTE — ED TRIAGE NOTES
Patient has a history of withdrawl seizures from benzos. Patient states she ran out of hr ativan yesterday. States she feels shaky and weak.

## 2023-04-23 NOTE — ED PROVIDER NOTES
History     Chief Complaint:  Withdrawal     The history is provided by the patient.      Jihan Gill is a 47 year old female with a history of polysubstance use disorder, depression, PTSD, bipolar II disorder, and anxiety who presents with withdrawal.     Patient feels she is withdrawing from ativan and gabapentin. She reports 2 days ago she lost her bag with all her medications. She states it wasn't stolen. She notes she spoke with her psychiatrist after losing her bag. She endorses sweats, nausea, tremors, and anxiety. Nausea is improved with medications given by EMS. She states lights and sounds seem magnified. Patient reports she is having trouble doing daily activities and would like to go to [facility]. She reports she last smoked meth a couple weeks ago. She state she has not drank alcohol in years. She states her last dose of ativan was 1 mg yesterday.    Patient states she was recently hospitalized for depression and taken off phenobarbital and put on ativan. She states she was also in treatment recently for substance use disorder, but she decided to leave because she didn't think it was helping. She reports no suicidal ideation, homicidal ideation, or hallucinations. She denies misuse of her medications. She reports no fever.    Independent Historian:   None - Patient Only    Review of External Notes:  Psychiatric discharge summary, admitted 3/28/2023 and discharged for 4/6/2023:      ROS:  Review of Systems   Constitutional: Negative for fever.   Gastrointestinal: Positive for nausea.   Neurological: Positive for tremors.   Psychiatric/Behavioral: Negative for hallucinations and suicidal ideas. The patient is nervous/anxious.    All other systems reviewed and are negative.    Allergies:  Buspirone  Droperidol  Ketorolac Tromethamine  Metoclopramide  Prochlorperazine  Abilify   Acetaminophen  Allopurinol  Compazine  Dimenhydrinate  Dramamine  Hydrocodone  Olanzapine  Reglan  Sulfa  Drugs  Diphenhydramine   Hydrocodone   Olanzapine   Quetiapine    Medications:    Clonazepam  Robinul  Atarax   Ativan  Gabapentin  Hydroxyzine  Melatonin  Methadone   Methocarbamol  Quetiapine   Vortioxetine  Vistaril   Catapres  Prilosec  Lithobid   Prozac  Viibryd     Past Medical History:    Arthritis   Bipolar II disorder   Chronic hepatitis C without hepatic coma    Depression    Polysubstance use disorder     Seizures    Urinary calculus  Anxiety  Insomnia   Type 2 diabetes   Fibromyalgia  Borderline personality disorder  Obesity   Drug-induced psychotic disorder with hallucinations  Anxiety   Panic disorder  Suicide attempt   Alcohol use disorder in remission   Tobacco use disorder   PTSD  Lithium toxicity  DDD    Past Surgical History:      Tonsillectomy    Family History:  Father- colon cancer  Mother- depression, obesity   Sister- depression, obesity    Social History:  Patient arrives via EMS alone.   PCP: Valentine Spaulding, DO    Physical Exam     Patient Vitals for the past 24 hrs:   BP Temp Temp src Pulse Resp SpO2   23 -- -- -- 90 -- 95 %   23 -- -- -- -- -- 94 %   23 1816 -- -- -- 97 -- --   23 1815 123/87 -- -- 110 16 100 %   23 1800 133/87 97.6  F (36.4  C) Oral 113 29 99 %   23 -- -- -- -- -- 98 %   23 1742 -- -- -- -- -- 99 %   23 128/70 -- -- 120 -- --      Physical Exam  SKIN:  Warm, dry.  HEMATOLOGIC/IMMUNOLOGIC/LYMPHATIC:  No pallor.  EYES:  Conjunctivae normal.  CARDIOVASCULAR: Tachycardic rate with regular rhythm.  No murmur.  RESPIRATORY:  No respiratory distress, breath sounds equal and normal.  GASTROINTESTINAL:  Soft, nontender abdomen.  MUSCULOSKELETAL: Overweight body habitus.  NEUROLOGIC:  Alert, conversant.  Oriented to self place and time.  Not tremulous.  Moving limbs spontaneously.  PSYCHIATRIC: Anxious mood with normal affect.  No psychotic features.  Cooperative.    Emergency Department Course    ECG  ECG results from 04/23/23   EKG 12 lead     Value    Systolic Blood Pressure     Diastolic Blood Pressure     Ventricular Rate 99    Atrial Rate 99    MO Interval 148    QRS Duration 86        QTc 451    P Axis 75    R AXIS 81    T Axis 60    Interpretation ECG      Sinus rhythm  Normal ECG  When compared with ECG of 30-MAR-2023 13:43,  No significant change      Laboratory:  Labs Ordered and Resulted from Time of ED Arrival to Time of ED Departure   INFLUENZA A/B, RSV, & SARS-COV2 PCR - Normal       Result Value    Influenza A PCR Negative      Influenza B PCR Negative      RSV PCR Negative      SARS CoV2 PCR Negative        Emergency Department Course & Assessments:  PSS-3    Date and Time Over the past 2 weeks have you felt down, depressed, or hopeless? Over the past 2 weeks have you had thoughts of killing yourself? Have you ever attempted to kill yourself? When did this last happen? User   04/23/23 1837 yes yes no -- DT               Interventions:  Medications   0.9% sodium chloride BOLUS (0 mLs Intravenous Stopped 4/23/23 1919)   ondansetron (ZOFRAN ODT) ODT tab 4 mg (4 mg Oral $Given 4/23/23 1921)   gabapentin (NEURONTIN) tablet 800 mg (800 mg Oral $Given 4/23/23 2032)      Independent Interpretation (X-rays, CTs, rhythm strip):  None    Assessments/Consultations/Discussion of Management or Tests:  ED Course as of 04/23/23 2257   Sun Apr 23, 2023   1757 I obtained history and examined the patient as noted above.    2017 I spoke with DEC regarding patient.    2036 I rechecked the patient and explained findings.      Social Determinants of Health affecting care:   None    Disposition:  The patient was discharged home.     Impression & Plan      Medical Decision Making:  This patient presents with ongoing struggles with her mental illness and substance abuse.  She denied suicidal or homicidal ideation.  Did not seem to be experiencing or exhibiting signs and symptoms of psychosis.  She was  concerned about withdrawing from her medications as she apparently lost them recently.  Most concerned about benzodiazepine withdrawal.  Certainly a possibility.  Her vital signs are actually reassuring and she initially was tachycardic in triage but this resolved without intervention.  Mentating well.  No fever.  Not diaphoretic.  Secondary gain certainly a possibility.  Upon review of the prescription monitoring program there have been a concerning number of controlled substances prescribed, especially benzodiazepines.  She was prescribed seventy five 1 mg Ativan tablets on 4/10/2023 and then fifty six 0.5 mg Ativan tablets on 4/16/2023.  I suspect she is abusing Ativan.  Regardless she was not medicated in the ED tonight with a benzodiazepine nor provided a prescription.  She was given 1 dose of gabapentin.  Not prescribed.  Given she is clinically well-appearing with reassuring vital signs I do not think she is suffering serious benzodiazepine withdrawal.  Underwent a bedside mental health assessment and they deemed the patient safe to be discharged for outpatient follow-up.  I strongly encouraged her to contact her provider tomorrow for recheck and if she needs prescriptions for medications she lost.    Diagnosis:    ICD-10-CM    1. Depression, unspecified depression type  F32.A       2. Generalized anxiety disorder  F41.1       3. Substance abuse (H)  F19.10          Scribe Disclosure:  I, Benigno Tejas, am serving as a scribe at 6:00 PM on 4/23/2023 to document services personally performed by Joey Graham MD based on my observations and the provider's statements to me.     4/23/2023   Joey Graham MD Moe, James Thomas, MD  04/23/23 9437

## 2023-04-24 NOTE — CONSULTS
Diagnostic Evaluation Consultation  Crisis Assessment    Patient was assessed: In Person  Patient location: [facility] ED room 22  Was a release of information signed: Yes. Providers included on the release: [facility]      Referral Data and Chief Complaint  Jihan Gill is a 47 year old, who uses she/her pronouns, and presents to the ED alone. Patient is referred to the ED by self. Patient is presenting to the ED for the following concerns: withdrawal from benzodiaazapine.      Informed Consent and Assessment Methods     Patient is her own guardian. Writer met with patient and explained the crisis assessment process, including applicable information disclosures and limits to confidentiality, assessed understanding of the process, and obtained consent to proceed with the assessment. Patient was observed to be able to participate in the assessment as evidenced by patient presents as fully oriented and gave verbal consent to complete assessment. Assessment methods included conducting a formal interview with patient, review of medical records, collaboration with medical staff, and obtaining relevant collateral information from family and community providers when available..     Over the course of this crisis assessment provided reassurance, offered validation and engaged patient in problem solving and disposition planning. Patient's response to interventions was patient presents as restless yet engaged with writer the entire interview and answered all questions.       Summary of Patient Situation       Pt reports that following recent discharge from [facility] she went into treatment and decided to leave prior to completion. She reports taking benzodiazapines again and decided on her own to go back into treatment. She reports that she has an intake with [facility], [place], operated by [facility] in [place] scheduled for this Tuesday am.    She reports that she took her last benzodiazapine on Friday and is going  through withdrawals and is interested in medication to help her get through until her Tues am MARIANNE treatment begins. Pt reports following sxs: feeling intensely restless and physically agitated, unable to sleep since Friday and poor appetite. She reports her goal is to not take benzodiazapine until she gets into treatment, yet has concerns that she is going to have a seizure or become medically unstable prior to Tues am start of MARIANNE tx.    Attending provider reports that pt is medically stable.     Pt denies SI or HI and denies all sxs of psychosis or any sxs of von, other that inability to sleep.    Pt reports that she tried to take a hot bath to help her cope, and her sxs feel too intensive, so she came to the ED for support.     Brief Psychosocial History  { Include:   - Current living situation: Homeless and stays with friends on their couches, moving around.    - Brief family history: Pt did not offer information on her family hx other than to state that her parents are both alive and are supportive.   - School/ Work Functioning: Pt not employed nor in school  - Employment and income source - financial concerns: Pt reports she is receiving unemployment insurance and states she has no financial concerns.   -  status: No  - Hobbies: Watch tv, hang out with friends.    - Supports: Mother, father, son, nephew  - Relevant legal issues: None reported  - Cultural, Sikhism, or spiritual influences on mental health care: No influences reported.    Significant Clinical History      Pt reports she first experienced struggles with anxiety and depressive sxs at age 19. She acknowledges a hx of trauma and declined to talk about this while going through likely withdrawals.      Pt reports that she has been treated both outpatient and inpatient many times for mental health and MARIANNE issues. She reports current outpt providers include: psychiatry with [facility], therapy with  [facility], Case  Management with [facility] and she is in a daily Methadone regiment with [facility] at their [place] location.     Pt reports historical dx of Bipolar ! Disorder, PTSD, MDD, anxiety and polysubstance abuse.    She reports current primary sxs incluide: withdrawal from substance, restlessness, inability to sleep, poor appetite and physical agitation.          Collateral Information  This writer attempted to call pt collateral and listed emergency contact Divya Galvez, who is the patient's mother at [phone]. Divya did not answer the call and a detailed message was left, requesting Divya to call back to Michelle to give collateral information.        Risk Assessment        [place] Suicide Severity Rating Scale Full Clinical Version: 2/24/2023  Suicidal Ideation  1. Wish to be Dead (Lifetime): Yes  1. Wish to be Dead (Past 1 Month): No  2. Non-Specific Active Suicidal Thoughts (Lifetime): Yes  2. Non-Specific Active Suicidal Thoughts (Past 1 Month): No  3. Active Suicidal Ideation with any Methods (Not Plan) Without Intent to Act (Lifetime): Yes  3. Active Suicidal Ideation with any Methods (Not Plan) Without Intent to Act (Past 1 Month): No  4. Active Suicidal Ideation with Some Intent to Act, Without Specific Plan (Lifetime): Yes  4. Active Suicidal Ideation with Some Intent to Act, Without Specific Plan (Past 1 Month): No  5. Active Suicidal Ideation with Specific Plan and Intent (Lifetime): Yes  5. Active Suicidal Ideation with Specific Plan and Intent (Past 1 Month): No  Intensity of Ideation  Most Severe Ideation Rating (Lifetime): 5  Most Severe Ideation Rating (Past 1 Month): 1  Frequency (Lifetime): Daily or almost daily  Frequency (Past 1 Month): 2-5 times in week  Duration (Lifetime): 4-8 hours/most of day  Duration (Past 1 Month): Less than 1 hour/some of the time  Controllability (Lifetime): Can control thoughts with some difficulty  Controllability (Past 1 Month): Easily  able to control thoughts  Deterrents (Lifetime): Uncertain that deterrents stopped you  Deterrents (Past 1 Month): Deterrents definitely stopped you from attempting suicide  Reasons for Ideation (Lifetime): Completely to end or stop the pain (You couldn't go on living with the pain or how you were feeling)  Reasons for Ideation (Past 1 Month): Does not apply  Suicidal Behavior  Actual Attempt (Lifetime): Yes  Total Number of Actual Attempts (Lifetime): 4  Actual Attempt (Past 3 Months): No  Has subject engaged in non-suicidal self-injurious behavior? (Lifetime): Yes  Has subject engaged in non-suicidal self-injurious behavior? (Past 3 Months): No  Interrupted Attempts (Lifetime): No  Aborted or Self-Interrupted Attempt (Lifetime): No  Preparatory Acts or Behavior (Lifetime): No  C-SSRS Risk (Lifetime/Recent)  Calculated C-SSRS Risk Score (Lifetime/Recent): Moderate Risk       [place] Suicide Severity Rating Scale Since Last Contact: 3/22/2023  Suicidal Ideation (Since Last Contact)  1. Wish to be Dead (Since Last Contact): No  2. Non-Specific Active Suicidal Thoughts (Since Last Contact): No  Suicidal Behavior (Since Last Contact)  Actual Attempt (Since Last Contact): No  Has subject engaged in non-suicidal self-injurious behavior? (Since Last Contact): No  Interrupted Attempts (Since Last Contact): No  Aborted or Self-Interrupted Attempt (Since Last Contact): No  Preparatory Acts or Behavior (Since Last Contact): No  Suicide (Since Last Contact): No  Actual/Potential Lethality (Most Lethal Attempt)  Actual Lethality/Medical Damage Code (Most Lethal Attempt): Death  C-SSRS Risk (Since Last Contact)  Calculated C-SSRS Risk Score (Since Last Contact): No Risk Indicated    Validity of evaluation is impacted by presenting factors during interview pt going through substance withdrawals and attending physician reports pt to be medically stable. Pt denies any recent SI. Current score is No Risk indicated.   Comments  regarding subjective versus objective responses to [place] tool: Pt is seeking help and presents as restless and in need of MARIANNE tx.   Environmental or Psychosocial Events: impulsivity/recklessness, unemployment/underemployment and unstable housing, homelessness  Chronic Risk Factors: history of suicide attempts (Pt reports 4 prior attempts), history of psychiatric hospitalization, chronic and ongoing sleep difficulties, history of abuse or neglect and parent divorce   Warning Signs: pain (new or worsening), increasing substance use or abuse, withdrawing from friends, family, and society and anxiety, agitation, unable to sleep, sleeping all the time  Protective Factors: help seeking  Interpretation of Risk Scoring, Risk Mitigation Interventions and Safety Plan:  Pt reports she is seeking help and has MARIANNE tx scheduled for 2 days from today. Pt denies any current SI. Given pt is going through withdrawls, a score of No Risk seems low and a score of Low to Moderate Risk seems more appropriate. Pt reports should her SI return she will return to ED or call for help.        Does the patient have thoughts of harming others? No     Is the patient engaging in sexually inappropriate behavior?  no        Current Substance Abuse     Is there recent substance abuse? Substance type(s): benzodiazapine Frequency: multiple times a day Quantity: varies Method: pill; Duration: varies Last use: 2 days ago     Was a urine drug screen or blood alcohol level obtained: No       Mental Status Exam     Affect: Labile   Appearance: Appropriate    Attention Span/Concentration: Attentive  Eye Contact: Engaged   Fund of Knowledge: Appropriate    Language /Speech Content: Fluent   Language /Speech Volume: Normal    Language /Speech Rate/Productions: Normal    Recent Memory: Intact   Remote Memory: Intact   Mood: Anxious    Orientation to Person: Yes    Orientation to Place: Yes   Orientation to Time of Day: Yes    Orientation to Date: Yes     Situation (Do they understand why they are here?): Yes    Psychomotor Behavior: Agitated    Thought Content: Clear   Thought Form: Obsessive/Perseverative      History of commitment: No        Medication    Psychotropic medications: Yes. Pt is currently taking (see addendum). Medication compliant: No: pt reports she has been taking extra medications. Recent medication changes: No  Medication changes made in the last two weeks: No    Addendum:    No current facility-administered medications for this encounter.     Current Outpatient Medications   Medication     acetaminophen (TYLENOL) 325 MG tablet     albuterol (PROAIR HFA/PROVENTIL HFA/VENTOLIN HFA) 108 (90 Base) MCG/ACT inhaler     diphenhydrAMINE (BENADRYL) 25 MG capsule     fish oil-omega-3 fatty acids 1000 MG capsule     gabapentin (NEURONTIN) 800 MG tablet     glycopyrrolate (ROBINUL) 1 MG tablet     hydrOXYzine (ATARAX) 25 MG tablet     ibuprofen (ADVIL/MOTRIN) 600 MG tablet     methadone HCl 10 MG/5ML SOLN     methocarbamol (ROBAXIN) 500 MG tablet     nicotine (NICORETTE) 2 MG gum     PHENobarbital (LUMINAL) 32.4 MG tablet     QUEtiapine (SEROQUEL) 25 MG tablet     ramelteon (ROZEREM) 8 MG tablet     vortioxetine (TRINTELLIX) 20 MG tablet          Current Care Team    Primary Care Provider: Dr. 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 She reports she has a scheduled detox and inpt TX set up through [facility]. She reports that she was recently in tx and left early and relapsed, all within the past month. She reports regret and a desire to get back into tx and has this plan already in motion with [place]. She reports she stopped using substances 2 days ago and reports the following sxs: intensive restlessness, physical agitation, very poor sleep and poor appetite. She reports concern that she will not be able to tolerate these sxs prior to her tx program in 2 days.  Pt denies SI or HI and reports that she is connecting with her parents, who are supportive. She reports she is not interested in detox tonight, that she would prefer to go back home and find a way to cope until her detox and tx begin on Tuesday.  This writer consulted with ED attending physician, Dr Graham, who agreed to prescribe patient gabapentin and a non-addictive sleeping aid to assist her until she gets into tx on Tuesday.  Writer discussed other coping means with pt and she reports that historically she takes baths and watches tv to distract, and these help her cope. However she reports that currently these coping means are not helpful.  Writer went over discharge plan for pt to obtain gabapentin and sleeping aid and to return home until detox and tx start on Tues. Dr Graham and patient both agree to this plan.   Due to pt report of no SI, and given the fact that she proactively scheduled detox and tx to begin Tuesday, pt is assessed as seeking help and safe for discharge back to home until detox starts on Tues.     Disposition    Recommended disposition: Substance Abuse Disorder Treatment and Other: Due to pt report of no SI, and given the fact that she proactively  scheduled detox and tx to begin Tuesday, pt is assessed as seeking help and safe for discharge back to home until detox starts on Tues.        Reviewed case and recommendations with attending provider. Attending Name: Dr Graham       Attending concurs with disposition: Yes       Patient and/or validated legal guardian concurs with disposition: Yes       Final disposition: Substance abuse disorder treatment  and Other: Due to pt report of no SI, and given the fact that she proactively scheduled detox and tx to begin Tuesday, pt is assessed as seeking help and safe for discharge back to home until detox starts on Tues. .       Was lethal means counseling provided as a part of aftercare planning? Yes - describe Pt denies current SI and reports that she has no more pills at home.       Assessment Details    Patient interview started at: 7:45pm and completed at: 8:30pm.     Total duration spent on the patient case in minutes: 1.25 hrs      CPT code(s) utilized: 42344 - Psychotherapy for Crisis - 60 (30-74*) min       Ezio Chen MA, [facility], Psychotherapist  [facility] - Triage & Transition Services  Callback: [phone]            Aftercare Plan  If I am feeling unsafe or I am in a crisis, I will:   Contact my established care providers   Call the National Suicide Prevention Lifeline: 988  Go to the nearest emergency room   Call 121     Warning signs that I or other people might notice when a crisis is developing for me: Becomes withdrawn and restless    Things I am able to do on my own to cope or help me feel better: Watch tv, take hot bath     Things that I am able to do with others to cope or help me better: Talk or text friends or family    Things I can use or do for distraction: Watch television, hold something cold, put a cold, sour candy in your mouth     Changes I can make to support my mental health and wellness: Follow through with detox on Tuesday and follow all tx recommendations for [place]     People in my life  "that I can ask for help: Parents, son, nephew     Your county has a mental health crisis team you can call 24/7: [facility] Mobile Crisis  [phone]       Crisis Lines  Crisis Text Line  Text 059196  You will be connected with a trained live crisis counselor to provide support.      Community Resources  Fast Tracker  Linking people to mental health and substance use disorder resources  gIcare Pharman.Top Hat     [place] Mental [facility] Warm Line  Peer to peer support  Monday thru Saturday, 12 pm to 10 pm  512.053.2674 or 7.817.710.6529  Text \"Support\" to 59726    National Gray on Mental Illness (ERNST)  421.054.5174 or 1.888.ERNST.HELPS      Mental Health Apps  My3  https://Meshify.org/    VirtualHopeBox  https://Smashrun/apps/virtual-hope-box/      Additional Information  Today you were seen by a licensed mental health professional through Triage and Transition services, Behavioral Healthcare Providers ([facility])  for a crisis assessment in the Emergency Department at [facility].  It is recommended that you follow up with your established providers (psychiatrist, mental health therapist, and/or primary care doctor - as relevant) as soon as possible. Coordinators from [facility] will be calling you in the next 24-48 hours to ensure that you have the resources you need.  You can also contact [facility] coordinators directly at [phone]. You may have been scheduled for or offered an appointment with a mental health provider. [facility] maintains an extensive network of licensed behavioral health providers to connect patients with the services they need.  We do not charge providers a fee to participate in our referral network.  We match patients with providers based on a patient's specific needs, insurance coverage, and location.  Our first effort will be to refer you to a provider within your care system, and will utilize providers outside your care system as needed.                  "

## 2023-05-20 ENCOUNTER — MEDICAL CORRESPONDENCE (OUTPATIENT)
Dept: HEALTH INFORMATION MANAGEMENT | Facility: CLINIC | Age: 48
End: 2023-05-20

## 2023-05-20 ENCOUNTER — HOSPITAL ENCOUNTER (EMERGENCY)
Facility: CLINIC | Age: 48
Discharge: NURSING, REHAB OR LONG-TERM CARE | End: 2023-05-20
Attending: EMERGENCY MEDICINE | Admitting: EMERGENCY MEDICINE
Payer: COMMERCIAL

## 2023-05-20 VITALS
SYSTOLIC BLOOD PRESSURE: 110 MMHG | RESPIRATION RATE: 15 BRPM | DIASTOLIC BLOOD PRESSURE: 76 MMHG | OXYGEN SATURATION: 95 % | TEMPERATURE: 97.9 F | HEART RATE: 84 BPM

## 2023-05-20 DIAGNOSIS — F13.10 BENZODIAZEPINE ABUSE (H): ICD-10-CM

## 2023-05-20 LAB
ALBUMIN SERPL BCG-MCNC: 4 G/DL (ref 3.5–5.2)
ALP SERPL-CCNC: 107 U/L (ref 35–104)
ALT SERPL W P-5'-P-CCNC: 13 U/L (ref 10–35)
ANION GAP SERPL CALCULATED.3IONS-SCNC: 13 MMOL/L (ref 7–15)
APAP SERPL-MCNC: <5 UG/ML (ref 10–30)
AST SERPL W P-5'-P-CCNC: 20 U/L (ref 10–35)
BASOPHILS # BLD AUTO: 0 10E3/UL (ref 0–0.2)
BASOPHILS NFR BLD AUTO: 0 %
BILIRUB SERPL-MCNC: 0.2 MG/DL
BUN SERPL-MCNC: 11.4 MG/DL (ref 6–20)
CALCIUM SERPL-MCNC: 9.3 MG/DL (ref 8.6–10)
CHLORIDE SERPL-SCNC: 101 MMOL/L (ref 98–107)
CREAT SERPL-MCNC: 0.58 MG/DL (ref 0.51–0.95)
DEPRECATED HCO3 PLAS-SCNC: 25 MMOL/L (ref 22–29)
EOSINOPHIL # BLD AUTO: 0.3 10E3/UL (ref 0–0.7)
EOSINOPHIL NFR BLD AUTO: 3 %
ERYTHROCYTE [DISTWIDTH] IN BLOOD BY AUTOMATED COUNT: 13.2 % (ref 10–15)
ETHANOL SERPL-MCNC: <0.01 G/DL
GFR SERPL CREATININE-BSD FRML MDRD: >90 ML/MIN/1.73M2
GLUCOSE SERPL-MCNC: 150 MG/DL (ref 70–99)
HCG SERPL QL: NEGATIVE
HCT VFR BLD AUTO: 40.7 % (ref 35–47)
HGB BLD-MCNC: 12.9 G/DL (ref 11.7–15.7)
IMM GRANULOCYTES # BLD: 0 10E3/UL
IMM GRANULOCYTES NFR BLD: 0 %
LYMPHOCYTES # BLD AUTO: 4.7 10E3/UL (ref 0.8–5.3)
LYMPHOCYTES NFR BLD AUTO: 47 %
MCH RBC QN AUTO: 30 PG (ref 26.5–33)
MCHC RBC AUTO-ENTMCNC: 31.7 G/DL (ref 31.5–36.5)
MCV RBC AUTO: 95 FL (ref 78–100)
MONOCYTES # BLD AUTO: 0.7 10E3/UL (ref 0–1.3)
MONOCYTES NFR BLD AUTO: 7 %
NEUTROPHILS # BLD AUTO: 4.4 10E3/UL (ref 1.6–8.3)
NEUTROPHILS NFR BLD AUTO: 43 %
NRBC # BLD AUTO: 0 10E3/UL
NRBC BLD AUTO-RTO: 0 /100
PLATELET # BLD AUTO: 192 10E3/UL (ref 150–450)
POTASSIUM SERPL-SCNC: 3.8 MMOL/L (ref 3.4–5.3)
PROT SERPL-MCNC: 7 G/DL (ref 6.4–8.3)
RBC # BLD AUTO: 4.3 10E6/UL (ref 3.8–5.2)
SALICYLATES SERPL-MCNC: 0.3 MG/DL
SODIUM SERPL-SCNC: 139 MMOL/L (ref 136–145)
WBC # BLD AUTO: 10.1 10E3/UL (ref 4–11)

## 2023-05-20 PROCEDURE — 36415 COLL VENOUS BLD VENIPUNCTURE: CPT | Performed by: EMERGENCY MEDICINE

## 2023-05-20 PROCEDURE — 80053 COMPREHEN METABOLIC PANEL: CPT | Performed by: EMERGENCY MEDICINE

## 2023-05-20 PROCEDURE — 84703 CHORIONIC GONADOTROPIN ASSAY: CPT | Performed by: EMERGENCY MEDICINE

## 2023-05-20 PROCEDURE — 99285 EMERGENCY DEPT VISIT HI MDM: CPT

## 2023-05-20 PROCEDURE — 85025 COMPLETE CBC W/AUTO DIFF WBC: CPT | Performed by: EMERGENCY MEDICINE

## 2023-05-20 PROCEDURE — 82077 ASSAY SPEC XCP UR&BREATH IA: CPT | Performed by: EMERGENCY MEDICINE

## 2023-05-20 PROCEDURE — 93005 ELECTROCARDIOGRAM TRACING: CPT

## 2023-05-20 PROCEDURE — 80143 DRUG ASSAY ACETAMINOPHEN: CPT | Performed by: EMERGENCY MEDICINE

## 2023-05-20 PROCEDURE — 80179 DRUG ASSAY SALICYLATE: CPT | Performed by: EMERGENCY MEDICINE

## 2023-05-20 RX ORDER — LIDOCAINE 40 MG/G
CREAM TOPICAL
Status: DISCONTINUED | OUTPATIENT
Start: 2023-05-20 | End: 2023-05-20 | Stop reason: HOSPADM

## 2023-05-20 ASSESSMENT — ACTIVITIES OF DAILY LIVING (ADL)
ADLS_ACUITY_SCORE: 37

## 2023-05-20 NOTE — ED TRIAGE NOTES
[facility] EMS reports that earlier today the pt had taken an uncertain amount of Ativan and hydroxyzine tablets. Staff encouraged the pt to be seen but pt refused. EMS reports staff completed a UA test from pt and found pt was positive for Benzos; pt also became more lethargic than normal prompting staff to call EMS to bring pt to ER. EMS reports pt is tachy at -120's.     Triage Assessment     Row Name 05/20/23 0120       Triage Assessment (Adult)    Airway WDL WDL       Respiratory WDL    Respiratory WDL WDL       Skin Circulation/Temperature WDL    Skin Circulation/Temperature WDL WDL       Cardiac WDL    Cardiac WDL X;rhythm    Pulse Rate & Regularity tachycardic       Peripheral/Neurovascular WDL    Peripheral Neurovascular WDL WDL       Cognitive/Neuro/Behavioral WDL    Cognitive/Neuro/Behavioral WDL X;level of consciousness;arousability    Level of Consciousness alert;confused;lethargic    Arousal Level arouses to voice    Orientation place;time    Speech rambling;garbled    Mood/Behavior flat affect

## 2023-05-20 NOTE — ED PROVIDER NOTES
History     Chief Complaint:  Possible Drug Overdose     History limited due to patient's somnolence    HPI   Jihan Gill is a 47 year old female with a history of substance abuse who presents to the ED via EMS for evaluation of a drug overdose. Today the patient reportedly took an unspecified amount of Ativan and Hydroxyzine. She has been staying at a treatment center and due to concern that she was abnormally sleepy and altered EMS was called to bring her into the ED for evaluation.  It does not sound like she is supposed to be taking benzodiazepines based on her care plan there.  Here in the ED the patient is altered and cannot provide additional history.  No history of trauma.     Independent Historian:   I reviewed the notes from [facility].     Review of External Notes:   I reviewed Care Everywhere and updated Epic.       Medications:   Gabapentin  Ibuprofen  Methadone  Tylenol     Past Medical History:    Arthritis  Bipolar I disorder  Chronic hepatitis C without hepatic coma  Depression  Substance abuse: opioids, benzodiazepines, hypnotics  Seizures  Kidney stones   Borderline personality disorder  Chronic pain     Past Surgical History:     section  Tonsillectomy      Physical Exam     Patient Vitals for the past 24 hrs:   BP Temp Temp src Pulse Resp SpO2   23 0300  122/90 -- -- 91 18 92 %   23 0230 122/65 -- -- 99 21 93 %   23 0210 121/88 -- -- 107 17 94 %   23 0145 -- -- -- 112 19 94 %   23 0130 -- -- -- 113 16 97 %   23 0120 120/78 -- --  121 12 99 %   23 0118 125/74 97.9  F (36.6  C) Oral  125 20 99 %      Physical Exam  Nursing note and vitals reviewed.  Constitutional: Patient talking nonsensically when awakened.  Otherwise very somnolent.   HENT:   Mouth/Throat: Mucous membranes are dry   Cardiovascular: Tachycardic rate, regular rhythm and normal heart sounds.  No murmur.  Pulmonary/Chest: Effort normal and breath  sounds normal. No respiratory distress. No wheezes. No rales.   Abdominal: Soft. Normal appearance. No distension. There is no tenderness. There is no rigidity and no guarding.   Musculoskeletal: Normal range of motion of extremities.   Neurological: Difficult to rouse with sternal rub.  GCS 12 (M6, V3, E3)  Skin: Skin is warm and dry.    Psychiatric: Unable to assess    Emergency Department Course   ECG  ECG taken at 01:51:29, ECG read at 0153  Sinus tachycardia  Rate 112 bpm. MN interval 146 ms. QRS duration 82 ms. QT/QTc 340/464 ms. P-R-T axes 60 73 41.      Laboratory:  Labs Ordered and Resulted from Time of ED Arrival to Time of ED Departure   COMPREHENSIVE METABOLIC PANEL - Abnormal       Result Value    Sodium 139      Potassium 3.8      Chloride 101      Carbon Dioxide (CO2) 25      Anion Gap 13      Urea Nitrogen 11.4      Creatinine 0.58      Calcium 9.3      Glucose 150 (*)     Alkaline Phosphatase 107 (*)     AST 20      ALT 13      Protein Total 7.0      Albumin 4.0      Bilirubin Total 0.2      GFR Estimate >90     ACETAMINOPHEN LEVEL - Abnormal    Acetaminophen <5.0 (*)    HCG QUALITATIVE PREGNANCY - Normal    hCG Serum Qualitative Negative     SALICYLATE LEVEL - Normal    Salicylate 0.3     ETHYL ALCOHOL LEVEL - Normal    Alcohol ethyl <0.01     CBC WITH PLATELETS AND DIFFERENTIAL    WBC Count 10.1      RBC Count 4.30      Hemoglobin 12.9      Hematocrit 40.7      MCV 95      MCH 30.0      MCHC 31.7      RDW 13.2      Platelet Count 192      % Neutrophils 43      % Lymphocytes 47      % Monocytes 7      % Eosinophils 3      % Basophils 0      % Immature Granulocytes 0      NRBCs per 100 WBC 0      Absolute Neutrophils 4.4      Absolute Lymphocytes 4.7      Absolute Monocytes 0.7      Absolute Eosinophils 0.3      Absolute Basophils 0.0      Absolute Immature Granulocytes 0.0      Absolute NRBCs 0.0          Assessments:  0137: The patient was seen and evaluated.     0557: I reassessed the patient.  She was sleeping comfortably.     Independent Interpretation (X-rays, CTs, rhythm strip):  None    Consultations/Discussion of Management or Tests:  None      Social Determinants of Health affecting care:   Healthcare Access/Compliance and Stress/Adjustment Disorders    Disposition:  The patient was admitted to the hospital under the care of Dr. Holt.     Impression & Plan      Medical Decision Makin-year-old female who presents from her chemical dependency treatment center.  She did test positive therefore benzodiazepines which she is not supposed to be taking.  Clinical exam consistent with sedative toxidrome.  Further psychiatric evaluation is unable to be evaluated due to her somnolence.  Will need to allow her to metabolize her intoxicants with plan for reassessment in the morning.  Screening labs as noted above are negative for other life-threatening ingestions which was considered due to her history of overdoses in the past.  We will continue to monitor her safety.  She remains on a health officer authority hold at this time.    Anticipate discharge back to [facility] treatment center later this morning.    Diagnosis:    ICD-10-CM    1. Benzodiazepine abuse (H)  F13.10            Scribe Disclosure:  Dilip CABALLERO, am serving as a scribe at 1:30 AM on 2023 to document services personally performed by Raffaele Acosta MD based on my observations and the provider's statements to me.   2023   Raffaele Acosta MD Amdahl, John, MD  [phone]

## 2023-05-20 NOTE — ED NOTES
RN ED Mental Health Handoff Note    JM    Does patient require 1:1? No    Hold and rights been given and documented for patient: Yes    Is the patient in BH scrubs? No -    Has the patient been searched? Yes    Is the 15 minute observation tool up to date? Yes    Was patient issued a welcome folder? Yes    Room check completed this shift: Yes    PSS3 and [facility] Assessment/Reassessment this shift:    PSS-3    Date and Time Over the past 2 weeks have you felt down, depressed, or hopeless? Over the past 2 weeks have you had thoughts of killing yourself? Have you ever attempted to kill yourself? When did this last happen? User   05/20/23 0124 patient unable to complete patient unable to complete patient unable to complete -- CT          Behavioral status of patient: Green    Code 21 called this shift? No    Use of restraints/seclusion this shift? No    Most recent vital signs:  Temp: 97.9  F (36.6  C) Temp src: Oral BP: 113/77 Pulse: 77   Resp: 18 SpO2: 95 % O2 Device: None (Room air)      Medications:  Scheduled medication compliance? Yes    PRN Meds administered this shift? No    Medications   lidocaine 1 % 0.1-1 mL (has no administration in time range)   lidocaine (LMX4) cream (has no administration in time range)   sodium chloride (PF) 0.9% PF flush 3 mL (has no administration in time range)   sodium chloride (PF) 0.9% PF flush 3 mL (has no administration in time range)         ADLs    Meal Provided this shift? Yes    Hygiene items provided? Yes    ADLs completed? Yes    Date of last shower: n/a    Any information that would be helpful in caring for this patient?  Pills found on pt- pills given to pharmacy until discharge     Family present/updated? Yes    Location of patient's belongings: pharmacy, dec office    Critical Care Minutes:  Does the patient need critical care minutes documented? No

## 2023-05-20 NOTE — ED NOTES
Bed: ED16  Expected date: 5/20/23  Expected time: 1:06 AM  Means of arrival:   Comments:  LORI [phone] F

## 2023-05-20 NOTE — ED NOTES
"RN spoke with treatment facility ([facility] see paperwork on chart) counselor, Adelina Whitlock @ [phone] for pt history an updates. Adelina explained this is pt's \"3rd time coming to treatment. She never lasts more than 10 days. She will take just about anything to get high. At this rate, although she states these intents are not intentional for SI, if she keeps doing this she will die.\" Adelina explains, \"I believe she will best benefit from court ordered commitment.\" MD updated, DEC request placed.   "

## 2023-05-20 NOTE — ED NOTES
"Pt moved from room 16 to room 7. Writer was joined by security and searched pt, as well as wanded pt. RN and security found a bottle of \"lorazepam\" and a bag of unmarked pills upon search. RN and security also told pt to take down hair out of bun and shake hair. 1 unmarked pill fell out of hair. As pt did not have sitter overnight, unsure if pt self administered any pills since arrival. MD, Charge, and pharmacy notified. Pharmacy identified unmarked pills as lorazepam, hydroxyzine, and trintellix.  Pills given to pharmacy until pt discharge.   "

## 2023-05-20 NOTE — PHARMACY-CONSULT NOTE
[facility] requests that patient not be given back her lorazepam, hydroxyzine and Trintellex as patient is not supposed to be taking/using lorazepam. The remaining medications are being provided to pt by facility.     Discussed with Dr. Holt and he is in agreement to dispose of these medications.     Hydroxyzine and Trintellex were disposed of via black hazardous waste bucket.     Lorazepam 1mg tablets qty #19 were disposed of in narcotic waste container, witnessed by Judith Alexander [facility].     Jada Edmonds PharmD, [facility]   Emergency Medicine Pharmacist  [phone] or Toya  May 20, 2023

## 2023-05-20 NOTE — ED NOTES
Pt able to walk to bathroom independently, voided and was able to walk back to bed without difficulty.

## 2023-05-20 NOTE — ED NOTES
Called report to ar monet at [phone] spoke and gave report to Twan.   Twan will lena on the way to  pt and transport back to facility.

## 2023-05-22 LAB
ATRIAL RATE - MUSE: 112 BPM
DIASTOLIC BLOOD PRESSURE - MUSE: NORMAL MMHG
INTERPRETATION ECG - MUSE: NORMAL
P AXIS - MUSE: 60 DEGREES
PR INTERVAL - MUSE: 146 MS
QRS DURATION - MUSE: 82 MS
QT - MUSE: 340 MS
QTC - MUSE: 464 MS
R AXIS - MUSE: 73 DEGREES
SYSTOLIC BLOOD PRESSURE - MUSE: NORMAL MMHG
T AXIS - MUSE: 41 DEGREES
VENTRICULAR RATE- MUSE: 112 BPM

## 2023-07-02 ENCOUNTER — HEALTH MAINTENANCE LETTER (OUTPATIENT)
Age: 48
End: 2023-07-02

## 2023-08-22 NOTE — ED NOTES
[facility] Reassessment and Progress Note    Client Name: Jihan Gill  Date: March 23, 2022       Presenting issue that brought patient to the emPATH unit: Withdrawal, depression, suicidal ideation    Current presentation on the unit: Pt reports feeling physically unwell and wanting to have time to let medication assist with the feelings. Pt is pleasant and cooperative during reassessment. Pt is engaged.    Current risk to self or others? No - Pt does not endorse any suicidal or homicidal ideation. Pt does not endorse auditory or visual hallucinations.    Summary of therapeutic interventions completed with patient: Active listening, assessing dimensions of crisis, solution focus brief therapy, safety planning and discharge planning.    Treatment objectives addressed in this session: Stabilize the suicide crisis; Patient will identify the impact of substance abuse on their functioning.    Progress on treatment goals: Improvement. Pt no longer endorses suicidal ideation. Discussed protective factors and discharge planning. Pt will return back to residence where she lives with her mother.    Additional collateral information: NA      Mental Status:     Appearance:   Appropriate    Eye Contact:   Poor   Psychomotor Behavior: Normal    Attitude:   Cooperative  Pleasant   Orientation:   All   Speech    Rate / Production: Normal/ Responsive    Volume:  Normal    Mood:    Anxious    Affect:    Appropriate    Thought Content:  Clear    Thought Form:  Coherent  Logical    Insight:    Fair       Plan: Discharge back home to residence where living with mother. Pt recently had CD assessment at [facility] and will have referrals for CD treatment provided by their assessment. Will review information with Pt prior to discharge. Discussed with Pt instructions how to coordinate services with [facility] to obtain assessment.    Disposition: Substance abuse disorder treatment     Rationale for disposition: Pt does not  endorse SI, HI, AH/VH. Pt recently had a CD assessment through [facility] and has referrals for CD treatment. Pt reports feeling safe and returning home once feeling better and the medication starts improving her physical symptoms.    Reviewed assessment with attending provider: Marcela Morelos     Total time spent with patient:.50 hrs     CPT code: 23181 - Psychotherapy (with patient) - 30 (16-37*) min      James Kumar [facility]                                                              Oral Minoxidil Pregnancy And Lactation Text: This medication should only be used when clearly needed if you are pregnant, attempting to become pregnant or breast feeding.

## 2023-09-10 ENCOUNTER — HEALTH MAINTENANCE LETTER (OUTPATIENT)
Age: 48
End: 2023-09-10

## 2023-10-11 NOTE — ED PROVIDER NOTES
EmPATH Unit - Psychiatric Observation Discharge Summary  [facility] Emergency Department  Discharge Date: 3/15/2023    Jihan Gill MRN: [id number]   Age: 47 year old YOB: 1975     Brief HPI & Initial ED Course     Chief Complaint   Patient presents with     Depression     HPI  Jihan Gill is a 47 year old female with history notable for major depressive disorder, borderline personality disorder, and anxiety further complicated by substance use disorders involving opiates and benzodiazepines.  She is currently under observation status on the EmPATH unit, now nearing 47 hours in the emergency department.  Overnight, there were no acute issues although plans to discharge yesterday were postponed in hopes that it would allow additional time for the patient to continue coordinating disposition options and resources with her outpatient team and supports.  On reassessment today, the patient reports sleeping well last night.  Energy is fair.  Appetite is normal.  She continues to feel anxious about her disposition plan.  She does not desire returning back to where she was residing however needs to gather her belongings and medications.   We reviewed that the patient has picked up 20 days worth of clonazepam in the month of March.  We reviewed that she should have an adequate supply of medication to last through next week. She questions whether the person whom she is staying with could have been stealing medications from her purse.  During her last visit on the EmPATH unit, she had reported similar concerns related to the house she was living in in order to regain an additional refill of benzodiazepines.  I reviewed with the patient that if accessing the medications continues to become a source of conflict for negative contributor to her sense of stability, we may need to consider transitioning to detox.  She is familiar with utilizing detox in the past.  She is familiar with staying at shelters  "in the past.  Today, she is not seeking to utilize detox services.  She expressed some concern regarding the potential for benzodiazepine withdrawal.  She was educated on the role gabapentin plays in helping to minimize those concerns.  She is not experiencing active suicidal thoughts.  Her son continues to be protective against thoughts of suicide when fleeting thoughts do occur.  There was no indication of psychosis or homicidal thoughts.        Physical Examination   BP: 120/74  Pulse: 63  Temp:  (NORBERTO-drinking coffee)  Resp: 18  Height: 165.1 cm (5' 5\")  Weight: 104.3 kg (230 lb)  SpO2: 95 %    Physical Exam  General: Appears stated age.   Neuro: Alert and fully oriented. Extremities appear to demonstrate normal strength on visual inspection.   Integumentary/Skin: no rash visualized, normal color    Psychiatric Examination   Appearance: awake, alert  Attitude:  cooperative  Eye Contact:  fair  Mood:  anxious  Affect:  appropriate and in normal range  Speech:  clear, coherent  Psychomotor Behavior:  no evidence of tardive dyskinesia, dystonia, or tics  Thought Process:  logical and linear  Associations:  no loose associations  Thought Content:  no evidence of suicidal ideation or homicidal ideation and no evidence of psychotic thought  Insight:  fair  Judgement:  fair  Oriented to:  time, person, and place  Attention Span and Concentration:  fair  Recent and Remote Memory:  fair  Language: able to name/identify objects without impairment  Fund of Knowledge: intact with awareness of current and past events    Results        Labs Ordered and Resulted from Time of ED Arrival to Time of ED Departure   BASIC METABOLIC PANEL - Abnormal       Result Value    Sodium 138      Potassium 4.5      Chloride 97 (*)     Carbon Dioxide (CO2) 32 (*)     Anion Gap 9      Urea Nitrogen 10.8      Creatinine 0.73      Calcium 10.8 (*)     Glucose 120 (*)     GFR Estimate >90     COVID-19 VIRUS (CORONAVIRUS) BY PCR - Normal    SARS " CoV2 PCR Negative     HCG QUALITATIVE PREGNANCY - Normal    hCG Serum Qualitative Negative     TSH WITH FREE T4 REFLEX - Normal    TSH 0.94     CBC WITH PLATELETS AND DIFFERENTIAL    WBC Count 8.7      RBC Count 4.26      Hemoglobin 12.6      Hematocrit 39.6      MCV 93      MCH 29.6      MCHC 31.8      RDW 13.3      Platelet Count 223      % Neutrophils 57      % Lymphocytes 36      % Monocytes 5      % Eosinophils 1      % Basophils 0      % Immature Granulocytes 1      NRBCs per 100 WBC 0      Absolute Neutrophils 4.9      Absolute Lymphocytes 3.2      Absolute Monocytes 0.4      Absolute Eosinophils 0.1      Absolute Basophils 0.0      Absolute Immature Granulocytes 0.0      Absolute NRBCs 0.0         Observation Course   The patient was found to have a psychiatric condition that would benefit from an observation stay in the emergency department for further psychiatric stabilization and/or coordination of a safe disposition. The plan upon observation admission included serial assessments of psychiatric condition, potential administration of medications if indicated, further disposition pending the patient's psychiatric course during the monitoring period.     Serial assessments of the patient's psychiatric condition were performed. Nursing notes were reviewed. During the observation period, the patient did not require medications for agitation, and did not require restraints/seclusion for patient and/or provider safety.     After a period of working with the treatment team on the EmPATH unit, the patient's mental state improved to allow a safe transition to outpatient care. After counseling on the diagnosis, work-up, and treatment plan, the patient was discharged. Close follow-up with a psychiatrist and/or therapist was recommended and community psychiatric resources were provided. Patient is to return to the ED if any urgent or potentially life-threatening concerns.     Discharge Diagnoses:   Final diagnoses:    Anxiety   Borderline personality disorder (H)   Major depressive disorder, recurrent episode, moderate (H)   Opioid use disorder, severe, on maintenance therapy (H)   Benzodiazepine dependence, continuous (H)       Treatment Plan:  -Continue Viibryd 10 mg daily for mood and anxiety management  -Continue methadone maintenance treatment through her outpatient clinic  -I again reviewed with the patient that we will not be providing her with a refill of controlled substances, including clonazepam.  She is not interested in utilizing resources to assist with detox.  She will continue taking gabapentin which will provide antiseizure coverage and symptomatic relief from withdrawal symptoms at the dose she is currently taking.  -Crisis beds are not available to the patient due to the need to obtain daily dosing of methadone  -Shelter resources will be provided  -Discharge back to the community today.      At the time of discharge, the patient's acute suicide risk was determined to be low due to the following factors: Reduction in the intensity of mood/anxiety symptoms that preceded the admission, denial of suicidal thoughts, denies feeling helpless or helpless, not currently under the influence of alcohol or illicit substances, denies experiencing command hallucinations, no immediate access to firearms. The patient's acute risk could be higher if noncompliant with their treatment plan, medications, follow-up appointments or using illicit substances or alcohol. Protective factors include: social supports, children    I spent more than 30 minutes on discharge day activities.    --  Anderson Monroe MD  [facility] EMERGENCY DEPT  EmPATH Unit  3/15/2023      Anderson Monroe MD  03/15/23 5054     [phone]

## 2024-06-12 NOTE — PROGRESS NOTES
Hospitalist was not ordered, because she was seeing by a Doctor in the last 30 days.  If health problems arise, order a consult with MD.   No

## 2024-07-08 ENCOUNTER — APPOINTMENT (OUTPATIENT)
Dept: GENERAL RADIOLOGY | Facility: CLINIC | Age: 49
End: 2024-07-08
Attending: BEHAVIOR TECHNICIAN
Payer: COMMERCIAL

## 2024-07-08 ENCOUNTER — HOSPITAL ENCOUNTER (OUTPATIENT)
Facility: CLINIC | Age: 49
Setting detail: OBSERVATION
Discharge: LEFT BEFORE DISCHARGE | End: 2024-07-11
Attending: EMERGENCY MEDICINE | Admitting: EMERGENCY MEDICINE
Payer: COMMERCIAL

## 2024-07-08 DIAGNOSIS — F33.2 SEVERE EPISODE OF RECURRENT MAJOR DEPRESSIVE DISORDER, WITHOUT PSYCHOTIC FEATURES (H): ICD-10-CM

## 2024-07-08 DIAGNOSIS — F11.20 OPIOID USE DISORDER, SEVERE, ON MAINTENANCE THERAPY (H): ICD-10-CM

## 2024-07-08 DIAGNOSIS — F33.9 DEPRESSION, RECURRENT (H): ICD-10-CM

## 2024-07-08 DIAGNOSIS — F60.3 BORDERLINE PERSONALITY DISORDER (H): ICD-10-CM

## 2024-07-08 DIAGNOSIS — R44.0 AUDITORY HALLUCINATIONS: ICD-10-CM

## 2024-07-08 DIAGNOSIS — F41.1 GAD (GENERALIZED ANXIETY DISORDER): ICD-10-CM

## 2024-07-08 DIAGNOSIS — R45.851 SUICIDAL IDEATION: ICD-10-CM

## 2024-07-08 PROBLEM — R00.2 PALPITATIONS: Status: ACTIVE | Noted: 2024-07-08

## 2024-07-08 LAB
ALBUMIN SERPL BCG-MCNC: 4.8 G/DL (ref 3.5–5.2)
ALP SERPL-CCNC: 144 U/L (ref 40–150)
ALT SERPL W P-5'-P-CCNC: 21 U/L (ref 0–50)
AMPHETAMINES UR QL SCN: ABNORMAL
ANION GAP SERPL CALCULATED.3IONS-SCNC: 11 MMOL/L (ref 7–15)
AST SERPL W P-5'-P-CCNC: 19 U/L (ref 0–45)
ATRIAL RATE - MUSE: 112 BPM
BARBITURATES UR QL SCN: ABNORMAL
BASOPHILS # BLD AUTO: 0 10E3/UL (ref 0–0.2)
BASOPHILS NFR BLD AUTO: 0 %
BENZODIAZ UR QL SCN: ABNORMAL
BILIRUB SERPL-MCNC: 0.4 MG/DL
BUN SERPL-MCNC: 13.5 MG/DL (ref 6–20)
BZE UR QL SCN: ABNORMAL
CALCIUM SERPL-MCNC: 10.9 MG/DL (ref 8.6–10)
CANNABINOIDS UR QL SCN: ABNORMAL
CHLORIDE SERPL-SCNC: 98 MMOL/L (ref 98–107)
CREAT SERPL-MCNC: 0.88 MG/DL (ref 0.51–0.95)
DEPRECATED HCO3 PLAS-SCNC: 30 MMOL/L (ref 22–29)
DIASTOLIC BLOOD PRESSURE - MUSE: NORMAL MMHG
EGFRCR SERPLBLD CKD-EPI 2021: 81 ML/MIN/1.73M2
EOSINOPHIL # BLD AUTO: 0 10E3/UL (ref 0–0.7)
EOSINOPHIL NFR BLD AUTO: 0 %
ERYTHROCYTE [DISTWIDTH] IN BLOOD BY AUTOMATED COUNT: 13.3 % (ref 10–15)
ETHANOL SERPL-MCNC: <0.01 G/DL
FENTANYL UR QL: ABNORMAL
GLUCOSE SERPL-MCNC: 127 MG/DL (ref 70–99)
HCG INTACT+B SERPL-ACNC: <1 MIU/ML
HCT VFR BLD AUTO: 42.9 % (ref 35–47)
HGB BLD-MCNC: 14.2 G/DL (ref 11.7–15.7)
HOLD SPECIMEN: NORMAL
HOLD SPECIMEN: NORMAL
IMM GRANULOCYTES # BLD: 0 10E3/UL
IMM GRANULOCYTES NFR BLD: 0 %
INTERPRETATION ECG - MUSE: NORMAL
LYMPHOCYTES # BLD AUTO: 2.5 10E3/UL (ref 0.8–5.3)
LYMPHOCYTES NFR BLD AUTO: 23 %
MCH RBC QN AUTO: 29.6 PG (ref 26.5–33)
MCHC RBC AUTO-ENTMCNC: 33.1 G/DL (ref 31.5–36.5)
MCV RBC AUTO: 90 FL (ref 78–100)
MONOCYTES # BLD AUTO: 0.9 10E3/UL (ref 0–1.3)
MONOCYTES NFR BLD AUTO: 8 %
NEUTROPHILS # BLD AUTO: 7.6 10E3/UL (ref 1.6–8.3)
NEUTROPHILS NFR BLD AUTO: 68 %
NRBC # BLD AUTO: 0 10E3/UL
NRBC BLD AUTO-RTO: 0 /100
OPIATES UR QL SCN: ABNORMAL
P AXIS - MUSE: 80 DEGREES
PCP QUAL URINE (ROCHE): ABNORMAL
PLATELET # BLD AUTO: 306 10E3/UL (ref 150–450)
POTASSIUM SERPL-SCNC: 4 MMOL/L (ref 3.4–5.3)
PR INTERVAL - MUSE: 128 MS
PROT SERPL-MCNC: 8.8 G/DL (ref 6.4–8.3)
QRS DURATION - MUSE: 78 MS
QT - MUSE: 326 MS
QTC - MUSE: 444 MS
R AXIS - MUSE: 90 DEGREES
RBC # BLD AUTO: 4.79 10E6/UL (ref 3.8–5.2)
SODIUM SERPL-SCNC: 139 MMOL/L (ref 135–145)
SYSTOLIC BLOOD PRESSURE - MUSE: NORMAL MMHG
T AXIS - MUSE: 66 DEGREES
TROPONIN T SERPL HS-MCNC: 22 NG/L
TROPONIN T SERPL HS-MCNC: 22 NG/L
TSH SERPL DL<=0.005 MIU/L-ACNC: 0.98 UIU/ML (ref 0.3–4.2)
VENTRICULAR RATE- MUSE: 112 BPM
WBC # BLD AUTO: 11.1 10E3/UL (ref 4–11)

## 2024-07-08 PROCEDURE — 93005 ELECTROCARDIOGRAM TRACING: CPT

## 2024-07-08 PROCEDURE — 99285 EMERGENCY DEPT VISIT HI MDM: CPT

## 2024-07-08 PROCEDURE — 250N000013 HC RX MED GY IP 250 OP 250 PS 637: Performed by: EMERGENCY MEDICINE

## 2024-07-08 PROCEDURE — 80053 COMPREHEN METABOLIC PANEL: CPT | Performed by: EMERGENCY MEDICINE

## 2024-07-08 PROCEDURE — 84443 ASSAY THYROID STIM HORMONE: CPT | Performed by: BEHAVIOR TECHNICIAN

## 2024-07-08 PROCEDURE — G0378 HOSPITAL OBSERVATION PER HR: HCPCS

## 2024-07-08 PROCEDURE — 85041 AUTOMATED RBC COUNT: CPT | Performed by: EMERGENCY MEDICINE

## 2024-07-08 PROCEDURE — 82040 ASSAY OF SERUM ALBUMIN: CPT | Performed by: EMERGENCY MEDICINE

## 2024-07-08 PROCEDURE — 80307 DRUG TEST PRSMV CHEM ANLYZR: CPT | Performed by: BEHAVIOR TECHNICIAN

## 2024-07-08 PROCEDURE — 82077 ASSAY SPEC XCP UR&BREATH IA: CPT | Performed by: BEHAVIOR TECHNICIAN

## 2024-07-08 PROCEDURE — 71046 X-RAY EXAM CHEST 2 VIEWS: CPT

## 2024-07-08 PROCEDURE — 36415 COLL VENOUS BLD VENIPUNCTURE: CPT | Performed by: EMERGENCY MEDICINE

## 2024-07-08 PROCEDURE — 84484 ASSAY OF TROPONIN QUANT: CPT | Performed by: EMERGENCY MEDICINE

## 2024-07-08 PROCEDURE — 84702 CHORIONIC GONADOTROPIN TEST: CPT | Performed by: EMERGENCY MEDICINE

## 2024-07-08 PROCEDURE — 85025 COMPLETE CBC W/AUTO DIFF WBC: CPT | Performed by: EMERGENCY MEDICINE

## 2024-07-08 PROCEDURE — 80179 DRUG ASSAY SALICYLATE: CPT | Performed by: PSYCHIATRY & NEUROLOGY

## 2024-07-08 RX ORDER — CLONAZEPAM 1 MG/1
1 TABLET ORAL 3 TIMES DAILY PRN
COMMUNITY
Start: 2024-04-09 | End: 2024-07-11

## 2024-07-08 RX ORDER — LOPERAMIDE HCL 2 MG
2 CAPSULE ORAL 4 TIMES DAILY PRN
Status: DISCONTINUED | OUTPATIENT
Start: 2024-07-08 | End: 2024-07-11 | Stop reason: HOSPADM

## 2024-07-08 RX ORDER — CALCIUM CARBONATE 500 MG/1
500 TABLET, CHEWABLE ORAL 3 TIMES DAILY PRN
Status: DISCONTINUED | OUTPATIENT
Start: 2024-07-08 | End: 2024-07-11 | Stop reason: HOSPADM

## 2024-07-08 RX ORDER — QUETIAPINE FUMARATE 100 MG/1
100 TABLET, FILM COATED ORAL AT BEDTIME
COMMUNITY
End: 2024-07-11

## 2024-07-08 RX ORDER — CLONAZEPAM 1 MG/1
1 TABLET ORAL 3 TIMES DAILY PRN
Status: DISCONTINUED | OUTPATIENT
Start: 2024-07-08 | End: 2024-07-11 | Stop reason: HOSPADM

## 2024-07-08 RX ORDER — ONDANSETRON 4 MG/1
4 TABLET, ORALLY DISINTEGRATING ORAL EVERY 6 HOURS PRN
Status: DISCONTINUED | OUTPATIENT
Start: 2024-07-08 | End: 2024-07-08

## 2024-07-08 RX ORDER — METFORMIN HCL 500 MG
500 TABLET, EXTENDED RELEASE 24 HR ORAL
COMMUNITY
Start: 2024-02-22 | End: 2025-02-21

## 2024-07-08 RX ORDER — ONDANSETRON 4 MG/1
4 TABLET, ORALLY DISINTEGRATING ORAL EVERY 6 HOURS PRN
Status: DISCONTINUED | OUTPATIENT
Start: 2024-07-08 | End: 2024-07-11 | Stop reason: HOSPADM

## 2024-07-08 RX ORDER — HYDROXYZINE HYDROCHLORIDE 50 MG/1
50 TABLET, FILM COATED ORAL EVERY 6 HOURS PRN
Status: DISCONTINUED | OUTPATIENT
Start: 2024-07-08 | End: 2024-07-11 | Stop reason: HOSPADM

## 2024-07-08 RX ORDER — HYDROXYZINE HYDROCHLORIDE 25 MG/1
25 TABLET, FILM COATED ORAL ONCE
Status: COMPLETED | OUTPATIENT
Start: 2024-07-08 | End: 2024-07-08

## 2024-07-08 RX ORDER — PALIPERIDONE 3 MG/1
3 TABLET, EXTENDED RELEASE ORAL AT BEDTIME
Status: DISCONTINUED | OUTPATIENT
Start: 2024-07-08 | End: 2024-07-09

## 2024-07-08 RX ORDER — ACETAMINOPHEN 325 MG/1
650 TABLET ORAL EVERY 4 HOURS PRN
Status: DISCONTINUED | OUTPATIENT
Start: 2024-07-08 | End: 2024-07-11 | Stop reason: HOSPADM

## 2024-07-08 RX ORDER — TRAZODONE HYDROCHLORIDE 50 MG/1
50 TABLET, FILM COATED ORAL
Status: DISCONTINUED | OUTPATIENT
Start: 2024-07-08 | End: 2024-07-11 | Stop reason: HOSPADM

## 2024-07-08 RX ORDER — CLONIDINE HYDROCHLORIDE 0.1 MG/1
0.1 TABLET ORAL EVERY 6 HOURS PRN
Status: DISCONTINUED | OUTPATIENT
Start: 2024-07-08 | End: 2024-07-11 | Stop reason: HOSPADM

## 2024-07-08 RX ORDER — GABAPENTIN 600 MG/1
600 TABLET ORAL AT BEDTIME
COMMUNITY
End: 2024-07-11

## 2024-07-08 RX ORDER — QUETIAPINE FUMARATE 100 MG/1
100 TABLET, FILM COATED ORAL AT BEDTIME
Status: DISCONTINUED | OUTPATIENT
Start: 2024-07-08 | End: 2024-07-09

## 2024-07-08 RX ORDER — PALIPERIDONE 3 MG/1
3 TABLET, EXTENDED RELEASE ORAL AT BEDTIME
COMMUNITY
End: 2024-07-11

## 2024-07-08 RX ORDER — GABAPENTIN 300 MG/1
600 CAPSULE ORAL AT BEDTIME
Status: DISCONTINUED | OUTPATIENT
Start: 2024-07-08 | End: 2024-07-11 | Stop reason: HOSPADM

## 2024-07-08 RX ORDER — GABAPENTIN 300 MG/1
300 CAPSULE ORAL 2 TIMES DAILY
COMMUNITY
End: 2024-07-11

## 2024-07-08 ASSESSMENT — ACTIVITIES OF DAILY LIVING (ADL)
ADLS_ACUITY_SCORE: 37

## 2024-07-08 ASSESSMENT — COLUMBIA-SUICIDE SEVERITY RATING SCALE - C-SSRS
2. HAVE YOU ACTUALLY HAD ANY THOUGHTS OF KILLING YOURSELF IN THE PAST MONTH?: YES
1. IN THE PAST MONTH, HAVE YOU WISHED YOU WERE DEAD OR WISHED YOU COULD GO TO SLEEP AND NOT WAKE UP?: YES
3. HAVE YOU BEEN THINKING ABOUT HOW YOU MIGHT KILL YOURSELF?: YES
5. HAVE YOU STARTED TO WORK OUT OR WORKED OUT THE DETAILS OF HOW TO KILL YOURSELF? DO YOU INTEND TO CARRY OUT THIS PLAN?: NO
4. HAVE YOU HAD THESE THOUGHTS AND HAD SOME INTENTION OF ACTING ON THEM?: NO
6. HAVE YOU EVER DONE ANYTHING, STARTED TO DO ANYTHING, OR PREPARED TO DO ANYTHING TO END YOUR LIFE?: YES

## 2024-07-08 NOTE — ED TRIAGE NOTES
Pt comes in via ems   Pt has command hallucination due to not taking her medications   Pt called 911 due to palpitations today   Pt is not on a hold per ems

## 2024-07-08 NOTE — ED TRIAGE NOTES
[facility]  ED Arrival Note      Means of Arrival: EMS  Comes from: {comesfrom:938849}    Story:        EMS/PD Interventions: {EMS/PDINTERVENTIONS:620324}  EMS Medications: {EMSMEDICATIONS:048654}    Meets Stroke Criteria? {Meetsstroke:054719}  Meets Trauma Criteria? {Meetstraumacriteria:820008}  Shock Index (HR/SBP): {Shock Index:985109}      Directed to: {Roomedto:975254}  [facility]           Triage Assessment (Adult)       Row Name 07/08/24 1535          Triage Assessment    Airway WDL WDL        Respiratory WDL    Respiratory WDL WDL        Skin Circulation/Temperature WDL    Skin Circulation/Temperature WDL WDL        Cardiac WDL    Cardiac WDL WDL        Peripheral/Neurovascular WDL    Peripheral Neurovascular WDL WDL        Cognitive/Neuro/Behavioral WDL    Cognitive/Neuro/Behavioral WDL WDL

## 2024-07-08 NOTE — ED TRIAGE NOTES
[facility]  ED Arrival Note      Means of Arrival: EMS  Comes from: [facility]    Story: Presents with multiple and vague complaints. Patient reports being at her clinic and receiving methadone or narcan-- does not recall witch. Patient endorses dizziness, palpitations, and sweating. Additionally, patient has vague suicidal complaints including passive thoughts and a chronic plan to walk into traffic. Patient denies using alcohol or drugs today.           EMS/PD Interventions: N/A  EMS Medications: N/A    Meets Stroke Criteria? No  Meets Trauma Criteria? No  Shock Index (HR/SBP): <0.8      Directed to: Triage Lobby  Belongings: Remain with patient           Triage Assessment (Adult)       Row Name 07/08/24 8339          Triage Assessment    Airway WDL WDL        Respiratory WDL    Respiratory WDL WDL        Skin Circulation/Temperature WDL    Skin Circulation/Temperature WDL WDL        Cardiac WDL    Cardiac WDL WDL        Peripheral/Neurovascular WDL    Peripheral Neurovascular WDL WDL        Cognitive/Neuro/Behavioral WDL    Cognitive/Neuro/Behavioral WDL WDL

## 2024-07-09 PROBLEM — F29 PSYCHOSIS, UNSPECIFIED PSYCHOSIS TYPE (H): Status: ACTIVE | Noted: 2024-07-09

## 2024-07-09 PROCEDURE — 250N000011 HC RX IP 250 OP 636: Performed by: PSYCHIATRY & NEUROLOGY

## 2024-07-09 PROCEDURE — G0378 HOSPITAL OBSERVATION PER HR: HCPCS

## 2024-07-09 PROCEDURE — 250N000013 HC RX MED GY IP 250 OP 250 PS 637: Performed by: PSYCHIATRY & NEUROLOGY

## 2024-07-09 PROCEDURE — 99223 1ST HOSP IP/OBS HIGH 75: CPT | Performed by: PSYCHIATRY & NEUROLOGY

## 2024-07-09 RX ORDER — VILAZODONE HYDROCHLORIDE 20 MG/1
20 TABLET ORAL DAILY
Status: DISCONTINUED | OUTPATIENT
Start: 2024-07-09 | End: 2024-07-09

## 2024-07-09 RX ORDER — QUETIAPINE FUMARATE 100 MG/1
100 TABLET, FILM COATED ORAL
Status: DISCONTINUED | OUTPATIENT
Start: 2024-07-09 | End: 2024-07-11 | Stop reason: HOSPADM

## 2024-07-09 RX ORDER — METHADONE HYDROCHLORIDE 10 MG/ML
90 CONCENTRATE ORAL DAILY
Status: DISCONTINUED | OUTPATIENT
Start: 2024-07-09 | End: 2024-07-11 | Stop reason: HOSPADM

## 2024-07-09 RX ORDER — VILAZODONE HYDROCHLORIDE 10 MG/1
10 TABLET ORAL DAILY
Status: DISCONTINUED | OUTPATIENT
Start: 2024-07-09 | End: 2024-07-11 | Stop reason: HOSPADM

## 2024-07-09 RX ORDER — PALIPERIDONE 6 MG/1
6 TABLET, EXTENDED RELEASE ORAL AT BEDTIME
Status: DISCONTINUED | OUTPATIENT
Start: 2024-07-09 | End: 2024-07-11 | Stop reason: HOSPADM

## 2024-07-09 RX ADMIN — GABAPENTIN 600 MG: 300 CAPSULE ORAL at 00:12

## 2024-07-09 RX ADMIN — ACETAMINOPHEN 650 MG: 325 TABLET, FILM COATED ORAL at 21:50

## 2024-07-09 RX ADMIN — ONDANSETRON 4 MG: 4 TABLET, ORALLY DISINTEGRATING ORAL at 08:11

## 2024-07-09 RX ADMIN — QUETIAPINE FUMARATE 100 MG: 100 TABLET ORAL at 21:56

## 2024-07-09 RX ADMIN — CLONAZEPAM 1 MG: 1 TABLET ORAL at 15:37

## 2024-07-09 RX ADMIN — GABAPENTIN 600 MG: 300 CAPSULE ORAL at 21:50

## 2024-07-09 RX ADMIN — PALIPERIDONE 6 MG: 6 TABLET, EXTENDED RELEASE ORAL at 21:50

## 2024-07-09 RX ADMIN — METHADONE HYDROCHLORIDE 90 MG: 10 CONCENTRATE ORAL at 09:56

## 2024-07-09 RX ADMIN — HYDROXYZINE HYDROCHLORIDE 50 MG: 50 TABLET, FILM COATED ORAL at 08:11

## 2024-07-09 RX ADMIN — VILAZODONE HYDROCHLORIDE 10 MG: 10 TABLET ORAL at 13:52

## 2024-07-09 RX ADMIN — CLONAZEPAM 1 MG: 1 TABLET ORAL at 08:10

## 2024-07-09 RX ADMIN — QUETIAPINE FUMARATE 100 MG: 100 TABLET ORAL at 00:12

## 2024-07-09 RX ADMIN — PALIPERIDONE 3 MG: 3 TABLET, EXTENDED RELEASE ORAL at 00:12

## 2024-07-09 ASSESSMENT — ACTIVITIES OF DAILY LIVING (ADL)
ADLS_ACUITY_SCORE: 37
HYGIENE/GROOMING: INDEPENDENT
ADLS_ACUITY_SCORE: 37

## 2024-07-09 NOTE — ED NOTES
"Patient states \"Are you going to be giving me Narcan?\"  Requests nurse to discard water in cup & obtain fresh water from room sink.   Declines hydroxyzine at staring at the medication cup  for awhile.   "

## 2024-07-09 NOTE — ED PROVIDER NOTES
EmPATH Unit - Initial Psychiatric Observation Note  [facility] Emergency Department  Observation Initiation Date: Jul 8, 2024    Jihan Gill MRN: [id number]   Age: 49 year old YOB: 1975     History     Chief Complaint   Patient presents with    Palpitations           Suicidal     HPI  Jihan Gill is a 49 year old female with a past history notable for mood instability further complicated by substance usage which appears to have given rise to a variety of diagnoses, ranging from major depressive disorder to bipolar disorder and schizoaffective disorder.  She is familiar to me from a prior visit to the EmPATH unit.  She returns to the emergency department reporting auditory hallucinations, depressed mood, and suicidal ideation.  Her urine drug screen was positive for fentanyl, amphetamines, and benzodiazepines.  She was determined to be medically stable then transferred to the EmPATH unit where she is now approaching 21 hours in the emergency department.  Overnight, there were no acute issues.  On examination today, the patient reports that over the past 2 to 3 weeks, her mood has gradually felt more depressed.  She was not able to identify any particular contributing psychosocial stressor however notes that she had discontinued taking her antidepressant Viibryd following a period of euthymic mood.  Around 4 July, she relapsed on substances which further intensified her depressive symptoms and gave rise to reemerging auditory hallucinations.  Auditory hallucinations were contributing to thoughts of suicide and feelings of paranoia leading to her arrival in the emergency department to seek additional help.  Today, she feels tired and is primarily focused on wanting to rest.  She is open to medication adjustments aimed at addressing auditory hallucinations and mood stability.  She did not endorse recent manic symptoms.  Suicidal thoughts are present however decreased in intensity and  "currently described as being passive.  There was no indication of homicidal thoughts.  Auditory hallucinations are present today and described as moderate in intensity.          Review of Systems      Physical Examination   BP: 130/84  Pulse: 106  Temp: 98  F (36.7  C)  Resp: 17  Height: 165.1 cm (5' 5\")  Weight: 111.1 kg (245 lb)  SpO2: 97 %    Physical Exam  General: Appears stated age.   Neuro: Alert and fully oriented. Extremities appear to demonstrate normal strength on visual inspection.   Integumentary/Skin: no rash visualized, normal color    Psychiatric Examination   Appearance: awake, alert  Attitude:  cooperative  Eye Contact:  fair  Mood:  anxious and sad   Affect:  intensity is blunted  Speech:  clear, coherent  Psychomotor Behavior:  no evidence of tardive dyskinesia, dystonia, or tics  Thought Process:  linear  Associations:  no loose associations  Thought Content:  passive suicidal ideation present and auditory hallucinations present  Insight:  fair  Judgement:  fair  Oriented to:  time, person, and place  Attention Span and Concentration:  fair  Recent and Remote Memory:  fair  Language: able to name/identify objects without impairment  Fund of Knowledge: intact with awareness of current and past events    ED Course     ED Course as of 07/09/24 1144   Mon Jul 08, 2024 2016 I evaluated patient and obtained history.   2127 I obtained history and examined the patient as noted above.        Labs Ordered and Resulted from Time of ED Arrival to Time of ED Departure   COMPREHENSIVE METABOLIC PANEL - Abnormal       Result Value    Sodium 139      Potassium 4.0      Carbon Dioxide (CO2) 30 (*)     Anion Gap 11      Urea Nitrogen 13.5      Creatinine 0.88      GFR Estimate 81      Calcium 10.9 (*)     Chloride 98      Glucose 127 (*)     Alkaline Phosphatase 144      AST 19      ALT 21      Protein Total 8.8 (*)     Albumin 4.8      Bilirubin Total 0.4     TROPONIN T, HIGH SENSITIVITY - Abnormal    Troponin " T, High Sensitivity 22 (*)    CBC WITH PLATELETS AND DIFFERENTIAL - Abnormal    WBC Count 11.1 (*)     RBC Count 4.79      Hemoglobin 14.2      Hematocrit 42.9      MCV 90      MCH 29.6      MCHC 33.1      RDW 13.3      Platelet Count 306      % Neutrophils 68      % Lymphocytes 23      % Monocytes 8      % Eosinophils 0      % Basophils 0      % Immature Granulocytes 0      NRBCs per 100 WBC 0      Absolute Neutrophils 7.6      Absolute Lymphocytes 2.5      Absolute Monocytes 0.9      Absolute Eosinophils 0.0      Absolute Basophils 0.0      Absolute Immature Granulocytes 0.0      Absolute NRBCs 0.0     TROPONIN T, HIGH SENSITIVITY - Abnormal    Troponin T, High Sensitivity 22 (*)    URINE DRUG SCREEN PANEL - Abnormal    Amphetamines Urine Screen Positive (*)     Barbituates Urine Screen Negative      Benzodiazepine Urine Screen Positive (*)     Cannabinoids Urine Screen Negative      Cocaine Urine Screen Negative      Fentanyl Qual Urine Screen Positive (*)     Opiates Urine Screen Negative      PCP Urine Screen Negative     TSH WITH FREE T4 REFLEX - Normal    TSH 0.98     ETHYL ALCOHOL LEVEL - Normal    Alcohol ethyl <0.01     HCG QUANTITATIVE PREGNANCY - Normal    hCG Quantitative <1         Assessments & Plan (with Medical Decision Making)   Patient presenting with worsening auditory hallucinations, depressed mood, and suicidal ideation in the context of a chronic mental health condition and likely worsened by recent relapse on illicit substances including methamphetamine and fentanyl.  Her treatment plan is focused on optimizing antipsychotic and antidepressant treatments and monitoring her response and tolerability as we await symptom improvement. Nursing notes reviewed noting no acute issues.     I have reviewed the assessment completed by the [facility].     During the observation period, the patient did not require medications for agitation, and did not require restraints/seclusion for patient and/or provider  safety.     The patient was found to have a psychiatric condition that would benefit from an observation stay in the emergency department for further psychiatric stabilization and/or coordination of a safe disposition. The observation plan includes serial assessments of psychiatric condition, potential administration of medications if indicated, further disposition pending the patient's psychiatric course during the monitoring period.     Preliminary diagnosis:    ICD-10-CM    1. Auditory hallucinations  R44.0       2. Suicidal ideation  R45.851       3. Opioid use disorder, severe, on maintenance therapy (H)  F11.20       4. Borderline personality disorder (H)  F60.3       5. Severe episode of recurrent major depressive disorder, without psychotic features (H)  F33.2     Rule out bipolar disorder           Treatment Plan:  -Noting that the patient previously responded well to Viibryd for antidepressant treatment, the medication will be restarted at 10 mg daily.  Her differential diagnosis does include a bipolar disorder therefore close monitoring would be recommended to monitor for the risk of mood destabilization.  The presence of a mood stabilizer such as Invega which she is currently taking should minimize this risk.  -Resume Invega 6 mg nightly for antipsychotic treatment.  If residual symptoms persist, consider increasing the dose towards 9 mg daily however recent worsening of psychosis may be related to methamphetamine use.  Continue to monitor and treat accordingly.  -Urine drug screen was reviewed and positive for amphetamines, fentanyl, benzodiazepines.  She does maintain a prescription for benzodiazepines which have been restarted to prevent withdrawal.  She should continue to work with her outpatient provider on gradually tapering off of benzodiazepines to avoid complications with opiates.  -Consider a referral for outpatient MICD treatment  -Enter to observation status and reassess  tomorrow.    --  Anderson Monroe MD   [facility] EMERGENCY DEPT  EmPATH Unit       Anderson Monroe MD  07/09/24 [id number]

## 2024-07-09 NOTE — PROGRESS NOTES
"49 year old female received from ED for auditory hallucinations, suicidal ideation and palpitations. Pt had medical work up in the ED and was medically cleared for transfer to [facility]. Pt reports she is experiencing auditory hallucinations that tell her, \"I am not safe and my son has been hurt.\" Pt denies the AH are command in nature and states they are always present. Pt endorses suicidal ideation with thoughts to overdose on medications or run out in traffic. Pt contracts for safety on the unit. Pt reports last suicide attempt was in 2012 or 2013 by overdose on Tylenol. Pt states she relapsed on meth two days ago and that was the last time she used. Pt reports she has been at [facility] for two months. Pt states she has a psychiatrist she sees once a month but missed her appointment last month. Pt reports she is medication compliant and last took her medications yesterday.     Pt reports she is on 80mg of methadone through [place] and had a dose this morning. Dosage will need to be verified tomorrow morning.     Pt presents as flat, she appears preoccupied with delayed speech. Pt cooperative during intake process.     Nursing and risk assessments completed.  Assessments reviewed with LMHP and physician. Video monitoring in progress, patient informed.  Admission information reviewed with patient. [facility] on [facility]. Questions regarding EmPATH addressed. Pt search completed and belongings inventoried.  "

## 2024-07-09 NOTE — ED NOTES
Received call from [facility] stating they would fax over Pt's MAR. Waiting on fax to arrive.

## 2024-07-09 NOTE — CONSULTS
Diagnostic Evaluation Consultation  Crisis Assessment    Patient Name: Jihan Gill  Age:  49 year old  Legal Sex: female  Gender Identity: female  Pronouns: she/her  Race: White  Ethnicity: Not  or   Language: English      Patient was assessed: In person   Crisis Assessment Start Date: 07/08/24  Crisis Assessment Start Time: 2331  Crisis Assessment Stop Time: 0001  Patient location: [facility] EMERGENCY DEPT EMPATH 03                                 Referral Data and Chief Complaint  Jihan Gill presents to the ED via EMS. Patient is presenting to the ED for the following concerns: Substance use, Suicidal ideation, Worsening psychosocial stress.   Factors that make the mental health crisis life threatening or complex are:  Pt presents to the ED via EMS due to suicidal ideation and hallucinations. Pt states that she was at the methadone clinic today and began experiencing worsening auditory hallucinations, delusional thoughts and suicidal ideation. Pt states that she has had some high stress weeks recently. She states that today she couldn't handle the stress and had thoughts that she didn't want to be around anymore. She reports that when she had SI thoughts today she had thoughts of overdosing on pills or walking into traffic. Pt states that she called 911 and came to the ED via EMS. Pt states that the voices are 'always there, they come and go.' She states that she can manage voices if she manages her stress. Pt reports that she relapsed a few days ago on meth and fentanyl. She has been using substances on and off again for the past month. She states that she takes methadone daily.      Informed Consent and Assessment Methods  Explained the crisis assessment process, including applicable information disclosures and limits to confidentiality, assessed understanding of the process, and obtained consent to proceed with the assessment.  Assessment methods included  "conducting a formal interview with patient, review of medical records, collaboration with medical staff, and obtaining relevant collateral information from family and community providers when available: done     Patient response to interventions: verbalizes understanding  Coping skills were attempted to reduce the crisis:  called 911     History of the Crisis   The patient is a 48 y.o. female who comes to the ED with medics. Patient has a history of polysubstance abuse, depression, anxiety, alcohol abuse, borderline personality disorder, bipolar disorder, TBI, and schizoaffective disorder who presented to the ED for evaluation of auditory hallucinations. Patient discussed she has been \"hearing stuff and having crazy thoughts about someone hurting my son\". She reported hearing multiple voices saying \"someone was going to hurt my son\". She stated the voices are worse when she is alone. Pt has been hospitalized and went to treatment in January 2024. Patient denied any current suicidal thoughts or self-harm intent or plan. She reports a history of suicidal thoughts and suicide attempts a few years ago. Patient reported she is connected to a psychiatrist. Patient noted she is currently on a MI/CD commitment and is currently living at [facility]. Patient has a history of legal issues. Pt was in long-term in January 2024. She states that she has court dates coming up for driving after revocation and violating a restraining order her mom has against her. reported she was conditionally released from long-term last week. She stated she is not currently on probation.  Mental Health Care: : None. Commitment History: Yes. Patient is currently under a MI/CD Commitment.  Community Providers: [facility]: Yes, [place] [facility] [place] Chemical use/abuse: Pertinent Substance Use History - Methamphetamines, benzodiazepines, alcohol, and nicotine.    Brief " Psychosocial History  Family:  Single, Children yes  Support System:  Parent(s)  Employment Status:  employed part-time  Source of Income:  salary/wages  Financial Environmental Concerns:  none  Current Hobbies:  exercise/fitness, music, outdoor activities, television/movies/videos, social media/computer activities  Barriers in Personal Life:  mental health concerns    Significant Clinical History  Current Anxiety Symptoms:  excessive worry, anxious  Current Depression/Trauma:  hopelessness, thoughts of death/suicide  Current Somatic Symptoms:  excessive worry, anxious  Current Psychosis/Thought Disturbance:  auditory hallucinations, visual hallucinations  Current Eating Symptoms:     Chemical Use History:  Alcohol: None  Benzodiazepines: None  Opiates: Other (comments) (fentanyl)  Last Use:: 07/08/24  Cocaine: None  Marijuana: None  Other Use: Methamphetamines  Last Use:: 07/08/24   Past diagnosis:  Anxiety Disorder, Bipolar Disorder, Depression, Personality Disorder, Suicide attempt(s), Schizophrenia, Substance Use Disorder  Family history:  Death by Suicide  Past treatment:  Individual therapy, Probation/Court ordered treatment, Civil Commitment, Psychiatric Medication Management, Day Treatment, Inpatient Hospitalization, AA/NA, Supportive Living Environment (group home, intermediate house, etc)  Details of most recent treatment:  pt states that she sees a psychiatrist regularly. she currently lives at [facility]  Other relevant history:          Collateral Information  Is there collateral information: No (attempted to contact pts father, Connor Gill ([phone]) to obtain collateral information. no answer at this time.)     Risk Assessment  [place] Suicide Severity Rating Scale Full Clinical Version:  Suicidal Ideation  Q1 Wish to be Dead (Lifetime): Yes  Q2 Non-Specific Active Suicidal Thoughts (Lifetime): Yes  3. Active Suicidal Ideation with any Methods (Not Plan) Without Intent to Act (Lifetime):  Yes  Q4 Active Suicidal Ideation with Some Intent to Act, Without Specific Plan (Lifetime): Yes  Q5 Active Suicidal Ideation with Specific Plan and Intent (Lifetime): Yes  Q6 Suicide Behavior (Lifetime): yes     Suicidal Behavior (Lifetime)  Actual Attempt (Lifetime): Yes  Total Number of Actual Attempts (Lifetime): 4  Has subject engaged in non-suicidal self-injurious behavior? (Lifetime): Yes  Interrupted Attempts (Lifetime): No  Aborted or Self-Interrupted Attempt (Lifetime): No  Preparatory Acts or Behavior (Lifetime): No    [place] Suicide Severity Rating Scale Recent:   Suicidal Ideation (Recent)  Q1 Wished to be Dead (Past Month): yes  Q2 Suicidal Thoughts (Past Month): yes  Q3 Suicidal Thought Method: yes  Q4 Suicidal Intent without Specific Plan: yes  Q5 Suicide Intent with Specific Plan: no  If yes to Q6, within past 3 months?: no  Level of Risk per Screen: high risk  Intensity of Ideation (Recent)  Frequency (Past 1 Month): 2-5 times in week  Duration (Past 1 Month): Less than 1 hour/some of the time  Controllability (Past 1 Month): Can control thoughts with little difficulty  Deterrents (Past 1 Month): Deterrents probably stopped you  Reasons for Ideation (Past 1 Month): Equally to get attention, revenge, or a reaction from others and to end/stop the pain  Suicidal Behavior (Recent)  Actual Attempt (Past 3 Months): No  Has subject engaged in non-suicidal self-injurious behavior? (Past 3 Months): No  Interrupted Attempts (Past 3 Months): No  Aborted or Self-Interrupted Attempt (Past 3 Months): No  Preparatory Acts or Behavior (Past 3 Months): No    Environmental or Psychosocial Events: legal issues such as DWI, DUI, lawsuit, CPS involvement, etc., suicide bereavement, ongoing abuse of substances  Protective Factors: Protective Factors: strong bond to family unit, community support, or employment, lives in a responsibly safe and stable environment, help seeking, cultural, spiritual , or Yazdanism beliefs  associated with meaning and value in life, optimistic outlook - identification of future goals    Does the patient have thoughts of harming others? Feels Like Hurting Others: no  Previous Attempt to Hurt Others: no    Is the patient engaging in sexually inappropriate behavior?           Mental Status Exam   Affect: Flat  Appearance: Appropriate  Attention Span/Concentration: Attentive  Eye Contact: Engaged    Fund of Knowledge: Appropriate   Language /Speech Content: Fluent  Language /Speech Volume: Normal  Language /Speech Rate/Productions: Normal  Recent Memory: Intact  Remote Memory: Intact  Mood: Normal  Orientation to Person: Yes   Orientation to Place: Yes  Orientation to Time of Day: Yes  Orientation to Date: Yes     Situation (Do they understand why they are here?): Yes  Psychomotor Behavior: Normal  Thought Content: Hallucinations  Thought Form: Intact       Medication  Psychotropic medications:   Medication Orders - Psychiatric (From admission, onward)      Start     Dose/Rate Route Frequency Ordered Stop    07/08/24 2300  paliperidone ER (INVEGA) 24 hr tablet 3 mg         3 mg Oral AT BEDTIME 07/08/24 2257 07/08/24 2300  QUEtiapine (SEROquel) tablet 100 mg         100 mg Oral AT BEDTIME 07/08/24 2257 07/08/24 2254  nicotine (NICORETTE) gum 2 mg         2 mg Buccal EVERY 1 HOUR PRN 07/08/24 2254      07/08/24 2254  hydrOXYzine HCl (ATARAX) tablet 50 mg         50 mg Oral EVERY 6 HOURS PRN 07/08/24 2254 07/08/24 2254  traZODone (DESYREL) tablet 50 mg         50 mg Oral AT BEDTIME PRN 07/08/24 2254               Current Care Team  Patient Care Team:  Clinic, [facility] as PCP - General  Healthcare, [facility] as PCP - Mental Health/Behavioral Medicine    Diagnosis  Patient Active Problem List   Diagnosis Code    Degeneration of lumbar or lumbosacral intervertebral disc M51.37    Major depression, recurrent (H24) F33.9    Opiate addiction (H) F11.20    Methadone overdose  (H) T40.3X1A    Suicide attempt (H) T14.91XA    Depression F32.A    Lithium overdose, intentional self-harm, initial encounter (H) T56.892A    Borderline personality disorder (H) F60.3    Bipolar II disorder (H) F31.81    Benzodiazepine dependence (H) F13.20    Acute hepatitis C virus infection B17.10    Bipolar disorder (H) F31.9    Suicidal ideation R45.851    Bipolar 1 disorder (H) F31.9    Chronic pain syndrome G89.4    Benzodiazepine withdrawal without complication (H) F13.930    Depression with suicidal ideation F32.A, R45.851    Sedative, hypnotic or anxiolytic abuse, continuous (H) F13.10    Depression, recurrent (H24) F33.9    Opioid use disorder, severe, on maintenance therapy (H) F11.20    Other specified anxiety disorders F41.8    Palpitations R00.2    Auditory hallucinations R44.0    Psychosis, unspecified psychosis type (H) F29       Primary Problem This Admission  Active Hospital Problems    *Psychosis, unspecified psychosis type (H)      Palpitations      Auditory hallucinations      Suicidal ideation        Clinical Summary and Substantiation of Recommendations   A lower level of care has been unsuccessful in treating and stabilizing patient s mental health symptoms. Patient will remain on EmPATH unit under observation for continued monitoring, treatment and therapeutic intervention of mental health symptoms. Observation at EmPATH could help mitigate the need for a more restrictive level of care in an inpatient setting.    Patient coping skills attempted to reduce the crisis:  called 911    Disposition  Recommended disposition: Observation        Reviewed case and recommendations with attending provider. Attending Name: provider not available at time of assessment       Attending concurs with disposition:  (provider not available at time of assessment)       Patient and/or validated legal guardian concurs with disposition:   yes       Final disposition:  observation    Legal status on admission:  Voluntary/Patient has signed consent for treatment    Assessment Details   Total duration spent with the patient: 30 min     CPT code(s) utilized: 59058 - Psychotherapy for Crisis - 60 (30-74*) min    Nayeli Elliott, [facility], LADC, Psychotherapist  DEC - Triage & Transition Services  Callback: [phone]

## 2024-07-09 NOTE — PLAN OF CARE
Jihan Gill  July 9, 2024  Plan of Care Hand-off Note     Patient Care Path: observation    Plan for Care:   A lower level of care has been unsuccessful in treating and stabilizing patient s mental health symptoms. Patient will remain on EmPATH unit under observation for continued monitoring, treatment and therapeutic intervention of mental health symptoms. Observation at EmPATH could help mitigate the need for a more restrictive level of care in an inpatient setting.    Identified Goals and Safety Issues: decrease auditory hallucinations, engage in safety planning    Overview:  father Mcdonald, [phone]      Legal Status: Legal Status at Admission: Voluntary/Patient has signed consent for treatment    Psychiatry Consult: pt will meet with psychiatrist on EmPATH in the AM       Updated RN regarding plan of care.      Nayeli Elliott, LPCC, LADC

## 2024-07-09 NOTE — PROGRESS NOTES
Pt's , Cecy Interiano ([phone]), called looking for update. Writer informed CM that we are waiting for psychiatry provider to give recommendation for POC.     LOUANN ShepardSW

## 2024-07-09 NOTE — ED NOTES
Pt has been resting on the recliner. Pt declined dinner tray. Pt informed tray will be placed on table near her in case she changes her mind. Pt nodded in acknowledgement.

## 2024-07-09 NOTE — ED PROVIDER NOTES
"  Emergency Department Note      History of Present Illness     Chief Complaint   Palpitations (/) and Suicidal      HPI   Jihan Gill is a 49 year old female with history of bipolar 1 disorder, polysubstance abuse, depression, anxiety who presents to the ED for evaluation of palpitations and suicidal ideations.  Patient states that she has had intermittent palpitations for about a week.  Last night she had an episode of palpitations that were more constant.  She also has some chest tightness today.  She denies any cough, fever, rhinorrhea, sore throat.  No history of prior heart attacks or stroke.  No recent travel or surgery.  She was at her methadone clinic this morning.  She then went on a walk and started to have some auditory and visual hallucinations.  She states that she heard voices telling her that they were \" coming to get her and her son\".  She states that she has had suicidal ideations recently.  No recent attempts to harm herself.  She states that she has a plan to walk into traffic or ingest a bunch of pills.  She denies any substance use or alcohol use currently.  She states that she would like some help.    Independent Historian   Patient only.    Review of External Notes   Reviewed ED note from 1/30/2024.  She was seen for auditory hallucinations in the setting of medication noncompliance. She was discharged to her sober living facility.     Past Medical History     Medical History and Problem List   Past Medical History:   Diagnosis Date    Arthritis     Benzodiazepine abuse     Bipolar 1 disorder     Chronic hepatitis C     Chronic pain syndrome     History of acetaminophen overdose     History of lithium overdose     Major depression     Opiate addiction     Polysubstance abuse     Postpartum depression     Posttraumatic stress disorder     Renal calculus 2001       Medications   clonazePAM (KLONOPIN) 1 MG tablet  gabapentin (NEURONTIN) 300 MG capsule  gabapentin (NEURONTIN) 600 MG " "tablet  metFORMIN (GLUCOPHAGE XR) 500 MG 24 hr tablet  methadone HCl 10 MG/5ML SOLN  paliperidone ER (INVEGA) 3 MG 24 hr tablet  QUEtiapine (SEROQUEL) 100 MG tablet  albuterol (PROAIR HFA/PROVENTIL HFA/VENTOLIN HFA) 108 (90 Base) MCG/ACT inhaler  diphenhydrAMINE (BENADRYL) 25 MG capsule  fish oil-omega-3 fatty acids 1000 MG capsule  gabapentin (NEURONTIN) 800 MG tablet  glycopyrrolate (ROBINUL) 1 MG tablet  hydrOXYzine (ATARAX) 25 MG tablet  methocarbamol (ROBAXIN) 500 MG tablet  nicotine (NICORETTE) 2 MG gum  PHENobarbital (LUMINAL) 32.4 MG tablet  QUEtiapine (SEROQUEL) 25 MG tablet  ramelteon (ROZEREM) 8 MG tablet  vortioxetine (TRINTELLIX) 20 MG tablet        Surgical History   Past Surgical History:   Procedure Laterality Date    C/SECTION, LOW TRANSVERSE      x5    Incision and drainage of breast abscess Left     TONSILLECTOMY         Physical Exam     Patient Vitals for the past 24 hrs:   BP Temp Temp src Pulse Resp SpO2 Height Weight   07/08/24 2321 129/89 98.1  F (36.7  C) Oral 102 16 96 % 1.626 m (5' 4\") 103.5 kg (228 lb 1.6 oz)   07/08/24 2100 124/85 -- -- 94 -- 95 % -- --   07/08/24 2030 124/80 -- -- 97 13 97 % -- --   07/08/24 2015 124/85 -- -- 101 20 98 % -- --   07/08/24 2005 136/83 -- -- 98 -- 97 % -- --   07/08/24 1534 130/84 98  F (36.7  C) Temporal 106 17 97 % 1.651 m (5' 5\") 111.1 kg (245 lb)     Physical Exam  Physical Exam:  General: lying comfortably on hospital bed  Head: normocephalic, atraumatic  Eyes: PERRLA, EOMI  Ears: External ears appear normal.   Nose: no signs of bleeding   Throat: moist mucous membranes  Neck: No JVD  CV: tachycardic, regular rhythm  Pulm: lungs clear to ausculation bilaterally, normal respiratory effort, normal chest expansion with breathing   Abdomen: soft, non-tender, non-distended  MSK: No trauma  Ext: normal range of motion of all extremities. No gross deformities  Skin: warm, dry, no rashes  Neuro: alert and oriented  Psych: Appropriate mood. " Cooperative      Diagnostics     Lab Results   Labs Ordered and Resulted from Time of ED Arrival to Time of ED Departure   COMPREHENSIVE METABOLIC PANEL - Abnormal       Result Value    Sodium 139      Potassium 4.0      Carbon Dioxide (CO2) 30 (*)     Anion Gap 11      Urea Nitrogen 13.5      Creatinine 0.88      GFR Estimate 81      Calcium 10.9 (*)     Chloride 98      Glucose 127 (*)     Alkaline Phosphatase 144      AST 19      ALT 21      Protein Total 8.8 (*)     Albumin 4.8      Bilirubin Total 0.4     TROPONIN T, HIGH SENSITIVITY - Abnormal    Troponin T, High Sensitivity 22 (*)    CBC WITH PLATELETS AND DIFFERENTIAL - Abnormal    WBC Count 11.1 (*)     RBC Count 4.79      Hemoglobin 14.2      Hematocrit 42.9      MCV 90      MCH 29.6      MCHC 33.1      RDW 13.3      Platelet Count 306      % Neutrophils 68      % Lymphocytes 23      % Monocytes 8      % Eosinophils 0      % Basophils 0      % Immature Granulocytes 0      NRBCs per 100 WBC 0      Absolute Neutrophils 7.6      Absolute Lymphocytes 2.5      Absolute Monocytes 0.9      Absolute Eosinophils 0.0      Absolute Basophils 0.0      Absolute Immature Granulocytes 0.0      Absolute NRBCs 0.0     TROPONIN T, HIGH SENSITIVITY - Abnormal    Troponin T, High Sensitivity 22 (*)    URINE DRUG SCREEN PANEL - Abnormal    Amphetamines Urine Screen Positive (*)     Barbituates Urine Screen Negative      Benzodiazepine Urine Screen Positive (*)     Cannabinoids Urine Screen Negative      Cocaine Urine Screen Negative      Fentanyl Qual Urine Screen Positive (*)     Opiates Urine Screen Negative      PCP Urine Screen Negative     TSH WITH FREE T4 REFLEX - Normal    TSH 0.98     ETHYL ALCOHOL LEVEL - Normal    Alcohol ethyl <0.01     HCG QUANTITATIVE PREGNANCY - Normal    hCG Quantitative <1         Imaging   XR Chest 2 Views   Final Result   IMPRESSION: Lungs are clear. No pleural effusion. Heart size and pulmonary vascularity within normal limits.          EKG    ECG results from 07/08/24   EKG 12 lead     Value    Systolic Blood Pressure     Diastolic Blood Pressure     Ventricular Rate 112    Atrial Rate 112    LA Interval 128    QRS Duration 78        QTc 444    P Axis 80    R AXIS 90    T Axis 66    Interpretation ECG      Sinus tachycardia  Rightward axis  Borderline ECG  When compared with ECG of 20-MAY-2023 01:51,  No significant change was found  Confirmed by GENERATED REPORT, COMPUTER ([id number]),  Aasen, Bradley ([id number]) on 7/8/2024 4:24:21 PM           Independent Interpretation   None    ED Course      Medications Administered   Medications   loperamide (IMODIUM) capsule 2 mg (has no administration in time range)   melatonin tablet 5 mg (has no administration in time range)   alum & mag hydroxide-simethicone (MAALOX) 200-200-25 MG chewable tablet 2 tablet (has no administration in time range)   calcium carbonate (TUMS) chewable tablet 500 mg (has no administration in time range)   magnesium hydroxide (MILK OF MAGNESIA) suspension 30 mL (has no administration in time range)   ondansetron (ZOFRAN ODT) ODT tab 4 mg (has no administration in time range)   cloNIDine (CATAPRES) tablet 0.1 mg (has no administration in time range)   acetaminophen (TYLENOL) tablet 650 mg (has no administration in time range)   hydrOXYzine HCl (ATARAX) tablet 50 mg (has no administration in time range)   traZODone (DESYREL) tablet 50 mg (has no administration in time range)   nicotine (NICORETTE) gum 2 mg (has no administration in time range)   paliperidone ER (INVEGA) 24 hr tablet 3 mg (3 mg Oral $Given 7/9/24 0012)   gabapentin (NEURONTIN) capsule 600 mg (600 mg Oral $Given 7/9/24 0012)   QUEtiapine (SEROquel) tablet 100 mg (100 mg Oral $Given 7/9/24 0012)   clonazePAM (klonoPIN) tablet 1 mg (has no administration in time range)   hydrOXYzine HCl (ATARAX) tablet 25 mg (25 mg Oral Not Given 7/8/24 2143)       Procedures   Procedures     Discussion of Management   None    ED Course    ED Course as of 07/09/24 0038   Mon Jul 08, 2024 2016 I evaluated patient and obtained history.   2127 I obtained history and examined the patient as noted above.        Optional/Additional Documentation  None    Medical Decision Making / Diagnosis     CMS Diagnoses: None    MIPS       None    [facility] [place]   Jihan Gill is a 49 year old female who presents to the ED for evaluation of palpitations and suicidal ideations.  Vitals notable for initial tachycardia which later resolved.  Patient states that she has had intermittent palpitations over a week.  Last night she felt the palpitations returned and has been constant today.  She also endorses chest tightness.  No coughing, fever, shortness of breath, nausea, vomiting.  Broad differential was considered including ACS, hyperthyroidism, A-fib/a flutter, SVT, PE.  EKG shows sinus tachycardia.  No ischemic changes.  No dysrhythmias.  Initial troponin was elevated but repeat troponin was flat.  Low suspicion for ACS.  Patient's history and exam findings are not consistent with PE.  No shortness of breath.  No hypoxia, no pleuritic chest pain, no recent surgeries or travel.  Chest x-ray does not show evidence of pneumonia, pleural effusion, pneumothorax.  TSH was normal.  Patient remained on cardiac monitor during ED stay without evidence of dysrhythmia. Will plan to follow up with PCP.     Patient also presents with suicidal ideations, depression, and auditory hallucinations.  She has a history of chronic substance use, bipolar disorder, psychosis.  She states that she has been having more suicidal ideations recently with ongoing auditory hallucinations.  She also states that she has a plan of walking into traffic or ingesting pills.  She sees a psychiatrist every month.  She is also on Invega which she ran out of.  Patient is calm and coherent on exam.  No signs of agitation.  She is requesting help for her mental health.  Will plan for DEC consult and she will be  transferred to [facility].      Disposition   [facility]     Diagnosis     ICD-10-CM    1. Auditory hallucinations  R44.0       2. Suicidal ideation  R45.851       3. Palpitations  R00.2            Discharge Medications   New Prescriptions    No medications on file         BOSTON Calvo Kausar, PA-C  07/09/24 0198

## 2024-07-09 NOTE — ED PROVIDER NOTES
ED APC SUPERVISION NOTE:   I evaluated this patient in conjunction with Kip Zamora PA-C  I have participated in the care of the patient and personally performed key elements of the history, exam, and medical decision making.      HPI:   Jihan Gill is a 49 year old female presenting to the emergency department for evaluation of palpitations and suicidal ideation. The patient reports ongoing palpitations for the past week which worsened last night. The patient reports some left sided chest discomfort. The patient denies any cough, fever, rhinorrhea, nausea, vomiting, or shortness of breath. The patient also reports that while she was at her Methadone clinic this morning, she decided to go for a walk when she began experiencing auditory and visual hallucinations. The patient says she is actively suicidal and has a plan to walk into traffic or overdose on medication.  She denies any ingestion of medications.  She does report that she used methamphetamines a few days ago, but nothing recently.  She also has a history of alcohol use many years ago, but none recently.  She reports the auditory hallucinations are quite bothersome.  She is desiring help for her mental health.    Independent Historian:   None    Review of External Notes:   1/30/2024 [facility] emergency center note from Dr. Romero where she was seen for her mental health as well.     EXAM:   General:  Sitting on bed, comfortable appearing.   HENT:  No obvious trauma to head  Right Ear:  External ear normal.   Left Ear:  External ear normal.   Nose:  Nose normal.   Eyes:  Conjunctivae and EOM are normal.  Neck: Normal range of motion. Neck supple. No tracheal deviation present.   Pulm/Chest: No respiratory distress  M/S: Normal range of motion.   Neuro: Alert. GCS 15.  Skin: Skin is warm and dry. No rash noted. Not diaphoretic.   Psych: Normal mood and affect. Behavior is normal.     Independent Interpretation (X-rays, CTs, rhythm  strip):  None    Consultations/Discussion of Management or Tests:  None     Social Determinants of Health affecting care:   None     MEDICAL DECISION MAKING/ASSESSMENT AND PLAN:   Jihan Gill is a very pleasant 49 year old year old patient who presents to the emergency department with concern of auditory hallucinations.  She is also had some palpitations.  TSH is normal.  Electrolytes are unremarkable.  Screening EKG confirms a sinus rhythm. The EKG was reviewed and shows no evidence of Brugada syndrome, Black-Parkinson-White, hypertrophic cardiomyopathy or prolonged QTc syndrome.  No shortness of breath or hypoxia to suggest pulmonary embolism.  Serial high-sensitivity troponins were very slightly detectable, but are negative.  I doubt ACS.  Alcohol level is negative.  Urine drug screen is positive for amphetamines, benzodiazepines, and fentanyl.  Patient denies any ingestion of other substances.  Patient is medically cleared at this time will be sent to [facility] for continued evaluation and treatment of her mental health.  I called report.  Patient is cooperative and in agreement.  She declined a dose of Zyprexa, but was amenable to a dose of hydroxyzine before she went to [facility].     DIAGNOSIS:     ICD-10-CM    1. Auditory hallucinations  R44.0       2. Suicidal ideation  R45.851       3. Palpitations  R00.2                DISPOSITION:   The patient was transferred to [facility].        Scribe Disclosure:  I, Ahmet Torres, am serving as a scribe at 8:31 PM on 7/8/2024 to document services personally performed by Brendon Gonzalez DO based on my observations and the provider's statements to me.   7/8/2024  [facility] EMERGENCY DEPT     Brendon Gonzalez DO  07/08/24 2228

## 2024-07-09 NOTE — ED NOTES
Call placed to [facility] to verify methadone dosage, patient takes 90 mg liquid oral daily, last had yesterday morning.

## 2024-07-09 NOTE — PROGRESS NOTES
Pt was restless and walking around the unit the first 2 hours of this shift but afterwards pt finally settled down and was able to go to sleep. Pt has been sleeping in chair 3 with no distress noted; breathing even and unlabored.

## 2024-07-09 NOTE — PROGRESS NOTES
Patient is sitting in the chair and appears calm. States that she is hearing voices telling her that she is going to pass out. Denies SI at this time.

## 2024-07-10 ENCOUNTER — TELEPHONE (OUTPATIENT)
Dept: BEHAVIORAL HEALTH | Facility: CLINIC | Age: 49
End: 2024-07-10
Payer: COMMERCIAL

## 2024-07-10 PROCEDURE — G0378 HOSPITAL OBSERVATION PER HR: HCPCS

## 2024-07-10 PROCEDURE — 250N000013 HC RX MED GY IP 250 OP 250 PS 637: Performed by: PSYCHIATRY & NEUROLOGY

## 2024-07-10 PROCEDURE — 99233 SBSQ HOSP IP/OBS HIGH 50: CPT | Performed by: PSYCHIATRY & NEUROLOGY

## 2024-07-10 RX ADMIN — QUETIAPINE FUMARATE 100 MG: 100 TABLET ORAL at 21:55

## 2024-07-10 RX ADMIN — VILAZODONE HYDROCHLORIDE 10 MG: 10 TABLET ORAL at 09:51

## 2024-07-10 RX ADMIN — PALIPERIDONE 6 MG: 6 TABLET, EXTENDED RELEASE ORAL at 21:52

## 2024-07-10 RX ADMIN — METHADONE HYDROCHLORIDE 90 MG: 10 CONCENTRATE ORAL at 09:51

## 2024-07-10 RX ADMIN — GABAPENTIN 600 MG: 300 CAPSULE ORAL at 21:53

## 2024-07-10 RX ADMIN — ACETAMINOPHEN 650 MG: 325 TABLET, FILM COATED ORAL at 15:10

## 2024-07-10 RX ADMIN — CLONAZEPAM 1 MG: 1 TABLET ORAL at 06:59

## 2024-07-10 RX ADMIN — CLONAZEPAM 1 MG: 1 TABLET ORAL at 16:44

## 2024-07-10 ASSESSMENT — ACTIVITIES OF DAILY LIVING (ADL)
ADLS_ACUITY_SCORE: 37

## 2024-07-10 NOTE — PROGRESS NOTES
Patient requested PRN tylenol for headache. Tylenol was offered. Pt went back to rest in the recliner.

## 2024-07-10 NOTE — ED NOTES
Pt spent all shift sleeping. She reports she feels tired. Pt denies any SI/HI/[place]. She endorses a headache she rated 4/10. Pt requested and received PRN quetiapine and tylenol.

## 2024-07-10 NOTE — TELEPHONE ENCOUNTER
S: [facility] ED , DEC  Emerald  calling at 2:28 PM about a 49 year old/Female presenting with AH wanting to hurt her and also saying her 23 yr old son has passed away is in her food. Pt has been reluctant and scared to eat. Pt son has not passed away and is still very much alive. Pt reporting SI with plan to run into traffic or overdose if discharged.     B: Pt arrived via EMS. Presenting problem, stressors: Pt stressors, substance use, SI and worsening psychosocial stressors. Pt mentioned having high stress and having thoughts she didn't want to be around anymore.    Pt affect in ED: Flat and Apathetic  Pt Dx: Unspecified Psychosis  Previous [facility] hx? Yes: January 5th 2024.  Pt endorses SI with a plan to overdose or to run into traffic.    Hx of suicide attempt? Yes: 4 SA.  Pt has a remote hx of SIB via unknown  Pt denies HI   Pt endorses auditory hallucinations .   Pt RARS Score: 3    Hx of aggression/violence, sexual offenses, legal concerns, Epic care plan? describe: No  Current concerns for aggression this visit? No  Does pt have a history of Civil Commitment? Yes, currently on MICD civil commitment  Is Pt their own guardian? Yes    Pt is prescribed medication. Is patient medication compliant? Yes  Pt endorses OP services: Psychiatrist and   CD concerns: Actively using/consuming Fentanyl and Meth  Acute or chronic medical concerns: No  Does Pt present with specific needs, assistive devices, or exclusionary criteria? None      Pt is ambulatory  Pt is able to perform ADLs independently      A: Pt to be reviewed for [facility] admission. Pt is Voluntary  [facility]erred [facility]: [facility]- Also Flexible to placement outside of [facility] did not specify.    COVID Symptoms: No  If yes, COVID test required   Utox: Positive for Amphetamines, Fentanyl and Benzodiazepine    CMP: Abnormalities: Carbon Dioxide 30, Calcium 10.9,Glucose 127, Protein Total 8.8  CBC: Abnormalities: WBC Count 11.1,   HCG: Negative    R:  Patient cleared and ready for behavioral bed placement: Yes  Pt placed on [facility] worklist? Yes    Does Patient need a Transfer Center request created? Yes, writer completed Transfer Center request at:  2:43 PM    3:53 PM/4:03 PM Per call to Alea SORIANO [place] pt will meet exclusionary criteria d/t suboxone and/or methadone use.    R: [facility] Call Log 7/10/24 3:25 PM:   Intake has called facilities that have not updated the bed status within the last 12 hours.   -[facility]+1 HR                          [facility] is at capacity           [facility] is posting 0 beds. [phone]   [facility] is posting 0 beds. Negative covid required  [facility] is posting 0 beds. Neg covid. No high school/[place]-psych. [phone]. Per call at 8:10am, at cap. Can try after 9:30am  [facility] is posting 0 beds. [phone]  [facility] is posting 0 beds. [phone]   [facility] is posting 3 beds. Negative covid. [phone]. PT NOT APPROPRIATE D/T UNIT RESTRICTIONS.  [facility] is posting 0 beds [phone]    [facility] is posting 4 beds. LOW acuity ONLY. Mixed unit 12+. Negative covid- [phone]  [facility] has 0 beds posted. No aggression. Negative Covid. Low acuity.  [facility] is posting 4 beds. Negative covid. [phone]   [facility]) is posting 1 bed. Low acuity only. Neg covid.  [phone]     Pt remains on the work list pending appropriate bed availability.

## 2024-07-10 NOTE — ED PROVIDER NOTES
EmPATH Unit - Psychiatric Consultation  [facility] Emergency Department    Jihan Gill MRN: [id number]   Age: 49 year old YOB: 1975     History     Chief Complaint   Patient presents with    Palpitations           Suicidal     HPI  Jihan Gill is a 49 year old female with history notable for mood instability and psychosis giving rise to a variety of diagnoses in the context of illicit substance usage.  She is currently under observation status on the EmPATH unit, now approaching 45 hours in the emergency department.  On reassessment today, the patient reports ongoing hallucinations without improvement.  These hallucinations influence feelings of paranoia.  For example, she reports hearing a voice that tells her the water may be contaminated and so she is hesitant to drink water from the dispensing fountain.  Suicidal thoughts persist and her mood remains depressed.  She is tolerating Viibryd without side effects.  She confirms maintaining adherence with Invega 6 mg daily prior to her arrival.  She would be open to optimizing the dose.  She is requesting psychiatric hospitalization as she notes ongoing symptom intensity.    Past Medical History  Past Medical History:   Diagnosis Date    Arthritis     Benzodiazepine abuse     Bipolar 1 disorder     Chronic hepatitis C     Chronic pain syndrome     History of acetaminophen overdose     History of lithium overdose     Major depression     Opiate addiction     Polysubstance abuse     Postpartum depression     Posttraumatic stress disorder     Renal calculus 2001     Past Surgical History:   Procedure Laterality Date    C/SECTION, LOW TRANSVERSE      x5    Incision and drainage of breast abscess Left     TONSILLECTOMY       clonazePAM (KLONOPIN) 1 MG tablet  gabapentin (NEURONTIN) 300 MG capsule  gabapentin (NEURONTIN) 600 MG tablet  metFORMIN (GLUCOPHAGE XR) 500 MG 24 hr tablet  methadone HCl 10 MG/5ML SOLN  paliperidone ER (INVEGA) 3 MG 24  hr tablet  QUEtiapine (SEROQUEL) 100 MG tablet  albuterol (PROAIR HFA/PROVENTIL HFA/VENTOLIN HFA) 108 (90 Base) MCG/ACT inhaler  diphenhydrAMINE (BENADRYL) 25 MG capsule  fish oil-omega-3 fatty acids 1000 MG capsule  gabapentin (NEURONTIN) 800 MG tablet  glycopyrrolate (ROBINUL) 1 MG tablet  hydrOXYzine (ATARAX) 25 MG tablet  methocarbamol (ROBAXIN) 500 MG tablet  nicotine (NICORETTE) 2 MG gum  PHENobarbital (LUMINAL) 32.4 MG tablet  QUEtiapine (SEROQUEL) 25 MG tablet  ramelteon (ROZEREM) 8 MG tablet  vortioxetine (TRINTELLIX) 20 MG tablet      Allergies   Allergen Reactions    Buspirone Hives    Droperidol Anxiety and Other (See Comments)     SHAKING  Tardive Dyskinesia    Ketorolac Hives    Metoclopramide Anxiety, Other (See Comments) and Rash     Extrapyramidal Side Effect, Dystonia    Prochlorperazine Anxiety and Other (See Comments)     Extrapyramidal Side Effect, dystonia    Abilify [Aripiprazole] Other (See Comments)     Tardive dyskinesia    Allopurinol     Compazine Other (See Comments)     Dystonia    Dimenhydrinate Other (See Comments)     SHAKING  Jaw lock.  Agitation      Dramamine      Jaw lock.      Hydrocodone Nausea and Vomiting    Olanzapine Other (See Comments)     Tardive dyskinesia from Zyprexa - per patient    Sulfa Antibiotics Other (See Comments)     dystonia       Family History  Family History   Problem Relation Age of Onset    No Known Problems Father     No Known Problems Mother     No Known Problems Sister     No Known Problems Brother      Social History   Social History     Tobacco Use    Smoking status: Every Day     Current packs/day: 0.50     Average packs/day: 0.5 packs/day for 8.0 years (4.0 ttl pk-yrs)     Types: Cigarettes    Smokeless tobacco: Never   Substance Use Topics    Alcohol use: Not Currently     Comment: States no EtOH since 2008.    Drug use: Yes     Comment: Methadone 60mg/day street buy          Review of Systems  A medically appropriate review of systems was  "performed with pertinent positives and negatives noted in the HPI, and all other systems negative.    Physical Examination   BP: 130/84  Pulse: 106  Temp: 98  F (36.7  C)  Resp: 17  Height: 165.1 cm (5' 5\")  Weight: 111.1 kg (245 lb)  SpO2: 97 %    Physical Exam  General: Appears stated age.   Neuro: Alert and fully oriented. Extremities appear to demonstrate normal strength on visual inspection.   Integumentary/Skin: no rash visualized, normal color    Psychiatric Examination   Appearance: awake, alert  Attitude:  cooperative  Eye Contact:  fair  Mood:  anxious and depressed  Affect:  intensity is blunted  Speech:  clear, coherent  Psychomotor Behavior:  no evidence of tardive dyskinesia, dystonia, or tics  Thought Process:  linear  Associations:  no loose associations  Thought Content:  active suicidal ideation present and auditory hallucinations present  Insight:  fair  Judgement:  fair  Oriented to:  time, person, and place  Attention Span and Concentration:  fair  Recent and Remote Memory:  fair  Language: able to name/identify objects without impairment  Fund of Knowledge: intact with awareness of current and past events    ED Course     ED Course as of 07/10/24 1200   Mon Jul 08, 2024 2016 I evaluated patient and obtained history.   2127 I obtained history and examined the patient as noted above.        Labs Ordered and Resulted from Time of ED Arrival to Time of ED Departure   COMPREHENSIVE METABOLIC PANEL - Abnormal       Result Value    Sodium 139      Potassium 4.0      Carbon Dioxide (CO2) 30 (*)     Anion Gap 11      Urea Nitrogen 13.5      Creatinine 0.88      GFR Estimate 81      Calcium 10.9 (*)     Chloride 98      Glucose 127 (*)     Alkaline Phosphatase 144      AST 19      ALT 21      Protein Total 8.8 (*)     Albumin 4.8      Bilirubin Total 0.4     TROPONIN T, HIGH SENSITIVITY - Abnormal    Troponin T, High Sensitivity 22 (*)    CBC WITH PLATELETS AND DIFFERENTIAL - Abnormal    WBC Count 11.1 " (*)     RBC Count 4.79      Hemoglobin 14.2      Hematocrit 42.9      MCV 90      MCH 29.6      MCHC 33.1      RDW 13.3      Platelet Count 306      % Neutrophils 68      % Lymphocytes 23      % Monocytes 8      % Eosinophils 0      % Basophils 0      % Immature Granulocytes 0      NRBCs per 100 WBC 0      Absolute Neutrophils 7.6      Absolute Lymphocytes 2.5      Absolute Monocytes 0.9      Absolute Eosinophils 0.0      Absolute Basophils 0.0      Absolute Immature Granulocytes 0.0      Absolute NRBCs 0.0     TROPONIN T, HIGH SENSITIVITY - Abnormal    Troponin T, High Sensitivity 22 (*)    URINE DRUG SCREEN PANEL - Abnormal    Amphetamines Urine Screen Positive (*)     Barbituates Urine Screen Negative      Benzodiazepine Urine Screen Positive (*)     Cannabinoids Urine Screen Negative      Cocaine Urine Screen Negative      Fentanyl Qual Urine Screen Positive (*)     Opiates Urine Screen Negative      PCP Urine Screen Negative     TSH WITH FREE T4 REFLEX - Normal    TSH 0.98     ETHYL ALCOHOL LEVEL - Normal    Alcohol ethyl <0.01     HCG QUANTITATIVE PREGNANCY - Normal    hCG Quantitative <1         Assessments & Plan (with Medical Decision Making)   Patient presenting with worsening auditory hallucinations, depressed mood, and suicidal ideation in the context of a chronic mental health condition and likely worsened by recent relapse on illicit substances including methamphetamine and fentanyl.  Her treatment plan is focused on optimizing antipsychotic and antidepressant treatments and monitoring her response and tolerability as we await symptom improvement. Nursing notes reviewed noting no acute issues.         I have reviewed the assessment completed by the [facility].     Preliminary diagnosis:    ICD-10-CM    1. Auditory hallucinations  R44.0       2. Suicidal ideation  R45.851       3. Opioid use disorder, severe, on maintenance therapy (H)  F11.20       4. Borderline personality disorder (H)  F60.3       5.  Severe episode of recurrent major depressive disorder, without psychotic features (H)  F33.2     Rule out bipolar disorder           Treatment Plan:  -Increase Invega from 6 mg to 9 mg daily to optimize antipsychotic treatments  -Continue Viibryd 10 mg daily for antidepressant treatment  -Consider a chemical health assessment to explore residential MICD treatment options.  -Transfer to inpatient psychiatry for further treatment and stabilization    --  Anderson Monroe MD   [facility] EMERGENCY DEPT  EmPATH Unit       Anderson Monroe MD  07/10/24 2153

## 2024-07-10 NOTE — PROGRESS NOTES
Patient is sleeping in the recliner chair in the milieu. Pt declined breakfast tray. Pt was informed that they tray will left by her chair nearby in case she would like to it later.

## 2024-07-10 NOTE — PROGRESS NOTES
"Triage & Transition Services, Extended Care     Therapy Progress Note    Patient: Jihan goes by \"Jihan,\" uses she/her pronouns  Date of Service: July 10, 2024  Site of Service: [facility] EMERGENCY DEPT                             [id number]    Patient was seen: yes  Mode of Assessment: In person    Presentation Summary: Pt was in her recliner finishing up with nursing when this writer approached and invited her to meet in consult room for reassessment. Pt readily met with this writer. Pt reported, she was \"still hearing voices telling her that her 23 year old son has passed away and that he is in her food\" making it challenging for pt to eat despite feeling hungry. Pt mentioned feeling \"really weak\" and having a \"headache\" as well as unrestful sleep last night. Pt stated \"things are getting worse and the meds are not helping. The voices are getting worse--they are going to hurt me. I started hearing voices 6 months ago.\" Pt endorsed SI with a plan to overdose on medications and run into traffic if discharged as she does not feel safe to return home today--pt resides at the [facility]. Pt expressed a desire for IP mental health hospitalization as it has been helpful to her in the past with her longest stay having been a month. Pt reported being established with an outpatient psychiatrist and recently starting with a new . Pt did not endorse HI, VH or NSSIB.     Therapeutic Intervention(s) Provided: supportive listening, identified helpful coping skills pt could use in distress    Current Symptoms: apathy, thoughts of death/suicide, auditory hallucinations      Mental Status Exam   Affect: Flat  Appearance: Appropriate  Attention Span/Concentration: Attentive  Eye Contact: Engaged    Fund of Knowledge: Appropriate   Language /Speech Content: Fluent  Language /Speech Volume: Normal  Language /Speech Rate/Productions: Normal  Recent Memory: Intact  Remote Memory: Intact  Mood: " Apathetic  Orientation to Person: Yes   Orientation to Place: Yes  Orientation to Time of Day: Yes  Orientation to Date: Yes     Situation (Do they understand why they are here?): Yes  Psychomotor Behavior: Normal  Thought Content: Hallucinations  Thought Form: Obsessive/Perseverative    Treatment Objective(s) Addressed: rapport building, identifying and practicing coping strategies, processing feelings, safety planning, assessing safety    Patient Response to Interventions: acceptance expressed, verbalizes understanding    Progress Towards Goals:   Patient Reports Symptoms Are: worsening  Patient Progress Toward Goals: is not making progress  Next Step to Work Toward Discharge: symptom stabilization, patient ability to engage in safety planning, awaiting acceptance at community level of care    Case Management:   Case Management Included: collaborating with patient's support system  Details on Collaborating with Patient's Support System: Care Coordination with pt's  Cecy Interiano @ [phone]. JOSE signed.   Summary of Interaction: Care Coordination regarding pt's plan of care while at [facility].    Plan:   Final Disposition / Recommended Care Path: Inpatient mental health  Plan for Care reviewed with assigned Medical Provider: Yes. Anderson Monroe MD  Plan for Care Team Review: Provider; RN  Patient and/or validated legal guardian concurs: Yes    Clinical Substantiation: The pt was found to have a psychiatric condition that would benefit from inpatient mental health care for further stabilization and treatment of her mental health symptoms. Pt symptoms are ongoing including AH and SI with a plan to overdose on medications or run into traffic should she be discharged. Attempts at managing mental health symptoms and maintaining safety at a lower level of care have been unsuccessful. Pt s current mental health symptoms are requiring a secure setting and further intervention. Pt agreeable to this plan and  voluntary.    Legal Status:   Legal Status at Admission: Voluntary/Patient has signed consent for treatment    Session Status:   Time session started: 0950  Time session ended: 1010  Session Duration (minutes): 20 minutes  Session Number: 1  Anticipated number of sessions or this episode of care: 1    Time Spent: 20 minutes    CPT Code: CPT Codes: 78429 - Psychotherapy (with patient) - 30 (16-37*) min    Diagnosis:   Patient Active Problem List   Diagnosis Code    Degeneration of lumbar or lumbosacral intervertebral disc M51.37    Major depression, recurrent (H24) F33.9    Opiate addiction (H) F11.20    Methadone overdose (H) T40.3X1A    Suicide attempt (H) T14.91XA    Depression F32.A    Lithium overdose, intentional self-harm, initial encounter (H) T56.892A    Borderline personality disorder (H) F60.3    Bipolar II disorder (H) F31.81    Benzodiazepine dependence (H) F13.20    Acute hepatitis C virus infection B17.10    Bipolar disorder (H) F31.9    Suicidal ideation R45.851    Bipolar 1 disorder (H) F31.9    Chronic pain syndrome G89.4    Benzodiazepine withdrawal without complication (H) F13.930    Depression with suicidal ideation F32.A, R45.851    Sedative, hypnotic or anxiolytic abuse, continuous (H) F13.10    Depression, recurrent (H24) F33.9    Opioid use disorder, severe, on maintenance therapy (H) F11.20    Other specified anxiety disorders F41.8    Palpitations R00.2    Auditory hallucinations R44.0    Psychosis, unspecified psychosis type (H) F29       Primary Problem This Admission: Active Hospital Problems    *Psychosis, unspecified psychosis type (H)      Palpitations      Auditory hallucinations      Suicidal ideation              DILIA Lam   Licensed Mental Health Professional (LMHP), [facility]  [phone]

## 2024-07-11 ENCOUNTER — TELEPHONE (OUTPATIENT)
Dept: BEHAVIORAL HEALTH | Facility: CLINIC | Age: 49
End: 2024-07-11
Payer: COMMERCIAL

## 2024-07-11 VITALS
WEIGHT: 228.1 LBS | HEIGHT: 64 IN | DIASTOLIC BLOOD PRESSURE: 82 MMHG | BODY MASS INDEX: 38.94 KG/M2 | RESPIRATION RATE: 18 BRPM | HEART RATE: 92 BPM | SYSTOLIC BLOOD PRESSURE: 133 MMHG | OXYGEN SATURATION: 96 % | TEMPERATURE: 98.3 F

## 2024-07-11 LAB — SALICYLATES SERPL-MCNC: <0.3 MG/DL

## 2024-07-11 PROCEDURE — 99207 PR NO BILLABLE SERVICE THIS VISIT: CPT | Performed by: PSYCHIATRY & NEUROLOGY

## 2024-07-11 PROCEDURE — 250N000013 HC RX MED GY IP 250 OP 250 PS 637: Performed by: PSYCHIATRY & NEUROLOGY

## 2024-07-11 PROCEDURE — G0378 HOSPITAL OBSERVATION PER HR: HCPCS

## 2024-07-11 RX ORDER — QUETIAPINE FUMARATE 25 MG/1
100 TABLET, FILM COATED ORAL
COMMUNITY
Start: 2024-07-11

## 2024-07-11 RX ORDER — PALIPERIDONE 3 MG/1
6 TABLET, EXTENDED RELEASE ORAL AT BEDTIME
COMMUNITY
Start: 2024-07-11

## 2024-07-11 RX ADMIN — CLONAZEPAM 1 MG: 1 TABLET ORAL at 13:41

## 2024-07-11 RX ADMIN — CLONAZEPAM 1 MG: 1 TABLET ORAL at 07:52

## 2024-07-11 RX ADMIN — METHADONE HYDROCHLORIDE 90 MG: 10 CONCENTRATE ORAL at 07:51

## 2024-07-11 RX ADMIN — VILAZODONE HYDROCHLORIDE 10 MG: 10 TABLET ORAL at 07:38

## 2024-07-11 ASSESSMENT — COLUMBIA-SUICIDE SEVERITY RATING SCALE - C-SSRS
SUICIDE, SINCE LAST CONTACT: NO
1. SINCE LAST CONTACT, HAVE YOU WISHED YOU WERE DEAD OR WISHED YOU COULD GO TO SLEEP AND NOT WAKE UP?: NO
ATTEMPT SINCE LAST CONTACT: NO
TOTAL  NUMBER OF ABORTED OR SELF INTERRUPTED ATTEMPTS SINCE LAST CONTACT: NO
TOTAL  NUMBER OF INTERRUPTED ATTEMPTS SINCE LAST CONTACT: NO
2. HAVE YOU ACTUALLY HAD ANY THOUGHTS OF KILLING YOURSELF?: NO
6. HAVE YOU EVER DONE ANYTHING, STARTED TO DO ANYTHING, OR PREPARED TO DO ANYTHING TO END YOUR LIFE?: NO

## 2024-07-11 ASSESSMENT — ACTIVITIES OF DAILY LIVING (ADL)
ADLS_ACUITY_SCORE: 37
HYGIENE/GROOMING: INDEPENDENT
ADLS_ACUITY_SCORE: 37

## 2024-07-11 NOTE — PROGRESS NOTES
[facility] called, wanting to know if the PT has transferred to IP. Writer talked to The nurse and told them that we are still waiting on Intake to see if the bed is available.

## 2024-07-11 NOTE — DISCHARGE INSTRUCTIONS
Your Upcoming Appointment    Type: Teletherapy  Date: Monday, 7/15/2024  Time: 10:00 am - 11:00 am  Provider: Alanna HINOJOSA  LICSW  Location: [facility], [address], [place] [address]  Phone: [phone]  Patient Instructions: Appointments are via telehealth only. An email is needed to provide intake paperwork to be filled out prior to the appointment. Thanks!

## 2024-07-11 NOTE — ED NOTES
Patient approached staff and asked if she could go back to [facility] earlier. Later, patient requested klonopin for anxiety, due to having to wait. Now, patient is requesting to leave, since she is voluntary.

## 2024-07-11 NOTE — PROGRESS NOTES
"Triage and Transition Services Extended Care Reassessment     Patient: Jihan goes by \"Jihan,\" uses she/her pronouns  Date of Service: July 11, 2024  Site of Service: [facility] EMERGENCY DEPT                             [id number]  Patient was seen yes  Mode of Assessment: In person     Reason for Reassessment: other (see comment) (Pt requesting to discharge)    History of Patient's Original Emergency Room Encounter: The patient is a 48 y.o. female who comes to the ED with medics. Patient has a history of polysubstance abuse, depression, anxiety, alcohol abuse, borderline personality disorder, bipolar disorder, TBI, and schizoaffective disorder who presented to the ED for evaluation of auditory hallucinations. Patient discussed she has been \"hearing stuff and having crazy thoughts about someone hurting my son\". She reported hearing multiple voices saying \"someone was going to hurt my son\". She stated the voices are worse when she is alone. Pt has been hospitalized and went to treatment in January 2024. Patient denied any current suicidal thoughts or self-harm intent or plan. She reports a history of suicidal thoughts and suicide attempts a few years ago. Patient reported she is connected to a psychiatrist. Patient noted she is currently on a MI/CD commitment and is currently living at [facility]. Patient has a history of legal issues. Pt was in custodial in January 2024. She states that she has court dates coming up for driving after revocation and violating a restraining order her mom has against her. reported she was conditionally released from custodial last week. She stated she is not currently on probation.  Mental Health Care: : None. Commitment History: Yes. Patient is currently under a MI/CD Commitment.  Community Providers: [facility] Hospital[facility]: Yes, most recently at [facility] on 1/5/24 Chemical use/abuse: Pertinent Substance Use " History - Methamphetamines, benzodiazepines, alcohol, and nicotine.    Current Patient Presentation: Pt is calm, cooperative, and engaged with Writer for therapeutic check-in.    Presentation Summary: Pt was requesting to meet with Writer for therapeutic check-in to discuss possible discharge back to [facility]. When prompted by Writer to discuss what attributed to Pt's desire to discharge, Pt reports feeling physically well and a decrease in her auditory hallucinations. Writer had Pt score the severity of her auditory hallucinations from 0-10, and Pt reports they are at a 4 and initially presented at an 8. Pt reports they are still present yet she is able to tolerate them given she feels physically better. Writer and Pt discussed suicidal ideation, to which Pt declines experiencing any. Pt also declines any HI. When discussing visual hallucinations, Pt reports she at times sees things out of the corner of her eye, yet does not endorse anything currently. Pt reports feeling safe to discharge and was able to engage in and complete an aftercare/safety plan with Writer. Pt reports having a psychiatrist and a  and is willing to schedule with a therapist prior to returning to [facility].    Changes Observed Since Initial Assessment: patient/family request, decrease in presenting symptoms    Therapeutic Interventions Provided: Engaged in safety planning    Current Symptoms:   apathy, thoughts of death/suicide   auditory hallucinations      Mental Status Exam   Affect: Blunted  Appearance: Appropriate  Attention Span/Concentration: Attentive  Eye Contact: Engaged    Fund of Knowledge: Appropriate   Language /Speech Content: Fluent  Language /Speech Volume: Normal  Language /Speech Rate/Productions: Normal  Recent Memory: Intact  Remote Memory: Intact  Mood: Normal  Orientation to Person: Yes   Orientation to Place: Yes  Orientation to Time of Day: Yes  Orientation to Date: Yes     Situation (Do  they understand why they are here?): Yes  Psychomotor Behavior: Normal  Thought Content: Hallucinations  Thought Form: Intact    Treatment Objective(s) Addressed: rapport building, identifying an appropriate aftercare plan, identifying treatment goals, assessing safety, safety planning    Patient Response to Interventions: acceptance expressed, verbalizes understanding    Progress Towards Goals:  Patient Reports Symptoms Are: improving  Patient Progress Toward Goals: is making progress  Comment: Pt denies any SI, HI, or active VH. Pt reports decrease in auditory hallucinations and feels safe to discharge.  Next Step to Work Toward Discharge: symptom stabilization, patient ability to engage in safety planning  Symptom Stabilization Comment: Pt denies any SI, HI, or active VH. Pt reports decrease in auditory hallucinations and feels safe to discharge.  Ability to Engage Comment: Writer and Pt engaged in and completed an aftercare/safety plan.    Case Management: Case Management Included: collaborating with patient's support system  Details on Collaborating with Patient's Support System: NA  Summary of Interaction: NA    C-SSRS Since Last Contact:   1. Wish to be Dead (Since Last Contact): No  2. Non-Specific Active Suicidal Thoughts (Since Last Contact): No     Actual Attempt (Since Last Contact): No  Has subject engaged in non-suicidal self-injurious behavior? (Since Last Contact): No  Interrupted Attempts (Since Last Contact): No  Aborted or Self-Interrupted Attempt (Since Last Contact): No  Preparatory Acts or Behavior (Since Last Contact): No  Suicide (Since Last Contact): No     Calculated C-SSRS Risk Score (Since Last Contact): No Risk Indicated    Plan: Final Disposition / Recommended Care Path: discharge  Plan for Care reviewed with assigned Medical Provider: yes  Plan for Care Team Review: RN  Comments: Information from this check-in will be relayed to attending psychiatric provider.  Patient and/or validated  legal guardian concurs: yes  Clinical Substantiation:     Pt was requesting to meet with Writer for therapeutic check-in to discuss possible discharge back to [facility]. When prompted by Writer to discuss what attributed to Pt's desire to discharge, Pt reports feeling physically well and a decrease in her auditory hallucinations. Writer had Pt score the severity of her auditory hallucinations from 0-10, and Pt reports they are at a 4 and initially presented at an 8. Pt reports they are still present yet she is able to tolerate them given she feels physically better. Writer and Pt discussed suicidal ideation, to which Pt declines experiencing any. Pt also declines any HI. When discussing visual hallucinations, Pt reports she at times sees things out of the corner of her eye, yet does not endorse anything currently. Pt reports feeling safe to discharge and was able to engage in and complete an aftercare/safety plan with Writer. Pt reports having a psychiatrist and a  and is willing to schedule with a therapist prior to returning to [facility].    Pt presents with chronic symptoms of psychosis and polysubstance use. Hospitalization at this time is unlikely to modify high risk factors of poor coping skills and ongoing substance use. Furthermore, hospitalization may reinforce maladaptive coping skills and lack of self-determination. Pt has a well documented history of help-seeking behaviors in times of distress.    At this time IP MH admission is not being recommended due Pt requesting to discharge and feeling safe to do so. Pt was able to fully safety plan with writer. Pt's current presentation appears to be chronic in nature and Pt's current sx appear to be at Pt's baseline. Pt does appear to be at higher risk of death by suicide accidental or intentional due to mental health hx and substance use sx.  At this time IP MH admission does not appear to be the most therapeutically beneficial  intervention/ level of care for Pt. Pt appears to be able to use and motivated to engage in supportive mental health/ community resources.       Legal Status: Legal Status at Admission: Voluntary/Patient has signed consent for treatment    Session Status: Time session started: 1250  Time session ended: 1300  Session Duration (minutes): 10 minutes  Session Number: 2  Anticipated number of sessions or this episode of care: 2    Session Start Time: 1250  Session Stop Time: 1300  CPT codes: Non-Billable  Time Spent: 10 minutes      CPT code(s) utilized: Non-Billable    Diagnosis:   Patient Active Problem List   Diagnosis Code    Degeneration of lumbar or lumbosacral intervertebral disc M51.37    Major depression, recurrent (H24) F33.9    Opiate addiction (H) F11.20    Methadone overdose (H) T40.3X1A    Suicide attempt (H) T14.91XA    Depression F32.A    Lithium overdose, intentional self-harm, initial encounter (H) T56.892A    Borderline personality disorder (H) F60.3    Bipolar II disorder (H) F31.81    Benzodiazepine dependence (H) F13.20    Acute hepatitis C virus infection B17.10    Bipolar disorder (H) F31.9    Suicidal ideation R45.851    Bipolar 1 disorder (H) F31.9    Chronic pain syndrome G89.4    Benzodiazepine withdrawal without complication (H) F13.930    Depression with suicidal ideation F32.A, R45.851    Sedative, hypnotic or anxiolytic abuse, 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

## 2024-07-11 NOTE — PROGRESS NOTES
Central Intake called and requested lab work be obtained and records be faxed to [facility] @ [phone] for Inpt review. Received a call back reporting that the pt was too high acuity and they will not be accepting her.

## 2024-07-11 NOTE — TELEPHONE ENCOUNTER
R: [place]  Access Inpatient Bed Call Log  7/10/2024 11:35 PM  Intake has called facilities that have not updated their bed status within the last 12 hours.??      ADULTS:     *[place]  [place] -- [facility]: @ cap per website.  [place] -- [facility]:  @ Cap per website.   [place] -- [facility]: @ Cap per website.  [place] -- [facility]: @ Cap per website. - Per Don Rojo @ 11:35PM, they are full  [place] -- [facility]: @ Cap per website.  [facility] -- [facility]: @ Cap per website.  [place] -- [facility]YA beds @ Cap per website. Ages 18-28, Voluntary only, COVID test req'd, NO aggression, physical or sexual assault, violence hx or drug abuse, or psychosis. -Per Bianca @ 11:42PM, 1 YA bed  [place] -- [facility]: @ Cap per website.  [place] -- [facility]: @ cap per website.  [place] -- [facility]:  @ Posting 1 bed. - Per Valeria @ 11:36PM, they are at full capacity tonight     *[facility]: @ Posting 4 beds.  Mixed unit/Low acuity only.   [facility]: @ Cap per website. Low acuity, No aggression  [facility]: @ cap per website.  [facility]:  @ Posting 1 bed. Low acuity only. No current aggression. - Per Valeria @ 11:36PM, they are at full capacity tonight     Pt remains on waitlist pending appropriate placement availability.    2:19 AM [facility] Per linette, they are able to review.

## 2024-07-11 NOTE — TELEPHONE ENCOUNTER
Per EC, pt has d/c'd and can be removed from IP worklist. Pt removed and intake no longer following.

## 2024-07-11 NOTE — PROGRESS NOTES
The patient left prior to being reassessed today. No safety concerns identified by our therapist following their meeting.

## 2024-07-11 NOTE — ED NOTES
"Patient is calm and cooperative. States she is having auditory hallucinations, telling her that the methadone we are giving her is really narcan, and that that we are putting things in her food. Also the voices are telling her they are going to kill her son. Patient states she \"sees things\" out of the corner of her eye, like a figure walking by. Patient states she is tired, and not feeling like doing much. Low energy, cooperative and appropriate on the unit.  "

## 2024-07-11 NOTE — ED NOTES
Informed patient she is next in line to be seen. Patient still wants to leave AMA. Signed form. Patient denies suicidal ideation. Contracts for safety.

## 2024-07-11 NOTE — PROGRESS NOTES
Patient has been resting on the recliner chair. Pt ate about 50 percent of her dinner plate. Writer offered pt to take a shower she decline they offer. Calm and cooperative with VSS, and Meds. PT denies any S/HI right now. She contract for safety on the unit.

## 2024-07-11 NOTE — TELEPHONE ENCOUNTER
10:02 AM: Intake called [facility] to follow-up on review. MONICA Claros informed that Pt was declined by the Provider d/t acuity and current milieu.

## 2024-11-03 ENCOUNTER — HEALTH MAINTENANCE LETTER (OUTPATIENT)
Age: 49
End: 2024-11-03

## 2025-07-12 ENCOUNTER — HEALTH MAINTENANCE LETTER (OUTPATIENT)
Age: 50
End: 2025-07-12